# Patient Record
Sex: MALE | Race: WHITE | Employment: OTHER | URBAN - NONMETROPOLITAN AREA
[De-identification: names, ages, dates, MRNs, and addresses within clinical notes are randomized per-mention and may not be internally consistent; named-entity substitution may affect disease eponyms.]

---

## 2017-01-22 DIAGNOSIS — I10 ESSENTIAL HYPERTENSION: ICD-10-CM

## 2017-01-23 RX ORDER — AMLODIPINE BESYLATE 5 MG/1
TABLET ORAL
Qty: 60 TABLET | Refills: 5 | Status: SHIPPED | OUTPATIENT
Start: 2017-01-23 | End: 2017-06-06 | Stop reason: DRUGHIGH

## 2017-03-15 ENCOUNTER — TELEPHONE (OUTPATIENT)
Dept: CARDIOLOGY | Age: 62
End: 2017-03-15

## 2017-04-06 RX ORDER — LISINOPRIL 20 MG/1
20 TABLET ORAL 2 TIMES DAILY
Qty: 60 TABLET | Refills: 3 | Status: SHIPPED | OUTPATIENT
Start: 2017-04-06 | End: 2017-08-17 | Stop reason: SDUPTHER

## 2017-05-12 ENCOUNTER — OFFICE VISIT (OUTPATIENT)
Dept: GASTROENTEROLOGY | Age: 62
End: 2017-05-12
Payer: MEDICARE

## 2017-05-12 VITALS
SYSTOLIC BLOOD PRESSURE: 118 MMHG | WEIGHT: 174.6 LBS | HEIGHT: 67 IN | HEART RATE: 54 BPM | BODY MASS INDEX: 27.4 KG/M2 | DIASTOLIC BLOOD PRESSURE: 70 MMHG | OXYGEN SATURATION: 93 %

## 2017-05-12 DIAGNOSIS — R11.0 NAUSEA: ICD-10-CM

## 2017-05-12 DIAGNOSIS — R10.11 RIGHT UPPER QUADRANT ABDOMINAL PAIN: Primary | ICD-10-CM

## 2017-05-12 PROCEDURE — 99214 OFFICE O/P EST MOD 30 MIN: CPT | Performed by: NURSE PRACTITIONER

## 2017-05-12 RX ORDER — ONDANSETRON 4 MG/1
TABLET, FILM COATED ORAL
COMMUNITY
Start: 2017-05-09 | End: 2017-06-06 | Stop reason: ALTCHOICE

## 2017-05-12 RX ORDER — DICYCLOMINE HCL 20 MG
TABLET ORAL
COMMUNITY
Start: 2017-05-09 | End: 2017-06-06 | Stop reason: ALTCHOICE

## 2017-05-12 RX ORDER — MELOXICAM 15 MG/1
TABLET ORAL
Refills: 5 | COMMUNITY
Start: 2017-04-13 | End: 2018-03-29 | Stop reason: DRUGHIGH

## 2017-05-12 ASSESSMENT — ENCOUNTER SYMPTOMS
BLOOD IN STOOL: 0
SHORTNESS OF BREATH: 0
COUGH: 0
NAUSEA: 1
CONSTIPATION: 0
RECTAL PAIN: 0
ALLERGIC/IMMUNOLOGIC NEGATIVE: 1
TROUBLE SWALLOWING: 0
VOMITING: 0
SORE THROAT: 0
DIARRHEA: 0
BACK PAIN: 1
ABDOMINAL PAIN: 1
WHEEZING: 0
ABDOMINAL DISTENTION: 0
EYE DISCHARGE: 0
RHINORRHEA: 0
ANAL BLEEDING: 0

## 2017-05-16 ENCOUNTER — TELEPHONE (OUTPATIENT)
Dept: GASTROENTEROLOGY | Age: 62
End: 2017-05-16

## 2017-05-18 ENCOUNTER — ANESTHESIA (OUTPATIENT)
Dept: ENDOSCOPY | Age: 62
End: 2017-05-18
Payer: MEDICARE

## 2017-05-18 ENCOUNTER — PREP FOR PROCEDURE (OUTPATIENT)
Dept: SURGERY | Age: 62
End: 2017-05-18

## 2017-05-18 ENCOUNTER — OFFICE VISIT (OUTPATIENT)
Dept: SURGERY | Age: 62
End: 2017-05-18
Payer: MEDICARE

## 2017-05-18 ENCOUNTER — TELEPHONE (OUTPATIENT)
Dept: GASTROENTEROLOGY | Age: 62
End: 2017-05-18

## 2017-05-18 ENCOUNTER — ANESTHESIA EVENT (OUTPATIENT)
Dept: ENDOSCOPY | Age: 62
End: 2017-05-18
Payer: MEDICARE

## 2017-05-18 ENCOUNTER — HOSPITAL ENCOUNTER (OUTPATIENT)
Age: 62
Setting detail: OUTPATIENT SURGERY
Discharge: HOME | End: 2017-05-18
Attending: INTERNAL MEDICINE | Admitting: INTERNAL MEDICINE
Payer: MEDICARE

## 2017-05-18 VITALS
TEMPERATURE: 98.2 F | SYSTOLIC BLOOD PRESSURE: 150 MMHG | DIASTOLIC BLOOD PRESSURE: 80 MMHG | BODY MASS INDEX: 26.46 KG/M2 | WEIGHT: 168.6 LBS | HEIGHT: 67 IN

## 2017-05-18 VITALS
RESPIRATION RATE: 16 BRPM | HEART RATE: 79 BPM | DIASTOLIC BLOOD PRESSURE: 103 MMHG | OXYGEN SATURATION: 95 % | SYSTOLIC BLOOD PRESSURE: 166 MMHG | TEMPERATURE: 97.8 F

## 2017-05-18 VITALS
DIASTOLIC BLOOD PRESSURE: 94 MMHG | SYSTOLIC BLOOD PRESSURE: 177 MMHG | RESPIRATION RATE: 18 BRPM | OXYGEN SATURATION: 91 %

## 2017-05-18 DIAGNOSIS — K81.9 ACALCULOUS CHOLECYSTITIS: Primary | ICD-10-CM

## 2017-05-18 PROCEDURE — 7100000010 HC PHASE II RECOVERY - FIRST 15 MIN: Performed by: INTERNAL MEDICINE

## 2017-05-18 PROCEDURE — 87077 CULTURE AEROBIC IDENTIFY: CPT

## 2017-05-18 PROCEDURE — 3700000000 HC ANESTHESIA ATTENDED CARE: Performed by: INTERNAL MEDICINE

## 2017-05-18 PROCEDURE — 2500000003 HC RX 250 WO HCPCS: Performed by: NURSE ANESTHETIST, CERTIFIED REGISTERED

## 2017-05-18 PROCEDURE — 2500000003 HC RX 250 WO HCPCS: Performed by: INTERNAL MEDICINE

## 2017-05-18 PROCEDURE — 43239 EGD BIOPSY SINGLE/MULTIPLE: CPT | Performed by: INTERNAL MEDICINE

## 2017-05-18 PROCEDURE — 99215 OFFICE O/P EST HI 40 MIN: CPT | Performed by: PHYSICIAN ASSISTANT

## 2017-05-18 PROCEDURE — 2580000003 HC RX 258: Performed by: INTERNAL MEDICINE

## 2017-05-18 PROCEDURE — 7100000011 HC PHASE II RECOVERY - ADDTL 15 MIN: Performed by: INTERNAL MEDICINE

## 2017-05-18 PROCEDURE — 3609012400 HC EGD TRANSORAL BIOPSY SINGLE/MULTIPLE: Performed by: INTERNAL MEDICINE

## 2017-05-18 PROCEDURE — 6360000002 HC RX W HCPCS: Performed by: NURSE ANESTHETIST, CERTIFIED REGISTERED

## 2017-05-18 RX ORDER — ONDANSETRON 2 MG/ML
4 INJECTION INTRAMUSCULAR; INTRAVENOUS
Status: DISCONTINUED | OUTPATIENT
Start: 2017-05-18 | End: 2017-05-18 | Stop reason: HOSPADM

## 2017-05-18 RX ORDER — SODIUM CHLORIDE 0.9 % (FLUSH) 0.9 %
10 SYRINGE (ML) INJECTION PRN
Status: CANCELLED | OUTPATIENT
Start: 2017-05-18

## 2017-05-18 RX ORDER — PROMETHAZINE HYDROCHLORIDE 25 MG/ML
6.25 INJECTION, SOLUTION INTRAMUSCULAR; INTRAVENOUS
Status: DISCONTINUED | OUTPATIENT
Start: 2017-05-18 | End: 2017-05-18 | Stop reason: HOSPADM

## 2017-05-18 RX ORDER — SODIUM CHLORIDE, SODIUM LACTATE, POTASSIUM CHLORIDE, CALCIUM CHLORIDE 600; 310; 30; 20 MG/100ML; MG/100ML; MG/100ML; MG/100ML
INJECTION, SOLUTION INTRAVENOUS CONTINUOUS
Status: CANCELLED | OUTPATIENT
Start: 2017-05-18

## 2017-05-18 RX ORDER — SODIUM CHLORIDE, SODIUM LACTATE, POTASSIUM CHLORIDE, CALCIUM CHLORIDE 600; 310; 30; 20 MG/100ML; MG/100ML; MG/100ML; MG/100ML
INJECTION, SOLUTION INTRAVENOUS CONTINUOUS
Status: DISCONTINUED | OUTPATIENT
Start: 2017-05-18 | End: 2017-05-18 | Stop reason: HOSPADM

## 2017-05-18 RX ORDER — PROPOFOL 10 MG/ML
INJECTION, EMULSION INTRAVENOUS PRN
Status: DISCONTINUED | OUTPATIENT
Start: 2017-05-18 | End: 2017-05-18 | Stop reason: SDUPTHER

## 2017-05-18 RX ORDER — SODIUM CHLORIDE 0.9 % (FLUSH) 0.9 %
10 SYRINGE (ML) INJECTION EVERY 12 HOURS SCHEDULED
Status: CANCELLED | OUTPATIENT
Start: 2017-05-18

## 2017-05-18 RX ORDER — DIPHENHYDRAMINE HYDROCHLORIDE 50 MG/ML
12.5 INJECTION INTRAMUSCULAR; INTRAVENOUS
Status: DISCONTINUED | OUTPATIENT
Start: 2017-05-18 | End: 2017-05-18 | Stop reason: HOSPADM

## 2017-05-18 RX ORDER — LIDOCAINE HYDROCHLORIDE 10 MG/ML
1 INJECTION, SOLUTION EPIDURAL; INFILTRATION; INTRACAUDAL; PERINEURAL ONCE
Status: COMPLETED | OUTPATIENT
Start: 2017-05-18 | End: 2017-05-18

## 2017-05-18 RX ORDER — LIDOCAINE HYDROCHLORIDE 10 MG/ML
INJECTION, SOLUTION EPIDURAL; INFILTRATION; INTRACAUDAL; PERINEURAL PRN
Status: DISCONTINUED | OUTPATIENT
Start: 2017-05-18 | End: 2017-05-18 | Stop reason: SDUPTHER

## 2017-05-18 RX ADMIN — LIDOCAINE HYDROCHLORIDE 1 ML: 10 INJECTION, SOLUTION EPIDURAL; INFILTRATION; INTRACAUDAL; PERINEURAL at 11:26

## 2017-05-18 RX ADMIN — LIDOCAINE HYDROCHLORIDE 5 ML: 10 INJECTION, SOLUTION EPIDURAL; INFILTRATION; INTRACAUDAL; PERINEURAL at 12:07

## 2017-05-18 RX ADMIN — SODIUM CHLORIDE, SODIUM LACTATE, POTASSIUM CHLORIDE, AND CALCIUM CHLORIDE: 600; 310; 30; 20 INJECTION, SOLUTION INTRAVENOUS at 11:25

## 2017-05-18 RX ADMIN — PROPOFOL 200 MG: 10 INJECTION, EMULSION INTRAVENOUS at 12:07

## 2017-05-18 ASSESSMENT — ENCOUNTER SYMPTOMS
ABDOMINAL DISTENTION: 1
NAUSEA: 1
APNEA: 1
WHEEZING: 0
VOMITING: 1
ALLERGIC/IMMUNOLOGIC NEGATIVE: 1
SHORTNESS OF BREATH: 1
SHORTNESS OF BREATH: 1
BACK PAIN: 1
ABDOMINAL PAIN: 1
CONSTIPATION: 1
EYES NEGATIVE: 1

## 2017-05-18 ASSESSMENT — PAIN SCALES - GENERAL: PAINLEVEL_OUTOF10: 9

## 2017-05-18 ASSESSMENT — COPD QUESTIONNAIRES: CAT_SEVERITY: MODERATE

## 2017-05-19 ENCOUNTER — ANESTHESIA EVENT (OUTPATIENT)
Dept: OPERATING ROOM | Age: 62
End: 2017-05-19
Payer: MEDICARE

## 2017-05-19 ENCOUNTER — HOSPITAL ENCOUNTER (OUTPATIENT)
Age: 62
Setting detail: OUTPATIENT SURGERY
Discharge: HOME | End: 2017-05-19
Attending: SURGERY | Admitting: SURGERY
Payer: MEDICARE

## 2017-05-19 ENCOUNTER — TELEPHONE (OUTPATIENT)
Dept: SURGERY | Age: 62
End: 2017-05-19

## 2017-05-19 ENCOUNTER — ANESTHESIA (OUTPATIENT)
Dept: OPERATING ROOM | Age: 62
End: 2017-05-19
Payer: MEDICARE

## 2017-05-19 VITALS
TEMPERATURE: 98.7 F | OXYGEN SATURATION: 94 % | HEIGHT: 67 IN | DIASTOLIC BLOOD PRESSURE: 90 MMHG | WEIGHT: 175 LBS | SYSTOLIC BLOOD PRESSURE: 170 MMHG | HEART RATE: 60 BPM | RESPIRATION RATE: 18 BRPM | BODY MASS INDEX: 27.47 KG/M2

## 2017-05-19 VITALS
DIASTOLIC BLOOD PRESSURE: 83 MMHG | TEMPERATURE: 98.4 F | OXYGEN SATURATION: 95 % | SYSTOLIC BLOOD PRESSURE: 178 MMHG | RESPIRATION RATE: 7 BRPM

## 2017-05-19 DIAGNOSIS — K81.9 ACALCULOUS CHOLECYSTITIS: ICD-10-CM

## 2017-05-19 PROBLEM — K81.1 CHRONIC CHOLECYSTITIS WITHOUT CALCULUS: Chronic | Status: ACTIVE | Noted: 2017-05-19

## 2017-05-19 LAB — CLOTEST: NEGATIVE

## 2017-05-19 PROCEDURE — 47562 LAPAROSCOPIC CHOLECYSTECTOMY: CPT | Performed by: PHYSICIAN ASSISTANT

## 2017-05-19 PROCEDURE — 2500000003 HC RX 250 WO HCPCS: Performed by: ANESTHESIOLOGY

## 2017-05-19 PROCEDURE — 2720000001 HC MISC SURG SUPPLY STERILE $51-500: Performed by: SURGERY

## 2017-05-19 PROCEDURE — 7100000001 HC PACU RECOVERY - ADDTL 15 MIN: Performed by: SURGERY

## 2017-05-19 PROCEDURE — 7100000000 HC PACU RECOVERY - FIRST 15 MIN: Performed by: SURGERY

## 2017-05-19 PROCEDURE — 3700000001 HC ADD 15 MINUTES (ANESTHESIA): Performed by: SURGERY

## 2017-05-19 PROCEDURE — 3600000004 HC SURGERY LEVEL 4 BASE: Performed by: SURGERY

## 2017-05-19 PROCEDURE — 2500000003 HC RX 250 WO HCPCS: Performed by: NURSE ANESTHETIST, CERTIFIED REGISTERED

## 2017-05-19 PROCEDURE — 2580000003 HC RX 258: Performed by: PHYSICIAN ASSISTANT

## 2017-05-19 PROCEDURE — 88304 TISSUE EXAM BY PATHOLOGIST: CPT

## 2017-05-19 PROCEDURE — 6360000002 HC RX W HCPCS: Performed by: NURSE ANESTHETIST, CERTIFIED REGISTERED

## 2017-05-19 PROCEDURE — 7100000010 HC PHASE II RECOVERY - FIRST 15 MIN: Performed by: SURGERY

## 2017-05-19 PROCEDURE — 6360000002 HC RX W HCPCS: Performed by: PHYSICIAN ASSISTANT

## 2017-05-19 PROCEDURE — 3600000014 HC SURGERY LEVEL 4 ADDTL 15MIN: Performed by: SURGERY

## 2017-05-19 PROCEDURE — 3700000000 HC ANESTHESIA ATTENDED CARE: Performed by: SURGERY

## 2017-05-19 PROCEDURE — 7100000011 HC PHASE II RECOVERY - ADDTL 15 MIN: Performed by: SURGERY

## 2017-05-19 PROCEDURE — 2500000003 HC RX 250 WO HCPCS: Performed by: SURGERY

## 2017-05-19 PROCEDURE — 47562 LAPAROSCOPIC CHOLECYSTECTOMY: CPT | Performed by: SURGERY

## 2017-05-19 PROCEDURE — 2720000000 HC MISC SURG SUPPLY STERILE $0-50: Performed by: SURGERY

## 2017-05-19 RX ORDER — MIDAZOLAM HYDROCHLORIDE 1 MG/ML
2 INJECTION INTRAMUSCULAR; INTRAVENOUS
Status: DISCONTINUED | OUTPATIENT
Start: 2017-05-19 | End: 2017-05-19 | Stop reason: HOSPADM

## 2017-05-19 RX ORDER — MIDAZOLAM HYDROCHLORIDE 1 MG/ML
INJECTION INTRAMUSCULAR; INTRAVENOUS PRN
Status: DISCONTINUED | OUTPATIENT
Start: 2017-05-19 | End: 2017-05-19 | Stop reason: SDUPTHER

## 2017-05-19 RX ORDER — SODIUM CHLORIDE, SODIUM LACTATE, POTASSIUM CHLORIDE, CALCIUM CHLORIDE 600; 310; 30; 20 MG/100ML; MG/100ML; MG/100ML; MG/100ML
INJECTION, SOLUTION INTRAVENOUS CONTINUOUS
Status: DISCONTINUED | OUTPATIENT
Start: 2017-05-19 | End: 2017-05-19 | Stop reason: HOSPADM

## 2017-05-19 RX ORDER — LIDOCAINE HYDROCHLORIDE 10 MG/ML
INJECTION, SOLUTION INFILTRATION; PERINEURAL PRN
Status: DISCONTINUED | OUTPATIENT
Start: 2017-05-19 | End: 2017-05-19 | Stop reason: SDUPTHER

## 2017-05-19 RX ORDER — DEXAMETHASONE SODIUM PHOSPHATE 4 MG/ML
INJECTION, SOLUTION INTRA-ARTICULAR; INTRALESIONAL; INTRAMUSCULAR; INTRAVENOUS; SOFT TISSUE PRN
Status: DISCONTINUED | OUTPATIENT
Start: 2017-05-19 | End: 2017-05-19 | Stop reason: SDUPTHER

## 2017-05-19 RX ORDER — MEPERIDINE HYDROCHLORIDE 50 MG/ML
12.5 INJECTION INTRAMUSCULAR; INTRAVENOUS; SUBCUTANEOUS EVERY 5 MIN PRN
Status: DISCONTINUED | OUTPATIENT
Start: 2017-05-19 | End: 2017-05-19 | Stop reason: HOSPADM

## 2017-05-19 RX ORDER — LABETALOL HYDROCHLORIDE 5 MG/ML
5 INJECTION, SOLUTION INTRAVENOUS EVERY 10 MIN PRN
Status: DISCONTINUED | OUTPATIENT
Start: 2017-05-19 | End: 2017-05-19 | Stop reason: HOSPADM

## 2017-05-19 RX ORDER — OXYCODONE AND ACETAMINOPHEN 7.5; 325 MG/1; MG/1
1 TABLET ORAL EVERY 4 HOURS PRN
Status: DISCONTINUED | OUTPATIENT
Start: 2017-05-19 | End: 2017-05-19 | Stop reason: HOSPADM

## 2017-05-19 RX ORDER — OXYCODONE AND ACETAMINOPHEN 7.5; 325 MG/1; MG/1
2 TABLET ORAL EVERY 6 HOURS PRN
Status: DISCONTINUED | OUTPATIENT
Start: 2017-05-19 | End: 2017-05-19 | Stop reason: HOSPADM

## 2017-05-19 RX ORDER — ONDANSETRON 2 MG/ML
INJECTION INTRAMUSCULAR; INTRAVENOUS PRN
Status: DISCONTINUED | OUTPATIENT
Start: 2017-05-19 | End: 2017-05-19 | Stop reason: SDUPTHER

## 2017-05-19 RX ORDER — DIPHENHYDRAMINE HYDROCHLORIDE 50 MG/ML
12.5 INJECTION INTRAMUSCULAR; INTRAVENOUS
Status: DISCONTINUED | OUTPATIENT
Start: 2017-05-19 | End: 2017-05-19 | Stop reason: HOSPADM

## 2017-05-19 RX ORDER — LIDOCAINE HYDROCHLORIDE 10 MG/ML
1 INJECTION, SOLUTION EPIDURAL; INFILTRATION; INTRACAUDAL; PERINEURAL
Status: COMPLETED | OUTPATIENT
Start: 2017-05-19 | End: 2017-05-19

## 2017-05-19 RX ORDER — HYDRALAZINE HYDROCHLORIDE 20 MG/ML
5 INJECTION INTRAMUSCULAR; INTRAVENOUS EVERY 10 MIN PRN
Status: DISCONTINUED | OUTPATIENT
Start: 2017-05-19 | End: 2017-05-19 | Stop reason: HOSPADM

## 2017-05-19 RX ORDER — FENTANYL CITRATE 50 UG/ML
25 INJECTION, SOLUTION INTRAMUSCULAR; INTRAVENOUS
Status: DISCONTINUED | OUTPATIENT
Start: 2017-05-19 | End: 2017-05-19 | Stop reason: HOSPADM

## 2017-05-19 RX ORDER — SODIUM CHLORIDE 0.9 % (FLUSH) 0.9 %
10 SYRINGE (ML) INJECTION EVERY 12 HOURS SCHEDULED
Status: DISCONTINUED | OUTPATIENT
Start: 2017-05-19 | End: 2017-05-19 | Stop reason: HOSPADM

## 2017-05-19 RX ORDER — OXYCODONE AND ACETAMINOPHEN 7.5; 325 MG/1; MG/1
TABLET ORAL
Qty: 15 TABLET | Refills: 0 | Status: SHIPPED | OUTPATIENT
Start: 2017-05-19 | End: 2018-03-29 | Stop reason: DRUGHIGH

## 2017-05-19 RX ORDER — PROPOFOL 10 MG/ML
INJECTION, EMULSION INTRAVENOUS PRN
Status: DISCONTINUED | OUTPATIENT
Start: 2017-05-19 | End: 2017-05-19 | Stop reason: SDUPTHER

## 2017-05-19 RX ORDER — SODIUM CHLORIDE 0.9 % (FLUSH) 0.9 %
10 SYRINGE (ML) INJECTION PRN
Status: DISCONTINUED | OUTPATIENT
Start: 2017-05-19 | End: 2017-05-19 | Stop reason: HOSPADM

## 2017-05-19 RX ORDER — METOCLOPRAMIDE HYDROCHLORIDE 5 MG/ML
10 INJECTION INTRAMUSCULAR; INTRAVENOUS
Status: DISCONTINUED | OUTPATIENT
Start: 2017-05-19 | End: 2017-05-19 | Stop reason: HOSPADM

## 2017-05-19 RX ORDER — FENTANYL CITRATE 50 UG/ML
INJECTION, SOLUTION INTRAMUSCULAR; INTRAVENOUS PRN
Status: DISCONTINUED | OUTPATIENT
Start: 2017-05-19 | End: 2017-05-19 | Stop reason: SDUPTHER

## 2017-05-19 RX ORDER — MORPHINE SULFATE 10 MG/ML
INJECTION, SOLUTION INTRAMUSCULAR; INTRAVENOUS PRN
Status: DISCONTINUED | OUTPATIENT
Start: 2017-05-19 | End: 2017-05-19 | Stop reason: SDUPTHER

## 2017-05-19 RX ORDER — FENTANYL CITRATE 50 UG/ML
50 INJECTION, SOLUTION INTRAMUSCULAR; INTRAVENOUS
Status: DISCONTINUED | OUTPATIENT
Start: 2017-05-19 | End: 2017-05-19 | Stop reason: HOSPADM

## 2017-05-19 RX ORDER — PROMETHAZINE HYDROCHLORIDE 25 MG/ML
6.25 INJECTION, SOLUTION INTRAMUSCULAR; INTRAVENOUS
Status: DISCONTINUED | OUTPATIENT
Start: 2017-05-19 | End: 2017-05-19 | Stop reason: HOSPADM

## 2017-05-19 RX ORDER — ROCURONIUM BROMIDE 10 MG/ML
INJECTION, SOLUTION INTRAVENOUS PRN
Status: DISCONTINUED | OUTPATIENT
Start: 2017-05-19 | End: 2017-05-19 | Stop reason: SDUPTHER

## 2017-05-19 RX ADMIN — LIDOCAINE HYDROCHLORIDE 1 ML: 10 INJECTION, SOLUTION EPIDURAL; INFILTRATION; INTRACAUDAL; PERINEURAL at 11:34

## 2017-05-19 RX ADMIN — MIDAZOLAM HYDROCHLORIDE 1 MG: 1 INJECTION, SOLUTION INTRAMUSCULAR; INTRAVENOUS at 13:28

## 2017-05-19 RX ADMIN — Medication 2 G: at 13:46

## 2017-05-19 RX ADMIN — PHENYLEPHRINE HYDROCHLORIDE 50 MCG: 10 INJECTION INTRAVENOUS at 13:50

## 2017-05-19 RX ADMIN — PROPOFOL 140 MG: 10 INJECTION, EMULSION INTRAVENOUS at 13:35

## 2017-05-19 RX ADMIN — SODIUM CHLORIDE, SODIUM LACTATE, POTASSIUM CHLORIDE, AND CALCIUM CHLORIDE: 600; 310; 30; 20 INJECTION, SOLUTION INTRAVENOUS at 11:34

## 2017-05-19 RX ADMIN — ROCURONIUM BROMIDE 50 MG: 10 INJECTION INTRAVENOUS at 13:35

## 2017-05-19 RX ADMIN — FENTANYL CITRATE 100 MCG: 50 INJECTION INTRAMUSCULAR; INTRAVENOUS at 13:34

## 2017-05-19 RX ADMIN — ONDANSETRON HYDROCHLORIDE 4 MG: 2 INJECTION, SOLUTION INTRAVENOUS at 14:29

## 2017-05-19 RX ADMIN — MORPHINE SULFATE 4 MG: 10 INJECTION INTRAMUSCULAR; INTRAVENOUS; SUBCUTANEOUS at 14:36

## 2017-05-19 RX ADMIN — SODIUM CHLORIDE, SODIUM LACTATE, POTASSIUM CHLORIDE, AND CALCIUM CHLORIDE: 600; 310; 30; 20 INJECTION, SOLUTION INTRAVENOUS at 14:21

## 2017-05-19 RX ADMIN — LIDOCAINE HYDROCHLORIDE 5 ML: 10 INJECTION, SOLUTION INFILTRATION; PERINEURAL at 13:35

## 2017-05-19 RX ADMIN — FENTANYL CITRATE 75 MCG: 50 INJECTION INTRAMUSCULAR; INTRAVENOUS at 14:02

## 2017-05-19 RX ADMIN — DEXAMETHASONE SODIUM PHOSPHATE 5 MG: 4 INJECTION, SOLUTION INTRAMUSCULAR; INTRAVENOUS at 13:45

## 2017-05-19 RX ADMIN — FENTANYL CITRATE 25 MCG: 50 INJECTION INTRAMUSCULAR; INTRAVENOUS at 14:11

## 2017-05-19 RX ADMIN — MIDAZOLAM HYDROCHLORIDE 1 MG: 1 INJECTION, SOLUTION INTRAMUSCULAR; INTRAVENOUS at 13:33

## 2017-05-19 RX ADMIN — SUGAMMADEX 160 MG: 100 INJECTION, SOLUTION INTRAVENOUS at 14:38

## 2017-05-19 ASSESSMENT — PAIN SCALES - GENERAL
PAINLEVEL_OUTOF10: 4
PAINLEVEL_OUTOF10: 3

## 2017-05-19 ASSESSMENT — PAIN DESCRIPTION - PROGRESSION: CLINICAL_PROGRESSION: GRADUALLY IMPROVING

## 2017-05-19 ASSESSMENT — PAIN - FUNCTIONAL ASSESSMENT: PAIN_FUNCTIONAL_ASSESSMENT: 0-10

## 2017-05-22 PROBLEM — K81.9 ACALCULOUS CHOLECYSTITIS: Chronic | Status: ACTIVE | Noted: 2017-05-19

## 2017-06-02 ENCOUNTER — OFFICE VISIT (OUTPATIENT)
Dept: SURGERY | Age: 62
End: 2017-06-02

## 2017-06-02 VITALS — HEART RATE: 52 BPM | DIASTOLIC BLOOD PRESSURE: 70 MMHG | SYSTOLIC BLOOD PRESSURE: 110 MMHG

## 2017-06-02 DIAGNOSIS — K81.9 ACALCULOUS CHOLECYSTITIS: Primary | ICD-10-CM

## 2017-06-02 PROCEDURE — 99024 POSTOP FOLLOW-UP VISIT: CPT | Performed by: PHYSICIAN ASSISTANT

## 2017-06-06 ENCOUNTER — OFFICE VISIT (OUTPATIENT)
Dept: CARDIOLOGY | Age: 62
End: 2017-06-06
Payer: MEDICARE

## 2017-06-06 VITALS
SYSTOLIC BLOOD PRESSURE: 130 MMHG | WEIGHT: 170 LBS | HEART RATE: 50 BPM | HEIGHT: 67 IN | DIASTOLIC BLOOD PRESSURE: 80 MMHG | BODY MASS INDEX: 26.68 KG/M2

## 2017-06-06 DIAGNOSIS — I10 ESSENTIAL HYPERTENSION: ICD-10-CM

## 2017-06-06 DIAGNOSIS — I25.10 CORONARY ARTERY DISEASE INVOLVING NATIVE CORONARY ARTERY OF NATIVE HEART WITHOUT ANGINA PECTORIS: Primary | ICD-10-CM

## 2017-06-06 DIAGNOSIS — E78.2 MIXED HYPERLIPIDEMIA: ICD-10-CM

## 2017-06-06 PROCEDURE — 99214 OFFICE O/P EST MOD 30 MIN: CPT | Performed by: NURSE PRACTITIONER

## 2017-06-06 RX ORDER — AMLODIPINE BESYLATE 5 MG/1
5 TABLET ORAL 2 TIMES DAILY
COMMUNITY
End: 2017-06-06 | Stop reason: ALTCHOICE

## 2017-06-06 RX ORDER — AMLODIPINE BESYLATE 10 MG/1
10 TABLET ORAL 2 TIMES DAILY
Qty: 60 TABLET | Refills: 5 | Status: SHIPPED | OUTPATIENT
Start: 2017-06-06 | End: 2017-09-29 | Stop reason: SDUPTHER

## 2017-06-06 RX ORDER — AMLODIPINE BESYLATE 5 MG/1
10 TABLET ORAL 2 TIMES DAILY
COMMUNITY
End: 2017-06-06 | Stop reason: DRUGHIGH

## 2017-06-06 RX ORDER — OMEPRAZOLE 20 MG/1
20 CAPSULE, DELAYED RELEASE ORAL DAILY
COMMUNITY
End: 2018-11-12

## 2017-06-06 RX ORDER — METOPROLOL TARTRATE 50 MG/1
23 TABLET, FILM COATED ORAL 2 TIMES DAILY
COMMUNITY
End: 2017-06-24 | Stop reason: SDUPTHER

## 2017-06-26 RX ORDER — METOPROLOL TARTRATE 50 MG/1
TABLET, FILM COATED ORAL
Qty: 60 TABLET | Refills: 5 | Status: ON HOLD | OUTPATIENT
Start: 2017-06-26 | End: 2017-08-13 | Stop reason: DRUGHIGH

## 2017-08-13 ENCOUNTER — HOSPITAL ENCOUNTER (OUTPATIENT)
Age: 62
Setting detail: OBSERVATION
Discharge: HOME | End: 2017-08-16
Attending: EMERGENCY MEDICINE | Admitting: INTERNAL MEDICINE
Payer: MEDICARE

## 2017-08-13 ENCOUNTER — APPOINTMENT (OUTPATIENT)
Dept: NUCLEAR MEDICINE | Age: 62
End: 2017-08-13
Payer: MEDICARE

## 2017-08-13 ENCOUNTER — APPOINTMENT (OUTPATIENT)
Dept: GENERAL RADIOLOGY | Age: 62
End: 2017-08-13
Payer: MEDICARE

## 2017-08-13 DIAGNOSIS — R07.89 CHEST DISCOMFORT: Primary | ICD-10-CM

## 2017-08-13 LAB
ALBUMIN SERPL-MCNC: 4.3 G/DL (ref 3.5–5.2)
ALP BLD-CCNC: 99 U/L (ref 40–130)
ALT SERPL-CCNC: 14 U/L (ref 5–41)
ANION GAP SERPL CALCULATED.3IONS-SCNC: 13 MMOL/L (ref 7–19)
AST SERPL-CCNC: 21 U/L (ref 5–40)
BASOPHILS ABSOLUTE: 0.1 K/UL (ref 0–0.2)
BASOPHILS RELATIVE PERCENT: 0.8 % (ref 0–1)
BILIRUB SERPL-MCNC: 0.5 MG/DL (ref 0.2–1.2)
BUN BLDV-MCNC: 15 MG/DL (ref 8–23)
CALCIUM SERPL-MCNC: 9.5 MG/DL (ref 8.8–10.2)
CHLORIDE BLD-SCNC: 99 MMOL/L (ref 98–111)
CO2: 25 MMOL/L (ref 22–29)
CREAT SERPL-MCNC: 0.8 MG/DL (ref 0.5–1.2)
D DIMER: 1.19 NG/ML DDU (ref 0–0.48)
EOSINOPHILS ABSOLUTE: 0.2 K/UL (ref 0–0.6)
EOSINOPHILS RELATIVE PERCENT: 2.6 % (ref 0–5)
GFR NON-AFRICAN AMERICAN: >60
GLUCOSE BLD-MCNC: 87 MG/DL (ref 74–109)
HCT VFR BLD CALC: 39.9 % (ref 42–52)
HEMOGLOBIN: 13.3 G/DL (ref 14–18)
LYMPHOCYTES ABSOLUTE: 1.9 K/UL (ref 1.1–4.5)
LYMPHOCYTES RELATIVE PERCENT: 29.7 % (ref 20–40)
MCH RBC QN AUTO: 32 PG (ref 27–31)
MCHC RBC AUTO-ENTMCNC: 33.3 G/DL (ref 33–37)
MCV RBC AUTO: 95.9 FL (ref 80–94)
MONOCYTES ABSOLUTE: 0.8 K/UL (ref 0–0.9)
MONOCYTES RELATIVE PERCENT: 12.1 % (ref 0–10)
NEUTROPHILS ABSOLUTE: 3.6 K/UL (ref 1.5–7.5)
NEUTROPHILS RELATIVE PERCENT: 54.6 % (ref 50–65)
PDW BLD-RTO: 13.7 % (ref 11.5–14.5)
PERFORMED ON: NORMAL
PERFORMED ON: NORMAL
PLATELET # BLD: 193 K/UL (ref 130–400)
PMV BLD AUTO: 9.8 FL (ref 9.4–12.4)
POC TROPONIN I: 0 NG/ML (ref 0–0.08)
POC TROPONIN I: 0 NG/ML (ref 0–0.08)
POTASSIUM SERPL-SCNC: 4.1 MMOL/L (ref 3.5–5)
RBC # BLD: 4.16 M/UL (ref 4.7–6.1)
SODIUM BLD-SCNC: 137 MMOL/L (ref 136–145)
TOTAL PROTEIN: 7.5 G/DL (ref 6.6–8.7)
TROPONIN: <0.01 NG/ML (ref 0–0.03)
WBC # BLD: 6.5 K/UL (ref 4.8–10.8)

## 2017-08-13 PROCEDURE — 85025 COMPLETE CBC W/AUTO DIFF WBC: CPT

## 2017-08-13 PROCEDURE — 6360000002 HC RX W HCPCS: Performed by: EMERGENCY MEDICINE

## 2017-08-13 PROCEDURE — 85379 FIBRIN DEGRADATION QUANT: CPT

## 2017-08-13 PROCEDURE — 80053 COMPREHEN METABOLIC PANEL: CPT

## 2017-08-13 PROCEDURE — G0378 HOSPITAL OBSERVATION PER HR: HCPCS

## 2017-08-13 PROCEDURE — 71010 XR CHEST PORTABLE: CPT

## 2017-08-13 PROCEDURE — A9540 TC99M MAA: HCPCS | Performed by: EMERGENCY MEDICINE

## 2017-08-13 PROCEDURE — 2580000003 HC RX 258: Performed by: INTERNAL MEDICINE

## 2017-08-13 PROCEDURE — 96374 THER/PROPH/DIAG INJ IV PUSH: CPT

## 2017-08-13 PROCEDURE — 36415 COLL VENOUS BLD VENIPUNCTURE: CPT

## 2017-08-13 PROCEDURE — 78582 LUNG VENTILAT&PERFUS IMAGING: CPT

## 2017-08-13 PROCEDURE — A9558 XE133 XENON 10MCI: HCPCS | Performed by: EMERGENCY MEDICINE

## 2017-08-13 PROCEDURE — 3430000000 HC RX DIAGNOSTIC RADIOPHARMACEUTICAL: Performed by: EMERGENCY MEDICINE

## 2017-08-13 PROCEDURE — 99284 EMERGENCY DEPT VISIT MOD MDM: CPT | Performed by: EMERGENCY MEDICINE

## 2017-08-13 PROCEDURE — 99285 EMERGENCY DEPT VISIT HI MDM: CPT

## 2017-08-13 PROCEDURE — 93005 ELECTROCARDIOGRAM TRACING: CPT

## 2017-08-13 PROCEDURE — 84484 ASSAY OF TROPONIN QUANT: CPT

## 2017-08-13 PROCEDURE — 6370000000 HC RX 637 (ALT 250 FOR IP): Performed by: EMERGENCY MEDICINE

## 2017-08-13 RX ORDER — SODIUM CHLORIDE 0.9 % (FLUSH) 0.9 %
10 SYRINGE (ML) INJECTION PRN
Status: DISCONTINUED | OUTPATIENT
Start: 2017-08-13 | End: 2017-08-15

## 2017-08-13 RX ORDER — ACETAMINOPHEN 325 MG/1
650 TABLET ORAL EVERY 4 HOURS PRN
Status: DISCONTINUED | OUTPATIENT
Start: 2017-08-13 | End: 2017-08-16 | Stop reason: HOSPADM

## 2017-08-13 RX ORDER — METOPROLOL TARTRATE 50 MG/1
25 TABLET, FILM COATED ORAL 2 TIMES DAILY
COMMUNITY
End: 2017-09-28 | Stop reason: DRUGHIGH

## 2017-08-13 RX ORDER — LEFLUNOMIDE 20 MG/1
20 TABLET ORAL DAILY
Status: DISCONTINUED | OUTPATIENT
Start: 2017-08-13 | End: 2017-08-16 | Stop reason: HOSPADM

## 2017-08-13 RX ORDER — GABAPENTIN 400 MG/1
800 CAPSULE ORAL 3 TIMES DAILY
Status: DISCONTINUED | OUTPATIENT
Start: 2017-08-13 | End: 2017-08-14 | Stop reason: SDUPTHER

## 2017-08-13 RX ORDER — SODIUM CHLORIDE 0.9 % (FLUSH) 0.9 %
10 SYRINGE (ML) INJECTION EVERY 12 HOURS SCHEDULED
Status: DISCONTINUED | OUTPATIENT
Start: 2017-08-13 | End: 2017-08-15

## 2017-08-13 RX ORDER — METOPROLOL TARTRATE 50 MG/1
25 TABLET, FILM COATED ORAL 2 TIMES DAILY
Status: DISCONTINUED | OUTPATIENT
Start: 2017-08-14 | End: 2017-08-16 | Stop reason: HOSPADM

## 2017-08-13 RX ORDER — ASPIRIN 81 MG/1
81 TABLET ORAL DAILY
Status: DISCONTINUED | OUTPATIENT
Start: 2017-08-14 | End: 2017-08-16 | Stop reason: HOSPADM

## 2017-08-13 RX ORDER — PANTOPRAZOLE SODIUM 40 MG/1
40 TABLET, DELAYED RELEASE ORAL
Status: DISCONTINUED | OUTPATIENT
Start: 2017-08-14 | End: 2017-08-14

## 2017-08-13 RX ORDER — ONDANSETRON 2 MG/ML
4 INJECTION INTRAMUSCULAR; INTRAVENOUS EVERY 6 HOURS PRN
Status: DISCONTINUED | OUTPATIENT
Start: 2017-08-13 | End: 2017-08-16 | Stop reason: HOSPADM

## 2017-08-13 RX ORDER — LISINOPRIL 20 MG/1
20 TABLET ORAL 2 TIMES DAILY
Status: DISCONTINUED | OUTPATIENT
Start: 2017-08-13 | End: 2017-08-16 | Stop reason: HOSPADM

## 2017-08-13 RX ORDER — ASPIRIN 81 MG/1
324 TABLET, CHEWABLE ORAL ONCE
Status: COMPLETED | OUTPATIENT
Start: 2017-08-13 | End: 2017-08-13

## 2017-08-13 RX ORDER — AMLODIPINE BESYLATE 10 MG/1
10 TABLET ORAL 2 TIMES DAILY
Status: DISCONTINUED | OUTPATIENT
Start: 2017-08-13 | End: 2017-08-16 | Stop reason: HOSPADM

## 2017-08-13 RX ORDER — NITROGLYCERIN 0.4 MG/1
0.4 TABLET SUBLINGUAL EVERY 5 MIN PRN
Status: DISCONTINUED | OUTPATIENT
Start: 2017-08-13 | End: 2017-08-16 | Stop reason: HOSPADM

## 2017-08-13 RX ORDER — MORPHINE SULFATE 4 MG/ML
4 INJECTION, SOLUTION INTRAMUSCULAR; INTRAVENOUS ONCE
Status: COMPLETED | OUTPATIENT
Start: 2017-08-13 | End: 2017-08-13

## 2017-08-13 RX ORDER — METOPROLOL TARTRATE 50 MG/1
50 TABLET, FILM COATED ORAL 2 TIMES DAILY
Status: DISCONTINUED | OUTPATIENT
Start: 2017-08-13 | End: 2017-08-13

## 2017-08-13 RX ADMIN — Medication 5 MILLICURIE: at 16:36

## 2017-08-13 RX ADMIN — ASPIRIN 81 MG CHEWABLE TABLET 324 MG: 81 TABLET CHEWABLE at 13:55

## 2017-08-13 RX ADMIN — Medication 10 ML: at 21:55

## 2017-08-13 RX ADMIN — Medication 10 MILLICURIE: at 16:36

## 2017-08-13 RX ADMIN — MORPHINE SULFATE 4 MG: 4 INJECTION, SOLUTION INTRAMUSCULAR; INTRAVENOUS at 13:55

## 2017-08-13 ASSESSMENT — ENCOUNTER SYMPTOMS
ABDOMINAL PAIN: 0
BACK PAIN: 0
SHORTNESS OF BREATH: 1
VOMITING: 0
NAUSEA: 0
COUGH: 0
HEARTBURN: 1

## 2017-08-13 ASSESSMENT — PAIN DESCRIPTION - LOCATION
LOCATION: CHEST
LOCATION: CHEST

## 2017-08-13 ASSESSMENT — PAIN DESCRIPTION - PAIN TYPE
TYPE: ACUTE PAIN
TYPE: ACUTE PAIN

## 2017-08-13 ASSESSMENT — PAIN SCALES - GENERAL
PAINLEVEL_OUTOF10: 3
PAINLEVEL_OUTOF10: 6

## 2017-08-14 LAB
ALBUMIN SERPL-MCNC: 3.9 G/DL (ref 3.5–5.2)
ALP BLD-CCNC: 125 U/L (ref 40–130)
ALT SERPL-CCNC: 31 U/L (ref 5–41)
ANION GAP SERPL CALCULATED.3IONS-SCNC: 11 MMOL/L (ref 7–19)
AST SERPL-CCNC: 37 U/L (ref 5–40)
BASOPHILS ABSOLUTE: 0.1 K/UL (ref 0–0.2)
BASOPHILS RELATIVE PERCENT: 1.1 % (ref 0–1)
BILIRUB SERPL-MCNC: 0.4 MG/DL (ref 0.2–1.2)
BUN BLDV-MCNC: 12 MG/DL (ref 8–23)
CALCIUM SERPL-MCNC: 9.4 MG/DL (ref 8.8–10.2)
CHLORIDE BLD-SCNC: 103 MMOL/L (ref 98–111)
CHOLESTEROL, TOTAL: 210 MG/DL (ref 160–199)
CO2: 27 MMOL/L (ref 22–29)
CREAT SERPL-MCNC: 0.7 MG/DL (ref 0.5–1.2)
EOSINOPHILS ABSOLUTE: 0.2 K/UL (ref 0–0.6)
EOSINOPHILS RELATIVE PERCENT: 3.8 % (ref 0–5)
GFR NON-AFRICAN AMERICAN: >60
GLUCOSE BLD-MCNC: 103 MG/DL (ref 74–109)
HCT VFR BLD CALC: 40.5 % (ref 42–52)
HDLC SERPL-MCNC: 78 MG/DL (ref 55–121)
HEMOGLOBIN: 13.2 G/DL (ref 14–18)
INR BLD: 0.92 (ref 0.88–1.18)
LDL CHOLESTEROL CALCULATED: 116 MG/DL
LV EF: 58 %
LVEF MODALITY: NORMAL
LYMPHOCYTES ABSOLUTE: 1.5 K/UL (ref 1.1–4.5)
LYMPHOCYTES RELATIVE PERCENT: 27.9 % (ref 20–40)
MCH RBC QN AUTO: 31.4 PG (ref 27–31)
MCHC RBC AUTO-ENTMCNC: 32.6 G/DL (ref 33–37)
MCV RBC AUTO: 96.4 FL (ref 80–94)
MONOCYTES ABSOLUTE: 0.6 K/UL (ref 0–0.9)
MONOCYTES RELATIVE PERCENT: 11.7 % (ref 0–10)
NEUTROPHILS ABSOLUTE: 2.9 K/UL (ref 1.5–7.5)
NEUTROPHILS RELATIVE PERCENT: 55.3 % (ref 50–65)
PDW BLD-RTO: 13.8 % (ref 11.5–14.5)
PLATELET # BLD: 192 K/UL (ref 130–400)
PMV BLD AUTO: 10 FL (ref 9.4–12.4)
POTASSIUM SERPL-SCNC: 4.5 MMOL/L (ref 3.5–5)
PROTHROMBIN TIME: 12.3 SEC (ref 12–14.6)
RBC # BLD: 4.2 M/UL (ref 4.7–6.1)
SODIUM BLD-SCNC: 141 MMOL/L (ref 136–145)
TOTAL PROTEIN: 7.1 G/DL (ref 6.6–8.7)
TRIGL SERPL-MCNC: 80 MG/DL (ref 150–199)
TROPONIN: <0.01 NG/ML (ref 0–0.03)
TROPONIN: <0.01 NG/ML (ref 0–0.03)
WBC # BLD: 5.2 K/UL (ref 4.8–10.8)

## 2017-08-14 PROCEDURE — 6370000000 HC RX 637 (ALT 250 FOR IP): Performed by: INTERNAL MEDICINE

## 2017-08-14 PROCEDURE — 80061 LIPID PANEL: CPT

## 2017-08-14 PROCEDURE — 84484 ASSAY OF TROPONIN QUANT: CPT

## 2017-08-14 PROCEDURE — 93005 ELECTROCARDIOGRAM TRACING: CPT

## 2017-08-14 PROCEDURE — 85025 COMPLETE CBC W/AUTO DIFF WBC: CPT

## 2017-08-14 PROCEDURE — 36415 COLL VENOUS BLD VENIPUNCTURE: CPT

## 2017-08-14 PROCEDURE — 85610 PROTHROMBIN TIME: CPT

## 2017-08-14 PROCEDURE — 2580000003 HC RX 258: Performed by: INTERNAL MEDICINE

## 2017-08-14 PROCEDURE — 80053 COMPREHEN METABOLIC PANEL: CPT

## 2017-08-14 PROCEDURE — 99220 PR INITIAL OBSERVATION CARE/DAY 70 MINUTES: CPT | Performed by: INTERNAL MEDICINE

## 2017-08-14 PROCEDURE — G0378 HOSPITAL OBSERVATION PER HR: HCPCS

## 2017-08-14 PROCEDURE — 93306 TTE W/DOPPLER COMPLETE: CPT

## 2017-08-14 RX ORDER — GABAPENTIN 800 MG/1
800 TABLET ORAL 3 TIMES DAILY
Status: DISCONTINUED | OUTPATIENT
Start: 2017-08-14 | End: 2017-08-16 | Stop reason: HOSPADM

## 2017-08-14 RX ORDER — FAMOTIDINE 20 MG/1
20 TABLET, FILM COATED ORAL 2 TIMES DAILY
Status: COMPLETED | OUTPATIENT
Start: 2017-08-14 | End: 2017-08-15

## 2017-08-14 RX ORDER — SODIUM CHLORIDE 9 MG/ML
INJECTION, SOLUTION INTRAVENOUS CONTINUOUS
Status: DISCONTINUED | OUTPATIENT
Start: 2017-08-14 | End: 2017-08-15

## 2017-08-14 RX ORDER — DIPHENHYDRAMINE HCL 25 MG
50 TABLET ORAL
Status: COMPLETED | OUTPATIENT
Start: 2017-08-14 | End: 2017-08-15

## 2017-08-14 RX ORDER — PREDNISONE 20 MG/1
40 TABLET ORAL
Status: COMPLETED | OUTPATIENT
Start: 2017-08-14 | End: 2017-08-15

## 2017-08-14 RX ORDER — NICOTINE POLACRILEX 4 MG/1
20 GUM, CHEWING ORAL DAILY
Status: DISCONTINUED | OUTPATIENT
Start: 2017-08-14 | End: 2017-08-15

## 2017-08-14 RX ORDER — DIPHENHYDRAMINE HCL 25 MG
50 TABLET ORAL 4 TIMES DAILY
Status: DISCONTINUED | OUTPATIENT
Start: 2017-08-14 | End: 2017-08-14

## 2017-08-14 RX ADMIN — AMLODIPINE BESYLATE 10 MG: 10 TABLET ORAL at 20:11

## 2017-08-14 RX ADMIN — Medication 10 ML: at 20:17

## 2017-08-14 RX ADMIN — METOPROLOL TARTRATE 25 MG: 50 TABLET, FILM COATED ORAL at 20:11

## 2017-08-14 RX ADMIN — GABAPENTIN 800 MG: 800 TABLET ORAL at 08:37

## 2017-08-14 RX ADMIN — PREDNISONE 40 MG: 20 TABLET ORAL at 22:58

## 2017-08-14 RX ADMIN — FAMOTIDINE 20 MG: 20 TABLET ORAL at 20:16

## 2017-08-14 RX ADMIN — SODIUM CHLORIDE: 9 INJECTION, SOLUTION INTRAVENOUS at 11:55

## 2017-08-14 RX ADMIN — LEFLUNOMIDE 20 MG: 20 TABLET ORAL at 08:34

## 2017-08-14 RX ADMIN — NICOTINE POLACRILEX 20 MG: 4 GUM, CHEWING ORAL at 08:35

## 2017-08-14 RX ADMIN — LISINOPRIL 20 MG: 20 TABLET ORAL at 08:34

## 2017-08-14 RX ADMIN — PREDNISONE 40 MG: 20 TABLET ORAL at 20:17

## 2017-08-14 RX ADMIN — ASPIRIN 81 MG: 81 TABLET ORAL at 08:34

## 2017-08-14 RX ADMIN — GABAPENTIN 800 MG: 800 TABLET ORAL at 16:51

## 2017-08-14 RX ADMIN — AMLODIPINE BESYLATE 10 MG: 10 TABLET ORAL at 08:34

## 2017-08-14 RX ADMIN — DIPHENHYDRAMINE HCL 50 MG: 25 TABLET ORAL at 11:55

## 2017-08-14 RX ADMIN — PREDNISONE 40 MG: 20 TABLET ORAL at 16:47

## 2017-08-14 RX ADMIN — FAMOTIDINE 20 MG: 20 TABLET ORAL at 11:55

## 2017-08-14 RX ADMIN — PREDNISONE 40 MG: 20 TABLET ORAL at 11:54

## 2017-08-14 RX ADMIN — DIPHENHYDRAMINE HCL 50 MG: 25 TABLET ORAL at 22:58

## 2017-08-14 RX ADMIN — GABAPENTIN 800 MG: 800 TABLET ORAL at 20:16

## 2017-08-14 RX ADMIN — SODIUM CHLORIDE: 9 INJECTION, SOLUTION INTRAVENOUS at 22:58

## 2017-08-14 RX ADMIN — DIPHENHYDRAMINE HCL 50 MG: 25 TABLET ORAL at 16:47

## 2017-08-14 RX ADMIN — Medication 10 ML: at 08:37

## 2017-08-14 RX ADMIN — DIPHENHYDRAMINE HCL 50 MG: 25 TABLET ORAL at 20:16

## 2017-08-14 RX ADMIN — LISINOPRIL 20 MG: 20 TABLET ORAL at 20:11

## 2017-08-14 ASSESSMENT — PAIN SCALES - GENERAL: PAINLEVEL_OUTOF10: 0

## 2017-08-15 LAB
BASE EXCESS ARTERIAL: 1 (ref -3–3)
BASE EXCESS VENOUS: 1
BASE EXCESS VENOUS: 2
CALCIUM IONIZED: 1.07 MMOL/L (ref 1.1–1.3)
CALCIUM IONIZED: 1.16 MMOL/L (ref 1.1–1.3)
CALCIUM IONIZED: 1.16 MMOL/L (ref 1.1–1.3)
CO2: 26 MEQ/L (ref 21–32)
CO2: 26 MEQ/L (ref 21–32)
CO2: 27 MEQ/L (ref 21–32)
GFR NON-AFRICAN AMERICAN: >60
GLUCOSE BLD-MCNC: 134 MG/DL (ref 70–99)
GLUCOSE BLD-MCNC: 135 MG/DL (ref 70–99)
GLUCOSE BLD-MCNC: 138 MG/DL (ref 70–99)
HEMOGLOBIN: 13.3 GM/DL (ref 12–18)
HEMOGLOBIN: 13.5 GM/DL (ref 12–18)
HEMOGLOBIN: 13.6 GM/DL (ref 12–18)
O2 SAT, ARTERIAL: 94 % (ref 93–100)
O2 SAT, VEN: 69 %
O2 SAT, VEN: 93 %
PCO2 ARTERIAL: 40 MM HG (ref 35–48)
PCO2, VEN: 38.8 MM HG (ref 40–50)
PCO2, VEN: 44.2 MM HG (ref 40–50)
PERFORMED ON: ABNORMAL
PH ARTERIAL: 7.41 (ref 7.3–7.5)
PH VENOUS: 7.39
PH VENOUS: 7.43
PO2 ARTERIAL: 69 MM HG (ref 83–108)
PO2, VEN: 36.6 MM HG
PO2, VEN: 63.2 MM HG
POC ANION GAP: 7
POC ANION GAP: 7
POC ANION GAP: 8
POC CHLORIDE: 110 MEQ/L (ref 99–110)
POC CHLORIDE: 111 MEQ/L (ref 99–110)
POC CHLORIDE: 111 MEQ/L (ref 99–110)
POC CREATININE: 0.7 MG/DL (ref 0.3–1.3)
POC CREATININE: 0.7 MG/DL (ref 0.3–1.3)
POC CREATININE: 0.8 MG/DL (ref 0.3–1.3)
POC HEMATOCRIT: 39 % (ref 37–52)
POC HEMATOCRIT: 40 % (ref 37–52)
POC HEMATOCRIT: 40 % (ref 37–52)
POC POTASSIUM: 3.3 MEQ/L (ref 3.5–5.1)
POC SAMPLE TYPE: ABNORMAL
POC SODIUM: 144 MEQ/L (ref 136–145)
POC SODIUM: 144 MEQ/L (ref 136–145)
POC SODIUM: 145 MEQ/L (ref 136–145)
TCO2 ARTERIAL: 27 MMOL/L
TCO2 CALC VENOUS: 27 MMOL/L
TCO2 CALC VENOUS: 28 MMOL/L

## 2017-08-15 PROCEDURE — 82800 BLOOD PH: CPT

## 2017-08-15 PROCEDURE — 6370000000 HC RX 637 (ALT 250 FOR IP): Performed by: INTERNAL MEDICINE

## 2017-08-15 PROCEDURE — 93460 R&L HRT ART/VENTRICLE ANGIO: CPT | Performed by: INTERNAL MEDICINE

## 2017-08-15 PROCEDURE — 82948 REAGENT STRIP/BLOOD GLUCOSE: CPT

## 2017-08-15 PROCEDURE — 93567 NJX CAR CTH SPRVLV AORTGRPHY: CPT | Performed by: INTERNAL MEDICINE

## 2017-08-15 PROCEDURE — 99999 PR OFFICE/OUTPT VISIT,PROCEDURE ONLY: CPT | Performed by: INTERNAL MEDICINE

## 2017-08-15 PROCEDURE — C1887 CATHETER, GUIDING: HCPCS

## 2017-08-15 PROCEDURE — 6360000002 HC RX W HCPCS

## 2017-08-15 PROCEDURE — 2709999900 HC NON-CHARGEABLE SUPPLY

## 2017-08-15 PROCEDURE — 84132 ASSAY OF SERUM POTASSIUM: CPT

## 2017-08-15 PROCEDURE — S0028 INJECTION, FAMOTIDINE, 20 MG: HCPCS

## 2017-08-15 PROCEDURE — 85014 HEMATOCRIT: CPT

## 2017-08-15 PROCEDURE — G0378 HOSPITAL OBSERVATION PER HR: HCPCS

## 2017-08-15 PROCEDURE — 2720000001 HC MISC SURG SUPPLY STERILE $51-500

## 2017-08-15 PROCEDURE — C1751 CATH, INF, PER/CENT/MIDLINE: HCPCS

## 2017-08-15 PROCEDURE — 82435 ASSAY OF BLOOD CHLORIDE: CPT

## 2017-08-15 PROCEDURE — C1769 GUIDE WIRE: HCPCS

## 2017-08-15 PROCEDURE — 84295 ASSAY OF SERUM SODIUM: CPT

## 2017-08-15 PROCEDURE — 82330 ASSAY OF CALCIUM: CPT

## 2017-08-15 PROCEDURE — 82565 ASSAY OF CREATININE: CPT

## 2017-08-15 PROCEDURE — C1894 INTRO/SHEATH, NON-LASER: HCPCS

## 2017-08-15 PROCEDURE — 82374 ASSAY BLOOD CARBON DIOXIDE: CPT

## 2017-08-15 PROCEDURE — 82810 BLOOD GASES O2 SAT ONLY: CPT

## 2017-08-15 PROCEDURE — 2500000003 HC RX 250 WO HCPCS

## 2017-08-15 PROCEDURE — C1760 CLOSURE DEV, VASC: HCPCS

## 2017-08-15 RX ORDER — SODIUM CHLORIDE 0.9 % (FLUSH) 0.9 %
10 SYRINGE (ML) INJECTION PRN
Status: DISCONTINUED | OUTPATIENT
Start: 2017-08-15 | End: 2017-08-16 | Stop reason: HOSPADM

## 2017-08-15 RX ORDER — SODIUM CHLORIDE 0.9 % (FLUSH) 0.9 %
10 SYRINGE (ML) INJECTION EVERY 12 HOURS SCHEDULED
Status: DISCONTINUED | OUTPATIENT
Start: 2017-08-15 | End: 2017-08-16 | Stop reason: HOSPADM

## 2017-08-15 RX ORDER — ATORVASTATIN CALCIUM 20 MG/1
20 TABLET, FILM COATED ORAL NIGHTLY
Status: DISCONTINUED | OUTPATIENT
Start: 2017-08-15 | End: 2017-08-16 | Stop reason: HOSPADM

## 2017-08-15 RX ORDER — SODIUM CHLORIDE 9 MG/ML
INJECTION, SOLUTION INTRAVENOUS CONTINUOUS
Status: ACTIVE | OUTPATIENT
Start: 2017-08-15 | End: 2017-08-16

## 2017-08-15 RX ADMIN — LEFLUNOMIDE 20 MG: 20 TABLET ORAL at 08:18

## 2017-08-15 RX ADMIN — GABAPENTIN 800 MG: 800 TABLET ORAL at 20:26

## 2017-08-15 RX ADMIN — METOPROLOL TARTRATE 25 MG: 50 TABLET, FILM COATED ORAL at 08:18

## 2017-08-15 RX ADMIN — GABAPENTIN 800 MG: 800 TABLET ORAL at 08:31

## 2017-08-15 RX ADMIN — ATORVASTATIN CALCIUM 20 MG: 20 TABLET, FILM COATED ORAL at 20:26

## 2017-08-15 RX ADMIN — DIPHENHYDRAMINE HCL 50 MG: 25 TABLET ORAL at 06:05

## 2017-08-15 RX ADMIN — PREDNISONE 40 MG: 20 TABLET ORAL at 06:05

## 2017-08-15 RX ADMIN — LISINOPRIL 20 MG: 20 TABLET ORAL at 20:27

## 2017-08-15 RX ADMIN — LISINOPRIL 20 MG: 20 TABLET ORAL at 08:18

## 2017-08-15 RX ADMIN — AMLODIPINE BESYLATE 10 MG: 10 TABLET ORAL at 08:18

## 2017-08-15 RX ADMIN — METOPROLOL TARTRATE 25 MG: 50 TABLET, FILM COATED ORAL at 20:28

## 2017-08-15 RX ADMIN — ASPIRIN 81 MG: 81 TABLET ORAL at 08:18

## 2017-08-15 RX ADMIN — NICOTINE POLACRILEX 20 MG: 4 GUM, CHEWING ORAL at 08:18

## 2017-08-15 RX ADMIN — FAMOTIDINE 20 MG: 20 TABLET ORAL at 08:31

## 2017-08-15 RX ADMIN — AMLODIPINE BESYLATE 10 MG: 10 TABLET ORAL at 20:27

## 2017-08-15 ASSESSMENT — PAIN SCALES - GENERAL
PAINLEVEL_OUTOF10: 0

## 2017-08-16 VITALS
HEIGHT: 67 IN | SYSTOLIC BLOOD PRESSURE: 120 MMHG | HEART RATE: 67 BPM | OXYGEN SATURATION: 92 % | WEIGHT: 174 LBS | TEMPERATURE: 98.4 F | RESPIRATION RATE: 14 BRPM | BODY MASS INDEX: 27.31 KG/M2 | DIASTOLIC BLOOD PRESSURE: 74 MMHG

## 2017-08-16 LAB
ANION GAP SERPL CALCULATED.3IONS-SCNC: 13 MMOL/L (ref 7–19)
BUN BLDV-MCNC: 13 MG/DL (ref 8–23)
CALCIUM SERPL-MCNC: 8.9 MG/DL (ref 8.8–10.2)
CHLORIDE BLD-SCNC: 105 MMOL/L (ref 98–111)
CO2: 26 MMOL/L (ref 22–29)
CREAT SERPL-MCNC: 0.6 MG/DL (ref 0.5–1.2)
GFR NON-AFRICAN AMERICAN: >60
GLUCOSE BLD-MCNC: 113 MG/DL (ref 74–109)
HCT VFR BLD CALC: 36.1 % (ref 42–52)
HEMOGLOBIN: 11.7 G/DL (ref 14–18)
MCH RBC QN AUTO: 31.6 PG (ref 27–31)
MCHC RBC AUTO-ENTMCNC: 32.4 G/DL (ref 33–37)
MCV RBC AUTO: 97.6 FL (ref 80–94)
PDW BLD-RTO: 13.7 % (ref 11.5–14.5)
PLATELET # BLD: 185 K/UL (ref 130–400)
PMV BLD AUTO: 9.9 FL (ref 9.4–12.4)
POTASSIUM SERPL-SCNC: 3.8 MMOL/L (ref 3.5–5)
RBC # BLD: 3.7 M/UL (ref 4.7–6.1)
SODIUM BLD-SCNC: 144 MMOL/L (ref 136–145)
WBC # BLD: 12.7 K/UL (ref 4.8–10.8)

## 2017-08-16 PROCEDURE — G0378 HOSPITAL OBSERVATION PER HR: HCPCS

## 2017-08-16 PROCEDURE — 36415 COLL VENOUS BLD VENIPUNCTURE: CPT

## 2017-08-16 PROCEDURE — 99217 PR OBSERVATION CARE DISCHARGE MANAGEMENT: CPT | Performed by: INTERNAL MEDICINE

## 2017-08-16 PROCEDURE — 2580000003 HC RX 258: Performed by: INTERNAL MEDICINE

## 2017-08-16 PROCEDURE — 85027 COMPLETE CBC AUTOMATED: CPT

## 2017-08-16 PROCEDURE — 80048 BASIC METABOLIC PNL TOTAL CA: CPT

## 2017-08-16 RX ORDER — ATORVASTATIN CALCIUM 20 MG/1
20 TABLET, FILM COATED ORAL NIGHTLY
Qty: 30 TABLET | Refills: 3 | Status: SHIPPED | OUTPATIENT
Start: 2017-08-16 | End: 2017-09-29 | Stop reason: SDUPTHER

## 2017-08-16 RX ADMIN — AMLODIPINE BESYLATE 10 MG: 10 TABLET ORAL at 08:38

## 2017-08-16 RX ADMIN — GABAPENTIN 800 MG: 800 TABLET ORAL at 08:38

## 2017-08-16 RX ADMIN — Medication 10 ML: at 08:44

## 2017-08-16 RX ADMIN — LEFLUNOMIDE 20 MG: 20 TABLET ORAL at 08:42

## 2017-08-16 RX ADMIN — METOPROLOL TARTRATE 25 MG: 50 TABLET, FILM COATED ORAL at 08:39

## 2017-08-16 RX ADMIN — ASPIRIN 81 MG: 81 TABLET ORAL at 08:43

## 2017-08-16 RX ADMIN — LISINOPRIL 20 MG: 20 TABLET ORAL at 08:43

## 2017-08-16 ASSESSMENT — PAIN SCALES - GENERAL: PAINLEVEL_OUTOF10: 0

## 2017-08-17 RX ORDER — LISINOPRIL 20 MG/1
TABLET ORAL
Qty: 60 TABLET | Refills: 5 | Status: SHIPPED | OUTPATIENT
Start: 2017-08-17 | End: 2017-10-11 | Stop reason: SDUPTHER

## 2017-09-28 ENCOUNTER — OFFICE VISIT (OUTPATIENT)
Dept: CARDIOLOGY | Age: 62
End: 2017-09-28
Payer: MEDICARE

## 2017-09-28 VITALS
HEART RATE: 48 BPM | HEIGHT: 67 IN | BODY MASS INDEX: 26.68 KG/M2 | SYSTOLIC BLOOD PRESSURE: 110 MMHG | DIASTOLIC BLOOD PRESSURE: 62 MMHG | WEIGHT: 170 LBS

## 2017-09-28 DIAGNOSIS — I25.10 ATHEROSCLEROSIS OF NATIVE CORONARY ARTERY OF NATIVE HEART WITHOUT ANGINA PECTORIS: Primary | ICD-10-CM

## 2017-09-28 DIAGNOSIS — I35.0 NONRHEUMATIC AORTIC VALVE STENOSIS: ICD-10-CM

## 2017-09-28 DIAGNOSIS — I10 ESSENTIAL HYPERTENSION: ICD-10-CM

## 2017-09-28 DIAGNOSIS — R06.02 SHORTNESS OF BREATH: ICD-10-CM

## 2017-09-28 PROCEDURE — 99213 OFFICE O/P EST LOW 20 MIN: CPT | Performed by: CLINICAL NURSE SPECIALIST

## 2017-09-28 RX ORDER — METOPROLOL TARTRATE 50 MG/1
TABLET, FILM COATED ORAL
COMMUNITY
End: 2018-06-04 | Stop reason: SDUPTHER

## 2017-09-29 RX ORDER — ATORVASTATIN CALCIUM 20 MG/1
20 TABLET, FILM COATED ORAL NIGHTLY
Qty: 90 TABLET | Refills: 2 | Status: SHIPPED | OUTPATIENT
Start: 2017-09-29 | End: 2018-03-29 | Stop reason: DRUGHIGH

## 2017-09-29 RX ORDER — AMLODIPINE BESYLATE 10 MG/1
10 TABLET ORAL 2 TIMES DAILY
Qty: 180 TABLET | Refills: 2 | Status: SHIPPED | OUTPATIENT
Start: 2017-09-29 | End: 2018-07-12 | Stop reason: SDUPTHER

## 2017-09-29 ASSESSMENT — ENCOUNTER SYMPTOMS
ORTHOPNEA: 0
BLURRED VISION: 0
HEARTBURN: 0
NAUSEA: 0
VOMITING: 0
COUGH: 0
SHORTNESS OF BREATH: 1

## 2017-10-11 RX ORDER — LISINOPRIL 20 MG/1
TABLET ORAL
Qty: 180 TABLET | Refills: 2 | Status: SHIPPED | OUTPATIENT
Start: 2017-10-11 | End: 2018-09-19 | Stop reason: SDUPTHER

## 2017-12-12 ENCOUNTER — TELEPHONE (OUTPATIENT)
Dept: CARDIOLOGY | Age: 62
End: 2017-12-12

## 2017-12-15 ENCOUNTER — TELEPHONE (OUTPATIENT)
Dept: CARDIOLOGY | Age: 62
End: 2017-12-15

## 2017-12-15 NOTE — TELEPHONE ENCOUNTER
Called pt as requested and informed him that he has been cleared for surgery in January and that he does not need any testing. Pt v/u. Told pt I will fax a letter to Dr. Núñez [place] office.

## 2017-12-15 NOTE — TELEPHONE ENCOUNTER
Notification of Approved Cardiac Clearance    A surgical clearance request was initiated for this patient for the following procedure: knee surgery    After considering the patient's currently state of cardiac health, this patient has been risk to move forward with the surgical procedure indicated. Please note a letter has been forwarded to the office of Dr. Kavin Garcia to make them aware.

## 2018-01-29 LAB
ALBUMIN SERPL-MCNC: 3.6 G/DL (ref 3.5–5.2)
ALP BLD-CCNC: 118 U/L (ref 40–130)
ALT SERPL-CCNC: 10 U/L (ref 5–41)
ANION GAP SERPL CALCULATED.3IONS-SCNC: 15 MMOL/L (ref 7–19)
AST SERPL-CCNC: 15 U/L (ref 5–40)
BASOPHILS ABSOLUTE: 0.1 K/UL (ref 0–0.2)
BASOPHILS RELATIVE PERCENT: 1.2 % (ref 0–1)
BILIRUB SERPL-MCNC: <0.2 MG/DL (ref 0.2–1.2)
BUN BLDV-MCNC: 16 MG/DL (ref 8–23)
CALCIUM SERPL-MCNC: 9.1 MG/DL (ref 8.8–10.2)
CHLORIDE BLD-SCNC: 100 MMOL/L (ref 98–111)
CO2: 25 MMOL/L (ref 22–29)
CREAT SERPL-MCNC: 0.8 MG/DL (ref 0.5–1.2)
EOSINOPHILS ABSOLUTE: 0.3 K/UL (ref 0–0.6)
EOSINOPHILS RELATIVE PERCENT: 4 % (ref 0–5)
GFR NON-AFRICAN AMERICAN: >60
GLUCOSE BLD-MCNC: 122 MG/DL (ref 74–109)
HCT VFR BLD CALC: 35.8 % (ref 42–52)
HEMOGLOBIN: 11.1 G/DL (ref 14–18)
LYMPHOCYTES ABSOLUTE: 1.9 K/UL (ref 1.1–4.5)
LYMPHOCYTES RELATIVE PERCENT: 27.6 % (ref 20–40)
MCH RBC QN AUTO: 29.8 PG (ref 27–31)
MCHC RBC AUTO-ENTMCNC: 31 G/DL (ref 33–37)
MCV RBC AUTO: 96 FL (ref 80–94)
MONOCYTES ABSOLUTE: 0.7 K/UL (ref 0–0.9)
MONOCYTES RELATIVE PERCENT: 10.3 % (ref 0–10)
NEUTROPHILS ABSOLUTE: 3.8 K/UL (ref 1.5–7.5)
NEUTROPHILS RELATIVE PERCENT: 56.6 % (ref 50–65)
PDW BLD-RTO: 13.4 % (ref 11.5–14.5)
PLATELET # BLD: 207 K/UL (ref 130–400)
PMV BLD AUTO: 9.3 FL (ref 9.4–12.4)
POTASSIUM SERPL-SCNC: 4.4 MMOL/L (ref 3.5–5)
RBC # BLD: 3.73 M/UL (ref 4.7–6.1)
SEDIMENTATION RATE, ERYTHROCYTE: 28 MM/HR (ref 0–15)
SODIUM BLD-SCNC: 140 MMOL/L (ref 136–145)
TOTAL PROTEIN: 6.7 G/DL (ref 6.6–8.7)
WBC # BLD: 6.7 K/UL (ref 4.8–10.8)

## 2018-03-29 ENCOUNTER — OFFICE VISIT (OUTPATIENT)
Dept: CARDIOLOGY | Age: 63
End: 2018-03-29
Payer: MEDICARE

## 2018-03-29 VITALS
WEIGHT: 172 LBS | DIASTOLIC BLOOD PRESSURE: 62 MMHG | HEIGHT: 66 IN | SYSTOLIC BLOOD PRESSURE: 100 MMHG | BODY MASS INDEX: 27.64 KG/M2 | HEART RATE: 60 BPM

## 2018-03-29 DIAGNOSIS — I25.10 ARTERIOSCLEROTIC HEART DISEASE: ICD-10-CM

## 2018-03-29 DIAGNOSIS — I35.0 NONRHEUMATIC AORTIC VALVE STENOSIS: Primary | ICD-10-CM

## 2018-03-29 DIAGNOSIS — I35.0 MODERATE AORTIC STENOSIS: ICD-10-CM

## 2018-03-29 DIAGNOSIS — I35.1 MODERATE AORTIC REGURGITATION: ICD-10-CM

## 2018-03-29 PROCEDURE — 99213 OFFICE O/P EST LOW 20 MIN: CPT | Performed by: INTERNAL MEDICINE

## 2018-03-29 RX ORDER — OXYCODONE AND ACETAMINOPHEN 7.5; 325 MG/1; MG/1
1 TABLET ORAL 4 TIMES DAILY
Status: ON HOLD | COMMUNITY
End: 2019-12-14 | Stop reason: HOSPADM

## 2018-03-29 RX ORDER — TAMSULOSIN HYDROCHLORIDE 0.4 MG/1
0.4 CAPSULE ORAL DAILY
COMMUNITY
End: 2018-11-12

## 2018-03-29 RX ORDER — MELOXICAM 15 MG/1
15 TABLET ORAL DAILY
COMMUNITY
End: 2019-01-20 | Stop reason: ALTCHOICE

## 2018-03-29 RX ORDER — ATORVASTATIN CALCIUM 20 MG/1
20 TABLET, FILM COATED ORAL DAILY
COMMUNITY
End: 2018-08-06 | Stop reason: SDUPTHER

## 2018-03-30 ENCOUNTER — TELEPHONE (OUTPATIENT)
Dept: CARDIOLOGY | Age: 63
End: 2018-03-30

## 2018-04-04 PROBLEM — I35.0 MODERATE AORTIC STENOSIS: Status: ACTIVE | Noted: 2017-08-14

## 2018-04-04 PROBLEM — I35.1 MODERATE AORTIC REGURGITATION: Status: ACTIVE | Noted: 2017-08-14

## 2018-04-05 ENCOUNTER — HOSPITAL ENCOUNTER (OUTPATIENT)
Dept: NON INVASIVE DIAGNOSTICS | Age: 63
Discharge: HOME | End: 2018-04-05
Payer: MEDICARE

## 2018-04-05 DIAGNOSIS — I35.0 NONRHEUMATIC AORTIC VALVE STENOSIS: ICD-10-CM

## 2018-04-05 LAB
LV EF: 58 %
LVEF MODALITY: NORMAL

## 2018-04-05 PROCEDURE — 93306 TTE W/DOPPLER COMPLETE: CPT | Performed by: INTERNAL MEDICINE

## 2018-04-05 PROCEDURE — 93306 TTE W/DOPPLER COMPLETE: CPT

## 2018-04-12 ENCOUNTER — CLINICAL DOCUMENTATION (OUTPATIENT)
Dept: CARDIOLOGY | Age: 63
End: 2018-04-12

## 2018-06-04 RX ORDER — METOPROLOL TARTRATE 50 MG/1
TABLET, FILM COATED ORAL
Qty: 180 TABLET | Refills: 3 | Status: SHIPPED | OUTPATIENT
Start: 2018-06-04 | End: 2018-08-07 | Stop reason: DRUGHIGH

## 2018-07-08 LAB
EKG P AXIS: 70 DEGREES
EKG P AXIS: 71 DEGREES
EKG P AXIS: 77 DEGREES
EKG P AXIS: 78 DEGREES
EKG P-R INTERVAL: 209 MS
EKG P-R INTERVAL: 224 MS
EKG P-R INTERVAL: 243 MS
EKG P-R INTERVAL: 243 MS
EKG Q-T INTERVAL: 408 MS
EKG Q-T INTERVAL: 410 MS
EKG Q-T INTERVAL: 411 MS
EKG Q-T INTERVAL: 412 MS
EKG QRS DURATION: 102 MS
EKG QRS DURATION: 96 MS
EKG QRS DURATION: 96 MS
EKG QRS DURATION: 99 MS
EKG QTC CALCULATION (BAZETT): 365 MS
EKG QTC CALCULATION (BAZETT): 375 MS
EKG QTC CALCULATION (BAZETT): 376 MS
EKG QTC CALCULATION (BAZETT): 389 MS
EKG T AXIS: 39 DEGREES
EKG T AXIS: 53 DEGREES
EKG T AXIS: 54 DEGREES
EKG T AXIS: 61 DEGREES

## 2018-07-12 RX ORDER — AMLODIPINE BESYLATE 10 MG/1
TABLET ORAL
Qty: 180 TABLET | Refills: 3 | Status: SHIPPED | OUTPATIENT
Start: 2018-07-12 | End: 2018-07-16 | Stop reason: DRUGHIGH

## 2018-07-16 ENCOUNTER — OFFICE VISIT (OUTPATIENT)
Dept: CARDIOLOGY | Age: 63
End: 2018-07-16
Payer: MEDICARE

## 2018-07-16 VITALS
HEIGHT: 66 IN | BODY MASS INDEX: 28.28 KG/M2 | DIASTOLIC BLOOD PRESSURE: 62 MMHG | WEIGHT: 176 LBS | SYSTOLIC BLOOD PRESSURE: 98 MMHG | HEART RATE: 60 BPM

## 2018-07-16 DIAGNOSIS — Z95.5 S/P CORONARY ARTERY STENT PLACEMENT: ICD-10-CM

## 2018-07-16 DIAGNOSIS — I35.0 MODERATE AORTIC STENOSIS: ICD-10-CM

## 2018-07-16 DIAGNOSIS — E78.2 MIXED HYPERLIPIDEMIA: ICD-10-CM

## 2018-07-16 DIAGNOSIS — I95.89 OTHER SPECIFIED HYPOTENSION: Primary | ICD-10-CM

## 2018-07-16 DIAGNOSIS — I25.10 CORONARY ARTERY DISEASE INVOLVING NATIVE CORONARY ARTERY OF NATIVE HEART WITHOUT ANGINA PECTORIS: ICD-10-CM

## 2018-07-16 DIAGNOSIS — I35.1 MODERATE AORTIC REGURGITATION: ICD-10-CM

## 2018-07-16 DIAGNOSIS — I10 ESSENTIAL HYPERTENSION: ICD-10-CM

## 2018-07-16 PROBLEM — M06.9 RHEUMATOID ARTHRITIS (HCC): Status: ACTIVE | Noted: 2017-12-19

## 2018-07-16 PROBLEM — G62.9 NEUROPATHY: Status: ACTIVE | Noted: 2017-12-19

## 2018-07-16 PROBLEM — M10.9 GOUT: Status: ACTIVE | Noted: 2017-12-19

## 2018-07-16 PROBLEM — M17.10 ARTHRITIS OF KNEE: Status: ACTIVE | Noted: 2018-07-16

## 2018-07-16 PROBLEM — K21.9 GASTROESOPHAGEAL REFLUX DISEASE: Status: ACTIVE | Noted: 2017-12-19

## 2018-07-16 PROCEDURE — 99213 OFFICE O/P EST LOW 20 MIN: CPT | Performed by: NURSE PRACTITIONER

## 2018-07-16 RX ORDER — AMLODIPINE BESYLATE 5 MG/1
5 TABLET ORAL DAILY
Qty: 30 TABLET | Refills: 0
Start: 2018-07-16 | End: 2018-11-16 | Stop reason: DRUGHIGH

## 2018-07-16 NOTE — PROGRESS NOTES
Dear Drs. No ref. provider found & Chloe Burks,    Thank you for allowing me to participate in the care of Mr. Deanna Aranda. He presents today at the [address] [place] [address] in the [facility]. As you know, Mr. Selena Lima is a 60 y.o. male with history of hyperlipidemia, Mod AS/AR, MI, COPD, GERD and CAD-stents who presents with the chief complaint of hypotension. He is a patient of Dr. Sushma Cazares. Complaints of hypotension. BP today 98/62 HR 60. On norvasc 10 mg daily, Lisinopril 20 mg daily, lopressor 25 mg BID. Last seen in March by Dr. Sushma Cazares. BP at that time was 100/62. He cut back the lisinopril and norvasc himself about a week ago. Before he was low in evening and high in the morning. On July 11th he was 66/44, 71/54. Otherwise he has been 80-90s/ 40-50s. He has a few readings in the low 119U systolic. He is fatigue and has been dizzy/ lightheaded. He otherwise denies chest pain, SOA, HURLEY, PND, orthopnea or syncope. He has no other complaints. Review of Systems    Constitutional: Negative for fever, chills, diaphoresis, activity change, appetite change,  and unexpected weight change. +fatigue  Eyes: Negative for photophobia, pain, redness and visual disturbance. Respiratory: Negative for apnea, cough, chest tightness, + shortness of breath-stable, wheezing and stridor. Cardiovascular: Negative for chest pain, palpitations and leg swelling. Gastrointestinal: Negative for abdominal distention. Genitourinary: Negative for dysuria, urgency and frequency. Musculoskeletal: Negative for myalgias, arthralgias and gait problem. Skin: Negative for color change, pallor, rash and wound. Neurological: Negative for tremors, speech difficulty, weakness and numbness. +dizziness  Hematological: Does not bruise/bleed easily. Psychiatric/Behavioral: Negative.         Past Medical History:   Diagnosis Date    Arthritis     Bronchitis     Cancer ([facility] [address]

## 2018-07-31 LAB
ALBUMIN SERPL-MCNC: 4.4 G/DL (ref 3.5–5.2)
ALP BLD-CCNC: 111 U/L (ref 40–130)
ALT SERPL-CCNC: 13 U/L (ref 5–41)
ANION GAP SERPL CALCULATED.3IONS-SCNC: 15 MMOL/L (ref 7–19)
AST SERPL-CCNC: 21 U/L (ref 5–40)
BASOPHILS ABSOLUTE: 0.1 K/UL (ref 0–0.2)
BASOPHILS RELATIVE PERCENT: 1 % (ref 0–1)
BILIRUB SERPL-MCNC: 0.5 MG/DL (ref 0.2–1.2)
BUN BLDV-MCNC: 15 MG/DL (ref 8–23)
CALCIUM SERPL-MCNC: 9.5 MG/DL (ref 8.8–10.2)
CHLORIDE BLD-SCNC: 101 MMOL/L (ref 98–111)
CO2: 26 MMOL/L (ref 22–29)
CREAT SERPL-MCNC: 0.8 MG/DL (ref 0.5–1.2)
EOSINOPHILS ABSOLUTE: 0.2 K/UL (ref 0–0.6)
EOSINOPHILS RELATIVE PERCENT: 4.2 % (ref 0–5)
GFR NON-AFRICAN AMERICAN: >60
GLUCOSE BLD-MCNC: 142 MG/DL (ref 74–109)
HCT VFR BLD CALC: 39.6 % (ref 42–52)
HEMOGLOBIN: 12.4 G/DL (ref 14–18)
LYMPHOCYTES ABSOLUTE: 1.6 K/UL (ref 1.1–4.5)
LYMPHOCYTES RELATIVE PERCENT: 27.4 % (ref 20–40)
MCH RBC QN AUTO: 30.3 PG (ref 27–31)
MCHC RBC AUTO-ENTMCNC: 31.3 G/DL (ref 33–37)
MCV RBC AUTO: 96.8 FL (ref 80–94)
MONOCYTES ABSOLUTE: 0.6 K/UL (ref 0–0.9)
MONOCYTES RELATIVE PERCENT: 11 % (ref 0–10)
NEUTROPHILS ABSOLUTE: 3.2 K/UL (ref 1.5–7.5)
NEUTROPHILS RELATIVE PERCENT: 56.2 % (ref 50–65)
PDW BLD-RTO: 13.7 % (ref 11.5–14.5)
PLATELET # BLD: 170 K/UL (ref 130–400)
PMV BLD AUTO: 9.9 FL (ref 9.4–12.4)
POTASSIUM SERPL-SCNC: 4.6 MMOL/L (ref 3.5–5)
RBC # BLD: 4.09 M/UL (ref 4.7–6.1)
SEDIMENTATION RATE, ERYTHROCYTE: 19 MM/HR (ref 0–15)
SODIUM BLD-SCNC: 142 MMOL/L (ref 136–145)
TOTAL PROTEIN: 7.3 G/DL (ref 6.6–8.7)
WBC # BLD: 5.7 K/UL (ref 4.8–10.8)

## 2018-08-06 RX ORDER — ATORVASTATIN CALCIUM 20 MG/1
20 TABLET, FILM COATED ORAL NIGHTLY
Qty: 90 TABLET | Refills: 0 | Status: SHIPPED | OUTPATIENT
Start: 2018-08-06 | End: 2018-11-28 | Stop reason: SDUPTHER

## 2018-08-07 ENCOUNTER — OFFICE VISIT (OUTPATIENT)
Dept: CARDIOLOGY | Age: 63
End: 2018-08-07
Payer: MEDICARE

## 2018-08-07 VITALS
HEART RATE: 56 BPM | WEIGHT: 177 LBS | BODY MASS INDEX: 28.45 KG/M2 | SYSTOLIC BLOOD PRESSURE: 102 MMHG | DIASTOLIC BLOOD PRESSURE: 62 MMHG | HEIGHT: 66 IN

## 2018-08-07 DIAGNOSIS — I10 ESSENTIAL HYPERTENSION: Primary | ICD-10-CM

## 2018-08-07 PROCEDURE — 99213 OFFICE O/P EST LOW 20 MIN: CPT | Performed by: NURSE PRACTITIONER

## 2018-08-07 NOTE — PROGRESS NOTES
for tremors, speech difficulty, weakness and numbness. +dizziness-improved  Hematological: Does not bruise/bleed easily. Psychiatric/Behavioral: Negative. Past Medical History:   Diagnosis Date    Arthritis     Bronchitis     Cancer ([facility] [address]     Skin Cancer    Chronic cholecystitis without calculus 5/19/2017    Chronic GERD     COPD (chronic obstructive pulmonary disease) (HCC)     Coronary atherosclerosis of native coronary artery     s/p PTCA and stent placement to the LAD and RCA    History of tobacco abuse 2010    Hyperlipidemia     MI (myocardial infarction) ([facility] [address]     Old, inferior wall    Moderate aortic regurgitation 08/14/2017    mod-severe AR.  Moderate aortic stenosis 08/14/2017    mod-severe aortic stenosis. Past Surgical History:   Procedure Laterality Date    BACK SURGERY      s/p laminectomy    [facility]  [facility]/10[facility]04 [facility]      [facility]    CARD[facility]ATION  08/29/03 [facility]    [facility]ARD[facility]  [facility]/16/01 [facility]    CARDIAC CATHETERIZATION  05/25/2009    EF is estimated to be 50%. See scanned document.  CARDIAC CATHETERIZATION N/A 03/2016    stent placement    CARDIAC CATHETERIZATION  08/2017    no PCI    CHOLECYSTECTOMY, LAPAROSCOPIC N/A 5/19/2017    CHOLECYSTECTOMY LAPAROSCOPIC performed by Sahra Barry MD at [address] [place] [address] COLONOSCOPY  05/28/2015    Dr. Bridgette Aguilar  3/16    stent to LAD    EGD  05/18/2017    dr Santi Frausto      Left thumb    JOINT REPLACEMENT Right     JOINT REPLACEMENT      KNEE SURGERY      s/p Rt.  knee replacement    LEG SURGERY Right     Broken leg with surgical repair    [place] EGD TRANSORAL BIOPSY SINGLE/MULTIPLE N/A 5/18/2017    Dr Joana Aguilar, Amelie (-), biliary colic, surgical referral    UPPER GASTROINTESTINAL ENDOSCOPY  03/30/2015    Dr. Eda Santamaria       Family History   Problem Relation Age of Onset   

## 2018-08-23 ENCOUNTER — TELEPHONE (OUTPATIENT)
Dept: CARDIOLOGY | Age: 63
End: 2018-08-23

## 2018-08-24 NOTE — TELEPHONE ENCOUNTER
Called and spoke to patient's wife (she said he cannot hear well on the phone). I let her know what Robert Conroy thought of his BP log readings. The wife did say that his blood pressure bottoms out around 2:00 and that is why he checks it then. She verified that he is taking norvasc 5mg daily, lisinopril 20mg nightly, and lopressor 25 mg BID. Let patient's wife know to call if she needed anything.

## 2018-08-28 ENCOUNTER — HOSPITAL ENCOUNTER (OUTPATIENT)
Dept: GENERAL RADIOLOGY | Age: 63
Discharge: HOME | End: 2018-08-28
Payer: MEDICARE

## 2018-08-28 ENCOUNTER — OFFICE VISIT (OUTPATIENT)
Dept: FAMILY MEDICINE CLINIC | Age: 63
End: 2018-08-28
Payer: MEDICARE

## 2018-08-28 VITALS
HEIGHT: 67 IN | HEART RATE: 52 BPM | DIASTOLIC BLOOD PRESSURE: 72 MMHG | RESPIRATION RATE: 16 BRPM | SYSTOLIC BLOOD PRESSURE: 130 MMHG | OXYGEN SATURATION: 97 % | WEIGHT: 175.8 LBS | BODY MASS INDEX: 27.59 KG/M2 | TEMPERATURE: 97.9 F

## 2018-08-28 DIAGNOSIS — M54.50 ACUTE LEFT-SIDED LOW BACK PAIN WITHOUT SCIATICA: Primary | ICD-10-CM

## 2018-08-28 DIAGNOSIS — M54.50 ACUTE LEFT-SIDED LOW BACK PAIN WITHOUT SCIATICA: ICD-10-CM

## 2018-08-28 DIAGNOSIS — Z87.442 HISTORY OF RENAL STONE: ICD-10-CM

## 2018-08-28 DIAGNOSIS — R10.32 ABDOMINAL PAIN, LEFT LOWER QUADRANT: ICD-10-CM

## 2018-08-28 DIAGNOSIS — M54.50 LOW BACK PAIN WITHOUT SCIATICA, UNSPECIFIED BACK PAIN LATERALITY, UNSPECIFIED CHRONICITY: ICD-10-CM

## 2018-08-28 LAB
BILIRUBIN URINE: NEGATIVE
BLOOD, URINE: NEGATIVE
CLARITY: CLEAR
COLOR: YELLOW
GLUCOSE URINE: NEGATIVE MG/DL
KETONES, URINE: NEGATIVE MG/DL
LEUKOCYTE ESTERASE, URINE: NEGATIVE
NITRITE, URINE: NEGATIVE
PH UA: 6
PROTEIN UA: NEGATIVE MG/DL
SPECIFIC GRAVITY UA: <=1.005
URINE REFLEX TO CULTURE: NORMAL
URINE TYPE: NORMAL
UROBILINOGEN, URINE: 0.2 E.U./DL

## 2018-08-28 PROCEDURE — 99213 OFFICE O/P EST LOW 20 MIN: CPT | Performed by: FAMILY MEDICINE

## 2018-08-28 PROCEDURE — 74176 CT ABD & PELVIS W/O CONTRAST: CPT

## 2018-08-28 ASSESSMENT — ENCOUNTER SYMPTOMS
VOMITING: 0
CONSTIPATION: 0
DIARRHEA: 0
NAUSEA: 0
SHORTNESS OF BREATH: 0
COUGH: 0
BACK PAIN: 1
RHINORRHEA: 0
ABDOMINAL PAIN: 1

## 2018-08-28 ASSESSMENT — PATIENT HEALTH QUESTIONNAIRE - PHQ9
1. LITTLE INTEREST OR PLEASURE IN DOING THINGS: 0
SUM OF ALL RESPONSES TO PHQ QUESTIONS 1-9: 0
SUM OF ALL RESPONSES TO PHQ QUESTIONS 1-9: 0
SUM OF ALL RESPONSES TO PHQ9 QUESTIONS 1 & 2: 0
2. FEELING DOWN, DEPRESSED OR HOPELESS: 0

## 2018-08-28 NOTE — PATIENT INSTRUCTIONS
Patient Education        Learning About Diet for Kidney Stone Prevention  What are kidney stones? Kidney stones are made of salts and minerals in the urine that form small \"guy. \" Stones can form in the kidneys and the ureters (the tubes that lead from the kidneys to the bladder). They can also form in the bladder. Stones may not cause a problem as long as they stay in the kidneys. But they can cause sudden, severe pain. Pain is most likely when the stones travel from the kidneys to the bladder. Kidney stones can cause bloody urine. Kidney stones often run in families. You are more likely to get them if you don't drink enough fluids, mainly water. Certain foods and drinks and some dietary supplements may also increase your risk for kidney stones if you consume too much of them. What can you do to prevent kidney stones? Changing what you eat may not prevent all types of kidney stones. But for people who have a history of certain kinds of kidney stones, some changes in diet may help. A dietitian can help you set up a meal plan that includes healthy, low-oxalate choices. Here are some general guidelines to get you started. Plan your meals and snacks around foods that are low in oxalate. These foods include:  · Corn, kale, parsnips, and squash,. · Beef, chicken, pork, turkey, and fish. · Milk, butter, cheese, and yogurt. You can eat certain foods that are medium-high in oxalate, but eat them only once in a while. These foods include:  · Bread. · Brown rice. · English muffins. · Figs. · Popcorn. · String beans. · Tomatoes. Limit very high-oxalate foods, including:  · Black tea. · Coffee. · Chocolate. · Dark green vegetables. · Nuts. Here are some other things you can do to help prevent kidney stones. · Drink plenty of fluids. If you have kidney, heart, or liver disease and have to limit fluids, talk with your doctor before you increase the amount of fluids you drink.   · Do not take more than the recommended daily dose of vitamins C and D.  · Limit the salt in your diet. · Eat a balanced diet that is not too high in protein. Follow-up care is a key part of your treatment and safety. Be sure to make and go to all appointments, and call your doctor if you are having problems. It's also a good idea to know your test results and keep a list of the medicines you take. Where can you learn more? Go to https://BlinpickpemichiSpecifiedByeb.Mobitto. org and sign in to your [employer] account. Enter C138 in the The Daily Caller box to learn more about \"Learning About Diet for Kidney Stone Prevention. \"     If you do not have an account, please click on the \"Sign Up Now\" link. Current as of: May 12, 2017  Content Version: 11.7  © 7297-6076 [employer]. Care instructions adapted under license by [facility] ([facility]). If you have questions about a medical condition or this instruction, always ask your healthcare professional. [place] [address] any warranty or liability for your use of this information.

## 2018-08-28 NOTE — PROGRESS NOTES
Negative for gait problem. Skin: Negative for rash and wound. Psychiatric/Behavioral: Negative for dysphoric mood. The patient is not nervous/anxious. Past Medical History:   Diagnosis Date    Arthritis     Bronchitis     Cancer ([facility] [address]     Skin Cancer    Chronic cholecystitis without calculus 5/19/2017    Chronic GERD     COPD (chronic obstructive pulmonary disease) (HCC)     Coronary atherosclerosis of native coronary artery     s/p PTCA and stent placement to the LAD and RCA    History of tobacco abuse 2010    Hyperlipidemia     MI (myocardial infarction) ([facility] [address]     Old, inferior wall    Moderate aortic regurgitation 08/14/2017    mod-severe AR.  Moderate aortic stenosis 08/14/2017    mod-severe aortic stenosis. Current Outpatient Prescriptions   Medication Sig Dispense Refill    metoprolol tartrate (LOPRESSOR) 25 MG tablet Take 1 tablet by mouth 2 times daily 60 tablet 3    atorvastatin (LIPITOR) 20 MG tablet TAKE 1 TABLET BY MOUTH NIGHTLY 90 tablet 0    amLODIPine (NORVASC) 5 MG tablet Take 1 tablet by mouth daily 30 tablet 0    oxyCODONE-acetaminophen (PERCOCET) 7.5-325 MG per tablet Take 1 tablet by mouth 4 times daily.  meloxicam (MOBIC) 15 MG tablet Take 1/2 tablet daily      tamsulosin (FLOMAX) 0.4 MG capsule Take 0.4 mg by mouth daily      lisinopril (PRINIVIL;ZESTRIL) 20 MG tablet TAKE 1 TABLET BY MOUTH TWICE A DAY (Patient taking differently: daily TAKE 1 TABLET BY MOUTH TWICE A DAY) 180 tablet 2    omeprazole (PRILOSEC) 20 MG delayed release capsule Take 20 mg by mouth daily      gabapentin (NEURONTIN) 800 MG tablet Take 800 mg by mouth 2 times daily .  leflunomide (ARAVA) 20 MG tablet Take 20 mg by mouth daily       nitroGLYCERIN (NITROSTAT) 0.4 MG SL tablet Place 1 tablet under the tongue every 5 minutes as needed 1 tablet as needed 25 tablet 3    folic acid (FOLVITE) 1 MG tablet Take 1 mg by mouth daily.       aspirin 81 MG tablet Take 81 mg by mouth Cancer Neg Hx     Stomach Cancer Neg Hx        /72 (Site: Left Arm, Position: Sitting, Cuff Size: Large Adult)   Pulse 52   Temp 97.9 °F (36.6 °C) (Temporal)   Resp 16   Ht 5' 7\" (1.702 m)   Wt 175 lb 12.8 oz (79.7 kg)   SpO2 97%   BMI 27.53 kg/m²     Physical Exam   Constitutional: He is oriented to person, place, and time. He appears well-developed and well-nourished. No distress. HENT:   Head: Normocephalic and atraumatic. Right Ear: External ear normal.   Left Ear: External ear normal.   Nose: Nose normal.   Eyes: Conjunctivae and EOM are normal. Right eye exhibits no discharge. Left eye exhibits no discharge. No scleral icterus. Neck: Neck supple. No tracheal deviation present. No thyromegaly present. Cardiovascular: Normal rate, regular rhythm and intact distal pulses. Exam reveals no gallop and no friction rub. Murmur heard. Pulmonary/Chest: Effort normal and breath sounds normal. No respiratory distress. He has no wheezes. He has no rales. Abdominal: Soft. Bowel sounds are normal. He exhibits no distension. There is no tenderness. No abdominal tenderness at area of reported pain. No CVA tenderness   Musculoskeletal: He exhibits no edema (Bilateral lower extremities), tenderness (no tenderness appreciated) or deformity (No gross deformities of upper or lower extremities). Lymphadenopathy:     He has no cervical adenopathy. Neurological: He is alert and oriented to person, place, and time. No cranial nerve deficit. He exhibits normal muscle tone. Skin: Skin is warm and dry. No rash noted. He is not diaphoretic. No erythema. Psychiatric: He has a normal mood and affect. His behavior is normal. Thought content normal.   Nursing note and vitals reviewed. Assessment:    ICD-10-CM ICD-9-CM    1. Acute left-sided low back pain without sciatica M54.5 724.2 CT ABDOMEN PELVIS WO CONTRAST Additional Contrast? None   2.  Abdominal pain, left lower quadrant R10.32 789.04 CT Patient voices understanding and agrees to plans along with risks and benefits of plan. Patient is instructed to continue prior meds, diet, and exercise plans as instructed. Patient agrees to follow up as instructed and sooner if needed. Patient agrees to go to ER if condition becomes emergent. Return if symptoms worsen or fail to improve, for next scheduled follow up with PCP.

## 2018-09-05 ENCOUNTER — OFFICE VISIT (OUTPATIENT)
Dept: VASCULAR SURGERY | Age: 63
End: 2018-09-05
Payer: MEDICARE

## 2018-09-05 ENCOUNTER — HOSPITAL ENCOUNTER (OUTPATIENT)
Dept: VASCULAR LAB | Age: 63
Discharge: HOME | End: 2018-09-05
Payer: MEDICARE

## 2018-09-05 VITALS
RESPIRATION RATE: 18 BRPM | TEMPERATURE: 97.9 F | DIASTOLIC BLOOD PRESSURE: 84 MMHG | HEART RATE: 50 BPM | SYSTOLIC BLOOD PRESSURE: 145 MMHG

## 2018-09-05 DIAGNOSIS — I70.213 ATHEROSCLEROSIS OF NATIVE ARTERY OF BOTH LOWER EXTREMITIES WITH INTERMITTENT CLAUDICATION (HCC): ICD-10-CM

## 2018-09-05 DIAGNOSIS — I70.213 ATHEROSCLEROSIS OF NATIVE ARTERY OF BOTH LOWER EXTREMITIES WITH INTERMITTENT CLAUDICATION (HCC): Primary | ICD-10-CM

## 2018-09-05 PROCEDURE — 93923 UPR/LXTR ART STDY 3+ LVLS: CPT

## 2018-09-05 PROCEDURE — 99203 OFFICE O/P NEW LOW 30 MIN: CPT | Performed by: PHYSICIAN ASSISTANT

## 2018-09-05 NOTE — PROGRESS NOTES
Patient Care Team:  Bria Jacobsen as PCP - Iza Pina MD (Cardiology)  Justino Chandler DO as Consulting Physician (Gastroenterology)  SHIMNO Galvan as Nurse Practitioner (Family Nurse Practitioner)  Abdi Ontiveros MD as Consulting Physician (Rheumatology)  Liliane Maciel DO as Consulting Physician (Pain Management)      History and Physical    Mr. Manuelito De La Fuente is a 59 yo male who presents today as a referral from Dr. Bria Jacobsen for evaluation of PVD. He reports a history of CBP. He has had 3 back surgeries in the past. He sees pain management for his back. He also sees Dr. Myah Waddell. He had a CT A/P with results as listed below. He reports low back pain and pain that radiates around into the left lower quadrant abdominal pain. He reports that intermittently at variable distance his legs get weak and numb. Current medication include ASA and a statin daily. Julius Rodriguez has not had new wounds. Old records have been obtained, reviewed, and summarized. Madison Arriaga is a 60 y.o. male with the following history reviewed and recorded in [facility]:  Patient Active Problem List    Diagnosis Date Noted    Chest discomfort      Priority: High    Aortic valve stenosis      Priority: High    Arthritis of knee 07/16/2018    Gastroesophageal reflux disease 12/19/2017    Gout 12/19/2017    Neuropathy 12/19/2017    Rheumatoid arthritis ([facility] [address] 12/19/2017    Moderate aortic stenosis 08/14/2017     mod-severe aortic stenosis.  Moderate aortic regurgitation 08/14/2017     mod-severe AR.        Acalculous cholecystitis 05/19/2017    Right upper quadrant abdominal pain 05/12/2017    Mixed hyperlipidemia 04/26/2016    Essential hypertension 04/26/2016    S/P coronary artery stent placement 04/26/2016    SOB (shortness of breath) 04/26/2016    CAD (coronary artery disease)     Coronary artery disease involving native coronary artery of native heart     Hiatal hernia 04/20/2015    Encounter for screening colonoscopy 03/13/2015    Dysphagia 03/13/2015    Hoarseness, chronic 03/13/2015    Chronic heartburn 03/13/2015    History of tobacco abuse     Leg pain 08/25/2011    Hyperlipidemia     Coronary atherosclerosis of native coronary artery     MI (myocardial infarction) (HCC)     COPD (chronic obstructive pulmonary disease) (HCC)     Bronchitis      Current Outpatient Prescriptions   Medication Sig Dispense Refill    metoprolol tartrate (LOPRESSOR) 25 MG tablet Take 1 tablet by mouth 2 times daily 60 tablet 3    atorvastatin (LIPITOR) 20 MG tablet TAKE 1 TABLET BY MOUTH NIGHTLY 90 tablet 0    amLODIPine (NORVASC) 5 MG tablet Take 1 tablet by mouth daily 30 tablet 0    oxyCODONE-acetaminophen (PERCOCET) 7.5-325 MG per tablet Take 1 tablet by mouth 4 times daily.  meloxicam (MOBIC) 15 MG tablet Take 1/2 tablet daily      tamsulosin (FLOMAX) 0.4 MG capsule Take 0.4 mg by mouth daily      lisinopril (PRINIVIL;ZESTRIL) 20 MG tablet TAKE 1 TABLET BY MOUTH TWICE A DAY (Patient taking differently: daily TAKE 1 TABLET BY MOUTH TWICE A DAY) 180 tablet 2    omeprazole (PRILOSEC) 20 MG delayed release capsule Take 20 mg by mouth daily      gabapentin (NEURONTIN) 800 MG tablet Take 800 mg by mouth 2 times daily .  leflunomide (ARAVA) 20 MG tablet Take 20 mg by mouth daily       nitroGLYCERIN (NITROSTAT) 0.4 MG SL tablet Place 1 tablet under the tongue every 5 minutes as needed 1 tablet as needed 25 tablet 3    folic acid (FOLVITE) 1 MG tablet Take 1 mg by mouth daily.  aspirin 81 MG tablet Take 81 mg by mouth daily. No current facility-administered medications for this visit. Allergies: Codeine;  Hydrocodone-acetaminophen; and Iv [iodides]  Past Medical History:   Diagnosis Date    Arthritis     Bronchitis     Cancer ([facility] [address]     Skin Cancer    Chronic cholecystitis without calculus 5/19/2017    Chronic GERD     COPD (chronic obstructive pulmonary disease) ([facility] [address]     Coronary atherosclerosis of native coronary artery     s/p PTCA and stent placement to the LAD and RCA    History of tobacco abuse 2010    Hyperlipidemia     MI (myocardial infarction) ([facility] [address]     Old, inferior wall    Moderate aortic regurgitation 08/14/2017    mod-severe AR.  Moderate aortic stenosis 08/14/2017    mod-severe aortic stenosis. Past Surgical History:   Procedure Laterality Date    BACK SURGERY      s/p laminectomy    [facility]      CARDIAC CATHETERIZATION  12/10/03  [address] [facility] [address]    [facility]    CARDIAC CATHETERIZATION  08/29/03 [address] [facility] [address]    [facility]    CARDIAC CATHETERIZATION  02/16/01 [facility]    CARDIAC CATHETERIZATION  05/25/2009    EF is estimated to be 50%. See scanned document.  CARDIAC CATHETERIZATION N/A 03/2016    stent placement    CARDIAC CATHETERIZATION  08/2017    no PCI    CHOLECYSTECTOMY, LAPAROSCOPIC N/A 5/19/2017    CHOLECYSTECTOMY LAPAROSCOPIC performed by Katherine Barragan MD at [address] [place] [address] COLONOSCOPY  05/28/2015    Dr. Coyne Sera  3/16    stent to LAD    JOINT REPLACEMENT      Left thumb    JOINT REPLACEMENT Right     JOINT REPLACEMENT      KNEE SURGERY      s/p Rt.  knee replacement    LEG SURGERY Right     Broken leg with surgical repair    [place] EGD TRANSORAL BIOPSY SINGLE/MULTIPLE N/A 5/18/2017    Dr Adrienne Chowdhury, Amelie (-), biliary colic, surgical referral    UPPER GASTROINTESTINAL ENDOSCOPY  03/30/2015    Dr. Td Glodman     Family History   Problem Relation Age of Onset    Cancer Mother     Heart Disease Father     Cancer Father     Liver Cancer Father     Diabetes Maternal Grandmother     Heart Disease Maternal Grandfather     Cancer Maternal Grandfather     Stroke Paternal Grandmother     Colon Cancer Neg Hx     Colon Polyps Neg Hx     Esophageal Cancer Neg Hx     Liver Disease Neg Hx     Rectal Cancer Neg Hx     Stomach Cancer Neg Hx      Social History   Substance Use Topics    Smoking status: Former Smoker     Packs/day: 3.00     Years: 40.00     Types: Cigarettes     Quit date: 2009    Smokeless tobacco: Never Used    Alcohol use Yes         Review of Systems    Constitutional  no significant activity change, appetite change, or unexpected weight change. No fever or chills. No diaphoresis or significant fatigue. HENT  no significant rhinorrhea or epistaxis. No tinnitus or significant hearing loss. Eyes  no sudden vision change or amaurosis. Respiratory  no significant shortness of breath, wheezing, or stridor. No apnea, cough, or chest tightness associated with shortness of breath. Cardiovascular  no chest pain, syncope, or significant dizziness. No palpitations or significant leg swelling. Patient reports  claudication. Gastrointestinal  no abdominal swelling or pain. No blood in stool. No severe constipation, diarrhea, nausea, or vomiting. Genitourinary  No difficulty urinating, dysuria, frequency, or urgency. No flank pain or hematuria. Musculoskeletal  no back pain, gait disturbance, or myalgia. Skin  no color change, rash, pallor, or new wound. Neurologic  no dizziness, facial asymmetry, or light headedness. No seizures. No speech difficulty or lateralizing weakness. Hematologic  no easy bruising or excessive bleeding. Psychiatric  no severe anxiety or nervousness. No confusion. All other review of systems are negative. Physical Exam    BP (!) 145/84 Comment: left  Pulse 50   Temp 97.9 °F (36.6 °C)   Resp 18     Constitutional  well developed, well nourished. No diaphoresis or acute distress. HENT  head normocephalic. Right external ear canal appears normal.  Left external ear canal appears normal.  Septum appears midline. Eyes  conjunctiva normal.  EOMS normal.  No exudate. No icterus. Neck- ROM appears normal, no tracheal deviation. Cardiovascular  Regular rate and rhythm. Heart sounds are normal.  No murmur, rub, or gallop.

## 2018-09-06 ENCOUNTER — TELEPHONE (OUTPATIENT)
Dept: VASCULAR SURGERY | Age: 63
End: 2018-09-06

## 2018-09-19 RX ORDER — LISINOPRIL 20 MG/1
TABLET ORAL
Qty: 180 TABLET | Refills: 3 | Status: SHIPPED | OUTPATIENT
Start: 2018-09-19 | End: 2019-10-07 | Stop reason: ALTCHOICE

## 2018-10-12 ENCOUNTER — HOSPITAL ENCOUNTER (OUTPATIENT)
Dept: MRI IMAGING | Age: 63
Discharge: HOME | End: 2018-10-12
Payer: MEDICARE

## 2018-10-12 DIAGNOSIS — M54.5 LOW BACK PAIN, UNSPECIFIED BACK PAIN LATERALITY, UNSPECIFIED CHRONICITY, WITH SCIATICA PRESENCE UNSPECIFIED: ICD-10-CM

## 2018-10-12 PROCEDURE — 72148 MRI LUMBAR SPINE W/O DYE: CPT

## 2018-10-31 ENCOUNTER — OFFICE VISIT (OUTPATIENT)
Dept: GASTROENTEROLOGY | Age: 63
End: 2018-10-31
Payer: MEDICARE

## 2018-10-31 VITALS
WEIGHT: 182 LBS | HEART RATE: 55 BPM | HEIGHT: 67 IN | DIASTOLIC BLOOD PRESSURE: 60 MMHG | SYSTOLIC BLOOD PRESSURE: 129 MMHG | OXYGEN SATURATION: 96 % | BODY MASS INDEX: 28.56 KG/M2

## 2018-10-31 DIAGNOSIS — R19.8 ALTERNATING CONSTIPATION AND DIARRHEA: ICD-10-CM

## 2018-10-31 DIAGNOSIS — R12 CHRONIC HEARTBURN: Primary | ICD-10-CM

## 2018-10-31 DIAGNOSIS — R13.10 DYSPHAGIA, UNSPECIFIED TYPE: ICD-10-CM

## 2018-10-31 PROCEDURE — 99214 OFFICE O/P EST MOD 30 MIN: CPT | Performed by: NURSE PRACTITIONER

## 2018-10-31 RX ORDER — RANITIDINE 150 MG/1
150 TABLET ORAL NIGHTLY
Qty: 30 TABLET | Refills: 11 | Status: SHIPPED | OUTPATIENT
Start: 2018-10-31 | End: 2019-09-19 | Stop reason: SDUPTHER

## 2018-10-31 RX ORDER — PANTOPRAZOLE SODIUM 40 MG/1
40 TABLET, DELAYED RELEASE ORAL DAILY
Qty: 30 TABLET | Refills: 11 | Status: SHIPPED | OUTPATIENT
Start: 2018-10-31 | End: 2019-09-19 | Stop reason: SDUPTHER

## 2018-10-31 ASSESSMENT — ENCOUNTER SYMPTOMS
BACK PAIN: 1
ABDOMINAL DISTENTION: 0
VOMITING: 0
COUGH: 0
BLOOD IN STOOL: 0
VOICE CHANGE: 0
CONSTIPATION: 1
DIARRHEA: 1
ANAL BLEEDING: 0
SORE THROAT: 0
NAUSEA: 0
TROUBLE SWALLOWING: 1
RECTAL PAIN: 0
SHORTNESS OF BREATH: 1
ABDOMINAL PAIN: 0

## 2018-11-12 ENCOUNTER — HOSPITAL ENCOUNTER (OUTPATIENT)
Dept: GENERAL RADIOLOGY | Age: 63
Discharge: HOME | End: 2018-11-12
Payer: MEDICARE

## 2018-11-12 ENCOUNTER — HOSPITAL ENCOUNTER (OUTPATIENT)
Dept: PREADMISSION TESTING | Age: 63
Discharge: HOME | End: 2018-11-16
Payer: MEDICARE

## 2018-11-12 VITALS — HEIGHT: 67 IN | BODY MASS INDEX: 28.56 KG/M2 | WEIGHT: 182 LBS

## 2018-11-12 LAB
ALBUMIN SERPL-MCNC: 4.1 G/DL (ref 3.5–5.2)
ALP BLD-CCNC: 105 U/L (ref 40–130)
ALT SERPL-CCNC: 16 U/L (ref 5–41)
ANION GAP SERPL CALCULATED.3IONS-SCNC: 10 MMOL/L (ref 7–19)
APTT: 26.4 SEC (ref 26–36.2)
AST SERPL-CCNC: 19 U/L (ref 5–40)
BASOPHILS ABSOLUTE: 0.1 K/UL (ref 0–0.2)
BASOPHILS RELATIVE PERCENT: 1.6 % (ref 0–1)
BILIRUB SERPL-MCNC: 0.5 MG/DL (ref 0.2–1.2)
BILIRUBIN URINE: NEGATIVE
BLOOD, URINE: NEGATIVE
BUN BLDV-MCNC: 16 MG/DL (ref 8–23)
CALCIUM SERPL-MCNC: 9.3 MG/DL (ref 8.8–10.2)
CHLORIDE BLD-SCNC: 103 MMOL/L (ref 98–111)
CLARITY: CLEAR
CO2: 28 MMOL/L (ref 22–29)
COLOR: YELLOW
CREAT SERPL-MCNC: 0.8 MG/DL (ref 0.5–1.2)
EKG P AXIS: 55 DEGREES
EKG P-R INTERVAL: 228 MS
EKG Q-T INTERVAL: 422 MS
EKG QRS DURATION: 94 MS
EKG QTC CALCULATION (BAZETT): 393 MS
EKG T AXIS: 46 DEGREES
EOSINOPHILS ABSOLUTE: 0.3 K/UL (ref 0–0.6)
EOSINOPHILS RELATIVE PERCENT: 5.5 % (ref 0–5)
GFR NON-AFRICAN AMERICAN: >60
GLUCOSE BLD-MCNC: 88 MG/DL (ref 74–109)
GLUCOSE URINE: NEGATIVE MG/DL
HCT VFR BLD CALC: 41.4 % (ref 42–52)
HEMOGLOBIN: 13 G/DL (ref 14–18)
INR BLD: 0.94 (ref 0.88–1.18)
KETONES, URINE: NEGATIVE MG/DL
LEUKOCYTE ESTERASE, URINE: NEGATIVE
LYMPHOCYTES ABSOLUTE: 1.9 K/UL (ref 1.1–4.5)
LYMPHOCYTES RELATIVE PERCENT: 30.8 % (ref 20–40)
MCH RBC QN AUTO: 30.2 PG (ref 27–31)
MCHC RBC AUTO-ENTMCNC: 31.4 G/DL (ref 33–37)
MCV RBC AUTO: 96.1 FL (ref 80–94)
MONOCYTES ABSOLUTE: 0.9 K/UL (ref 0–0.9)
MONOCYTES RELATIVE PERCENT: 14.4 % (ref 0–10)
NEUTROPHILS ABSOLUTE: 2.9 K/UL (ref 1.5–7.5)
NEUTROPHILS RELATIVE PERCENT: 47.4 % (ref 50–65)
NITRITE, URINE: NEGATIVE
PDW BLD-RTO: 13.4 % (ref 11.5–14.5)
PH UA: 5.5
PLATELET # BLD: 193 K/UL (ref 130–400)
PMV BLD AUTO: 10.2 FL (ref 9.4–12.4)
POTASSIUM SERPL-SCNC: 4.9 MMOL/L (ref 3.5–5)
PROTEIN UA: NEGATIVE MG/DL
PROTHROMBIN TIME: 12.5 SEC (ref 12–14.6)
RBC # BLD: 4.31 M/UL (ref 4.7–6.1)
SODIUM BLD-SCNC: 141 MMOL/L (ref 136–145)
SPECIFIC GRAVITY UA: 1.01
TOTAL PROTEIN: 6.9 G/DL (ref 6.6–8.7)
URINE REFLEX TO CULTURE: NORMAL
UROBILINOGEN, URINE: 0.2 E.U./DL
WBC # BLD: 6.2 K/UL (ref 4.8–10.8)

## 2018-11-12 PROCEDURE — 80053 COMPREHEN METABOLIC PANEL: CPT

## 2018-11-12 PROCEDURE — 81003 URINALYSIS AUTO W/O SCOPE: CPT

## 2018-11-12 PROCEDURE — 93005 ELECTROCARDIOGRAM TRACING: CPT

## 2018-11-12 PROCEDURE — 71046 X-RAY EXAM CHEST 2 VIEWS: CPT

## 2018-11-12 PROCEDURE — 85610 PROTHROMBIN TIME: CPT

## 2018-11-12 PROCEDURE — 85025 COMPLETE CBC W/AUTO DIFF WBC: CPT

## 2018-11-12 PROCEDURE — 85730 THROMBOPLASTIN TIME PARTIAL: CPT

## 2018-11-16 ENCOUNTER — OFFICE VISIT (OUTPATIENT)
Dept: CARDIOLOGY | Age: 63
End: 2018-11-16
Payer: MEDICARE

## 2018-11-16 VITALS
HEART RATE: 55 BPM | BODY MASS INDEX: 28.56 KG/M2 | HEIGHT: 67 IN | WEIGHT: 182 LBS | SYSTOLIC BLOOD PRESSURE: 138 MMHG | DIASTOLIC BLOOD PRESSURE: 78 MMHG

## 2018-11-16 DIAGNOSIS — I10 ESSENTIAL HYPERTENSION: Primary | ICD-10-CM

## 2018-11-16 DIAGNOSIS — Z95.5 S/P CORONARY ARTERY STENT PLACEMENT: ICD-10-CM

## 2018-11-16 DIAGNOSIS — I51.89 DIASTOLIC DYSFUNCTION: ICD-10-CM

## 2018-11-16 DIAGNOSIS — I35.0 MODERATE AORTIC STENOSIS: ICD-10-CM

## 2018-11-16 DIAGNOSIS — R06.09 DOE (DYSPNEA ON EXERTION): ICD-10-CM

## 2018-11-16 DIAGNOSIS — I25.10 CORONARY ARTERY DISEASE INVOLVING NATIVE CORONARY ARTERY OF NATIVE HEART WITHOUT ANGINA PECTORIS: ICD-10-CM

## 2018-11-16 DIAGNOSIS — E78.2 MIXED HYPERLIPIDEMIA: ICD-10-CM

## 2018-11-16 DIAGNOSIS — Z01.818 PRE-OP TESTING: ICD-10-CM

## 2018-11-16 PROCEDURE — 99214 OFFICE O/P EST MOD 30 MIN: CPT | Performed by: NURSE PRACTITIONER

## 2018-11-16 PROCEDURE — 93000 ELECTROCARDIOGRAM COMPLETE: CPT | Performed by: NURSE PRACTITIONER

## 2018-11-16 RX ORDER — AMLODIPINE BESYLATE 5 MG/1
10 TABLET ORAL DAILY
Qty: 60 TABLET | Refills: 0 | Status: SHIPPED
Start: 2018-11-16 | End: 2019-05-07 | Stop reason: SDUPTHER

## 2018-11-16 RX ORDER — AMLODIPINE BESYLATE 5 MG/1
10 TABLET ORAL DAILY
Qty: 30 TABLET | Refills: 0 | Status: SHIPPED
Start: 2018-11-16 | End: 2018-11-16 | Stop reason: DRUGHIGH

## 2018-11-16 NOTE — Clinical Note
Will need risk letters for both Dr. Isak Rollins, GI 11/29/18 and Dr. Mayur Stuart- back surgery 12/4/18. Getting DSE as soon as possible.

## 2018-11-16 NOTE — PROGRESS NOTES
Dear Singh Mckenzie,    Thank you for allowing me to participate in the care of Mr. Radha Astorga. He presents today at the [address] [place] [address] in the [facility]. As you know, Mr. Dillan Martinez is a 58 y.o. male with history of hyperlipidemia, Mod AS/AR, MI, COPD, GERD, former smoker and CAD-stents who presents with the chief complaint of 3 month follow up and risk stratification. He is a patient of Dr. Linda Dillard. HTN-running high at home 140-170s/ 80-90s. He has been in a lot of pain with numbness as well in his legs. His surgery is planned for 12/4/18 on his back  HLD-on statin, pcp manages   CAD-on ASA, statin  AS/AR- SOB, chronic-no change   He also needs risk stratification for upcoming surgery with Dr. Dr. Romaine Griggs for back. He is currently not doing any physical or strenuous activity related to back. He states if his back was ok he could do normal daily activities without worsening SOB or chest pain. He is also to have a scope with Dr. Mary Beasley GI on 11/29/2018. He otherwise denies chest pain, SOA, HURLEY, PND, orthopnea or syncope. He has no other complaints. Review of Systems    Constitutional: Negative for fever, chills, diaphoresis, activity change, appetite change,  and unexpected weight change. +fatigue  Eyes: Negative for photophobia, pain, redness and visual disturbance. Respiratory: Negative for apnea, cough, chest tightness, + shortness of breath-no change, wheezing and stridor. Cardiovascular: Negative for chest pain, palpitations and leg swelling. Gastrointestinal: Negative for abdominal distention. Genitourinary: Negative for dysuria, urgency and frequency. Musculoskeletal: Negative for myalgias, and + gait problem. +chronic back pain  Skin: Negative for color change, pallor, rash and wound. Neurological: Negative for tremors, speech difficulty, weakness and numbness. Hematological: Does not bruise/bleed easily.

## 2018-11-19 ENCOUNTER — HOSPITAL ENCOUNTER (OUTPATIENT)
Dept: NON INVASIVE DIAGNOSTICS | Age: 63
Discharge: HOME | End: 2018-11-19
Payer: MEDICARE

## 2018-11-19 ENCOUNTER — TELEPHONE (OUTPATIENT)
Dept: CARDIOLOGY | Age: 63
End: 2018-11-19

## 2018-11-19 VITALS
SYSTOLIC BLOOD PRESSURE: 140 MMHG | WEIGHT: 178.57 LBS | DIASTOLIC BLOOD PRESSURE: 60 MMHG | BODY MASS INDEX: 27.97 KG/M2 | HEART RATE: 98 BPM

## 2018-11-19 PROCEDURE — 6360000002 HC RX W HCPCS: Performed by: INTERNAL MEDICINE

## 2018-11-19 PROCEDURE — 2580000003 HC RX 258: Performed by: INTERNAL MEDICINE

## 2018-11-19 PROCEDURE — 93306 TTE W/DOPPLER COMPLETE: CPT

## 2018-11-19 RX ORDER — SODIUM CHLORIDE 0.9 % (FLUSH) 0.9 %
10 SYRINGE (ML) INJECTION PRN
Status: ACTIVE | OUTPATIENT
Start: 2018-11-19 | End: 2018-11-20

## 2018-11-19 RX ORDER — SODIUM CHLORIDE 9 MG/ML
INJECTION, SOLUTION INTRAVENOUS
Status: COMPLETED | OUTPATIENT
Start: 2018-11-19 | End: 2018-11-19

## 2018-11-19 RX ORDER — DOBUTAMINE HYDROCHLORIDE 200 MG/100ML
10 INJECTION INTRAVENOUS CONTINUOUS PRN
Status: ACTIVE | OUTPATIENT
Start: 2018-11-19 | End: 2018-11-20

## 2018-11-19 RX ADMIN — SODIUM CHLORIDE: 9 INJECTION, SOLUTION INTRAVENOUS at 11:20

## 2018-11-19 RX ADMIN — DOBUTAMINE HYDROCHLORIDE 10 MCG/KG/MIN: 200 INJECTION INTRAVENOUS at 11:25

## 2018-11-19 RX ADMIN — Medication 10 ML: at 11:15

## 2018-11-21 ENCOUNTER — TELEPHONE (OUTPATIENT)
Dept: CARDIOLOGY | Age: 63
End: 2018-11-21

## 2018-11-28 DIAGNOSIS — E78.2 MIXED HYPERLIPIDEMIA: Primary | ICD-10-CM

## 2018-11-28 RX ORDER — ATORVASTATIN CALCIUM 20 MG/1
20 TABLET, FILM COATED ORAL NIGHTLY
Qty: 30 TABLET | Refills: 0 | Status: SHIPPED | OUTPATIENT
Start: 2018-11-28 | End: 2018-12-27 | Stop reason: SDUPTHER

## 2018-11-28 RX ORDER — ATORVASTATIN CALCIUM 20 MG/1
TABLET, FILM COATED ORAL
Qty: 90 TABLET | Refills: 0 | OUTPATIENT
Start: 2018-11-28

## 2018-11-29 ENCOUNTER — HOSPITAL ENCOUNTER (OUTPATIENT)
Age: 63
Setting detail: SPECIMEN
Discharge: HOME | End: 2018-11-29
Payer: MEDICARE

## 2018-11-29 ENCOUNTER — ANESTHESIA EVENT (OUTPATIENT)
Dept: OPERATING ROOM | Age: 63
End: 2018-11-29

## 2018-11-29 ENCOUNTER — ANESTHESIA (OUTPATIENT)
Dept: OPERATING ROOM | Age: 63
End: 2018-11-29

## 2018-11-29 ENCOUNTER — HOSPITAL ENCOUNTER (OUTPATIENT)
Age: 63
Setting detail: OUTPATIENT SURGERY
Discharge: HOME | End: 2018-11-29
Attending: INTERNAL MEDICINE | Admitting: INTERNAL MEDICINE
Payer: MEDICARE

## 2018-11-29 VITALS — DIASTOLIC BLOOD PRESSURE: 89 MMHG | SYSTOLIC BLOOD PRESSURE: 166 MMHG | OXYGEN SATURATION: 98 %

## 2018-11-29 VITALS
HEART RATE: 53 BPM | HEIGHT: 67 IN | DIASTOLIC BLOOD PRESSURE: 65 MMHG | RESPIRATION RATE: 18 BRPM | OXYGEN SATURATION: 95 % | WEIGHT: 180 LBS | SYSTOLIC BLOOD PRESSURE: 103 MMHG | BODY MASS INDEX: 28.25 KG/M2

## 2018-11-29 DIAGNOSIS — E78.2 MIXED HYPERLIPIDEMIA: ICD-10-CM

## 2018-11-29 LAB
ALBUMIN SERPL-MCNC: 4.4 G/DL (ref 3.5–5.2)
ALP BLD-CCNC: 110 U/L (ref 40–130)
ALT SERPL-CCNC: 16 U/L (ref 5–41)
AST SERPL-CCNC: 21 U/L (ref 5–40)
BILIRUB SERPL-MCNC: 0.5 MG/DL (ref 0.2–1.2)
BILIRUBIN DIRECT: 0.1 MG/DL (ref 0–0.3)
BILIRUBIN, INDIRECT: 0.4 MG/DL (ref 0.1–1)
CHOLESTEROL, TOTAL: 174 MG/DL (ref 160–199)
HDLC SERPL-MCNC: 71 MG/DL (ref 55–121)
LDL CHOLESTEROL CALCULATED: 88 MG/DL
TOTAL PROTEIN: 7.7 G/DL (ref 6.6–8.7)
TRIGL SERPL-MCNC: 77 MG/DL (ref 0–149)

## 2018-11-29 PROCEDURE — 43248 EGD GUIDE WIRE INSERTION: CPT

## 2018-11-29 PROCEDURE — 43239 EGD BIOPSY SINGLE/MULTIPLE: CPT | Performed by: INTERNAL MEDICINE

## 2018-11-29 PROCEDURE — 43248 EGD GUIDE WIRE INSERTION: CPT | Performed by: INTERNAL MEDICINE

## 2018-11-29 PROCEDURE — 43239 EGD BIOPSY SINGLE/MULTIPLE: CPT

## 2018-11-29 PROCEDURE — 88305 TISSUE EXAM BY PATHOLOGIST: CPT

## 2018-11-29 PROCEDURE — 88312 SPECIAL STAINS GROUP 1: CPT

## 2018-11-29 PROCEDURE — G8918 PT W/O PREOP ORDER IV AB PRO: HCPCS

## 2018-11-29 PROCEDURE — G8907 PT DOC NO EVENTS ON DISCHARG: HCPCS

## 2018-11-29 RX ORDER — PROPOFOL 10 MG/ML
INJECTION, EMULSION INTRAVENOUS PRN
Status: DISCONTINUED | OUTPATIENT
Start: 2018-11-29 | End: 2018-11-29 | Stop reason: SDUPTHER

## 2018-11-29 RX ORDER — LIDOCAINE HYDROCHLORIDE 10 MG/ML
1 INJECTION, SOLUTION EPIDURAL; INFILTRATION; INTRACAUDAL; PERINEURAL
Status: COMPLETED | OUTPATIENT
Start: 2018-11-29 | End: 2018-11-29

## 2018-11-29 RX ORDER — SODIUM CHLORIDE 9 MG/ML
INJECTION, SOLUTION INTRAVENOUS CONTINUOUS
Status: DISCONTINUED | OUTPATIENT
Start: 2018-11-29 | End: 2018-11-29 | Stop reason: HOSPADM

## 2018-11-29 RX ORDER — SUCRALFATE ORAL 1 G/10ML
1 SUSPENSION ORAL 4 TIMES DAILY
Qty: 1200 ML | Refills: 3 | Status: SHIPPED | OUTPATIENT
Start: 2018-11-29 | End: 2019-01-20 | Stop reason: ALTCHOICE

## 2018-11-29 RX ADMIN — SODIUM CHLORIDE: 9 INJECTION, SOLUTION INTRAVENOUS at 12:54

## 2018-11-29 RX ADMIN — LIDOCAINE HYDROCHLORIDE 30 MG: 10 INJECTION, SOLUTION EPIDURAL; INFILTRATION; INTRACAUDAL; PERINEURAL at 13:55

## 2018-11-29 RX ADMIN — PROPOFOL 300 MG: 10 INJECTION, EMULSION INTRAVENOUS at 13:55

## 2018-11-29 NOTE — H&P
7.5-325 MG per tablet Take 1 tablet by mouth 4 times daily. Yes Historical Provider, MD   meloxicam (MOBIC) 15 MG tablet Take 1/2 tablet daily   Yes Historical Provider, MD   gabapentin (NEURONTIN) 800 MG tablet Take 800 mg by mouth 2 times daily . 6/13/16  Yes Historical Provider, MD   leflunomide (ARAVA) 20 MG tablet Take 20 mg by mouth daily  1/22/16  Yes Historical Provider, MD   nitroGLYCERIN (NITROSTAT) 0.4 MG SL tablet Place 1 tablet under the tongue every 5 minutes as needed 1 tablet as needed 6/18/15  Yes SHIMON Tobin - CNP   folic acid (FOLVITE) 1 MG tablet Take 1 mg by mouth daily. Yes Historical Provider, MD   aspirin 81 MG tablet Take 81 mg by mouth daily. Yes Historical Provider, MD   pantoprazole (PROTONIX) 40 MG tablet Take 1 tablet by mouth daily Take daily first thing in the morning on an empty stomach. 10/31/18   SHIMON Hamlin   ranitidine (ZANTAC) 150 MG tablet Take 1 tablet by mouth nightly 10/31/18   SHIMON Hamlin       Past Medical History:  Past Medical History:   Diagnosis Date    Arthritis     Bronchitis     Cancer [facility])     Skin Cancer    Chronic cholecystitis without calculus 5/19/2017    Chronic GERD     COPD (chronic obstructive pulmonary disease) (HCC)     Coronary atherosclerosis of native coronary artery     s/p PTCA and stent placement to the LAD and RCA/5 stents    History of tobacco abuse 2010    Hyperlipidemia     Hypertension     MI (myocardial infarction) ([facility] [address]     Old, inferior wall    Moderate aortic regurgitation 08/14/2017    mod-severe AR.  Moderate aortic stenosis 08/14/2017    mod-severe aortic stenosis.         Past Surgical History:  Past Surgical History:   Procedure Laterality Date    BACK SURGERY      s/p laminectomy    CARDIAC CATHETERIZATION  08/10/04 [facility]      CARDIAC CATHETERIZATION  12/10/03  [facility]    CARDIAC CATHETERIZATION  08/29/03 [facility]    CARDIAC CATHETERIZATION  02/16/01 BILLIE    [facility]    CARDIAC

## 2018-12-03 ENCOUNTER — TELEPHONE (OUTPATIENT)
Dept: GASTROENTEROLOGY | Age: 63
End: 2018-12-03

## 2018-12-04 ENCOUNTER — ANESTHESIA EVENT (OUTPATIENT)
Dept: OPERATING ROOM | Age: 63
DRG: 460 | End: 2018-12-04
Payer: MEDICARE

## 2018-12-04 ENCOUNTER — ANESTHESIA (OUTPATIENT)
Dept: OPERATING ROOM | Age: 63
DRG: 460 | End: 2018-12-04
Payer: MEDICARE

## 2018-12-04 ENCOUNTER — HOSPITAL ENCOUNTER (INPATIENT)
Age: 63
LOS: 2 days | Discharge: HOME | DRG: 460 | End: 2018-12-06
Payer: MEDICARE

## 2018-12-04 VITALS
SYSTOLIC BLOOD PRESSURE: 101 MMHG | DIASTOLIC BLOOD PRESSURE: 58 MMHG | OXYGEN SATURATION: 100 % | RESPIRATION RATE: 8 BRPM | TEMPERATURE: 94.2 F

## 2018-12-04 PROBLEM — M48.062 LUMBAR STENOSIS WITH NEUROGENIC CLAUDICATION: Status: ACTIVE | Noted: 2018-12-04

## 2018-12-04 PROBLEM — M48.062 SPINAL STENOSIS OF LUMBAR REGION WITH NEUROGENIC CLAUDICATION: Status: ACTIVE | Noted: 2018-12-04

## 2018-12-04 PROBLEM — M51.36 DDD (DEGENERATIVE DISC DISEASE), LUMBAR: Status: ACTIVE | Noted: 2018-12-04

## 2018-12-04 LAB
ABO/RH: NORMAL
ANTIBODY SCREEN: NORMAL

## 2018-12-04 PROCEDURE — 2500000003 HC RX 250 WO HCPCS

## 2018-12-04 PROCEDURE — 6360000002 HC RX W HCPCS

## 2018-12-04 PROCEDURE — 6360000002 HC RX W HCPCS: Performed by: ANESTHESIOLOGY

## 2018-12-04 PROCEDURE — 6360000002 HC RX W HCPCS: Performed by: NURSE ANESTHETIST, CERTIFIED REGISTERED

## 2018-12-04 PROCEDURE — 3700000001 HC ADD 15 MINUTES (ANESTHESIA)

## 2018-12-04 PROCEDURE — C9359 IMPLNT,BON VOID FILLER-PUTTY: HCPCS

## 2018-12-04 PROCEDURE — 94664 DEMO&/EVAL PT USE INHALER: CPT

## 2018-12-04 PROCEDURE — 2709999900 HC NON-CHARGEABLE SUPPLY

## 2018-12-04 PROCEDURE — 00NY0ZZ RELEASE LUMBAR SPINAL CORD, OPEN APPROACH: ICD-10-PCS

## 2018-12-04 PROCEDURE — 1210000000 HC MED SURG R&B

## 2018-12-04 PROCEDURE — 3600000005 HC SURGERY LEVEL 5 BASE

## 2018-12-04 PROCEDURE — 2500000003 HC RX 250 WO HCPCS: Performed by: NURSE ANESTHETIST, CERTIFIED REGISTERED

## 2018-12-04 PROCEDURE — 94762 N-INVAS EAR/PLS OXIMTRY CONT: CPT

## 2018-12-04 PROCEDURE — 86901 BLOOD TYPING SEROLOGIC RH(D): CPT

## 2018-12-04 PROCEDURE — C9290 INJ, BUPIVACAINE LIPOSOME: HCPCS

## 2018-12-04 PROCEDURE — 36415 COLL VENOUS BLD VENIPUNCTURE: CPT

## 2018-12-04 PROCEDURE — 6370000000 HC RX 637 (ALT 250 FOR IP)

## 2018-12-04 PROCEDURE — 7100000000 HC PACU RECOVERY - FIRST 15 MIN

## 2018-12-04 PROCEDURE — 3600000015 HC SURGERY LEVEL 5 ADDTL 15MIN

## 2018-12-04 PROCEDURE — 2580000003 HC RX 258

## 2018-12-04 PROCEDURE — 2580000003 HC RX 258: Performed by: ANESTHESIOLOGY

## 2018-12-04 PROCEDURE — 0SG1071 FUSION OF 2 OR MORE LUMBAR VERTEBRAL JOINTS WITH AUTOLOGOUS TISSUE SUBSTITUTE, POSTERIOR APPROACH, POSTERIOR COLUMN, OPEN APPROACH: ICD-10-PCS

## 2018-12-04 PROCEDURE — 3700000000 HC ANESTHESIA ATTENDED CARE

## 2018-12-04 PROCEDURE — 86850 RBC ANTIBODY SCREEN: CPT

## 2018-12-04 PROCEDURE — 2720000010 HC SURG SUPPLY STERILE

## 2018-12-04 PROCEDURE — 86900 BLOOD TYPING SEROLOGIC ABO: CPT

## 2018-12-04 PROCEDURE — 7100000001 HC PACU RECOVERY - ADDTL 15 MIN

## 2018-12-04 PROCEDURE — C1751 CATH, INF, PER/CENT/MIDLINE: HCPCS

## 2018-12-04 PROCEDURE — 2780000010 HC IMPLANT OTHER

## 2018-12-04 DEVICE — AGENT HEMSTAT 8ML FLX TIP MTRX + DISP SURGIFLO: Type: IMPLANTABLE DEVICE | Site: SPINE LUMBAR | Status: FUNCTIONAL

## 2018-12-04 DEVICE — PUTTY BNE GRFT DEMIN BNE CONT 5 CC VOL: Type: IMPLANTABLE DEVICE | Site: SPINE LUMBAR | Status: FUNCTIONAL

## 2018-12-04 RX ORDER — SODIUM CHLORIDE 9 MG/ML
INJECTION, SOLUTION INTRAVENOUS CONTINUOUS
Status: DISCONTINUED | OUTPATIENT
Start: 2018-12-04 | End: 2018-12-06 | Stop reason: HOSPADM

## 2018-12-04 RX ORDER — AMLODIPINE BESYLATE 10 MG/1
10 TABLET ORAL DAILY
Status: DISCONTINUED | OUTPATIENT
Start: 2018-12-05 | End: 2018-12-06 | Stop reason: HOSPADM

## 2018-12-04 RX ORDER — FENTANYL CITRATE 50 UG/ML
INJECTION, SOLUTION INTRAMUSCULAR; INTRAVENOUS PRN
Status: DISCONTINUED | OUTPATIENT
Start: 2018-12-04 | End: 2018-12-04 | Stop reason: SDUPTHER

## 2018-12-04 RX ORDER — ACETAMINOPHEN 325 MG/1
650 TABLET ORAL EVERY 4 HOURS PRN
Status: DISCONTINUED | OUTPATIENT
Start: 2018-12-04 | End: 2018-12-06 | Stop reason: HOSPADM

## 2018-12-04 RX ORDER — FOLIC ACID 1 MG/1
1 TABLET ORAL DAILY
Status: DISCONTINUED | OUTPATIENT
Start: 2018-12-05 | End: 2018-12-06 | Stop reason: HOSPADM

## 2018-12-04 RX ORDER — SODIUM CHLORIDE, SODIUM LACTATE, POTASSIUM CHLORIDE, CALCIUM CHLORIDE 600; 310; 30; 20 MG/100ML; MG/100ML; MG/100ML; MG/100ML
INJECTION, SOLUTION INTRAVENOUS CONTINUOUS
Status: DISCONTINUED | OUTPATIENT
Start: 2018-12-04 | End: 2018-12-04

## 2018-12-04 RX ORDER — PROMETHAZINE HYDROCHLORIDE 25 MG/ML
6.25 INJECTION, SOLUTION INTRAMUSCULAR; INTRAVENOUS
Status: DISCONTINUED | OUTPATIENT
Start: 2018-12-04 | End: 2018-12-04 | Stop reason: HOSPADM

## 2018-12-04 RX ORDER — OXYCODONE HYDROCHLORIDE AND ACETAMINOPHEN 5; 325 MG/1; MG/1
1 TABLET ORAL EVERY 4 HOURS PRN
Status: DISCONTINUED | OUTPATIENT
Start: 2018-12-04 | End: 2018-12-06 | Stop reason: HOSPADM

## 2018-12-04 RX ORDER — ROCURONIUM BROMIDE 10 MG/ML
INJECTION, SOLUTION INTRAVENOUS PRN
Status: DISCONTINUED | OUTPATIENT
Start: 2018-12-04 | End: 2018-12-04 | Stop reason: SDUPTHER

## 2018-12-04 RX ORDER — METOCLOPRAMIDE HYDROCHLORIDE 5 MG/ML
10 INJECTION INTRAMUSCULAR; INTRAVENOUS
Status: DISCONTINUED | OUTPATIENT
Start: 2018-12-04 | End: 2018-12-04 | Stop reason: HOSPADM

## 2018-12-04 RX ORDER — ONDANSETRON 2 MG/ML
4 INJECTION INTRAMUSCULAR; INTRAVENOUS EVERY 6 HOURS PRN
Status: DISCONTINUED | OUTPATIENT
Start: 2018-12-04 | End: 2018-12-06 | Stop reason: HOSPADM

## 2018-12-04 RX ORDER — MEPERIDINE HYDROCHLORIDE 50 MG/ML
12.5 INJECTION INTRAMUSCULAR; INTRAVENOUS; SUBCUTANEOUS EVERY 5 MIN PRN
Status: DISCONTINUED | OUTPATIENT
Start: 2018-12-04 | End: 2018-12-04 | Stop reason: HOSPADM

## 2018-12-04 RX ORDER — LIDOCAINE HYDROCHLORIDE 10 MG/ML
1 INJECTION, SOLUTION EPIDURAL; INFILTRATION; INTRACAUDAL; PERINEURAL
Status: DISCONTINUED | OUTPATIENT
Start: 2018-12-04 | End: 2018-12-04 | Stop reason: HOSPADM

## 2018-12-04 RX ORDER — HYDRALAZINE HYDROCHLORIDE 20 MG/ML
5 INJECTION INTRAMUSCULAR; INTRAVENOUS EVERY 10 MIN PRN
Status: DISCONTINUED | OUTPATIENT
Start: 2018-12-04 | End: 2018-12-04 | Stop reason: HOSPADM

## 2018-12-04 RX ORDER — PROPOFOL 10 MG/ML
INJECTION, EMULSION INTRAVENOUS PRN
Status: DISCONTINUED | OUTPATIENT
Start: 2018-12-04 | End: 2018-12-04 | Stop reason: SDUPTHER

## 2018-12-04 RX ORDER — ONDANSETRON 2 MG/ML
INJECTION INTRAMUSCULAR; INTRAVENOUS PRN
Status: DISCONTINUED | OUTPATIENT
Start: 2018-12-04 | End: 2018-12-04 | Stop reason: SDUPTHER

## 2018-12-04 RX ORDER — SODIUM CHLORIDE 0.9 % (FLUSH) 0.9 %
10 SYRINGE (ML) INJECTION EVERY 12 HOURS SCHEDULED
Status: DISCONTINUED | OUTPATIENT
Start: 2018-12-04 | End: 2018-12-06 | Stop reason: HOSPADM

## 2018-12-04 RX ORDER — SODIUM CHLORIDE 0.9 % (FLUSH) 0.9 %
10 SYRINGE (ML) INJECTION PRN
Status: DISCONTINUED | OUTPATIENT
Start: 2018-12-04 | End: 2018-12-06 | Stop reason: HOSPADM

## 2018-12-04 RX ORDER — DEXAMETHASONE SODIUM PHOSPHATE 10 MG/ML
INJECTION INTRAMUSCULAR; INTRAVENOUS PRN
Status: DISCONTINUED | OUTPATIENT
Start: 2018-12-04 | End: 2018-12-04 | Stop reason: SDUPTHER

## 2018-12-04 RX ORDER — VANCOMYCIN HYDROCHLORIDE 1 G/20ML
INJECTION, POWDER, LYOPHILIZED, FOR SOLUTION INTRAVENOUS PRN
Status: DISCONTINUED | OUTPATIENT
Start: 2018-12-04 | End: 2018-12-04 | Stop reason: HOSPADM

## 2018-12-04 RX ORDER — DOCUSATE SODIUM 100 MG/1
100 CAPSULE, LIQUID FILLED ORAL DAILY
Status: DISCONTINUED | OUTPATIENT
Start: 2018-12-05 | End: 2018-12-06 | Stop reason: HOSPADM

## 2018-12-04 RX ORDER — NITROGLYCERIN 0.4 MG/1
0.4 TABLET SUBLINGUAL EVERY 5 MIN PRN
Status: DISCONTINUED | OUTPATIENT
Start: 2018-12-04 | End: 2018-12-06 | Stop reason: HOSPADM

## 2018-12-04 RX ORDER — DIPHENHYDRAMINE HYDROCHLORIDE 50 MG/ML
12.5 INJECTION INTRAMUSCULAR; INTRAVENOUS
Status: DISCONTINUED | OUTPATIENT
Start: 2018-12-04 | End: 2018-12-04 | Stop reason: HOSPADM

## 2018-12-04 RX ORDER — GABAPENTIN 400 MG/1
800 CAPSULE ORAL 2 TIMES DAILY
Status: DISCONTINUED | OUTPATIENT
Start: 2018-12-04 | End: 2018-12-06 | Stop reason: HOSPADM

## 2018-12-04 RX ORDER — MORPHINE SULFATE 2 MG/ML
4 INJECTION, SOLUTION INTRAMUSCULAR; INTRAVENOUS EVERY 5 MIN PRN
Status: DISCONTINUED | OUTPATIENT
Start: 2018-12-04 | End: 2018-12-04 | Stop reason: HOSPADM

## 2018-12-04 RX ORDER — LIDOCAINE HYDROCHLORIDE 10 MG/ML
INJECTION, SOLUTION EPIDURAL; INFILTRATION; INTRACAUDAL; PERINEURAL PRN
Status: DISCONTINUED | OUTPATIENT
Start: 2018-12-04 | End: 2018-12-04 | Stop reason: SDUPTHER

## 2018-12-04 RX ORDER — DIAZEPAM 5 MG/1
5 TABLET ORAL EVERY 6 HOURS PRN
Status: DISCONTINUED | OUTPATIENT
Start: 2018-12-04 | End: 2018-12-06 | Stop reason: HOSPADM

## 2018-12-04 RX ORDER — MIDAZOLAM HYDROCHLORIDE 1 MG/ML
INJECTION INTRAMUSCULAR; INTRAVENOUS PRN
Status: DISCONTINUED | OUTPATIENT
Start: 2018-12-04 | End: 2018-12-04 | Stop reason: SDUPTHER

## 2018-12-04 RX ORDER — PROPOFOL 10 MG/ML
INJECTION, EMULSION INTRAVENOUS CONTINUOUS PRN
Status: DISCONTINUED | OUTPATIENT
Start: 2018-12-04 | End: 2018-12-04 | Stop reason: SDUPTHER

## 2018-12-04 RX ORDER — BUPIVACAINE HYDROCHLORIDE 2.5 MG/ML
INJECTION, SOLUTION INFILTRATION; PERINEURAL PRN
Status: DISCONTINUED | OUTPATIENT
Start: 2018-12-04 | End: 2018-12-04 | Stop reason: HOSPADM

## 2018-12-04 RX ORDER — FENTANYL CITRATE 50 UG/ML
25 INJECTION, SOLUTION INTRAMUSCULAR; INTRAVENOUS
Status: DISCONTINUED | OUTPATIENT
Start: 2018-12-04 | End: 2018-12-04 | Stop reason: HOSPADM

## 2018-12-04 RX ORDER — LISINOPRIL 20 MG/1
20 TABLET ORAL 2 TIMES DAILY
Status: DISCONTINUED | OUTPATIENT
Start: 2018-12-04 | End: 2018-12-06 | Stop reason: HOSPADM

## 2018-12-04 RX ORDER — SODIUM CHLORIDE 0.9 % (FLUSH) 0.9 %
10 SYRINGE (ML) INJECTION PRN
Status: DISCONTINUED | OUTPATIENT
Start: 2018-12-04 | End: 2018-12-04 | Stop reason: HOSPADM

## 2018-12-04 RX ORDER — LABETALOL HYDROCHLORIDE 5 MG/ML
5 INJECTION, SOLUTION INTRAVENOUS EVERY 10 MIN PRN
Status: DISCONTINUED | OUTPATIENT
Start: 2018-12-04 | End: 2018-12-04 | Stop reason: HOSPADM

## 2018-12-04 RX ORDER — ENALAPRILAT 2.5 MG/2ML
1.25 INJECTION INTRAVENOUS
Status: DISCONTINUED | OUTPATIENT
Start: 2018-12-04 | End: 2018-12-04 | Stop reason: HOSPADM

## 2018-12-04 RX ORDER — CEFAZOLIN SODIUM 1 G/50ML
1 INJECTION, SOLUTION INTRAVENOUS EVERY 8 HOURS
Status: COMPLETED | OUTPATIENT
Start: 2018-12-05 | End: 2018-12-05

## 2018-12-04 RX ORDER — POLYETHYLENE GLYCOL 3350 17 G/17G
17 POWDER, FOR SOLUTION ORAL DAILY PRN
Status: DISCONTINUED | OUTPATIENT
Start: 2018-12-04 | End: 2018-12-06 | Stop reason: HOSPADM

## 2018-12-04 RX ORDER — SODIUM CHLORIDE 0.9 % (FLUSH) 0.9 %
10 SYRINGE (ML) INJECTION EVERY 12 HOURS SCHEDULED
Status: DISCONTINUED | OUTPATIENT
Start: 2018-12-04 | End: 2018-12-04 | Stop reason: HOSPADM

## 2018-12-04 RX ORDER — MORPHINE SULFATE 2 MG/ML
2 INJECTION, SOLUTION INTRAMUSCULAR; INTRAVENOUS EVERY 5 MIN PRN
Status: DISCONTINUED | OUTPATIENT
Start: 2018-12-04 | End: 2018-12-04 | Stop reason: HOSPADM

## 2018-12-04 RX ORDER — FENTANYL CITRATE 50 UG/ML
50 INJECTION, SOLUTION INTRAMUSCULAR; INTRAVENOUS
Status: COMPLETED | OUTPATIENT
Start: 2018-12-04 | End: 2018-12-04

## 2018-12-04 RX ORDER — MIDAZOLAM HYDROCHLORIDE 1 MG/ML
2 INJECTION INTRAMUSCULAR; INTRAVENOUS
Status: DISCONTINUED | OUTPATIENT
Start: 2018-12-04 | End: 2018-12-04 | Stop reason: HOSPADM

## 2018-12-04 RX ORDER — OXYCODONE HYDROCHLORIDE AND ACETAMINOPHEN 5; 325 MG/1; MG/1
2 TABLET ORAL EVERY 4 HOURS PRN
Status: DISCONTINUED | OUTPATIENT
Start: 2018-12-04 | End: 2018-12-06 | Stop reason: HOSPADM

## 2018-12-04 RX ORDER — FAMOTIDINE 20 MG/1
20 TABLET, FILM COATED ORAL DAILY
Status: DISCONTINUED | OUTPATIENT
Start: 2018-12-05 | End: 2018-12-06 | Stop reason: HOSPADM

## 2018-12-04 RX ORDER — PANTOPRAZOLE SODIUM 40 MG/1
40 TABLET, DELAYED RELEASE ORAL DAILY
Status: DISCONTINUED | OUTPATIENT
Start: 2018-12-05 | End: 2018-12-06 | Stop reason: HOSPADM

## 2018-12-04 RX ORDER — ATORVASTATIN CALCIUM 20 MG/1
20 TABLET, FILM COATED ORAL NIGHTLY
Status: DISCONTINUED | OUTPATIENT
Start: 2018-12-04 | End: 2018-12-06 | Stop reason: HOSPADM

## 2018-12-04 RX ORDER — SUCRALFATE 1 G/1
1 TABLET ORAL 3 TIMES DAILY PRN
Status: DISCONTINUED | OUTPATIENT
Start: 2018-12-04 | End: 2018-12-06 | Stop reason: HOSPADM

## 2018-12-04 RX ADMIN — LIDOCAINE HYDROCHLORIDE 40 MG: 10 INJECTION, SOLUTION EPIDURAL; INFILTRATION; INTRACAUDAL; PERINEURAL at 17:42

## 2018-12-04 RX ADMIN — ROCURONIUM BROMIDE 50 MG: 10 INJECTION INTRAVENOUS at 18:18

## 2018-12-04 RX ADMIN — HYDROMORPHONE HYDROCHLORIDE 0.5 MG: 1 INJECTION, SOLUTION INTRAMUSCULAR; INTRAVENOUS; SUBCUTANEOUS at 20:59

## 2018-12-04 RX ADMIN — PHENYLEPHRINE HYDROCHLORIDE 200 MCG: 10 INJECTION INTRAVENOUS at 19:00

## 2018-12-04 RX ADMIN — ROCURONIUM BROMIDE 40 MG: 10 INJECTION INTRAVENOUS at 17:42

## 2018-12-04 RX ADMIN — FENTANYL CITRATE 50 MCG: 50 INJECTION INTRAMUSCULAR; INTRAVENOUS at 19:07

## 2018-12-04 RX ADMIN — FENTANYL CITRATE 50 MCG: 50 INJECTION, SOLUTION INTRAMUSCULAR; INTRAVENOUS at 17:25

## 2018-12-04 RX ADMIN — MIDAZOLAM 1 MG: 1 INJECTION INTRAMUSCULAR; INTRAVENOUS at 18:20

## 2018-12-04 RX ADMIN — PROPOFOL 150 MG: 10 INJECTION, EMULSION INTRAVENOUS at 17:42

## 2018-12-04 RX ADMIN — HYDROMORPHONE HYDROCHLORIDE 0.5 MG: 1 INJECTION, SOLUTION INTRAMUSCULAR; INTRAVENOUS; SUBCUTANEOUS at 20:42

## 2018-12-04 RX ADMIN — HYDROMORPHONE HYDROCHLORIDE 0.5 MG: 1 INJECTION, SOLUTION INTRAMUSCULAR; INTRAVENOUS; SUBCUTANEOUS at 20:36

## 2018-12-04 RX ADMIN — PROPOFOL 50 MCG/KG/MIN: 10 INJECTION, EMULSION INTRAVENOUS at 17:50

## 2018-12-04 RX ADMIN — LISINOPRIL 20 MG: 20 TABLET ORAL at 22:16

## 2018-12-04 RX ADMIN — METOPROLOL TARTRATE 25 MG: 25 TABLET ORAL at 22:16

## 2018-12-04 RX ADMIN — PHENYLEPHRINE HYDROCHLORIDE 200 MCG: 10 INJECTION INTRAVENOUS at 19:36

## 2018-12-04 RX ADMIN — MIDAZOLAM 1 MG: 1 INJECTION INTRAMUSCULAR; INTRAVENOUS at 17:36

## 2018-12-04 RX ADMIN — ATORVASTATIN CALCIUM 20 MG: 20 TABLET, FILM COATED ORAL at 22:16

## 2018-12-04 RX ADMIN — SODIUM CHLORIDE: 9 INJECTION, SOLUTION INTRAVENOUS at 21:36

## 2018-12-04 RX ADMIN — FENTANYL CITRATE 100 MCG: 50 INJECTION INTRAMUSCULAR; INTRAVENOUS at 17:42

## 2018-12-04 RX ADMIN — SODIUM CHLORIDE, SODIUM LACTATE, POTASSIUM CHLORIDE, AND CALCIUM CHLORIDE: 600; 310; 30; 20 INJECTION, SOLUTION INTRAVENOUS at 13:20

## 2018-12-04 RX ADMIN — Medication 2 G: at 17:52

## 2018-12-04 RX ADMIN — ONDANSETRON HYDROCHLORIDE 4 MG: 2 INJECTION, SOLUTION INTRAMUSCULAR; INTRAVENOUS at 19:39

## 2018-12-04 RX ADMIN — SUGAMMADEX 200 MG: 100 INJECTION, SOLUTION INTRAVENOUS at 19:46

## 2018-12-04 RX ADMIN — Medication 10 ML: at 22:16

## 2018-12-04 RX ADMIN — FENTANYL CITRATE 50 MCG: 50 INJECTION INTRAMUSCULAR; INTRAVENOUS at 18:00

## 2018-12-04 RX ADMIN — Medication 10 ML: at 21:36

## 2018-12-04 RX ADMIN — SODIUM CHLORIDE, SODIUM LACTATE, POTASSIUM CHLORIDE, AND CALCIUM CHLORIDE: 600; 310; 30; 20 INJECTION, SOLUTION INTRAVENOUS at 19:48

## 2018-12-04 RX ADMIN — HYDROMORPHONE HYDROCHLORIDE 1 MG: 1 INJECTION, SOLUTION INTRAMUSCULAR; INTRAVENOUS; SUBCUTANEOUS at 19:53

## 2018-12-04 RX ADMIN — SODIUM CHLORIDE, SODIUM LACTATE, POTASSIUM CHLORIDE, AND CALCIUM CHLORIDE: 600; 310; 30; 20 INJECTION, SOLUTION INTRAVENOUS at 18:01

## 2018-12-04 RX ADMIN — FENTANYL CITRATE 50 MCG: 50 INJECTION INTRAMUSCULAR; INTRAVENOUS at 18:20

## 2018-12-04 RX ADMIN — GABAPENTIN 800 MG: 400 CAPSULE ORAL at 22:16

## 2018-12-04 RX ADMIN — DEXAMETHASONE SODIUM PHOSPHATE 10 MG: 10 INJECTION INTRAMUSCULAR; INTRAVENOUS at 17:55

## 2018-12-04 ASSESSMENT — PAIN SCALES - GENERAL
PAINLEVEL_OUTOF10: 6
PAINLEVEL_OUTOF10: 7
PAINLEVEL_OUTOF10: 0

## 2018-12-04 ASSESSMENT — LIFESTYLE VARIABLES: SMOKING_STATUS: 0

## 2018-12-04 ASSESSMENT — PAIN - FUNCTIONAL ASSESSMENT: PAIN_FUNCTIONAL_ASSESSMENT: 0-10

## 2018-12-04 NOTE — ANESTHESIA PRE PROCEDURE
daily.    Historical Provider, MD       Current medications:    No current facility-administered medications for this visit. No current outpatient prescriptions on file. Facility-Administered Medications Ordered in Other Visits   Medication Dose Route Frequency Provider Last Rate Last Dose    ceFAZolin (ANCEF) 2 g in 0.9% sodium chloride 50 mL IVPB  2 g Intravenous Once Pritesh Poole MD        lactated ringers infusion   Intravenous Continuous Pritesh Poole  mL/hr at 12/04/18 1320         Allergies:     Allergies   Allergen Reactions    Codeine Swelling     Lips swelling    Iv [Iodides] Swelling    Hydrocodone Swelling     lips swelling       Problem List:    Patient Active Problem List   Diagnosis Code    Hyperlipidemia E78.5    Coronary atherosclerosis of native coronary artery I25.10    [facility] I21.9    COPD (chronic obstructive pulmonary disease) ([facility] J44.9    Bronchitis J40    Leg pain M79.606    History of tobacco abuse Z87.891    Dysphagia R13.10    Hoarseness, chronic R49.0    Chronic heartburn R12    Hiatal hernia K44.9    CAD (coronary artery disease) I25.10    Coronary artery disease involving native coronary artery of native heart I25.10    Mixed hyperlipidemia E78.2    Essential hypertension I10    S/P coronary artery stent placement Z95.5    SOB (shortness of breath) R06.02    Right upper quadrant abdominal pain R10.11    Acalculous cholecystitis K81.9    Chest discomfort R07.89    Aortic valve stenosis I35.0    Moderate aortic stenosis I35.0    Moderate aortic regurgitation I35.1    Arthritis of knee M17.10    Gastroesophageal reflux disease K21.9    Gout M10.9    Neuropathy G62.9    Rheumatoid arthritis (HCC) M06.9    Alternating constipation and diarrhea R19.8       Past Medical History:        Diagnosis Date    Arthritis     Bronchitis     Cancer ([facility]     Skin Cancer    Chronic cholecystitis without calculus 5/19/2017 Former Smoker     Packs/day: 3.00     Years: 40.00     Types: Cigarettes     Quit date: 2009    Smokeless tobacco: Never Used    Alcohol use Yes      Comment: 6-12 beers daily                                Counseling given: Not Answered      Vital Signs (Current): There were no vitals filed for this visit. BP Readings from Last 3 Encounters:   12/04/18 125/82   11/29/18 103/65   11/29/18 (!) 166/89       NPO Status:                                                                                 BMI:   Wt Readings from Last 3 Encounters:   12/04/18 182 lb (82.6 kg)   11/29/18 180 lb (81.6 kg)   11/19/18 178 lb 9.2 oz (81 kg)     There is no height or weight on file to calculate BMI.    CBC:   Lab Results   Component Value Date    WBC 6.2 11/12/2018    RBC 4.31 11/12/2018    HGB 13.0 11/12/2018    HCT 41.4 11/12/2018    MCV 96.1 11/12/2018    RDW 13.4 11/12/2018     11/12/2018       CMP:   Lab Results   Component Value Date     11/12/2018    K 4.9 11/12/2018     11/12/2018    CO2 28 11/12/2018    BUN 16 11/12/2018    CREATININE 0.8 11/12/2018    LABGLOM >60 11/12/2018    GLUCOSE 88 11/12/2018    PROT 7.7 11/29/2018    CALCIUM 9.3 11/12/2018    BILITOT 0.5 11/29/2018    ALKPHOS 110 11/29/2018    AST 21 11/29/2018    ALT 16 11/29/2018       POC Tests: No results for input(s): POCGLU, POCNA, POCK, POCCL, POCBUN, POCHEMO, POCHCT in the last 72 hours.     Coags:   Lab Results   Component Value Date    PROTIME 12.5 11/12/2018    INR 0.94 11/12/2018    APTT 26.4 11/12/2018       HCG (If Applicable): No results found for: PREGTESTUR, PREGSERUM, HCG, HCGQUANT     ABGs:   Lab Results   Component Value Date    PHART 7.408 08/15/2017    PO2ART 69 08/15/2017    PYB8MMV 40 08/15/2017    BEART 1 08/15/2017    S8RDJGVK 94 08/15/2017        Type & Screen (If Applicable):  No results found for: LABABO, [address]    Anesthesia Evaluation  Patient summary reviewed and Nursing

## 2018-12-05 LAB
HCT VFR BLD CALC: 31.3 % (ref 42–52)
HEMOGLOBIN: 9.8 G/DL (ref 14–18)
MCH RBC QN AUTO: 30.1 PG (ref 27–31)
MCHC RBC AUTO-ENTMCNC: 31.3 G/DL (ref 33–37)
MCV RBC AUTO: 96 FL (ref 80–94)
PDW BLD-RTO: 13.2 % (ref 11.5–14.5)
PLATELET # BLD: 158 K/UL (ref 130–400)
PMV BLD AUTO: 10.1 FL (ref 9.4–12.4)
RBC # BLD: 3.26 M/UL (ref 4.7–6.1)
WBC # BLD: 6.3 K/UL (ref 4.8–10.8)

## 2018-12-05 PROCEDURE — G8988 SELF CARE GOAL STATUS: HCPCS

## 2018-12-05 PROCEDURE — 1210000000 HC MED SURG R&B

## 2018-12-05 PROCEDURE — G8987 SELF CARE CURRENT STATUS: HCPCS

## 2018-12-05 PROCEDURE — 97535 SELF CARE MNGMENT TRAINING: CPT

## 2018-12-05 PROCEDURE — G8978 MOBILITY CURRENT STATUS: HCPCS

## 2018-12-05 PROCEDURE — G8989 SELF CARE D/C STATUS: HCPCS

## 2018-12-05 PROCEDURE — 36415 COLL VENOUS BLD VENIPUNCTURE: CPT

## 2018-12-05 PROCEDURE — 97165 OT EVAL LOW COMPLEX 30 MIN: CPT

## 2018-12-05 PROCEDURE — 97161 PT EVAL LOW COMPLEX 20 MIN: CPT

## 2018-12-05 PROCEDURE — 97750 PHYSICAL PERFORMANCE TEST: CPT

## 2018-12-05 PROCEDURE — 6370000000 HC RX 637 (ALT 250 FOR IP)

## 2018-12-05 PROCEDURE — 2580000003 HC RX 258

## 2018-12-05 PROCEDURE — 94762 N-INVAS EAR/PLS OXIMTRY CONT: CPT

## 2018-12-05 PROCEDURE — G8979 MOBILITY GOAL STATUS: HCPCS

## 2018-12-05 PROCEDURE — 6360000002 HC RX W HCPCS

## 2018-12-05 PROCEDURE — 85027 COMPLETE CBC AUTOMATED: CPT

## 2018-12-05 RX ADMIN — OXYCODONE AND ACETAMINOPHEN 2 TABLET: 5; 325 TABLET ORAL at 08:17

## 2018-12-05 RX ADMIN — OXYCODONE AND ACETAMINOPHEN 2 TABLET: 5; 325 TABLET ORAL at 12:59

## 2018-12-05 RX ADMIN — CEFAZOLIN SODIUM 1 G: 1 INJECTION, SOLUTION INTRAVENOUS at 10:11

## 2018-12-05 RX ADMIN — OXYCODONE HYDROCHLORIDE AND ACETAMINOPHEN 1 TABLET: 5; 325 TABLET ORAL at 17:45

## 2018-12-05 RX ADMIN — OXYCODONE HYDROCHLORIDE AND ACETAMINOPHEN 1 TABLET: 5; 325 TABLET ORAL at 19:47

## 2018-12-05 RX ADMIN — FOLIC ACID 1 MG: 1 TABLET ORAL at 08:16

## 2018-12-05 RX ADMIN — OXYCODONE AND ACETAMINOPHEN 2 TABLET: 5; 325 TABLET ORAL at 23:40

## 2018-12-05 RX ADMIN — Medication 10 ML: at 19:48

## 2018-12-05 RX ADMIN — GABAPENTIN 800 MG: 400 CAPSULE ORAL at 08:16

## 2018-12-05 RX ADMIN — CEFAZOLIN SODIUM 1 G: 1 INJECTION, SOLUTION INTRAVENOUS at 00:10

## 2018-12-05 RX ADMIN — OXYCODONE AND ACETAMINOPHEN 2 TABLET: 5; 325 TABLET ORAL at 03:32

## 2018-12-05 RX ADMIN — ATORVASTATIN CALCIUM 20 MG: 20 TABLET, FILM COATED ORAL at 19:47

## 2018-12-05 RX ADMIN — PANTOPRAZOLE SODIUM 40 MG: 40 TABLET, DELAYED RELEASE ORAL at 08:16

## 2018-12-05 RX ADMIN — FAMOTIDINE 20 MG: 20 TABLET ORAL at 08:16

## 2018-12-05 RX ADMIN — DOCUSATE SODIUM 100 MG: 100 CAPSULE, LIQUID FILLED ORAL at 08:16

## 2018-12-05 RX ADMIN — SODIUM CHLORIDE: 9 INJECTION, SOLUTION INTRAVENOUS at 08:19

## 2018-12-05 RX ADMIN — GABAPENTIN 800 MG: 400 CAPSULE ORAL at 19:46

## 2018-12-05 RX ADMIN — OXYCODONE AND ACETAMINOPHEN 2 TABLET: 5; 325 TABLET ORAL at 00:10

## 2018-12-05 RX ADMIN — SODIUM CHLORIDE: 9 INJECTION, SOLUTION INTRAVENOUS at 19:48

## 2018-12-05 RX ADMIN — METOPROLOL TARTRATE 25 MG: 25 TABLET ORAL at 08:16

## 2018-12-05 ASSESSMENT — PAIN SCALES - GENERAL
PAINLEVEL_OUTOF10: 8
PAINLEVEL_OUTOF10: 5
PAINLEVEL_OUTOF10: 7
PAINLEVEL_OUTOF10: 6
PAINLEVEL_OUTOF10: 7
PAINLEVEL_OUTOF10: 7

## 2018-12-05 ASSESSMENT — PAIN DESCRIPTION - PAIN TYPE: TYPE: SURGICAL PAIN

## 2018-12-05 ASSESSMENT — PAIN DESCRIPTION - DESCRIPTORS: DESCRIPTORS: SORE

## 2018-12-05 ASSESSMENT — PAIN DESCRIPTION - LOCATION: LOCATION: BACK

## 2018-12-05 NOTE — PROGRESS NOTES
10.1 12/05/2018       ASSESSMENT AND PLAN:    Patient Active Problem List    Diagnosis Date Noted    Chest discomfort      Priority: High    Aortic valve stenosis      Priority: High    DDD (degenerative disc disease), lumbar 12/04/2018    Spinal stenosis of lumbar region with neurogenic claudication 12/04/2018    Lumbar stenosis with neurogenic claudication 12/04/2018    Alternating constipation and diarrhea 10/31/2018    Arthritis of knee 07/16/2018    Gastroesophageal reflux disease 12/19/2017    Gout 12/19/2017    Neuropathy 12/19/2017    Rheumatoid arthritis ([facility] [facility] 12/19/2017    Moderate aortic stenosis 08/14/2017    Moderate aortic regurgitation 08/14/2017    Acalculous cholecystitis 05/19/2017    Right upper quadrant abdominal pain 05/12/2017    Mixed hyperlipidemia 04/26/2016    Essential hypertension 04/26/2016    S/P coronary artery stent placement 04/26/2016    SOB (shortness of breath) 04/26/2016    CAD (coronary artery disease)     Coronary artery disease involving native coronary artery of native heart     Hiatal hernia 04/20/2015    Dysphagia 03/13/2015    Hoarseness, chronic 03/13/2015    Chronic heartburn 03/13/2015    History of tobacco abuse     Leg pain 08/25/2011    Hyperlipidemia     Coronary atherosclerosis of native coronary artery     MI (myocardial infarction) ([facility] [facility]     [facility]ch[facility]) [facility]     Bronchitis        Post operative day 1 status post laminectomy L1-L5    1:  Activity Level:  Progressive  2:  Pain Control:  Continue current  3:  Discharge Planning:  Pending continued stability  4:  Other:  Monitor drain output      Electronically signed by Irina Gold PA-C on 12/5/18 at 6:56 AM

## 2018-12-05 NOTE — PROGRESS NOTES
Occupational Therapy  Facility/Department: [facility] 5 SURG SERVICES  Daily Treatment Note  NAME: Quintin Bonilla  : 1955  MRN: [id number]    Date of Service: 2018    Discharge Recommendations:  Home with assist PRN       Patient Diagnosis(es): There were no encounter diagnoses. has a past medical history of Arthritis; Bronchitis; Cancer ([facility] [address] Chronic cholecystitis without calculus; Chronic GERD; COPD (chronic obstructive pulmonary disease) ([facility] [address] Coronary atherosclerosis of native coronary artery; DDD (degenerative disc disease), lumbar; History of tobacco abuse; Hyperlipidemia; Hypertension; MI (myocardial infarction) ([facility] [facility] Moderate aortic regurgitation; Moderate aortic stenosis; and Spinal stenosis of lumbar region with neurogenic claudication. has a past surgical history that includes knee surgery; back surgery; Upper gastrointestinal endoscopy (2015); Leg Surgery (Right); Coronary angioplasty with stent (3/16); joint replacement; joint replacement (Right); Colonoscopy (2015); Cardiac catheterization (08/10/04 [facility]); Cardiac catheterization (12/10/03  [facility]); Cardiac catheterization (03 [facility]); Cardiac catheterization (01 [facility]); Cardiac catheterization (2009); Cardiac catheterization (N/A, 2016); pr egd transoral biopsy single/multiple (N/A, 2017); Cholecystectomy, laparoscopic (N/A, 2017); Cardiac catheterization (2017); joint replacement; EGD (2017); pr egd transoral biopsy single/multiple (N/A, 2018); Upper gastrointestinal endoscopy (N/A, 2018); and Lumbar spine surgery (N/A, 2018).     Restrictions  Restrictions/Precautions  Restrictions/Precautions: Fall Risk  Required Braces or Orthoses?: Yes (does not have LSO yet)  Required Braces or Orthoses  Spinal Other: awaiting LSO  Position Activity Restriction  Spinal Precautions: No Bending, No Lifting, No Twisting  Other position/activity restrictions: patient has drain, LSO ordered but patient is under the impression that he doesn't need to use unless PT thinks he could benefit from one. Subjective   General  Chart Reviewed: Yes  Patient assessed for rehabilitation services?: Yes  Family / Caregiver Present: Yes (spouse)  Pre Treatment Pain Screening  Pain at present: 4  Scale Used: Numeric Score  Intervention List: Patient able to continue with treatment   Orientation  Orientation  Overall Orientation Status: Within Normal Limits  Objective    ADL    LE Bathing: Contact guard assistance (instructed in AE use)    LE Dressing: Contact guard assistance (instructed in AE use)            Functional Mobility  Functional - Mobility Device: Rolling Walker  Assist Level: Contact guard assistance  Functional Mobility Comments: Instructed in squat technique and potential issues with home management    Bed mobility  Comment: Instructed in bed mobility techniques                                                                 Assessment   Performance deficits / Impairments: Decreased functional mobility ; Decreased ADL status; Decreased high-level IADLs  Assessment: Instruction completed this visit. No further visits anticipated  Treatment Diagnosis: Lumbar Laminectomy and decompression L1-L5  Patient Education: back protocol  Barriers to Learning: none  REQUIRES OT FOLLOW UP: No  Activity Tolerance  Activity Tolerance: Patient Tolerated treatment well  Safety Devices  Safety Devices in place: Yes  Type of devices: Call light within reach; Left in chair;Chair alarm in place          Plan   Plan  Plan Comment: no further visits planned  G-Code  OT G-codes  Functional Assessment Tool Used: bathe dress toilet  Functional Limitation: Self care  Self Care Current Status (): At least 1 percent but less than 20 percent impaired, limited or restricted  Self Care Goal Status (): 0 percent impaired, limited or restricted  Self Care Discharge Status ():  At least 1 percent but less than 20 percent impaired, limited or restricted  OutComes Score                                           AM-PAC Score             Goals  Short term goals  Short term goal 1: Patient will be independent to demo/restate back protocols as they relate to bathing, dressing, toileting, bed mobility, and light home management  (MET)       Therapy Time   Individual Concurrent Group Co-treatment   Time In 1015         Time Out 1040         Minutes [address]Margarita Damon OT Electronically signed by Ne Sellers OT on 12/5/2018 at 12:27 PM

## 2018-12-05 NOTE — OP NOTE
levels were exposed. Once this was done, a Leksell rongeur was used to remove the spinous processes from L1 to L5. A Leksell rongeur was used to remove the central lamina of L4 L3 and L2 and L1 without complication. The facet joints were so hypertrophic, they were encroaching upon the midline and causing spinal stenosis. The medial aspect of the facet joints at each level were resected with a Leksell rongeur. A 3 mm Kerrison was then used to perform a central laminectomy taking off the entire lamina or entire central lamina of L4 L3 L2 and L1. Once this was done a high-speed bur was used to create a trough on each side of the spinal canal. In this trough a small osteotome was used cleave off the medial aspect of the remaining hypertrophic facet joints. The overhanging bone was removed with pituitaries, Kerrison punches, and up angled small curettes. This was all done without any dural injury. The spinal canal and been adequately decompressed. A 3 mm Kerrison punch was then used to undercut the lateral recesses from L1 to L5 bilaterally effectively decompressing lateral recesses and as far out into the neural foramen as could be reached. This effectively decompressed all the neural elements. The dura ballooned out nicely. There was still some dural deformity secondary to chronic compression around the L3 4 level. At the L4 5 level on the right there was a moderate amount of epidural scarring that this is able to be handled through the use of a Penfield elevator dissecting it off of the dura and then ultimately enabling me to complete the decompression. At this point the wound was copiously irrigated with normal saline. Surgi-Amandeep was placed in the posterolateral gutters to help maintain hemostasis. It should be noted that throughout this case he had significant bony bleeding that was unable to be stopped until the laminectomy was completed.  The dura was covered with a long piece of Gelfoam. The left-sided transverse processes were fully exposed and the decorticated. 10 mL of demineralized bone matrix was used along with some of the patient's locally obtained autograft bone from the extensive decompression was used to packed posterior lateral gutter on the left which was the concave side of his deformity with bone graft to promote a fusion. The retractors were removed. Vancomycin powder was placed within the wound. The paraspinal musculature, fascia, and subcu skin was anesthetized with a mixture of Marcaine and Exparel. The closure was performed by reapproximating the fascia and paraspinal musculature with #1 Vicryls in a watertight fashion. The subcutaneous skin was closed with 2-0 Vicryl. The skin edges were reapproximated with skin staples. Sterile dressings were applied. A deep Hemovac drain was placed prior to closing the fascial layer. Once the dressings were applied patient was transferred to a supine position and awakened from general endotracheal anesthesia. He was extubated and taken to the recovery room in satisfactory condition. There were no monitoring events noted throughout the case. All counts were correct at the end of the case. There were no complications.

## 2018-12-06 VITALS
OXYGEN SATURATION: 94 % | DIASTOLIC BLOOD PRESSURE: 90 MMHG | BODY MASS INDEX: 28.56 KG/M2 | HEIGHT: 67 IN | WEIGHT: 182 LBS | TEMPERATURE: 98.4 F | SYSTOLIC BLOOD PRESSURE: 154 MMHG | RESPIRATION RATE: 18 BRPM | HEART RATE: 72 BPM

## 2018-12-06 PROCEDURE — 6370000000 HC RX 637 (ALT 250 FOR IP)

## 2018-12-06 RX ADMIN — METOPROLOL TARTRATE 25 MG: 25 TABLET ORAL at 08:00

## 2018-12-06 RX ADMIN — OXYCODONE AND ACETAMINOPHEN 2 TABLET: 5; 325 TABLET ORAL at 08:00

## 2018-12-06 RX ADMIN — OXYCODONE AND ACETAMINOPHEN 2 TABLET: 5; 325 TABLET ORAL at 04:33

## 2018-12-06 RX ADMIN — PANTOPRAZOLE SODIUM 40 MG: 40 TABLET, DELAYED RELEASE ORAL at 08:00

## 2018-12-06 RX ADMIN — DOCUSATE SODIUM 100 MG: 100 CAPSULE, LIQUID FILLED ORAL at 08:00

## 2018-12-06 RX ADMIN — LISINOPRIL 20 MG: 20 TABLET ORAL at 07:59

## 2018-12-06 RX ADMIN — FAMOTIDINE 20 MG: 20 TABLET ORAL at 08:00

## 2018-12-06 RX ADMIN — GABAPENTIN 800 MG: 400 CAPSULE ORAL at 08:00

## 2018-12-06 RX ADMIN — FOLIC ACID 1 MG: 1 TABLET ORAL at 08:00

## 2018-12-06 ASSESSMENT — PAIN SCALES - GENERAL
PAINLEVEL_OUTOF10: 7
PAINLEVEL_OUTOF10: 7

## 2018-12-11 DIAGNOSIS — R30.0 DYSURIA: Primary | ICD-10-CM

## 2018-12-12 ENCOUNTER — OFFICE VISIT (OUTPATIENT)
Dept: FAMILY MEDICINE CLINIC | Age: 63
End: 2018-12-12
Payer: MEDICARE

## 2018-12-12 VITALS
TEMPERATURE: 97.7 F | HEIGHT: 66 IN | SYSTOLIC BLOOD PRESSURE: 100 MMHG | WEIGHT: 174.6 LBS | BODY MASS INDEX: 28.06 KG/M2 | DIASTOLIC BLOOD PRESSURE: 60 MMHG | HEART RATE: 60 BPM | RESPIRATION RATE: 16 BRPM | OXYGEN SATURATION: 97 %

## 2018-12-12 DIAGNOSIS — N41.0 ACUTE PROSTATITIS: Primary | ICD-10-CM

## 2018-12-12 DIAGNOSIS — R30.0 DYSURIA: ICD-10-CM

## 2018-12-12 LAB
BILIRUBIN URINE: NEGATIVE
BLOOD, URINE: NEGATIVE
CLARITY: CLEAR
COLOR: YELLOW
GLUCOSE URINE: NEGATIVE MG/DL
KETONES, URINE: NEGATIVE MG/DL
LEUKOCYTE ESTERASE, URINE: NEGATIVE
NITRITE, URINE: NEGATIVE
PH UA: 5.5
PROTEIN UA: NEGATIVE MG/DL
SPECIFIC GRAVITY UA: <=1.005
URINE REFLEX TO CULTURE: NORMAL
URINE TYPE: NORMAL
UROBILINOGEN, URINE: 0.2 E.U./DL

## 2018-12-12 PROCEDURE — 99213 OFFICE O/P EST LOW 20 MIN: CPT | Performed by: FAMILY MEDICINE

## 2018-12-12 RX ORDER — SULFAMETHOXAZOLE AND TRIMETHOPRIM 800; 160 MG/1; MG/1
1 TABLET ORAL 2 TIMES DAILY
Qty: 20 TABLET | Refills: 0 | Status: SHIPPED | OUTPATIENT
Start: 2018-12-12 | End: 2018-12-22

## 2018-12-12 ASSESSMENT — ENCOUNTER SYMPTOMS
EYE PAIN: 0
BACK PAIN: 1
SHORTNESS OF BREATH: 0
NAUSEA: 0
ABDOMINAL PAIN: 1
DIARRHEA: 0
VOMITING: 0
COUGH: 0
EYE DISCHARGE: 0
RHINORRHEA: 0
CONSTIPATION: 1

## 2018-12-12 NOTE — PATIENT INSTRUCTIONS
may help. Take your time and do not strain when having a bowel movement. · Avoid alcohol, caffeine, and spicy foods, especially if they make your symptoms worse. When should you call for help? Call your doctor now or seek immediate medical care if:    · You have symptoms of a urinary tract infection. These may include:  ? Pain or burning when you urinate. ? A frequent need to urinate without being able to pass much urine. ? Pain in the flank, which is just below the rib cage and above the waist on either side of the back. ? Blood in your urine. ? A fever.    Watch closely for changes in your health, and be sure to contact your doctor if:    · You cannot empty your bladder completely.     · You do not get better as expected. Where can you learn more? Go to [email or web address]://[email or web address] [email or web address] org and sign in to your [employer] account. Enter B917 in the [employer] box to learn more about \"Prostatitis: Care Instructions. \"     If you do not have an account, please click on the \"Sign Up Now\" link. Current as of: December 3, 2017  Content Version: 11.8  © 9332-4279 Healthwise, Incorporated. Care instructions adapted under license by [facility] [employer] [facility] ([facility]). If you have questions about a medical condition or this instruction, always ask your healthcare professional. [address] any warranty or liability for your use of this information.

## 2018-12-20 ENCOUNTER — HOSPITAL ENCOUNTER (OUTPATIENT)
Age: 63
Setting detail: OUTPATIENT SURGERY
Discharge: HOME | End: 2018-12-20
Payer: MEDICARE

## 2018-12-20 ENCOUNTER — ANESTHESIA (OUTPATIENT)
Dept: OPERATING ROOM | Age: 63
End: 2018-12-20
Payer: MEDICARE

## 2018-12-20 ENCOUNTER — ANESTHESIA EVENT (OUTPATIENT)
Dept: OPERATING ROOM | Age: 63
End: 2018-12-20
Payer: MEDICARE

## 2018-12-20 VITALS
HEIGHT: 67 IN | HEART RATE: 72 BPM | SYSTOLIC BLOOD PRESSURE: 109 MMHG | RESPIRATION RATE: 20 BRPM | DIASTOLIC BLOOD PRESSURE: 55 MMHG | BODY MASS INDEX: 28.56 KG/M2 | WEIGHT: 182 LBS | OXYGEN SATURATION: 96 % | TEMPERATURE: 98 F

## 2018-12-20 VITALS
SYSTOLIC BLOOD PRESSURE: 99 MMHG | DIASTOLIC BLOOD PRESSURE: 60 MMHG | OXYGEN SATURATION: 100 % | TEMPERATURE: 97.1 F | RESPIRATION RATE: 12 BRPM

## 2018-12-20 DIAGNOSIS — M48.062 SPINAL STENOSIS OF LUMBAR REGION WITH NEUROGENIC CLAUDICATION: Primary | ICD-10-CM

## 2018-12-20 LAB
HCT VFR BLD CALC: 34.7 % (ref 42–52)
HEMOGLOBIN: 10.6 G/DL (ref 14–18)
MCH RBC QN AUTO: 29.5 PG (ref 27–31)
MCHC RBC AUTO-ENTMCNC: 30.5 G/DL (ref 33–37)
MCV RBC AUTO: 96.7 FL (ref 80–94)
PDW BLD-RTO: 13.5 % (ref 11.5–14.5)
PLATELET # BLD: 397 K/UL (ref 130–400)
PMV BLD AUTO: 8.1 FL (ref 9.4–12.4)
RBC # BLD: 3.59 M/UL (ref 4.7–6.1)
WBC # BLD: 6.8 K/UL (ref 4.8–10.8)

## 2018-12-20 PROCEDURE — 2780000010 HC IMPLANT OTHER

## 2018-12-20 PROCEDURE — 7100000010 HC PHASE II RECOVERY - FIRST 15 MIN

## 2018-12-20 PROCEDURE — 3700000001 HC ADD 15 MINUTES (ANESTHESIA)

## 2018-12-20 PROCEDURE — 6360000002 HC RX W HCPCS: Performed by: ANESTHESIOLOGY

## 2018-12-20 PROCEDURE — 2500000003 HC RX 250 WO HCPCS

## 2018-12-20 PROCEDURE — 6360000002 HC RX W HCPCS

## 2018-12-20 PROCEDURE — 2709999900 HC NON-CHARGEABLE SUPPLY

## 2018-12-20 PROCEDURE — 7100000011 HC PHASE II RECOVERY - ADDTL 15 MIN

## 2018-12-20 PROCEDURE — 85027 COMPLETE CBC AUTOMATED: CPT

## 2018-12-20 PROCEDURE — 36415 COLL VENOUS BLD VENIPUNCTURE: CPT

## 2018-12-20 PROCEDURE — 3600000005 HC SURGERY LEVEL 5 BASE

## 2018-12-20 PROCEDURE — 7100000000 HC PACU RECOVERY - FIRST 15 MIN

## 2018-12-20 PROCEDURE — 2580000003 HC RX 258

## 2018-12-20 PROCEDURE — 2580000003 HC RX 258: Performed by: ANESTHESIOLOGY

## 2018-12-20 PROCEDURE — 7100000001 HC PACU RECOVERY - ADDTL 15 MIN

## 2018-12-20 PROCEDURE — 3700000000 HC ANESTHESIA ATTENDED CARE

## 2018-12-20 PROCEDURE — 3600000015 HC SURGERY LEVEL 5 ADDTL 15MIN

## 2018-12-20 DEVICE — AGENT HEMSTAT 8ML FLX TIP MTRX + DISP SURGIFLO: Type: IMPLANTABLE DEVICE | Site: BACK | Status: FUNCTIONAL

## 2018-12-20 RX ORDER — SCOLOPAMINE TRANSDERMAL SYSTEM 1 MG/1
1 PATCH, EXTENDED RELEASE TRANSDERMAL ONCE
Status: DISCONTINUED | OUTPATIENT
Start: 2018-12-20 | End: 2018-12-20 | Stop reason: HOSPADM

## 2018-12-20 RX ORDER — PROPOFOL 10 MG/ML
INJECTION, EMULSION INTRAVENOUS PRN
Status: DISCONTINUED | OUTPATIENT
Start: 2018-12-20 | End: 2018-12-20 | Stop reason: SDUPTHER

## 2018-12-20 RX ORDER — MEPERIDINE HYDROCHLORIDE 50 MG/ML
12.5 INJECTION INTRAMUSCULAR; INTRAVENOUS; SUBCUTANEOUS EVERY 5 MIN PRN
Status: DISCONTINUED | OUTPATIENT
Start: 2018-12-20 | End: 2018-12-20 | Stop reason: HOSPADM

## 2018-12-20 RX ORDER — METOCLOPRAMIDE HYDROCHLORIDE 5 MG/ML
10 INJECTION INTRAMUSCULAR; INTRAVENOUS
Status: DISCONTINUED | OUTPATIENT
Start: 2018-12-20 | End: 2018-12-20 | Stop reason: HOSPADM

## 2018-12-20 RX ORDER — KETAMINE HYDROCHLORIDE 100 MG/ML
INJECTION, SOLUTION INTRAMUSCULAR; INTRAVENOUS PRN
Status: DISCONTINUED | OUTPATIENT
Start: 2018-12-20 | End: 2018-12-20 | Stop reason: SDUPTHER

## 2018-12-20 RX ORDER — LIDOCAINE HYDROCHLORIDE 10 MG/ML
INJECTION, SOLUTION EPIDURAL; INFILTRATION; INTRACAUDAL; PERINEURAL PRN
Status: DISCONTINUED | OUTPATIENT
Start: 2018-12-20 | End: 2018-12-20 | Stop reason: SDUPTHER

## 2018-12-20 RX ORDER — PROMETHAZINE HYDROCHLORIDE 25 MG/ML
6.25 INJECTION, SOLUTION INTRAMUSCULAR; INTRAVENOUS
Status: DISCONTINUED | OUTPATIENT
Start: 2018-12-20 | End: 2018-12-20 | Stop reason: HOSPADM

## 2018-12-20 RX ORDER — ENALAPRILAT 2.5 MG/2ML
1.25 INJECTION INTRAVENOUS
Status: DISCONTINUED | OUTPATIENT
Start: 2018-12-20 | End: 2018-12-20 | Stop reason: HOSPADM

## 2018-12-20 RX ORDER — FENTANYL CITRATE 50 UG/ML
50 INJECTION, SOLUTION INTRAMUSCULAR; INTRAVENOUS
Status: COMPLETED | OUTPATIENT
Start: 2018-12-20 | End: 2018-12-20

## 2018-12-20 RX ORDER — EPHEDRINE SULFATE 50 MG/ML
INJECTION, SOLUTION INTRAVENOUS PRN
Status: DISCONTINUED | OUTPATIENT
Start: 2018-12-20 | End: 2018-12-20 | Stop reason: SDUPTHER

## 2018-12-20 RX ORDER — FENTANYL CITRATE 50 UG/ML
INJECTION, SOLUTION INTRAMUSCULAR; INTRAVENOUS PRN
Status: DISCONTINUED | OUTPATIENT
Start: 2018-12-20 | End: 2018-12-20 | Stop reason: SDUPTHER

## 2018-12-20 RX ORDER — MIDAZOLAM HYDROCHLORIDE 1 MG/ML
2 INJECTION INTRAMUSCULAR; INTRAVENOUS ONCE
Status: COMPLETED | OUTPATIENT
Start: 2018-12-20 | End: 2018-12-20

## 2018-12-20 RX ORDER — SUCCINYLCHOLINE CHLORIDE 20 MG/ML
INJECTION INTRAMUSCULAR; INTRAVENOUS PRN
Status: DISCONTINUED | OUTPATIENT
Start: 2018-12-20 | End: 2018-12-20 | Stop reason: SDUPTHER

## 2018-12-20 RX ORDER — LIDOCAINE HYDROCHLORIDE 10 MG/ML
1 INJECTION, SOLUTION EPIDURAL; INFILTRATION; INTRACAUDAL; PERINEURAL
Status: DISCONTINUED | OUTPATIENT
Start: 2018-12-20 | End: 2018-12-20 | Stop reason: HOSPADM

## 2018-12-20 RX ORDER — OXYCODONE HYDROCHLORIDE 5 MG/1
10 TABLET ORAL PRN
Status: DISCONTINUED | OUTPATIENT
Start: 2018-12-20 | End: 2018-12-20 | Stop reason: HOSPADM

## 2018-12-20 RX ORDER — DIPHENHYDRAMINE HYDROCHLORIDE 50 MG/ML
12.5 INJECTION INTRAMUSCULAR; INTRAVENOUS
Status: DISCONTINUED | OUTPATIENT
Start: 2018-12-20 | End: 2018-12-20 | Stop reason: HOSPADM

## 2018-12-20 RX ORDER — MORPHINE SULFATE 2 MG/ML
2 INJECTION, SOLUTION INTRAMUSCULAR; INTRAVENOUS EVERY 5 MIN PRN
Status: DISCONTINUED | OUTPATIENT
Start: 2018-12-20 | End: 2018-12-20 | Stop reason: HOSPADM

## 2018-12-20 RX ORDER — OXYCODONE AND ACETAMINOPHEN 7.5; 325 MG/1; MG/1
1 TABLET ORAL EVERY 6 HOURS PRN
Qty: 60 TABLET | Refills: 0 | Status: SHIPPED | OUTPATIENT
Start: 2018-12-20 | End: 2019-01-03

## 2018-12-20 RX ORDER — ONDANSETRON 2 MG/ML
INJECTION INTRAMUSCULAR; INTRAVENOUS PRN
Status: DISCONTINUED | OUTPATIENT
Start: 2018-12-20 | End: 2018-12-20 | Stop reason: SDUPTHER

## 2018-12-20 RX ORDER — SODIUM CHLORIDE 0.9 % (FLUSH) 0.9 %
10 SYRINGE (ML) INJECTION PRN
Status: DISCONTINUED | OUTPATIENT
Start: 2018-12-20 | End: 2018-12-20 | Stop reason: HOSPADM

## 2018-12-20 RX ORDER — OXYCODONE HYDROCHLORIDE 5 MG/1
5 TABLET ORAL PRN
Status: DISCONTINUED | OUTPATIENT
Start: 2018-12-20 | End: 2018-12-20 | Stop reason: HOSPADM

## 2018-12-20 RX ORDER — HYDRALAZINE HYDROCHLORIDE 20 MG/ML
5 INJECTION INTRAMUSCULAR; INTRAVENOUS EVERY 10 MIN PRN
Status: DISCONTINUED | OUTPATIENT
Start: 2018-12-20 | End: 2018-12-20 | Stop reason: HOSPADM

## 2018-12-20 RX ORDER — LABETALOL HYDROCHLORIDE 5 MG/ML
5 INJECTION, SOLUTION INTRAVENOUS EVERY 10 MIN PRN
Status: DISCONTINUED | OUTPATIENT
Start: 2018-12-20 | End: 2018-12-20 | Stop reason: HOSPADM

## 2018-12-20 RX ORDER — MIDAZOLAM HYDROCHLORIDE 1 MG/ML
2 INJECTION INTRAMUSCULAR; INTRAVENOUS
Status: COMPLETED | OUTPATIENT
Start: 2018-12-20 | End: 2018-12-20

## 2018-12-20 RX ORDER — MORPHINE SULFATE 2 MG/ML
4 INJECTION, SOLUTION INTRAMUSCULAR; INTRAVENOUS EVERY 5 MIN PRN
Status: DISCONTINUED | OUTPATIENT
Start: 2018-12-20 | End: 2018-12-20 | Stop reason: HOSPADM

## 2018-12-20 RX ORDER — SODIUM CHLORIDE 0.9 % (FLUSH) 0.9 %
10 SYRINGE (ML) INJECTION EVERY 12 HOURS SCHEDULED
Status: DISCONTINUED | OUTPATIENT
Start: 2018-12-20 | End: 2018-12-20 | Stop reason: HOSPADM

## 2018-12-20 RX ORDER — VANCOMYCIN HYDROCHLORIDE 1 G/20ML
INJECTION, POWDER, LYOPHILIZED, FOR SOLUTION INTRAVENOUS PRN
Status: DISCONTINUED | OUTPATIENT
Start: 2018-12-20 | End: 2018-12-20 | Stop reason: HOSPADM

## 2018-12-20 RX ORDER — SODIUM CHLORIDE, SODIUM LACTATE, POTASSIUM CHLORIDE, CALCIUM CHLORIDE 600; 310; 30; 20 MG/100ML; MG/100ML; MG/100ML; MG/100ML
INJECTION, SOLUTION INTRAVENOUS CONTINUOUS
Status: DISCONTINUED | OUTPATIENT
Start: 2018-12-20 | End: 2018-12-20 | Stop reason: HOSPADM

## 2018-12-20 RX ORDER — DEXAMETHASONE SODIUM PHOSPHATE 10 MG/ML
INJECTION INTRAMUSCULAR; INTRAVENOUS PRN
Status: DISCONTINUED | OUTPATIENT
Start: 2018-12-20 | End: 2018-12-20 | Stop reason: SDUPTHER

## 2018-12-20 RX ORDER — ROCURONIUM BROMIDE 10 MG/ML
INJECTION, SOLUTION INTRAVENOUS PRN
Status: DISCONTINUED | OUTPATIENT
Start: 2018-12-20 | End: 2018-12-20 | Stop reason: SDUPTHER

## 2018-12-20 RX ADMIN — PROPOFOL 25 MG: 10 INJECTION, EMULSION INTRAVENOUS at 16:26

## 2018-12-20 RX ADMIN — MIDAZOLAM HYDROCHLORIDE 2 MG: 2 INJECTION, SOLUTION INTRAMUSCULAR; INTRAVENOUS at 08:21

## 2018-12-20 RX ADMIN — EPHEDRINE SULFATE 10 MG: 50 INJECTION, SOLUTION INTRAMUSCULAR; INTRAVENOUS; SUBCUTANEOUS at 16:06

## 2018-12-20 RX ADMIN — EPHEDRINE SULFATE 5 MG: 50 INJECTION, SOLUTION INTRAMUSCULAR; INTRAVENOUS; SUBCUTANEOUS at 15:53

## 2018-12-20 RX ADMIN — EPHEDRINE SULFATE 10 MG: 50 INJECTION, SOLUTION INTRAMUSCULAR; INTRAVENOUS; SUBCUTANEOUS at 15:59

## 2018-12-20 RX ADMIN — PHENYLEPHRINE HYDROCHLORIDE 80 MCG: 10 INJECTION INTRAVENOUS at 15:48

## 2018-12-20 RX ADMIN — FENTANYL CITRATE 50 MCG: 50 INJECTION, SOLUTION INTRAMUSCULAR; INTRAVENOUS at 11:15

## 2018-12-20 RX ADMIN — Medication 20 MG: at 15:30

## 2018-12-20 RX ADMIN — PHENYLEPHRINE HYDROCHLORIDE 80 MCG: 10 INJECTION INTRAVENOUS at 16:06

## 2018-12-20 RX ADMIN — WATER 1 G: 1 INJECTION INTRAMUSCULAR; INTRAVENOUS; SUBCUTANEOUS at 15:42

## 2018-12-20 RX ADMIN — ONDANSETRON HYDROCHLORIDE 4 MG: 2 INJECTION, SOLUTION INTRAMUSCULAR; INTRAVENOUS at 16:14

## 2018-12-20 RX ADMIN — PHENYLEPHRINE HYDROCHLORIDE 80 MCG: 10 INJECTION INTRAVENOUS at 15:53

## 2018-12-20 RX ADMIN — PROPOFOL 30 MG: 10 INJECTION, EMULSION INTRAVENOUS at 16:15

## 2018-12-20 RX ADMIN — FENTANYL CITRATE 50 MCG: 50 INJECTION INTRAMUSCULAR; INTRAVENOUS at 15:30

## 2018-12-20 RX ADMIN — SODIUM CHLORIDE, SODIUM LACTATE, POTASSIUM CHLORIDE, AND CALCIUM CHLORIDE: 600; 310; 30; 20 INJECTION, SOLUTION INTRAVENOUS at 07:57

## 2018-12-20 RX ADMIN — PHENYLEPHRINE HYDROCHLORIDE 80 MCG: 10 INJECTION INTRAVENOUS at 15:59

## 2018-12-20 RX ADMIN — HYDROMORPHONE HYDROCHLORIDE 0.5 MG: 1 INJECTION, SOLUTION INTRAMUSCULAR; INTRAVENOUS; SUBCUTANEOUS at 16:25

## 2018-12-20 RX ADMIN — HYDROMORPHONE HYDROCHLORIDE 0.5 MG: 1 INJECTION, SOLUTION INTRAMUSCULAR; INTRAVENOUS; SUBCUTANEOUS at 16:46

## 2018-12-20 RX ADMIN — MIDAZOLAM HYDROCHLORIDE 2 MG: 2 INJECTION, SOLUTION INTRAMUSCULAR; INTRAVENOUS at 13:57

## 2018-12-20 RX ADMIN — FENTANYL CITRATE 50 MCG: 50 INJECTION INTRAMUSCULAR; INTRAVENOUS at 15:36

## 2018-12-20 RX ADMIN — SUGAMMADEX 200 MG: 100 INJECTION, SOLUTION INTRAVENOUS at 16:31

## 2018-12-20 RX ADMIN — PROPOFOL 100 MG: 10 INJECTION, EMULSION INTRAVENOUS at 15:30

## 2018-12-20 RX ADMIN — FENTANYL CITRATE 50 MCG: 50 INJECTION INTRAMUSCULAR; INTRAVENOUS at 16:15

## 2018-12-20 RX ADMIN — SUCCINYLCHOLINE CHLORIDE 160 MG: 20 INJECTION, SOLUTION INTRAMUSCULAR; INTRAVENOUS; PARENTERAL at 15:30

## 2018-12-20 RX ADMIN — SODIUM CHLORIDE, POTASSIUM CHLORIDE, SODIUM LACTATE AND CALCIUM CHLORIDE: 600; 310; 30; 20 INJECTION, SOLUTION INTRAVENOUS at 07:58

## 2018-12-20 RX ADMIN — SODIUM CHLORIDE, SODIUM LACTATE, POTASSIUM CHLORIDE, AND CALCIUM CHLORIDE: 600; 310; 30; 20 INJECTION, SOLUTION INTRAVENOUS at 16:22

## 2018-12-20 RX ADMIN — LIDOCAINE HYDROCHLORIDE 50 MG: 10 INJECTION, SOLUTION EPIDURAL; INFILTRATION; INTRACAUDAL; PERINEURAL at 15:30

## 2018-12-20 RX ADMIN — ROCURONIUM BROMIDE 45 MG: 10 INJECTION INTRAVENOUS at 15:37

## 2018-12-20 RX ADMIN — DEXAMETHASONE SODIUM PHOSPHATE 10 MG: 10 INJECTION INTRAMUSCULAR; INTRAVENOUS at 15:40

## 2018-12-20 RX ADMIN — ROCURONIUM BROMIDE 5 MG: 10 INJECTION INTRAVENOUS at 15:30

## 2018-12-20 ASSESSMENT — PAIN SCALES - GENERAL
PAINLEVEL_OUTOF10: 0
PAINLEVEL_OUTOF10: 0
PAINLEVEL_OUTOF10: 3
PAINLEVEL_OUTOF10: 6

## 2018-12-20 ASSESSMENT — PAIN - FUNCTIONAL ASSESSMENT: PAIN_FUNCTIONAL_ASSESSMENT: 0-10

## 2018-12-20 ASSESSMENT — LIFESTYLE VARIABLES: SMOKING_STATUS: 0

## 2018-12-20 NOTE — OP NOTE
Preoperative diagnosis:  1. 2 weeks status post lumbar laminectomy/decompression with persistent serous drainage from the wound    Postoperative diagnoses:  1. Postoperative seroma status post lumbar laminectomy/decompression    Procedure:  1. Exploration with irrigation and debridement of postoperative laminectomy wound  2. Revision laminectomy with widening of the laminectomy searching for any area of cerebrospinal fluid leakage    Staff surgeon: Dr. Fredrik Severe, M.D. Assistant: LEIDY Elena assisted throughout all key components of this case. He assisted with the exposure. He retracted tissue and utilized suction enabling adequate visualization of the dura. Anesthesia: Gen. endotracheal anesthesia    Estimated blood loss: Minimal.    Complications: None    Findings: Rust colored serous fluid consistent with postoperative seroma. Procedure in detail:  Patient was seen in the preoperative holding area. The patient was given prophylactic and bodies. Patient taken operating room where he was placed under general endotracheal anesthesia and appropriate monitoring leads were placed. Patient was then transferred to a prone position on the [facility]. The lumbar spine was prepped and draped in sterile fashion. A timeout was called, and all in the room agreed to proceed. The incision was opened by removing the staples and then the subcutaneous sutures. The fascial sutures were removed and upon removing the fascial sutures there was noted to be serous fluid both subfascially and superficial to the fascia. It was typical seroma appearing fluid. There was no purulence. The right angle retractors were used to hold back the musculature enabling adequate exposure of the dura. Once the dura was exposed, under loupe magnification the laminectomy was widened distally and surgery of any evidence of cerebrospinal fluid leakage. There was no CSF leakage noted.  The wound was copiously irrigated

## 2018-12-20 NOTE — ANESTHESIA PRE PROCEDURE
Manolo Frost APRN - CNP   folic acid (FOLVITE) 1 MG tablet Take 1 mg by mouth daily. Historical Provider, MD   aspirin 81 MG tablet Take 81 mg by mouth daily. Historical Provider, MD       Current medications:    No current facility-administered medications for this visit. No current outpatient prescriptions on file. Facility-Administered Medications Ordered in Other Visits   Medication Dose Route Frequency Provider Last Rate Last Dose    lactated ringers infusion   Intravenous Continuous Pritesh ELIUD Poole  mL/hr at 12/20/18 0758      ceFAZolin (ANCEF) 1 g in sterile water 10 mL IV syringe  1 g Intravenous Once Magaly Espinal MD           Allergies:     Allergies   Allergen Reactions    Codeine Swelling     Lips swelling    Iv [Iodides] Swelling    Hydrocodone Swelling     lips swelling       Problem List:    Patient Active Problem List   Diagnosis Code    Hyperlipidemia E78.5    Coronary atherosclerosis of native coronary artery I25.10    MI (myocardial infarction) ([facility] [address] I21.9    COPD (chronic obstructive pulmonary disease) ([facility] J44.9    Bronchitis J40    Leg pain M79.606    History of tobacco abuse Z87.891    Dysphagia R13.10    Hoarseness, chronic R49.0    Chronic heartburn R12    Hiatal hernia K44.9    CAD (coronary artery disease) I25.10    Coronary artery disease involving native coronary artery of native heart I25.10    Mixed hyperlipidemia E78.2    Essential hypertension I10    S/P coronary artery stent placement Z95.5    SOB (shortness of breath) R06.02    Right upper quadrant abdominal pain R10.11    Acalculous cholecystitis K81.9    Chest discomfort R07.89    Aortic valve stenosis I35.0    Moderate aortic stenosis I35.0    Moderate aortic regurgitation I35.1    Arthritis of knee M17.10    Gastroesophageal reflux disease K21.9    Gout M10.9    Neuropathy G62.9    Rheumatoid arthritis (HCC) M06.9    Alternating constipation and diarrhea R19.8    DDD

## 2018-12-20 NOTE — BRIEF OP NOTE
Brief Postoperative Note  ______________________________________________________________    Patient: Roberto Wynne  YOB: 1955  MRN: [id number]  Date of Procedure: 12/20/2018    Pre-Op Diagnosis: M54.5    Post-Op Diagnosis: Same       Procedure(s): I AND D LUMBAR INCISION SEROMA    Anesthesia: General    Surgeon(s):  Lobo Weaver MD    Assistant: Hilda Maciel PA-C    Estimated Blood Loss (mL): 50    Complications: None    Specimens:   * No specimens in log *    Implants:    Implant Name Type Inv. Item Serial No.  Lot No. LRB No. Used   IMPL SEALANT SURGIFLO HEMOSTAT MATRIC Bone/Graft/Tissue IMPL SEALANT SURGIFLO HEMOSTAT MATRIC   JN: [id number] N/A 1         Drains:   [REMOVED] Closed/Suction Drain Posterior Back [employer] [place] citizen (Removed)   Dressing Status Clean;Dry; Intact 12/4/2018  9:40 PM   Drainage Appearance Bloody 12/5/2018  5:46 PM   Status Compressed 12/5/2018  5:46 PM   Output (ml) 80 ml 12/6/2018  3:29 AM       [REMOVED] Urethral Catheter Double-lumen;Non-latex;Straight-tip 16 fr (Removed)   $ Urethral catheter insertion Inserted for procedure 12/4/2018  9:40 PM   Site Assessment No urethral drainage 12/4/2018  9:40 PM   Urine Color Yellow 12/4/2018  9:40 PM   Urine Appearance Clear 12/4/2018  9:40 PM       Findings: SEE OPERATIVE REPORT    Lobo Weaver MD  Date: 12/20/2018  Time: 4:42 PM

## 2018-12-27 DIAGNOSIS — E78.2 MIXED HYPERLIPIDEMIA: ICD-10-CM

## 2018-12-27 RX ORDER — ATORVASTATIN CALCIUM 20 MG/1
TABLET, FILM COATED ORAL
Qty: 30 TABLET | Refills: 5 | Status: SHIPPED | OUTPATIENT
Start: 2018-12-27 | End: 2019-07-12 | Stop reason: SDUPTHER

## 2019-01-20 ENCOUNTER — HOSPITAL ENCOUNTER (EMERGENCY)
Age: 64
Discharge: HOME | End: 2019-01-20
Payer: MEDICARE

## 2019-01-20 ENCOUNTER — APPOINTMENT (OUTPATIENT)
Dept: GENERAL RADIOLOGY | Age: 64
End: 2019-01-20
Payer: MEDICARE

## 2019-01-20 VITALS
TEMPERATURE: 99.3 F | OXYGEN SATURATION: 94 % | WEIGHT: 175 LBS | HEIGHT: 67 IN | BODY MASS INDEX: 27.47 KG/M2 | RESPIRATION RATE: 20 BRPM | SYSTOLIC BLOOD PRESSURE: 133 MMHG | HEART RATE: 74 BPM | DIASTOLIC BLOOD PRESSURE: 85 MMHG

## 2019-01-20 DIAGNOSIS — G89.29 CHRONIC NECK PAIN: ICD-10-CM

## 2019-01-20 DIAGNOSIS — M54.2 CHRONIC NECK PAIN: ICD-10-CM

## 2019-01-20 DIAGNOSIS — S16.1XXA ACUTE STRAIN OF NECK MUSCLE, INITIAL ENCOUNTER: Primary | ICD-10-CM

## 2019-01-20 PROCEDURE — 99283 EMERGENCY DEPT VISIT LOW MDM: CPT

## 2019-01-20 PROCEDURE — 6370000000 HC RX 637 (ALT 250 FOR IP): Performed by: NURSE PRACTITIONER

## 2019-01-20 PROCEDURE — 6360000002 HC RX W HCPCS: Performed by: NURSE PRACTITIONER

## 2019-01-20 PROCEDURE — 72040 X-RAY EXAM NECK SPINE 2-3 VW: CPT

## 2019-01-20 PROCEDURE — 96372 THER/PROPH/DIAG INJ SC/IM: CPT

## 2019-01-20 PROCEDURE — 99283 EMERGENCY DEPT VISIT LOW MDM: CPT | Performed by: NURSE PRACTITIONER

## 2019-01-20 RX ORDER — LIDOCAINE 50 MG/G
1 PATCH TOPICAL DAILY
Status: DISCONTINUED | OUTPATIENT
Start: 2019-01-20 | End: 2019-01-20 | Stop reason: HOSPADM

## 2019-01-20 RX ORDER — HYDROMORPHONE HCL 110MG/55ML
1 PATIENT CONTROLLED ANALGESIA SYRINGE INTRAVENOUS ONCE
Status: COMPLETED | OUTPATIENT
Start: 2019-01-20 | End: 2019-01-20

## 2019-01-20 RX ORDER — METHOCARBAMOL 500 MG/1
500 TABLET, FILM COATED ORAL 3 TIMES DAILY
Qty: 30 TABLET | Refills: 0 | Status: SHIPPED | OUTPATIENT
Start: 2019-01-20 | End: 2019-01-30

## 2019-01-20 RX ORDER — LIDOCAINE 50 MG/G
1 PATCH TOPICAL DAILY
Qty: 6 PATCH | Refills: 0 | Status: SHIPPED | OUTPATIENT
Start: 2019-01-20 | End: 2019-01-26

## 2019-01-20 RX ORDER — ORPHENADRINE CITRATE 30 MG/ML
60 INJECTION INTRAMUSCULAR; INTRAVENOUS ONCE
Status: COMPLETED | OUTPATIENT
Start: 2019-01-20 | End: 2019-01-20

## 2019-01-20 RX ADMIN — ORPHENADRINE CITRATE 60 MG: 30 INJECTION INTRAMUSCULAR; INTRAVENOUS at 15:03

## 2019-01-20 RX ADMIN — HYDROMORPHONE HYDROCHLORIDE 1 MG: 2 INJECTION INTRAMUSCULAR; INTRAVENOUS; SUBCUTANEOUS at 15:03

## 2019-01-20 ASSESSMENT — ENCOUNTER SYMPTOMS
ABDOMINAL PAIN: 0
BACK PAIN: 1

## 2019-01-20 ASSESSMENT — PAIN SCALES - GENERAL
PAINLEVEL_OUTOF10: 5
PAINLEVEL_OUTOF10: 5

## 2019-01-20 ASSESSMENT — PAIN DESCRIPTION - LOCATION: LOCATION: NECK

## 2019-01-20 ASSESSMENT — PAIN DESCRIPTION - PAIN TYPE: TYPE: ACUTE PAIN

## 2019-01-29 DIAGNOSIS — R30.0 DYSURIA: ICD-10-CM

## 2019-01-29 LAB
ALBUMIN SERPL-MCNC: 4.2 G/DL (ref 3.5–5.2)
ALP BLD-CCNC: 123 U/L (ref 40–130)
ALT SERPL-CCNC: 11 U/L (ref 5–41)
ANION GAP SERPL CALCULATED.3IONS-SCNC: 17 MMOL/L (ref 7–19)
AST SERPL-CCNC: 15 U/L (ref 5–40)
BASOPHILS ABSOLUTE: 0.1 K/UL (ref 0–0.2)
BASOPHILS RELATIVE PERCENT: 1 % (ref 0–1)
BILIRUB SERPL-MCNC: <0.2 MG/DL (ref 0.2–1.2)
BUN BLDV-MCNC: 15 MG/DL (ref 8–23)
CALCIUM SERPL-MCNC: 10.3 MG/DL (ref 8.8–10.2)
CHLORIDE BLD-SCNC: 100 MMOL/L (ref 98–111)
CO2: 26 MMOL/L (ref 22–29)
CREAT SERPL-MCNC: 0.8 MG/DL (ref 0.5–1.2)
EOSINOPHILS ABSOLUTE: 0.3 K/UL (ref 0–0.6)
EOSINOPHILS RELATIVE PERCENT: 3.2 % (ref 0–5)
GFR NON-AFRICAN AMERICAN: >60
GLUCOSE BLD-MCNC: 108 MG/DL (ref 74–109)
HCT VFR BLD CALC: 37.9 % (ref 42–52)
HEMOGLOBIN: 11.3 G/DL (ref 14–18)
LYMPHOCYTES ABSOLUTE: 2 K/UL (ref 1.1–4.5)
LYMPHOCYTES RELATIVE PERCENT: 24.1 % (ref 20–40)
MCH RBC QN AUTO: 27.8 PG (ref 27–31)
MCHC RBC AUTO-ENTMCNC: 29.8 G/DL (ref 33–37)
MCV RBC AUTO: 93.1 FL (ref 80–94)
MONOCYTES ABSOLUTE: 0.9 K/UL (ref 0–0.9)
MONOCYTES RELATIVE PERCENT: 10.3 % (ref 0–10)
NEUTROPHILS ABSOLUTE: 5.1 K/UL (ref 1.5–7.5)
NEUTROPHILS RELATIVE PERCENT: 61.2 % (ref 50–65)
PDW BLD-RTO: 13.6 % (ref 11.5–14.5)
PLATELET # BLD: 294 K/UL (ref 130–400)
PMV BLD AUTO: 8.8 FL (ref 9.4–12.4)
POTASSIUM SERPL-SCNC: 5.2 MMOL/L (ref 3.5–5)
RBC # BLD: 4.07 M/UL (ref 4.7–6.1)
SEDIMENTATION RATE, ERYTHROCYTE: 75 MM/HR (ref 0–15)
SODIUM BLD-SCNC: 143 MMOL/L (ref 136–145)
TOTAL PROTEIN: 7.9 G/DL (ref 6.6–8.7)
WBC # BLD: 8.3 K/UL (ref 4.8–10.8)

## 2019-03-01 ENCOUNTER — OFFICE VISIT (OUTPATIENT)
Dept: CARDIOLOGY | Age: 64
End: 2019-03-01
Payer: MEDICARE

## 2019-03-01 VITALS
DIASTOLIC BLOOD PRESSURE: 72 MMHG | WEIGHT: 186 LBS | HEIGHT: 67 IN | HEART RATE: 40 BPM | SYSTOLIC BLOOD PRESSURE: 138 MMHG | BODY MASS INDEX: 29.19 KG/M2

## 2019-03-01 DIAGNOSIS — E78.2 MIXED HYPERLIPIDEMIA: Primary | ICD-10-CM

## 2019-03-01 DIAGNOSIS — I25.10 CORONARY ARTERY DISEASE INVOLVING NATIVE CORONARY ARTERY OF NATIVE HEART WITHOUT ANGINA PECTORIS: ICD-10-CM

## 2019-03-01 DIAGNOSIS — I10 ESSENTIAL HYPERTENSION: ICD-10-CM

## 2019-03-01 DIAGNOSIS — I35.0 NONRHEUMATIC AORTIC VALVE STENOSIS: ICD-10-CM

## 2019-03-01 DIAGNOSIS — R00.1 BRADYCARDIA: ICD-10-CM

## 2019-03-01 PROCEDURE — 93000 ELECTROCARDIOGRAM COMPLETE: CPT | Performed by: NURSE PRACTITIONER

## 2019-03-01 PROCEDURE — 99213 OFFICE O/P EST LOW 20 MIN: CPT | Performed by: NURSE PRACTITIONER

## 2019-03-01 RX ORDER — CELECOXIB 100 MG/1
100 CAPSULE ORAL 2 TIMES DAILY
Refills: 5 | COMMUNITY
Start: 2019-02-07 | End: 2019-06-05

## 2019-03-26 ENCOUNTER — HOSPITAL ENCOUNTER (OUTPATIENT)
Dept: GENERAL RADIOLOGY | Age: 64
Discharge: HOME | End: 2019-03-26
Payer: MEDICARE

## 2019-03-26 ENCOUNTER — OFFICE VISIT (OUTPATIENT)
Dept: FAMILY MEDICINE CLINIC | Age: 64
End: 2019-03-26
Payer: MEDICARE

## 2019-03-26 VITALS
OXYGEN SATURATION: 98 % | RESPIRATION RATE: 20 BRPM | SYSTOLIC BLOOD PRESSURE: 134 MMHG | HEART RATE: 62 BPM | DIASTOLIC BLOOD PRESSURE: 72 MMHG | TEMPERATURE: 98.2 F | BODY MASS INDEX: 29.44 KG/M2 | WEIGHT: 188 LBS

## 2019-03-26 DIAGNOSIS — M1A.9XX0 CHRONIC GOUT WITHOUT TOPHUS, UNSPECIFIED CAUSE, UNSPECIFIED SITE: Primary | ICD-10-CM

## 2019-03-26 DIAGNOSIS — M1A.9XX0 CHRONIC GOUT WITHOUT TOPHUS, UNSPECIFIED CAUSE, UNSPECIFIED SITE: ICD-10-CM

## 2019-03-26 LAB
BASOPHILS ABSOLUTE: 0.1 K/UL (ref 0–0.2)
BASOPHILS RELATIVE PERCENT: 0.6 % (ref 0–1)
C-REACTIVE PROTEIN: 12.69 MG/DL (ref 0–0.5)
EOSINOPHILS ABSOLUTE: 0.2 K/UL (ref 0–0.6)
EOSINOPHILS RELATIVE PERCENT: 2.6 % (ref 0–5)
HCT VFR BLD CALC: 37.4 % (ref 42–52)
HEMOGLOBIN: 11.4 G/DL (ref 14–18)
LYMPHOCYTES ABSOLUTE: 2.1 K/UL (ref 1.1–4.5)
LYMPHOCYTES RELATIVE PERCENT: 22.5 % (ref 20–40)
MCH RBC QN AUTO: 27.9 PG (ref 27–31)
MCHC RBC AUTO-ENTMCNC: 30.5 G/DL (ref 33–37)
MCV RBC AUTO: 91.7 FL (ref 80–94)
MONOCYTES ABSOLUTE: 1.2 K/UL (ref 0–0.9)
MONOCYTES RELATIVE PERCENT: 12.9 % (ref 0–10)
NEUTROPHILS ABSOLUTE: 5.7 K/UL (ref 1.5–7.5)
NEUTROPHILS RELATIVE PERCENT: 61.2 % (ref 50–65)
PDW BLD-RTO: 16.7 % (ref 11.5–14.5)
PLATELET # BLD: 218 K/UL (ref 130–400)
PMV BLD AUTO: 9.7 FL (ref 9.4–12.4)
RBC # BLD: 4.08 M/UL (ref 4.7–6.1)
URIC ACID, SERUM: 3.9 MG/DL (ref 3.4–7)
WBC # BLD: 9.3 K/UL (ref 4.8–10.8)

## 2019-03-26 PROCEDURE — 73130 X-RAY EXAM OF HAND: CPT

## 2019-03-26 PROCEDURE — 99213 OFFICE O/P EST LOW 20 MIN: CPT | Performed by: NURSE PRACTITIONER

## 2019-03-26 RX ORDER — METHYLPREDNISOLONE 4 MG/1
TABLET ORAL
Qty: 1 KIT | Refills: 0 | Status: SHIPPED | OUTPATIENT
Start: 2019-03-26 | End: 2019-04-01

## 2019-03-26 ASSESSMENT — PATIENT HEALTH QUESTIONNAIRE - PHQ9
SUM OF ALL RESPONSES TO PHQ QUESTIONS 1-9: 0
1. LITTLE INTEREST OR PLEASURE IN DOING THINGS: 0
SUM OF ALL RESPONSES TO PHQ9 QUESTIONS 1 & 2: 0
2. FEELING DOWN, DEPRESSED OR HOPELESS: 0
SUM OF ALL RESPONSES TO PHQ QUESTIONS 1-9: 0

## 2019-03-26 ASSESSMENT — ENCOUNTER SYMPTOMS: COLOR CHANGE: 1

## 2019-05-07 ENCOUNTER — TELEPHONE (OUTPATIENT)
Dept: CARDIOLOGY | Age: 64
End: 2019-05-07

## 2019-05-07 RX ORDER — AMLODIPINE BESYLATE 5 MG/1
5 TABLET ORAL 2 TIMES DAILY
Qty: 60 TABLET | Refills: 3 | Status: SHIPPED | OUTPATIENT
Start: 2019-05-07 | End: 2019-09-16 | Stop reason: SDUPTHER

## 2019-05-13 ENCOUNTER — OFFICE VISIT (OUTPATIENT)
Dept: FAMILY MEDICINE CLINIC | Age: 64
End: 2019-05-13
Payer: MEDICARE

## 2019-05-13 ENCOUNTER — HOSPITAL ENCOUNTER (OUTPATIENT)
Dept: GENERAL RADIOLOGY | Age: 64
Discharge: HOME | End: 2019-05-13
Payer: MEDICARE

## 2019-05-13 VITALS
HEART RATE: 58 BPM | BODY MASS INDEX: 29.6 KG/M2 | OXYGEN SATURATION: 98 % | WEIGHT: 189 LBS | DIASTOLIC BLOOD PRESSURE: 78 MMHG | RESPIRATION RATE: 20 BRPM | TEMPERATURE: 98.4 F | SYSTOLIC BLOOD PRESSURE: 122 MMHG
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
## 2019-05-13 DIAGNOSIS — R53.82 CHRONIC FATIGUE: ICD-10-CM
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
## 2019-05-13 DIAGNOSIS — R10.11 RIGHT UPPER QUADRANT ABDOMINAL PAIN: ICD-10-CM

## 2019-05-13 DIAGNOSIS — M05.79 RHEUMATOID ARTHRITIS INVOLVING MULTIPLE SITES WITH POSITIVE RHEUMATOID FACTOR (HCC): ICD-10-CM

## 2019-05-13 DIAGNOSIS — K59.03 DRUG-INDUCED CONSTIPATION: ICD-10-CM

## 2019-05-13 DIAGNOSIS — D64.9 ANEMIA, UNSPECIFIED TYPE: ICD-10-CM

## 2019-05-13 LAB
BILIRUBIN URINE: NEGATIVE
BLOOD, URINE: NEGATIVE
CLARITY: CLEAR
COLOR: YELLOW
FERRITIN: 25.9 NG/ML (ref 30–400)
FOLATE: 15.6 NG/ML (ref 4.5–32.2)
GLUCOSE URINE: NEGATIVE MG/DL
HCT VFR BLD CALC: 41.4 % (ref 42–52)
IRON SATURATION: 8 % (ref 14–50)
IRON: 38 UG/DL (ref 59–158)
KETONES, URINE: NEGATIVE MG/DL
LEUKOCYTE ESTERASE, URINE: NEGATIVE
NITRITE, URINE: NEGATIVE
PH UA: 5.5 (ref 5–8)
PROTEIN UA: NEGATIVE MG/DL
RETICULOCYTE ABSOLUTE COUNT: 0.07 M/UL (ref 0.03–0.12)
RETICULOCYTE COUNT PCT: 1.43 % (ref 0.5–1.5)
SPECIFIC GRAVITY UA: 1.01 (ref 1–1.03)
T4 FREE: 1 NG/DL (ref 0.9–1.7)
TOTAL IRON BINDING CAPACITY: 451 UG/DL (ref 250–400)
TSH SERPL DL<=0.05 MIU/L-ACNC: 2.28 UIU/ML (ref 0.27–4.2)
URINE REFLEX TO CULTURE: NORMAL
URINE TYPE: NORMAL
UROBILINOGEN, URINE: 0.2 E.U./DL
VITAMIN B-12: 317 PG/ML (ref 211–946)

## 2019-05-13 PROCEDURE — 99214 OFFICE O/P EST MOD 30 MIN: CPT | Performed by: NURSE PRACTITIONER

## 2019-05-13 PROCEDURE — 74018 RADEX ABDOMEN 1 VIEW: CPT

## 2019-05-13 RX ORDER — FERROUS SULFATE 325(65) MG
325 TABLET ORAL
Qty: 30 TABLET | Refills: 5 | Status: SHIPPED | OUTPATIENT
Start: 2019-05-13 | End: 2019-11-07 | Stop reason: ALTCHOICE

## 2019-05-13 ASSESSMENT — ENCOUNTER SYMPTOMS
BACK PAIN: 1
ABDOMINAL PAIN: 1

## 2019-05-13 NOTE — PATIENT INSTRUCTIONS
Urine is normal.   KUB no stone  Miralax daily 1 capful daily  If no improvement after a couple days RTC for further eval.

## 2019-05-13 NOTE — PROGRESS NOTES
SUBJECTIVE:  Here today for abdominal pain. Patient ID: Dio Melendrez is a 58 y.o. male. HPI:   Back Pain   This is a new problem. The current episode started in the past 7 days (thursday night ). The problem occurs intermittently. The problem has been gradually worsening since onset. The quality of the pain is described as stabbing. Radiates to: to the front of abd. The symptoms are aggravated by lying down (worse on right side). Associated symptoms include abdominal pain. Pertinent negatives include no fever. (No change in urine, BM's a little more difficult  No history of bladder infection. )      Patient is worried that he might have a kidney stone. He also takes chronic pain medication Percocet and does admit to having some problems here recently with constipation but he has been taking something that seems to be improving the situation  Past Medical History:   Diagnosis Date    Arthritis     Bronchitis     Cancer ([facility] [address]     Skin Cancer    Chronic cholecystitis without calculus 5/19/2017    Chronic GERD     COPD (chronic obstructive pulmonary disease) (HCC)     Coronary atherosclerosis of native coronary artery     s/p PTCA and stent placement to the LAD and RCA/5 stents    DDD (degenerative disc disease), lumbar 12/4/2018    History of tobacco abuse 2010    Hyperlipidemia     Hypertension     MI (myocardial infarction) ([facility] [address]     Old, inferior wall    Moderate aortic regurgitation 08/14/2017    mod-severe AR.  Moderate aortic stenosis 08/14/2017    mod-severe aortic stenosis.  Spinal stenosis of lumbar region with neurogenic claudication 12/4/2018      Prior to Visit Medications    Medication Sig Taking?  Authorizing Provider   ferrous sulfate 325 (65 Fe) MG tablet Take 1 tablet by mouth daily (with breakfast) Yes SHIMON May   amLODIPine (NORVASC) 5 MG tablet Take 1 tablet by mouth 2 times daily Yes SHIMON Alston   celecoxib (CELEBREX) 100 MG capsule 100 mg 2 times daily Yes Historical Provider, MD   metoprolol tartrate (LOPRESSOR) 25 MG tablet Take 0.5 tablets by mouth 2 times daily Yes SHIMON Meehan   atorvastatin (LIPITOR) 20 MG tablet TAKE 1 TABLET BY MOUTH EVERY DAY AT NIGHT Yes SHIMON Martínez   Docusate Calcium (STOOL SOFTENER PO) Take by mouth as needed Yes Historical Provider, MD   pantoprazole (PROTONIX) 40 MG tablet Take 1 tablet by mouth daily Take daily first thing in the morning on an empty stomach. Yes SHIMON Douglass   ranitidine (ZANTAC) 150 MG tablet Take 1 tablet by mouth nightly Yes SHIMON Douglass   lisinopril (PRINIVIL;ZESTRIL) 20 MG tablet TAKE 1 TABLET BY MOUTH TWICE A DAY Yes SHIMON Forbes   oxyCODONE-acetaminophen (PERCOCET) 7.5-325 MG per tablet Take 1 tablet by mouth 4 times daily. Yes Historical Provider, MD   gabapentin (NEURONTIN) 800 MG tablet Take 800 mg by mouth 2 times daily . Yes Historical Provider, MD   nitroGLYCERIN (NITROSTAT) 0.4 MG SL tablet Place 1 tablet under the tongue every 5 minutes as needed 1 tablet as needed Yes SHIMON Lezama - CNP   aspirin 81 MG tablet Take 81 mg by mouth daily. Yes Historical Provider, MD      Allergies   Allergen Reactions    Codeine Swelling     Lips swelling    Iv [Iodides] Swelling    Hydrocodone Swelling     lips swelling       Review of Systems   Constitutional: Negative for fever. Gastrointestinal: Positive for abdominal pain. Musculoskeletal: Positive for back pain. Narrative   EXAMINATION: XR ABDOMEN (KUB) (SINGLE AP VIEW) 5/13/2019 12:20 PM   HISTORY: XR ABDOMEN (KUB) (SINGLE AP VIEW) 5/13/2019 11:10 AM   HISTORY: R10.11   COMPARISON: None   FINDINGS:   There is a nonobstructive bowel gas pattern. No pathologic   calcification or organomegaly is visualized. Evaluation for free intraperitoneal air is limited on a supine   radiograph, but there are no definite findings of free intraperitoneal   air.     Advanced degenerative spondylosis is noted in the lumbar spine. Loss   of height of all disc spaces. There is subtle lateral translation of   L3 on L4 to the right L2 on L3 to the left. Hypertrophic degenerative   changes present. Surgical clips are noted in previous cholecystectomy. .        Impression   1. Nonspecific bowel gas pattern. Advanced degenerative changes   present in the lumbar spine. Orders Only on 05/13/2019   Component Date Value Ref Range Status    Color, UA 05/13/2019 Yellow  Straw/Yellow Final    Clarity, UA 05/13/2019 Clear  Clear Final    Glucose, Ur 05/13/2019 Negative  Negative mg/dL Final    Bilirubin Urine 05/13/2019 Negative  Negative Final    Ketones, Urine 05/13/2019 Negative  Negative mg/dL Final    Specific Gravity, UA 05/13/2019 1.010  1.005 - 1.030 Final    Blood, Urine 05/13/2019 Negative  Negative Final    pH, UA 05/13/2019 5.5  5.0 - 8.0 Final    Protein, UA 05/13/2019 Negative  Negative mg/dL Final    Urobilinogen, Urine 05/13/2019 0.2  <2.0 E.U./dL Final    Nitrite, Urine 05/13/2019 Negative  Negative Final    Leukocyte Esterase, Urine 05/13/2019 Negative  Negative Final    Urine Reflex to Culture 05/13/2019 Not Indicated   Corrected    Corrected result; previously reported as yes on 05/13/2019 at 12:13 by TRISTA    Urine Type 05/13/2019 Clean catch   Final    Retic Ct Pct 05/13/2019 1.43  0.50 - 1.50 % Final    Retic Ct Abs 05/13/2019 0.0669  0.0250 - 0.1250 M/uL Final    Hematocrit 05/13/2019 41.4* 42.0 - 52.0 % Final         OBJECTIVE:    Physical Exam   Constitutional: He is oriented to person, place, and time. He appears well-developed and well-nourished. HENT:   Head: Normocephalic and atraumatic. Right Ear: External ear normal.   Left Ear: External ear normal.   Eyes: Conjunctivae and lids are normal.   Neck: Normal range of motion. Cardiovascular: Normal rate, regular rhythm and normal heart sounds.    Pulmonary/Chest: Effort normal and breath sounds normal.   Abdominal: Normal appearance. Neurological: He is alert and oriented to person, place, and time. Skin: Skin is warm and dry. Psychiatric: He has a normal mood and affect. His behavior is normal.      /78 (Site: Left Upper Arm, Position: Sitting, Cuff Size: Medium Adult)   Pulse 58   Temp 98.4 °F (36.9 °C) (Oral)   Resp 20   Wt 189 lb (85.7 kg)   SpO2 98%   BMI 29.60 kg/m²      ASSESSMENT:      ICD-10-CM    1. Tick bite, initial encounter W57. Juanito Grissom B. [place]fer[place] Antibodies   2. Right upper quadrant abdominal pain R10.11 XR ABDOMEN (KUB) (SINGLE AP VIEW)     Urinalysis Reflex to Culture     CANCELED: Urinalysis Reflex to Culture   3. Rheumatoid arthritis involving multiple sites with positive rheumatoid factor (HCC) M05.79    4. Anemia, unspecified type D64.9 Ferritin     Folate     Iron and TIBC     Reticulocytes     Vitamin B12     ferrous sulfate 325 (65 Fe) MG tablet   5. Chronic fatigue R53.82 TSH without Reflex     T4, Free       PLAN:    Ramón Owens: Back Pain (back pain radiates around to belly . )  RTC for no improvement    Diagnosis and orders for this visit are above.

## 2019-05-15 LAB — LYME, EIA: 0.59 LIV (ref 0–1.2)

## 2019-05-16 ENCOUNTER — TELEPHONE (OUTPATIENT)
Dept: CARDIOLOGY | Age: 64
End: 2019-05-16

## 2019-05-16 NOTE — TELEPHONE ENCOUNTER
Triage Declined by Patient:    The patient called today for shortness of breath. This is an emergent reason for call that requires triage by an RN. Patient refused triage.  Patient requested to schedule appt on 05/17 w/ [place] at [facility]

## 2019-05-17 ENCOUNTER — OFFICE VISIT (OUTPATIENT)
Dept: CARDIOLOGY | Age: 64
End: 2019-05-17
Payer: MEDICARE

## 2019-05-17 VITALS
DIASTOLIC BLOOD PRESSURE: 64 MMHG | WEIGHT: 187 LBS | HEART RATE: 60 BPM | BODY MASS INDEX: 29.35 KG/M2 | HEIGHT: 67 IN | SYSTOLIC BLOOD PRESSURE: 92 MMHG

## 2019-05-17 DIAGNOSIS — R94.31 ABNORMAL EKG: ICD-10-CM

## 2019-05-17 DIAGNOSIS — I10 ESSENTIAL HYPERTENSION: ICD-10-CM

## 2019-05-17 DIAGNOSIS — I35.0 NONRHEUMATIC AORTIC VALVE STENOSIS: Primary | ICD-10-CM

## 2019-05-17 DIAGNOSIS — R06.02 SOB (SHORTNESS OF BREATH): ICD-10-CM

## 2019-05-17 DIAGNOSIS — R53.83 OTHER FATIGUE: ICD-10-CM

## 2019-05-17 DIAGNOSIS — I25.10 CORONARY ARTERY DISEASE INVOLVING NATIVE CORONARY ARTERY OF NATIVE HEART WITHOUT ANGINA PECTORIS: ICD-10-CM

## 2019-05-17 DIAGNOSIS — E78.5 DYSLIPIDEMIA: ICD-10-CM

## 2019-05-17 PROCEDURE — 93000 ELECTROCARDIOGRAM COMPLETE: CPT | Performed by: NURSE PRACTITIONER

## 2019-05-17 PROCEDURE — 99213 OFFICE O/P EST LOW 20 MIN: CPT | Performed by: NURSE PRACTITIONER

## 2019-05-20 ENCOUNTER — HOSPITAL ENCOUNTER (OUTPATIENT)
Dept: GENERAL RADIOLOGY | Age: 64
Discharge: HOME | End: 2019-05-20
Payer: MEDICARE

## 2019-05-20 ENCOUNTER — OFFICE VISIT (OUTPATIENT)
Dept: FAMILY MEDICINE CLINIC | Age: 64
End: 2019-05-20
Payer: MEDICARE

## 2019-05-20 VITALS
BODY MASS INDEX: 28.98 KG/M2 | SYSTOLIC BLOOD PRESSURE: 118 MMHG | RESPIRATION RATE: 20 BRPM | OXYGEN SATURATION: 98 % | WEIGHT: 185 LBS | HEART RATE: 57 BPM | TEMPERATURE: 97.9 F | DIASTOLIC BLOOD PRESSURE: 72 MMHG

## 2019-05-20 DIAGNOSIS — R10.11 RIGHT UPPER QUADRANT ABDOMINAL PAIN: ICD-10-CM

## 2019-05-20 DIAGNOSIS — R10.11 RIGHT UPPER QUADRANT ABDOMINAL PAIN: Primary | ICD-10-CM

## 2019-05-20 PROCEDURE — 2500000003 HC RX 250 WO HCPCS: Performed by: NURSE PRACTITIONER

## 2019-05-20 PROCEDURE — 74176 CT ABD & PELVIS W/O CONTRAST: CPT

## 2019-05-20 PROCEDURE — 99213 OFFICE O/P EST LOW 20 MIN: CPT | Performed by: NURSE PRACTITIONER

## 2019-05-20 RX ADMIN — BARIUM SULFATE 450 ML: 21 SUSPENSION ORAL at 14:16

## 2019-05-20 ASSESSMENT — ENCOUNTER SYMPTOMS
CONSTIPATION: 1
DIARRHEA: 0
ABDOMINAL PAIN: 1
BACK PAIN: 1
VOMITING: 0
NAUSEA: 0
ABDOMINAL DISTENTION: 1

## 2019-05-20 NOTE — PROGRESS NOTES
SHIMON Overton   amLODIPine (NORVASC) 5 MG tablet Take 1 tablet by mouth 2 times daily Yes SHIMON Durbin   celecoxib (CELEBREX) 100 MG capsule 100 mg 2 times daily Yes Historical Provider, MD   metoprolol tartrate (LOPRESSOR) 25 MG tablet Take 0.5 tablets by mouth 2 times daily Yes SHIMON Meehan   atorvastatin (LIPITOR) 20 MG tablet TAKE 1 TABLET BY MOUTH EVERY DAY AT NIGHT Yes Darryle Brave, APRN   Docusate Calcium (STOOL SOFTENER PO) Take by mouth as needed Yes Historical Provider, MD   pantoprazole (PROTONIX) 40 MG tablet Take 1 tablet by mouth daily Take daily first thing in the morning on an empty stomach. Yes SHIMON Marti   ranitidine (ZANTAC) 150 MG tablet Take 1 tablet by mouth nightly Yes SHIMON Marti   lisinopril (PRINIVIL;ZESTRIL) 20 MG tablet TAKE 1 TABLET BY MOUTH TWICE A DAY Yes SHIMON Durbin   oxyCODONE-acetaminophen (PERCOCET) 7.5-325 MG per tablet Take 1 tablet by mouth 4 times daily. Yes Historical Provider, MD   gabapentin (NEURONTIN) 800 MG tablet Take 800 mg by mouth 2 times daily . Yes Historical Provider, MD   nitroGLYCERIN (NITROSTAT) 0.4 MG SL tablet Place 1 tablet under the tongue every 5 minutes as needed 1 tablet as needed Yes SHIMON Landa - CNP   aspirin 81 MG tablet Take 81 mg by mouth daily. Yes Historical Provider, MD      Allergies   Allergen Reactions    Codeine Swelling     Lips swelling    Iv [Iodides] Swelling    Hydrocodone Swelling     lips swelling       Review of Systems   Constitutional: Negative for activity change, appetite change and fever. Gastrointestinal: Positive for abdominal distention, abdominal pain and constipation. Negative for diarrhea, nausea and vomiting. Genitourinary: Negative for difficulty urinating and hematuria. Musculoskeletal: Positive for back pain and gait problem. OBJECTIVE:    Physical Exam   Constitutional: He is oriented to person, place, and time.  He appears well-developed and well-nourished. HENT:   Head: Normocephalic and atraumatic. Right Ear: External ear normal.   Left Ear: External ear normal.   Eyes: Conjunctivae and lids are normal.   Neck: Normal range of motion. Cardiovascular: Normal rate, regular rhythm and normal heart sounds. Pulmonary/Chest: Effort normal and breath sounds normal.   Abdominal: Normal appearance. There is tenderness in the right upper quadrant. Bowel sound increased RUQ decreased rest of abdomen. Neurological: He is alert and oriented to person, place, and time. Skin: Skin is warm and dry. Psychiatric: He has a normal mood and affect. His behavior is normal.      /72 (Site: Left Upper Arm, Position: Sitting, Cuff Size: Medium Adult)   Pulse 57   Temp 97.9 °F (36.6 °C) (Oral)   Resp 20   Wt 185 lb (83.9 kg)   SpO2 98%   BMI 28.98 kg/m²      ASSESSMENT:      ICD-10-CM    1. Right upper quadrant abdominal pain R10.11 CT ABDOMEN PELVIS WO CONTRAST Additional Contrast? Oral       PLAN:    Ramón Owesn: Abdominal Pain (still having stomach pain )  Review CT   Consider GI referral.     Diagnosis and orders for this visit are above.

## 2019-05-21 DIAGNOSIS — N28.1 RENAL CYST: Primary | ICD-10-CM

## 2019-05-24 ENCOUNTER — HOSPITAL ENCOUNTER (OUTPATIENT)
Dept: NON INVASIVE DIAGNOSTICS | Age: 64
Discharge: HOME | End: 2019-05-24
Payer: MEDICARE

## 2019-05-24 ENCOUNTER — HOSPITAL ENCOUNTER (OUTPATIENT)
Dept: NUCLEAR MEDICINE | Age: 64
Discharge: HOME | End: 2019-05-26
Payer: MEDICARE

## 2019-05-24 DIAGNOSIS — R53.83 OTHER FATIGUE: ICD-10-CM

## 2019-05-24 DIAGNOSIS — R06.02 SOB (SHORTNESS OF BREATH): ICD-10-CM

## 2019-05-24 DIAGNOSIS — R94.31 ABNORMAL EKG: ICD-10-CM

## 2019-05-24 DIAGNOSIS — I35.0 NONRHEUMATIC AORTIC VALVE STENOSIS: ICD-10-CM

## 2019-05-24 LAB
LV EF: 58 %
LVEF MODALITY: NORMAL

## 2019-05-24 PROCEDURE — 78452 HT MUSCLE IMAGE SPECT MULT: CPT

## 2019-05-24 PROCEDURE — 93017 CV STRESS TEST TRACING ONLY: CPT

## 2019-05-24 PROCEDURE — 93306 TTE W/DOPPLER COMPLETE: CPT

## 2019-05-24 PROCEDURE — A9500 TC99M SESTAMIBI: HCPCS | Performed by: NURSE PRACTITIONER

## 2019-05-24 PROCEDURE — 3430000000 HC RX DIAGNOSTIC RADIOPHARMACEUTICAL: Performed by: NURSE PRACTITIONER

## 2019-05-24 PROCEDURE — 6360000002 HC RX W HCPCS: Performed by: INTERNAL MEDICINE

## 2019-05-24 RX ADMIN — TETRAKIS(2-METHOXYISOBUTYLISOCYANIDE)COPPER(I) TETRAFLUOROBORATE 10 MILLICURIE: 1 INJECTION, POWDER, LYOPHILIZED, FOR SOLUTION INTRAVENOUS at 12:49

## 2019-05-24 RX ADMIN — REGADENOSON 0.4 MG: 0.08 INJECTION, SOLUTION INTRAVENOUS at 10:51

## 2019-05-24 RX ADMIN — TETRAKIS(2-METHOXYISOBUTYLISOCYANIDE)COPPER(I) TETRAFLUOROBORATE 30 MILLICURIE: 1 INJECTION, POWDER, LYOPHILIZED, FOR SOLUTION INTRAVENOUS at 12:49

## 2019-05-28 ENCOUNTER — TELEPHONE (OUTPATIENT)
Dept: CARDIOLOGY | Age: 64
End: 2019-05-28

## 2019-05-28 LAB
LV EF: 54 %
LVEF MODALITY: NORMAL

## 2019-05-28 NOTE — TELEPHONE ENCOUNTER
Called and spoke to patient, gave the following results per Shahab Doshi;    SHIMON [facility]             EF (Pump of the heart) 55-60% Normal   LVH (thickening of the ventricle wall) no   Diastolic Dysfunction yes, Grade 1-mild impaired relaxation   Mitral Valve- abnormal-trace reguritation   Aortic Valve-abnormal- mild to moderate aortic stenosis   Stable Echo      Patient voiced understanding, also mailed letter with results for patient's wife to review\.

## 2019-06-05 ENCOUNTER — TELEPHONE (OUTPATIENT)
Dept: CARDIOLOGY | Age: 64
End: 2019-06-05

## 2019-06-05 ENCOUNTER — OFFICE VISIT (OUTPATIENT)
Dept: CARDIOLOGY | Age: 64
End: 2019-06-05
Payer: MEDICARE

## 2019-06-05 VITALS
HEIGHT: 67 IN | HEART RATE: 50 BPM | BODY MASS INDEX: 28.41 KG/M2 | DIASTOLIC BLOOD PRESSURE: 72 MMHG | SYSTOLIC BLOOD PRESSURE: 124 MMHG | WEIGHT: 181 LBS

## 2019-06-05 DIAGNOSIS — I25.10 CORONARY ARTERY DISEASE INVOLVING NATIVE CORONARY ARTERY OF NATIVE HEART WITHOUT ANGINA PECTORIS: Primary | ICD-10-CM

## 2019-06-05 PROCEDURE — 99203 OFFICE O/P NEW LOW 30 MIN: CPT | Performed by: INTERNAL MEDICINE

## 2019-06-05 ASSESSMENT — ENCOUNTER SYMPTOMS
ABDOMINAL DISTENTION: 0
SHORTNESS OF BREATH: 1
BACK PAIN: 0
BLOOD IN STOOL: 0
DIARRHEA: 0
COUGH: 0
ABDOMINAL PAIN: 0
VOMITING: 0
WHEEZING: 0

## 2019-06-05 NOTE — PROGRESS NOTES
[facility]  Cardiology Office Note  [address] [address]  Phone: [phone]  Fax: [phone]                            Date:  6/5/2019  Patient: Adonay Mendez  Age:  58 y.o., 1955    Referral: SHIMON Evangelista      PROBLEM LIST:    Patient Active Problem List    Diagnosis Date Noted    Chest discomfort      Priority: High    Aortic valve stenosis      Priority: High    DDD (degenerative disc disease), lumbar 12/04/2018     Priority: Low    Spinal stenosis of lumbar region with neurogenic claudication 12/04/2018     Priority: Low    Lumbar stenosis with neurogenic claudication 12/04/2018     Priority: Low    Alternating constipation and diarrhea 10/31/2018     Priority: Low    Arthritis of knee 07/16/2018     Priority: Low    Gastroesophageal reflux disease 12/19/2017     Priority: Low    Gout 12/19/2017     Priority: Low    Neuropathy 12/19/2017     Priority: Low    Rheumatoid arthritis ([address] 12/19/2017     Priority: Low    Moderate aortic stenosis 08/14/2017     Priority: Low     Overview Note:     mod-severe aortic stenosis.  Moderate aortic regurgitation 08/14/2017     Priority: Low     Overview Note:     mod-severe AR.        Acalculous cholecystitis 05/19/2017     Priority: Low    Right upper quadrant abdominal pain 05/12/2017     Priority: Low    Mixed hyperlipidemia 04/26/2016     Priority: Low    Essential hypertension 04/26/2016     Priority: Low    S/P coronary artery stent placement 04/26/2016     Priority: Low    SOB (shortness of breath) 04/26/2016     Priority: Low    CAD (coronary artery disease)      Priority: Low    Coronary artery disease involving native coronary artery of native heart      Priority: Low    Hiatal hernia 04/20/2015     Priority: Low    Dysphagia 03/13/2015     Priority: Low    Hoarseness, chronic 03/13/2015     Priority: Low    Chronic heartburn 03/13/2015     Priority: Low  History of tobacco abuse      Priority: Low    Leg pain 08/25/2011     Priority: Low    Hyperlipidemia      Priority: Low    Coronary atherosclerosis of native coronary artery      Priority: Low    MI [facility]myocardial infarction) ([facility] [address]      Priority: Low    COPD (chronic obstructive pulmonary disease) ([facility])      Priority: Low    Bronchitis      Priority: Low     1. Coronary artery disease with prior inferior STEMI 2001, multiple PCI's to RCA, LAD and OM with catheterization 8/15/2017 with LAD mid 40% ISR, OM1 proximal 35% ISR, RCA mid 60% ISR, normal LV ejection fraction. 2. Moderate aortic stenosis with echo May 2019 with mean gradient 27 mmHg. 3. COPD with prior ex-tobacco use. 4. Squamous cell carcinoma of skin and right upper back on treatment. 5. Spinal stenosis    PRESENTATION: Claire Rocha is a 58y.o. year old male reports progressive exertional shortness of breath with fatigue over the last 2-3 months. This has been worsening. He reports no chest pain. There have been no episodes of lightheadedness or syncope. He does have a wound on the right upper back which is cancerous and did get infected in the last week. He has a prescription for antibiotics. REVIEW OF SYSTEMS:  Review of Systems   Constitutional: Positive for activity change and fatigue. Negative for diaphoresis. HENT: Negative for hearing loss, nosebleeds and tinnitus. Eyes: Negative for visual disturbance. Respiratory: Positive for shortness of breath. Negative for cough and wheezing. Cardiovascular: Negative for chest pain, palpitations and leg swelling. Gastrointestinal: Negative for abdominal distention, abdominal pain, blood in stool, diarrhea and vomiting. Endocrine: Negative for cold intolerance, heat intolerance, polydipsia, polyphagia and polyuria. Genitourinary: Negative for difficulty urinating, flank pain and hematuria. Musculoskeletal: Negative for arthralgias, back pain, joint swelling and myalgias. Skin: Positive for wound. Negative for pallor and rash. Neurological: Negative for dizziness, seizures, syncope and headaches. Psychiatric/Behavioral: Negative for behavioral problems and dysphoric mood. The patient is not nervous/anxious. Past Medical History:      Diagnosis Date    Arthritis     Bronchitis     Cancer ([facility] [address]     Skin Cancer    Chronic cholecystitis without calculus 5/19/2017    Chronic GERD     COPD (chronic obstructive pulmonary disease) (HCC)     Coronary atherosclerosis of native coronary artery     s/p PTCA and stent placement to the LAD and RCA/5 stents    DDD (degenerative disc disease), lumbar 12/4/2018    History of tobacco abuse 2010    Hyperlipidemia     Hypertension     MI (myocardial infarction) ([facility] [address]     Old, inferior wall    Moderate aortic regurgitation 08/14/2017    mod-severe AR.  Moderate aortic stenosis 08/14/2017    mod-severe aortic stenosis.  Spinal stenosis of lumbar region with neurogenic claudication 12/4/2018       Past Surgical History:      Procedure Laterality Date    BACK SURGERY      s/p laminectomy    [facility]AC [facility]HETERIZATION  08/10/04 [facility]      CARDI[facility]10/03  [facility]    CARDIAC CATHETERIZATION  08/29/03 [facility]    CARDIAC CATHETERIZATION  02/16/01 [facility]    CARDIAC CATHETERIZATION  05/25/2009    EF is estimated to be 50%. See scanned document.  CARDIAC CATHETERIZATION N/A 03/2016    stent placement    CARDIAC CATHETERIZATION  08/2017    no PCI    CHOLECYSTECTOMY, LAPAROSCOPIC N/A 5/19/2017    CHOLECYSTECTOMY LAPAROSCOPIC performed by Jeff Bergman MD at [address] [place] [address] COLONOSCOPY  05/28/2015    Dr. Hue Alexander  3/16    stent to LAD    EGD  05/18/2017    dr Guzman Arizmendi      Left thumb    JOINT REPLACEMENT Right     JOINT REPLACEMENT      KNEE SURGERY      s/p Rt.  knee replacement    LEG SURGERY Right     Broken leg with surgical for this visit. Allergies:  Codeine; Iv [iodides]; and Hydrocodone    Past Social History:  Social History     Socioeconomic History    Marital status:      Spouse name: Not on file    Number of children: Not on file    Years of education: Not on file    Highest education level: Not on file   Occupational History    Not on file   Social Needs    Financial resource strain: Not on file    Food insecurity:     Worry: Not on file     Inability: Not on file    Transportation needs:     Medical: Not on file     Non-medical: Not on file   Tobacco Use    Smoking status: Former Smoker     Packs/day: 0.00     Types: Cigarettes    Smokeless tobacco: Never Used   Substance and Sexual Activity    Alcohol use:  Yes    Drug use: No    Sexual activity: Yes     Partners: Female   Lifestyle    Physical activity:     Days per week: Not on file     Minutes per session: Not on file    Stress: Not on file   Relationships    Social connections:     Talks on phone: Not on file     Gets together: Not on file     Attends Church service: Not on file     Active member of club or organization: Not on file     Attends meetings of clubs or organizations: Not on file     Relationship status: Not on file    Intimate partner violence:     Fear of current or ex partner: Not on file     Emotionally abused: Not on file     Physically abused: Not on file     Forced sexual activity: Not on file   Other Topics Concern    Not on file   Social History Narrative    Not on file       Family History:       Problem Relation Age of Onset    Cancer Mother     Heart Disease Father     Cancer Father     Liver Cancer Father     Diabetes Maternal Grandmother     Heart Disease Maternal Grandfather     Cancer Maternal Grandfather     Stroke Paternal Grandmother     Colon Cancer Neg Hx     Colon Polyps Neg Hx     Esophageal Cancer Neg Hx     Liver Disease Neg Hx     Rectal Cancer Neg Hx     Stomach Cancer Neg Hx Physical Examination:  /72 (Site: Right Upper Arm)   Pulse 50   Ht 5' 7\" (1.702 m)   Wt 181 lb (82.1 kg)   BMI 28.35 kg/m²   Physical Exam   Blood pressure right arm sitting 110/70 mmHg, pulse 64 bpm regular  No JVD  No edema  No carotid bruits   Pedal pulses in both posterior tibials are 1-2+  S1 of normal intensity, A2 is diminished in intensity at least moderately, a mid to late peaking systolic murmur is appreciated in the aortic and high pitched in the mitral area consistent with moderate to severe aortic stenosis. Lungs are clear bilaterally with no crepitations or wheezes  Abdomen is soft, nontender, no palpable organomegaly, no abnormal pulsations are felt, no bruits audible  Neurological status is intact  No deformities  Right upper back with bandage. Labs:   CBC: No results for input(s): WBC, HGB, HCT, PLT in the last 72 hours. BMP:No results for input(s): NA, K, CO2, BUN, CREATININE, LABGLOM, GLUCOSE in the last 72 hours. BNP: No results for input(s): BNP in the last 72 hours. PT/INR: No results for input(s): PROTIME, INR in the last 72 hours. APTT:No results for input(s): APTT in the last 72 hours. CARDIAC ENZYMES:No results for input(s): CKTOTAL, CKMB, CKMBINDEX, TROPONINI in the last 72 hours. FASTING LIPID PANEL:  Lab Results   Component Value Date    HDL 71 11/29/2018    LDLCALC 88 11/29/2018    TRIG 77 11/29/2018     LIVER PROFILE:No results for input(s): AST, ALT, LABALBU in the last 72 hours.         Imaging:  Findings      Mitral Valve   Mitral valve leaflets are mildly thickened with preserved leaflet   mobility.   Trace mitral regurgitation.      Aortic Valve   The aortic valve is trileaflet.   Moderate calcification with mildly reduced leaflet separation.   Mild-moderate aortic stenosis is present.   The aortic valve area is 1.5 cm2 with a maximum gradient of 47 mmHg and a   mean gradient of 27 mmHg.   Mild aortic regurgitation noted.      Tricuspid Valve   Tricuspid

## 2019-06-05 NOTE — PATIENT INSTRUCTIONS
Take your antibiotics for the next 10 days. [place] at the [facility] and [address] [facility] located on the first floor of [place] [address] through hospital main entrance and turn immediately to your left. Date/Time:     Take one dose of Benadryl 6 hours prior to your procedure. Allergies:  Codeine; Iv [iodides]; and Hydrocodone   Contact number:  [phone] (home) [phone] (work)    Cardiac Catheterization Instructions   · Do not eat or drink anything after midnight on the night before your procedure. You can take your morning medications with a sip of water unless otherwise directed not to. · Bring a list of the names and dosages of all the medications you are taking. · Coumadin (warfarin) should be stopped two days prior to this procedure. · Pradaxa (dabigatran) should be stopped one day prior to procedure. · You should arrange to have someone take you home rather than drive yourself. · Further plan will depend upon the result of the cardiac catheterization.       If for any reason you are unable to keep this appointment, please contact [facility], [phone], as soon as possible to reschedule.  -------------------------------------------------------------------------------------------------------------------

## 2019-06-05 NOTE — TELEPHONE ENCOUNTER
Patient in office to see Dr. Triston Barbosa. Scheduled with Maricarmen in the cath lab for 10 days from today due to patient needing to be on antibiotic for 10 days for possible infection on back per his dermatologist. 10 days fell on the weekend so I scheduled for 6/17/19 at [address] [place] [address] to go over with patient he says that day is not good for him he forgot to tell me that. Called cath lab back and talked with Maricarmen again and rescheduled patient to 6/18/19 at 10AM. Patient will arrive at [address] [place] [address]. He does have contrast dye listed in his chart. Patient and I spoke with MD he has never been treated with prednisone he has always only had benadryl. He has been instructed to take one dose of benadryl 6 hours before his procedure and he will be treated from there in the cath lab. Referral has been placed. Patient is not on any medications that need to be held prior. Give instructions verbally in office.

## 2019-06-10 ENCOUNTER — HOSPITAL ENCOUNTER (OUTPATIENT)
Dept: MRI IMAGING | Age: 64
Discharge: HOME | End: 2019-06-10
Payer: MEDICARE

## 2019-06-10 DIAGNOSIS — M54.5 LOW BACK PAIN, UNSPECIFIED BACK PAIN LATERALITY, UNSPECIFIED CHRONICITY, WITH SCIATICA PRESENCE UNSPECIFIED: ICD-10-CM

## 2019-06-10 LAB
ATYPICAL LYMPHOCYTE RELATIVE PERCENT: 8 % (ref 0–8)
BASOPHILS ABSOLUTE: 0.1 K/UL (ref 0–0.2)
BASOPHILS RELATIVE PERCENT: 1 % (ref 0–1)
EOSINOPHILS ABSOLUTE: 0 K/UL (ref 0–0.6)
EOSINOPHILS RELATIVE PERCENT: 0 % (ref 0–5)
HCT VFR BLD CALC: 40 % (ref 42–52)
HEMOGLOBIN: 12.1 G/DL (ref 14–18)
HYPOCHROMIA: ABNORMAL
LYMPHOCYTES ABSOLUTE: 2 K/UL (ref 1.1–4.5)
LYMPHOCYTES RELATIVE PERCENT: 28 % (ref 20–40)
MCH RBC QN AUTO: 27 PG (ref 27–31)
MCHC RBC AUTO-ENTMCNC: 30.3 G/DL (ref 33–37)
MCV RBC AUTO: 89.3 FL (ref 80–94)
MONOCYTES ABSOLUTE: 0.6 K/UL (ref 0–0.9)
MONOCYTES RELATIVE PERCENT: 10 % (ref 0–10)
NEUTROPHILS ABSOLUTE: 2.9 K/UL (ref 1.5–7.5)
NEUTROPHILS RELATIVE PERCENT: 53 % (ref 50–65)
PDW BLD-RTO: 16.7 % (ref 11.5–14.5)
PLATELET # BLD: 131 K/UL (ref 130–400)
PLATELET SLIDE REVIEW: ADEQUATE
PMV BLD AUTO: 9.6 FL (ref 9.4–12.4)
RBC # BLD: 4.48 M/UL (ref 4.7–6.1)
WBC # BLD: 5.5 K/UL (ref 4.8–10.8)

## 2019-06-10 PROCEDURE — 72148 MRI LUMBAR SPINE W/O DYE: CPT

## 2019-06-12 ENCOUNTER — OFFICE VISIT (OUTPATIENT)
Dept: UROLOGY | Age: 64
End: 2019-06-12
Payer: MEDICARE

## 2019-06-12 VITALS — BODY MASS INDEX: 28.25 KG/M2 | TEMPERATURE: 99.4 F | WEIGHT: 180 LBS | HEIGHT: 67 IN

## 2019-06-12 DIAGNOSIS — N13.8 HYPERTROPHY OF PROSTATE WITH URINARY OBSTRUCTION: ICD-10-CM

## 2019-06-12 DIAGNOSIS — N40.1 HYPERTROPHY OF PROSTATE WITH URINARY OBSTRUCTION: ICD-10-CM

## 2019-06-12 DIAGNOSIS — N28.1 RENAL CYST, LEFT: Primary | ICD-10-CM

## 2019-06-12 LAB
BILIRUBIN, POC: 1
BLOOD URINE, POC: NORMAL
CLARITY, POC: CLEAR
COLOR, POC: YELLOW
GLUCOSE URINE, POC: NORMAL
KETONES, POC: NORMAL
LEUKOCYTE EST, POC: NORMAL
NITRITE, POC: NORMAL
PH, POC: 5
PROTEIN, POC: NORMAL
SPECIFIC GRAVITY, POC: 1.03
UROBILINOGEN, POC: 0.2

## 2019-06-12 PROCEDURE — 99204 OFFICE O/P NEW MOD 45 MIN: CPT | Performed by: UROLOGY

## 2019-06-12 PROCEDURE — 81003 URINALYSIS AUTO W/O SCOPE: CPT | Performed by: UROLOGY

## 2019-06-12 RX ORDER — TAMSULOSIN HYDROCHLORIDE 0.4 MG/1
0.4 CAPSULE ORAL DAILY
Qty: 30 CAPSULE | Refills: 11 | Status: ON HOLD | OUTPATIENT
Start: 2019-06-12 | End: 2019-07-11

## 2019-06-12 ASSESSMENT — ENCOUNTER SYMPTOMS
COUGH: 0
RHINORRHEA: 0
EYE REDNESS: 0
BACK PAIN: 0
SHORTNESS OF BREATH: 0
SINUS PRESSURE: 0
VOMITING: 0
SORE THROAT: 0
ABDOMINAL DISTENTION: 0
WHEEZING: 0
EYE DISCHARGE: 0
FACIAL SWELLING: 0
BLOOD IN STOOL: 0
CONSTIPATION: 0
EYE PAIN: 0
COLOR CHANGE: 0
NAUSEA: 0
DIARRHEA: 0
ABDOMINAL PAIN: 0

## 2019-06-12 NOTE — PROGRESS NOTES
infarction) ([facility] [address]     Old, inferior wall    Moderate aortic regurgitation 08/14/2017    mod-severe AR.  Moderate aortic stenosis 08/14/2017    mod-severe aortic stenosis.  Spinal stenosis of lumbar region with neurogenic claudication 12/4/2018       Past Surgical History:   Procedure Laterality Date    BACK SURGERY      s/p laminectomy    CARDIAC CATHETERIZATION  08/10/04 [facility]      CARDIAC CATHETERIZATION  12/10/03  [facility]    CARDIAC CATHETERIZATION  08/29/03 [facility]    CARDIAC CATHETERIZATION  02/16/01 [facility]    CARDIAC CATHETERIZATION  05/25/2009    EF is estimated to be 50%. See scanned document.  CARDIAC CATHETERIZATION N/A 03/2016    stent placement    CARDIAC CATHETERIZATION  08/2017    no PCI    CHOLECYSTECTOMY, LAPAROSCOPIC N/A 5/19/2017    CHOLECYSTECTOMY LAPAROSCOPIC performed by Jeff Bergman MD at [address] [place] [address] COLONOSCOPY  05/28/2015    Dr. Hue Alexander  3/16    stent to LAD    EGD  05/18/2017    dr Guzman Arizmendi      Left thumb    JOINT REPLACEMENT Right     JOINT REPLACEMENT      KNEE SURGERY      s/p Rt.  knee replacement    LEG SURGERY Right     Broken leg with surgical repair    LUMBAR SPINE SURGERY N/A 12/4/2018    L1-5 LAMINECTOMY performed by Reji Johnson MD at [address] [place] [address] [place] EGD TRANSORAL BIOPSY SINGLE/MULTIPLE N/A 5/18/2017    Dr Rachel Godoy, Amelie (-), biliary colic, surgical referral    [place] EGD TRANSORAL BIOPSY SINGLE/MULTIPLE N/A 11/29/2018    Dr JAIDEN Torres-w/dilation over wire-51 [place]-Distal esophagitis, gastritis    REMOVE HARDWARE SPINE N/A 12/20/2018    I AND D LUMBAR INCISION SEROMA performed by Reji Johnson MD at [address] [facility]  03/30/2015    Dr. Jocelyn Montano 11/29/2018    Dr JAIDEN Torres-w/dilation over wire-51 [place]-Distal esophagitis, gastritis       Current Outpatient Medications   Medication Sig Dispense Refill    Never Used   Substance and Sexual Activity    Alcohol use: Yes    Drug use: No    Sexual activity: Yes     Partners: Female   Lifestyle    Physical activity:     Days per week: None     Minutes per session: None    Stress: None   Relationships    Social connections:     Talks on phone: None     Gets together: None     Attends Yazidism service: None     Active member of club or organization: None     Attends meetings of clubs or organizations: None     Relationship status: None    Intimate partner violence:     Fear of current or ex partner: None     Emotionally abused: None     Physically abused: None     Forced sexual activity: None   Other Topics Concern    None   Social History Narrative    None       Family History   Problem Relation Age of Onset    Cancer Mother     Heart Disease Father     Cancer Father     Liver Cancer Father     Diabetes Maternal Grandmother     Heart Disease Maternal Grandfather     Cancer Maternal Grandfather     Stroke Paternal Grandmother     Colon Cancer Neg Hx     Colon Polyps Neg Hx     Esophageal Cancer Neg Hx     Liver Disease Neg Hx     Rectal Cancer Neg Hx     Stomach Cancer Neg Hx        REVIEW OF SYSTEMS:  Review of Systems   Constitutional: Negative for activity change, chills, fatigue and fever. HENT: Negative for congestion, ear discharge, ear pain, facial swelling, mouth sores, rhinorrhea, sinus pressure and sore throat. Eyes: Negative for pain, discharge and redness. Respiratory: Negative for cough, shortness of breath and wheezing. Cardiovascular: Negative for chest pain, palpitations and leg swelling. Gastrointestinal: Negative for abdominal distention, abdominal pain, blood in stool, constipation, diarrhea, nausea and vomiting. Endocrine: Negative for polydipsia, polyphagia and polyuria. Genitourinary: Positive for flank pain.  Negative for decreased urine volume, difficulty urinating, enuresis, frequency, genital sores, hematuria and urgency. Musculoskeletal: Negative for back pain, gait problem, joint swelling, neck pain and neck stiffness. Skin: Negative for color change, rash and wound. Allergic/Immunologic: Negative for environmental allergies and immunocompromised state. Neurological: Negative for dizziness, syncope, weakness, light-headedness, numbness and headaches. Psychiatric/Behavioral: Negative for agitation, confusion, dysphoric mood, self-injury, sleep disturbance and suicidal ideas. The patient is not hyperactive. PHYSICAL EXAM:  Temp 99.4 °F (37.4 °C) (Temporal)   Ht 5' 7\" (1.702 m)   Wt 180 lb (81.6 kg)   BMI 28.19 kg/m²   Physical Exam   Constitutional: He is oriented to person, place, and time. He appears well-developed and well-nourished. HENT:   Head: Normocephalic and atraumatic. Eyes: Conjunctivae are normal. No scleral icterus. Neck: Normal range of motion. Cardiovascular: Normal rate, regular rhythm and intact distal pulses. Pulmonary/Chest: Effort normal and breath sounds normal. No respiratory distress. Abdominal: Soft. Bowel sounds are normal. He exhibits no distension and no mass. There is no tenderness. Hernia confirmed negative in the right inguinal area and confirmed negative in the left inguinal area. Genitourinary: Rectum normal, testes normal and penis normal. Prostate is not tender. Enlarged: Prostate is 25 g smooth without nodularity. Right testis shows no mass, no swelling and no tenderness. Left testis shows no mass, no swelling and no tenderness. Circumcised. No phimosis. No discharge found. Musculoskeletal: Normal range of motion. He exhibits no edema or tenderness. Lymphadenopathy:     He has no cervical adenopathy. Right: No inguinal adenopathy present. Left: No inguinal adenopathy present. Neurological: He is alert and oriented to person, place, and time. Skin: Skin is warm and dry. Psychiatric: He has a normal mood and affect.  His behavior is normal.   Nursing note and vitals reviewed. DATA:  CBC:   Lab Results   Component Value Date    WBC 5.5 06/10/2019    RBC 4.48 06/10/2019    HGB 12.1 06/10/2019    HCT 40.0 06/10/2019    MCV 89.3 06/10/2019    MCH 27.0 06/10/2019    MCHC 30.3 06/10/2019    RDW 16.7 06/10/2019     06/10/2019    MPV 9.6 06/10/2019     CMP:    Lab Results   Component Value Date     01/29/2019    K 5.2 01/29/2019     01/29/2019    CO2 26 01/29/2019    BUN 15 01/29/2019    CREATININE 0.8 01/29/2019    LABGLOM >60 01/29/2019    GLUCOSE 108 01/29/2019    PROT 7.9 01/29/2019    LABALBU 4.2 01/29/2019    CALCIUM 10.3 01/29/2019    BILITOT <0.2 01/29/2019    ALKPHOS 123 01/29/2019    AST 15 01/29/2019    ALT 11 01/29/2019     PSA: No results found for: PSA  Results for orders placed or performed in visit on 06/12/19   POCT Urinalysis No Micro (Auto)   Result Value Ref Range    Color, UA yellow     Clarity, UA clear     Glucose, UA POC neg     Bilirubin, UA 1     Ketones, UA trace     Spec Grav, UA 1.030     Blood, UA POC neg     pH, UA 5.0     Protein, UA POC trace     Urobilinogen, UA 0.2     Leukocytes, UA neg     Nitrite, UA neg      No results found for: PSA  No results found for: PSAFREEPCT          IMAGING:  I reviewed the CT scan done on May 20, 2019 and this does show hypodense masses in both kidneys. These appeared to have the appearance of benign cortical cyst they were present on prior CT scan back in August 2018 however neither 1 of the studies were done with contrast of these or not completely characterized. He needs a renal ultrasound or a CT scan with renal mass protocol. 1. Renal cyst, left  He will get a renal ultrasound  - POCT Urinalysis No Micro (Auto)  - US Renal Complete; Future    2. Hypertrophy of prostate with urinary obstruction  He has a normal prostate on TIMOTHY we will go ahead and start him on alpha-blocker and check a PSA on follow-up  - PSA, Diagnostic;  Future  - tamsulosin (FLOMAX) 0.4 MG capsule; Take 1 capsule by mouth daily  Dispense: 30 capsule; Refill: 11      Orders Placed This Encounter   Procedures    US Renal Complete     Standing Status:   Future     Standing Expiration Date:   6/11/2020     Order Specific Question:   Reason for exam:     Answer:   f/u renal cyst    PSA, Diagnostic     PSA prior to next visit in 6 weeks     Standing Status:   Future     Standing Expiration Date:   6/11/2020    POCT Urinalysis No Micro (Auto)        Return in about 6 weeks (around 7/24/2019) for PSA prior to vext visit, office visit after xray study. EMR Dragon/transcription disclaimer: Much of this documentt is electronic  transcription/translation of spoken language to printed text. The  electronic translation of spoken language may be erroneous, or at times,  nonsensical words or phrases may be inadvertently transcribed.  Although I  have reviewed the document for such errors, some may still exist.

## 2019-06-17 NOTE — TELEPHONE ENCOUNTER
Patient come by the office to ask if he is still good to go for his heart cath tomorrow. Patient was on an antibiotic for 10 days. He did find out the infection on his back was a staff infection. He has been running a 100.4 temp over the last couple days but nothing higher than that. Consulted with Dr. Cha Groves he asked if he was clear of infection now patient says he is due to see his dermatologist today for a follow up at [facility]. He will have to be cleared of infection before going on with cath per Dr. Cha Groves. Patient knows this as well and he is going to let me know after his eli today what they say. If he is cleared he will bring something stating this. If he is not cleared I will call the cath lab and cancel his procedure for tomorrow.

## 2019-06-18 ENCOUNTER — HOSPITAL ENCOUNTER (OUTPATIENT)
Dept: GENERAL RADIOLOGY | Age: 64
Discharge: HOME | End: 2019-06-18
Payer: MEDICARE

## 2019-06-18 ENCOUNTER — OFFICE VISIT (OUTPATIENT)
Dept: FAMILY MEDICINE CLINIC | Age: 64
End: 2019-06-18
Payer: MEDICARE

## 2019-06-18 VITALS
DIASTOLIC BLOOD PRESSURE: 64 MMHG | WEIGHT: 183 LBS | SYSTOLIC BLOOD PRESSURE: 98 MMHG | RESPIRATION RATE: 20 BRPM | HEART RATE: 57 BPM | OXYGEN SATURATION: 99 % | BODY MASS INDEX: 28.66 KG/M2 | TEMPERATURE: 97.8 F

## 2019-06-18 DIAGNOSIS — R50.9 FEVER, UNSPECIFIED FEVER CAUSE: ICD-10-CM

## 2019-06-18 DIAGNOSIS — R50.9 FEVER, UNSPECIFIED FEVER CAUSE: Primary | ICD-10-CM

## 2019-06-18 PROCEDURE — 71046 X-RAY EXAM CHEST 2 VIEWS: CPT

## 2019-06-18 PROCEDURE — 73562 X-RAY EXAM OF KNEE 3: CPT

## 2019-06-18 PROCEDURE — 73130 X-RAY EXAM OF HAND: CPT

## 2019-06-18 PROCEDURE — 99214 OFFICE O/P EST MOD 30 MIN: CPT | Performed by: NURSE PRACTITIONER

## 2019-06-18 ASSESSMENT — ENCOUNTER SYMPTOMS
ABDOMINAL PAIN: 0
COUGH: 0

## 2019-06-23 LAB
BLOOD CULTURE, ROUTINE: NORMAL
CULTURE, BLOOD 2: NORMAL
CULTURE, BLOOD 3: NORMAL

## 2019-07-03 ENCOUNTER — TELEPHONE (OUTPATIENT)
Dept: CARDIOLOGY | Age: 64
End: 2019-07-03

## 2019-07-03 NOTE — TELEPHONE ENCOUNTER
Talked with Dr. Asia Bach he says if patient had a true reaction to contrast and not just allergic to shellfish he would like for him to take Prednisone 50 mg one tablet 6 hours prior to procedure (not benadryl) and then another tablet the morning of procedure (not benadryl) If he is allergic to shellfish then he is to take only the benadryl. Spoke with wife and patient states he had a reaction to the contrast dye about 30 years ago that caused his face to swell. Advised he is to no longer take the benadryl like instructed before, he is to take the prednisone like instructed above. Request it be sent to [facility] wife voiced understanding.

## 2019-07-04 RX ORDER — PREDNISONE 50 MG/1
TABLET ORAL
Qty: 2 TABLET | Refills: 0 | Status: ON HOLD | OUTPATIENT
Start: 2019-07-04 | End: 2019-07-11 | Stop reason: HOSPADM

## 2019-07-11 ENCOUNTER — HOSPITAL ENCOUNTER (OUTPATIENT)
Dept: CARDIAC CATH/INVASIVE PROCEDURES | Age: 64
Discharge: HOME | End: 2019-07-11
Attending: INTERNAL MEDICINE | Admitting: INTERNAL MEDICINE
Payer: MEDICARE

## 2019-07-11 VITALS
RESPIRATION RATE: 13 BRPM | TEMPERATURE: 98.3 F | HEART RATE: 68 BPM | HEIGHT: 67 IN | BODY MASS INDEX: 28.88 KG/M2 | OXYGEN SATURATION: 99 % | WEIGHT: 184 LBS | DIASTOLIC BLOOD PRESSURE: 68 MMHG | SYSTOLIC BLOOD PRESSURE: 133 MMHG

## 2019-07-11 DIAGNOSIS — R94.39 ABNORMAL MYOCARDIAL PERFUSION STUDY: ICD-10-CM

## 2019-07-11 LAB
ALBUMIN SERPL-MCNC: 3.9 G/DL (ref 3.5–5.2)
ALP BLD-CCNC: 129 U/L (ref 40–130)
ALT SERPL-CCNC: 10 U/L (ref 5–41)
ANION GAP SERPL CALCULATED.3IONS-SCNC: 10 MMOL/L (ref 7–19)
AST SERPL-CCNC: 16 U/L (ref 5–40)
BASE EXCESS ARTERIAL: 1 (ref -3–3)
BASE EXCESS VENOUS: 0
BASE EXCESS VENOUS: 1
BILIRUB SERPL-MCNC: 0.3 MG/DL (ref 0.2–1.2)
BUN BLDV-MCNC: 12 MG/DL (ref 8–23)
CALCIUM IONIZED: 1.19 MMOL/L (ref 1.1–1.3)
CALCIUM IONIZED: 1.22 MMOL/L (ref 1.1–1.3)
CALCIUM IONIZED: 1.22 MMOL/L (ref 1.1–1.3)
CALCIUM SERPL-MCNC: 9.5 MG/DL (ref 8.8–10.2)
CHLORIDE BLD-SCNC: 103 MMOL/L (ref 98–111)
CO2: 25 MEQ/L (ref 21–32)
CO2: 25 MEQ/L (ref 21–32)
CO2: 27 MEQ/L (ref 21–32)
CO2: 27 MMOL/L (ref 22–29)
CREAT SERPL-MCNC: 0.8 MG/DL (ref 0.5–1.2)
EKG P AXIS: 34 DEGREES
EKG P-R INTERVAL: 212 MS
EKG Q-T INTERVAL: 394 MS
EKG QRS DURATION: 94 MS
EKG QTC CALCULATION (BAZETT): 373 MS
EKG T AXIS: 15 DEGREES
GFR NON-AFRICAN AMERICAN: >60
GLUCOSE BLD-MCNC: 138 MG/DL (ref 70–99)
GLUCOSE BLD-MCNC: 139 MG/DL (ref 70–99)
GLUCOSE BLD-MCNC: 139 MG/DL (ref 74–109)
GLUCOSE BLD-MCNC: 147 MG/DL (ref 70–99)
HCT VFR BLD CALC: 39.2 % (ref 42–52)
HEMOGLOBIN: 12.2 G/DL (ref 14–18)
HEMOGLOBIN: 12.7 GM/DL (ref 12–18)
HEMOGLOBIN: 13.2 GM/DL (ref 12–18)
HEMOGLOBIN: 13.3 GM/DL (ref 12–18)
MCH RBC QN AUTO: 28.6 PG (ref 27–31)
MCHC RBC AUTO-ENTMCNC: 31.1 G/DL (ref 33–37)
MCV RBC AUTO: 91.8 FL (ref 80–94)
O2 SAT, ARTERIAL: 95 % (ref 93–100)
O2 SAT, VEN: 76 %
O2 SAT, VEN: 77 %
PCO2 ARTERIAL: 40 MM HG (ref 35–48)
PCO2, VEN: 41 MM HG (ref 40–50)
PCO2, VEN: 45.6 MM HG (ref 40–50)
PDW BLD-RTO: 15.2 % (ref 11.5–14.5)
PERFORMED ON: ABNORMAL
PH ARTERIAL: 7.41 (ref 7.3–7.5)
PH VENOUS: 7.37
PH VENOUS: 7.39
PLATELET # BLD: 207 K/UL (ref 130–400)
PMV BLD AUTO: 8.9 FL (ref 9.4–12.4)
PO2 ARTERIAL: 73 MM HG (ref 83–108)
PO2, VEN: 41.9 MM HG
PO2, VEN: 42 MM HG
POC ANION GAP: 7
POC ANION GAP: 9
POC ANION GAP: 9
POC CHLORIDE: 107 MEQ/L (ref 99–110)
POC CHLORIDE: 107 MEQ/L (ref 99–110)
POC CHLORIDE: 108 MEQ/L (ref 99–110)
POC CREATININE: 0.5 MG/DL (ref 0.3–1.3)
POC CREATININE: 0.6 MG/DL (ref 0.3–1.3)
POC CREATININE: 0.6 MG/DL (ref 0.3–1.3)
POC HEMATOCRIT: 37 % (ref 37–52)
POC HEMATOCRIT: 39 % (ref 37–52)
POC HEMATOCRIT: 39 % (ref 37–52)
POC POTASSIUM: 3.8 MEQ/L (ref 3.5–5.1)
POC POTASSIUM: 4 MEQ/L (ref 3.5–5.1)
POC POTASSIUM: 4.1 MEQ/L (ref 3.5–5.1)
POC SAMPLE TYPE: ABNORMAL
POC SODIUM: 141 MEQ/L (ref 136–145)
POC SODIUM: 141 MEQ/L (ref 136–145)
POC SODIUM: 142 MEQ/L (ref 136–145)
POTASSIUM SERPL-SCNC: 4.4 MMOL/L (ref 3.5–5)
RBC # BLD: 4.27 M/UL (ref 4.7–6.1)
SODIUM BLD-SCNC: 140 MMOL/L (ref 136–145)
TCO2 ARTERIAL: 27 MMOL/L
TCO2 CALC VENOUS: 26 MMOL/L
TCO2 CALC VENOUS: 28 MMOL/L
TOTAL PROTEIN: 8.3 G/DL (ref 6.6–8.7)
WBC # BLD: 5.1 K/UL (ref 4.8–10.8)

## 2019-07-11 PROCEDURE — 92928 PRQ TCAT PLMT NTRAC ST 1 LES: CPT

## 2019-07-11 PROCEDURE — C1887 CATHETER, GUIDING: HCPCS

## 2019-07-11 PROCEDURE — 82330 ASSAY OF CALCIUM: CPT

## 2019-07-11 PROCEDURE — 6360000004 HC RX CONTRAST MEDICATION: Performed by: INTERNAL MEDICINE

## 2019-07-11 PROCEDURE — 85014 HEMATOCRIT: CPT

## 2019-07-11 PROCEDURE — 82948 REAGENT STRIP/BLOOD GLUCOSE: CPT

## 2019-07-11 PROCEDURE — 84295 ASSAY OF SERUM SODIUM: CPT

## 2019-07-11 PROCEDURE — 92928 PRQ TCAT PLMT NTRAC ST 1 LES: CPT | Performed by: INTERNAL MEDICINE

## 2019-07-11 PROCEDURE — 36415 COLL VENOUS BLD VENIPUNCTURE: CPT

## 2019-07-11 PROCEDURE — 82374 ASSAY BLOOD CARBON DIOXIDE: CPT

## 2019-07-11 PROCEDURE — 82810 BLOOD GASES O2 SAT ONLY: CPT

## 2019-07-11 PROCEDURE — C1769 GUIDE WIRE: HCPCS

## 2019-07-11 PROCEDURE — 99152 MOD SED SAME PHYS/QHP 5/>YRS: CPT | Performed by: INTERNAL MEDICINE

## 2019-07-11 PROCEDURE — 2580000003 HC RX 258: Performed by: INTERNAL MEDICINE

## 2019-07-11 PROCEDURE — C1751 CATH, INF, PER/CENT/MIDLINE: HCPCS

## 2019-07-11 PROCEDURE — C1725 CATH, TRANSLUMIN NON-LASER: HCPCS

## 2019-07-11 PROCEDURE — 93005 ELECTROCARDIOGRAM TRACING: CPT

## 2019-07-11 PROCEDURE — 82565 ASSAY OF CREATININE: CPT

## 2019-07-11 PROCEDURE — C1894 INTRO/SHEATH, NON-LASER: HCPCS

## 2019-07-11 PROCEDURE — 6360000002 HC RX W HCPCS

## 2019-07-11 PROCEDURE — C1760 CLOSURE DEV, VASC: HCPCS

## 2019-07-11 PROCEDURE — 84132 ASSAY OF SERUM POTASSIUM: CPT

## 2019-07-11 PROCEDURE — 6360000002 HC RX W HCPCS: Performed by: INTERNAL MEDICINE

## 2019-07-11 PROCEDURE — 80053 COMPREHEN METABOLIC PANEL: CPT

## 2019-07-11 PROCEDURE — 93460 R&L HRT ART/VENTRICLE ANGIO: CPT | Performed by: INTERNAL MEDICINE

## 2019-07-11 PROCEDURE — 6370000000 HC RX 637 (ALT 250 FOR IP)

## 2019-07-11 PROCEDURE — 85027 COMPLETE CBC AUTOMATED: CPT

## 2019-07-11 PROCEDURE — 82800 BLOOD PH: CPT

## 2019-07-11 PROCEDURE — 99152 MOD SED SAME PHYS/QHP 5/>YRS: CPT

## 2019-07-11 PROCEDURE — 99153 MOD SED SAME PHYS/QHP EA: CPT

## 2019-07-11 PROCEDURE — 82435 ASSAY OF BLOOD CHLORIDE: CPT

## 2019-07-11 PROCEDURE — C1874 STENT, COATED/COV W/DEL SYS: HCPCS

## 2019-07-11 PROCEDURE — 93460 R&L HRT ART/VENTRICLE ANGIO: CPT

## 2019-07-11 PROCEDURE — 2709999900 HC NON-CHARGEABLE SUPPLY

## 2019-07-11 PROCEDURE — 2500000003 HC RX 250 WO HCPCS

## 2019-07-11 RX ORDER — SODIUM CHLORIDE 9 MG/ML
INJECTION, SOLUTION INTRAVENOUS CONTINUOUS
Status: DISCONTINUED | OUTPATIENT
Start: 2019-07-11 | End: 2019-07-11 | Stop reason: HOSPADM

## 2019-07-11 RX ORDER — DIPHENHYDRAMINE HYDROCHLORIDE 50 MG/ML
50 INJECTION INTRAMUSCULAR; INTRAVENOUS ONCE
Status: COMPLETED | OUTPATIENT
Start: 2019-07-11 | End: 2019-07-11

## 2019-07-11 RX ORDER — SODIUM CHLORIDE 9 MG/ML
INJECTION, SOLUTION INTRAVENOUS CONTINUOUS
Status: DISCONTINUED | OUTPATIENT
Start: 2019-07-11 | End: 2019-07-11

## 2019-07-11 RX ORDER — SODIUM CHLORIDE 0.9 % (FLUSH) 0.9 %
10 SYRINGE (ML) INJECTION PRN
Status: DISCONTINUED | OUTPATIENT
Start: 2019-07-11 | End: 2019-07-11 | Stop reason: HOSPADM

## 2019-07-11 RX ORDER — CLOPIDOGREL BISULFATE 75 MG/1
75 TABLET ORAL DAILY
Qty: 90 TABLET | Refills: 3 | Status: SHIPPED | OUTPATIENT
Start: 2019-07-11 | End: 2020-06-22

## 2019-07-11 RX ORDER — ONDANSETRON 2 MG/ML
4 INJECTION INTRAMUSCULAR; INTRAVENOUS EVERY 6 HOURS PRN
Status: DISCONTINUED | OUTPATIENT
Start: 2019-07-11 | End: 2019-07-11

## 2019-07-11 RX ORDER — IODIXANOL 320 MG/ML
200 INJECTION, SOLUTION INTRAVASCULAR
Status: COMPLETED | OUTPATIENT
Start: 2019-07-11 | End: 2019-07-11

## 2019-07-11 RX ORDER — ALPRAZOLAM 0.5 MG/1
0.5 TABLET ORAL
Status: DISCONTINUED | OUTPATIENT
Start: 2019-07-11 | End: 2019-07-11 | Stop reason: HOSPADM

## 2019-07-11 RX ORDER — ONDANSETRON 2 MG/ML
4 INJECTION INTRAMUSCULAR; INTRAVENOUS EVERY 6 HOURS PRN
Status: DISCONTINUED | OUTPATIENT
Start: 2019-07-11 | End: 2019-07-11 | Stop reason: HOSPADM

## 2019-07-11 RX ORDER — SODIUM CHLORIDE 0.9 % (FLUSH) 0.9 %
10 SYRINGE (ML) INJECTION EVERY 12 HOURS SCHEDULED
Status: DISCONTINUED | OUTPATIENT
Start: 2019-07-11 | End: 2019-07-11 | Stop reason: HOSPADM

## 2019-07-11 RX ORDER — ACETAMINOPHEN 325 MG/1
650 TABLET ORAL EVERY 4 HOURS PRN
Status: DISCONTINUED | OUTPATIENT
Start: 2019-07-11 | End: 2019-07-11 | Stop reason: HOSPADM

## 2019-07-11 RX ORDER — OXYCODONE AND ACETAMINOPHEN 7.5; 325 MG/1; MG/1
1 TABLET ORAL EVERY 4 HOURS PRN
Status: DISCONTINUED | OUTPATIENT
Start: 2019-07-11 | End: 2019-07-11 | Stop reason: HOSPADM

## 2019-07-11 RX ADMIN — IODIXANOL 170 ML: 320 INJECTION, SOLUTION INTRAVASCULAR at 13:05

## 2019-07-11 RX ADMIN — HYDROCORTISONE SODIUM SUCCINATE 200 MG: 100 INJECTION, POWDER, FOR SOLUTION INTRAMUSCULAR; INTRAVENOUS at 08:13

## 2019-07-11 RX ADMIN — DIPHENHYDRAMINE HYDROCHLORIDE 50 MG: 50 INJECTION, SOLUTION INTRAMUSCULAR; INTRAVENOUS at 08:11

## 2019-07-11 RX ADMIN — SODIUM CHLORIDE: 9 INJECTION, SOLUTION INTRAVENOUS at 08:05

## 2019-07-11 NOTE — H&P
Spouse name: Not on file    Number of children: Not on file    Years of education: Not on file    Highest education level: Not on file   Occupational History    Not on file   Social Needs    Financial resource strain: Not on file    Food insecurity:       Worry: Not on file       Inability: Not on file    Transportation needs:       Medical: Not on file       Non-medical: Not on file   Tobacco Use    Smoking status: Former Smoker       Packs/day: 0.00       Types: Cigarettes    Smokeless tobacco: Never Used   Substance and Sexual Activity    Alcohol use:  Yes    Drug use: No    Sexual activity: Yes       Partners: Female   Lifestyle    Physical activity:       Days per week: Not on file       Minutes per session: Not on file    Stress: Not on file   Relationships    Social connections:       Talks on phone: Not on file       Gets together: Not on file       Attends Mandaen service: Not on file       Active member of club or organization: Not on file       Attends meetings of clubs or organizations: Not on file       Relationship status: Not on file    Intimate partner violence:       Fear of current or ex partner: Not on file       Emotionally abused: Not on file       Physically abused: Not on file       Forced sexual activity: Not on file   Other Topics Concern    Not on file   Social History Narrative    Not on file            Family History:   Family History             Problem Relation Age of Onset    Cancer Mother      Heart Disease Father      Cancer Father      Liver Cancer Father      Diabetes Maternal Grandmother      Heart Disease Maternal Grandfather      Cancer Maternal Grandfather      Stroke Paternal Grandmother      Colon Cancer Neg Hx      Colon Polyps Neg Hx      Esophageal Cancer Neg Hx      Liver Disease Neg Hx      Rectal Cancer Neg Hx      Stomach Cancer Neg Hx                 Physical Examination:  /72 (Site: Right Upper Arm)   Pulse 50   Ht 5' 7\"

## 2019-07-12 DIAGNOSIS — E78.2 MIXED HYPERLIPIDEMIA: ICD-10-CM

## 2019-07-12 RX ORDER — ATORVASTATIN CALCIUM 20 MG/1
TABLET, FILM COATED ORAL
Qty: 30 TABLET | Refills: 5 | Status: SHIPPED | OUTPATIENT
Start: 2019-07-12 | End: 2019-10-15 | Stop reason: ALTCHOICE

## 2019-07-14 LAB
EKG P AXIS: 73 DEGREES
EKG P-R INTERVAL: 186 MS
EKG Q-T INTERVAL: 392 MS
EKG QRS DURATION: 98 MS
EKG QTC CALCULATION (BAZETT): 399 MS
EKG T AXIS: 50 DEGREES

## 2019-07-24 ENCOUNTER — HOSPITAL ENCOUNTER (OUTPATIENT)
Dept: GENERAL RADIOLOGY | Age: 64
Discharge: HOME | End: 2019-07-24
Payer: MEDICARE

## 2019-07-24 DIAGNOSIS — N28.1 RENAL CYST, LEFT: ICD-10-CM

## 2019-07-24 PROCEDURE — 76770 US EXAM ABDO BACK WALL COMP: CPT

## 2019-07-31 ENCOUNTER — OFFICE VISIT (OUTPATIENT)
Dept: CARDIOLOGY | Age: 64
End: 2019-07-31
Payer: MEDICARE

## 2019-07-31 VITALS
HEART RATE: 66 BPM | SYSTOLIC BLOOD PRESSURE: 128 MMHG | HEIGHT: 67 IN | WEIGHT: 182 LBS | DIASTOLIC BLOOD PRESSURE: 72 MMHG | BODY MASS INDEX: 28.56 KG/M2

## 2019-07-31 DIAGNOSIS — I25.10 CORONARY ARTERY DISEASE INVOLVING NATIVE CORONARY ARTERY OF NATIVE HEART WITHOUT ANGINA PECTORIS: Primary | ICD-10-CM

## 2019-07-31 DIAGNOSIS — I10 ESSENTIAL HYPERTENSION: ICD-10-CM

## 2019-07-31 DIAGNOSIS — N40.1 HYPERTROPHY OF PROSTATE WITH URINARY OBSTRUCTION: ICD-10-CM

## 2019-07-31 DIAGNOSIS — I35.0 AORTIC VALVE STENOSIS, ETIOLOGY OF CARDIAC VALVE DISEASE UNSPECIFIED: ICD-10-CM

## 2019-07-31 DIAGNOSIS — N13.8 HYPERTROPHY OF PROSTATE WITH URINARY OBSTRUCTION: ICD-10-CM

## 2019-07-31 DIAGNOSIS — I35.1 MODERATE AORTIC REGURGITATION: ICD-10-CM

## 2019-07-31 LAB — PROSTATE SPECIFIC ANTIGEN: 1 NG/ML (ref 0–4)

## 2019-07-31 PROCEDURE — 99214 OFFICE O/P EST MOD 30 MIN: CPT | Performed by: CLINICAL NURSE SPECIALIST

## 2019-07-31 NOTE — PROGRESS NOTES
CATHETERIZATION N/A 03/2016    stent placement    CARDIAC CATHETERIZATION  08/2017    no PCI    CARDIAC CATHETERIZATION  07/11/2019    Drug-eluting stents placed to mid LAD and mid RCA, normal LV    CHOLECYSTECTOMY, LAPAROSCOPIC N/A 5/19/2017    CHOLECYSTECTOMY LAPAROSCOPIC performed by Dilma Junior MD at [address] [place] [address]  05/28/2015    Dr. Ehsan Cuevas  3/16    stent to LAD    EGD  05/18/2017    dr Yassine Ireland      Left thumb    JOINT REPLACEMENT Right     JOINT REPLACEMENT      KNEE SURGERY      s/p Rt. knee replacement    LEG SURGERY Right     Broken leg with surgical repair    LUMBAR SPINE SURGERY N/A 12/4/2018    L1-5 LAMINECTOMY performed by Miguel Orellana MD at [facility] EGD TRANSORAL BIOPSY SINGLE/MULTIPLE N/A 5/18/2017    Dr Jazmine Macedo, Amelie (-), biliary colic, surgical referral    [place] EGD TRANSORAL BIOPSY SINGLE/MULTIPLE N/A 11/29/2018    Dr JAIDEN Torres-w/dilation over wire-51 [place]-Distal esophagitis, gastritis    REMOVE HARDWARE SPINE N/A 12/20/2018    I AND D LUMBAR INCISION SEROMA performed by Miguel Orellana MD at [address]  03/30/2015    Dr. Radha Noel 11/29/2018    Dr JAIDEN Torres-w/dilation over wire-51 [place]-Distal esophagitis, gastritis     Family History   Problem Relation Age of Onset    Cancer Mother     Heart Disease Father     Cancer Father     Liver Cancer Father     Diabetes Maternal Grandmother     Heart Disease Maternal Grandfather     Cancer Maternal Grandfather     Stroke Paternal Grandmother     Colon Cancer Neg Hx     Colon Polyps Neg Hx     Esophageal Cancer Neg Hx     Liver Disease Neg Hx     Rectal Cancer Neg Hx     Stomach Cancer Neg Hx      Social History     Tobacco Use    Smoking status: Former Smoker     Packs/day: 0.00     Types: Cigarettes    Smokeless tobacco: Never Used   Substance Use Topics    Alcohol use:  Yes Current Outpatient Medications   Medication Sig Dispense Refill    metoprolol tartrate (LOPRESSOR) 25 MG tablet Take 0.5 tablets by mouth 2 times daily 60 tablet 3    atorvastatin (LIPITOR) 20 MG tablet TAKE 1 TABLET BY MOUTH EVERY DAY AT NIGHT 30 tablet 5    clopidogrel (PLAVIX) 75 MG tablet Take 1 tablet by mouth daily 90 tablet 3    ferrous sulfate 325 (65 Fe) MG tablet Take 1 tablet by mouth daily (with breakfast) 30 tablet 5    amLODIPine (NORVASC) 5 MG tablet Take 1 tablet by mouth 2 times daily 60 tablet 3    Docusate Calcium (STOOL SOFTENER PO) Take 1 tablet by mouth as needed       pantoprazole (PROTONIX) 40 MG tablet Take 1 tablet by mouth daily Take daily first thing in the morning on an empty stomach. 30 tablet 11    ranitidine (ZANTAC) 150 MG tablet Take 1 tablet by mouth nightly 30 tablet 11    lisinopril (PRINIVIL;ZESTRIL) 20 MG tablet TAKE 1 TABLET BY MOUTH TWICE A  tablet 3    oxyCODONE-acetaminophen (PERCOCET) 7.5-325 MG per tablet Take 1 tablet by mouth 4 times daily.  gabapentin (NEURONTIN) 800 MG tablet Take 800 mg by mouth 2 times daily .  nitroGLYCERIN (NITROSTAT) 0.4 MG SL tablet Place 1 tablet under the tongue every 5 minutes as needed 1 tablet as needed 25 tablet 3    aspirin 81 MG tablet Take 81 mg by mouth nightly        No current facility-administered medications for this visit. Allergies: Codeine; Iv [iodides]; and Hydrocodone    Review of Systems  Constitutional - no significant activity change, appetite change, or unexpected weight change. No fever, chills or diaphoresis. No fatigue. HEENT - no significant rhinorrhea or epistaxis. No tinnitus or significant hearing loss. Eyes - no sudden vision change or amaurosis. Respiratory - no significant wheezing, stridor, apnea or cough. + dyspnea on exertion + shortness of breath. Cardiovascular - no exertional chest pain, orthopnea or PND. No sensation of arrhythmia or slow heart rate.    No

## 2019-08-02 ENCOUNTER — OFFICE VISIT (OUTPATIENT)
Dept: UROLOGY | Age: 64
End: 2019-08-02
Payer: MEDICARE

## 2019-08-02 VITALS
BODY MASS INDEX: 28.72 KG/M2 | TEMPERATURE: 97.9 F | WEIGHT: 183 LBS | DIASTOLIC BLOOD PRESSURE: 68 MMHG | HEIGHT: 67 IN | SYSTOLIC BLOOD PRESSURE: 131 MMHG | HEART RATE: 51 BPM

## 2019-08-02 DIAGNOSIS — N28.1 RENAL CYST, LEFT: Primary | ICD-10-CM

## 2019-08-02 DIAGNOSIS — N40.1 HYPERTROPHY OF PROSTATE WITH URINARY OBSTRUCTION: ICD-10-CM

## 2019-08-02 DIAGNOSIS — N13.8 HYPERTROPHY OF PROSTATE WITH URINARY OBSTRUCTION: ICD-10-CM

## 2019-08-02 DIAGNOSIS — N28.1 COMPLEX RENAL CYST: ICD-10-CM

## 2019-08-02 LAB
APPEARANCE FLUID: NORMAL
BILIRUBIN, POC: NORMAL
BLOOD URINE, POC: NORMAL
CLARITY, POC: CLEAR
COLOR, POC: YELLOW
GLUCOSE URINE, POC: NORMAL
KETONES, POC: NORMAL
LEUKOCYTE EST, POC: NORMAL
NITRITE, POC: NORMAL
PH, POC: 5.5
PROTEIN, POC: NORMAL
SPECIFIC GRAVITY, POC: 1.01
UROBILINOGEN, POC: 0.2

## 2019-08-02 PROCEDURE — 81002 URINALYSIS NONAUTO W/O SCOPE: CPT | Performed by: UROLOGY

## 2019-08-02 PROCEDURE — 99214 OFFICE O/P EST MOD 30 MIN: CPT | Performed by: UROLOGY

## 2019-08-02 RX ORDER — PREDNISONE 50 MG/1
50 TABLET ORAL EVERY 6 HOURS
Qty: 3 TABLET | Refills: 0 | Status: SHIPPED | OUTPATIENT
Start: 2019-08-02 | End: 2019-08-03

## 2019-08-02 RX ORDER — ALFUZOSIN HYDROCHLORIDE 10 MG/1
10 TABLET, EXTENDED RELEASE ORAL DAILY
Qty: 30 TABLET | Refills: 6 | Status: SHIPPED | OUTPATIENT
Start: 2019-08-02 | End: 2019-11-07

## 2019-08-02 RX ORDER — DIPHENHYDRAMINE HCL 25 MG
50 TABLET ORAL ONCE
Qty: 2 TABLET | Refills: 0 | Status: SHIPPED | OUTPATIENT
Start: 2019-08-02 | End: 2019-08-02

## 2019-08-02 ASSESSMENT — ENCOUNTER SYMPTOMS
BACK PAIN: 0
COUGH: 0
WHEEZING: 0
SHORTNESS OF BREATH: 0
DIARRHEA: 0
EYE DISCHARGE: 0
EYE PAIN: 0
RHINORRHEA: 0
BLOOD IN STOOL: 0
CONSTIPATION: 0
ABDOMINAL PAIN: 0
VOMITING: 0
FACIAL SWELLING: 0
SORE THROAT: 0
EYE REDNESS: 0
ABDOMINAL DISTENTION: 0
COLOR CHANGE: 0
NAUSEA: 0
SINUS PRESSURE: 0

## 2019-08-02 NOTE — PROGRESS NOTES
cholecystitis without calculus 5/19/2017    Chronic GERD     COPD (chronic obstructive pulmonary disease) (HCC)     Coronary atherosclerosis of native coronary artery     s/p PTCA and stent placement to the LAD and RCA/5 stents    DDD (degenerative disc disease), lumbar 12/4/2018    History of tobacco abuse 2010    Hyperlipidemia     Hypertension     MI (myocardial infarction) ([facility] [address]     Old, inferior wall    Moderate aortic regurgitation 08/14/2017    mod-severe AR.  Moderate aortic stenosis 08/14/2017    mod-severe aortic stenosis.  Spinal stenosis of lumbar region with neurogenic claudication 12/4/2018       Past Surgical History:   Procedure Laterality Date    BACK SURGERY      s/p laminectomy    [facility]  [facility]      CARDIAC CATHETERIZATION  12/10/03  [address]    [facility]    CARDIAC CATHETERIZATION  08/29/03 [address]    [facility]    CARDIAC [facility]IZATION  02/16/01 [facility]    CARDIAC CATHETERIZATION  05/25/2009    EF is estimated to be 50%. See scanned document.  CARDIAC CATHETERIZATION N/A 03/2016    stent placement    CARDIAC CATHETERIZATION  08/2017    no PCI    CARDIAC CATHETERIZATION  07/11/2019    Drug-eluting stents placed to mid LAD and mid RCA, normal LV    CHOLECYSTECTOMY, LAPAROSCOPIC N/A 5/19/2017    CHOLECYSTECTOMY LAPAROSCOPIC performed by Jayashree Hayden MD at [address] [place] [address]  05/28/2015    Dr. Sim Heads  3/16    stent to LAD    EGD  05/18/2017    dr Jesús Longo      Left thumb    JOINT REPLACEMENT Right     JOINT REPLACEMENT      KNEE SURGERY      s/p Rt.  knee replacement    LEG SURGERY Right     Broken leg with surgical repair    LUMBAR SPINE SURGERY N/A 12/4/2018    L1-5 LAMINECTOMY performed by Cheli Mead MD at [address] [place] EGD TRANSORAL BIOPSY SINGLE/MULTIPLE N/A 5/18/2017    Dr Rukhsana Cordoba, Amelie (-), biliary colic, surgical referral    [place] EGD TRANSORAL BIOPSY Skippy Lessen nitroGLYCERIN (NITROSTAT) 0.4 MG SL tablet Place 1 tablet under the tongue every 5 minutes as needed 1 tablet as needed 25 tablet 3    aspirin 81 MG tablet Take 81 mg by mouth nightly        No current facility-administered medications for this visit. Allergies   Allergen Reactions    Codeine Swelling     Lips swelling    Iv [Iodides] Swelling     Contrast dye used for heart cath. Caused face to swell.  Hydrocodone Swelling     lips swelling       Social History     Socioeconomic History    Marital status:      Spouse name: None    Number of children: None    Years of education: None    Highest education level: None   Occupational History    None   Social Needs    Financial resource strain: None    Food insecurity:     Worry: None     Inability: None    Transportation needs:     Medical: None     Non-medical: None   Tobacco Use    Smoking status: Former Smoker     Packs/day: 0.00     Types: Cigarettes    Smokeless tobacco: Never Used   Substance and Sexual Activity    Alcohol use:  Yes    Drug use: No    Sexual activity: Yes     Partners: Female   Lifestyle    Physical activity:     Days per week: None     Minutes per session: None    Stress: None   Relationships    Social connections:     Talks on phone: None     Gets together: None     Attends [employer]ar[employer] service: None     Active member of club or organization: None     Attends meetings of clubs or organizations: None     Relationship status: None    Intimate partner violence:     Fear of current or ex partner: None     Emotionally abused: None     Physically abused: None     Forced sexual activity: None   Other Topics Concern    None   Social History Narrative    None       Family History   Problem Relation Age of Onset    Cancer Mother     Heart Disease Father     Cancer Father     Liver Cancer Father     Diabetes Maternal Grandmother     Heart Disease Maternal Grandfather     Cancer Maternal Grandfather     Urinalysis no Micro  - CT ABDOMEN PELVIS W WO CONTRAST Additional Contrast? Radiologist Recommendation (renal mass protocol); Future    2. Hypertrophy of prostate with urinary obstruction  He could not tolerate Flomax due to adverse reaction and a prescription in for alfuzosin. He was instructed should he experience dizziness is to stop the medication  - POCT Urinalysis no Micro  - alfuzosin (UROXATRAL) 10 MG extended release tablet; Take 1 tablet by mouth daily  Dispense: 30 tablet; Refill: 6    3. Complex renal cyst  2.2 cm cyst with a septation. He will follow-up to see me with a CT scan to better characterize this and see if there is any interval change in size in 6 months he has an allergy to contrast and iodides and will premedicate him  - CT ABDOMEN PELVIS W WO CONTRAST Additional Contrast? Radiologist Recommendation (renal mass protocol); Future  - diphenhydrAMINE (BENADRYL) 25 MG tablet; Take 2 tablets by mouth once for 1 dose Dose should be given 1 hour prior to contrast media administration. Dispense: 2 tablet; Refill: 0  - predniSONE (DELTASONE) 50 MG tablet; Take 1 tablet by mouth every 6 hours for 3 doses First dose should be given 13 hours prior to contrast media administration. Dispense: 3 tablet; Refill: 0      Orders Placed This Encounter   Procedures    CT ABDOMEN PELVIS W WO CONTRAST Additional Contrast? Radiologist Recommendation (renal mass protocol)     Renal mass protocol     Standing Status:   Future     Standing Expiration Date:   8/1/2020     Order Specific Question:   Additional Contrast?     Answer:   Radiologist Recommendation     Comments:   renal mass protocol     Order Specific Question:   Reason for exam:     Answer:   f/u right complex cyst seen on SHAUN    POCT Urinalysis no Micro        Return in about 6 months (around 2/2/2020) for office visit after xray study.       EMR Dragon/transcription disclaimer: Much of this documentt is electronic  transcription/translation of

## 2019-08-14 LAB
C-REACTIVE PROTEIN: 0.58 MG/DL (ref 0–0.5)
SEDIMENTATION RATE, ERYTHROCYTE: 21 MM/HR (ref 0–15)
URIC ACID, SERUM: 5.4 MG/DL (ref 3.4–7)

## 2019-09-16 RX ORDER — AMLODIPINE BESYLATE 5 MG/1
TABLET ORAL
Qty: 180 TABLET | Refills: 3 | Status: SHIPPED | OUTPATIENT
Start: 2019-09-16 | End: 2019-10-07

## 2019-10-07 ENCOUNTER — OFFICE VISIT (OUTPATIENT)
Dept: CARDIOLOGY | Age: 64
End: 2019-10-07
Payer: MEDICARE

## 2019-10-07 VITALS
HEART RATE: 56 BPM | WEIGHT: 189 LBS | DIASTOLIC BLOOD PRESSURE: 60 MMHG | BODY MASS INDEX: 29.66 KG/M2 | SYSTOLIC BLOOD PRESSURE: 92 MMHG | HEIGHT: 67 IN

## 2019-10-07 DIAGNOSIS — M79.10 MYALGIA: ICD-10-CM

## 2019-10-07 DIAGNOSIS — E78.2 MIXED HYPERLIPIDEMIA: ICD-10-CM

## 2019-10-07 DIAGNOSIS — I25.118 CORONARY ARTERY DISEASE OF NATIVE ARTERY OF NATIVE HEART WITH STABLE ANGINA PECTORIS (HCC): ICD-10-CM

## 2019-10-07 DIAGNOSIS — I35.0 AORTIC VALVE STENOSIS, ETIOLOGY OF CARDIAC VALVE DISEASE UNSPECIFIED: ICD-10-CM

## 2019-10-07 DIAGNOSIS — I95.2 HYPOTENSION DUE TO DRUGS: Primary | ICD-10-CM

## 2019-10-07 PROBLEM — I35.1 MODERATE AORTIC REGURGITATION: Status: RESOLVED | Noted: 2017-08-14 | Resolved: 2019-10-07

## 2019-10-07 PROCEDURE — 99214 OFFICE O/P EST MOD 30 MIN: CPT | Performed by: CLINICAL NURSE SPECIALIST

## 2019-10-07 RX ORDER — AMLODIPINE BESYLATE 5 MG/1
5 TABLET ORAL DAILY
Qty: 90 TABLET | Refills: 3 | Status: SHIPPED | OUTPATIENT
Start: 2019-10-07 | End: 2019-11-07

## 2019-10-07 ASSESSMENT — ENCOUNTER SYMPTOMS
VOMITING: 0
WHEEZING: 0
ABDOMINAL PAIN: 0
EYE REDNESS: 0
CHEST TIGHTNESS: 1
COUGH: 0
FACIAL SWELLING: 0
NAUSEA: 0
SHORTNESS OF BREATH: 0

## 2019-10-08 DIAGNOSIS — E78.2 MIXED HYPERLIPIDEMIA: ICD-10-CM

## 2019-10-08 LAB
ALT SERPL-CCNC: 11 U/L (ref 5–41)
AST SERPL-CCNC: 21 U/L (ref 5–40)
CHOLESTEROL, TOTAL: 161 MG/DL (ref 160–199)
HDLC SERPL-MCNC: 69 MG/DL (ref 55–121)
LDL CHOLESTEROL CALCULATED: 80 MG/DL
TRIGL SERPL-MCNC: 61 MG/DL (ref 0–149)

## 2019-10-15 ENCOUNTER — OFFICE VISIT (OUTPATIENT)
Dept: GASTROENTEROLOGY | Age: 64
End: 2019-10-15
Payer: MEDICARE

## 2019-10-15 VITALS
HEIGHT: 67 IN | WEIGHT: 186 LBS | BODY MASS INDEX: 29.19 KG/M2 | SYSTOLIC BLOOD PRESSURE: 121 MMHG | HEART RATE: 62 BPM | OXYGEN SATURATION: 96 % | DIASTOLIC BLOOD PRESSURE: 70 MMHG

## 2019-10-15 DIAGNOSIS — R12 CHRONIC HEARTBURN: ICD-10-CM

## 2019-10-15 PROCEDURE — 99213 OFFICE O/P EST LOW 20 MIN: CPT | Performed by: NURSE PRACTITIONER

## 2019-10-15 RX ORDER — PANTOPRAZOLE SODIUM 40 MG/1
40 TABLET, DELAYED RELEASE ORAL DAILY
Qty: 90 TABLET | Refills: 3 | Status: SHIPPED | OUTPATIENT
Start: 2019-10-15 | End: 2020-01-01

## 2019-10-15 ASSESSMENT — ENCOUNTER SYMPTOMS
SHORTNESS OF BREATH: 0
COUGH: 0
DIARRHEA: 0
BACK PAIN: 1
VOICE CHANGE: 0
VOMITING: 0
TROUBLE SWALLOWING: 0
NAUSEA: 0
BLOOD IN STOOL: 0
RECTAL PAIN: 0
ANAL BLEEDING: 0
CONSTIPATION: 0
ABDOMINAL PAIN: 0
ABDOMINAL DISTENTION: 0

## 2019-11-05 ENCOUNTER — TRANSCRIBE ORDERS (OUTPATIENT)
Dept: ADMINISTRATIVE | Facility: HOSPITAL | Age: 64
End: 2019-11-05

## 2019-11-05 DIAGNOSIS — M25.511 RIGHT SHOULDER PAIN, UNSPECIFIED CHRONICITY: Primary | ICD-10-CM

## 2019-11-05 DIAGNOSIS — M19.011 PRIMARY OSTEOARTHRITIS OF RIGHT SHOULDER: ICD-10-CM

## 2019-11-07 ENCOUNTER — OFFICE VISIT (OUTPATIENT)
Dept: CARDIOLOGY | Age: 64
End: 2019-11-07
Payer: MEDICARE

## 2019-11-07 VITALS
WEIGHT: 188 LBS | HEART RATE: 56 BPM | SYSTOLIC BLOOD PRESSURE: 110 MMHG | DIASTOLIC BLOOD PRESSURE: 78 MMHG | HEIGHT: 67 IN | BODY MASS INDEX: 29.51 KG/M2

## 2019-11-07 DIAGNOSIS — M19.011 PRIMARY OSTEOARTHRITIS OF BOTH SHOULDERS: ICD-10-CM

## 2019-11-07 DIAGNOSIS — M79.10 MYALGIA DUE TO STATIN: ICD-10-CM

## 2019-11-07 DIAGNOSIS — I35.0 AORTIC VALVE STENOSIS, ETIOLOGY OF CARDIAC VALVE DISEASE UNSPECIFIED: ICD-10-CM

## 2019-11-07 DIAGNOSIS — M48.062 SPINAL STENOSIS OF LUMBAR REGION WITH NEUROGENIC CLAUDICATION: ICD-10-CM

## 2019-11-07 DIAGNOSIS — T46.6X5A MYALGIA DUE TO STATIN: ICD-10-CM

## 2019-11-07 DIAGNOSIS — I25.10 CORONARY ARTERY DISEASE INVOLVING NATIVE CORONARY ARTERY OF NATIVE HEART WITHOUT ANGINA PECTORIS: Primary | ICD-10-CM

## 2019-11-07 DIAGNOSIS — E78.2 MIXED HYPERLIPIDEMIA: ICD-10-CM

## 2019-11-07 DIAGNOSIS — I10 ESSENTIAL HYPERTENSION: ICD-10-CM

## 2019-11-07 DIAGNOSIS — M19.012 PRIMARY OSTEOARTHRITIS OF BOTH SHOULDERS: ICD-10-CM

## 2019-11-07 PROCEDURE — 99214 OFFICE O/P EST MOD 30 MIN: CPT | Performed by: CLINICAL NURSE SPECIALIST

## 2019-11-07 RX ORDER — LISINOPRIL 20 MG/1
20 TABLET ORAL 2 TIMES DAILY
COMMUNITY
End: 2020-07-02

## 2019-11-07 RX ORDER — AMLODIPINE BESYLATE 5 MG/1
5 TABLET ORAL 2 TIMES DAILY
Qty: 180 TABLET | Refills: 3 | Status: SHIPPED | OUTPATIENT
Start: 2019-11-07 | End: 2019-12-10

## 2019-11-07 RX ORDER — PRAVASTATIN SODIUM 20 MG
20 TABLET ORAL DAILY
Qty: 90 TABLET | Refills: 3 | Status: SHIPPED | OUTPATIENT
Start: 2019-11-07 | End: 2020-07-02

## 2019-11-07 ASSESSMENT — ENCOUNTER SYMPTOMS
COUGH: 0
WHEEZING: 0
NAUSEA: 0
FACIAL SWELLING: 0
SHORTNESS OF BREATH: 0
EYE REDNESS: 0
CHEST TIGHTNESS: 0
VOMITING: 0
ABDOMINAL PAIN: 0

## 2019-11-11 ENCOUNTER — HOSPITAL ENCOUNTER (OUTPATIENT)
Dept: MRI IMAGING | Facility: HOSPITAL | Age: 64
Discharge: HOME | End: 2019-11-11
Admitting: ORTHOPAEDIC SURGERY

## 2019-11-11 PROCEDURE — 73221 MRI JOINT UPR EXTREM W/O DYE: CPT

## 2019-11-13 ENCOUNTER — TELEPHONE (OUTPATIENT)
Dept: CARDIOLOGY | Age: 64
End: 2019-11-13

## 2019-12-02 RX ORDER — LISINOPRIL 20 MG/1
TABLET ORAL
Qty: 180 TABLET | Refills: 3 | Status: SHIPPED | OUTPATIENT
Start: 2019-12-02 | End: 2019-12-10

## 2019-12-10 ENCOUNTER — HOSPITAL ENCOUNTER (OUTPATIENT)
Dept: PREADMISSION TESTING | Age: 64
Discharge: HOME | DRG: 483 | End: 2019-12-14
Payer: MEDICARE

## 2019-12-10 ENCOUNTER — HOSPITAL ENCOUNTER (OUTPATIENT)
Dept: GENERAL RADIOLOGY | Age: 64
Discharge: HOME | DRG: 483 | End: 2019-12-10
Payer: MEDICARE

## 2019-12-10 VITALS — BODY MASS INDEX: 28.88 KG/M2 | WEIGHT: 184 LBS | HEIGHT: 67 IN

## 2019-12-10 LAB
ABO/RH: NORMAL
ALBUMIN SERPL-MCNC: 4.1 G/DL (ref 3.5–5.2)
ALP BLD-CCNC: 135 U/L (ref 40–130)
ALT SERPL-CCNC: 13 U/L (ref 5–41)
ANION GAP SERPL CALCULATED.3IONS-SCNC: 13 MMOL/L (ref 7–19)
ANTIBODY SCREEN: NORMAL
AST SERPL-CCNC: 22 U/L (ref 5–40)
BASOPHILS ABSOLUTE: 0.1 K/UL (ref 0–0.2)
BASOPHILS RELATIVE PERCENT: 0.8 % (ref 0–1)
BILIRUB SERPL-MCNC: 0.3 MG/DL (ref 0.2–1.2)
BILIRUBIN URINE: NEGATIVE
BLOOD, URINE: NEGATIVE
BUN BLDV-MCNC: 15 MG/DL (ref 8–23)
CALCIUM SERPL-MCNC: 9.7 MG/DL (ref 8.8–10.2)
CHLORIDE BLD-SCNC: 100 MMOL/L (ref 98–111)
CLARITY: ABNORMAL
CO2: 26 MMOL/L (ref 22–29)
COLOR: YELLOW
CREAT SERPL-MCNC: 0.8 MG/DL (ref 0.5–1.2)
EKG P AXIS: 57 DEGREES
EKG P-R INTERVAL: 222 MS
EKG Q-T INTERVAL: 408 MS
EKG QRS DURATION: 98 MS
EKG QTC CALCULATION (BAZETT): 386 MS
EKG T AXIS: 53 DEGREES
EOSINOPHILS ABSOLUTE: 0.2 K/UL (ref 0–0.6)
EOSINOPHILS RELATIVE PERCENT: 2.1 % (ref 0–5)
GFR NON-AFRICAN AMERICAN: >60
GLUCOSE BLD-MCNC: 101 MG/DL (ref 74–109)
GLUCOSE URINE: NEGATIVE MG/DL
HCT VFR BLD CALC: 43 % (ref 42–52)
HEMOGLOBIN: 13.3 G/DL (ref 14–18)
IMMATURE GRANULOCYTES #: 0 K/UL
INR BLD: 1.03 (ref 0.88–1.18)
KETONES, URINE: NEGATIVE MG/DL
LEUKOCYTE ESTERASE, URINE: NEGATIVE
LYMPHOCYTES ABSOLUTE: 3 K/UL (ref 1.1–4.5)
LYMPHOCYTES RELATIVE PERCENT: 40.9 % (ref 20–40)
MCH RBC QN AUTO: 27.9 PG (ref 27–31)
MCHC RBC AUTO-ENTMCNC: 30.9 G/DL (ref 33–37)
MCV RBC AUTO: 90.3 FL (ref 80–94)
MONOCYTES ABSOLUTE: 0.9 K/UL (ref 0–0.9)
MONOCYTES RELATIVE PERCENT: 12 % (ref 0–10)
NEUTROPHILS ABSOLUTE: 3.2 K/UL (ref 1.5–7.5)
NEUTROPHILS RELATIVE PERCENT: 43.9 % (ref 50–65)
NITRITE, URINE: NEGATIVE
PDW BLD-RTO: 16 % (ref 11.5–14.5)
PH UA: 5.5 (ref 5–8)
PLATELET # BLD: 221 K/UL (ref 130–400)
PMV BLD AUTO: 9.5 FL (ref 9.4–12.4)
POTASSIUM SERPL-SCNC: 4.4 MMOL/L (ref 3.5–5)
PROTEIN UA: NEGATIVE MG/DL
PROTHROMBIN TIME: 12.9 SEC (ref 12–14.6)
RBC # BLD: 4.76 M/UL (ref 4.7–6.1)
SODIUM BLD-SCNC: 139 MMOL/L (ref 136–145)
SPECIFIC GRAVITY UA: 1.01 (ref 1–1.03)
TOTAL PROTEIN: 7.8 G/DL (ref 6.6–8.7)
URINE REFLEX TO CULTURE: ABNORMAL
UROBILINOGEN, URINE: 0.2 E.U./DL
WBC # BLD: 7.2 K/UL (ref 4.8–10.8)

## 2019-12-10 PROCEDURE — 86901 BLOOD TYPING SEROLOGIC RH(D): CPT

## 2019-12-10 PROCEDURE — 85610 PROTHROMBIN TIME: CPT

## 2019-12-10 PROCEDURE — 87081 CULTURE SCREEN ONLY: CPT

## 2019-12-10 PROCEDURE — 85025 COMPLETE CBC W/AUTO DIFF WBC: CPT

## 2019-12-10 PROCEDURE — 93005 ELECTROCARDIOGRAM TRACING: CPT | Performed by: ORTHOPAEDIC SURGERY

## 2019-12-10 PROCEDURE — 86900 BLOOD TYPING SEROLOGIC ABO: CPT

## 2019-12-10 PROCEDURE — 93010 ELECTROCARDIOGRAM REPORT: CPT | Performed by: INTERNAL MEDICINE

## 2019-12-10 PROCEDURE — 71046 X-RAY EXAM CHEST 2 VIEWS: CPT

## 2019-12-10 PROCEDURE — 81003 URINALYSIS AUTO W/O SCOPE: CPT

## 2019-12-10 PROCEDURE — 86850 RBC ANTIBODY SCREEN: CPT

## 2019-12-10 PROCEDURE — 80053 COMPREHEN METABOLIC PANEL: CPT

## 2019-12-10 RX ORDER — AMLODIPINE BESYLATE 5 MG/1
5 TABLET ORAL 2 TIMES DAILY
COMMUNITY
End: 2020-01-01

## 2019-12-11 PROBLEM — M19.011 PRIMARY OSTEOARTHRITIS OF RIGHT SHOULDER: Status: ACTIVE | Noted: 2019-12-11

## 2019-12-11 LAB — MRSA CULTURE ONLY: NORMAL

## 2019-12-12 ENCOUNTER — ANESTHESIA EVENT (OUTPATIENT)
Dept: OPERATING ROOM | Age: 64
DRG: 483 | End: 2019-12-12
Payer: MEDICARE

## 2019-12-12 ENCOUNTER — ANESTHESIA (OUTPATIENT)
Dept: OPERATING ROOM | Age: 64
DRG: 483 | End: 2019-12-12
Payer: MEDICARE

## 2019-12-12 ENCOUNTER — APPOINTMENT (OUTPATIENT)
Dept: GENERAL RADIOLOGY | Age: 64
DRG: 483 | End: 2019-12-12
Attending: ORTHOPAEDIC SURGERY
Payer: MEDICARE

## 2019-12-12 ENCOUNTER — HOSPITAL ENCOUNTER (INPATIENT)
Age: 64
LOS: 2 days | Discharge: HOME | DRG: 483 | End: 2019-12-14
Attending: ORTHOPAEDIC SURGERY | Admitting: INTERNAL MEDICINE
Payer: MEDICARE

## 2019-12-12 VITALS
OXYGEN SATURATION: 92 % | SYSTOLIC BLOOD PRESSURE: 156 MMHG | DIASTOLIC BLOOD PRESSURE: 97 MMHG | TEMPERATURE: 96.4 F | RESPIRATION RATE: 2 BRPM

## 2019-12-12 DIAGNOSIS — M19.011 PRIMARY OSTEOARTHRITIS, RIGHT SHOULDER: Primary | ICD-10-CM

## 2019-12-12 LAB
ABO/RH: NORMAL
ANTIBODY SCREEN: NORMAL
BASOPHILS ABSOLUTE: 0 K/UL (ref 0–0.2)
BASOPHILS RELATIVE PERCENT: 0.2 % (ref 0–1)
EOSINOPHILS ABSOLUTE: 0 K/UL (ref 0–0.6)
EOSINOPHILS RELATIVE PERCENT: 0.2 % (ref 0–5)
HCT VFR BLD CALC: 26.4 % (ref 42–52)
HEMOGLOBIN: 8.3 G/DL (ref 14–18)
IMMATURE GRANULOCYTES #: 0.1 K/UL
LACTIC ACID: 2.7 MMOL/L (ref 0.5–1.9)
LYMPHOCYTES ABSOLUTE: 1 K/UL (ref 1.1–4.5)
LYMPHOCYTES RELATIVE PERCENT: 11.2 % (ref 20–40)
MCH RBC QN AUTO: 28.3 PG (ref 27–31)
MCHC RBC AUTO-ENTMCNC: 31.4 G/DL (ref 33–37)
MCV RBC AUTO: 90.1 FL (ref 80–94)
MONOCYTES ABSOLUTE: 0.6 K/UL (ref 0–0.9)
MONOCYTES RELATIVE PERCENT: 6.5 % (ref 0–10)
NEUTROPHILS ABSOLUTE: 7 K/UL (ref 1.5–7.5)
NEUTROPHILS RELATIVE PERCENT: 81.3 % (ref 50–65)
PDW BLD-RTO: 15.9 % (ref 11.5–14.5)
PLATELET # BLD: 137 K/UL (ref 130–400)
PMV BLD AUTO: 9.3 FL (ref 9.4–12.4)
RBC # BLD: 2.93 M/UL (ref 4.7–6.1)
TROPONIN: <0.01 NG/ML (ref 0–0.03)
WBC # BLD: 8.6 K/UL (ref 4.8–10.8)

## 2019-12-12 PROCEDURE — 2580000003 HC RX 258: Performed by: NURSE ANESTHETIST, CERTIFIED REGISTERED

## 2019-12-12 PROCEDURE — 3600000015 HC SURGERY LEVEL 5 ADDTL 15MIN: Performed by: ORTHOPAEDIC SURGERY

## 2019-12-12 PROCEDURE — 84443 ASSAY THYROID STIM HORMONE: CPT

## 2019-12-12 PROCEDURE — 36430 TRANSFUSION BLD/BLD COMPNT: CPT

## 2019-12-12 PROCEDURE — 7100000001 HC PACU RECOVERY - ADDTL 15 MIN: Performed by: ORTHOPAEDIC SURGERY

## 2019-12-12 PROCEDURE — 2000000000 HC ICU R&B

## 2019-12-12 PROCEDURE — 3600000005 HC SURGERY LEVEL 5 BASE: Performed by: ORTHOPAEDIC SURGERY

## 2019-12-12 PROCEDURE — 2780000010 HC IMPLANT OTHER: Performed by: ORTHOPAEDIC SURGERY

## 2019-12-12 PROCEDURE — 3700000001 HC ADD 15 MINUTES (ANESTHESIA): Performed by: ORTHOPAEDIC SURGERY

## 2019-12-12 PROCEDURE — 6360000002 HC RX W HCPCS: Performed by: NURSE ANESTHETIST, CERTIFIED REGISTERED

## 2019-12-12 PROCEDURE — 84484 ASSAY OF TROPONIN QUANT: CPT

## 2019-12-12 PROCEDURE — 2580000003 HC RX 258: Performed by: ORTHOPAEDIC SURGERY

## 2019-12-12 PROCEDURE — 73030 X-RAY EXAM OF SHOULDER: CPT

## 2019-12-12 PROCEDURE — 2580000003 HC RX 258: Performed by: HOSPITALIST

## 2019-12-12 PROCEDURE — 6360000002 HC RX W HCPCS: Performed by: ORTHOPAEDIC SURGERY

## 2019-12-12 PROCEDURE — C1776 JOINT DEVICE (IMPLANTABLE): HCPCS | Performed by: ORTHOPAEDIC SURGERY

## 2019-12-12 PROCEDURE — 6360000002 HC RX W HCPCS: Performed by: ANESTHESIOLOGY

## 2019-12-12 PROCEDURE — 86923 COMPATIBILITY TEST ELECTRIC: CPT

## 2019-12-12 PROCEDURE — 82533 TOTAL CORTISOL: CPT

## 2019-12-12 PROCEDURE — 64415 NJX AA&/STRD BRCH PLXS IMG: CPT | Performed by: ANESTHESIOLOGY

## 2019-12-12 PROCEDURE — 2720000010 HC SURG SUPPLY STERILE: Performed by: ORTHOPAEDIC SURGERY

## 2019-12-12 PROCEDURE — 0RRJ00Z REPLACEMENT OF RIGHT SHOULDER JOINT WITH REVERSE BALL AND SOCKET SYNTHETIC SUBSTITUTE, OPEN APPROACH: ICD-10-PCS | Performed by: ORTHOPAEDIC SURGERY

## 2019-12-12 PROCEDURE — 83605 ASSAY OF LACTIC ACID: CPT

## 2019-12-12 PROCEDURE — 86900 BLOOD TYPING SEROLOGIC ABO: CPT

## 2019-12-12 PROCEDURE — 2709999900 HC NON-CHARGEABLE SUPPLY: Performed by: ORTHOPAEDIC SURGERY

## 2019-12-12 PROCEDURE — 7100000000 HC PACU RECOVERY - FIRST 15 MIN: Performed by: ORTHOPAEDIC SURGERY

## 2019-12-12 PROCEDURE — C1713 ANCHOR/SCREW BN/BN,TIS/BN: HCPCS | Performed by: ORTHOPAEDIC SURGERY

## 2019-12-12 PROCEDURE — 2500000003 HC RX 250 WO HCPCS: Performed by: NURSE ANESTHETIST, CERTIFIED REGISTERED

## 2019-12-12 PROCEDURE — 85025 COMPLETE CBC W/AUTO DIFF WBC: CPT

## 2019-12-12 PROCEDURE — P9016 RBC LEUKOCYTES REDUCED: HCPCS

## 2019-12-12 PROCEDURE — 80053 COMPREHEN METABOLIC PANEL: CPT

## 2019-12-12 PROCEDURE — 36415 COLL VENOUS BLD VENIPUNCTURE: CPT

## 2019-12-12 PROCEDURE — 6370000000 HC RX 637 (ALT 250 FOR IP): Performed by: ORTHOPAEDIC SURGERY

## 2019-12-12 PROCEDURE — 86901 BLOOD TYPING SEROLOGIC RH(D): CPT

## 2019-12-12 PROCEDURE — 2500000003 HC RX 250 WO HCPCS: Performed by: ANESTHESIOLOGY

## 2019-12-12 PROCEDURE — 3700000000 HC ANESTHESIA ATTENDED CARE: Performed by: ORTHOPAEDIC SURGERY

## 2019-12-12 PROCEDURE — 86850 RBC ANTIBODY SCREEN: CPT

## 2019-12-12 DEVICE — SCREW BNE L20MM DIA6.5MM UNIV SHLDR CTRL UNIVERSE REVERS: Type: IMPLANTABLE DEVICE | Site: SHOULDER | Status: FUNCTIONAL

## 2019-12-12 DEVICE — SCREW BNE L30MM DIA4.5MM UNIV SHLDR TI PERIPH LOK UNIVERSE: Type: IMPLANTABLE DEVICE | Site: SHOULDER | Status: FUNCTIONAL

## 2019-12-12 DEVICE — SPHERE GLEN M DIA39MM +4MM OFFSET LAT SHLDR UNIVERS REVERS: Type: IMPLANTABLE DEVICE | Site: SHOULDER | Status: FUNCTIONAL

## 2019-12-12 DEVICE — LINER HUM M DIA39MM +3MM OFFSET MED SHLDR UHMWPE UNIVERS: Type: IMPLANTABLE DEVICE | Site: SHOULDER | Status: FUNCTIONAL

## 2019-12-12 DEVICE — BASEPLATE GLEN M UNIV SHLDR CAP COAT UNIVERSE REVERS: Type: IMPLANTABLE DEVICE | Site: SHOULDER | Status: FUNCTIONAL

## 2019-12-12 DEVICE — CUP HUM DIA39MM SHLDR NEUT SUT UNIVERS REVERS: Type: IMPLANTABLE DEVICE | Site: SHOULDER | Status: FUNCTIONAL

## 2019-12-12 RX ORDER — HYDRALAZINE HYDROCHLORIDE 20 MG/ML
5 INJECTION INTRAMUSCULAR; INTRAVENOUS EVERY 10 MIN PRN
Status: DISCONTINUED | OUTPATIENT
Start: 2019-12-12 | End: 2019-12-12 | Stop reason: HOSPADM

## 2019-12-12 RX ORDER — ONDANSETRON 2 MG/ML
4 INJECTION INTRAMUSCULAR; INTRAVENOUS EVERY 6 HOURS PRN
Status: DISCONTINUED | OUTPATIENT
Start: 2019-12-12 | End: 2019-12-14 | Stop reason: HOSPADM

## 2019-12-12 RX ORDER — SENNA AND DOCUSATE SODIUM 50; 8.6 MG/1; MG/1
1 TABLET, FILM COATED ORAL 2 TIMES DAILY
Status: DISCONTINUED | OUTPATIENT
Start: 2019-12-12 | End: 2019-12-14 | Stop reason: HOSPADM

## 2019-12-12 RX ORDER — NALOXONE HYDROCHLORIDE 0.4 MG/ML
0.4 INJECTION, SOLUTION INTRAMUSCULAR; INTRAVENOUS; SUBCUTANEOUS PRN
Status: DISCONTINUED | OUTPATIENT
Start: 2019-12-12 | End: 2019-12-14 | Stop reason: HOSPADM

## 2019-12-12 RX ORDER — DOCUSATE SODIUM 100 MG/1
100 CAPSULE, LIQUID FILLED ORAL 2 TIMES DAILY
Status: DISCONTINUED | OUTPATIENT
Start: 2019-12-12 | End: 2019-12-14 | Stop reason: HOSPADM

## 2019-12-12 RX ORDER — NITROGLYCERIN 0.4 MG/1
0.4 TABLET SUBLINGUAL EVERY 5 MIN PRN
Status: DISCONTINUED | OUTPATIENT
Start: 2019-12-12 | End: 2019-12-14 | Stop reason: HOSPADM

## 2019-12-12 RX ORDER — CLOPIDOGREL BISULFATE 75 MG/1
75 TABLET ORAL DAILY
Status: DISCONTINUED | OUTPATIENT
Start: 2019-12-13 | End: 2019-12-14 | Stop reason: HOSPADM

## 2019-12-12 RX ORDER — 0.9 % SODIUM CHLORIDE 0.9 %
INTRAVENOUS SOLUTION INTRAVENOUS CONTINUOUS PRN
Status: COMPLETED | OUTPATIENT
Start: 2019-12-12 | End: 2019-12-12

## 2019-12-12 RX ORDER — FENTANYL CITRATE 50 UG/ML
25 INJECTION, SOLUTION INTRAMUSCULAR; INTRAVENOUS
Status: DISCONTINUED | OUTPATIENT
Start: 2019-12-12 | End: 2019-12-12 | Stop reason: HOSPADM

## 2019-12-12 RX ORDER — FUROSEMIDE 10 MG/ML
10 INJECTION INTRAMUSCULAR; INTRAVENOUS PRN
Status: DISCONTINUED | OUTPATIENT
Start: 2019-12-12 | End: 2019-12-14 | Stop reason: HOSPADM

## 2019-12-12 RX ORDER — 0.9 % SODIUM CHLORIDE 0.9 %
500 INTRAVENOUS SOLUTION INTRAVENOUS ONCE
Status: COMPLETED | OUTPATIENT
Start: 2019-12-12 | End: 2019-12-12

## 2019-12-12 RX ORDER — MIDAZOLAM HYDROCHLORIDE 1 MG/ML
2 INJECTION INTRAMUSCULAR; INTRAVENOUS
Status: COMPLETED | OUTPATIENT
Start: 2019-12-12 | End: 2019-12-12

## 2019-12-12 RX ORDER — SODIUM CHLORIDE 0.9 % (FLUSH) 0.9 %
10 SYRINGE (ML) INJECTION PRN
Status: DISCONTINUED | OUTPATIENT
Start: 2019-12-12 | End: 2019-12-12 | Stop reason: HOSPADM

## 2019-12-12 RX ORDER — ROPIVACAINE HYDROCHLORIDE 5 MG/ML
INJECTION, SOLUTION EPIDURAL; INFILTRATION; PERINEURAL
Status: COMPLETED | OUTPATIENT
Start: 2019-12-12 | End: 2019-12-12

## 2019-12-12 RX ORDER — MEPERIDINE HYDROCHLORIDE 50 MG/ML
12.5 INJECTION INTRAMUSCULAR; INTRAVENOUS; SUBCUTANEOUS EVERY 5 MIN PRN
Status: DISCONTINUED | OUTPATIENT
Start: 2019-12-12 | End: 2019-12-12 | Stop reason: HOSPADM

## 2019-12-12 RX ORDER — OXYCODONE HCL 20 MG/1
20 TABLET, FILM COATED, EXTENDED RELEASE ORAL EVERY 12 HOURS PRN
Status: DISCONTINUED | OUTPATIENT
Start: 2019-12-12 | End: 2019-12-14 | Stop reason: HOSPADM

## 2019-12-12 RX ORDER — 0.9 % SODIUM CHLORIDE 0.9 %
250 INTRAVENOUS SOLUTION INTRAVENOUS ONCE
Status: DISCONTINUED | OUTPATIENT
Start: 2019-12-12 | End: 2019-12-14 | Stop reason: HOSPADM

## 2019-12-12 RX ORDER — SODIUM CHLORIDE 0.9 % (FLUSH) 0.9 %
10 SYRINGE (ML) INJECTION EVERY 12 HOURS SCHEDULED
Status: DISCONTINUED | OUTPATIENT
Start: 2019-12-12 | End: 2019-12-14 | Stop reason: HOSPADM

## 2019-12-12 RX ORDER — MORPHINE SULFATE 4 MG/ML
4 INJECTION, SOLUTION INTRAMUSCULAR; INTRAVENOUS EVERY 5 MIN PRN
Status: DISCONTINUED | OUTPATIENT
Start: 2019-12-12 | End: 2019-12-12 | Stop reason: HOSPADM

## 2019-12-12 RX ORDER — FENTANYL CITRATE 50 UG/ML
INJECTION, SOLUTION INTRAMUSCULAR; INTRAVENOUS PRN
Status: DISCONTINUED | OUTPATIENT
Start: 2019-12-12 | End: 2019-12-12 | Stop reason: SDUPTHER

## 2019-12-12 RX ORDER — LISINOPRIL 20 MG/1
20 TABLET ORAL 2 TIMES DAILY
Status: DISCONTINUED | OUTPATIENT
Start: 2019-12-12 | End: 2019-12-14 | Stop reason: HOSPADM

## 2019-12-12 RX ORDER — LIDOCAINE HYDROCHLORIDE 10 MG/ML
1 INJECTION, SOLUTION EPIDURAL; INFILTRATION; INTRACAUDAL; PERINEURAL
Status: DISCONTINUED | OUTPATIENT
Start: 2019-12-12 | End: 2019-12-12 | Stop reason: HOSPADM

## 2019-12-12 RX ORDER — ENALAPRILAT 2.5 MG/2ML
1.25 INJECTION INTRAVENOUS
Status: DISCONTINUED | OUTPATIENT
Start: 2019-12-12 | End: 2019-12-12 | Stop reason: HOSPADM

## 2019-12-12 RX ORDER — SODIUM CHLORIDE 0.9 % (FLUSH) 0.9 %
10 SYRINGE (ML) INJECTION EVERY 12 HOURS SCHEDULED
Status: DISCONTINUED | OUTPATIENT
Start: 2019-12-12 | End: 2019-12-12 | Stop reason: HOSPADM

## 2019-12-12 RX ORDER — MULTIVITAMIN WITH FOLIC ACID 400 MCG
1 TABLET ORAL DAILY
Status: DISCONTINUED | OUTPATIENT
Start: 2019-12-12 | End: 2019-12-14 | Stop reason: HOSPADM

## 2019-12-12 RX ORDER — LIDOCAINE HYDROCHLORIDE 10 MG/ML
INJECTION, SOLUTION EPIDURAL; INFILTRATION; INTRACAUDAL; PERINEURAL PRN
Status: DISCONTINUED | OUTPATIENT
Start: 2019-12-12 | End: 2019-12-12 | Stop reason: SDUPTHER

## 2019-12-12 RX ORDER — PRAVASTATIN SODIUM 20 MG
20 TABLET ORAL DAILY
Status: DISCONTINUED | OUTPATIENT
Start: 2019-12-13 | End: 2019-12-14 | Stop reason: HOSPADM

## 2019-12-12 RX ORDER — SODIUM CHLORIDE 9 MG/ML
INJECTION, SOLUTION INTRAVENOUS CONTINUOUS
Status: DISCONTINUED | OUTPATIENT
Start: 2019-12-12 | End: 2019-12-12

## 2019-12-12 RX ORDER — SODIUM CHLORIDE, SODIUM LACTATE, POTASSIUM CHLORIDE, CALCIUM CHLORIDE 600; 310; 30; 20 MG/100ML; MG/100ML; MG/100ML; MG/100ML
INJECTION, SOLUTION INTRAVENOUS CONTINUOUS PRN
Status: DISCONTINUED | OUTPATIENT
Start: 2019-12-12 | End: 2019-12-12 | Stop reason: SDUPTHER

## 2019-12-12 RX ORDER — DEXAMETHASONE SODIUM PHOSPHATE 10 MG/ML
INJECTION INTRAMUSCULAR; INTRAVENOUS PRN
Status: DISCONTINUED | OUTPATIENT
Start: 2019-12-12 | End: 2019-12-12 | Stop reason: SDUPTHER

## 2019-12-12 RX ORDER — DIPHENHYDRAMINE HYDROCHLORIDE 50 MG/ML
12.5 INJECTION INTRAMUSCULAR; INTRAVENOUS
Status: DISCONTINUED | OUTPATIENT
Start: 2019-12-12 | End: 2019-12-12 | Stop reason: HOSPADM

## 2019-12-12 RX ORDER — MORPHINE SULFATE 4 MG/ML
2 INJECTION, SOLUTION INTRAMUSCULAR; INTRAVENOUS
Status: DISCONTINUED | OUTPATIENT
Start: 2019-12-12 | End: 2019-12-14 | Stop reason: HOSPADM

## 2019-12-12 RX ORDER — GABAPENTIN 400 MG/1
800 CAPSULE ORAL 2 TIMES DAILY
Status: DISCONTINUED | OUTPATIENT
Start: 2019-12-12 | End: 2019-12-14 | Stop reason: HOSPADM

## 2019-12-12 RX ORDER — ACETAMINOPHEN 325 MG/1
650 TABLET ORAL EVERY 6 HOURS
Status: DISCONTINUED | OUTPATIENT
Start: 2019-12-12 | End: 2019-12-14 | Stop reason: HOSPADM

## 2019-12-12 RX ORDER — ONDANSETRON 2 MG/ML
INJECTION INTRAMUSCULAR; INTRAVENOUS PRN
Status: DISCONTINUED | OUTPATIENT
Start: 2019-12-12 | End: 2019-12-12 | Stop reason: SDUPTHER

## 2019-12-12 RX ORDER — LABETALOL 20 MG/4 ML (5 MG/ML) INTRAVENOUS SYRINGE
5 EVERY 10 MIN PRN
Status: DISCONTINUED | OUTPATIENT
Start: 2019-12-12 | End: 2019-12-12 | Stop reason: HOSPADM

## 2019-12-12 RX ORDER — MORPHINE SULFATE 4 MG/ML
2 INJECTION, SOLUTION INTRAMUSCULAR; INTRAVENOUS EVERY 5 MIN PRN
Status: DISCONTINUED | OUTPATIENT
Start: 2019-12-12 | End: 2019-12-12 | Stop reason: HOSPADM

## 2019-12-12 RX ORDER — ROCURONIUM BROMIDE 10 MG/ML
INJECTION, SOLUTION INTRAVENOUS PRN
Status: DISCONTINUED | OUTPATIENT
Start: 2019-12-12 | End: 2019-12-12 | Stop reason: SDUPTHER

## 2019-12-12 RX ORDER — SODIUM CHLORIDE 0.9 % (FLUSH) 0.9 %
10 SYRINGE (ML) INJECTION PRN
Status: DISCONTINUED | OUTPATIENT
Start: 2019-12-12 | End: 2019-12-14 | Stop reason: HOSPADM

## 2019-12-12 RX ORDER — METOCLOPRAMIDE HYDROCHLORIDE 5 MG/ML
10 INJECTION INTRAMUSCULAR; INTRAVENOUS
Status: DISCONTINUED | OUTPATIENT
Start: 2019-12-12 | End: 2019-12-12 | Stop reason: HOSPADM

## 2019-12-12 RX ORDER — AMLODIPINE BESYLATE 5 MG/1
5 TABLET ORAL DAILY
Status: DISCONTINUED | OUTPATIENT
Start: 2019-12-13 | End: 2019-12-14 | Stop reason: HOSPADM

## 2019-12-12 RX ORDER — MORPHINE SULFATE 4 MG/ML
4 INJECTION, SOLUTION INTRAMUSCULAR; INTRAVENOUS
Status: DISCONTINUED | OUTPATIENT
Start: 2019-12-12 | End: 2019-12-14 | Stop reason: HOSPADM

## 2019-12-12 RX ORDER — SODIUM CHLORIDE, SODIUM LACTATE, POTASSIUM CHLORIDE, CALCIUM CHLORIDE 600; 310; 30; 20 MG/100ML; MG/100ML; MG/100ML; MG/100ML
INJECTION, SOLUTION INTRAVENOUS CONTINUOUS
Status: DISCONTINUED | OUTPATIENT
Start: 2019-12-12 | End: 2019-12-12

## 2019-12-12 RX ORDER — PROMETHAZINE HYDROCHLORIDE 25 MG/ML
6.25 INJECTION, SOLUTION INTRAMUSCULAR; INTRAVENOUS
Status: DISCONTINUED | OUTPATIENT
Start: 2019-12-12 | End: 2019-12-12 | Stop reason: HOSPADM

## 2019-12-12 RX ORDER — PROPOFOL 10 MG/ML
INJECTION, EMULSION INTRAVENOUS PRN
Status: DISCONTINUED | OUTPATIENT
Start: 2019-12-12 | End: 2019-12-12 | Stop reason: SDUPTHER

## 2019-12-12 RX ORDER — FENTANYL CITRATE 50 UG/ML
50 INJECTION, SOLUTION INTRAMUSCULAR; INTRAVENOUS
Status: DISCONTINUED | OUTPATIENT
Start: 2019-12-12 | End: 2019-12-12 | Stop reason: HOSPADM

## 2019-12-12 RX ORDER — SODIUM CHLORIDE, SODIUM LACTATE, POTASSIUM CHLORIDE, CALCIUM CHLORIDE 600; 310; 30; 20 MG/100ML; MG/100ML; MG/100ML; MG/100ML
INJECTION, SOLUTION INTRAVENOUS CONTINUOUS
Status: DISCONTINUED | OUTPATIENT
Start: 2019-12-12 | End: 2019-12-13

## 2019-12-12 RX ORDER — CYCLOBENZAPRINE HCL 10 MG
10 TABLET ORAL 3 TIMES DAILY PRN
Status: DISCONTINUED | OUTPATIENT
Start: 2019-12-12 | End: 2019-12-14 | Stop reason: HOSPADM

## 2019-12-12 RX ORDER — DIPHENHYDRAMINE HCL 25 MG
25 TABLET ORAL EVERY 6 HOURS PRN
Status: DISCONTINUED | OUTPATIENT
Start: 2019-12-12 | End: 2019-12-14 | Stop reason: HOSPADM

## 2019-12-12 RX ADMIN — FENTANYL CITRATE 50 MCG: 50 INJECTION INTRAMUSCULAR; INTRAVENOUS at 15:32

## 2019-12-12 RX ADMIN — FENTANYL CITRATE 150 MCG: 50 INJECTION, SOLUTION INTRAMUSCULAR; INTRAVENOUS at 16:58

## 2019-12-12 RX ADMIN — SODIUM CHLORIDE, POTASSIUM CHLORIDE, SODIUM LACTATE AND CALCIUM CHLORIDE: 600; 310; 30; 20 INJECTION, SOLUTION INTRAVENOUS at 21:17

## 2019-12-12 RX ADMIN — LIDOCAINE HYDROCHLORIDE 50 MG: 10 INJECTION, SOLUTION EPIDURAL; INFILTRATION; INTRACAUDAL; PERINEURAL at 16:58

## 2019-12-12 RX ADMIN — SODIUM CHLORIDE 500 ML: 9 INJECTION, SOLUTION INTRAVENOUS at 20:58

## 2019-12-12 RX ADMIN — ROCURONIUM BROMIDE 50 MG: 10 SOLUTION INTRAVENOUS at 16:58

## 2019-12-12 RX ADMIN — SENNOSIDES AND DOCUSATE SODIUM 1 TABLET: 8.6; 5 TABLET ORAL at 21:06

## 2019-12-12 RX ADMIN — FENTANYL CITRATE 150 MCG: 50 INJECTION INTRAMUSCULAR; INTRAVENOUS at 16:56

## 2019-12-12 RX ADMIN — PHENYLEPHRINE HYDROCHLORIDE 80 MCG: 10 INJECTION INTRAVENOUS at 17:17

## 2019-12-12 RX ADMIN — ROCURONIUM BROMIDE 20 MG: 10 SOLUTION INTRAVENOUS at 17:44

## 2019-12-12 RX ADMIN — ONDANSETRON HYDROCHLORIDE 4 MG: 2 INJECTION, SOLUTION INTRAMUSCULAR; INTRAVENOUS at 18:10

## 2019-12-12 RX ADMIN — Medication 2 G: at 17:00

## 2019-12-12 RX ADMIN — MIDAZOLAM 2 MG: 1 INJECTION INTRAMUSCULAR; INTRAVENOUS at 15:51

## 2019-12-12 RX ADMIN — SODIUM CHLORIDE, SODIUM LACTATE, POTASSIUM CHLORIDE, AND CALCIUM CHLORIDE: 600; 310; 30; 20 INJECTION, SOLUTION INTRAVENOUS at 16:54

## 2019-12-12 RX ADMIN — PHENYLEPHRINE HYDROCHLORIDE 80 MCG: 10 INJECTION INTRAVENOUS at 17:11

## 2019-12-12 RX ADMIN — DOCUSATE SODIUM 100 MG: 100 CAPSULE, LIQUID FILLED ORAL at 21:06

## 2019-12-12 RX ADMIN — THERA TABS 1 TABLET: TAB at 21:11

## 2019-12-12 RX ADMIN — ACETAMINOPHEN 650 MG: 325 TABLET ORAL at 21:11

## 2019-12-12 RX ADMIN — GABAPENTIN 800 MG: 400 CAPSULE ORAL at 21:06

## 2019-12-12 RX ADMIN — ROPIVACAINE HYDROCHLORIDE 20 ML: 5 INJECTION, SOLUTION EPIDURAL; INFILTRATION; PERINEURAL at 15:56

## 2019-12-12 RX ADMIN — SUGAMMADEX 300 MG: 100 INJECTION, SOLUTION INTRAVENOUS at 18:10

## 2019-12-12 RX ADMIN — Medication 10 ML: at 21:03

## 2019-12-12 RX ADMIN — ONDANSETRON 4 MG: 2 INJECTION INTRAMUSCULAR; INTRAVENOUS at 21:57

## 2019-12-12 RX ADMIN — SODIUM CHLORIDE, SODIUM LACTATE, POTASSIUM CHLORIDE, AND CALCIUM CHLORIDE: 600; 310; 30; 20 INJECTION, SOLUTION INTRAVENOUS at 18:10

## 2019-12-12 RX ADMIN — DEXAMETHASONE SODIUM PHOSPHATE 10 MG: 10 INJECTION INTRAMUSCULAR; INTRAVENOUS at 18:10

## 2019-12-12 RX ADMIN — SODIUM CHLORIDE 1000 ML: 9 INJECTION, SOLUTION INTRAVENOUS at 20:09

## 2019-12-12 RX ADMIN — PHENYLEPHRINE HYDROCHLORIDE 20 MCG/MIN: 10 INJECTION INTRAVENOUS at 17:26

## 2019-12-12 RX ADMIN — PROPOFOL 110 MG: 10 INJECTION, EMULSION INTRAVENOUS at 16:58

## 2019-12-12 ASSESSMENT — PAIN SCALES - GENERAL
PAINLEVEL_OUTOF10: 3
PAINLEVEL_OUTOF10: 6
PAINLEVEL_OUTOF10: 0
PAINLEVEL_OUTOF10: 7
PAINLEVEL_OUTOF10: 0
PAINLEVEL_OUTOF10: 6

## 2019-12-12 ASSESSMENT — ENCOUNTER SYMPTOMS: SHORTNESS OF BREATH: 1

## 2019-12-12 ASSESSMENT — PAIN - FUNCTIONAL ASSESSMENT: PAIN_FUNCTIONAL_ASSESSMENT: 0-10

## 2019-12-12 ASSESSMENT — PAIN DESCRIPTION - LOCATION
LOCATION: SHOULDER
LOCATION: SHOULDER

## 2019-12-12 ASSESSMENT — PAIN DESCRIPTION - PAIN TYPE
TYPE: CHRONIC PAIN
TYPE: ACUTE PAIN
TYPE: ACUTE PAIN

## 2019-12-12 ASSESSMENT — LIFESTYLE VARIABLES: SMOKING_STATUS: 0

## 2019-12-13 PROBLEM — I95.9 HYPOTENSION: Status: ACTIVE | Noted: 2019-12-13

## 2019-12-13 PROBLEM — D64.9 POSTOPERATIVE ANEMIA: Status: ACTIVE | Noted: 2019-12-13

## 2019-12-13 LAB
ALBUMIN SERPL-MCNC: 2.8 G/DL (ref 3.5–5.2)
ALP BLD-CCNC: 78 U/L (ref 40–130)
ALT SERPL-CCNC: 9 U/L (ref 5–41)
ANION GAP SERPL CALCULATED.3IONS-SCNC: 13 MMOL/L (ref 7–19)
ANION GAP SERPL CALCULATED.3IONS-SCNC: 14 MMOL/L (ref 7–19)
AST SERPL-CCNC: 16 U/L (ref 5–40)
BILIRUB SERPL-MCNC: 0.3 MG/DL (ref 0.2–1.2)
BLOOD BANK DISPENSE STATUS: NORMAL
BLOOD BANK DISPENSE STATUS: NORMAL
BLOOD BANK PRODUCT CODE: NORMAL
BLOOD BANK PRODUCT CODE: NORMAL
BPU ID: NORMAL
BPU ID: NORMAL
BUN BLDV-MCNC: 14 MG/DL (ref 8–23)
BUN BLDV-MCNC: 14 MG/DL (ref 8–23)
CALCIUM SERPL-MCNC: 8 MG/DL (ref 8.8–10.2)
CALCIUM SERPL-MCNC: 8.2 MG/DL (ref 8.8–10.2)
CHLORIDE BLD-SCNC: 103 MMOL/L (ref 98–111)
CHLORIDE BLD-SCNC: 103 MMOL/L (ref 98–111)
CO2: 20 MMOL/L (ref 22–29)
CO2: 21 MMOL/L (ref 22–29)
CORTISOL TOTAL: 29.4 UG/DL
CREAT SERPL-MCNC: 0.7 MG/DL (ref 0.5–1.2)
CREAT SERPL-MCNC: 0.8 MG/DL (ref 0.5–1.2)
DESCRIPTION BLOOD BANK: NORMAL
DESCRIPTION BLOOD BANK: NORMAL
GFR NON-AFRICAN AMERICAN: >60
GFR NON-AFRICAN AMERICAN: >60
GLUCOSE BLD-MCNC: 109 MG/DL (ref 74–109)
GLUCOSE BLD-MCNC: 111 MG/DL (ref 74–109)
HCT VFR BLD CALC: 32 % (ref 42–52)
HEMOGLOBIN: 10.3 G/DL (ref 14–18)
MCH RBC QN AUTO: 28.5 PG (ref 27–31)
MCHC RBC AUTO-ENTMCNC: 32.2 G/DL (ref 33–37)
MCV RBC AUTO: 88.6 FL (ref 80–94)
PDW BLD-RTO: 15.4 % (ref 11.5–14.5)
PLATELET # BLD: 142 K/UL (ref 130–400)
PMV BLD AUTO: 9.3 FL (ref 9.4–12.4)
POTASSIUM SERPL-SCNC: 4 MMOL/L (ref 3.5–5)
POTASSIUM SERPL-SCNC: 4.5 MMOL/L (ref 3.5–5)
RBC # BLD: 3.61 M/UL (ref 4.7–6.1)
SODIUM BLD-SCNC: 137 MMOL/L (ref 136–145)
SODIUM BLD-SCNC: 137 MMOL/L (ref 136–145)
TOTAL PROTEIN: 5 G/DL (ref 6.6–8.7)
TROPONIN: <0.01 NG/ML (ref 0–0.03)
TSH SERPL DL<=0.05 MIU/L-ACNC: 1.96 UIU/ML (ref 0.27–4.2)
WBC # BLD: 8.9 K/UL (ref 4.8–10.8)

## 2019-12-13 PROCEDURE — 6360000002 HC RX W HCPCS: Performed by: INTERNAL MEDICINE

## 2019-12-13 PROCEDURE — 1210000000 HC MED SURG R&B

## 2019-12-13 PROCEDURE — 85027 COMPLETE CBC AUTOMATED: CPT

## 2019-12-13 PROCEDURE — 84484 ASSAY OF TROPONIN QUANT: CPT

## 2019-12-13 PROCEDURE — 80048 BASIC METABOLIC PNL TOTAL CA: CPT

## 2019-12-13 PROCEDURE — 97110 THERAPEUTIC EXERCISES: CPT

## 2019-12-13 PROCEDURE — 6360000002 HC RX W HCPCS: Performed by: ORTHOPAEDIC SURGERY

## 2019-12-13 PROCEDURE — 97161 PT EVAL LOW COMPLEX 20 MIN: CPT

## 2019-12-13 PROCEDURE — 6360000002 HC RX W HCPCS: Performed by: HOSPITALIST

## 2019-12-13 PROCEDURE — 2580000003 HC RX 258: Performed by: INTERNAL MEDICINE

## 2019-12-13 PROCEDURE — 2580000003 HC RX 258: Performed by: ORTHOPAEDIC SURGERY

## 2019-12-13 PROCEDURE — 6370000000 HC RX 637 (ALT 250 FOR IP): Performed by: INTERNAL MEDICINE

## 2019-12-13 PROCEDURE — 36415 COLL VENOUS BLD VENIPUNCTURE: CPT

## 2019-12-13 PROCEDURE — 6370000000 HC RX 637 (ALT 250 FOR IP): Performed by: ORTHOPAEDIC SURGERY

## 2019-12-13 RX ORDER — KETOROLAC TROMETHAMINE 30 MG/ML
15 INJECTION, SOLUTION INTRAMUSCULAR; INTRAVENOUS EVERY 8 HOURS PRN
Status: DISCONTINUED | OUTPATIENT
Start: 2019-12-13 | End: 2019-12-14 | Stop reason: HOSPADM

## 2019-12-13 RX ADMIN — ACETAMINOPHEN 650 MG: 325 TABLET ORAL at 08:15

## 2019-12-13 RX ADMIN — ACETAMINOPHEN 650 MG: 325 TABLET ORAL at 01:59

## 2019-12-13 RX ADMIN — MORPHINE SULFATE 4 MG: 4 INJECTION, SOLUTION INTRAMUSCULAR; INTRAVENOUS at 18:14

## 2019-12-13 RX ADMIN — SENNOSIDES AND DOCUSATE SODIUM 1 TABLET: 8.6; 5 TABLET ORAL at 21:07

## 2019-12-13 RX ADMIN — THERA TABS 1 TABLET: TAB at 08:14

## 2019-12-13 RX ADMIN — MORPHINE SULFATE 2 MG: 4 INJECTION, SOLUTION INTRAMUSCULAR; INTRAVENOUS at 14:46

## 2019-12-13 RX ADMIN — Medication 10 ML: at 21:07

## 2019-12-13 RX ADMIN — CLOPIDOGREL BISULFATE 75 MG: 75 TABLET ORAL at 08:15

## 2019-12-13 RX ADMIN — Medication 10 ML: at 08:16

## 2019-12-13 RX ADMIN — MORPHINE SULFATE 4 MG: 4 INJECTION, SOLUTION INTRAMUSCULAR; INTRAVENOUS at 21:07

## 2019-12-13 RX ADMIN — ACETAMINOPHEN 650 MG: 325 TABLET ORAL at 21:06

## 2019-12-13 RX ADMIN — Medication 2 G: at 08:16

## 2019-12-13 RX ADMIN — MORPHINE SULFATE 2 MG: 4 INJECTION, SOLUTION INTRAMUSCULAR; INTRAVENOUS at 08:15

## 2019-12-13 RX ADMIN — PRAVASTATIN SODIUM 20 MG: 20 TABLET ORAL at 08:14

## 2019-12-13 RX ADMIN — GABAPENTIN 800 MG: 400 CAPSULE ORAL at 21:07

## 2019-12-13 RX ADMIN — DOCUSATE SODIUM 100 MG: 100 CAPSULE, LIQUID FILLED ORAL at 08:14

## 2019-12-13 RX ADMIN — METOPROLOL TARTRATE 12.5 MG: 25 TABLET ORAL at 08:14

## 2019-12-13 RX ADMIN — Medication 2 G: at 01:59

## 2019-12-13 RX ADMIN — DOCUSATE SODIUM 100 MG: 100 CAPSULE, LIQUID FILLED ORAL at 21:07

## 2019-12-13 RX ADMIN — GABAPENTIN 800 MG: 400 CAPSULE ORAL at 08:15

## 2019-12-13 RX ADMIN — KETOROLAC TROMETHAMINE 15 MG: 30 INJECTION, SOLUTION INTRAMUSCULAR at 13:09

## 2019-12-13 RX ADMIN — SENNOSIDES AND DOCUSATE SODIUM 1 TABLET: 8.6; 5 TABLET ORAL at 08:14

## 2019-12-13 RX ADMIN — ONDANSETRON 4 MG: 2 INJECTION INTRAMUSCULAR; INTRAVENOUS at 09:23

## 2019-12-13 RX ADMIN — ACETAMINOPHEN 650 MG: 325 TABLET ORAL at 13:10

## 2019-12-13 RX ADMIN — KETOROLAC TROMETHAMINE 15 MG: 30 INJECTION, SOLUTION INTRAMUSCULAR at 05:47

## 2019-12-13 ASSESSMENT — PAIN DESCRIPTION - DESCRIPTORS
DESCRIPTORS: ACHING;THROBBING
DESCRIPTORS: DISCOMFORT

## 2019-12-13 ASSESSMENT — PAIN SCALES - GENERAL
PAINLEVEL_OUTOF10: 0
PAINLEVEL_OUTOF10: 6
PAINLEVEL_OUTOF10: 3
PAINLEVEL_OUTOF10: 6
PAINLEVEL_OUTOF10: 8
PAINLEVEL_OUTOF10: 3
PAINLEVEL_OUTOF10: 2
PAINLEVEL_OUTOF10: 6
PAINLEVEL_OUTOF10: 3
PAINLEVEL_OUTOF10: 8
PAINLEVEL_OUTOF10: 10
PAINLEVEL_OUTOF10: 2
PAINLEVEL_OUTOF10: 6
PAINLEVEL_OUTOF10: 6

## 2019-12-13 ASSESSMENT — PAIN DESCRIPTION - LOCATION
LOCATION: SHOULDER
LOCATION: BACK;SHOULDER
LOCATION: SHOULDER
LOCATION: ARM

## 2019-12-13 ASSESSMENT — ENCOUNTER SYMPTOMS
VOMITING: 0
DIARRHEA: 0
CONSTIPATION: 0
RESPIRATORY NEGATIVE: 1
GASTROINTESTINAL NEGATIVE: 1
SHORTNESS OF BREATH: 0
BACK PAIN: 0
NAUSEA: 0

## 2019-12-13 ASSESSMENT — PAIN DESCRIPTION - ORIENTATION
ORIENTATION: RIGHT

## 2019-12-13 ASSESSMENT — PAIN DESCRIPTION - PAIN TYPE
TYPE: SURGICAL PAIN
TYPE: CHRONIC PAIN
TYPE: ACUTE PAIN;CHRONIC PAIN
TYPE: SURGICAL PAIN
TYPE: ACUTE PAIN
TYPE: ACUTE PAIN;CHRONIC PAIN
TYPE: ACUTE PAIN;CHRONIC PAIN
TYPE: ACUTE PAIN
TYPE: CHRONIC PAIN

## 2019-12-13 ASSESSMENT — PAIN DESCRIPTION - PROGRESSION
CLINICAL_PROGRESSION: GRADUALLY IMPROVING
CLINICAL_PROGRESSION: NOT CHANGED

## 2019-12-13 ASSESSMENT — PAIN DESCRIPTION - DIRECTION: RADIATING_TOWARDS: BACK

## 2019-12-13 ASSESSMENT — PAIN DESCRIPTION - FREQUENCY
FREQUENCY: INTERMITTENT
FREQUENCY: CONTINUOUS

## 2019-12-13 ASSESSMENT — PAIN DESCRIPTION - ONSET: ONSET: ON-GOING

## 2019-12-13 ASSESSMENT — PAIN - FUNCTIONAL ASSESSMENT
PAIN_FUNCTIONAL_ASSESSMENT: PREVENTS OR INTERFERES WITH ALL ACTIVE AND SOME PASSIVE ACTIVITIES
PAIN_FUNCTIONAL_ASSESSMENT: PREVENTS OR INTERFERES SOME ACTIVE ACTIVITIES AND ADLS

## 2019-12-14 VITALS
SYSTOLIC BLOOD PRESSURE: 109 MMHG | OXYGEN SATURATION: 90 % | HEIGHT: 67 IN | RESPIRATION RATE: 16 BRPM | HEART RATE: 83 BPM | BODY MASS INDEX: 28.88 KG/M2 | DIASTOLIC BLOOD PRESSURE: 64 MMHG | WEIGHT: 184 LBS | TEMPERATURE: 99.7 F

## 2019-12-14 PROBLEM — I95.9 HYPOTENSION: Status: RESOLVED | Noted: 2019-12-13 | Resolved: 2019-12-14

## 2019-12-14 PROBLEM — M19.011 PRIMARY OSTEOARTHRITIS OF RIGHT SHOULDER: Status: RESOLVED | Noted: 2019-12-11 | Resolved: 2019-12-14

## 2019-12-14 PROBLEM — D64.9 POSTOPERATIVE ANEMIA: Status: RESOLVED | Noted: 2019-12-13 | Resolved: 2019-12-14

## 2019-12-14 PROBLEM — M19.011 PRIMARY OSTEOARTHRITIS, RIGHT SHOULDER: Status: RESOLVED | Noted: 2019-12-12 | Resolved: 2019-12-14

## 2019-12-14 LAB
ANION GAP SERPL CALCULATED.3IONS-SCNC: 10 MMOL/L (ref 7–19)
BUN BLDV-MCNC: 11 MG/DL (ref 8–23)
CALCIUM SERPL-MCNC: 8.3 MG/DL (ref 8.8–10.2)
CHLORIDE BLD-SCNC: 102 MMOL/L (ref 98–111)
CO2: 24 MMOL/L (ref 22–29)
CREAT SERPL-MCNC: 0.7 MG/DL (ref 0.5–1.2)
GFR NON-AFRICAN AMERICAN: >60
GLUCOSE BLD-MCNC: 136 MG/DL (ref 74–109)
HCT VFR BLD CALC: 29.5 % (ref 42–52)
HEMOGLOBIN: 9.7 G/DL (ref 14–18)
MCH RBC QN AUTO: 28.5 PG (ref 27–31)
MCHC RBC AUTO-ENTMCNC: 32.9 G/DL (ref 33–37)
MCV RBC AUTO: 86.8 FL (ref 80–94)
PDW BLD-RTO: 16 % (ref 11.5–14.5)
PLATELET # BLD: 138 K/UL (ref 130–400)
PMV BLD AUTO: 9.7 FL (ref 9.4–12.4)
POTASSIUM SERPL-SCNC: 3.9 MMOL/L (ref 3.5–5)
RBC # BLD: 3.4 M/UL (ref 4.7–6.1)
SODIUM BLD-SCNC: 136 MMOL/L (ref 136–145)
WBC # BLD: 7.1 K/UL (ref 4.8–10.8)

## 2019-12-14 PROCEDURE — 85027 COMPLETE CBC AUTOMATED: CPT

## 2019-12-14 PROCEDURE — 6370000000 HC RX 637 (ALT 250 FOR IP): Performed by: INTERNAL MEDICINE

## 2019-12-14 PROCEDURE — 6360000002 HC RX W HCPCS: Performed by: INTERNAL MEDICINE

## 2019-12-14 PROCEDURE — 97116 GAIT TRAINING THERAPY: CPT

## 2019-12-14 PROCEDURE — 36415 COLL VENOUS BLD VENIPUNCTURE: CPT

## 2019-12-14 PROCEDURE — 2580000003 HC RX 258: Performed by: INTERNAL MEDICINE

## 2019-12-14 PROCEDURE — 80048 BASIC METABOLIC PNL TOTAL CA: CPT

## 2019-12-14 RX ORDER — DOCUSATE SODIUM 250 MG
250 CAPSULE ORAL 2 TIMES DAILY
Qty: 60 CAPSULE | Refills: 0 | Status: SHIPPED | OUTPATIENT
Start: 2019-12-14 | End: 2020-07-02

## 2019-12-14 RX ORDER — OXYCODONE AND ACETAMINOPHEN 10; 325 MG/1; MG/1
1-2 TABLET ORAL EVERY 4 HOURS PRN
Qty: 25 TABLET | Refills: 0 | Status: SHIPPED | OUTPATIENT
Start: 2019-12-14 | End: 2019-12-17

## 2019-12-14 RX ADMIN — DOCUSATE SODIUM 100 MG: 100 CAPSULE, LIQUID FILLED ORAL at 09:19

## 2019-12-14 RX ADMIN — GABAPENTIN 800 MG: 400 CAPSULE ORAL at 09:18

## 2019-12-14 RX ADMIN — MORPHINE SULFATE 4 MG: 4 INJECTION, SOLUTION INTRAMUSCULAR; INTRAVENOUS at 12:02

## 2019-12-14 RX ADMIN — ACETAMINOPHEN 650 MG: 325 TABLET ORAL at 02:35

## 2019-12-14 RX ADMIN — MORPHINE SULFATE 4 MG: 4 INJECTION, SOLUTION INTRAMUSCULAR; INTRAVENOUS at 02:35

## 2019-12-14 RX ADMIN — PRAVASTATIN SODIUM 20 MG: 20 TABLET ORAL at 09:18

## 2019-12-14 RX ADMIN — MORPHINE SULFATE 4 MG: 4 INJECTION, SOLUTION INTRAMUSCULAR; INTRAVENOUS at 07:36

## 2019-12-14 RX ADMIN — ACETAMINOPHEN 650 MG: 325 TABLET ORAL at 07:37

## 2019-12-14 RX ADMIN — Medication 10 ML: at 07:37

## 2019-12-14 RX ADMIN — SENNOSIDES AND DOCUSATE SODIUM 1 TABLET: 8.6; 5 TABLET ORAL at 09:18

## 2019-12-14 RX ADMIN — THERA TABS 1 TABLET: TAB at 09:18

## 2019-12-14 RX ADMIN — CLOPIDOGREL BISULFATE 75 MG: 75 TABLET ORAL at 09:18

## 2019-12-14 ASSESSMENT — PAIN SCALES - GENERAL
PAINLEVEL_OUTOF10: 9
PAINLEVEL_OUTOF10: 7
PAINLEVEL_OUTOF10: 7
PAINLEVEL_OUTOF10: 0
PAINLEVEL_OUTOF10: 7

## 2020-01-01 ENCOUNTER — NURSE ONLY (OUTPATIENT)
Dept: FAMILY MEDICINE CLINIC | Age: 65
End: 2020-01-01
Payer: MEDICARE

## 2020-01-01 ENCOUNTER — OFFICE VISIT (OUTPATIENT)
Dept: UROLOGY | Age: 65
End: 2020-01-01
Payer: MEDICARE

## 2020-01-01 ENCOUNTER — TELEPHONE (OUTPATIENT)
Dept: CARDIOLOGY | Age: 65
End: 2020-01-01

## 2020-01-01 ENCOUNTER — OFFICE VISIT (OUTPATIENT)
Dept: FAMILY MEDICINE CLINIC | Age: 65
End: 2020-01-01
Payer: MEDICARE

## 2020-01-01 ENCOUNTER — HOSPITAL ENCOUNTER (OUTPATIENT)
Dept: GENERAL RADIOLOGY | Age: 65
Discharge: HOME | End: 2020-11-18
Payer: MEDICARE

## 2020-01-01 ENCOUNTER — HOSPITAL ENCOUNTER (OUTPATIENT)
Dept: GENERAL RADIOLOGY | Age: 65
Discharge: HOME | End: 2020-08-04
Payer: MEDICARE

## 2020-01-01 ENCOUNTER — TELEPHONE (OUTPATIENT)
Dept: GASTROENTEROLOGY | Age: 65
End: 2020-01-01

## 2020-01-01 ENCOUNTER — HOSPITAL ENCOUNTER (OUTPATIENT)
Dept: GENERAL RADIOLOGY | Age: 65
Discharge: HOME | End: 2020-12-01
Payer: MEDICARE

## 2020-01-01 ENCOUNTER — OFFICE VISIT (OUTPATIENT)
Dept: GASTROENTEROLOGY | Age: 65
End: 2020-01-01
Payer: MEDICARE

## 2020-01-01 ENCOUNTER — TELEPHONE (OUTPATIENT)
Dept: FAMILY MEDICINE CLINIC | Age: 65
End: 2020-01-01

## 2020-01-01 VITALS
DIASTOLIC BLOOD PRESSURE: 60 MMHG | SYSTOLIC BLOOD PRESSURE: 120 MMHG | BODY MASS INDEX: 27 KG/M2 | HEART RATE: 60 BPM | HEIGHT: 67 IN | OXYGEN SATURATION: 98 % | WEIGHT: 172 LBS

## 2020-01-01 VITALS
BODY MASS INDEX: 27.72 KG/M2 | RESPIRATION RATE: 20 BRPM | OXYGEN SATURATION: 98 % | OXYGEN SATURATION: 98 % | SYSTOLIC BLOOD PRESSURE: 118 MMHG | DIASTOLIC BLOOD PRESSURE: 66 MMHG | DIASTOLIC BLOOD PRESSURE: 78 MMHG | HEART RATE: 50 BPM | BODY MASS INDEX: 27.72 KG/M2 | WEIGHT: 177 LBS | RESPIRATION RATE: 20 BRPM | TEMPERATURE: 96.8 F | SYSTOLIC BLOOD PRESSURE: 120 MMHG | TEMPERATURE: 97.2 F | HEART RATE: 54 BPM | WEIGHT: 177 LBS

## 2020-01-01 VITALS — TEMPERATURE: 97.5 F | HEIGHT: 67 IN | BODY MASS INDEX: 27.94 KG/M2 | WEIGHT: 178 LBS

## 2020-01-01 DIAGNOSIS — N13.8 HYPERTROPHY OF PROSTATE WITH URINARY OBSTRUCTION: ICD-10-CM

## 2020-01-01 DIAGNOSIS — R10.9 FLANK PAIN: ICD-10-CM

## 2020-01-01 DIAGNOSIS — R10.31 RIGHT LOWER QUADRANT ABDOMINAL PAIN: ICD-10-CM

## 2020-01-01 DIAGNOSIS — Z79.899 CURRENT USE OF PROTON PUMP INHIBITOR: ICD-10-CM

## 2020-01-01 DIAGNOSIS — N40.1 HYPERTROPHY OF PROSTATE WITH URINARY OBSTRUCTION: ICD-10-CM

## 2020-01-01 LAB
ANION GAP SERPL CALCULATED.3IONS-SCNC: 7 MMOL/L (ref 7–19)
BACTERIA URINE, POC: 0
BACTERIA: NEGATIVE /HPF
BILIRUBIN URINE: 0 MG/DL
BILIRUBIN URINE: NEGATIVE
BLOOD, URINE: ABNORMAL
BLOOD, URINE: POSITIVE
BUN BLDV-MCNC: 11 MG/DL (ref 8–23)
CALCIUM SERPL-MCNC: 9.3 MG/DL (ref 8.8–10.2)
CASTS URINE, POC: 0
CHLORIDE BLD-SCNC: 104 MMOL/L (ref 98–111)
CLARITY: ABNORMAL
CLARITY: CLEAR
CO2: 30 MMOL/L (ref 22–29)
COLOR: YELLOW
COLOR: YELLOW
CREAT SERPL-MCNC: 0.7 MG/DL (ref 0.5–1.2)
CRYSTALS URINE, POC: 0
CRYSTALS, UA: ABNORMAL /HPF
EPI CELLS URINE, POC: 0
EPITHELIAL CELLS, UA: 0 /HPF (ref 0–5)
GFR AFRICAN AMERICAN: >59
GFR NON-AFRICAN AMERICAN: >60
GLUCOSE BLD-MCNC: 134 MG/DL (ref 74–109)
GLUCOSE URINE: NEGATIVE MG/DL
GLUCOSE URINE: NORMAL
HYALINE CASTS: 0 /HPF (ref 0–8)
KETONES, URINE: NEGATIVE
KETONES, URINE: NEGATIVE MG/DL
LEUKOCYTE EST, POC: NORMAL
LEUKOCYTE ESTERASE, URINE: NEGATIVE
NITRITE, URINE: NEGATIVE
NITRITE, URINE: NEGATIVE
PH UA: 5 (ref 4.5–8)
PH UA: 5.5 (ref 5–8)
POTASSIUM SERPL-SCNC: 4 MMOL/L (ref 3.5–5)
PROSTATE SPECIFIC ANTIGEN: 1.04 NG/ML (ref 0–4)
PROTEIN UA: NEGATIVE
PROTEIN UA: NEGATIVE MG/DL
RBC UA: 0 /HPF (ref 0–4)
RBC URINE, POC: 0
SODIUM BLD-SCNC: 141 MMOL/L (ref 136–145)
SPECIFIC GRAVITY UA: 1.01 (ref 1–1.03)
SPECIFIC GRAVITY UA: 1.01 (ref 1–1.03)
URINE REFLEX TO CULTURE: NO
URINE TYPE: ABNORMAL
UROBILINOGEN, URINE: 0.2 E.U./DL
UROBILINOGEN, URINE: NORMAL
WBC UA: 0 /HPF (ref 0–5)
WBC URINE, POC: 0
YEAST URINE, POC: 0

## 2020-01-01 PROCEDURE — 99213 OFFICE O/P EST LOW 20 MIN: CPT | Performed by: NURSE PRACTITIONER

## 2020-01-01 PROCEDURE — 90694 VACC AIIV4 NO PRSRV 0.5ML IM: CPT | Performed by: FAMILY MEDICINE

## 2020-01-01 PROCEDURE — 74150 CT ABDOMEN W/O CONTRAST: CPT

## 2020-01-01 PROCEDURE — 74018 RADEX ABDOMEN 1 VIEW: CPT

## 2020-01-01 PROCEDURE — 76770 US EXAM ABDO BACK WALL COMP: CPT

## 2020-01-01 PROCEDURE — G0008 ADMIN INFLUENZA VIRUS VAC: HCPCS | Performed by: FAMILY MEDICINE

## 2020-01-01 PROCEDURE — 81001 URINALYSIS AUTO W/SCOPE: CPT | Performed by: UROLOGY

## 2020-01-01 PROCEDURE — 99213 OFFICE O/P EST LOW 20 MIN: CPT | Performed by: UROLOGY

## 2020-01-01 RX ORDER — EZETIMIBE 10 MG/1
TABLET ORAL
Qty: 30 TABLET | Refills: 5 | Status: SHIPPED | OUTPATIENT
Start: 2020-01-01 | End: 2021-01-01

## 2020-01-01 RX ORDER — PREDNISONE 50 MG/1
50 TABLET ORAL EVERY 6 HOURS
Qty: 3 TABLET | Refills: 0 | Status: SHIPPED | OUTPATIENT
Start: 2020-01-01 | End: 2020-01-01

## 2020-01-01 RX ORDER — DIPHENHYDRAMINE HCL 25 MG
50 TABLET ORAL ONCE
Qty: 2 TABLET | Refills: 0 | Status: SHIPPED | OUTPATIENT
Start: 2020-01-01 | End: 2020-01-01

## 2020-01-01 RX ORDER — AMLODIPINE BESYLATE 5 MG/1
TABLET ORAL
Qty: 180 TABLET | Refills: 3 | Status: SHIPPED | OUTPATIENT
Start: 2020-01-01 | End: 2021-01-01

## 2020-01-01 RX ORDER — PANTOPRAZOLE SODIUM 40 MG/1
40 TABLET, DELAYED RELEASE ORAL DAILY
Qty: 90 TABLET | Refills: 3 | Status: SHIPPED | OUTPATIENT
Start: 2020-01-01

## 2020-01-01 ASSESSMENT — ENCOUNTER SYMPTOMS
ABDOMINAL PAIN: 1
ABDOMINAL PAIN: 1
ANAL BLEEDING: 0
COLOR CHANGE: 0
CONSTIPATION: 1
DIARRHEA: 0
BACK PAIN: 1
WHEEZING: 0
NAUSEA: 0
BACK PAIN: 1
COUGH: 0
SHORTNESS OF BREATH: 0
ABDOMINAL DISTENTION: 0
TROUBLE SWALLOWING: 0
SINUS PRESSURE: 0
DIARRHEA: 0
EYE DISCHARGE: 0
FACIAL SWELLING: 0
BLOOD IN STOOL: 0
VOMITING: 0
NAUSEA: 0
VOMITING: 0
RHINORRHEA: 0
COUGH: 0
VOICE CHANGE: 0
EYE PAIN: 0
BACK PAIN: 1
DIARRHEA: 0
EYE REDNESS: 0
CONSTIPATION: 0
SORE THROAT: 0
VOMITING: 0
RECTAL PAIN: 0
CONSTIPATION: 0
VOMITING: 0
BLOOD IN STOOL: 0
ABDOMINAL DISTENTION: 0
CONSTIPATION: 0
ABDOMINAL PAIN: 1
SHORTNESS OF BREATH: 0
NAUSEA: 0
ABDOMINAL PAIN: 0
NAUSEA: 0

## 2020-01-01 ASSESSMENT — PATIENT HEALTH QUESTIONNAIRE - PHQ9
SUM OF ALL RESPONSES TO PHQ QUESTIONS 1-9: 0
SUM OF ALL RESPONSES TO PHQ9 QUESTIONS 1 & 2: 0
1. LITTLE INTEREST OR PLEASURE IN DOING THINGS: 0
2. FEELING DOWN, DEPRESSED OR HOPELESS: 0
SUM OF ALL RESPONSES TO PHQ QUESTIONS 1-9: 0
SUM OF ALL RESPONSES TO PHQ QUESTIONS 1-9: 0

## 2020-01-15 DIAGNOSIS — E78.2 MIXED HYPERLIPIDEMIA: ICD-10-CM

## 2020-01-21 LAB
ALT SERPL-CCNC: 10 U/L (ref 5–41)
AST SERPL-CCNC: 19 U/L (ref 5–40)
CHOLESTEROL, TOTAL: 179 MG/DL (ref 160–199)
HDLC SERPL-MCNC: 49 MG/DL (ref 55–121)
LDL CHOLESTEROL CALCULATED: 107 MG/DL
TRIGL SERPL-MCNC: 117 MG/DL (ref 0–149)

## 2020-02-11 ENCOUNTER — HOSPITAL ENCOUNTER (OUTPATIENT)
Dept: GENERAL RADIOLOGY | Age: 65
Discharge: HOME | End: 2020-02-11
Payer: MEDICARE

## 2020-02-11 PROCEDURE — 6360000004 HC RX CONTRAST MEDICATION: Performed by: UROLOGY

## 2020-02-11 PROCEDURE — 74178 CT ABD&PLV WO CNTR FLWD CNTR: CPT

## 2020-02-11 RX ADMIN — IOPAMIDOL 85 ML: 755 INJECTION, SOLUTION INTRAVENOUS at 09:31

## 2020-02-13 ENCOUNTER — OFFICE VISIT (OUTPATIENT)
Dept: UROLOGY | Age: 65
End: 2020-02-13
Payer: MEDICARE

## 2020-02-13 VITALS — WEIGHT: 182 LBS | BODY MASS INDEX: 28.56 KG/M2 | HEIGHT: 67 IN

## 2020-02-13 PROCEDURE — 99213 OFFICE O/P EST LOW 20 MIN: CPT | Performed by: UROLOGY

## 2020-02-13 RX ORDER — OXYCODONE AND ACETAMINOPHEN 7.5; 325 MG/1; MG/1
1 TABLET ORAL EVERY 4 HOURS PRN
COMMUNITY

## 2020-02-13 ASSESSMENT — ENCOUNTER SYMPTOMS
EYE REDNESS: 0
WHEEZING: 0
EYE DISCHARGE: 0
ABDOMINAL PAIN: 0
DIARRHEA: 0
BACK PAIN: 0
SHORTNESS OF BREATH: 0
CONSTIPATION: 0
COUGH: 0

## 2020-02-13 NOTE — PROGRESS NOTES
Sarah Barfield is a 71 y.o. male who presents today   Chief Complaint   Patient presents with    Follow-up     I am here for a 6 month follow up for a renal cyst with my CT done. I am no longer taking alfuzosin     Renal Cyst:  Patient is here today for a renal cyst which was first noted approximately 6 month(s) ago. The mass(es) is(are): bilateral, he has a dominant cyst in the left kidney and a smaller septated cyst in the right kidney is felt to be a complex cyst  Overall the size of the cyst(s) are: stable. 4.2 cm on the left, 1.6 cm on the right. previously left was 3.9 cm. But by ultrasound it measured 4.8 cm. So there is some fluctuation on the measurements but these are all basically stable. Flank pain? no  Hematuria? None  Bosniak renal cyst classification: Type 2 on the right, 1 on the left    BPH/LUTS:  Patient is here today for lower urinary tract symptoms which started  year(s) ago. Recently the urinary symptoms are: are improving  Current medical Rx for BPH/LUTS: Alfuzosin. Previously had tried tamsulosin with no benefit.   He currently is not taking alfuzosin because he did not feel like it made any difference  Lower urinary tract symptoms: urgency, frequency, decreased urinary stream and nocturia, 2 times per night  incomplete emptying he rates his severity as mild with an AUA symptom score 7    Past Medical History:   Diagnosis Date    Arthritis     Bronchitis     Cancer ([address]     Skin Cancer    Chronic cholecystitis without calculus 5/19/2017    Chronic GERD     COPD (chronic obstructive pulmonary disease) (HCC)     Coronary artery disease of native artery of native heart with stable angina pectoris ([address]     Coronary atherosclerosis of native coronary artery     s/p PTCA and stent placement to the LAD and RCA/7 stents    DDD (degenerative disc disease), lumbar 12/4/2018    History of tobacco abuse 2010    Hyperlipidemia     Hypertension     MI (myocardial infarction) ([address] 0.7 12/14/2019    LABGLOM >60 12/14/2019    GLUCOSE 136 12/14/2019    PROT 5.0 12/12/2019    LABALBU 2.8 12/12/2019    CALCIUM 8.3 12/14/2019    BILITOT 0.3 12/12/2019    ALKPHOS 78 12/12/2019    AST 19 01/21/2020    ALT 10 01/21/2020     No results found for this visit on 02/13/20. Lab Results   Component Value Date    PSA 1.00 07/31/2019       IMAGING:  Reviewed the CT scan for renal mass protocol done 2/11/2020. This shows a simple cyst in the left kidney measuring 4.2 cm. It is stated this is increased in size from 3.9 cm however renal ultrasound done on 7/24/2019 measured this 1 to be 4.8 cm so since this is unchanged has the appearance of a simple cyst.  Smaller 1.6 cm cyst in the right kidney has no complex change on CT scan there is no thickened septations there is no mural nodules were calcification or enhancement in the wall so it looks more like a benign cyst as compared to the prior ultrasound. Again unchanged in size. 1. Renal cyst, left  Stable but benign-appearing cyst unchanged. We will follow-up ultrasound in 6 months if that shows no significant changes and I think 1 more annual follow-up is all that is needed  - US Renal Complete; Future    2. Complex renal cyst  This looks less complex by CT scan compared to ultrasound no change in size we will follow-up 6 months  - US Renal Complete; Future    3. Hypertrophy of prostate with urinary obstruction  Patient will be due his TIMOTHY and PSA in 6 months. He is now off all medical therapy feels like his symptoms are improved I did discuss options for minimally invasive treatment since he did not respond to medication versus GLAP versus TURP  - PSA, Diagnostic; Future      Orders Placed This Encounter   Procedures    US Renal Complete     Standing Status:   Future     Standing Expiration Date:   2/12/2021     Order Specific Question:   Reason for exam:     Answer:    Follow-up renal cyst    PSA, Diagnostic     PSA in 6 month     Standing Status: Future     Standing Expiration Date:   2/12/2021        Return in about 6 months (around 8/13/2020) for PSA prior to vext visit, office visit after xray study. All information inputted into the note by the MA to include chief complaint, past medical history, past surgical history, medications, allergies, social and family history and review of systems has been reviewed and updated as needed by me. EMR Dragon/transcription disclaimer: Much of this documentt is electronic  transcription/translation of spoken language to printed text. The  electronic translation of spoken language may be erroneous, or at times,  nonsensical words or phrases may be inadvertently transcribed.  Although I  have reviewed the document for such errors, some may still exist.

## 2020-03-23 ENCOUNTER — TELEPHONE (OUTPATIENT)
Dept: CARDIOLOGY | Age: 65
End: 2020-03-23

## 2020-06-22 RX ORDER — CLOPIDOGREL BISULFATE 75 MG/1
TABLET ORAL
Qty: 90 TABLET | Refills: 3 | Status: ON HOLD | OUTPATIENT
Start: 2020-06-22 | End: 2021-01-01 | Stop reason: HOSPADM

## 2020-07-02 ENCOUNTER — OFFICE VISIT (OUTPATIENT)
Dept: CARDIOLOGY | Age: 65
End: 2020-07-02
Payer: MEDICARE

## 2020-07-02 VITALS
DIASTOLIC BLOOD PRESSURE: 60 MMHG | BODY MASS INDEX: 27.62 KG/M2 | WEIGHT: 176 LBS | SYSTOLIC BLOOD PRESSURE: 134 MMHG | HEART RATE: 52 BPM | HEIGHT: 67 IN

## 2020-07-02 PROCEDURE — 99214 OFFICE O/P EST MOD 30 MIN: CPT | Performed by: CLINICAL NURSE SPECIALIST

## 2020-07-02 RX ORDER — EZETIMIBE 10 MG/1
10 TABLET ORAL DAILY
Qty: 30 TABLET | Refills: 5 | Status: SHIPPED | OUTPATIENT
Start: 2020-07-02 | End: 2020-01-01

## 2020-07-02 RX ORDER — HYDROXYCHLOROQUINE SULFATE 200 MG/1
400 TABLET, FILM COATED ORAL DAILY
COMMUNITY
Start: 2020-06-15

## 2020-07-02 ASSESSMENT — ENCOUNTER SYMPTOMS
COUGH: 0
EYE REDNESS: 0
VOMITING: 0
CHEST TIGHTNESS: 0
ABDOMINAL PAIN: 0
FACIAL SWELLING: 0
WHEEZING: 0
SHORTNESS OF BREATH: 1
NAUSEA: 0

## 2020-07-02 NOTE — PATIENT INSTRUCTIONS
Return in about 6 months (around 1/2/2021) for APRN. OK to stop Aspirin  Continue Plavix  Zetia 10mg daily    Call with any questionsor concerns  Follow up with Rojas Womack for non cardiac problems  Report any new problems  Cardiovascular Fitness-Exercise as tolerated. Strive for 15 minutes of exercise most days of the week. Cardiac / HealthyDiet  Continue current medications as directed  Continue plan of treatment  It is always recommended that you bring your medicationsbottles with you to each visit - this is for your safety!

## 2020-07-02 NOTE — PROGRESS NOTES
[facility], [address], [address]  [address]  Phone: [phone]  Fax: [phone]    OFFICE VISIT:  2020    Manny Alvarez - : 1955    Reason For Visit:  Dianna Davenport is a 71 y.o. male who is here for Coronary Artery Disease (No cardiac sx today. )      HPI  Patient is here for follow-up today with a history of CAD, aortic regurgitation, hypertension, hyperlipidemia. Catheterization 2019 showed normal LV systolic function. Moderate aortic stenosis. PCI with drug-eluting stents placed to mid LAD and mid RCA. Patient reports he has had a shoulder surgery since his last visit here that occurred in December. He states the surgeon operated while he was on Plavix. He states there were some complications of bleeding issues post surgery. He states he would really like to get off Plavix. He bleeds very easily when he cuts himself which happens frequently. He denies chest pain. He has chronic dyspnea which he feels is unchanged. He denies orthopnea, PND, edema, palpitations. There have been issues with myalgias related to statins. He has tried atorvastatin and last visit we changed to pravastatin which also caused issues. He states he felt he had hair loss and itching around his face related to this medication. When he stopped it, symptoms resolved. Faisal Solis is PCP.   Manny Alvarez has the following history as [facility] in [facility]:    Patient Active Problem List    Diagnosis Date Noted    Abnormal myocardial perfusion study      Priority: High    Chest discomfort      Priority: High    Aortic valve stenosis      Priority: High    Osteoarthritis of both shoulders 2019    DDD (degenerative disc disease), lumbar 2018    Spinal stenosis of lumbar region with neurogenic claudication 2018    Lumbar stenosis with neurogenic claudication 2018    Alternating constipation and diarrhea 10/31/2018    Arthritis of knee 07/16/2018    Gastroesophageal reflux disease 12/19/2017    Gout 12/19/2017    Neuropathy 12/19/2017    Rheumatoid arthritis ([address] 12/19/2017    Acalculous cholecystitis 05/19/2017    Right upper quadrant abdominal pain 05/12/2017    Mixed hyperlipidemia 04/26/2016    S/P coronary artery stent placement 04/26/2016    SOB (shortness of breath) 04/26/2016    Coronary artery disease involving native coronary artery of native heart without angina pectoris     Hiatal hernia 04/20/2015    Dysphagia 03/13/2015    Hoarseness, chronic 03/13/2015    Chronic heartburn 03/13/2015    History of tobacco abuse     Leg pain 08/25/2011    Hyperlipidemia     MI (myocardial infarction) ([address]     COPD (chronic obstructive pulmonary disease) (HCC)     Bronchitis      Past Medical History:   Diagnosis Date    Arthritis     Bronchitis     Cancer ([address]     Skin Cancer    Chronic cholecystitis without calculus 5/19/2017    Chronic GERD     COPD (chronic obstructive pulmonary disease) (HCC)     Coronary artery disease of native artery of native heart with stable angina pectoris ([address]     Coronary atherosclerosis of native coronary artery     s/p PTCA and stent placement to the LAD and RCA/7 stents    DDD (degenerative disc disease), lumbar 12/4/2018    History of tobacco abuse 2010    Hyperlipidemia     Hypertension     MI (myocardial infarction) ([address]     Old, inferior wall    Moderate aortic regurgitation 08/14/2017    mod-severe AR.  Moderate aortic stenosis 08/14/2017    mod-severe aortic stenosis.      Spinal stenosis of lumbar region with neurogenic claudication 12/4/2018     Past Surgical History:   Procedure Laterality Date    BACK SURGERY      s/p laminectomy    CARDIAC [facility]HETERIZATION  [facility]10[facility]04 [facility]      CARDIAC [facility]10/03  [facility]    CARDIAC CATHETERIZATION  08/29/03 [facility]    C[facility]ATION  [facility]/16/01 [facility]    CARDIAC CATHETERIZATION 05/25/2009    EF is estimated to be 50%. See scanned document.  CARDIAC CATHETERIZATION N/A 03/2016    stent placement    CARDIAC CATHETERIZATION  08/2017    no PCI    CARDIAC CATHETERIZATION  07/11/2019    Drug-eluting stents placed to mid LAD and mid RCA, normal LV    CHOLECYSTECTOMY, LAPAROSCOPIC N/A 5/19/2017    CHOLECYSTECTOMY LAPAROSCOPIC performed by Wojciech Spencer MD at [address] [place] [address]  05/28/2015    Dr. Juancho Herrera: No Polyps   10yr recall    CORONARY ANGIOPLASTY WITH STENT PLACEMENT  3/16    stent to LAD    JOINT REPLACEMENT      Left thumb    JOINT REPLACEMENT Right     JOINT REPLACEMENT      KNEE SURGERY      s/p Rt.  knee replacement    LEG SURGERY Right     Broken leg with surgical repair    LUMBAR SPINE SURGERY N/A 12/4/2018    L1-5 LAMINECTOMY performed by Luis Carlos Alfaro MD at [address] [place] [address] [place] EGD TRANSORAL BIOPSY SINGLE/MULTIPLE N/A 5/18/2017    Dr Prema Manley, Amelie (-), biliary colic, surgical referral    [place] EGD TRANSORAL BIOPSY SINGLE/MULTIPLE N/A 11/29/2018    Dr JAIDEN Torres-w/dilation over wire-51 [place]-Distal esophagitis, gastritis    REMOVE HARDWARE SPINE N/A 12/20/2018    I AND D LUMBAR INCISION SEROMA performed by Luis Carlos Alfaro MD at [facility] 12/12/2019    RIGHT REVERSE TOTAL SHOULDER ARTHROPLASTY performed by Kady Alberts MD at [facility]  03/30/2015    Dr. Juancho Herrera: amelie neg,,normal, empiric dilatation with 51F    UPPER GASTROINTESTINAL ENDOSCOPY N/A 11/29/2018    Dr JAIDEN Torres-w/dilation over wire-51 [place]-Distal esophagitis, gastritis     Family History   Problem Relation Age of Onset    Cancer Mother     Lung Cancer Mother     Heart Disease Father     Cancer Father     Liver Cancer Father     Diabetes Maternal Grandmother     Heart Disease Maternal Grandfather     Cancer Maternal Grandfather     Stroke Paternal Grandmother     Colon Cancer Neg Hx     Colon Polyps Neg Hx     Esophageal Cancer Neg Hx     Liver Disease Neg Hx     Rectal Cancer Neg Hx     Stomach Cancer Neg Hx      Social History     Tobacco Use    Smoking status: Former Smoker     Packs/day: 0.00     Types: Cigarettes     Last attempt to quit: 12/10/2009     Years since quitting: 10.5    Smokeless tobacco: Never Used   Substance Use Topics    Alcohol use: Yes     Alcohol/week: 6.0 standard drinks     Types: 6 Cans of beer per week     Comment: daily      Current Outpatient Medications   Medication Sig Dispense Refill    hydroxychloroquine (PLAQUENIL) 200 MG tablet Take 200 mg by mouth 2 times daily      ezetimibe (ZETIA) 10 MG tablet Take 1 tablet by mouth daily 30 tablet 5    clopidogrel (PLAVIX) 75 MG tablet TAKE 1 TABLET BY MOUTH EVERY DAY 90 tablet 3    metoprolol tartrate (LOPRESSOR) 25 MG tablet TAKE 1/2 TABLET BY MOUTH TWICE A DAY 90 tablet 2    oxyCODONE-acetaminophen (PERCOCET) 7.5-325 MG per tablet Take 1 tablet by mouth every 4 hours as needed for Pain.  amLODIPine (NORVASC) 5 MG tablet Take 5 mg by mouth daily      pantoprazole (PROTONIX) 40 MG tablet Take 1 tablet by mouth daily Take daily first thing in the morning on an empty stomach. 90 tablet 3    gabapentin (NEURONTIN) 800 MG tablet Take 800 mg by mouth 2 times daily .  nitroGLYCERIN (NITROSTAT) 0.4 MG SL tablet Place 1 tablet under the tongue every 5 minutes as needed 1 tablet as needed 25 tablet 3     No current facility-administered medications for this visit. Allergies: Codeine; Iv [iodides]; Hydrocodone; and Statins    Review of Systems  Review of Systems   Constitutional: Negative for activity change, diaphoresis, fatigue, fever and unexpected weight change. HENT: Negative for facial swelling and nosebleeds. Eyes: Negative for redness and visual disturbance. Respiratory: Positive for shortness of breath. Negative for cough, chest tightness and wheezing. Cardiovascular: Negative for chest pain, palpitations and leg swelling. Gastrointestinal: Negative for abdominal pain, nausea and vomiting. Endocrine: Negative for cold intolerance and heat intolerance. Genitourinary: Negative for dysuria and hematuria. Musculoskeletal: Negative for arthralgias and myalgias (resolved). Skin: Negative for pallor and rash. Neurological: Negative for dizziness, seizures, syncope, weakness and light-headedness. Hematological: Bruises/bleeds easily. Psychiatric/Behavioral: Negative for agitation. The patient is not nervous/anxious. Objective  Vital Signs - /60   Pulse 52   Ht 5' 7\" (1.702 m)   Wt 176 lb (79.8 kg)   BMI 27.57 kg/m²   Physical Exam  Vitals signs and nursing note reviewed. Constitutional:       General: He is not in acute distress. Appearance: Normal appearance. He is well-developed. HENT:      Head: Normocephalic and atraumatic. Right Ear: Hearing and external ear normal.      Left Ear: Hearing and external ear normal.      Nose: Nose normal.   Eyes:      General:         Right eye: No discharge. Left eye: No discharge. Pupils: Pupils are equal, round, and reactive to light. Neck:      Thyroid: No thyromegaly. Vascular: No JVD. Trachea: No tracheal deviation. Cardiovascular:      Rate and Rhythm: Normal rate and regular rhythm. Heart sounds: Murmur (2/6 systolic) present. No friction rub. No gallop. Comments: No carotid bruit  Pulmonary:      Effort: Pulmonary effort is normal. No respiratory distress. Breath sounds: Normal breath sounds. No wheezing or rales. Abdominal:      Palpations: Abdomen is soft. Tenderness: There is no abdominal tenderness. Musculoskeletal:         General: No swelling or deformity. Comments: Normal gait and station   Skin:     General: Skin is warm and dry. Findings: Bruising (arms) present. No rash. Neurological:      Mental Status: He is alert and oriented to person, place, and time. Cranial Nerves:  No problems  Cardiovascular Fitness-Exercise as tolerated. Strive for 15 minutes of exercise most days of the week. Cardiac / HealthyDiet  Continue current medications as directed  Continue plan of treatment  It is always recommended that you bring your medicationsbottles with you to each visit - this is for your safety!        SHIMON Simental

## 2020-09-02 ENCOUNTER — OFFICE VISIT (OUTPATIENT)
Dept: OTOLARYNGOLOGY | Facility: CLINIC | Age: 65
End: 2020-09-02

## 2020-09-02 VITALS
BODY MASS INDEX: 27.15 KG/M2 | TEMPERATURE: 98 F | SYSTOLIC BLOOD PRESSURE: 120 MMHG | HEIGHT: 67 IN | WEIGHT: 173 LBS | HEART RATE: 65 BPM | DIASTOLIC BLOOD PRESSURE: 79 MMHG | RESPIRATION RATE: 20 BRPM

## 2020-09-02 DIAGNOSIS — Z79.02 ANTIPLATELET OR ANTITHROMBOTIC LONG-TERM USE: ICD-10-CM

## 2020-09-02 DIAGNOSIS — C44.612 BASAL CELL CARCINOMA, ARM, RIGHT: ICD-10-CM

## 2020-09-02 DIAGNOSIS — C44.219 BASAL CELL CARCINOMA, EAR, LEFT: Primary | ICD-10-CM

## 2020-09-02 PROCEDURE — 99203 OFFICE O/P NEW LOW 30 MIN: CPT | Performed by: OTOLARYNGOLOGY

## 2020-09-02 RX ORDER — EZETIMIBE 10 MG/1
10 TABLET ORAL
COMMUNITY
Start: 2020-07-02

## 2020-09-02 RX ORDER — PANTOPRAZOLE SODIUM 40 MG/1
TABLET, DELAYED RELEASE ORAL
COMMUNITY
Start: 2020-06-10

## 2020-09-02 RX ORDER — HYDROXYCHLOROQUINE SULFATE 200 MG/1
200 TABLET, FILM COATED ORAL
COMMUNITY
Start: 2020-06-15

## 2020-09-02 RX ORDER — CLOPIDOGREL BISULFATE 75 MG/1
TABLET ORAL
COMMUNITY
Start: 2020-06-22

## 2020-09-02 RX ORDER — RANITIDINE 150 MG/1
TABLET ORAL
COMMUNITY
End: 2020-09-02

## 2020-09-02 RX ORDER — AMLODIPINE BESYLATE 5 MG/1
5 TABLET ORAL 2 TIMES DAILY
COMMUNITY
Start: 2020-06-21

## 2020-09-02 RX ORDER — GABAPENTIN 800 MG/1
800 TABLET ORAL EVERY 8 HOURS
COMMUNITY
Start: 2020-07-13

## 2020-09-02 RX ORDER — OXYCODONE AND ACETAMINOPHEN 7.5; 325 MG/1; MG/1
TABLET ORAL
COMMUNITY
Start: 2020-08-07

## 2020-09-02 NOTE — PROGRESS NOTES
PRIMARY CARE PROVIDER: Jose Goss MD  REFERRING PROVIDER: No ref. provider found    Chief Complaint   Patient presents with   • Skin Lesion     bcc of left ear       Subjective   History of Present Illness:  Cuhng Castañeda is a  64 y.o. male who presents for surgical consultation regarding a biopsy-proven basal cell carcinoma  of the left ear.  Prior to the biopsy, the lesion had the following characteristics:    Location: left ear  Quality: nodular, bleeding lesion  Severity: mild  Duration: 1 year  Timing: constant  Modifying Factors: chemo cream did not alter it  Associated Signs & Symptoms: mild pain and bleeding    He also has a lesion on the right arm.  He reports that this was biopsied, and consistent with a basal cell carcinoma.  Prior to the biopsy, the lesion had been present for a few years.  He had treated this multiple times with topical chemo cream without resolution.    On Plavix Dr. Hewitt.  Stents a little over a year ago.    Review of Systems:  Review of Systems   Constitutional: Negative for chills, fatigue, fever and unexpected weight change.   HENT: Negative for facial swelling.    Respiratory: Negative for cough, chest tightness and shortness of breath.    Cardiovascular: Negative for chest pain.   Musculoskeletal: Negative for neck pain.   Skin: Negative for color change.   Neurological: Negative for facial asymmetry.   Hematological: Negative for adenopathy. Bruises/bleeds easily.       Past History:  Past Medical History:   Diagnosis Date   • Arthritis    • Asthma    • COPD (chronic obstructive pulmonary disease) (CMS/[facility])    • Heart disease    • High blood pressure      Past Surgical History:   Procedure Laterality Date   • BACK SURGERY      X5   • CARDIAC SURGERY      STENTS X7   • REPLACEMENT TOTAL KNEE BILATERAL     • SHOULDER SURGERY       History reviewed. No pertinent family history.  Social History     Tobacco Use   • Smoking status: Never Smoker   • Smokeless tobacco: Never Used    Substance Use Topics   • Alcohol use: Yes     Comment: SOCIAL   • Drug use: Not on file     Allergies:  Codeine; Iodides; Hydrocodone; Hydrocodone-acetaminophen; and Statins    Current Outpatient Medications:   •  amLODIPine (NORVASC) 5 MG tablet, Take 5 mg by mouth 2 (Two) Times a Day., Disp: , Rfl:   •  clopidogrel (PLAVIX) 75 MG tablet, clopidogrel 75 mg tablet, Disp: , Rfl:   •  ezetimibe (ZETIA) 10 MG tablet, Take 10 mg by mouth., Disp: , Rfl:   •  gabapentin (NEURONTIN) 800 MG tablet, Take 800 mg by mouth Every 8 (Eight) Hours., Disp: , Rfl:   •  hydroxychloroquine (PLAQUENIL) 200 MG tablet, Take 200 mg by mouth., Disp: , Rfl:   •  metoprolol tartrate (LOPRESSOR) 25 MG tablet, Take 12.5 mg by mouth 2 (Two) Times a Day., Disp: , Rfl:   •  oxyCODONE-acetaminophen (PERCOCET) 7.5-325 MG per tablet, TAKE 1 TABLET BY MOUTH EVERY 6 HOURS AS NEEDED (8/7), Disp: , Rfl:   •  pantoprazole (PROTONIX) 40 MG EC tablet, TAKE 1 TABLET BY MOUTH DAILY TAKE DAILY FIRST THING IN THE MORNING ON AN EMPTY STOMACH., Disp: , Rfl:     Objective     Vital Signs:  Temp:  [98 °F (36.7 °C)] 98 °F (36.7 °C)  Heart Rate:  [65] 65  Resp:  [20] 20  BP: (120)/(79) 120/79    Physical Exam:  Physical Exam   Constitutional: He is oriented to person, place, and time. He appears well-developed and well-nourished. He is cooperative. No distress.   HENT:   Head: Normocephalic and atraumatic.   Right Ear: External ear normal.   Left Ear: External ear normal.   Ears:    Nose: Nose normal.   Eyes: Pupils are equal, round, and reactive to light. Conjunctivae and EOM are normal. Right eye exhibits no discharge. Left eye exhibits no discharge. No scleral icterus.   Neck: Normal range of motion. Neck supple. No JVD present. No tracheal deviation present. No thyromegaly present.   Pulmonary/Chest: Effort normal. No stridor.   Musculoskeletal: Normal range of motion. He exhibits no edema or deformity.   Lymphadenopathy:     He has no cervical adenopathy.    Neurological: He is alert and oriented to person, place, and time. He has normal strength. No cranial nerve deficit. Coordination normal.   Skin: Skin is warm and dry. No rash noted. He is not diaphoretic. No erythema. No pallor.        Psychiatric: He has a normal mood and affect. His speech is normal and behavior is normal. Judgment and thought content normal. Cognition and memory are normal.   Nursing note and vitals reviewed.      Data Review:  I have personally reviewed the pathology report demonstrating a basal cell carcinoma of the left inferior helix:      Operative plan:    Rotational flap    Assessment   Assessment:  1. Basal cell carcinoma, ear, left    2. Basal cell carcinoma, arm, right    3. Antiplatelet or antithrombotic long-term use        Plan   Plan:    I have offered excision of the skin lesion of the left ear in the office with frozen section analysis and likely rotational flap closure under local anesthesia.  We will see if he can come off his Plavix.  We will also request the pathology from the right upper extremity, and remove this at the same time.  Likely linear closure for this.    Discussion of skin lesion. Discussed risks, benefits, alternatives, and possible complications of excision of the skin lesion with reconstruction utilizing local tissue rearrangement, full-thickness skin grafting, or local interpolated flaps. Risks include, but are not limited too: bleeding, infection, hematoma, recurrence, need for additional procedures, flap failure, cosmetic deformity. Patient understands risks and would like to proceed with surgery.     My findings and recommendations were discussed and questions were answered.     Chiki Chery MD  09/02/20  13:58

## 2020-09-02 NOTE — PATIENT INSTRUCTIONS
CONTACT INFORMATION:  The main office phone number is [phone]. For emergencies after hours and on weekends, this number will convert over to our answering service and the on call provider will answer. Please try to keep non emergent phone calls/ questions to office hours 9am-5pm Monday through Friday.     [employer]  As an alternative, you can sign up and use the Epic MyChart system for more direct and quicker access for non emergent questions/ problems.  [facility] [employer] allows you to send messages to your doctor, view your test results, renew your prescriptions, schedule appointments, and more. To sign up, go to [employer] and click on the Sign Up Now link in the New User? box. Enter your [employer] Activation Code exactly as it appears below along with the last four digits of your Social Security Number and your Date of Birth () to complete the sign-up process. If you do not sign up before the expiration date, you must request a new code.    [employer] Activation Code: [email or web address]  Expires: 10/2/2020  1:07 PM    If you have questions, you can email [email or web address] or call [phone] to talk to our [employer] staff. Remember, [employer] is NOT to be used for urgent needs. For medical emergencies, dial 911.

## 2020-09-04 NOTE — TELEPHONE ENCOUNTER
Date: TBD    Cardiologist: Roni Jaramillo    Procedure: excision of skin lesion on ear    Surgeon: Kesha Pinzon    Last Office Visit: 7/2/20  Reason for office visit and medical concerns addressed at this office visit: vhd, cad, copd, htn, hyperlipidemia    Testing Performed and Date of Service:  7/11/19 Cath  Double vessel disease with in-stent restenosis in mid LAD and mid RCA. Normal LV ejection fraction. Moderate aortic stenosis. Successful PCI with drug-eluting stent to mid LAD and mid RCA. Medical management. Does the patient have a stent? If so, what type? LINDA 7/11/19    Current Medications: hydroxychloroquine, zetia, plavix, metoprolol, percocet, amlodipine, protonix, gabapentin,      Is the patient currently taking an anticoagulant?  If so, what is the diagnosis the patient has been given to warrant the need for the anticoagulant? plavix    Additional Notes: requesting to hold plavix prior to procedure4

## 2020-09-14 ENCOUNTER — TELEPHONE (OUTPATIENT)
Dept: OTOLARYNGOLOGY | Facility: CLINIC | Age: 65
End: 2020-09-14

## 2020-09-14 NOTE — TELEPHONE ENCOUNTER
I have contacted patient in regards to his clearance to hold his Plavix and I have not yet heard back from Dr Hewitt in regards to this. Patient will call Dr Hewitt office to see if he can get things moving.

## 2020-09-15 ENCOUNTER — TELEPHONE (OUTPATIENT)
Dept: OTOLARYNGOLOGY | Facility: CLINIC | Age: 65
End: 2020-09-15

## 2020-09-15 NOTE — TELEPHONE ENCOUNTER
patient has been cleared to hold his Plavix 5 days prior surgery and re-start right after surgery.Patient has been contacted .

## 2020-09-17 PROBLEM — N13.8 HYPERTROPHY OF PROSTATE WITH URINARY OBSTRUCTION: Status: ACTIVE | Noted: 2020-01-01

## 2020-09-17 PROBLEM — N40.1 HYPERTROPHY OF PROSTATE WITH URINARY OBSTRUCTION: Status: ACTIVE | Noted: 2020-01-01

## 2020-09-17 PROBLEM — N28.1 COMPLEX RENAL CYST: Status: ACTIVE | Noted: 2020-01-01

## 2020-09-17 PROBLEM — N28.1 RENAL CYST, LEFT: Status: ACTIVE | Noted: 2020-01-01

## 2020-09-17 NOTE — PROGRESS NOTES
Gentry Rodarte is a 71 y.o. male who presents today   Chief Complaint   Patient presents with    Follow-up     patient here for his yearly follow up on a renal cyst with renal ultrasound done and BPH with PSa done     BPH/LUTS:  Patient is here today for lower urinary tract symptoms which started  year(s) ago. Recently the urinary symptoms are: show no change  Current medical Rx for BPH/LUTS: None. He previously had tried tamsulosin and alfuzosin without any benefit. We have discussed surgical intervention and minimally invasive treatment with him in the past.  Lower urinary tract symptoms: urgency, frequency, hesitancy, decreased urinary stream, nocturia, 3 times per night and mild straining. His AUA symptom score is 12    Renal Cyst:  Patient is here today for a renal cyst which was first noted approximately 1 years(s) ago. The mass(es) is(are): bilateral is a dominant cyst in left kidney and a smaller septated cyst cyst in the right kidney felt to be a complex cyst.  Overall the size of the cyst(s) are: stable. Right measured 1.9 x 1.6 x 1.7. The cyst on the left previously measured 4.8 cm now measuring 5 cm  Flank pain? no  Hematuria?   None  Bosniak renal cyst classification: Type 2 on the right due to septation, 1 on the left    Past Medical History:   Diagnosis Date    Arthritis     Bronchitis     Cancer ([address]     Skin Cancer    Chronic cholecystitis without calculus 5/19/2017    Chronic GERD     COPD (chronic obstructive pulmonary disease) (HCC)     Coronary artery disease of native artery of native heart with stable angina pectoris ([address]     Coronary atherosclerosis of native coronary artery     s/p PTCA and stent placement to the LAD and RCA/7 stents    DDD (degenerative disc disease), lumbar 12/4/2018    History of tobacco abuse 2010    Hyperlipidemia     Hypertension     MI (myocardial infarction) ([address]     Old, inferior wall    Moderate aortic regurgitation 08/14/2017    mod-severe AR.     Moderate aortic stenosis 08/14/2017    mod-severe aortic stenosis.  Spinal stenosis of lumbar region with neurogenic claudication 12/4/2018       Past Surgical History:   Procedure Laterality Date    BACK SURGERY      s/p laminectomy    [facility]  [facility]10[facility]04 [facility]      C[facility]    CARDIAC CATHETERIZATION  08/29/03 [facility]    CARDIAC CATHETERIZATION  02/16/01 [facility]    CARDIAC CATHETERIZATION  05/25/2009    EF is estimated to be 50%. See scanned document.  CARDIAC CATHETERIZATION N/A 03/2016    stent placement    CARDIAC CATHETERIZATION  08/2017    no PCI    CARDIAC CATHETERIZATION  07/11/2019    Drug-eluting stents placed to mid LAD and mid RCA, normal LV    CHOLECYSTECTOMY, LAPAROSCOPIC N/A 5/19/2017    CHOLECYSTECTOMY LAPAROSCOPIC performed by Alberto Mckeon MD at [address] [place] [address]  05/28/2015    Dr. Davin Hutton: No Polyps   10yr recall    CORONARY ANGIOPLASTY WITH STENT PLACEMENT  3/16    stent to LAD    JOINT REPLACEMENT      Left thumb    JOINT REPLACEMENT Right     JOINT REPLACEMENT      KNEE SURGERY      s/p Rt.  knee replacement    LEG SURGERY Right     Broken leg with surgical repair    LUMBAR SPINE SURGERY N/A 12/4/2018    L1-5 LAMINECTOMY performed by Maranda Faria MD at [address] [place] [address] [place] EGD TRANSORAL BIOPSY SINGLE/MULTIPLE N/A 5/18/2017    Dr Vera Proctor, Amelie (-), biliary colic, surgical referral    [place] EGD TRANSORAL BIOPSY SINGLE/MULTIPLE N/A 11/29/2018    Dr JAIDEN Torres-w/dilation over wire-51 Brazilian-Distal esophagitis, gastritis    REMOVE HARDWARE SPINE N/A 12/20/2018    I AND D LUMBAR INCISION SEROMA performed by Maranda Faria MD at [facility] 12/12/2019    RIGHT REVERSE TOTAL SHOULDER ARTHROPLASTY performed by Tatyana Torres MD at [place] [address]  03/30/2015    Dr. Davin Hutton: amelie neg,,normal, empiric dilatation with 51F    UPPER GASTROINTESTINAL ENDOSCOPY N/A 11/29/2018    Dr JAIDEN Torres-w/dilation over wire-51 French-Distal esophagitis, gastritis       Current Outpatient Medications   Medication Sig Dispense Refill    hydroxychloroquine (PLAQUENIL) 200 MG tablet Take 200 mg by mouth 2 times daily      ezetimibe (ZETIA) 10 MG tablet Take 1 tablet by mouth daily 30 tablet 5    clopidogrel (PLAVIX) 75 MG tablet TAKE 1 TABLET BY MOUTH EVERY DAY 90 tablet 3    metoprolol tartrate (LOPRESSOR) 25 MG tablet TAKE 1/2 TABLET BY MOUTH TWICE A DAY 90 tablet 2    oxyCODONE-acetaminophen (PERCOCET) 7.5-325 MG per tablet Take 1 tablet by mouth every 4 hours as needed for Pain.  amLODIPine (NORVASC) 5 MG tablet Take 5 mg by mouth daily      pantoprazole (PROTONIX) 40 MG tablet Take 1 tablet by mouth daily Take daily first thing in the morning on an empty stomach. 90 tablet 3    gabapentin (NEURONTIN) 800 MG tablet Take 800 mg by mouth 2 times daily .  nitroGLYCERIN (NITROSTAT) 0.4 MG SL tablet Place 1 tablet under the tongue every 5 minutes as needed 1 tablet as needed 25 tablet 3     No current facility-administered medications for this visit. Allergies   Allergen Reactions    Codeine Swelling     Lips swelling    Iv [Iodides] Swelling     Contrast dye used for heart cath. Caused face to swell.     Hydrocodone Swelling     lips swelling    Hydrocodone-Acetaminophen Hives    Statins      Myalgias         Social History     Socioeconomic History    Marital status:      Spouse name: None    Number of children: None    Years of education: None    Highest education level: None   Occupational History    None   Social Needs    Financial resource strain: None    Food insecurity     Worry: None     Inability: None    Transportation needs     Medical: None     Non-medical: None   Tobacco Use    Smoking status: Former Smoker     Packs/day: 0.00     Types: Cigarettes     Last attempt to quit: 12/10/2009     Years since quitting: 10.7    Smokeless tobacco: Never Used   Substance and Sexual Activity    Alcohol use: Yes     Alcohol/week: 6.0 standard drinks     Types: 6 Cans of beer per week     Comment: daily    Drug use: No    Sexual activity: Yes     Partners: Female   Lifestyle    Physical activity     Days per week: None     Minutes per session: None    Stress: None   Relationships    Social connections     Talks on phone: None     Gets together: None     Attends Sikh service: None     Active member of club or organization: None     Attends meetings of clubs or organizations: None     Relationship status: None    Intimate partner violence     Fear of current or ex partner: None     Emotionally abused: None     Physically abused: None     Forced sexual activity: None   Other Topics Concern    None   Social History Narrative    None       Family History   Problem Relation Age of Onset    Cancer Mother     Lung Cancer Mother     Heart Disease Father     Cancer Father     Liver Cancer Father     Diabetes Maternal Grandmother     Heart Disease Maternal Grandfather     Cancer Maternal Grandfather     Stroke Paternal Grandmother     Colon Cancer Neg Hx     Colon Polyps Neg Hx     Esophageal Cancer Neg Hx     Liver Disease Neg Hx     Rectal Cancer Neg Hx     Stomach Cancer Neg Hx        REVIEW OF SYSTEMS:  Review of Systems   Constitutional: Negative for activity change, chills, fatigue and fever. HENT: Negative for congestion, ear discharge, ear pain, facial swelling, mouth sores, rhinorrhea, sinus pressure and sore throat. Eyes: Negative for pain, discharge and redness. Respiratory: Negative for cough, shortness of breath and wheezing. Cardiovascular: Negative for chest pain, palpitations and leg swelling. Gastrointestinal: Negative for abdominal distention, abdominal pain, blood in stool, constipation, diarrhea, nausea and vomiting. Endocrine: Negative for polydipsia, polyphagia and polyuria.    Genitourinary: Negative for decreased urine volume, difficulty urinating, dysuria, enuresis, flank pain, frequency, genital sores, hematuria and urgency. Musculoskeletal: Positive for back pain. Negative for gait problem, joint swelling, neck pain and neck stiffness. Skin: Negative for color change, rash and wound. Allergic/Immunologic: Negative for environmental allergies and immunocompromised state. Neurological: Negative for dizziness, syncope, weakness, light-headedness, numbness and headaches. Hematological: Bruises/bleeds easily. Psychiatric/Behavioral: Negative for agitation, confusion, dysphoric mood, self-injury, sleep disturbance and suicidal ideas. The patient is not hyperactive. PHYSICAL EXAM:  Temp 97.5 °F (36.4 °C) (Temporal)   Ht 5' 7\" (1.702 m)   Wt 178 lb (80.7 kg)   BMI 27.88 kg/m²   Physical Exam  Constitutional:       General: He is not in acute distress. Appearance: Normal appearance. He is well-developed. HENT:      Head: Normocephalic and atraumatic. Nose: Nose normal.   Eyes:      General: No scleral icterus. Conjunctiva/sclera: Conjunctivae normal.      Pupils: Pupils are equal, round, and reactive to light. Neck:      Musculoskeletal: Normal range of motion and neck supple. Trachea: No tracheal deviation. Cardiovascular:      Rate and Rhythm: Normal rate and regular rhythm. Pulmonary:      Effort: Pulmonary effort is normal. No respiratory distress. Breath sounds: No stridor. Abdominal:      General: There is no distension. Palpations: Abdomen is soft. There is no mass. Tenderness: There is no abdominal tenderness. Genitourinary:     Prostate: Enlarged (30 g). No nodules present. Musculoskeletal: Normal range of motion. General: No tenderness. Lymphadenopathy:      Cervical: No cervical adenopathy. Skin:     General: Skin is warm and dry. Findings: No erythema.    Neurological:      Mental Status: He is alert and oriented to person, place, and time. Psychiatric:         Behavior: Behavior normal.         Judgment: Judgment normal.             DATA:  CMP:    Lab Results   Component Value Date     12/14/2019    K 3.9 12/14/2019     12/14/2019    CO2 24 12/14/2019    BUN 11 12/14/2019    CREATININE 0.7 12/14/2019    LABGLOM >60 12/14/2019    GLUCOSE 136 12/14/2019    PROT 5.0 12/12/2019    LABALBU 2.8 12/12/2019    CALCIUM 8.3 12/14/2019    BILITOT 0.3 12/12/2019    ALKPHOS 78 12/12/2019    AST 19 01/21/2020    ALT 10 01/21/2020     Results for orders placed or performed in visit on 09/17/20   POCT Urinalysis Dipstick w/ Micro (Auto)   Result Value Ref Range    Color, UA Yellow     Clarity, UA Clear Clear    Glucose, Ur neg     Bilirubin Urine 0 mg/dL    Ketones, Urine Negative     Specific Gravity, UA 1.010 1.005 - 1.030    Blood, Urine Positive     pH, UA 5.0 4.5 - 8.0    Protein, UA Negative Negative    Nitrite, Urine Negative     Leukocytes, UA neg     Urobilinogen, Urine Normal     rbc urine, poc 0     wbc urine, poc 0     bacteria urine, poc 0     yeast urine, poc 0     casts urine, poc 0     epi cells urine, poc 0     crystals urine, poc 0      Lab Results   Component Value Date    PSA 1.04 08/04/2020    PSA 1.00 07/31/2019       IMAGING:  I reviewed the renal ultrasound. The septated cyst in the right kidney appears essentially unchanged. No significant change in size. On the left side this appears to be consistent with a simple cyst may have increased in size from 4.8 to 5 cm but again this appears benign. There was a hyperechoic area in the right lobe of the liver probably wrapping it representing hemangioma. This was not identified on the prior CT scan but there was a low-density lesion in the right lobe seen on prior CT scan but this was not done for delayed hemangioma protocol. This is small and probably represent benign hemangioma.     1. Renal cyst, left  Stable simple cyst.  - POCT Urinalysis Dipstick w/ Micro (Auto)  - CT ABDOMEN PELVIS W WO CONTRAST Additional Contrast? Radiologist Recommendation (renal mass protocol); Future    2. Complex renal cyst  This cyst needs follow-up in 1 year. If this appears unchanged by CT scan I think no further follow-up is needed. - diphenhydrAMINE (BENADRYL) 25 MG tablet; Take 2 tablets by mouth once for 1 dose Dose should be given 1 hour prior to contrast media administration. Dispense: 2 tablet; Refill: 0  - predniSONE (DELTASONE) 50 MG tablet; Take 1 tablet by mouth every 6 hours for 3 doses First dose should be given 13 hours prior to contrast media administration. Dispense: 3 tablet; Refill: 0  - CT ABDOMEN PELVIS W WO CONTRAST Additional Contrast? Radiologist Recommendation (renal mass protocol); Future    3. Hypertrophy of prostate with urinary obstruction  Symptoms stable TIMOTHY and PSA unremarkable. He has not responded to medical therapy the past he has moderate symptoms I did give him some information on [facility] minimally invasive treatment for BPH. He will contact us if he thinks he is interested in this otherwise we will follow-up in 1 year. - PSA, Diagnostic; Future      Orders Placed This Encounter   Procedures    CT ABDOMEN PELVIS W WO CONTRAST Additional Contrast? Radiologist Recommendation (renal mass protocol)     Renal mass protocol     Standing Status:   Future     Standing Expiration Date:   3/17/2022     Scheduling Instructions:       In 1 year prior to next visit     Order Specific Question:   Additional Contrast?     Answer:   Radiologist Recommendation     Comments:   renal mass protocol     Order Specific Question:   Reason for exam:     Answer:   renal mass follow-up complex renal cyst    PSA, Diagnostic     In 1 year prior to the next visit     Standing Status:   Future     Standing Expiration Date:   9/17/2022    POCT Urinalysis Dipstick w/ Micro (Auto)        Return in about 1 year (around 9/17/2021) for office visit after xray study, PSA prior to vext visit. All information inputted into the note by the MA to include chief complaint, past medical history, past surgical history, medications, allergies, social and family history and review of systems has been reviewed and updated as needed by me. EMR Dragon/transcription disclaimer: Much of this documentt is electronic  transcription/translation of spoken language to printed text. The  electronic translation of spoken language may be erroneous, or at times,  nonsensical words or phrases may be inadvertently transcribed.  Although I  have reviewed the document for such errors, some may still exist.

## 2020-10-07 NOTE — PROGRESS NOTES
Patient administered flu shot in left arm. CDC information sheet given. Patient voiced understanding. Patient tolerated well.

## 2020-10-13 ENCOUNTER — TRANSCRIBE ORDERS (OUTPATIENT)
Dept: ADMINISTRATIVE | Facility: HOSPITAL | Age: 65
End: 2020-10-13

## 2020-10-13 DIAGNOSIS — Z01.818 PRE-OP TESTING: ICD-10-CM

## 2020-10-13 DIAGNOSIS — Z01.818 PRE-TRANSPLANT EVALUATION FOR CHRONIC KIDNEY DISEASE: Primary | ICD-10-CM

## 2020-10-17 ENCOUNTER — LAB (OUTPATIENT)
Dept: LAB | Facility: HOSPITAL | Age: 65
End: 2020-10-17

## 2020-10-17 PROCEDURE — C9803 HOPD COVID-19 SPEC COLLECT: HCPCS | Performed by: OTOLARYNGOLOGY

## 2020-10-17 PROCEDURE — U0003 INFECTIOUS AGENT DETECTION BY NUCLEIC ACID (DNA OR RNA); SEVERE ACUTE RESPIRATORY SYNDROME CORONAVIRUS 2 (SARS-COV-2) (CORONAVIRUS DISEASE [COVID-19]), AMPLIFIED PROBE TECHNIQUE, MAKING USE OF HIGH THROUGHPUT TECHNOLOGIES AS DESCRIBED BY CMS-2020-01-R: HCPCS | Performed by: OTOLARYNGOLOGY

## 2020-10-18 LAB
COVID LABCORP PRIORITY: NORMAL
SARS-COV-2 RNA RESP QL NAA+PROBE: NOT DETECTED

## 2020-10-21 ENCOUNTER — PROCEDURE VISIT (OUTPATIENT)
Dept: OTOLARYNGOLOGY | Facility: CLINIC | Age: 65
End: 2020-10-21

## 2020-10-21 VITALS — TEMPERATURE: 97.8 F

## 2020-10-21 DIAGNOSIS — C44.219 BASAL CELL CARCINOMA, EAR, LEFT: Primary | ICD-10-CM

## 2020-10-21 DIAGNOSIS — C44.612 BASAL CELL CARCINOMA, ARM, RIGHT: ICD-10-CM

## 2020-10-21 PROCEDURE — 88331 PATH CONSLTJ SURG 1 BLK 1SPC: CPT | Performed by: OTOLARYNGOLOGY

## 2020-10-21 PROCEDURE — 13121 CMPLX RPR S/A/L 2.6-7.5 CM: CPT | Performed by: OTOLARYNGOLOGY

## 2020-10-21 PROCEDURE — 88305 TISSUE EXAM BY PATHOLOGIST: CPT | Performed by: OTOLARYNGOLOGY

## 2020-10-21 PROCEDURE — 11603 EXC TR-EXT MAL+MARG 2.1-3 CM: CPT | Performed by: OTOLARYNGOLOGY

## 2020-10-21 PROCEDURE — 14060 TIS TRNFR E/N/E/L 10 SQ CM/<: CPT | Performed by: OTOLARYNGOLOGY

## 2020-10-21 NOTE — PROGRESS NOTES
PATIENT NAME:  Chung Castañeda    DATE:  10/21/20    PREOPERATIVE DIAGNOSIS:  1) basal cell carcinoma of left ear   2) basal cell carcinoma skin of right arm    POSTOPERATIVE DIAGNOSIS:  1) basal cell carcinoma of left ear   2) basal cell carcinoma skin of right arm     PROCEDURE:  1) excision malignant neoplasm of skin of left ear, 1.9 cm x 1.8 cm   2) chondrocutaneous flap of left ear, 2.0 cm x 2.1 cm   3) excision of neoplasm skin of right arm, 2.3 cm x 5.0 cm   4) complex layered closure skin of right forearm, 5.5 cm    SURGEON:  Chiki Chery MD, FACS    FACILITY: [facility] Office Procedure Room    ANESTHESIA:  Local with 5 cc 1% lidocaine and 1:100,000 epinephrine    DICTATED BY:  Chiki Chery MD, FACS    IVF: None    IMPLANTS: None    DRAINS: None    SPECIMENS: 1) basal cell carcinoma of left ear, stitch at 12:00--frozen   2) basal cell carcinoma skin of right arm, stitch at 12:00-frozen    EBL: 15 cc    COMPLICATIONS: None apparent    INDICATIONS FOR SURGERY: Mr. Castañeda presented with biopsy-proven basal cell carcinomas of the right arm and left ear.  He opted for surgical excision.    OPERATIVE FINDINGS:     Left ear:  Lesion: 1.0 cm x 1.0 cm  Margins: 3 mm, then an additional 3 mm  Defect: 1.9 cm x 1.8 cm  Depth: Full-thickness  Flap and defect: 2.0 cm x 2.1 cm    Right arm:  Lesion: 1.3 cm x 2.2 cm  Margins: 3.5 mm, than an additional 3 mm  Defect: 5.0 cm x 2.3 cm  Depth: Subcutaneous fat  Closure Length: 5.0  cm      OPERATIVE DETAILS:       After patient verification consent material was reviewed, the patient was taken to the procedure room and laid supine on the procedure table.  The skin was cleansed with alcohol and infiltrated with 1% lidocaine with 1-100,000 epinephrine.    After approximately 15 minutes, the skin was tested for anesthesia, and deemed appropriate.  The right arm lesion was marked with the above listed dimensions, the appropriate margins, as listed above, were  drawn around this.  This was then converted to a fusiform-type incision to allow closure and to decrease the standing cutaneous deformities associated with circular to oval-type defects.  This was oriented transverse to the arm due to the inherent orientation of the cancer.    To the left ear, a wedge excision with chondral-cutaneous advancement flap was designed.    The patient was then sterilely prepped and draped.    The ear was addressed first.  A 15 blade was used to incise the skin along the prior-marked plan.  The skin was then excised in full-thickness fashion.  The specimen was oriented with a suture at 12:00 and sent for frozen section analysis.    The right arm lesion was then addressed.  A fresh 15 blade was used to make a fusiform incision and this is undermined the subcutaneous plane.  A stitch was placed at 12:00, and this was sent for frozen section analysis.    Frozen section analysis demonstrated a small amount of superficial basal cell carcinoma at the 6-9 margin on the arm; and a small focus of superficial invasion at the 12:00 of the ear.    As a result, the 6-9 o'clock margin was taken measuring 3 mm per approximately 2 and half centimeters of the right arm.  Surgical ink was placed at the distal margin.  A stitch was placed at 9:00.  This was sent for permanent section analysis.    I then took an individual wedge at 12:00 of the ear, placed blue at the new margin, and sent this for permanent section pathology.    The ear was closed by incising the cartilage, to please the inherent spring and rotating the flap in place.  This was closed utilizing deep 5-0 undyed Vicryl suture perform a running, locking 5-0 fast-absorbing gut.    The arm was then closed.  Utilizing a fresh 15 blade, the skin was undermined in the subcutaneous plane for approximately 2.0 cm in each direction to facilitate wound closure with reduced tension, and wound eversion.    The skin was closed utilizing deep, buried 4-0  undyed Vicryl suture.  The overlying skin was closed utilizing horizontal mattress and simple 4-0 nylon sutures.    Antibiotic ointment was placed to the incisions.    DISPOSITION:  The procedures were completed without complication and tolerated well.  The patient was released in the company of himself to return home in satisfactory condition.  A follow-up appointment has been scheduled, routine post-op medications prescribed (if required), and post-op instructions were given to the responsible party.           Chiki Chery MD, FACS  Board Certified Facial Plastic and Reconstructive Surgery  Board Certified Otolaryngology -- Head and Neck Surgery  Electronically signed by Chiki Chery MD, 10/21/20, 1:13 PM CDT.

## 2020-10-21 NOTE — PATIENT INSTRUCTIONS
CONTACT INFORMATION:  The main office phone number is [phone]. For emergencies after hours and on weekends, this number will convert over to our answering service and the on call provider will answer. Please try to keep non emergent phone calls/ questions to office hours 9am-5pm Monday through Friday.     [employer]  As an alternative, you can sign up and use the Epic MyChart system for more direct and quicker access for non emergent questions/ problems.  [facility] [employer]book allows you to send messages to your doctor, view your test results, renew your prescriptions, schedule appointments, and more. To sign up, go to [employer] and click on the Sign Up Now link in the New User? box. Enter your [employer]book Activation Code exactly as it appears below along with the last four digits of your Social Security Number and your Date of Birth () to complete the sign-up process. If you do not sign up before the expiration date, you must request a new code.    [employer]book Activation Code: [email or web address]  Expires: 2020  9:23 AM    If you have questions, you can email [email or web address] or call [phone] to talk to our [employer] staff. Remember, [employer] is NOT to be used for urgent needs. For medical emergencies, dial 911.

## 2020-10-23 LAB
LAB AP CASE REPORT: NORMAL
LAB AP CLINICAL INFORMATION: NORMAL
Lab: NORMAL
PATH REPORT.FINAL DX SPEC: NORMAL
PATH REPORT.GROSS SPEC: NORMAL

## 2020-10-29 ENCOUNTER — OFFICE VISIT (OUTPATIENT)
Dept: OTOLARYNGOLOGY | Facility: CLINIC | Age: 65
End: 2020-10-29

## 2020-10-29 DIAGNOSIS — C44.612 BASAL CELL CARCINOMA, ARM, RIGHT: ICD-10-CM

## 2020-10-29 DIAGNOSIS — C44.219 BASAL CELL CARCINOMA, EAR, LEFT: Primary | ICD-10-CM

## 2020-10-29 PROCEDURE — 99024 POSTOP FOLLOW-UP VISIT: CPT | Performed by: NURSE PRACTITIONER

## 2020-10-29 RX ORDER — CEPHALEXIN 500 MG/1
500 CAPSULE ORAL 4 TIMES DAILY
Qty: 28 CAPSULE | Refills: 0 | Status: SHIPPED | OUTPATIENT
Start: 2020-10-29 | End: 2020-11-05

## 2020-10-29 NOTE — PROGRESS NOTES
CC/Reason for visit: Chung Castañeda returns to the office status post excision of a basal cell carcinoma of the left ear with chondrocutaneous flap and excision of a basal cell carcinoma of the right forearm with complex layered closure on October 21, 2020.      SUBJECTIVE:  Since surgery, he has been doing relatively well.  He comes in today with complaints of increasing redness around the right forearm incision.  This has been progressively worsening over the last 3 days.  He denies fever, chills, bleeding, or drainage      OBJECTIVE:  Right forearm incision is well approximated and intact with sutures.  Moderate amount of erythema surrounding the right forearm incision.  This is warm to touch.  No evidence of drainage      Pathology Reviewed:        ASSESSMENT:  Diagnoses and all orders for this visit:    1. Basal cell carcinoma, ear, left (Primary)    2. Basal cell carcinoma, arm, right    Other orders  -     cephalexin (KEFLEX) 500 MG capsule; Take 1 capsule by mouth 4 (Four) Times a Day for 7 days.  Dispense: 28 capsule; Refill: 0        PLAN:   Start Keflex.  Continue local wound care.  He was instructed to call if his symptoms worsen or persist.  Keep next scheduled follow-up in 1 week for suture removal.        Michelle Mobley, APRN   10/29/2020  12:41 CDT

## 2020-11-05 ENCOUNTER — OFFICE VISIT (OUTPATIENT)
Dept: OTOLARYNGOLOGY | Facility: CLINIC | Age: 65
End: 2020-11-05

## 2020-11-05 DIAGNOSIS — Z48.02 ENCOUNTER FOR REMOVAL OF SUTURES: Primary | ICD-10-CM

## 2020-11-05 PROCEDURE — 99024 POSTOP FOLLOW-UP VISIT: CPT | Performed by: OTOLARYNGOLOGY

## 2020-11-05 NOTE — PROGRESS NOTES
Pt here for suture removal.  Removed sutures from right arm.  No S/S of infection noted.  Pt tolerated well

## 2020-11-18 NOTE — PROGRESS NOTES
SUBJECTIVE:    Patient ID: Mark Bettencourt is a 76 y.o. male. HPI:   HPI   Started last week. Thought it was back pain but now it is in the front on the right side and goes around to the back area. Varies in intensity. No injury  History of kidney stone    sitting in chair and sudden onset. He has recently been on antibiotic which she says caused some constipation he had some skin cancers removed and they got infected so he was started on antibiotic which he has completed he did state that he had a bowel movement this morning but it was very small  Radiology Unable to see right kidney due to stool. Narrative    Examination. XR ABDOMEN (KUB) (SINGLE AP VIEW) 11/18/2020 10:11 AM    History: Right lower quadrant and flank pain. A frontal projection of the abdomen including the pelvis is obtained. The comparison is made with the previous study dated 3/13/2019. There is moderate gas and stool in the colon. No evidence of    dilatation of small bowel loops. Both kidneys are obscured by the bowel contents. A linear    calcification projected over the left renal pelvis probably represent    a vascular calcification. No definite radiopaque calculi. There is evidence of prior cholecystectomy. Atheromatous changes of the abdominal aorta and iliac arteries are    seen. No evidence of previous lumbar spine surgery/laminectomy. Chronic    degenerative changes of the lumbar spine are seen with a mild    dextroscoliosis.         Impression    No nephrolithiasis. Nonspecific abdominal gas pattern. No evidence of obstruction or    ileus.         Orders Only on 11/18/2020   Component Date Value Ref Range Status    Color, UA 11/18/2020 Yellow  Straw/Yellow Final    Clarity, UA 11/18/2020 SL CLOUDY* Clear Final    Glucose, Ur 11/18/2020 Negative  Negative mg/dL Final    Bilirubin Urine 11/18/2020 Negative  Negative Final    Ketones, Urine 11/18/2020 Negative  Negative mg/dL Final    Specific Gravity, UA 11/18/2020 1.010  1.005 - 1.030 Final    Blood, Urine 11/18/2020 TRACE* Negative Final    pH, UA 11/18/2020 5.5  5.0 - 8.0 Final    Protein, UA 11/18/2020 Negative  Negative mg/dL Final    Urobilinogen, Urine 11/18/2020 0.2  <2.0 E.U./dL Final    Nitrite, Urine 11/18/2020 Negative  Negative Final    Leukocyte Esterase, Urine 11/18/2020 Negative  Negative Final    Urine Type 11/18/2020 Clean catch   Final    Urine Reflex to Culture 11/18/2020 no   Final       Past Medical History:   Diagnosis Date    Arthritis     Bronchitis     Cancer ([facility] [address]     Skin Cancer    Chronic cholecystitis without calculus 5/19/2017    Chronic GERD     [facility]ch[facility] obstructive [facility]) ([facility]     Coronary artery disease of native artery of native heart with stable angina pectoris ([facility] [address]     Coronary atherosclerosis of native coronary artery     s/p PTCA and stent placement to the LAD and RCA/7 stents    DDD (degenerative disc disease), lumbar 12/4/2018    History of tobacco abuse 2010    Hyperlipidemia     Hypertension     MI (myocardial infarction) ([facility] [address]     Old, inferior wall    Moderate aortic regurgitation 08/14/2017    mod-severe AR.  Moderate aortic stenosis 08/14/2017    mod-severe aortic stenosis.  Spinal stenosis of lumbar region with neurogenic claudication 12/4/2018      Prior to Visit Medications    Medication Sig Taking? Authorizing Provider   magnesium citrate solution 1/2 bottle to be taken and if no Bm repeat 1/2 bottle next day.  Yes SHIMON Dunbar   ezetimibe (ZETIA) 10 MG tablet TAKE 1 TABLET BY MOUTH EVERY DAY Yes Lester Webster APRN - NP   hydroxychloroquine (PLAQUENIL) 200 MG tablet Take 200 mg by mouth 2 times daily Yes Historical Provider, MD   clopidogrel (PLAVIX) 75 MG tablet TAKE 1 TABLET BY MOUTH EVERY DAY Yes SHIMON Freeman   metoprolol tartrate (LOPRESSOR) 25 MG tablet TAKE 1/2 TABLET BY MOUTH TWICE A DAY Yes Tyler Forte, APRN - CNP eye: No discharge. Extraocular Movements:      Right eye: Normal extraocular motion. Left eye: Normal extraocular motion. Conjunctiva/sclera: Conjunctivae normal.      Right eye: Right conjunctiva is not injected. Left eye: Left conjunctiva is not injected. Pupils: Pupils are equal, round, and reactive to light. Neck:      Musculoskeletal: Normal range of motion and neck supple. Thyroid: No thyromegaly. Vascular: No carotid bruit or JVD. Cardiovascular:      Rate and Rhythm: Normal rate and regular rhythm. Pulses:           Carotid pulses are 2+ on the right side and 2+ on the left side. Radial pulses are 2+ on the right side and 2+ on the left side. Heart sounds: Normal heart sounds, S1 normal and S2 normal. No murmur. Pulmonary:      Effort: Pulmonary effort is normal. No accessory muscle usage. Breath sounds: Normal breath sounds. Abdominal:      General: Bowel sounds are normal. There is no distension or abdominal bruit. Palpations: Abdomen is soft. There is no mass. Tenderness: There is no abdominal tenderness. Hernia: No hernia is present. Musculoskeletal: Normal range of motion. Right lower leg: No edema. Left lower leg: No edema. Lymphadenopathy:      Cervical:      Right cervical: No superficial cervical adenopathy. Left cervical: No superficial cervical adenopathy. Skin:     General: Skin is warm and dry. Coloration: Skin is not jaundiced or pale. Findings: No lesion or rash. Nails: There is no clubbing. Neurological:      Mental Status: He is alert and oriented to person, place, and time. Cranial Nerves: No facial asymmetry. Motor: No weakness or tremor. Coordination: Coordination normal.      Gait: Gait normal.      Deep Tendon Reflexes: Reflexes are normal and symmetric.    Psychiatric:         Attention and Perception: Attention normal.         Mood and Affect: Mood normal.         Speech: Speech normal.         Behavior: Behavior normal.         Thought Content: Thought content normal.         Cognition and Memory: Memory normal.         Judgment: Judgment normal.        /78 (Site: Left Upper Arm, Position: Sitting, Cuff Size: Medium Adult)   Pulse 54   Temp 97.2 °F (36.2 °C) (Temporal)   Resp 20   Wt 177 lb (80.3 kg)   SpO2 98%   BMI 27.72 kg/m²      ASSESSMENT:      ICD-10-CM    1. Flank pain  R10.9 Urinalysis Reflex to Culture     XR ABDOMEN (KUB) (SINGLE AP VIEW)   2. Right lower quadrant abdominal pain  R10.31 Urinalysis Reflex to Culture     XR ABDOMEN (KUB) (SINGLE AP VIEW)   3. Constipation, unspecified constipation type  K59.00 magnesium citrate solution       PLAN:    Ramón Owens: Abdominal Pain (has pain in RLQ radiates to right flank . about 1 week ago this started )  If continue pain will do a CT of abdomen     Diagnosis and orders for this visit are above.

## 2020-11-30 NOTE — PROGRESS NOTES
SUBJECTIVE:  Here today for continued Pain   Patient ID: Mark Bettencourt is a 76 y.o. male. HPI:   HPI   HPI HPI from 11/18/2020  Started last week. Thought it was back pain but now it is in the front on the right side and goes around to the back area. Varies in intensity. No injury  History of kidney stone    sitting in chair and sudden onset. He has recently been on antibiotic which she says caused some constipation he had some skin cancers removed and they got infected so he was started on antibiotic which he has completed he did state that he had a bowel movement this morning but it was very small  Radiology Unable to see right kidney due to stool. Narrative    Examination. XR ABDOMEN (KUB) (SINGLE AP VIEW) 11/18/2020 10:11 AM    History: Right lower quadrant and flank pain. A frontal projection of the abdomen including the pelvis is obtained. The comparison is made with the previous study dated 3/13/2019. There is moderate gas and stool in the colon. No evidence of    dilatation of small bowel loops. Both kidneys are obscured by the bowel contents. A linear    calcification projected over the left renal pelvis probably represent    a vascular calcification. No definite radiopaque calculi. There is evidence of prior cholecystectomy. Atheromatous changes of the abdominal aorta and iliac arteries are    seen. No evidence of previous lumbar spine surgery/laminectomy. Chronic    degenerative changes of the lumbar spine are seen with a mild    dextroscoliosis.         Impression    No nephrolithiasis. Nonspecific abdominal gas pattern. No evidence of obstruction or    ileus.        TODAY: continues with abdominal pain on the RLQ DID take magnesium citrate for Constipation. DId help with constipation. But Pain under rib. Not sharp severe at times than other times it is constant.      Past Medical History:   Diagnosis Date    Arthritis     Bronchitis     Cancer ([facility] [address]     Skin Cancer  Chronic cholecystitis without calculus 5/19/2017    Chronic GERD     COPD (chronic obstructive pulmonary disease) (HCC)     Coronary artery disease of native artery of native heart with stable angina pectoris ([facility] [address]     Coronary atherosclerosis of native coronary artery     s/p PTCA and stent placement to the LAD and RCA/7 stents    DDD (degenerative disc disease), lumbar 12/4/2018    History of tobacco abuse 2010    Hyperlipidemia     Hypertension     MI (myocardial infarction) ([facility] [address]     Old, inferior wall    Moderate aortic regurgitation 08/14/2017    mod-severe AR.  Moderate aortic stenosis 08/14/2017    mod-severe aortic stenosis.  Spinal stenosis of lumbar region with neurogenic claudication 12/4/2018      Prior to Visit Medications    Medication Sig Taking? Authorizing Provider   magnesium citrate solution 1/2 bottle to be taken and if no Bm repeat 1/2 bottle next day. Yes SHIMON Amin   ezetimibe (ZETIA) 10 MG tablet TAKE 1 TABLET BY MOUTH EVERY DAY Yes SHIMON Tavarez - NP   hydroxychloroquine (PLAQUENIL) 200 MG tablet Take 200 mg by mouth 2 times daily Yes Historical Provider, MD   clopidogrel (PLAVIX) 75 MG tablet TAKE 1 TABLET BY MOUTH EVERY DAY Yes SHIMON Amin   metoprolol tartrate (LOPRESSOR) 25 MG tablet TAKE 1/2 TABLET BY MOUTH TWICE A DAY Yes SHIMON Marion - CNP   oxyCODONE-acetaminophen (PERCOCET) 7.5-325 MG per tablet Take 1 tablet by mouth every 4 hours as needed for Pain. Yes Historical Provider, MD   amLODIPine (NORVASC) 5 MG tablet Take 5 mg by mouth 2 times daily  Yes Historical Provider, MD   pantoprazole (PROTONIX) 40 MG tablet Take 1 tablet by mouth daily Take daily first thing in the morning on an empty stomach. Yes Colon SHIMON Pizano   gabapentin (NEURONTIN) 800 MG tablet Take 800 mg by mouth 2 times daily .  Yes Historical Provider, MD   nitroGLYCERIN (NITROSTAT) 0.4 MG SL tablet Place 1 tablet under the tongue every 5 minutes as needed 1 tablet as needed Yes SHIMON Umanzor - CNP      Allergies   Allergen Reactions    Codeine Swelling     Lips swelling    Iv [Iodides] Swelling     Contrast dye used for heart cath. Caused face to swell.  Hydrocodone Swelling     lips swelling    Hydrocodone-Acetaminophen Hives    Statins      Myalgias         Review of Systems   Constitutional: Negative for appetite change and fever. Gastrointestinal: Positive for abdominal pain. Negative for constipation, diarrhea, nausea and vomiting. Genitourinary: Positive for flank pain. Negative for difficulty urinating and dysuria. OBJECTIVE:    Physical Exam  Vitals signs reviewed. Constitutional:       Appearance: Normal appearance. He is well-developed. HENT:      Head: Normocephalic and atraumatic. Right Ear: Tympanic membrane, ear canal and external ear normal. There is no impacted cerumen. Left Ear: Tympanic membrane, ear canal and external ear normal. There is no impacted cerumen. Nose: Nose normal.      Mouth/Throat:      Lips: Pink. Mouth: Mucous membranes are moist.      Dentition: Normal dentition. Tongue: No lesions. Pharynx: Oropharynx is clear. Uvula midline. Tonsils: No tonsillar exudate or tonsillar abscesses. Eyes:      General: Lids are normal.         Right eye: No discharge. Left eye: No discharge. Extraocular Movements:      Right eye: Normal extraocular motion. Left eye: Normal extraocular motion. Conjunctiva/sclera: Conjunctivae normal.      Right eye: Right conjunctiva is not injected. Left eye: Left conjunctiva is not injected. Pupils: Pupils are equal, round, and reactive to light. Neck:      Musculoskeletal: Normal range of motion and neck supple. Thyroid: No thyromegaly. Vascular: No carotid bruit or JVD. Cardiovascular:      Rate and Rhythm: Normal rate and regular rhythm.       Pulses:           Carotid pulses are 2+ on the right side and 2+ on the left side. Radial pulses are 2+ on the right side and 2+ on the left side. Heart sounds: Normal heart sounds, S1 normal and S2 normal. No murmur. Pulmonary:      Effort: Pulmonary effort is normal. No accessory muscle usage. Breath sounds: Normal breath sounds. Abdominal:      General: Bowel sounds are increased. There is no distension or abdominal bruit. Palpations: Abdomen is soft. There is no mass. Tenderness: There is abdominal tenderness in the right upper quadrant. Hernia: No hernia is present. Musculoskeletal: Normal range of motion. Right lower leg: No edema. Left lower leg: No edema. Lymphadenopathy:      Cervical:      Right cervical: No superficial cervical adenopathy. Left cervical: No superficial cervical adenopathy. Skin:     General: Skin is warm and dry. Coloration: Skin is not jaundiced or pale. Findings: No lesion or rash. Nails: There is no clubbing. Neurological:      Mental Status: He is alert and oriented to person, place, and time. Cranial Nerves: No facial asymmetry. Motor: No weakness or tremor. Coordination: Coordination normal.      Gait: Gait normal.      Deep Tendon Reflexes: Reflexes are normal and symmetric. Psychiatric:         Attention and Perception: Attention normal.         Mood and Affect: Mood normal.         Speech: Speech normal.         Behavior: Behavior normal.         Thought Content: Thought content normal.         Cognition and Memory: Memory normal.         Judgment: Judgment normal.        /66 (Site: Left Upper Arm, Position: Sitting, Cuff Size: Medium Adult)   Pulse 50   Temp 96.8 °F (36 °C) (Temporal)   Resp 20   Wt 177 lb (80.3 kg)   SpO2 98%   BMI 27.72 kg/m²      ASSESSMENT:      ICD-10-CM    1. Flank pain, acute  R10.9 CT ABDOMEN WO CONTRAST Additional Contrast? None       PLAN:    Ramón Owens: Abdominal Pain (RLQ pain continues .  he was seen on 11/18 for this and pain has not gone away . )  CT is planned for Dec 1    Diagnosis and orders for this visit are above.

## 2020-12-03 PROBLEM — Z79.899 CURRENT USE OF PROTON PUMP INHIBITOR: Status: ACTIVE | Noted: 2020-01-01

## 2020-12-03 NOTE — PROGRESS NOTES
Subjective:      Travis Santana is a78 y.o. male  Chief Complaint   Patient presents with    Heartburn     needs med refilled       HPI  PCP: Dustin Kothari   Pt is here for an annual appt for med refills. Has chronic heartburn well controlled with protonix 40mg daily. Needs med refilled. No a/e of medication reported. C/o right sided upper abd pain. Started a month ago. Constant. Some times worse than others. Describes as \"dull ache. \"  Laying on left side causes apulliong sensation and this makes the pain worse. No change in bowel habits, no blood in stools. Had a CT 12/1/2020 for eval of this pain, as ordered by his PCP. Negative from GI standpoint. GI scope reports in history per MA per OV policy, I reviewed this. Family HX:    Pt denies family hx of colon polyps, colon CA, inflammatory bowel dx, gastric CA and esophageal CA. Past Medical History:   Diagnosis Date    Arthritis     Bronchitis     Cancer ([facility] [address]     Skin Cancer    Chronic cholecystitis without calculus 5/19/2017    Chronic GERD     COPD (chronic obstructive pulmonary disease) (HCC)     Coronary artery disease of native artery of native heart with stable angina pectoris ([facility] [address]     Coronary atherosclerosis of native coronary artery     s/p PTCA and stent placement to the LAD and RCA/7 stents    DDD (degenerative disc disease), lumbar 12/4/2018    History of tobacco abuse 2010    Hyperlipidemia     Hypertension     MI (myocardial infarction) ([facility] [address]     Old, inferior wall    Moderate aortic regurgitation 08/14/2017    mod-severe AR.  Moderate aortic stenosis 08/14/2017    mod-severe aortic stenosis.      Spinal stenosis of lumbar region with neurogenic claudication 12/4/2018          Past Surgical History:   Procedure Laterality Date    BACK SURGERY      s/p laminectomy    CARDIAC CATHETERIZATION  08/10/04 [facility]      CARDIAC [facility]HE[facility]  [facility]10[facility]03  [facility]    CARDIAC CATHETERIZATION  08/29/03 [facility] Inability: None    Transportation needs     Medical: None     Non-medical: None   Tobacco Use    Smoking status: Former Smoker     Packs/day: 0.00     Types: Cigarettes     Last attempt to quit: 12/10/2009     Years since quitting: 10.9    Smokeless tobacco: Never Used   Substance and Sexual Activity    Alcohol use: Yes     Alcohol/week: 6.0 standard drinks     Types: 6 Cans of beer per week     Comment: daily    Drug use: No    Sexual activity: Yes     Partners: Female   Lifestyle    Physical activity     Days per week: None     Minutes per session: None    Stress: None   Relationships    Social connections     Talks on phone: None     Gets together: None     Attends [employer] service: None     Active member of club or organization: None     Attends meetings of clubs or organizations: None     Relationship status: None    Intimate partner violence     Fear of current or ex partner: None     Emotionally abused: None     Physically abused: None     Forced sexual activity: None   Other Topics Concern    None   Social History Narrative    None       Allergies   Allergen Reactions    Codeine Swelling     Lips swelling    Iv [Iodides] Swelling     Contrast dye used for heart cath. Caused face to swell.  Hydrocodone Swelling     lips swelling    Hydrocodone-Acetaminophen Hives    Statins      Myalgias         Current Outpatient Medications   Medication Sig Dispense Refill    magnesium citrate solution 1/2 bottle to be taken and if no Bm repeat 1/2 bottle next day.  296 mL 0    ezetimibe (ZETIA) 10 MG tablet TAKE 1 TABLET BY MOUTH EVERY DAY 30 tablet 5    hydroxychloroquine (PLAQUENIL) 200 MG tablet Take 200 mg by mouth 2 times daily      clopidogrel (PLAVIX) 75 MG tablet TAKE 1 TABLET BY MOUTH EVERY DAY 90 tablet 3    metoprolol tartrate (LOPRESSOR) 25 MG tablet TAKE 1/2 TABLET BY MOUTH TWICE A DAY 90 tablet 2    oxyCODONE-acetaminophen (PERCOCET) 7.5-325 MG per tablet Take 1 tablet by mouth every 4 hours as needed for Pain.  amLODIPine (NORVASC) 5 MG tablet Take 5 mg by mouth 2 times daily       pantoprazole (PROTONIX) 40 MG tablet Take 1 tablet by mouth daily Take daily first thing in the morning on an empty stomach. 90 tablet 3    gabapentin (NEURONTIN) 800 MG tablet Take 800 mg by mouth 2 times daily .  nitroGLYCERIN (NITROSTAT) 0.4 MG SL tablet Place 1 tablet under the tongue every 5 minutes as needed 1 tablet as needed 25 tablet 3     No current facility-administered medications for this visit. Review of Systems   Constitutional: Negative for fatigue and unexpected weight change. HENT: Negative for trouble swallowing and voice change. Respiratory: Negative for cough and shortness of breath. Cardiovascular: Negative for chest pain and palpitations. Gastrointestinal: Positive for abdominal pain. Negative for abdominal distention, anal bleeding, blood in stool, constipation, diarrhea, nausea, rectal pain and vomiting. Genitourinary: Negative for hematuria. Musculoskeletal: Positive for back pain and neck pain. Negative for arthralgias. Neurological: Negative for weakness and headaches. Psychiatric/Behavioral: Negative for dysphoric mood. The patient is not nervous/anxious. Objective:     Physical Exam  Vitals signs and nursing note reviewed. Constitutional:       Appearance: He is well-developed. Comments: /60   Pulse 60   Ht 5' 7\" (1.702 m)   Wt 172 lb (78 kg)   SpO2 98%   BMI 26.94 kg/m²    Eyes:      General: No scleral icterus. Conjunctiva/sclera: Conjunctivae normal.      Pupils: Pupils are equal, round, and reactive to light. Cardiovascular:      Rate and Rhythm: Normal rate and regular rhythm. Heart sounds: Murmur (3/6 early systolic) present. No friction rub. No gallop. Pulmonary:      Effort: Pulmonary effort is normal. No respiratory distress. Breath sounds: Normal breath sounds.    Abdominal:      General: Bowel sounds are normal. There is no distension. Palpations: Abdomen is soft. Tenderness: There is abdominal tenderness. There is no rebound. Comments: Pain is in this location, more on the lateral aspect of right upper abdomen   Neurological:      Mental Status: He is alert and oriented to person, place, and time. Cranial Nerves: No cranial nerve deficit. Psychiatric:         Judgment: Judgment normal.           Assessment:       Diagnosis Orders   1. Chronic heartburn     2. Current use of proton pump inhibitor  Basic Metabolic Panel   3. Right upper quadrant abdominal pain           Plan:      1. We discussed EGD and colonoscopy for eval of his abd pain. He defers at this time. But will call to get them scheduled if he changes his mind. 2. Bmp today. If stable I will refill his protonix, 90 day supply  3.  F/u in 1 year for med refills, sooner if needed

## 2020-12-03 NOTE — TELEPHONE ENCOUNTER
12-3-20  Pt has been notified of results & recommendations.   Faxed results to Jyothi Trujillo for review

## 2020-12-03 NOTE — PATIENT INSTRUCTIONS
Patient Education        pantoprazole (oral/injection)  Pronunciation:  pan TOE pra zole  Brand:  Protonix  What is the most important information I should know about pantoprazole? Pantoprazole can cause kidney problems. Tell your doctor if you are urinating less than usual, or if you have blood in your urine. Diarrhea may be a sign of a new infection. Call your doctor if you have diarrhea that is watery or has blood in it. Pantoprazole may cause new or worsening symptoms of lupus. Tell your doctor if you have joint pain and a skin rash on your cheeks or arms that worsens in sunlight. You may be more likely to have a broken bone while taking this medicine long term or more than once per day. What is pantoprazole? Pantoprazole is a proton pump inhibitor that decreases the amount of acid produced in the stomach. Pantoprazole is used to treat erosive esophagitis (damage to the esophagus from stomach acid caused by gastroesophageal reflux disease, or GERD) in adults and children who are at least 11years old. Pantoprazole is usually given for up to 8 weeks at a time while your esophagus heals. Pantoprazole is also used to treat Zollinger-Willis syndrome and other conditions involving excess stomach acid. Pantoprazole is not for immediate relief of heartburn. Pantoprazole may also be used for purposes not listed in this medication guide. What should I discuss with my healthcare provider before using pantoprazole? Heartburn can mimic early symptoms of a heart attack. Get emergency medical help if you have chest pain that spreads to your jaw or shoulder and you feel anxious or light-headed. You should not use this medicine if:  · you also take medicine that contains rilpivirine (Edurant, Eddie, Jewel Burner); or  · you are allergic to pantoprazole or similar medicines (lansoprazole, omeprazole, Nexium, Prevacid, Prilosec, and others).   Tell your doctor if you have ever had:  · low levels of magnesium in your blood;  · lupus; or  · osteoporosis or low bone mineral density. You may be more likely to have a broken bone in your hip, wrist, or spine while taking a proton pump inhibitor long-term or more than once per day. Talk with your doctor about ways to keep your bones healthy. It is not known whether this medicine will harm an unborn baby. Tell your doctor if you are pregnant or plan to become pregnant. You should not breast-feed while using this medicine. Pantoprazole is not approved for use by anyone younger than 11years old. How should I use pantoprazole? Follow all directions on your prescription label and read all medication guides or instruction sheets. Use the medicine exactly as directed. Use the lowest dose for the shortest amount of time needed to treat your condition. Pantoprazole is taken by mouth (oral) or given as an infusion into a vein (injection). A healthcare provider may teach you how to properly use pantoprazole injection by yourself. Pantoprazole tablets are taken by mouth, with or without food. Pantoprazole oral granules should be taken 30 minutes before a meal.  Do not crush, chew, or break the tablet. Swallow it whole. The oral granules should be mixed with applesauce or apple juice and given either by mouth or through a nasogastric (NG) tube. Read and carefully follow any Instructions for Use provided with your medicine. Ask your doctor or pharmacist if you do not understand these instructions. Use this medicine for the full prescribed length of time, even if your symptoms quickly improve. Call your doctor if your symptoms do not improve or if they get worse while you are using this medicine. This medicine can affect the results of certain medical tests. Tell any doctor who treats you that you are using pantoprazole. Pantoprazole may also affect a drug-screening urine test and you may have false results. Tell the laboratory staff that you use this medicine.   Store this medicine at room temperature away from moisture, heat, and light. What happens if I miss a dose? Use the medicine as soon as you can, but skip the missed dose if it is almost time for your next dose. Do not use two doses at one time. What happens if I overdose? Seek emergency medical attention or call the Poison Help line at 1-692.294.1487. What should I avoid while using pantoprazole? This medicine can cause diarrhea, which may be a sign of a new infection. If you have diarrhea that is watery or bloody, call your doctor. Do not use anti-diarrhea medicine unless your doctor tells you to. What are the possible side effects of pantoprazole? Get emergency medical help if you have signs of an allergic reaction: hives; difficulty breathing; swelling of your face, lips, tongue, or throat. Call your doctor at once if you have:  · severe stomach pain, diarrhea that is watery or bloody;  · sudden pain or trouble moving your hip, wrist, or back;  · bruising or swelling where intravenous pantoprazole was injected;  · kidney problems --urinating less than usual, blood in your urine, swelling, rapid weight gain;  · low magnesium --dizziness, fast or irregular heart rate, tremors (shaking) or jerking muscle movements, feeling jittery, muscle cramps, muscle spasms in your hands and feet, cough or choking feeling; or  · new or worsening symptoms of lupus --joint pain, and a skin rash on your cheeks or arms that worsens in sunlight. Taking pantoprazole long-term may cause you to develop stomach growths called fundic gland polyps. Talk with your doctor about this risk. If you use pantoprazole for longer than 3 years, you could develop a vitamin B-12 deficiency. Talk to your doctor about how to manage this condition if you develop it. Common side effects may include:  · headache, dizziness;  · stomach pain, gas, nausea, vomiting, diarrhea;  · joint pain; or  · fever, rash, or cold symptoms (most common in children).   This is not a complete list of side effects and others may occur. Call your doctor for medical advice about side effects. You may report side effects to FDA at 4-382-ZQK-1715. What other drugs will affect pantoprazole? Tell your doctor about all your other medicines, especially:  · digoxin;  · methotrexate; or  · a diuretic or \"water pill. \"  This list is not complete. Other drugs may affect pantoprazole, including prescription and over-the-counter medicines, vitamins, and herbal products. Not all possible drug interactions are listed here. Where can I get more information? Your pharmacist can provide more information about pantoprazole. Remember, keep this and all other medicines out of the reach of children, never share your medicines with others, and use this medication only for the indication prescribed. Every effort has been made to ensure that the information provided by [address] is accurate, up-to-date, and complete, but no guarantee is made to that effect. Drug information contained herein may be time sensitive. [employer] information has been compiled for use by healthcare practitioners and consumers in the [facility] and therefore [employer] does not warrant that uses outside of the [facility] are appropriate, unless specifically indicated otherwise. Merged with [facility]'s drug information does not endorse drugs, diagnose patients or recommend therapy. [employer] drug information is an informational resource designed to assist licensed healthcare practitioners in caring for their patients and/or to serve consumers viewing this service as a supplement to, and not a substitute for, the expertise, skill, knowledge and judgment of healthcare practitioners. The absence of a warning for a given drug or drug combination in no way should be construed to indicate that the drug or drug combination is safe, effective or appropriate for any given patient.  [employer] does not assume any responsibility for any aspect

## 2020-12-04 NOTE — TELEPHONE ENCOUNTER
The glucose was elevated. Please check with pt I think this glucose is not fasting.  If it is we need an A1C

## 2020-12-18 NOTE — TELEPHONE ENCOUNTER
Electronic refill request received today for the following:    Requested Prescriptions     Pending Prescriptions Disp Refills    pantoprazole (PROTONIX) 40 MG tablet [Pharmacy Med Name: PANTOPRAZOLE SOD DR 40 MG TAB] 90 tablet 3     Sig: TAKE 1 TABLET BY MOUTH DAILY TAKE DAILY FIRST THING IN THE MORNING ON AN EMPTY STOMACH. Last ov with [facility] APRN 12/3/20, it mentions that if his BMP was stable, she would send in 90 day refill. Renal function was normal. I am not seeing where it was sent in. No follow up scheduled.

## 2021-01-01 ENCOUNTER — OFFICE VISIT (OUTPATIENT)
Dept: CARDIOLOGY CLINIC | Age: 66
End: 2021-01-01
Payer: MEDICARE

## 2021-01-01 ENCOUNTER — APPOINTMENT (OUTPATIENT)
Dept: GENERAL RADIOLOGY | Age: 66
DRG: 466 | End: 2021-01-01
Payer: MEDICARE

## 2021-01-01 ENCOUNTER — TELEPHONE (OUTPATIENT)
Dept: NEUROSURGERY | Age: 66
End: 2021-01-01

## 2021-01-01 ENCOUNTER — TELEPHONE (OUTPATIENT)
Dept: FAMILY MEDICINE CLINIC | Age: 66
End: 2021-01-01

## 2021-01-01 ENCOUNTER — HOSPITAL ENCOUNTER (OUTPATIENT)
Dept: CT IMAGING | Age: 66
Discharge: HOME | End: 2021-03-26
Payer: MEDICARE

## 2021-01-01 ENCOUNTER — OFFICE VISIT (OUTPATIENT)
Age: 66
End: 2021-01-01

## 2021-01-01 ENCOUNTER — HOSPITAL ENCOUNTER (OUTPATIENT)
Dept: NON INVASIVE DIAGNOSTICS | Age: 66
Discharge: HOME | End: 2021-03-04
Payer: MEDICARE

## 2021-01-01 ENCOUNTER — OFFICE VISIT (OUTPATIENT)
Dept: VASCULAR SURGERY | Age: 66
End: 2021-01-01
Payer: MEDICARE

## 2021-01-01 ENCOUNTER — HOSPITAL ENCOUNTER (INPATIENT)
Age: 66
LOS: 2 days | Discharge: HOME | DRG: 286 | End: 2021-04-17
Attending: EMERGENCY MEDICINE
Payer: MEDICARE

## 2021-01-01 ENCOUNTER — APPOINTMENT (OUTPATIENT)
Dept: CT IMAGING | Age: 66
DRG: 466 | End: 2021-01-01
Payer: MEDICARE

## 2021-01-01 ENCOUNTER — TELEPHONE (OUTPATIENT)
Dept: CARDIOLOGY CLINIC | Age: 66
End: 2021-01-01

## 2021-01-01 ENCOUNTER — APPOINTMENT (OUTPATIENT)
Dept: GENERAL RADIOLOGY | Age: 66
DRG: 286 | End: 2021-01-01
Payer: MEDICARE

## 2021-01-01 ENCOUNTER — OFFICE VISIT (OUTPATIENT)
Dept: NEUROSURGERY | Age: 66
End: 2021-01-01
Payer: MEDICARE

## 2021-01-01 ENCOUNTER — HOSPITAL ENCOUNTER (INPATIENT)
Age: 66
LOS: 10 days | Discharge: HOSPICE | DRG: 466 | End: 2021-07-26
Attending: EMERGENCY MEDICINE | Admitting: INTERNAL MEDICINE
Payer: MEDICARE

## 2021-01-01 ENCOUNTER — OFFICE VISIT (OUTPATIENT)
Dept: CARDIOTHORACIC SURGERY | Age: 66
End: 2021-01-01
Payer: MEDICARE

## 2021-01-01 ENCOUNTER — APPOINTMENT (OUTPATIENT)
Dept: CT IMAGING | Age: 66
DRG: 266 | End: 2021-01-01
Attending: INTERNAL MEDICINE
Payer: MEDICARE

## 2021-01-01 ENCOUNTER — ANESTHESIA EVENT (OUTPATIENT)
Dept: CARDIAC CATH/INVASIVE PROCEDURES | Age: 66
DRG: 266 | End: 2021-01-01
Payer: MEDICARE

## 2021-01-01 ENCOUNTER — ANESTHESIA (OUTPATIENT)
Dept: CARDIAC CATH/INVASIVE PROCEDURES | Age: 66
DRG: 266 | End: 2021-01-01
Payer: MEDICARE

## 2021-01-01 ENCOUNTER — APPOINTMENT (OUTPATIENT)
Dept: GENERAL RADIOLOGY | Age: 66
DRG: 266 | End: 2021-01-01
Attending: INTERNAL MEDICINE
Payer: MEDICARE

## 2021-01-01 ENCOUNTER — ANESTHESIA EVENT (OUTPATIENT)
Dept: OPERATING ROOM | Age: 66
DRG: 466 | End: 2021-01-01
Payer: MEDICARE

## 2021-01-01 ENCOUNTER — OFFICE VISIT (OUTPATIENT)
Dept: FAMILY MEDICINE CLINIC | Age: 66
End: 2021-01-01
Payer: MEDICARE

## 2021-01-01 ENCOUNTER — ANESTHESIA (OUTPATIENT)
Dept: CARDIAC CATH/INVASIVE PROCEDURES | Age: 66
DRG: 267 | End: 2021-01-01
Payer: MEDICARE

## 2021-01-01 ENCOUNTER — VIRTUAL VISIT (OUTPATIENT)
Dept: FAMILY MEDICINE CLINIC | Age: 66
End: 2021-01-01
Payer: MEDICARE

## 2021-01-01 ENCOUNTER — HOSPITAL ENCOUNTER (OUTPATIENT)
Dept: CT IMAGING | Age: 66
Discharge: HOME | End: 2021-06-11
Payer: MEDICARE

## 2021-01-01 ENCOUNTER — ANESTHESIA (OUTPATIENT)
Dept: OPERATING ROOM | Age: 66
DRG: 466 | End: 2021-01-01
Payer: MEDICARE

## 2021-01-01 ENCOUNTER — ANESTHESIA EVENT (OUTPATIENT)
Dept: CARDIAC CATH/INVASIVE PROCEDURES | Age: 66
DRG: 267 | End: 2021-01-01
Payer: MEDICARE

## 2021-01-01 ENCOUNTER — APPOINTMENT (OUTPATIENT)
Dept: CT IMAGING | Age: 66
DRG: 286 | End: 2021-01-01
Payer: MEDICARE

## 2021-01-01 ENCOUNTER — HOSPITAL ENCOUNTER (OUTPATIENT)
Dept: CARDIAC CATH/INVASIVE PROCEDURES | Age: 66
Discharge: HOME | End: 2021-03-16
Attending: INTERNAL MEDICINE | Admitting: INTERNAL MEDICINE
Payer: MEDICARE

## 2021-01-01 ENCOUNTER — HOSPITAL ENCOUNTER (INPATIENT)
Dept: CARDIAC CATH/INVASIVE PROCEDURES | Age: 66
LOS: 3 days | Discharge: HOME | DRG: 266 | End: 2021-04-24
Attending: INTERNAL MEDICINE | Admitting: INTERNAL MEDICINE
Payer: MEDICARE

## 2021-01-01 ENCOUNTER — HOSPITAL ENCOUNTER (OUTPATIENT)
Dept: GENERAL RADIOLOGY | Age: 66
Discharge: HOME | DRG: 286 | End: 2021-04-12
Payer: MEDICARE

## 2021-01-01 ENCOUNTER — HOSPITAL ENCOUNTER (OUTPATIENT)
Dept: NON INVASIVE DIAGNOSTICS | Age: 66
Discharge: HOME | End: 2021-05-18
Payer: MEDICARE

## 2021-01-01 ENCOUNTER — TELEPHONE (OUTPATIENT)
Dept: VASCULAR SURGERY | Age: 66
End: 2021-01-01

## 2021-01-01 ENCOUNTER — HOSPITAL ENCOUNTER (INPATIENT)
Dept: CARDIAC CATH/INVASIVE PROCEDURES | Age: 66
LOS: 1 days | Discharge: HOME | DRG: 267 | End: 2021-04-08
Attending: INTERNAL MEDICINE | Admitting: INTERNAL MEDICINE
Payer: MEDICARE

## 2021-01-01 ENCOUNTER — HOSPITAL ENCOUNTER (OUTPATIENT)
Dept: GENERAL RADIOLOGY | Age: 66
Discharge: HOME | End: 2021-05-19
Payer: MEDICARE

## 2021-01-01 ENCOUNTER — NURSE ONLY (OUTPATIENT)
Dept: CARDIOLOGY | Age: 66
End: 2021-01-01

## 2021-01-01 VITALS
HEART RATE: 59 BPM | OXYGEN SATURATION: 99 % | SYSTOLIC BLOOD PRESSURE: 171 MMHG | DIASTOLIC BLOOD PRESSURE: 81 MMHG | TEMPERATURE: 98.2 F

## 2021-01-01 VITALS
HEART RATE: 58 BPM | WEIGHT: 179 LBS | DIASTOLIC BLOOD PRESSURE: 68 MMHG | HEIGHT: 67 IN | SYSTOLIC BLOOD PRESSURE: 119 MMHG | BODY MASS INDEX: 28.09 KG/M2

## 2021-01-01 VITALS — HEART RATE: 52 BPM

## 2021-01-01 VITALS
SYSTOLIC BLOOD PRESSURE: 114 MMHG | HEIGHT: 67 IN | BODY MASS INDEX: 26.37 KG/M2 | WEIGHT: 168 LBS | DIASTOLIC BLOOD PRESSURE: 66 MMHG | HEART RATE: 67 BPM

## 2021-01-01 VITALS
DIASTOLIC BLOOD PRESSURE: 50 MMHG | OXYGEN SATURATION: 91 % | SYSTOLIC BLOOD PRESSURE: 110 MMHG | TEMPERATURE: 98 F | HEART RATE: 75 BPM | RESPIRATION RATE: 16 BRPM | WEIGHT: 174.6 LBS | HEIGHT: 67 IN | BODY MASS INDEX: 27.4 KG/M2

## 2021-01-01 VITALS
HEART RATE: 58 BPM | DIASTOLIC BLOOD PRESSURE: 68 MMHG | WEIGHT: 179 LBS | SYSTOLIC BLOOD PRESSURE: 119 MMHG | BODY MASS INDEX: 28.09 KG/M2 | RESPIRATION RATE: 20 BRPM | OXYGEN SATURATION: 97 % | HEIGHT: 67 IN

## 2021-01-01 VITALS
OXYGEN SATURATION: 98 % | DIASTOLIC BLOOD PRESSURE: 77 MMHG | HEIGHT: 67 IN | BODY MASS INDEX: 27.47 KG/M2 | BODY MASS INDEX: 25.74 KG/M2 | TEMPERATURE: 96.4 F | OXYGEN SATURATION: 98 % | WEIGHT: 164 LBS | HEART RATE: 56 BPM | SYSTOLIC BLOOD PRESSURE: 165 MMHG | SYSTOLIC BLOOD PRESSURE: 110 MMHG | TEMPERATURE: 97.4 F | HEIGHT: 67 IN | HEART RATE: 78 BPM | DIASTOLIC BLOOD PRESSURE: 52 MMHG | WEIGHT: 175 LBS

## 2021-01-01 VITALS
RESPIRATION RATE: 12 BRPM | TEMPERATURE: 97 F | OXYGEN SATURATION: 93 % | SYSTOLIC BLOOD PRESSURE: 106 MMHG | DIASTOLIC BLOOD PRESSURE: 54 MMHG

## 2021-01-01 VITALS — OXYGEN SATURATION: 91 % | DIASTOLIC BLOOD PRESSURE: 70 MMHG | TEMPERATURE: 98.3 F | SYSTOLIC BLOOD PRESSURE: 112 MMHG

## 2021-01-01 VITALS
TEMPERATURE: 97.9 F | RESPIRATION RATE: 15 BRPM | HEART RATE: 73 BPM | SYSTOLIC BLOOD PRESSURE: 138 MMHG | HEIGHT: 67 IN | BODY MASS INDEX: 26.37 KG/M2 | WEIGHT: 168 LBS | DIASTOLIC BLOOD PRESSURE: 72 MMHG | OXYGEN SATURATION: 97 %

## 2021-01-01 VITALS — BODY MASS INDEX: 27.31 KG/M2 | HEIGHT: 67 IN | TEMPERATURE: 97.9 F | WEIGHT: 174 LBS

## 2021-01-01 VITALS
DIASTOLIC BLOOD PRESSURE: 97 MMHG | TEMPERATURE: 98.9 F | OXYGEN SATURATION: 95 % | WEIGHT: 163.1 LBS | RESPIRATION RATE: 20 BRPM | SYSTOLIC BLOOD PRESSURE: 133 MMHG | BODY MASS INDEX: 25.6 KG/M2 | HEART RATE: 140 BPM | HEIGHT: 67 IN

## 2021-01-01 VITALS
DIASTOLIC BLOOD PRESSURE: 64 MMHG | WEIGHT: 161 LBS | SYSTOLIC BLOOD PRESSURE: 120 MMHG | BODY MASS INDEX: 25.22 KG/M2 | TEMPERATURE: 97.5 F | RESPIRATION RATE: 16 BRPM | OXYGEN SATURATION: 98 % | HEART RATE: 76 BPM

## 2021-01-01 VITALS
HEIGHT: 67 IN | BODY MASS INDEX: 25.27 KG/M2 | SYSTOLIC BLOOD PRESSURE: 178 MMHG | DIASTOLIC BLOOD PRESSURE: 71 MMHG | HEART RATE: 69 BPM | WEIGHT: 161 LBS

## 2021-01-01 VITALS — OXYGEN SATURATION: 95 % | HEART RATE: 83 BPM

## 2021-01-01 VITALS
BODY MASS INDEX: 25.64 KG/M2 | OXYGEN SATURATION: 92 % | HEIGHT: 67 IN | RESPIRATION RATE: 18 BRPM | HEART RATE: 88 BPM | DIASTOLIC BLOOD PRESSURE: 93 MMHG | WEIGHT: 163.38 LBS | SYSTOLIC BLOOD PRESSURE: 147 MMHG | TEMPERATURE: 97.2 F

## 2021-01-01 VITALS
TEMPERATURE: 96.9 F | HEIGHT: 67 IN | SYSTOLIC BLOOD PRESSURE: 132 MMHG | DIASTOLIC BLOOD PRESSURE: 68 MMHG | BODY MASS INDEX: 25.74 KG/M2 | HEART RATE: 62 BPM | WEIGHT: 164 LBS | OXYGEN SATURATION: 97 %

## 2021-01-01 VITALS
HEIGHT: 67 IN | OXYGEN SATURATION: 95 % | RESPIRATION RATE: 16 BRPM | WEIGHT: 171 LBS | TEMPERATURE: 98.1 F | DIASTOLIC BLOOD PRESSURE: 53 MMHG | SYSTOLIC BLOOD PRESSURE: 106 MMHG | HEART RATE: 85 BPM | BODY MASS INDEX: 26.84 KG/M2

## 2021-01-01 VITALS — DIASTOLIC BLOOD PRESSURE: 81 MMHG | OXYGEN SATURATION: 96 % | SYSTOLIC BLOOD PRESSURE: 144 MMHG | TEMPERATURE: 99.5 F

## 2021-01-01 VITALS
HEIGHT: 67 IN | BODY MASS INDEX: 28.09 KG/M2 | WEIGHT: 179 LBS | SYSTOLIC BLOOD PRESSURE: 134 MMHG | DIASTOLIC BLOOD PRESSURE: 74 MMHG | HEART RATE: 56 BPM

## 2021-01-01 VITALS — OXYGEN SATURATION: 94 % | HEART RATE: 82 BPM

## 2021-01-01 DIAGNOSIS — Z98.890 S/P AORTIC VALVE REPAIR: ICD-10-CM

## 2021-01-01 DIAGNOSIS — R06.02 SHORTNESS OF BREATH: ICD-10-CM

## 2021-01-01 DIAGNOSIS — I65.23 BILATERAL CAROTID ARTERY STENOSIS: ICD-10-CM

## 2021-01-01 DIAGNOSIS — R07.89 OTHER CHEST PAIN: ICD-10-CM

## 2021-01-01 DIAGNOSIS — M10.242: ICD-10-CM

## 2021-01-01 DIAGNOSIS — R41.3 MEMORY LOSS: ICD-10-CM

## 2021-01-01 DIAGNOSIS — I95.9 HYPOTENSION, UNSPECIFIED HYPOTENSION TYPE: Primary | ICD-10-CM

## 2021-01-01 DIAGNOSIS — R06.02 SHORTNESS OF BREATH: Primary | ICD-10-CM

## 2021-01-01 DIAGNOSIS — E78.2 MIXED HYPERLIPIDEMIA: ICD-10-CM

## 2021-01-01 DIAGNOSIS — M25.561 ACUTE PAIN OF RIGHT KNEE: ICD-10-CM

## 2021-01-01 DIAGNOSIS — I25.10 CORONARY ARTERY DISEASE INVOLVING NATIVE CORONARY ARTERY OF NATIVE HEART WITHOUT ANGINA PECTORIS: ICD-10-CM

## 2021-01-01 DIAGNOSIS — R41.0 CONFUSION: Primary | ICD-10-CM

## 2021-01-01 DIAGNOSIS — I50.9 CONGESTIVE HEART FAILURE, UNSPECIFIED HF CHRONICITY, UNSPECIFIED HEART FAILURE TYPE (HCC): ICD-10-CM

## 2021-01-01 DIAGNOSIS — I63.9 CEREBROVASCULAR ACCIDENT (CVA), UNSPECIFIED MECHANISM (HCC): Primary | ICD-10-CM

## 2021-01-01 DIAGNOSIS — R09.89 SUSPECTED CEREBROVASCULAR ACCIDENT (CVA): ICD-10-CM

## 2021-01-01 DIAGNOSIS — I35.0 SEVERE AORTIC STENOSIS: Primary | ICD-10-CM

## 2021-01-01 DIAGNOSIS — D64.9 ANEMIA, UNSPECIFIED TYPE: ICD-10-CM

## 2021-01-01 DIAGNOSIS — Z78.9 STATIN INTOLERANCE: ICD-10-CM

## 2021-01-01 DIAGNOSIS — Z11.59 SCREENING FOR VIRAL DISEASE: Primary | ICD-10-CM

## 2021-01-01 DIAGNOSIS — I35.0 NONRHEUMATIC AORTIC VALVE STENOSIS: ICD-10-CM

## 2021-01-01 DIAGNOSIS — I35.0 SEVERE AORTIC STENOSIS: ICD-10-CM

## 2021-01-01 DIAGNOSIS — R26.9 GAIT ABNORMALITY: ICD-10-CM

## 2021-01-01 DIAGNOSIS — I50.32 CHRONIC DIASTOLIC CONGESTIVE HEART FAILURE (HCC): Primary | ICD-10-CM

## 2021-01-01 DIAGNOSIS — I50.32 CHRONIC DIASTOLIC CONGESTIVE HEART FAILURE (HCC): ICD-10-CM

## 2021-01-01 DIAGNOSIS — Z00.00 ROUTINE GENERAL MEDICAL EXAMINATION AT A HEALTH CARE FACILITY: ICD-10-CM

## 2021-01-01 DIAGNOSIS — M25.461 EFFUSION OF RIGHT KNEE: Primary | ICD-10-CM

## 2021-01-01 DIAGNOSIS — I25.10 CORONARY ARTERY DISEASE INVOLVING NATIVE CORONARY ARTERY OF NATIVE HEART WITHOUT ANGINA PECTORIS: Primary | ICD-10-CM

## 2021-01-01 DIAGNOSIS — R82.90 BAD ODOR OF URINE: Primary | ICD-10-CM

## 2021-01-01 DIAGNOSIS — J44.9 CHRONIC OBSTRUCTIVE PULMONARY DISEASE, UNSPECIFIED COPD TYPE (HCC): ICD-10-CM

## 2021-01-01 DIAGNOSIS — R41.0 CONFUSION: ICD-10-CM

## 2021-01-01 DIAGNOSIS — I35.0 AORTIC VALVE STENOSIS, ETIOLOGY OF CARDIAC VALVE DISEASE UNSPECIFIED: ICD-10-CM

## 2021-01-01 DIAGNOSIS — G44.89 OTHER HEADACHE SYNDROME: ICD-10-CM

## 2021-01-01 DIAGNOSIS — R09.89 BILATERAL CAROTID BRUITS: ICD-10-CM

## 2021-01-01 DIAGNOSIS — I35.0 NONRHEUMATIC AORTIC VALVE STENOSIS: Primary | ICD-10-CM

## 2021-01-01 DIAGNOSIS — R41.3 MEMORY CHANGES: ICD-10-CM

## 2021-01-01 DIAGNOSIS — M19.012 PRIMARY OSTEOARTHRITIS OF BOTH SHOULDERS: ICD-10-CM

## 2021-01-01 DIAGNOSIS — Z95.2 S/P TAVR (TRANSCATHETER AORTIC VALVE REPLACEMENT): ICD-10-CM

## 2021-01-01 DIAGNOSIS — Z95.2 S/P TAVR (TRANSCATHETER AORTIC VALVE REPLACEMENT): Primary | ICD-10-CM

## 2021-01-01 DIAGNOSIS — I65.23 BILATERAL CAROTID ARTERY STENOSIS: Primary | ICD-10-CM

## 2021-01-01 DIAGNOSIS — H53.9 VISUAL CHANGES: Primary | ICD-10-CM

## 2021-01-01 DIAGNOSIS — M19.011 PRIMARY OSTEOARTHRITIS OF BOTH SHOULDERS: ICD-10-CM

## 2021-01-01 DIAGNOSIS — I35.0 AORTIC VALVE STENOSIS, ETIOLOGY OF CARDIAC VALVE DISEASE UNSPECIFIED: Primary | ICD-10-CM

## 2021-01-01 DIAGNOSIS — R77.8 ELEVATED TROPONIN: ICD-10-CM

## 2021-01-01 DIAGNOSIS — R50.9 FEVER, UNSPECIFIED FEVER CAUSE: ICD-10-CM

## 2021-01-01 DIAGNOSIS — R09.89 SUSPECTED CEREBROVASCULAR ACCIDENT (CVA): Primary | ICD-10-CM

## 2021-01-01 DIAGNOSIS — Z00.00 MEDICARE ANNUAL WELLNESS VISIT, INITIAL: Primary | ICD-10-CM

## 2021-01-01 LAB
ABO/RH: NORMAL
ABO/RH: NORMAL
ACANTHOCYTES: ABNORMAL
ACANTHOCYTES: ABNORMAL
ACINETOBACTER CALCOAC BAUMANNII COMPLEX BY PCR: NOT DETECTED
ACINETOBACTER CALCOAC BAUMANNII COMPLEX BY PCR: NOT DETECTED
ALBUMIN SERPL-MCNC: 2.3 G/DL (ref 3.5–5.2)
ALBUMIN SERPL-MCNC: 2.5 G/DL (ref 3.5–5.2)
ALBUMIN SERPL-MCNC: 2.6 G/DL (ref 3.5–5.2)
ALBUMIN SERPL-MCNC: 2.6 G/DL (ref 3.5–5.2)
ALBUMIN SERPL-MCNC: 2.7 G/DL (ref 3.5–5.2)
ALBUMIN SERPL-MCNC: 2.9 G/DL (ref 3.5–5.2)
ALBUMIN SERPL-MCNC: 2.9 G/DL (ref 3.5–5.2)
ALBUMIN SERPL-MCNC: 3.3 G/DL (ref 3.5–5.2)
ALBUMIN SERPL-MCNC: 3.5 G/DL (ref 3.5–5.2)
ALBUMIN SERPL-MCNC: 3.6 G/DL (ref 3.5–5.2)
ALBUMIN SERPL-MCNC: 3.8 G/DL (ref 3.5–5.2)
ALP BLD-CCNC: 105 U/L (ref 40–130)
ALP BLD-CCNC: 105 U/L (ref 40–130)
ALP BLD-CCNC: 117 U/L (ref 40–130)
ALP BLD-CCNC: 139 U/L (ref 40–130)
ALP BLD-CCNC: 152 U/L (ref 40–130)
ALP BLD-CCNC: 175 U/L (ref 40–130)
ALP BLD-CCNC: 178 U/L (ref 40–130)
ALP BLD-CCNC: 209 U/L (ref 40–130)
ALP BLD-CCNC: 211 U/L (ref 40–130)
ALP BLD-CCNC: 231 U/L (ref 40–130)
ALP BLD-CCNC: 85 U/L (ref 40–130)
ALP BLD-CCNC: 88 U/L (ref 40–130)
ALP BLD-CCNC: 91 U/L (ref 40–130)
ALP BLD-CCNC: 95 U/L (ref 40–130)
ALP BLD-CCNC: 99 U/L (ref 40–130)
ALT SERPL-CCNC: 10 U/L (ref 5–41)
ALT SERPL-CCNC: 12 U/L (ref 5–41)
ALT SERPL-CCNC: 14 U/L (ref 5–41)
ALT SERPL-CCNC: 15 U/L (ref 5–41)
ALT SERPL-CCNC: 19 U/L (ref 5–41)
ALT SERPL-CCNC: 19 U/L (ref 5–41)
ALT SERPL-CCNC: 21 U/L (ref 5–41)
ALT SERPL-CCNC: 21 U/L (ref 5–41)
ALT SERPL-CCNC: 22 U/L (ref 5–41)
ALT SERPL-CCNC: 25 U/L (ref 5–41)
ALT SERPL-CCNC: 33 U/L (ref 5–41)
ALT SERPL-CCNC: 8 U/L (ref 5–41)
ALT SERPL-CCNC: 8 U/L (ref 5–41)
AMMONIA: 10 UMOL/L (ref 16–60)
ANAEROBIC CULTURE: ABNORMAL
ANION GAP SERPL CALCULATED.3IONS-SCNC: 10 MMOL/L (ref 7–19)
ANION GAP SERPL CALCULATED.3IONS-SCNC: 11 MMOL/L (ref 7–19)
ANION GAP SERPL CALCULATED.3IONS-SCNC: 12 MMOL/L (ref 7–19)
ANION GAP SERPL CALCULATED.3IONS-SCNC: 12 MMOL/L (ref 7–19)
ANION GAP SERPL CALCULATED.3IONS-SCNC: 13 MMOL/L (ref 7–19)
ANION GAP SERPL CALCULATED.3IONS-SCNC: 16 MMOL/L (ref 7–19)
ANION GAP SERPL CALCULATED.3IONS-SCNC: 18 MMOL/L (ref 7–19)
ANION GAP SERPL CALCULATED.3IONS-SCNC: 8 MMOL/L (ref 7–19)
ANION GAP SERPL CALCULATED.3IONS-SCNC: 9 MMOL/L (ref 7–19)
ANISOCYTOSIS: ABNORMAL
ANISOCYTOSIS: ABNORMAL
ANTIBODY SCREEN: NORMAL
ANTIBODY SCREEN: NORMAL
APPEARANCE FLUID: NORMAL
APPEARANCE FLUID: NORMAL
APTT: 29.5 SEC (ref 26–36.2)
AST SERPL-CCNC: 19 U/L (ref 5–40)
AST SERPL-CCNC: 21 U/L (ref 5–40)
AST SERPL-CCNC: 21 U/L (ref 5–40)
AST SERPL-CCNC: 22 U/L (ref 5–40)
AST SERPL-CCNC: 25 U/L (ref 5–40)
AST SERPL-CCNC: 27 U/L (ref 5–40)
AST SERPL-CCNC: 27 U/L (ref 5–40)
AST SERPL-CCNC: 28 U/L (ref 5–40)
AST SERPL-CCNC: 32 U/L (ref 5–40)
AST SERPL-CCNC: 34 U/L (ref 5–40)
AST SERPL-CCNC: 40 U/L (ref 5–40)
AST SERPL-CCNC: 47 U/L (ref 5–40)
AST SERPL-CCNC: 48 U/L (ref 5–40)
AST SERPL-CCNC: 56 U/L (ref 5–40)
AST SERPL-CCNC: 61 U/L (ref 5–40)
AST SERPL-CCNC: 63 U/L (ref 5–40)
ATYPICAL LYMPHOCYTE RELATIVE PERCENT: 1 % (ref 0–8)
ATYPICAL LYMPHOCYTE RELATIVE PERCENT: 2 % (ref 0–8)
BACTEROIDES FRAGILIS BY PCR: NOT DETECTED
BACTEROIDES FRAGILIS BY PCR: NOT DETECTED
BANDED NEUTROPHILS RELATIVE PERCENT: 11 % (ref 0–5)
BANDED NEUTROPHILS RELATIVE PERCENT: 39 % (ref 0–5)
BASE EXCESS ARTERIAL: -1 (ref -3–3)
BASE EXCESS ARTERIAL: -1.1 MMOL/L (ref -2–2)
BASE EXCESS VENOUS: 0
BASE EXCESS VENOUS: 1
BASOPHILS ABSOLUTE: 0 K/UL (ref 0–0.2)
BASOPHILS ABSOLUTE: 0.1 K/UL (ref 0–0.2)
BASOPHILS ABSOLUTE: 0.1 K/UL (ref 0–0.2)
BASOPHILS RELATIVE PERCENT: 0 % (ref 0–1)
BASOPHILS RELATIVE PERCENT: 0.1 % (ref 0–1)
BASOPHILS RELATIVE PERCENT: 0.1 % (ref 0–1)
BASOPHILS RELATIVE PERCENT: 0.2 % (ref 0–1)
BASOPHILS RELATIVE PERCENT: 0.3 % (ref 0–1)
BASOPHILS RELATIVE PERCENT: 0.4 % (ref 0–1)
BASOPHILS RELATIVE PERCENT: 0.4 % (ref 0–1)
BASOPHILS RELATIVE PERCENT: 0.7 % (ref 0–1)
BASOPHILS RELATIVE PERCENT: 1.1 % (ref 0–1)
BILIRUB SERPL-MCNC: 0.3 MG/DL (ref 0.2–1.2)
BILIRUB SERPL-MCNC: 0.3 MG/DL (ref 0.2–1.2)
BILIRUB SERPL-MCNC: 0.4 MG/DL (ref 0.2–1.2)
BILIRUB SERPL-MCNC: 0.5 MG/DL (ref 0.2–1.2)
BILIRUB SERPL-MCNC: 0.6 MG/DL (ref 0.2–1.2)
BILIRUB SERPL-MCNC: 0.7 MG/DL (ref 0.2–1.2)
BILIRUB SERPL-MCNC: <0.2 MG/DL (ref 0.2–1.2)
BILIRUB SERPL-MCNC: <0.2 MG/DL (ref 0.2–1.2)
BILIRUBIN URINE: NEGATIVE
BLOOD CULTURE, ROUTINE: ABNORMAL
BLOOD CULTURE, ROUTINE: NORMAL
BLOOD, URINE: NEGATIVE
BODY FLUID CULTURE, STERILE: ABNORMAL
BUN BLDV-MCNC: 11 MG/DL (ref 8–23)
BUN BLDV-MCNC: 12 MG/DL (ref 8–23)
BUN BLDV-MCNC: 13 MG/DL (ref 8–23)
BUN BLDV-MCNC: 15 MG/DL (ref 8–23)
BUN BLDV-MCNC: 17 MG/DL (ref 8–23)
BUN BLDV-MCNC: 18 MG/DL (ref 8–23)
BUN BLDV-MCNC: 19 MG/DL (ref 8–23)
BUN BLDV-MCNC: 19 MG/DL (ref 8–23)
BUN BLDV-MCNC: 26 MG/DL (ref 8–23)
BUN BLDV-MCNC: 29 MG/DL (ref 8–23)
BUN BLDV-MCNC: 9 MG/DL (ref 8–23)
C-REACTIVE PROTEIN: 34.75 MG/DL (ref 0–0.5)
CALCIUM IONIZED: 1.16 MMOL/L (ref 1.1–1.3)
CALCIUM IONIZED: 1.18 MMOL/L (ref 1.1–1.3)
CALCIUM IONIZED: 1.21 MMOL/L (ref 1.1–1.3)
CALCIUM SERPL-MCNC: 8.2 MG/DL (ref 8.8–10.2)
CALCIUM SERPL-MCNC: 8.3 MG/DL (ref 8.8–10.2)
CALCIUM SERPL-MCNC: 8.4 MG/DL (ref 8.8–10.2)
CALCIUM SERPL-MCNC: 8.4 MG/DL (ref 8.8–10.2)
CALCIUM SERPL-MCNC: 8.5 MG/DL (ref 8.8–10.2)
CALCIUM SERPL-MCNC: 8.6 MG/DL (ref 8.8–10.2)
CALCIUM SERPL-MCNC: 8.7 MG/DL (ref 8.8–10.2)
CALCIUM SERPL-MCNC: 9 MG/DL (ref 8.8–10.2)
CALCIUM SERPL-MCNC: 9.1 MG/DL (ref 8.8–10.2)
CALCIUM SERPL-MCNC: 9.1 MG/DL (ref 8.8–10.2)
CALCIUM SERPL-MCNC: 9.3 MG/DL (ref 8.8–10.2)
CALCIUM SERPL-MCNC: 9.3 MG/DL (ref 8.8–10.2)
CALCIUM SERPL-MCNC: 9.4 MG/DL (ref 8.8–10.2)
CALCIUM SERPL-MCNC: 9.4 MG/DL (ref 8.8–10.2)
CALCIUM SERPL-MCNC: 9.6 MG/DL (ref 8.8–10.2)
CANDIDA ALBICANS BY PCR: NOT DETECTED
CANDIDA ALBICANS BY PCR: NOT DETECTED
CANDIDA AURIS BY PCR: NOT DETECTED
CANDIDA AURIS BY PCR: NOT DETECTED
CANDIDA GLABRATA BY PCR: NOT DETECTED
CANDIDA GLABRATA BY PCR: NOT DETECTED
CANDIDA KRUSEI BY PCR: NOT DETECTED
CANDIDA KRUSEI BY PCR: NOT DETECTED
CANDIDA PARAPSILOSIS BY PCR: NOT DETECTED
CANDIDA PARAPSILOSIS BY PCR: NOT DETECTED
CANDIDA TROPICALIS BY PCR: NOT DETECTED
CANDIDA TROPICALIS BY PCR: NOT DETECTED
CARBOXYHEMOGLOBIN ARTERIAL: 2.1 % (ref 0–5)
CELL COUNT FLUID TYPE: NORMAL
CELL COUNT FLUID TYPE: NORMAL
CHLORIDE BLD-SCNC: 100 MMOL/L (ref 98–111)
CHLORIDE BLD-SCNC: 100 MMOL/L (ref 98–111)
CHLORIDE BLD-SCNC: 101 MMOL/L (ref 98–111)
CHLORIDE BLD-SCNC: 101 MMOL/L (ref 98–111)
CHLORIDE BLD-SCNC: 102 MMOL/L (ref 98–111)
CHLORIDE BLD-SCNC: 103 MMOL/L (ref 98–111)
CHLORIDE BLD-SCNC: 104 MMOL/L (ref 98–111)
CHLORIDE BLD-SCNC: 105 MMOL/L (ref 98–111)
CHLORIDE BLD-SCNC: 105 MMOL/L (ref 98–111)
CHLORIDE BLD-SCNC: 106 MMOL/L (ref 98–111)
CHLORIDE BLD-SCNC: 97 MMOL/L (ref 98–111)
CHLORIDE BLD-SCNC: 99 MMOL/L (ref 98–111)
CHLORIDE BLD-SCNC: 99 MMOL/L (ref 98–111)
CHOLESTEROL, TOTAL: 188 MG/DL (ref 160–199)
CHOLESTEROL, TOTAL: 94 MG/DL (ref 160–199)
CLARITY: CLEAR
CLOT EVALUATION: NORMAL
CLOT EVALUATION: NORMAL
CO2: 21 MMOL/L (ref 22–29)
CO2: 22 MMOL/L (ref 22–29)
CO2: 24 MEQ/L (ref 21–32)
CO2: 24 MMOL/L (ref 22–29)
CO2: 25 MEQ/L (ref 21–32)
CO2: 25 MMOL/L (ref 22–29)
CO2: 26 MMOL/L (ref 22–29)
CO2: 27 MEQ/L (ref 21–32)
CO2: 27 MMOL/L (ref 22–29)
CO2: 28 MMOL/L (ref 22–29)
CO2: 28 MMOL/L (ref 22–29)
CO2: 29 MMOL/L (ref 22–29)
COLOR FLUID: NORMAL
COLOR FLUID: NORMAL
COLOR: YELLOW
CREAT SERPL-MCNC: 0.5 MG/DL (ref 0.5–1.2)
CREAT SERPL-MCNC: 0.6 MG/DL (ref 0.5–1.2)
CREAT SERPL-MCNC: 0.7 MG/DL (ref 0.5–1.2)
CREAT SERPL-MCNC: 0.8 MG/DL (ref 0.5–1.2)
CREAT SERPL-MCNC: 1 MG/DL (ref 0.5–1.2)
CREAT SERPL-MCNC: 1.1 MG/DL (ref 0.5–1.2)
CRYPTOCOCCUS NEOFORMANS/GATTII BY PCR: NOT DETECTED
CRYPTOCOCCUS NEOFORMANS/GATTII BY PCR: NOT DETECTED
CULTURE SURGICAL: ABNORMAL
CULTURE, BLOOD 2: ABNORMAL
CULTURE, BLOOD 2: ABNORMAL
CULTURE, BLOOD 2: NORMAL
CULTURE, BLOOD 2: NORMAL
CULTURE, BLOOD 3: NORMAL
DOHLE BODIES: ABNORMAL
EKG P AXIS: 26 DEGREES
EKG P AXIS: 36 DEGREES
EKG P AXIS: 38 DEGREES
EKG P AXIS: 50 DEGREES
EKG P AXIS: 62 DEGREES
EKG P AXIS: 65 DEGREES
EKG P AXIS: 66 DEGREES
EKG P AXIS: 66 DEGREES
EKG P AXIS: 79 DEGREES
EKG P-R INTERVAL: 162 MS
EKG P-R INTERVAL: 168 MS
EKG P-R INTERVAL: 176 MS
EKG P-R INTERVAL: 184 MS
EKG P-R INTERVAL: 188 MS
EKG P-R INTERVAL: 194 MS
EKG P-R INTERVAL: 216 MS
EKG Q-T INTERVAL: 356 MS
EKG Q-T INTERVAL: 360 MS
EKG Q-T INTERVAL: 368 MS
EKG Q-T INTERVAL: 370 MS
EKG Q-T INTERVAL: 380 MS
EKG Q-T INTERVAL: 398 MS
EKG Q-T INTERVAL: 420 MS
EKG Q-T INTERVAL: 420 MS
EKG Q-T INTERVAL: 422 MS
EKG QRS DURATION: 106 MS
EKG QRS DURATION: 108 MS
EKG QRS DURATION: 110 MS
EKG QRS DURATION: 110 MS
EKG QRS DURATION: 114 MS
EKG QRS DURATION: 116 MS
EKG QRS DURATION: 122 MS
EKG QRS DURATION: 124 MS
EKG QRS DURATION: 128 MS
EKG QTC CALCULATION (BAZETT): 398 MS
EKG QTC CALCULATION (BAZETT): 403 MS
EKG QTC CALCULATION (BAZETT): 410 MS
EKG QTC CALCULATION (BAZETT): 410 MS
EKG QTC CALCULATION (BAZETT): 413 MS
EKG QTC CALCULATION (BAZETT): 415 MS
EKG QTC CALCULATION (BAZETT): 430 MS
EKG T AXIS: 13 DEGREES
EKG T AXIS: 20 DEGREES
EKG T AXIS: 21 DEGREES
EKG T AXIS: 37 DEGREES
EKG T AXIS: 38 DEGREES
EKG T AXIS: 43 DEGREES
EKG T AXIS: 45 DEGREES
EKG T AXIS: 47 DEGREES
EKG T AXIS: 68 DEGREES
ENTEROBACTER CLOACAE COMPLEX BY PCR: NOT DETECTED
ENTEROBACTER CLOACAE COMPLEX BY PCR: NOT DETECTED
ENTEROBACTERALES BY PCR: NOT DETECTED
ENTEROBACTERALES BY PCR: NOT DETECTED
ENTEROCOCCUS FAECALIS BY PCR: NOT DETECTED
ENTEROCOCCUS FAECALIS BY PCR: NOT DETECTED
ENTEROCOCCUS FAECIUM BY PCR: NOT DETECTED
ENTEROCOCCUS FAECIUM BY PCR: NOT DETECTED
EOSINOPHILS ABSOLUTE: 0 K/UL (ref 0–0.6)
EOSINOPHILS ABSOLUTE: 0.1 K/UL (ref 0–0.6)
EOSINOPHILS ABSOLUTE: 0.2 K/UL (ref 0–0.6)
EOSINOPHILS ABSOLUTE: 0.3 K/UL (ref 0–0.6)
EOSINOPHILS RELATIVE PERCENT: 0 % (ref 0–5)
EOSINOPHILS RELATIVE PERCENT: 0.2 % (ref 0–5)
EOSINOPHILS RELATIVE PERCENT: 0.3 % (ref 0–5)
EOSINOPHILS RELATIVE PERCENT: 0.4 % (ref 0–5)
EOSINOPHILS RELATIVE PERCENT: 1.8 % (ref 0–5)
EOSINOPHILS RELATIVE PERCENT: 4.6 % (ref 0–5)
ESCHERICHIA COLI BY PCR: NOT DETECTED
ESCHERICHIA COLI BY PCR: NOT DETECTED
FERRITIN: 213.3 NG/ML (ref 30–400)
FERRITIN: 340.1 NG/ML (ref 30–400)
GFR AFRICAN AMERICAN: >59
GFR AFRICAN AMERICAN: >60
GFR NON-AFRICAN AMERICAN: >60
GLUCOSE BLD-MCNC: 100 MG/DL (ref 74–109)
GLUCOSE BLD-MCNC: 110 MG/DL (ref 74–109)
GLUCOSE BLD-MCNC: 112 MG/DL (ref 74–109)
GLUCOSE BLD-MCNC: 115 MG/DL (ref 70–99)
GLUCOSE BLD-MCNC: 115 MG/DL (ref 74–109)
GLUCOSE BLD-MCNC: 122 MG/DL (ref 70–99)
GLUCOSE BLD-MCNC: 123 MG/DL (ref 70–99)
GLUCOSE BLD-MCNC: 126 MG/DL (ref 74–109)
GLUCOSE BLD-MCNC: 127 MG/DL (ref 74–109)
GLUCOSE BLD-MCNC: 128 MG/DL (ref 70–99)
GLUCOSE BLD-MCNC: 132 MG/DL (ref 74–109)
GLUCOSE BLD-MCNC: 135 MG/DL (ref 74–109)
GLUCOSE BLD-MCNC: 136 MG/DL (ref 74–109)
GLUCOSE BLD-MCNC: 144 MG/DL (ref 74–109)
GLUCOSE BLD-MCNC: 144 MG/DL (ref 74–109)
GLUCOSE BLD-MCNC: 146 MG/DL (ref 74–109)
GLUCOSE BLD-MCNC: 159 MG/DL (ref 74–109)
GLUCOSE BLD-MCNC: 172 MG/DL (ref 70–99)
GLUCOSE BLD-MCNC: 196 MG/DL (ref 74–109)
GLUCOSE BLD-MCNC: 81 MG/DL (ref 74–109)
GLUCOSE BLD-MCNC: 91 MG/DL (ref 74–109)
GLUCOSE BLD-MCNC: 94 MG/DL (ref 74–109)
GLUCOSE BLD-MCNC: 95 MG/DL (ref 74–109)
GLUCOSE BLD-MCNC: 97 MG/DL (ref 74–109)
GLUCOSE BLD-MCNC: 99 MG/DL (ref 74–109)
GLUCOSE URINE: NEGATIVE MG/DL
GRAM STAIN RESULT: ABNORMAL
HAEMOPHILUS INFLUENZAE BY PCR: NOT DETECTED
HAEMOPHILUS INFLUENZAE BY PCR: NOT DETECTED
HAPTOGLOBIN: 347 MG/DL (ref 30–200)
HBA1C MFR BLD: 5.1 % (ref 4–6)
HCO3 ARTERIAL: 26 MMOL/L (ref 22–26)
HCT VFR BLD CALC: 23.7 % (ref 42–52)
HCT VFR BLD CALC: 25.3 % (ref 42–52)
HCT VFR BLD CALC: 26.3 % (ref 42–52)
HCT VFR BLD CALC: 27 % (ref 42–52)
HCT VFR BLD CALC: 27.2 % (ref 42–52)
HCT VFR BLD CALC: 27.3 % (ref 42–52)
HCT VFR BLD CALC: 27.9 % (ref 42–52)
HCT VFR BLD CALC: 28.3 % (ref 42–52)
HCT VFR BLD CALC: 28.7 % (ref 42–52)
HCT VFR BLD CALC: 29.3 % (ref 42–52)
HCT VFR BLD CALC: 30.3 % (ref 42–52)
HCT VFR BLD CALC: 30.4 % (ref 42–52)
HCT VFR BLD CALC: 31.8 % (ref 42–52)
HCT VFR BLD CALC: 32.1 % (ref 42–52)
HCT VFR BLD CALC: 32.5 % (ref 42–52)
HCT VFR BLD CALC: 32.7 % (ref 42–52)
HCT VFR BLD CALC: 33.9 % (ref 42–52)
HCT VFR BLD CALC: 34.2 % (ref 42–52)
HCT VFR BLD CALC: 34.4 % (ref 42–52)
HCT VFR BLD CALC: 35.1 % (ref 42–52)
HCT VFR BLD CALC: 35.1 % (ref 42–52)
HCT VFR BLD CALC: 35.2 % (ref 42–52)
HCT VFR BLD CALC: 35.7 % (ref 42–52)
HCT VFR BLD CALC: 35.9 % (ref 42–52)
HCT VFR BLD CALC: 36.4 % (ref 42–52)
HCT VFR BLD CALC: 36.6 % (ref 42–52)
HCT VFR BLD CALC: 38.6 % (ref 42–52)
HCT VFR BLD CALC: 41.2 % (ref 42–52)
HCT VFR BLD CALC: 41.4 % (ref 42–52)
HCT VFR BLD CALC: 42.3 % (ref 42–52)
HDLC SERPL-MCNC: 29 MG/DL (ref 55–121)
HDLC SERPL-MCNC: 81 MG/DL (ref 55–121)
HEMOGLOBIN, ART, EXTENDED: 10.3 G/DL (ref 14–18)
HEMOGLOBIN: 10.4 G/DL (ref 14–18)
HEMOGLOBIN: 10.5 G/DL (ref 14–18)
HEMOGLOBIN: 10.7 G/DL (ref 14–18)
HEMOGLOBIN: 10.7 G/DL (ref 14–18)
HEMOGLOBIN: 11 G/DL (ref 14–18)
HEMOGLOBIN: 11.1 G/DL (ref 14–18)
HEMOGLOBIN: 11.3 G/DL (ref 14–18)
HEMOGLOBIN: 11.4 G/DL (ref 14–18)
HEMOGLOBIN: 11.4 G/DL (ref 14–18)
HEMOGLOBIN: 11.6 G/DL (ref 14–18)
HEMOGLOBIN: 11.7 G/DL (ref 14–18)
HEMOGLOBIN: 12.1 G/DL (ref 14–18)
HEMOGLOBIN: 12.4 G/DL (ref 14–18)
HEMOGLOBIN: 13 GM/DL (ref 12–18)
HEMOGLOBIN: 13.1 G/DL (ref 14–18)
HEMOGLOBIN: 13.2 GM/DL (ref 12–18)
HEMOGLOBIN: 13.6 G/DL (ref 14–18)
HEMOGLOBIN: 13.6 GM/DL (ref 12–18)
HEMOGLOBIN: 13.8 G/DL (ref 14–18)
HEMOGLOBIN: 7.5 G/DL (ref 14–18)
HEMOGLOBIN: 8.4 G/DL (ref 14–18)
HEMOGLOBIN: 8.5 G/DL (ref 14–18)
HEMOGLOBIN: 8.6 G/DL (ref 14–18)
HEMOGLOBIN: 8.8 G/DL (ref 14–18)
HEMOGLOBIN: 8.9 G/DL (ref 14–18)
HEMOGLOBIN: 8.9 G/DL (ref 14–18)
HEMOGLOBIN: 9 G/DL (ref 14–18)
HEMOGLOBIN: 9.5 G/DL (ref 14–18)
HEMOGLOBIN: 9.6 G/DL (ref 14–18)
HEMOGLOBIN: 9.8 G/DL (ref 14–18)
HEMOGLOBIN: 9.9 G/DL (ref 14–18)
HYPOCHROMIA: ABNORMAL
IMMATURE GRANULOCYTES #: 0 K/UL
IMMATURE GRANULOCYTES #: 0.1 K/UL
IMMATURE GRANULOCYTES #: 0.2 K/UL
INR BLD: 0.99 (ref 0.88–1.18)
INR BLD: 1.06 (ref 0.88–1.18)
INR BLD: 1.06 (ref 0.88–1.18)
INR BLD: 1.11 (ref 0.88–1.18)
IRON SATURATION: 13 % (ref 14–50)
IRON SATURATION: 18 % (ref 14–50)
IRON: 36 UG/DL (ref 59–158)
IRON: 38 UG/DL (ref 59–158)
KETONES, URINE: NEGATIVE MG/DL
KLEBSIELLA AEROGENES BY PCR: NOT DETECTED
KLEBSIELLA AEROGENES BY PCR: NOT DETECTED
KLEBSIELLA OXYTOCA BY PCR: NOT DETECTED
KLEBSIELLA OXYTOCA BY PCR: NOT DETECTED
KLEBSIELLA PNEUMONIAE GROUP BY PCR: NOT DETECTED
KLEBSIELLA PNEUMONIAE GROUP BY PCR: NOT DETECTED
LACTATE DEHYDROGENASE: 282 U/L (ref 91–215)
LACTIC ACID, SEPSIS: 1.2 MG/DL (ref 0.5–1.9)
LACTIC ACID, SEPSIS: 1.4 MG/DL (ref 0.5–1.9)
LACTIC ACID: 1 MMOL/L (ref 0.5–1.9)
LACTIC ACID: 1.6 MMOL/L (ref 0.5–1.9)
LACTIC ACID: 1.7 MMOL/L (ref 0.5–1.9)
LACTIC ACID: 1.7 MMOL/L (ref 0.5–1.9)
LACTIC ACID: 1.8 MMOL/L (ref 0.5–1.9)
LACTIC ACID: 1.9 MMOL/L (ref 0.5–1.9)
LACTIC ACID: 2.3 MMOL/L (ref 0.5–1.9)
LACTIC ACID: 2.4 MMOL/L (ref 0.5–1.9)
LDL CHOLESTEROL CALCULATED: 39 MG/DL
LDL CHOLESTEROL CALCULATED: 96 MG/DL
LEUKOCYTE ESTERASE, URINE: NEGATIVE
LISTERIA MONOCYTOGENES BY PCR: NOT DETECTED
LISTERIA MONOCYTOGENES BY PCR: NOT DETECTED
LV EF: 38 %
LV EF: 58 %
LV EF: 58 %
LV EF: 60 %
LVEF MODALITY: NORMAL
LYMPHOCYTES ABSOLUTE: 0.3 K/UL (ref 1.1–4.5)
LYMPHOCYTES ABSOLUTE: 0.4 K/UL (ref 1.1–4.5)
LYMPHOCYTES ABSOLUTE: 0.4 K/UL (ref 1.1–4.5)
LYMPHOCYTES ABSOLUTE: 0.5 K/UL (ref 1.1–4.5)
LYMPHOCYTES ABSOLUTE: 0.6 K/UL (ref 1.1–4.5)
LYMPHOCYTES ABSOLUTE: 0.7 K/UL (ref 1.1–4.5)
LYMPHOCYTES ABSOLUTE: 0.9 K/UL (ref 1.1–4.5)
LYMPHOCYTES ABSOLUTE: 1 K/UL (ref 1.1–4.5)
LYMPHOCYTES ABSOLUTE: 1.1 K/UL (ref 1.1–4.5)
LYMPHOCYTES ABSOLUTE: 1.3 K/UL (ref 1.1–4.5)
LYMPHOCYTES ABSOLUTE: 1.5 K/UL (ref 1.1–4.5)
LYMPHOCYTES ABSOLUTE: 1.6 K/UL (ref 1.1–4.5)
LYMPHOCYTES ABSOLUTE: 1.8 K/UL (ref 1.1–4.5)
LYMPHOCYTES RELATIVE PERCENT: 1 % (ref 20–40)
LYMPHOCYTES RELATIVE PERCENT: 10 % (ref 20–40)
LYMPHOCYTES RELATIVE PERCENT: 10.8 % (ref 20–40)
LYMPHOCYTES RELATIVE PERCENT: 12 % (ref 20–40)
LYMPHOCYTES RELATIVE PERCENT: 12.4 % (ref 20–40)
LYMPHOCYTES RELATIVE PERCENT: 12.8 % (ref 20–40)
LYMPHOCYTES RELATIVE PERCENT: 15 % (ref 20–40)
LYMPHOCYTES RELATIVE PERCENT: 15.6 % (ref 20–40)
LYMPHOCYTES RELATIVE PERCENT: 24.4 % (ref 20–40)
LYMPHOCYTES RELATIVE PERCENT: 3 % (ref 20–40)
LYMPHOCYTES RELATIVE PERCENT: 3.2 % (ref 20–40)
LYMPHOCYTES RELATIVE PERCENT: 4 % (ref 20–40)
LYMPHOCYTES RELATIVE PERCENT: 4.3 % (ref 20–40)
LYMPHOCYTES RELATIVE PERCENT: 6 % (ref 20–40)
LYMPHOCYTES RELATIVE PERCENT: 7.2 % (ref 20–40)
LYMPHOCYTES RELATIVE PERCENT: 8.2 % (ref 20–40)
LYMPHOCYTES RELATIVE PERCENT: 8.9 % (ref 20–40)
LYMPHOCYTES, BODY FLUID: 16 %
LYMPHOCYTES, BODY FLUID: 6 %
MACROCYTES: ABNORMAL
MACROPHAGE FLUID: 8 %
MAGNESIUM: 1.5 MG/DL (ref 1.6–2.4)
MAGNESIUM: 1.6 MG/DL (ref 1.6–2.4)
MAGNESIUM: 1.6 MG/DL (ref 1.6–2.4)
MAGNESIUM: 2 MG/DL (ref 1.6–2.4)
MAGNESIUM: 2.1 MG/DL (ref 1.6–2.4)
MAGNESIUM: 2.2 MG/DL (ref 1.6–2.4)
MCH RBC QN AUTO: 29.6 PG (ref 27–31)
MCH RBC QN AUTO: 29.8 PG (ref 27–31)
MCH RBC QN AUTO: 29.8 PG (ref 27–31)
MCH RBC QN AUTO: 29.9 PG (ref 27–31)
MCH RBC QN AUTO: 29.9 PG (ref 27–31)
MCH RBC QN AUTO: 30.1 PG (ref 27–31)
MCH RBC QN AUTO: 30.2 PG (ref 27–31)
MCH RBC QN AUTO: 30.5 PG (ref 27–31)
MCH RBC QN AUTO: 30.7 PG (ref 27–31)
MCH RBC QN AUTO: 30.7 PG (ref 27–31)
MCH RBC QN AUTO: 30.8 PG (ref 27–31)
MCH RBC QN AUTO: 30.9 PG (ref 27–31)
MCH RBC QN AUTO: 30.9 PG (ref 27–31)
MCH RBC QN AUTO: 31 PG (ref 27–31)
MCH RBC QN AUTO: 31.1 PG (ref 27–31)
MCH RBC QN AUTO: 31.2 PG (ref 27–31)
MCH RBC QN AUTO: 31.2 PG (ref 27–31)
MCH RBC QN AUTO: 31.3 PG (ref 27–31)
MCH RBC QN AUTO: 31.4 PG (ref 27–31)
MCH RBC QN AUTO: 31.5 PG (ref 27–31)
MCH RBC QN AUTO: 31.6 PG (ref 27–31)
MCH RBC QN AUTO: 31.7 PG (ref 27–31)
MCH RBC QN AUTO: 31.9 PG (ref 27–31)
MCHC RBC AUTO-ENTMCNC: 31.3 G/DL (ref 33–37)
MCHC RBC AUTO-ENTMCNC: 31.5 G/DL (ref 33–37)
MCHC RBC AUTO-ENTMCNC: 31.6 G/DL (ref 33–37)
MCHC RBC AUTO-ENTMCNC: 31.7 G/DL (ref 33–37)
MCHC RBC AUTO-ENTMCNC: 31.8 G/DL (ref 33–37)
MCHC RBC AUTO-ENTMCNC: 31.8 G/DL (ref 33–37)
MCHC RBC AUTO-ENTMCNC: 32 G/DL (ref 33–37)
MCHC RBC AUTO-ENTMCNC: 32.1 G/DL (ref 33–37)
MCHC RBC AUTO-ENTMCNC: 32.3 G/DL (ref 33–37)
MCHC RBC AUTO-ENTMCNC: 32.5 G/DL (ref 33–37)
MCHC RBC AUTO-ENTMCNC: 32.6 G/DL (ref 33–37)
MCHC RBC AUTO-ENTMCNC: 32.7 G/DL (ref 33–37)
MCHC RBC AUTO-ENTMCNC: 32.7 G/DL (ref 33–37)
MCHC RBC AUTO-ENTMCNC: 32.8 G/DL (ref 33–37)
MCHC RBC AUTO-ENTMCNC: 32.9 G/DL (ref 33–37)
MCHC RBC AUTO-ENTMCNC: 33.2 G/DL (ref 33–37)
MCHC RBC AUTO-ENTMCNC: 33.2 G/DL (ref 33–37)
MCHC RBC AUTO-ENTMCNC: 33.4 G/DL (ref 33–37)
MCV RBC AUTO: 100.3 FL (ref 80–94)
MCV RBC AUTO: 90.2 FL (ref 80–94)
MCV RBC AUTO: 91.1 FL (ref 80–94)
MCV RBC AUTO: 92.2 FL (ref 80–94)
MCV RBC AUTO: 92.3 FL (ref 80–94)
MCV RBC AUTO: 92.3 FL (ref 80–94)
MCV RBC AUTO: 92.6 FL (ref 80–94)
MCV RBC AUTO: 92.6 FL (ref 80–94)
MCV RBC AUTO: 92.7 FL (ref 80–94)
MCV RBC AUTO: 93.3 FL (ref 80–94)
MCV RBC AUTO: 93.7 FL (ref 80–94)
MCV RBC AUTO: 94.1 FL (ref 80–94)
MCV RBC AUTO: 95 FL (ref 80–94)
MCV RBC AUTO: 96.4 FL (ref 80–94)
MCV RBC AUTO: 97.1 FL (ref 80–94)
MCV RBC AUTO: 97.4 FL (ref 80–94)
MCV RBC AUTO: 97.8 FL (ref 80–94)
MCV RBC AUTO: 97.8 FL (ref 80–94)
MCV RBC AUTO: 98 FL (ref 80–94)
MCV RBC AUTO: 98 FL (ref 80–94)
MCV RBC AUTO: 98.1 FL (ref 80–94)
MCV RBC AUTO: 98.3 FL (ref 80–94)
MCV RBC AUTO: 98.5 FL (ref 80–94)
MCV RBC AUTO: 98.7 FL (ref 80–94)
MCV RBC AUTO: 98.8 FL (ref 80–94)
METHEMOGLOBIN ARTERIAL: 1.5 %
METHICILLIN RESISTANCE MECA/C  BY PCR: DETECTED
METHICILLIN RESISTANCE MECA/C AND MREJ BY PCR: NOT DETECTED
MONOCYTE, FLUID: 3 %
MONOCYTE, FLUID: 66 %
MONOCYTES ABSOLUTE: 0.1 K/UL (ref 0–0.9)
MONOCYTES ABSOLUTE: 0.1 K/UL (ref 0–0.9)
MONOCYTES ABSOLUTE: 0.2 K/UL (ref 0–0.9)
MONOCYTES ABSOLUTE: 0.4 K/UL (ref 0–0.9)
MONOCYTES ABSOLUTE: 0.5 K/UL (ref 0–0.9)
MONOCYTES ABSOLUTE: 0.5 K/UL (ref 0–0.9)
MONOCYTES ABSOLUTE: 0.7 K/UL (ref 0–0.9)
MONOCYTES ABSOLUTE: 0.9 K/UL (ref 0–0.9)
MONOCYTES ABSOLUTE: 0.9 K/UL (ref 0–0.9)
MONOCYTES ABSOLUTE: 1 K/UL (ref 0–0.9)
MONOCYTES ABSOLUTE: 1 K/UL (ref 0–0.9)
MONOCYTES ABSOLUTE: 1.2 K/UL (ref 0–0.9)
MONOCYTES ABSOLUTE: 1.2 K/UL (ref 0–0.9)
MONOCYTES ABSOLUTE: 1.3 K/UL (ref 0–0.9)
MONOCYTES ABSOLUTE: 1.5 K/UL (ref 0–0.9)
MONOCYTES RELATIVE PERCENT: 1 % (ref 0–10)
MONOCYTES RELATIVE PERCENT: 10.3 % (ref 0–10)
MONOCYTES RELATIVE PERCENT: 11 % (ref 0–10)
MONOCYTES RELATIVE PERCENT: 11.1 % (ref 0–10)
MONOCYTES RELATIVE PERCENT: 11.8 % (ref 0–10)
MONOCYTES RELATIVE PERCENT: 11.9 % (ref 0–10)
MONOCYTES RELATIVE PERCENT: 15 % (ref 0–10)
MONOCYTES RELATIVE PERCENT: 2 % (ref 0–10)
MONOCYTES RELATIVE PERCENT: 2 % (ref 0–10)
MONOCYTES RELATIVE PERCENT: 3 % (ref 0–10)
MONOCYTES RELATIVE PERCENT: 3.2 % (ref 0–10)
MONOCYTES RELATIVE PERCENT: 5 % (ref 0–10)
MONOCYTES RELATIVE PERCENT: 6.5 % (ref 0–10)
MONOCYTES RELATIVE PERCENT: 7.5 % (ref 0–10)
MONOCYTES RELATIVE PERCENT: 7.9 % (ref 0–10)
MONOCYTES RELATIVE PERCENT: 8.9 % (ref 0–10)
MONOCYTES RELATIVE PERCENT: 9 % (ref 0–10)
NEISSERIA MENINGITIDIS BY PCR: NOT DETECTED
NEISSERIA MENINGITIDIS BY PCR: NOT DETECTED
NEUTROPHIL, FLUID: 10 %
NEUTROPHIL, FLUID: 91 %
NEUTROPHILS ABSOLUTE: 10.3 K/UL (ref 1.5–7.5)
NEUTROPHILS ABSOLUTE: 10.4 K/UL (ref 1.5–7.5)
NEUTROPHILS ABSOLUTE: 10.9 K/UL (ref 1.5–7.5)
NEUTROPHILS ABSOLUTE: 11.3 K/UL (ref 1.5–7.5)
NEUTROPHILS ABSOLUTE: 12.4 K/UL (ref 1.5–7.5)
NEUTROPHILS ABSOLUTE: 14.6 K/UL (ref 1.5–7.5)
NEUTROPHILS ABSOLUTE: 17.5 K/UL (ref 1.5–7.5)
NEUTROPHILS ABSOLUTE: 18.6 K/UL (ref 1.5–7.5)
NEUTROPHILS ABSOLUTE: 3.8 K/UL (ref 1.5–7.5)
NEUTROPHILS ABSOLUTE: 5.9 K/UL (ref 1.5–7.5)
NEUTROPHILS ABSOLUTE: 5.9 K/UL (ref 1.5–7.5)
NEUTROPHILS ABSOLUTE: 6.7 K/UL (ref 1.5–7.5)
NEUTROPHILS ABSOLUTE: 6.9 K/UL (ref 1.5–7.5)
NEUTROPHILS ABSOLUTE: 7 K/UL (ref 1.5–7.5)
NEUTROPHILS ABSOLUTE: 7.7 K/UL (ref 1.5–7.5)
NEUTROPHILS ABSOLUTE: 7.8 K/UL (ref 1.5–7.5)
NEUTROPHILS ABSOLUTE: 8.7 K/UL (ref 1.5–7.5)
NEUTROPHILS RELATIVE PERCENT: 53 % (ref 50–65)
NEUTROPHILS RELATIVE PERCENT: 58.5 % (ref 50–65)
NEUTROPHILS RELATIVE PERCENT: 69.5 % (ref 50–65)
NEUTROPHILS RELATIVE PERCENT: 71.7 % (ref 50–65)
NEUTROPHILS RELATIVE PERCENT: 73 % (ref 50–65)
NEUTROPHILS RELATIVE PERCENT: 74.7 % (ref 50–65)
NEUTROPHILS RELATIVE PERCENT: 78.5 % (ref 50–65)
NEUTROPHILS RELATIVE PERCENT: 79 % (ref 50–65)
NEUTROPHILS RELATIVE PERCENT: 79.6 % (ref 50–65)
NEUTROPHILS RELATIVE PERCENT: 82 % (ref 50–65)
NEUTROPHILS RELATIVE PERCENT: 83.4 % (ref 50–65)
NEUTROPHILS RELATIVE PERCENT: 83.6 % (ref 50–65)
NEUTROPHILS RELATIVE PERCENT: 85 % (ref 50–65)
NEUTROPHILS RELATIVE PERCENT: 88.3 % (ref 50–65)
NEUTROPHILS RELATIVE PERCENT: 92 % (ref 50–65)
NEUTROPHILS RELATIVE PERCENT: 92.6 % (ref 50–65)
NEUTROPHILS RELATIVE PERCENT: 96 % (ref 50–65)
NITRITE, URINE: NEGATIVE
NUCLEATED CELLS FLUID: 3914 /CUMM
NUCLEATED CELLS FLUID: NORMAL /CUMM
NUMBER OF CELLS COUNTED FLUID: 100
NUMBER OF CELLS COUNTED FLUID: 100
O2 CONTENT ARTERIAL: 12.5 ML/DL
O2 SAT, ARTERIAL: 86 %
O2 SAT, ARTERIAL: 93 % (ref 93–100)
O2 SAT, VEN: 76 %
O2 SAT, VEN: 77 %
O2 THERAPY: ABNORMAL
ORGANISM: ABNORMAL
OVALOCYTES: ABNORMAL
OVALOCYTES: ABNORMAL
P2Y12 RESULT: 137 PRU (ref 194–418)
PCO2 ARTERIAL: 40 MM HG (ref 35–48)
PCO2 ARTERIAL: 54 MMHG (ref 35–45)
PCO2, VEN: 42 MM HG (ref 40–50)
PCO2, VEN: 46.6 MM HG (ref 40–50)
PDW BLD-RTO: 13.2 % (ref 11.5–14.5)
PDW BLD-RTO: 13.3 % (ref 11.5–14.5)
PDW BLD-RTO: 13.5 % (ref 11.5–14.5)
PDW BLD-RTO: 13.5 % (ref 11.5–14.5)
PDW BLD-RTO: 13.6 % (ref 11.5–14.5)
PDW BLD-RTO: 13.6 % (ref 11.5–14.5)
PDW BLD-RTO: 13.7 % (ref 11.5–14.5)
PDW BLD-RTO: 13.9 % (ref 11.5–14.5)
PDW BLD-RTO: 13.9 % (ref 11.5–14.5)
PDW BLD-RTO: 14 % (ref 11.5–14.5)
PDW BLD-RTO: 14.1 % (ref 11.5–14.5)
PDW BLD-RTO: 14.2 % (ref 11.5–14.5)
PDW BLD-RTO: 14.2 % (ref 11.5–14.5)
PDW BLD-RTO: 14.3 % (ref 11.5–14.5)
PDW BLD-RTO: 14.5 % (ref 11.5–14.5)
PDW BLD-RTO: 14.7 % (ref 11.5–14.5)
PDW BLD-RTO: 14.8 % (ref 11.5–14.5)
PDW BLD-RTO: 14.8 % (ref 11.5–14.5)
PDW BLD-RTO: 14.9 % (ref 11.5–14.5)
PDW BLD-RTO: 15 % (ref 11.5–14.5)
PDW BLD-RTO: 15 % (ref 11.5–14.5)
PDW BLD-RTO: 15.1 % (ref 11.5–14.5)
PERFORMED ON: ABNORMAL
PERFORMED ON: NORMAL
PH ARTERIAL: 7.29 (ref 7.35–7.45)
PH ARTERIAL: 7.39 (ref 7.3–7.5)
PH UA: 7 (ref 5–8)
PH VENOUS: 7.36
PH VENOUS: 7.38
PLATELET # BLD: 117 K/UL (ref 130–400)
PLATELET # BLD: 121 K/UL (ref 130–400)
PLATELET # BLD: 153 K/UL (ref 130–400)
PLATELET # BLD: 154 K/UL (ref 130–400)
PLATELET # BLD: 177 K/UL (ref 130–400)
PLATELET # BLD: 179 K/UL (ref 130–400)
PLATELET # BLD: 182 K/UL (ref 130–400)
PLATELET # BLD: 184 K/UL (ref 130–400)
PLATELET # BLD: 186 K/UL (ref 130–400)
PLATELET # BLD: 192 K/UL (ref 130–400)
PLATELET # BLD: 201 K/UL (ref 130–400)
PLATELET # BLD: 206 K/UL (ref 130–400)
PLATELET # BLD: 206 K/UL (ref 130–400)
PLATELET # BLD: 222 K/UL (ref 130–400)
PLATELET # BLD: 223 K/UL (ref 130–400)
PLATELET # BLD: 252 K/UL (ref 130–400)
PLATELET # BLD: 264 K/UL (ref 130–400)
PLATELET # BLD: 283 K/UL (ref 130–400)
PLATELET # BLD: 303 K/UL (ref 130–400)
PLATELET # BLD: 43 K/UL (ref 130–400)
PLATELET # BLD: 46 K/UL (ref 130–400)
PLATELET # BLD: 55 K/UL (ref 130–400)
PLATELET # BLD: 57 K/UL (ref 130–400)
PLATELET # BLD: 61 K/UL (ref 130–400)
PLATELET # BLD: 87 K/UL (ref 130–400)
PLATELET # BLD: 97 K/UL (ref 130–400)
PLATELET SLIDE REVIEW: ABNORMAL
PLATELET SLIDE REVIEW: ADEQUATE
PLATELET SLIDE REVIEW: ADEQUATE
PMV BLD AUTO: 10.1 FL (ref 9.4–12.4)
PMV BLD AUTO: 10.2 FL (ref 9.4–12.4)
PMV BLD AUTO: 10.4 FL (ref 9.4–12.4)
PMV BLD AUTO: 10.5 FL (ref 9.4–12.4)
PMV BLD AUTO: 10.5 FL (ref 9.4–12.4)
PMV BLD AUTO: 10.6 FL (ref 9.4–12.4)
PMV BLD AUTO: 10.7 FL (ref 9.4–12.4)
PMV BLD AUTO: 11.3 FL (ref 9.4–12.4)
PMV BLD AUTO: 11.5 FL (ref 9.4–12.4)
PMV BLD AUTO: 11.6 FL (ref 9.4–12.4)
PMV BLD AUTO: 12.3 FL (ref 9.4–12.4)
PMV BLD AUTO: 12.4 FL (ref 9.4–12.4)
PMV BLD AUTO: 12.7 FL (ref 9.4–12.4)
PMV BLD AUTO: 8.6 FL (ref 9.4–12.4)
PMV BLD AUTO: 8.7 FL (ref 9.4–12.4)
PMV BLD AUTO: 9 FL (ref 9.4–12.4)
PMV BLD AUTO: 9 FL (ref 9.4–12.4)
PMV BLD AUTO: 9.3 FL (ref 9.4–12.4)
PMV BLD AUTO: 9.5 FL (ref 9.4–12.4)
PMV BLD AUTO: 9.5 FL (ref 9.4–12.4)
PMV BLD AUTO: 9.6 FL (ref 9.4–12.4)
PMV BLD AUTO: 9.6 FL (ref 9.4–12.4)
PMV BLD AUTO: 9.7 FL (ref 9.4–12.4)
PMV BLD AUTO: 9.7 FL (ref 9.4–12.4)
PMV BLD AUTO: 9.8 FL (ref 9.4–12.4)
PO2 ARTERIAL: 54 MMHG (ref 80–100)
PO2 ARTERIAL: 68 MM HG (ref 83–108)
PO2, VEN: 42.1 MM HG
PO2, VEN: 42.9 MM HG
POC ACT LR: 235 SEC
POC ACT LR: 237 SEC
POC ACT LR: 250 SEC
POC ACT LR: 252 SEC
POC ACT LR: 263 SEC
POC ANION GAP: 10
POC ANION GAP: 9
POC ANION GAP: 9
POC CHLORIDE: 104 MEQ/L (ref 99–110)
POC CHLORIDE: 106 MEQ/L (ref 99–110)
POC CHLORIDE: 108 MEQ/L (ref 99–110)
POC CREATININE: 0.6 MG/DL (ref 0.3–1.3)
POC CREATININE: 0.7 MG/DL (ref 0.3–1.3)
POC CREATININE: 0.7 MG/DL (ref 0.3–1.3)
POC CREATININE: 0.8 MG/DL (ref 0.3–1.3)
POC HEMATOCRIT: 38 % (ref 37–52)
POC HEMATOCRIT: 39 % (ref 37–52)
POC HEMATOCRIT: 40 % (ref 37–52)
POC POTASSIUM: 3.6 MEQ/L (ref 3.5–5.1)
POC POTASSIUM: 3.6 MEQ/L (ref 3.5–5.1)
POC POTASSIUM: 3.8 MEQ/L (ref 3.5–5.1)
POC SAMPLE TYPE: ABNORMAL
POC SAMPLE TYPE: NORMAL
POC SODIUM: 140 MEQ/L (ref 136–145)
POC SODIUM: 141 MEQ/L (ref 136–145)
POC SODIUM: 141 MEQ/L (ref 136–145)
POLYCHROMASIA: ABNORMAL
POTASSIUM REFLEX MAGNESIUM: 2.9 MMOL/L (ref 3.5–5)
POTASSIUM REFLEX MAGNESIUM: 3.2 MMOL/L (ref 3.5–5)
POTASSIUM REFLEX MAGNESIUM: 3.3 MMOL/L (ref 3.5–5)
POTASSIUM REFLEX MAGNESIUM: 3.6 MMOL/L (ref 3.5–5)
POTASSIUM REFLEX MAGNESIUM: 3.6 MMOL/L (ref 3.5–5)
POTASSIUM REFLEX MAGNESIUM: 3.7 MMOL/L (ref 3.5–5)
POTASSIUM REFLEX MAGNESIUM: 3.8 MMOL/L (ref 3.5–5)
POTASSIUM REFLEX MAGNESIUM: 4 MMOL/L (ref 3.5–5)
POTASSIUM REFLEX MAGNESIUM: 4.3 MMOL/L (ref 3.5–5)
POTASSIUM REFLEX MAGNESIUM: 4.3 MMOL/L (ref 3.5–5)
POTASSIUM REFLEX MAGNESIUM: 4.7 MMOL/L (ref 3.5–5)
POTASSIUM REFLEX MAGNESIUM: 4.7 MMOL/L (ref 3.5–5)
POTASSIUM SERPL-SCNC: 3.4 MMOL/L (ref 3.5–5)
POTASSIUM SERPL-SCNC: 3.6 MMOL/L (ref 3.5–5)
POTASSIUM SERPL-SCNC: 3.9 MMOL/L (ref 3.5–5)
POTASSIUM SERPL-SCNC: 4 MMOL/L (ref 3.5–5)
POTASSIUM SERPL-SCNC: 4.2 MMOL/L (ref 3.5–5)
POTASSIUM SERPL-SCNC: 4.5 MMOL/L (ref 3.5–5)
POTASSIUM SERPL-SCNC: 4.5 MMOL/L (ref 3.5–5)
POTASSIUM SERPL-SCNC: 4.7 MMOL/L (ref 3.5–5)
POTASSIUM, WHOLE BLOOD: 4.1
PRO-BNP: 1332 PG/ML (ref 0–900)
PRO-BNP: 2540 PG/ML (ref 0–900)
PRO-BNP: 2714 PG/ML (ref 0–900)
PRO-BNP: 2773 PG/ML (ref 0–900)
PROCALCITONIN: 0.05 NG/ML (ref 0–0.09)
PROTEIN UA: NEGATIVE MG/DL
PROTEUS SPECIES BY PCR: NOT DETECTED
PROTEUS SPECIES BY PCR: NOT DETECTED
PROTHROMBIN TIME: 13 SEC (ref 12–14.6)
PROTHROMBIN TIME: 13.7 SEC (ref 12–14.6)
PROTHROMBIN TIME: 13.8 SEC (ref 12–14.6)
PROTHROMBIN TIME: 14.5 SEC (ref 12–14.6)
PSEUDOMONAS AERUGINOSA BY PCR: NOT DETECTED
PSEUDOMONAS AERUGINOSA BY PCR: NOT DETECTED
RBC # BLD: 2.53 M/UL (ref 4.7–6.1)
RBC # BLD: 2.74 M/UL (ref 4.7–6.1)
RBC # BLD: 2.78 M/UL (ref 4.7–6.1)
RBC # BLD: 2.85 M/UL (ref 4.7–6.1)
RBC # BLD: 2.87 M/UL (ref 4.7–6.1)
RBC # BLD: 2.96 M/UL (ref 4.7–6.1)
RBC # BLD: 3.01 M/UL (ref 4.7–6.1)
RBC # BLD: 3.08 M/UL (ref 4.7–6.1)
RBC # BLD: 3.12 M/UL (ref 4.7–6.1)
RBC # BLD: 3.25 M/UL (ref 4.7–6.1)
RBC # BLD: 3.41 M/UL (ref 4.7–6.1)
RBC # BLD: 3.44 M/UL (ref 4.7–6.1)
RBC # BLD: 3.46 M/UL (ref 4.7–6.1)
RBC # BLD: 3.51 M/UL (ref 4.7–6.1)
RBC # BLD: 3.57 M/UL (ref 4.7–6.1)
RBC # BLD: 3.67 M/UL (ref 4.7–6.1)
RBC # BLD: 3.73 M/UL (ref 4.7–6.1)
RBC # BLD: 3.92 M/UL (ref 4.7–6.1)
RBC # BLD: 3.93 M/UL (ref 4.7–6.1)
RBC # BLD: 3.94 M/UL (ref 4.7–6.1)
RBC # BLD: 4.23 M/UL (ref 4.7–6.1)
RBC # BLD: 4.36 M/UL (ref 4.7–6.1)
RBC # BLD: 4.39 M/UL (ref 4.7–6.1)
RBC FLUID: NORMAL /CUMM
RBC FLUID: NORMAL /CUMM
REASON FOR REJECTION: NORMAL
REJECTED TEST: NORMAL
RETICULOCYTE ABSOLUTE COUNT: 0.07 M/UL (ref 0.03–0.12)
RETICULOCYTE COUNT PCT: 2.17 % (ref 0.5–1.5)
SALMONELLA SPECIES BY PCR: NOT DETECTED
SALMONELLA SPECIES BY PCR: NOT DETECTED
SARS-COV-2, NAAT: NOT DETECTED
SARS-COV-2, PCR: NORMAL
SARS-COV-2, PCR: NOT DETECTED
SCHISTOCYTES: ABNORMAL
SEDIMENTATION RATE, ERYTHROCYTE: 30 MM/HR (ref 0–15)
SERRATIA MARCESCENS BY PCR: NOT DETECTED
SERRATIA MARCESCENS BY PCR: NOT DETECTED
SODIUM BLD-SCNC: 133 MMOL/L (ref 136–145)
SODIUM BLD-SCNC: 134 MMOL/L (ref 136–145)
SODIUM BLD-SCNC: 136 MMOL/L (ref 136–145)
SODIUM BLD-SCNC: 137 MMOL/L (ref 136–145)
SODIUM BLD-SCNC: 137 MMOL/L (ref 136–145)
SODIUM BLD-SCNC: 138 MMOL/L (ref 136–145)
SODIUM BLD-SCNC: 140 MMOL/L (ref 136–145)
SODIUM BLD-SCNC: 141 MMOL/L (ref 136–145)
SODIUM BLD-SCNC: 142 MMOL/L (ref 136–145)
SODIUM BLD-SCNC: 142 MMOL/L (ref 136–145)
SODIUM BLD-SCNC: 144 MMOL/L (ref 136–145)
SPECIFIC GRAVITY UA: 1 (ref 1–1.03)
STAPHYLOCOCCUS AUREUS BY PCR: DETECTED
STAPHYLOCOCCUS AUREUS BY PCR: NOT DETECTED
STAPHYLOCOCCUS EPIDERMIDIS BY PCR: DETECTED
STAPHYLOCOCCUS EPIDERMIDIS BY PCR: NOT DETECTED
STAPHYLOCOCCUS LUGDUNENSIS BY PCR: NOT DETECTED
STAPHYLOCOCCUS LUGDUNENSIS BY PCR: NOT DETECTED
STAPHYLOCOCCUS SPECIES BY PCR: DETECTED
STAPHYLOCOCCUS SPECIES BY PCR: DETECTED
STENOTROPHOMONAS MALTOPHILIA BY PCR: NOT DETECTED
STENOTROPHOMONAS MALTOPHILIA BY PCR: NOT DETECTED
STOMATOCYTES: ABNORMAL
STREP PNEUMONIAE ANTIGEN, URINE: NORMAL
STREPTOCOCCUS AGALACTIAE BY PCR: NOT DETECTED
STREPTOCOCCUS AGALACTIAE BY PCR: NOT DETECTED
STREPTOCOCCUS PNEUMONIAE BY PCR: NOT DETECTED
STREPTOCOCCUS PNEUMONIAE BY PCR: NOT DETECTED
STREPTOCOCCUS PYOGENES  BY PCR: NOT DETECTED
STREPTOCOCCUS PYOGENES  BY PCR: NOT DETECTED
STREPTOCOCCUS SPECIES BY PCR: NOT DETECTED
STREPTOCOCCUS SPECIES BY PCR: NOT DETECTED
TCO2 ARTERIAL: 26 MMOL/L
TCO2 CALC VENOUS: 26 MMOL/L
TCO2 CALC VENOUS: 28 MMOL/L
TOTAL IRON BINDING CAPACITY: 204 UG/DL (ref 250–400)
TOTAL IRON BINDING CAPACITY: 292 UG/DL (ref 250–400)
TOTAL PROTEIN: 4.7 G/DL (ref 6.6–8.7)
TOTAL PROTEIN: 4.9 G/DL (ref 6.6–8.7)
TOTAL PROTEIN: 5.1 G/DL (ref 6.6–8.7)
TOTAL PROTEIN: 5.4 G/DL (ref 6.6–8.7)
TOTAL PROTEIN: 5.8 G/DL (ref 6.6–8.7)
TOTAL PROTEIN: 6 G/DL (ref 6.6–8.7)
TOTAL PROTEIN: 6 G/DL (ref 6.6–8.7)
TOTAL PROTEIN: 6.1 G/DL (ref 6.6–8.7)
TOTAL PROTEIN: 6.2 G/DL (ref 6.6–8.7)
TOTAL PROTEIN: 6.4 G/DL (ref 6.6–8.7)
TOTAL PROTEIN: 6.7 G/DL (ref 6.6–8.7)
TOTAL PROTEIN: 6.7 G/DL (ref 6.6–8.7)
TOTAL PROTEIN: 6.8 G/DL (ref 6.6–8.7)
TOTAL PROTEIN: 6.9 G/DL (ref 6.6–8.7)
TOTAL PROTEIN: 7 G/DL (ref 6.6–8.7)
TOXIC GRANULATION: ABNORMAL
TOXIC GRANULATION: ABNORMAL
TRIGL SERPL-MCNC: 132 MG/DL (ref 0–149)
TRIGL SERPL-MCNC: 53 MG/DL (ref 0–149)
TROPONIN: 0.04 NG/ML (ref 0–0.03)
TROPONIN: 0.05 NG/ML (ref 0–0.03)
TROPONIN: 0.05 NG/ML (ref 0–0.03)
TROPONIN: 0.1 NG/ML (ref 0–0.03)
TROPONIN: 0.1 NG/ML (ref 0–0.03)
TROPONIN: 0.18 NG/ML (ref 0–0.03)
TROPONIN: 0.22 NG/ML (ref 0–0.03)
TROPONIN: 0.22 NG/ML (ref 0–0.03)
TROPONIN: 0.23 NG/ML (ref 0–0.03)
TSH REFLEX FT4: 1.52 UIU/ML (ref 0.35–5.5)
UROBILINOGEN, URINE: 0.2 E.U./DL
VITAMIN B-12: 689 PG/ML (ref 211–946)
VITAMIN B-12: 746 PG/ML (ref 211–946)
WBC # BLD: 10.9 K/UL (ref 4.8–10.8)
WBC # BLD: 10.9 K/UL (ref 4.8–10.8)
WBC # BLD: 12.2 K/UL (ref 4.8–10.8)
WBC # BLD: 13 K/UL (ref 4.8–10.8)
WBC # BLD: 13.2 K/UL (ref 4.8–10.8)
WBC # BLD: 13.3 K/UL (ref 4.8–10.8)
WBC # BLD: 13.8 K/UL (ref 4.8–10.8)
WBC # BLD: 14.8 K/UL (ref 4.8–10.8)
WBC # BLD: 15.8 K/UL (ref 4.8–10.8)
WBC # BLD: 19.4 K/UL (ref 4.8–10.8)
WBC # BLD: 19.8 K/UL (ref 4.8–10.8)
WBC # BLD: 22 K/UL (ref 4.8–10.8)
WBC # BLD: 6.3 K/UL (ref 4.8–10.8)
WBC # BLD: 6.6 K/UL (ref 4.8–10.8)
WBC # BLD: 7.3 K/UL (ref 4.8–10.8)
WBC # BLD: 7.4 K/UL (ref 4.8–10.8)
WBC # BLD: 7.5 K/UL (ref 4.8–10.8)
WBC # BLD: 7.8 K/UL (ref 4.8–10.8)
WBC # BLD: 8.4 K/UL (ref 4.8–10.8)
WBC # BLD: 8.5 K/UL (ref 4.8–10.8)
WBC # BLD: 9.5 K/UL (ref 4.8–10.8)
WBC # BLD: 9.6 K/UL (ref 4.8–10.8)
WBC # BLD: 9.7 K/UL (ref 4.8–10.8)
WBC # BLD: 9.7 K/UL (ref 4.8–10.8)
WBC # BLD: 9.8 K/UL (ref 4.8–10.8)

## 2021-01-01 PROCEDURE — 85014 HEMATOCRIT: CPT

## 2021-01-01 PROCEDURE — 70496 CT ANGIOGRAPHY HEAD: CPT

## 2021-01-01 PROCEDURE — 99213 OFFICE O/P EST LOW 20 MIN: CPT | Performed by: CLINICAL NURSE SPECIALIST

## 2021-01-01 PROCEDURE — 6370000000 HC RX 637 (ALT 250 FOR IP): Performed by: ORTHOPAEDIC SURGERY

## 2021-01-01 PROCEDURE — 82947 ASSAY GLUCOSE BLOOD QUANT: CPT

## 2021-01-01 PROCEDURE — 6360000002 HC RX W HCPCS: Performed by: ORTHOPAEDIC SURGERY

## 2021-01-01 PROCEDURE — C1769 GUIDE WIRE: HCPCS

## 2021-01-01 PROCEDURE — 97530 THERAPEUTIC ACTIVITIES: CPT

## 2021-01-01 PROCEDURE — 97162 PT EVAL MOD COMPLEX 30 MIN: CPT

## 2021-01-01 PROCEDURE — 93308 TTE F-UP OR LMTD: CPT

## 2021-01-01 PROCEDURE — 6360000002 HC RX W HCPCS

## 2021-01-01 PROCEDURE — 6360000004 HC RX CONTRAST MEDICATION: Performed by: STUDENT IN AN ORGANIZED HEALTH CARE EDUCATION/TRAINING PROGRAM

## 2021-01-01 PROCEDURE — 51798 US URINE CAPACITY MEASURE: CPT

## 2021-01-01 PROCEDURE — 2700000000 HC OXYGEN THERAPY PER DAY

## 2021-01-01 PROCEDURE — 89051 BODY FLUID CELL COUNT: CPT

## 2021-01-01 PROCEDURE — 6370000000 HC RX 637 (ALT 250 FOR IP): Performed by: NURSE PRACTITIONER

## 2021-01-01 PROCEDURE — 2140000000 HC CCU INTERMEDIATE R&B

## 2021-01-01 PROCEDURE — 87040 BLOOD CULTURE FOR BACTERIA: CPT

## 2021-01-01 PROCEDURE — 2500000003 HC RX 250 WO HCPCS: Performed by: EMERGENCY MEDICINE

## 2021-01-01 PROCEDURE — 94640 AIRWAY INHALATION TREATMENT: CPT

## 2021-01-01 PROCEDURE — 83540 ASSAY OF IRON: CPT

## 2021-01-01 PROCEDURE — 2580000003 HC RX 258: Performed by: NURSE PRACTITIONER

## 2021-01-01 PROCEDURE — 84484 ASSAY OF TROPONIN QUANT: CPT

## 2021-01-01 PROCEDURE — 6370000000 HC RX 637 (ALT 250 FOR IP): Performed by: INTERNAL MEDICINE

## 2021-01-01 PROCEDURE — C1725 CATH, TRANSLUMIN NON-LASER: HCPCS

## 2021-01-01 PROCEDURE — 0SPC0JZ REMOVAL OF SYNTHETIC SUBSTITUTE FROM RIGHT KNEE JOINT, OPEN APPROACH: ICD-10-PCS | Performed by: ORTHOPAEDIC SURGERY

## 2021-01-01 PROCEDURE — 99223 1ST HOSP IP/OBS HIGH 75: CPT | Performed by: PSYCHIATRY & NEUROLOGY

## 2021-01-01 PROCEDURE — 99999 PR OFFICE/OUTPT VISIT,PROCEDURE ONLY: CPT | Performed by: NURSE PRACTITIONER

## 2021-01-01 PROCEDURE — 80053 COMPREHEN METABOLIC PANEL: CPT

## 2021-01-01 PROCEDURE — 2580000003 HC RX 258: Performed by: ORTHOPAEDIC SURGERY

## 2021-01-01 PROCEDURE — 93005 ELECTROCARDIOGRAM TRACING: CPT | Performed by: ANESTHESIOLOGY

## 2021-01-01 PROCEDURE — 7100000000 HC PACU RECOVERY - FIRST 15 MIN

## 2021-01-01 PROCEDURE — 82435 ASSAY OF BLOOD CHLORIDE: CPT

## 2021-01-01 PROCEDURE — 85025 COMPLETE CBC W/AUTO DIFF WBC: CPT

## 2021-01-01 PROCEDURE — 6370000000 HC RX 637 (ALT 250 FOR IP): Performed by: FAMILY MEDICINE

## 2021-01-01 PROCEDURE — 87635 SARS-COV-2 COVID-19 AMP PRB: CPT

## 2021-01-01 PROCEDURE — 99239 HOSP IP/OBS DSCHRG MGMT >30: CPT | Performed by: INTERNAL MEDICINE

## 2021-01-01 PROCEDURE — 85018 HEMOGLOBIN: CPT

## 2021-01-01 PROCEDURE — 6370000000 HC RX 637 (ALT 250 FOR IP): Performed by: HOSPITALIST

## 2021-01-01 PROCEDURE — 82565 ASSAY OF CREATININE: CPT

## 2021-01-01 PROCEDURE — 2500000003 HC RX 250 WO HCPCS: Performed by: INTERNAL MEDICINE

## 2021-01-01 PROCEDURE — 02HV33Z INSERTION OF INFUSION DEVICE INTO SUPERIOR VENA CAVA, PERCUTANEOUS APPROACH: ICD-10-PCS | Performed by: FAMILY MEDICINE

## 2021-01-01 PROCEDURE — 7100000001 HC PACU RECOVERY - ADDTL 15 MIN

## 2021-01-01 PROCEDURE — 6370000000 HC RX 637 (ALT 250 FOR IP)

## 2021-01-01 PROCEDURE — 1210000000 HC MED SURG R&B

## 2021-01-01 PROCEDURE — 36415 COLL VENOUS BLD VENIPUNCTURE: CPT

## 2021-01-01 PROCEDURE — 86850 RBC ANTIBODY SCREEN: CPT

## 2021-01-01 PROCEDURE — 3700000001 HC ADD 15 MINUTES (ANESTHESIA)

## 2021-01-01 PROCEDURE — 93460 R&L HRT ART/VENTRICLE ANGIO: CPT

## 2021-01-01 PROCEDURE — 97116 GAIT TRAINING THERAPY: CPT

## 2021-01-01 PROCEDURE — 93005 ELECTROCARDIOGRAM TRACING: CPT

## 2021-01-01 PROCEDURE — 6360000002 HC RX W HCPCS: Performed by: HOSPITALIST

## 2021-01-01 PROCEDURE — 99214 OFFICE O/P EST MOD 30 MIN: CPT | Performed by: CLINICAL NURSE SPECIALIST

## 2021-01-01 PROCEDURE — G0438 PPPS, INITIAL VISIT: HCPCS | Performed by: FAMILY MEDICINE

## 2021-01-01 PROCEDURE — 99232 SBSQ HOSP IP/OBS MODERATE 35: CPT | Performed by: PSYCHIATRY & NEUROLOGY

## 2021-01-01 PROCEDURE — 85027 COMPLETE CBC AUTOMATED: CPT

## 2021-01-01 PROCEDURE — 93321 DOPPLER ECHO F-UP/LMTD STD: CPT

## 2021-01-01 PROCEDURE — 3700000000 HC ANESTHESIA ATTENDED CARE: Performed by: ORTHOPAEDIC SURGERY

## 2021-01-01 PROCEDURE — 93926 LOWER EXTREMITY STUDY: CPT

## 2021-01-01 PROCEDURE — 6360000002 HC RX W HCPCS: Performed by: NURSE PRACTITIONER

## 2021-01-01 PROCEDURE — 85347 COAGULATION TIME ACTIVATED: CPT

## 2021-01-01 PROCEDURE — 99213 OFFICE O/P EST LOW 20 MIN: CPT | Performed by: NURSE PRACTITIONER

## 2021-01-01 PROCEDURE — 33361 REPLACE AORTIC VALVE PERQ: CPT | Performed by: INTERNAL MEDICINE

## 2021-01-01 PROCEDURE — 6360000002 HC RX W HCPCS: Performed by: NURSE ANESTHETIST, CERTIFIED REGISTERED

## 2021-01-01 PROCEDURE — 99152 MOD SED SAME PHYS/QHP 5/>YRS: CPT

## 2021-01-01 PROCEDURE — 6360000004 HC RX CONTRAST MEDICATION: Performed by: NURSE PRACTITIONER

## 2021-01-01 PROCEDURE — 2580000003 HC RX 258: Performed by: INTERNAL MEDICINE

## 2021-01-01 PROCEDURE — 84132 ASSAY OF SERUM POTASSIUM: CPT

## 2021-01-01 PROCEDURE — 83010 ASSAY OF HAPTOGLOBIN QUANT: CPT

## 2021-01-01 PROCEDURE — 80048 BASIC METABOLIC PNL TOTAL CA: CPT

## 2021-01-01 PROCEDURE — 7100000001 HC PACU RECOVERY - ADDTL 15 MIN: Performed by: ORTHOPAEDIC SURGERY

## 2021-01-01 PROCEDURE — 96376 TX/PRO/DX INJ SAME DRUG ADON: CPT

## 2021-01-01 PROCEDURE — 93880 EXTRACRANIAL BILAT STUDY: CPT

## 2021-01-01 PROCEDURE — 99233 SBSQ HOSP IP/OBS HIGH 50: CPT | Performed by: PSYCHIATRY & NEUROLOGY

## 2021-01-01 PROCEDURE — 71045 X-RAY EXAM CHEST 1 VIEW: CPT

## 2021-01-01 PROCEDURE — C1713 ANCHOR/SCREW BN/BN,TIS/BN: HCPCS | Performed by: ORTHOPAEDIC SURGERY

## 2021-01-01 PROCEDURE — 87176 TISSUE HOMOGENIZATION CULTR: CPT

## 2021-01-01 PROCEDURE — 82728 ASSAY OF FERRITIN: CPT

## 2021-01-01 PROCEDURE — 85652 RBC SED RATE AUTOMATED: CPT

## 2021-01-01 PROCEDURE — 87186 SC STD MICRODIL/AGAR DIL: CPT

## 2021-01-01 PROCEDURE — 33361 REPLACE AORTIC VALVE PERQ: CPT | Performed by: THORACIC SURGERY (CARDIOTHORACIC VASCULAR SURGERY)

## 2021-01-01 PROCEDURE — 84443 ASSAY THYROID STIM HORMONE: CPT

## 2021-01-01 PROCEDURE — C1894 INTRO/SHEATH, NON-LASER: HCPCS

## 2021-01-01 PROCEDURE — 97110 THERAPEUTIC EXERCISES: CPT

## 2021-01-01 PROCEDURE — 73560 X-RAY EXAM OF KNEE 1 OR 2: CPT

## 2021-01-01 PROCEDURE — 33361 REPLACE AORTIC VALVE PERQ: CPT

## 2021-01-01 PROCEDURE — 84295 ASSAY OF SERUM SODIUM: CPT

## 2021-01-01 PROCEDURE — 6360000002 HC RX W HCPCS: Performed by: INTERNAL MEDICINE

## 2021-01-01 PROCEDURE — 93458 L HRT ARTERY/VENTRICLE ANGIO: CPT | Performed by: INTERNAL MEDICINE

## 2021-01-01 PROCEDURE — 82140 ASSAY OF AMMONIA: CPT

## 2021-01-01 PROCEDURE — 93567 NJX CAR CTH SPRVLV AORTGRPHY: CPT

## 2021-01-01 PROCEDURE — 71275 CT ANGIOGRAPHY CHEST: CPT

## 2021-01-01 PROCEDURE — 74176 CT ABD & PELVIS W/O CONTRAST: CPT

## 2021-01-01 PROCEDURE — 83605 ASSAY OF LACTIC ACID: CPT

## 2021-01-01 PROCEDURE — 93000 ELECTROCARDIOGRAM COMPLETE: CPT | Performed by: CLINICAL NURSE SPECIALIST

## 2021-01-01 PROCEDURE — 82374 ASSAY BLOOD CARBON DIOXIDE: CPT

## 2021-01-01 PROCEDURE — 82800 BLOOD PH: CPT

## 2021-01-01 PROCEDURE — 93460 R&L HRT ART/VENTRICLE ANGIO: CPT | Performed by: INTERNAL MEDICINE

## 2021-01-01 PROCEDURE — 2580000003 HC RX 258

## 2021-01-01 PROCEDURE — 2709999900 HC NON-CHARGEABLE SUPPLY

## 2021-01-01 PROCEDURE — 99233 SBSQ HOSP IP/OBS HIGH 50: CPT | Performed by: INTERNAL MEDICINE

## 2021-01-01 PROCEDURE — C1776 JOINT DEVICE (IMPLANTABLE): HCPCS | Performed by: ORTHOPAEDIC SURGERY

## 2021-01-01 PROCEDURE — 84145 PROCALCITONIN (PCT): CPT

## 2021-01-01 PROCEDURE — 02RF38Z REPLACEMENT OF AORTIC VALVE WITH ZOOPLASTIC TISSUE, PERCUTANEOUS APPROACH: ICD-10-PCS | Performed by: INTERNAL MEDICINE

## 2021-01-01 PROCEDURE — C8929 TTE W OR WO FOL WCON,DOPPLER: HCPCS

## 2021-01-01 PROCEDURE — 6360000002 HC RX W HCPCS: Performed by: ANESTHESIOLOGY

## 2021-01-01 PROCEDURE — 93325 DOPPLER ECHO COLOR FLOW MAPG: CPT

## 2021-01-01 PROCEDURE — 85576 BLOOD PLATELET AGGREGATION: CPT

## 2021-01-01 PROCEDURE — 93306 TTE W/DOPPLER COMPLETE: CPT

## 2021-01-01 PROCEDURE — 2500000003 HC RX 250 WO HCPCS

## 2021-01-01 PROCEDURE — 93458 L HRT ARTERY/VENTRICLE ANGIO: CPT

## 2021-01-01 PROCEDURE — 86900 BLOOD TYPING SEROLOGIC ABO: CPT

## 2021-01-01 PROCEDURE — 3700000001 HC ADD 15 MINUTES (ANESTHESIA): Performed by: ORTHOPAEDIC SURGERY

## 2021-01-01 PROCEDURE — 83735 ASSAY OF MAGNESIUM: CPT

## 2021-01-01 PROCEDURE — 6360000004 HC RX CONTRAST MEDICATION: Performed by: INTERNAL MEDICINE

## 2021-01-01 PROCEDURE — 6370000000 HC RX 637 (ALT 250 FOR IP): Performed by: ANESTHESIOLOGY

## 2021-01-01 PROCEDURE — 7100000000 HC PACU RECOVERY - FIRST 15 MIN: Performed by: ORTHOPAEDIC SURGERY

## 2021-01-01 PROCEDURE — 87075 CULTR BACTERIA EXCEPT BLOOD: CPT

## 2021-01-01 PROCEDURE — 93005 ELECTROCARDIOGRAM TRACING: CPT | Performed by: INTERNAL MEDICINE

## 2021-01-01 PROCEDURE — 94660 CPAP INITIATION&MGMT: CPT

## 2021-01-01 PROCEDURE — 99024 POSTOP FOLLOW-UP VISIT: CPT | Performed by: THORACIC SURGERY (CARDIOTHORACIC VASCULAR SURGERY)

## 2021-01-01 PROCEDURE — 99222 1ST HOSP IP/OBS MODERATE 55: CPT | Performed by: INTERNAL MEDICINE

## 2021-01-01 PROCEDURE — 3700000000 HC ANESTHESIA ATTENDED CARE

## 2021-01-01 PROCEDURE — 87070 CULTURE OTHR SPECIMN AEROBIC: CPT

## 2021-01-01 PROCEDURE — 85610 PROTHROMBIN TIME: CPT

## 2021-01-01 PROCEDURE — 1111F DSCHRG MED/CURRENT MED MERGE: CPT | Performed by: NURSE PRACTITIONER

## 2021-01-01 PROCEDURE — 99495 TRANSJ CARE MGMT MOD F2F 14D: CPT | Performed by: NURSE PRACTITIONER

## 2021-01-01 PROCEDURE — 94761 N-INVAS EAR/PLS OXIMETRY MLT: CPT

## 2021-01-01 PROCEDURE — 2720000010 HC SURG SUPPLY STERILE

## 2021-01-01 PROCEDURE — 93325 DOPPLER ECHO COLOR FLOW MAPG: CPT | Performed by: INTERNAL MEDICINE

## 2021-01-01 PROCEDURE — 86140 C-REACTIVE PROTEIN: CPT

## 2021-01-01 PROCEDURE — C1760 CLOSURE DEV, VASC: HCPCS

## 2021-01-01 PROCEDURE — 99284 EMERGENCY DEPT VISIT MOD MDM: CPT

## 2021-01-01 PROCEDURE — 85049 AUTOMATED PLATELET COUNT: CPT

## 2021-01-01 PROCEDURE — 83880 ASSAY OF NATRIURETIC PEPTIDE: CPT

## 2021-01-01 PROCEDURE — 2500000003 HC RX 250 WO HCPCS: Performed by: ORTHOPAEDIC SURGERY

## 2021-01-01 PROCEDURE — 85007 BL SMEAR W/DIFF WBC COUNT: CPT

## 2021-01-01 PROCEDURE — B2151ZZ FLUOROSCOPY OF LEFT HEART USING LOW OSMOLAR CONTRAST: ICD-10-PCS | Performed by: INTERNAL MEDICINE

## 2021-01-01 PROCEDURE — 82810 BLOOD GASES O2 SAT ONLY: CPT

## 2021-01-01 PROCEDURE — 2780000006 HC MISC HEART VALVE

## 2021-01-01 PROCEDURE — 0R9J3ZX DRAINAGE OF RIGHT SHOULDER JOINT, PERCUTANEOUS APPROACH, DIAGNOSTIC: ICD-10-PCS | Performed by: ORTHOPAEDIC SURGERY

## 2021-01-01 PROCEDURE — 99285 EMERGENCY DEPT VISIT HI MDM: CPT

## 2021-01-01 PROCEDURE — 99152 MOD SED SAME PHYS/QHP 5/>YRS: CPT | Performed by: INTERNAL MEDICINE

## 2021-01-01 PROCEDURE — 93005 ELECTROCARDIOGRAM TRACING: CPT | Performed by: NURSE PRACTITIONER

## 2021-01-01 PROCEDURE — 2580000003 HC RX 258: Performed by: HOSPITALIST

## 2021-01-01 PROCEDURE — 83036 HEMOGLOBIN GLYCOSYLATED A1C: CPT

## 2021-01-01 PROCEDURE — 74174 CTA ABD&PLVS W/CONTRAST: CPT

## 2021-01-01 PROCEDURE — B2111ZZ FLUOROSCOPY OF MULTIPLE CORONARY ARTERIES USING LOW OSMOLAR CONTRAST: ICD-10-PCS | Performed by: INTERNAL MEDICINE

## 2021-01-01 PROCEDURE — 2720000010 HC SURG SUPPLY STERILE: Performed by: ORTHOPAEDIC SURGERY

## 2021-01-01 PROCEDURE — 99204 OFFICE O/P NEW MOD 45 MIN: CPT | Performed by: NURSE PRACTITIONER

## 2021-01-01 PROCEDURE — B24BZZ4 ULTRASONOGRAPHY OF HEART WITH AORTA, TRANSESOPHAGEAL: ICD-10-PCS | Performed by: INTERNAL MEDICINE

## 2021-01-01 PROCEDURE — 2000000000 HC ICU R&B

## 2021-01-01 PROCEDURE — 85045 AUTOMATED RETICULOCYTE COUNT: CPT

## 2021-01-01 PROCEDURE — 87015 SPECIMEN INFECT AGNT CONCNTJ: CPT

## 2021-01-01 PROCEDURE — B3101ZZ FLUOROSCOPY OF THORACIC AORTA USING LOW OSMOLAR CONTRAST: ICD-10-PCS | Performed by: INTERNAL MEDICINE

## 2021-01-01 PROCEDURE — 83550 IRON BINDING TEST: CPT

## 2021-01-01 PROCEDURE — 95816 EEG AWAKE AND DROWSY: CPT | Performed by: PSYCHIATRY & NEUROLOGY

## 2021-01-01 PROCEDURE — 86901 BLOOD TYPING SEROLOGIC RH(D): CPT

## 2021-01-01 PROCEDURE — 70450 CT HEAD/BRAIN W/O DYE: CPT

## 2021-01-01 PROCEDURE — 82330 ASSAY OF CALCIUM: CPT

## 2021-01-01 PROCEDURE — 73030 X-RAY EXAM OF SHOULDER: CPT

## 2021-01-01 PROCEDURE — 89060 EXAM SYNOVIAL FLUID CRYSTALS: CPT

## 2021-01-01 PROCEDURE — 20610 DRAIN/INJ JOINT/BURSA W/O US: CPT

## 2021-01-01 PROCEDURE — 02HK3JZ INSERTION OF PACEMAKER LEAD INTO RIGHT VENTRICLE, PERCUTANEOUS APPROACH: ICD-10-PCS | Performed by: INTERNAL MEDICINE

## 2021-01-01 PROCEDURE — 96374 THER/PROPH/DIAG INJ IV PUSH: CPT

## 2021-01-01 PROCEDURE — 82607 VITAMIN B-12: CPT

## 2021-01-01 PROCEDURE — 33363 REPLACE AORTIC VALVE OPEN: CPT | Performed by: THORACIC SURGERY (CARDIOTHORACIC VASCULAR SURGERY)

## 2021-01-01 PROCEDURE — 70498 CT ANGIOGRAPHY NECK: CPT

## 2021-01-01 PROCEDURE — B24BZZ4 ULTRASONOGRAPHY OF HEART WITH AORTA, TRANSESOPHAGEAL: ICD-10-PCS | Performed by: FAMILY MEDICINE

## 2021-01-01 PROCEDURE — 4A023N7 MEASUREMENT OF CARDIAC SAMPLING AND PRESSURE, LEFT HEART, PERCUTANEOUS APPROACH: ICD-10-PCS | Performed by: INTERNAL MEDICINE

## 2021-01-01 PROCEDURE — 3600000005 HC SURGERY LEVEL 5 BASE: Performed by: ORTHOPAEDIC SURGERY

## 2021-01-01 PROCEDURE — 3600000015 HC SURGERY LEVEL 5 ADDTL 15MIN: Performed by: ORTHOPAEDIC SURGERY

## 2021-01-01 PROCEDURE — 6360000002 HC RX W HCPCS: Performed by: FAMILY MEDICINE

## 2021-01-01 PROCEDURE — 6360000002 HC RX W HCPCS: Performed by: STUDENT IN AN ORGANIZED HEALTH CARE EDUCATION/TRAINING PROGRAM

## 2021-01-01 PROCEDURE — 87449 NOS EACH ORGANISM AG IA: CPT

## 2021-01-01 PROCEDURE — 97161 PT EVAL LOW COMPLEX 20 MIN: CPT

## 2021-01-01 PROCEDURE — 87205 SMEAR GRAM STAIN: CPT

## 2021-01-01 PROCEDURE — 71046 X-RAY EXAM CHEST 2 VIEWS: CPT

## 2021-01-01 PROCEDURE — 93355 ECHO TRANSESOPHAGEAL (TEE): CPT

## 2021-01-01 PROCEDURE — 99214 OFFICE O/P EST MOD 30 MIN: CPT | Performed by: INTERNAL MEDICINE

## 2021-01-01 PROCEDURE — 99204 OFFICE O/P NEW MOD 45 MIN: CPT | Performed by: INTERNAL MEDICINE

## 2021-01-01 PROCEDURE — 95819 EEG AWAKE AND ASLEEP: CPT

## 2021-01-01 PROCEDURE — 99239 HOSP IP/OBS DSCHRG MGMT >30: CPT | Performed by: NURSE PRACTITIONER

## 2021-01-01 PROCEDURE — B246ZZ4 ULTRASONOGRAPHY OF RIGHT AND LEFT HEART, TRANSESOPHAGEAL: ICD-10-PCS | Performed by: INTERNAL MEDICINE

## 2021-01-01 PROCEDURE — 99204 OFFICE O/P NEW MOD 45 MIN: CPT | Performed by: THORACIC SURGERY (CARDIOTHORACIC VASCULAR SURGERY)

## 2021-01-01 PROCEDURE — 36592 COLLECT BLOOD FROM PICC: CPT

## 2021-01-01 PROCEDURE — 82803 BLOOD GASES ANY COMBINATION: CPT

## 2021-01-01 PROCEDURE — 93010 ELECTROCARDIOGRAM REPORT: CPT | Performed by: INTERNAL MEDICINE

## 2021-01-01 PROCEDURE — 87150 DNA/RNA AMPLIFIED PROBE: CPT

## 2021-01-01 PROCEDURE — 99153 MOD SED SAME PHYS/QHP EA: CPT

## 2021-01-01 PROCEDURE — 83615 LACTATE (LD) (LDH) ENZYME: CPT

## 2021-01-01 PROCEDURE — 93312 ECHO TRANSESOPHAGEAL: CPT

## 2021-01-01 PROCEDURE — 0SRC0EZ REPLACEMENT OF RIGHT KNEE JOINT WITH ARTICULATING SPACER, OPEN APPROACH: ICD-10-PCS | Performed by: ORTHOPAEDIC SURGERY

## 2021-01-01 PROCEDURE — 2580000003 HC RX 258: Performed by: NURSE ANESTHETIST, CERTIFIED REGISTERED

## 2021-01-01 PROCEDURE — 87102 FUNGUS ISOLATION CULTURE: CPT

## 2021-01-01 PROCEDURE — 97535 SELF CARE MNGMENT TRAINING: CPT

## 2021-01-01 PROCEDURE — 6360000004 HC RX CONTRAST MEDICATION: Performed by: PHYSICIAN ASSISTANT

## 2021-01-01 PROCEDURE — 99223 1ST HOSP IP/OBS HIGH 75: CPT | Performed by: NURSE PRACTITIONER

## 2021-01-01 PROCEDURE — 3209999900 FLUORO FOR SURGICAL PROCEDURES

## 2021-01-01 PROCEDURE — 93320 DOPPLER ECHO COMPLETE: CPT | Performed by: INTERNAL MEDICINE

## 2021-01-01 PROCEDURE — 36600 WITHDRAWAL OF ARTERIAL BLOOD: CPT

## 2021-01-01 PROCEDURE — 6360000004 HC RX CONTRAST MEDICATION: Performed by: NURSE ANESTHETIST, CERTIFIED REGISTERED

## 2021-01-01 PROCEDURE — 2500000003 HC RX 250 WO HCPCS: Performed by: NURSE ANESTHETIST, CERTIFIED REGISTERED

## 2021-01-01 PROCEDURE — 93922 UPR/L XTREMITY ART 2 LEVELS: CPT

## 2021-01-01 PROCEDURE — 80061 LIPID PANEL: CPT

## 2021-01-01 PROCEDURE — 86403 PARTICLE AGGLUT ANTBDY SCRN: CPT

## 2021-01-01 PROCEDURE — 93312 ECHO TRANSESOPHAGEAL: CPT | Performed by: INTERNAL MEDICINE

## 2021-01-01 PROCEDURE — 6360000002 HC RX W HCPCS: Performed by: PHYSICIAN ASSISTANT

## 2021-01-01 PROCEDURE — 97165 OT EVAL LOW COMPLEX 30 MIN: CPT

## 2021-01-01 PROCEDURE — 99497 ADVNCD CARE PLAN 30 MIN: CPT | Performed by: NURSE PRACTITIONER

## 2021-01-01 PROCEDURE — 2709999900 HC NON-CHARGEABLE SUPPLY: Performed by: ORTHOPAEDIC SURGERY

## 2021-01-01 PROCEDURE — 85730 THROMBOPLASTIN TIME PARTIAL: CPT

## 2021-01-01 PROCEDURE — 96375 TX/PRO/DX INJ NEW DRUG ADDON: CPT

## 2021-01-01 PROCEDURE — 93567 NJX CAR CTH SPRVLV AORTGRPHY: CPT | Performed by: INTERNAL MEDICINE

## 2021-01-01 DEVICE — IMPLANTABLE DEVICE: Type: IMPLANTABLE DEVICE | Site: KNEE | Status: FUNCTIONAL

## 2021-01-01 DEVICE — Z  INACTIVE USE 2750024 CEMENT BONE 40GM W/ GENTMYCN HI VISC PALACOS R+G: Type: IMPLANTABLE DEVICE | Site: KNEE | Status: FUNCTIONAL

## 2021-01-01 DEVICE — CEMENT BNE 40GM HI VISC RADPQ FOR REV SURG: Type: IMPLANTABLE DEVICE | Site: KNEE | Status: FUNCTIONAL

## 2021-01-01 DEVICE — STIMULAN® RAPID CURE PROVIDED STERILE FOR SINGLE PATIENT USE. STIMULAN® RAPID CURE CONTAINS CALCIUM SULFATE POWDER AND MIXING SOLUTION IN PRE-MEASURED QUANTITIES SO THAT WHEN MIXED TOGETHER IN A STERILE MIXING BOWL, THE RESULTANT PASTE IS TO BE DIGITALLY PACKED INTO OPEN BONE VOID/GAP TO SET INSITU OR PLACED INTO THE MOULD PROVIDED, THE MIXTURE SETS TO FORM BEADS. THE BIODEGRADABLE, RADIOPAQUE BEADS ARE RESORBED IN APPROXIMATELY 30 – 60 DAYS WHEN USED IN ACCORDANCE WITH THE DEVICE LABELLING. STIMULAN® RAPID CURE IS MANUFACTURED FROM SYNTHETIC IMPLANT GRADE CALCIUM SULFATE DIHYDRATE(CASO4.2H2O) THAT RESORBS AND IS REPLACED WITH BONE DURING THE HEALING PROCESS. ALSO, AS THE BONE VOID FILLER BEADS ARE BIODEGRADABLE AND BIOCOMPATIBLE, THEY MAY BE USED AT AN INFECTED SITE.
Type: IMPLANTABLE DEVICE | Site: KNEE | Status: FUNCTIONAL
Brand: STIMULAN® RAPID CURE

## 2021-01-01 DEVICE — ADAPTER FEM +2/-2MM OFFSET BOLT PFC SIG: Type: IMPLANTABLE DEVICE | Site: KNEE | Status: FUNCTIONAL

## 2021-01-01 DEVICE — STEM FEM L75MM DIA12MM UNIV KNEE PRESSFIT FLUT FOR ROT HNG: Type: IMPLANTABLE DEVICE | Site: KNEE | Status: FUNCTIONAL

## 2021-01-01 DEVICE — INSERT TIB SZ 4 AP51MM ML76MM THK15MM UNIV GVF POLYETH TC3: Type: IMPLANTABLE DEVICE | Site: KNEE | Status: FUNCTIONAL

## 2021-01-01 DEVICE — ADAPTER FEM 5DEG KNEE PFC SIG: Type: IMPLANTABLE DEVICE | Site: KNEE | Status: FUNCTIONAL

## 2021-01-01 RX ORDER — PROMETHAZINE HYDROCHLORIDE 25 MG/ML
6.25 INJECTION, SOLUTION INTRAMUSCULAR; INTRAVENOUS
Status: ACTIVE | OUTPATIENT
Start: 2021-01-01 | End: 2021-01-01

## 2021-01-01 RX ORDER — ACETAMINOPHEN 325 MG/1
650 TABLET ORAL EVERY 6 HOURS
Status: CANCELLED | OUTPATIENT
Start: 2021-01-01

## 2021-01-01 RX ORDER — CEFDINIR 300 MG/1
300 CAPSULE ORAL 2 TIMES DAILY
Qty: 10 CAPSULE | Refills: 0 | Status: ON HOLD | OUTPATIENT
Start: 2021-01-01 | End: 2021-01-01 | Stop reason: HOSPADM

## 2021-01-01 RX ORDER — CIMETIDINE 300 MG/1
300 TABLET, FILM COATED ORAL 4 TIMES DAILY
Qty: 4 TABLET | Refills: 0 | Status: ON HOLD | OUTPATIENT
Start: 2021-01-01 | End: 2021-01-01

## 2021-01-01 RX ORDER — HYDROMORPHONE HYDROCHLORIDE 1 MG/ML
0.25 INJECTION, SOLUTION INTRAMUSCULAR; INTRAVENOUS; SUBCUTANEOUS EVERY 5 MIN PRN
Status: DISCONTINUED | OUTPATIENT
Start: 2021-01-01 | End: 2021-01-01 | Stop reason: HOSPADM

## 2021-01-01 RX ORDER — SODIUM CHLORIDE 9 MG/ML
INJECTION, SOLUTION INTRAVENOUS CONTINUOUS
Status: DISCONTINUED | OUTPATIENT
Start: 2021-01-01 | End: 2021-01-01

## 2021-01-01 RX ORDER — FENTANYL CITRATE 50 UG/ML
INJECTION, SOLUTION INTRAMUSCULAR; INTRAVENOUS PRN
Status: DISCONTINUED | OUTPATIENT
Start: 2021-01-01 | End: 2021-01-01 | Stop reason: SDUPTHER

## 2021-01-01 RX ORDER — SODIUM CHLORIDE 0.9 % (FLUSH) 0.9 %
10 SYRINGE (ML) INJECTION PRN
Status: DISCONTINUED | OUTPATIENT
Start: 2021-01-01 | End: 2021-01-01 | Stop reason: HOSPADM

## 2021-01-01 RX ORDER — HYDROMORPHONE HYDROCHLORIDE 1 MG/ML
1 INJECTION, SOLUTION INTRAMUSCULAR; INTRAVENOUS; SUBCUTANEOUS
Status: DISCONTINUED | OUTPATIENT
Start: 2021-01-01 | End: 2021-01-01 | Stop reason: HOSPADM

## 2021-01-01 RX ORDER — SODIUM CHLORIDE 0.9 % (FLUSH) 0.9 %
5-40 SYRINGE (ML) INJECTION PRN
Status: DISCONTINUED | OUTPATIENT
Start: 2021-01-01 | End: 2021-01-01 | Stop reason: HOSPADM

## 2021-01-01 RX ORDER — HYDROMORPHONE HYDROCHLORIDE 1 MG/ML
1 INJECTION, SOLUTION INTRAMUSCULAR; INTRAVENOUS; SUBCUTANEOUS ONCE
Status: COMPLETED | OUTPATIENT
Start: 2021-01-01 | End: 2021-01-01

## 2021-01-01 RX ORDER — PROPOFOL 10 MG/ML
INJECTION, EMULSION INTRAVENOUS PRN
Status: DISCONTINUED | OUTPATIENT
Start: 2021-01-01 | End: 2021-01-01 | Stop reason: SDUPTHER

## 2021-01-01 RX ORDER — ACETAMINOPHEN 325 MG/1
650 TABLET ORAL EVERY 6 HOURS PRN
Status: CANCELLED | OUTPATIENT
Start: 2021-01-01

## 2021-01-01 RX ORDER — FUROSEMIDE 20 MG/1
20 TABLET ORAL DAILY
Qty: 60 TABLET | Refills: 3 | Status: SHIPPED | OUTPATIENT
Start: 2021-01-01

## 2021-01-01 RX ORDER — SODIUM CHLORIDE 9 MG/ML
INJECTION, SOLUTION INTRAVENOUS CONTINUOUS
Status: CANCELLED | OUTPATIENT
Start: 2021-01-01

## 2021-01-01 RX ORDER — ACETAMINOPHEN 325 MG/1
650 TABLET ORAL EVERY 4 HOURS PRN
Status: DISCONTINUED | OUTPATIENT
Start: 2021-01-01 | End: 2021-01-01 | Stop reason: HOSPADM

## 2021-01-01 RX ORDER — LIDOCAINE HYDROCHLORIDE 10 MG/ML
5 INJECTION, SOLUTION EPIDURAL; INFILTRATION; INTRACAUDAL; PERINEURAL ONCE
Status: DISCONTINUED | OUTPATIENT
Start: 2021-01-01 | End: 2021-01-01 | Stop reason: ALTCHOICE

## 2021-01-01 RX ORDER — EZETIMIBE 10 MG/1
10 TABLET ORAL DAILY
Status: CANCELLED | OUTPATIENT
Start: 2021-01-01

## 2021-01-01 RX ORDER — FUROSEMIDE 10 MG/ML
40 INJECTION INTRAMUSCULAR; INTRAVENOUS ONCE
Status: COMPLETED | OUTPATIENT
Start: 2021-01-01 | End: 2021-01-01

## 2021-01-01 RX ORDER — SODIUM CHLORIDE 0.9 % (FLUSH) 0.9 %
10 SYRINGE (ML) INJECTION EVERY 12 HOURS SCHEDULED
Status: DISCONTINUED | OUTPATIENT
Start: 2021-01-01 | End: 2021-01-01 | Stop reason: HOSPADM

## 2021-01-01 RX ORDER — DIPHENHYDRAMINE HYDROCHLORIDE 50 MG/ML
12.5 INJECTION INTRAMUSCULAR; INTRAVENOUS
Status: DISCONTINUED | OUTPATIENT
Start: 2021-01-01 | End: 2021-01-01 | Stop reason: HOSPADM

## 2021-01-01 RX ORDER — ASPIRIN 81 MG/1
81 TABLET, CHEWABLE ORAL DAILY
Status: DISCONTINUED | OUTPATIENT
Start: 2021-01-01 | End: 2021-01-01

## 2021-01-01 RX ORDER — THIAMINE HYDROCHLORIDE 100 MG/ML
100 INJECTION, SOLUTION INTRAMUSCULAR; INTRAVENOUS DAILY
Status: DISCONTINUED | OUTPATIENT
Start: 2021-01-01 | End: 2021-01-01 | Stop reason: HOSPADM

## 2021-01-01 RX ORDER — INDOMETHACIN 25 MG/1
50 CAPSULE ORAL DAILY PRN
Status: DISCONTINUED | OUTPATIENT
Start: 2021-01-01 | End: 2021-01-01 | Stop reason: HOSPADM

## 2021-01-01 RX ORDER — NITROGLYCERIN 0.4 MG/1
0.4 TABLET SUBLINGUAL EVERY 5 MIN PRN
Status: DISCONTINUED | OUTPATIENT
Start: 2021-01-01 | End: 2021-01-01 | Stop reason: HOSPADM

## 2021-01-01 RX ORDER — ONDANSETRON 2 MG/ML
4 INJECTION INTRAMUSCULAR; INTRAVENOUS EVERY 6 HOURS PRN
Status: DISCONTINUED | OUTPATIENT
Start: 2021-01-01 | End: 2021-01-01 | Stop reason: HOSPADM

## 2021-01-01 RX ORDER — MAGNESIUM SULFATE IN WATER 40 MG/ML
2000 INJECTION, SOLUTION INTRAVENOUS PRN
Status: DISCONTINUED | OUTPATIENT
Start: 2021-01-01 | End: 2021-01-01 | Stop reason: HOSPADM

## 2021-01-01 RX ORDER — MAGNESIUM SULFATE IN WATER 40 MG/ML
2000 INJECTION, SOLUTION INTRAVENOUS PRN
Status: CANCELLED | OUTPATIENT
Start: 2021-01-01

## 2021-01-01 RX ORDER — LORAZEPAM 2 MG/ML
1 INJECTION INTRAMUSCULAR
Status: DISCONTINUED | OUTPATIENT
Start: 2021-01-01 | End: 2021-01-01 | Stop reason: HOSPADM

## 2021-01-01 RX ORDER — POLYETHYLENE GLYCOL 3350 17 G/17G
17 POWDER, FOR SOLUTION ORAL DAILY PRN
Status: CANCELLED | OUTPATIENT
Start: 2021-01-01

## 2021-01-01 RX ORDER — CLOPIDOGREL BISULFATE 75 MG/1
75 TABLET ORAL DAILY
Status: DISCONTINUED | OUTPATIENT
Start: 2021-01-01 | End: 2021-01-01 | Stop reason: HOSPADM

## 2021-01-01 RX ORDER — PANTOPRAZOLE SODIUM 40 MG/1
40 TABLET, DELAYED RELEASE ORAL DAILY
Status: DISCONTINUED | OUTPATIENT
Start: 2021-01-01 | End: 2021-01-01 | Stop reason: HOSPADM

## 2021-01-01 RX ORDER — PREDNISONE 20 MG/1
40 TABLET ORAL DAILY
Status: ON HOLD | COMMUNITY
Start: 2021-01-01 | End: 2021-01-01

## 2021-01-01 RX ORDER — ACETAMINOPHEN 325 MG/1
650 TABLET ORAL EVERY 6 HOURS PRN
Status: DISCONTINUED | OUTPATIENT
Start: 2021-01-01 | End: 2021-01-01 | Stop reason: HOSPADM

## 2021-01-01 RX ORDER — NITROGLYCERIN 0.4 MG/1
0.4 TABLET SUBLINGUAL EVERY 5 MIN PRN
Status: DISCONTINUED | OUTPATIENT
Start: 2021-01-01 | End: 2021-01-01 | Stop reason: SDUPTHER

## 2021-01-01 RX ORDER — BUDESONIDE 0.5 MG/2ML
0.5 INHALANT ORAL 2 TIMES DAILY
Status: CANCELLED | OUTPATIENT
Start: 2021-01-01

## 2021-01-01 RX ORDER — MULTIVITAMIN WITH IRON
1 TABLET ORAL DAILY
COMMUNITY

## 2021-01-01 RX ORDER — ONDANSETRON 4 MG/1
4 TABLET, ORALLY DISINTEGRATING ORAL EVERY 8 HOURS PRN
Status: DISCONTINUED | OUTPATIENT
Start: 2021-01-01 | End: 2021-01-01 | Stop reason: HOSPADM

## 2021-01-01 RX ORDER — BACLOFEN 10 MG/1
5 TABLET ORAL 3 TIMES DAILY PRN
Status: DISCONTINUED | OUTPATIENT
Start: 2021-01-01 | End: 2021-01-01 | Stop reason: HOSPADM

## 2021-01-01 RX ORDER — SODIUM CHLORIDE, SODIUM LACTATE, POTASSIUM CHLORIDE, CALCIUM CHLORIDE 600; 310; 30; 20 MG/100ML; MG/100ML; MG/100ML; MG/100ML
INJECTION, SOLUTION INTRAVENOUS CONTINUOUS PRN
Status: DISCONTINUED | OUTPATIENT
Start: 2021-01-01 | End: 2021-01-01 | Stop reason: SDUPTHER

## 2021-01-01 RX ORDER — SODIUM CHLORIDE 9 MG/ML
INJECTION, SOLUTION INTRAVENOUS CONTINUOUS
Status: DISCONTINUED | OUTPATIENT
Start: 2021-01-01 | End: 2021-01-01 | Stop reason: HOSPADM

## 2021-01-01 RX ORDER — TRANEXAMIC ACID 650 1/1
1950 TABLET ORAL
Status: DISCONTINUED | OUTPATIENT
Start: 2021-01-01 | End: 2021-01-01 | Stop reason: HOSPADM

## 2021-01-01 RX ORDER — LIDOCAINE HYDROCHLORIDE 10 MG/ML
INJECTION, SOLUTION EPIDURAL; INFILTRATION; INTRACAUDAL; PERINEURAL PRN
Status: DISCONTINUED | OUTPATIENT
Start: 2021-01-01 | End: 2021-01-01 | Stop reason: SDUPTHER

## 2021-01-01 RX ORDER — SODIUM CHLORIDE 0.9 % (FLUSH) 0.9 %
10 SYRINGE (ML) INJECTION EVERY 12 HOURS SCHEDULED
Status: DISCONTINUED | OUTPATIENT
Start: 2021-01-01 | End: 2021-01-01

## 2021-01-01 RX ORDER — ALPRAZOLAM 0.5 MG/1
0.5 TABLET ORAL ONCE
Status: COMPLETED | OUTPATIENT
Start: 2021-01-01 | End: 2021-01-01

## 2021-01-01 RX ORDER — EZETIMIBE 10 MG/1
TABLET ORAL
Qty: 90 TABLET | Refills: 1 | Status: SHIPPED | OUTPATIENT
Start: 2021-01-01

## 2021-01-01 RX ORDER — LORAZEPAM 2 MG/ML
1 INJECTION INTRAMUSCULAR
Status: CANCELLED | OUTPATIENT
Start: 2021-01-01

## 2021-01-01 RX ORDER — POTASSIUM CHLORIDE 20 MEQ/1
40 TABLET, EXTENDED RELEASE ORAL PRN
Status: DISCONTINUED | OUTPATIENT
Start: 2021-01-01 | End: 2021-01-01 | Stop reason: HOSPADM

## 2021-01-01 RX ORDER — SODIUM CHLORIDE 0.9 % (FLUSH) 0.9 %
5-40 SYRINGE (ML) INJECTION EVERY 12 HOURS SCHEDULED
Status: DISCONTINUED | OUTPATIENT
Start: 2021-01-01 | End: 2021-01-01 | Stop reason: HOSPADM

## 2021-01-01 RX ORDER — HYDROXYCHLOROQUINE SULFATE 200 MG/1
200 TABLET, FILM COATED ORAL 2 TIMES DAILY
Status: DISCONTINUED | OUTPATIENT
Start: 2021-01-01 | End: 2021-01-01 | Stop reason: HOSPADM

## 2021-01-01 RX ORDER — EPHEDRINE SULFATE 50 MG/ML
INJECTION, SOLUTION INTRAVENOUS PRN
Status: DISCONTINUED | OUTPATIENT
Start: 2021-01-01 | End: 2021-01-01 | Stop reason: SDUPTHER

## 2021-01-01 RX ORDER — POTASSIUM CHLORIDE 7.45 MG/ML
10 INJECTION INTRAVENOUS PRN
Status: DISCONTINUED | OUTPATIENT
Start: 2021-01-01 | End: 2021-01-01 | Stop reason: HOSPADM

## 2021-01-01 RX ORDER — NITROGLYCERIN 0.4 MG/1
0.4 TABLET SUBLINGUAL EVERY 5 MIN PRN
Qty: 25 TABLET | Refills: 3 | Status: SHIPPED | OUTPATIENT
Start: 2021-01-01

## 2021-01-01 RX ORDER — SODIUM CHLORIDE, SODIUM GLUCONATE, SODIUM ACETATE, POTASSIUM CHLORIDE AND MAGNESIUM CHLORIDE 526; 502; 368; 37; 30 MG/100ML; MG/100ML; MG/100ML; MG/100ML; MG/100ML
INJECTION, SOLUTION INTRAVENOUS CONTINUOUS PRN
Status: DISCONTINUED | OUTPATIENT
Start: 2021-01-01 | End: 2021-01-01 | Stop reason: SDUPTHER

## 2021-01-01 RX ORDER — GABAPENTIN 400 MG/1
800 CAPSULE ORAL 2 TIMES DAILY
Status: DISCONTINUED | OUTPATIENT
Start: 2021-01-01 | End: 2021-01-01 | Stop reason: HOSPADM

## 2021-01-01 RX ORDER — LABETALOL HYDROCHLORIDE 5 MG/ML
5 INJECTION, SOLUTION INTRAVENOUS EVERY 10 MIN PRN
Status: DISCONTINUED | OUTPATIENT
Start: 2021-01-01 | End: 2021-01-01 | Stop reason: HOSPADM

## 2021-01-01 RX ORDER — HYDRALAZINE HYDROCHLORIDE 20 MG/ML
5 INJECTION INTRAMUSCULAR; INTRAVENOUS EVERY 10 MIN PRN
Status: DISCONTINUED | OUTPATIENT
Start: 2021-01-01 | End: 2021-01-01 | Stop reason: HOSPADM

## 2021-01-01 RX ORDER — CEFAZOLIN SODIUM 1 G/3ML
INJECTION, POWDER, FOR SOLUTION INTRAMUSCULAR; INTRAVENOUS PRN
Status: DISCONTINUED | OUTPATIENT
Start: 2021-01-01 | End: 2021-01-01 | Stop reason: SDUPTHER

## 2021-01-01 RX ORDER — SODIUM CHLORIDE 0.9 % (FLUSH) 0.9 %
5-40 SYRINGE (ML) INJECTION PRN
Status: DISCONTINUED | OUTPATIENT
Start: 2021-01-01 | End: 2021-01-01 | Stop reason: SDUPTHER

## 2021-01-01 RX ORDER — FUROSEMIDE 20 MG/1
20 TABLET ORAL DAILY
Status: DISCONTINUED | OUTPATIENT
Start: 2021-01-01 | End: 2021-01-01 | Stop reason: HOSPADM

## 2021-01-01 RX ORDER — SODIUM CHLORIDE 0.9 % (FLUSH) 0.9 %
10 SYRINGE (ML) INJECTION PRN
Status: DISCONTINUED | OUTPATIENT
Start: 2021-01-01 | End: 2021-01-01

## 2021-01-01 RX ORDER — MULTIVITAMIN WITH IRON
1 TABLET ORAL DAILY
Status: DISCONTINUED | OUTPATIENT
Start: 2021-01-01 | End: 2021-01-01 | Stop reason: HOSPADM

## 2021-01-01 RX ORDER — SODIUM CHLORIDE 0.9 % (FLUSH) 0.9 %
5-40 SYRINGE (ML) INJECTION EVERY 12 HOURS SCHEDULED
Status: DISCONTINUED | OUTPATIENT
Start: 2021-01-01 | End: 2021-01-01

## 2021-01-01 RX ORDER — MEPERIDINE HYDROCHLORIDE 50 MG/ML
12.5 INJECTION INTRAMUSCULAR; INTRAVENOUS; SUBCUTANEOUS EVERY 5 MIN PRN
Status: DISCONTINUED | OUTPATIENT
Start: 2021-01-01 | End: 2021-01-01 | Stop reason: HOSPADM

## 2021-01-01 RX ORDER — METOCLOPRAMIDE HYDROCHLORIDE 5 MG/ML
10 INJECTION INTRAMUSCULAR; INTRAVENOUS
Status: ACTIVE | OUTPATIENT
Start: 2021-01-01 | End: 2021-01-01

## 2021-01-01 RX ORDER — ENALAPRILAT 2.5 MG/2ML
1.25 INJECTION INTRAVENOUS
Status: DISCONTINUED | OUTPATIENT
Start: 2021-01-01 | End: 2021-01-01 | Stop reason: HOSPADM

## 2021-01-01 RX ORDER — LORAZEPAM 2 MG/ML
3 INJECTION INTRAMUSCULAR
Status: DISCONTINUED | OUTPATIENT
Start: 2021-01-01 | End: 2021-01-01 | Stop reason: HOSPADM

## 2021-01-01 RX ORDER — FUROSEMIDE 40 MG/1
20 TABLET ORAL DAILY
Status: DISCONTINUED | OUTPATIENT
Start: 2021-01-01 | End: 2021-01-01 | Stop reason: HOSPADM

## 2021-01-01 RX ORDER — LORAZEPAM 1 MG/1
1 TABLET ORAL
Status: DISCONTINUED | OUTPATIENT
Start: 2021-01-01 | End: 2021-01-01 | Stop reason: HOSPADM

## 2021-01-01 RX ORDER — FUROSEMIDE 10 MG/ML
INJECTION INTRAMUSCULAR; INTRAVENOUS
Status: DISPENSED
Start: 2021-01-01 | End: 2021-01-01

## 2021-01-01 RX ORDER — OXYCODONE HYDROCHLORIDE 5 MG/1
10 TABLET ORAL EVERY 4 HOURS PRN
Status: DISCONTINUED | OUTPATIENT
Start: 2021-01-01 | End: 2021-01-01

## 2021-01-01 RX ORDER — SODIUM CHLORIDE 0.9 % (FLUSH) 0.9 %
5-40 SYRINGE (ML) INJECTION EVERY 12 HOURS SCHEDULED
Status: DISCONTINUED | OUTPATIENT
Start: 2021-01-01 | End: 2021-01-01 | Stop reason: SDUPTHER

## 2021-01-01 RX ORDER — MAGNESIUM SULFATE 1 G/100ML
1000 INJECTION INTRAVENOUS ONCE
Status: COMPLETED | OUTPATIENT
Start: 2021-01-01 | End: 2021-01-01

## 2021-01-01 RX ORDER — AMLODIPINE BESYLATE 2.5 MG/1
2.5 TABLET ORAL DAILY
Qty: 90 TABLET | Refills: 3 | Status: ON HOLD | OUTPATIENT
Start: 2021-01-01 | End: 2021-01-01 | Stop reason: HOSPADM

## 2021-01-01 RX ORDER — SENNA AND DOCUSATE SODIUM 50; 8.6 MG/1; MG/1
1 TABLET, FILM COATED ORAL 2 TIMES DAILY
Status: CANCELLED | OUTPATIENT
Start: 2021-01-01

## 2021-01-01 RX ORDER — MORPHINE SULFATE 4 MG/ML
1 INJECTION, SOLUTION INTRAMUSCULAR; INTRAVENOUS ONCE
Status: COMPLETED | OUTPATIENT
Start: 2021-01-01 | End: 2021-01-01

## 2021-01-01 RX ORDER — HYDROMORPHONE HYDROCHLORIDE 1 MG/ML
0.5 INJECTION, SOLUTION INTRAMUSCULAR; INTRAVENOUS; SUBCUTANEOUS EVERY 5 MIN PRN
Status: DISCONTINUED | OUTPATIENT
Start: 2021-01-01 | End: 2021-01-01 | Stop reason: HOSPADM

## 2021-01-01 RX ORDER — ONDANSETRON 4 MG/1
4 TABLET, ORALLY DISINTEGRATING ORAL EVERY 8 HOURS PRN
Status: CANCELLED | OUTPATIENT
Start: 2021-01-01

## 2021-01-01 RX ORDER — ASPIRIN 81 MG/1
81 TABLET, CHEWABLE ORAL DAILY
Qty: 30 TABLET | Refills: 3 | Status: SHIPPED | OUTPATIENT
Start: 2021-01-01

## 2021-01-01 RX ORDER — SODIUM CHLORIDE 9 MG/ML
25 INJECTION, SOLUTION INTRAVENOUS PRN
Status: DISCONTINUED | OUTPATIENT
Start: 2021-01-01 | End: 2021-01-01 | Stop reason: SDUPTHER

## 2021-01-01 RX ORDER — ASPIRIN 81 MG/1
81 TABLET, CHEWABLE ORAL DAILY
Status: DISCONTINUED | OUTPATIENT
Start: 2021-01-01 | End: 2021-01-01 | Stop reason: HOSPADM

## 2021-01-01 RX ORDER — LIDOCAINE HYDROCHLORIDE 10 MG/ML
20 INJECTION, SOLUTION INFILTRATION; PERINEURAL ONCE
Status: COMPLETED | OUTPATIENT
Start: 2021-01-01 | End: 2021-01-01

## 2021-01-01 RX ORDER — LIDOCAINE HYDROCHLORIDE 10 MG/ML
10 INJECTION, SOLUTION EPIDURAL; INFILTRATION; INTRACAUDAL; PERINEURAL ONCE
Status: COMPLETED | OUTPATIENT
Start: 2021-01-01 | End: 2021-01-01

## 2021-01-01 RX ORDER — LORAZEPAM 2 MG/ML
1 INJECTION INTRAMUSCULAR EVERY 4 HOURS PRN
Status: DISCONTINUED | OUTPATIENT
Start: 2021-01-01 | End: 2021-01-01

## 2021-01-01 RX ORDER — MORPHINE SULFATE 4 MG/ML
4 INJECTION, SOLUTION INTRAMUSCULAR; INTRAVENOUS
Status: DISCONTINUED | OUTPATIENT
Start: 2021-01-01 | End: 2021-01-01

## 2021-01-01 RX ORDER — HYDROXYZINE PAMOATE 25 MG/1
25 CAPSULE ORAL 3 TIMES DAILY PRN
Status: CANCELLED | OUTPATIENT
Start: 2021-01-01

## 2021-01-01 RX ORDER — IPRATROPIUM BROMIDE AND ALBUTEROL SULFATE 2.5; .5 MG/3ML; MG/3ML
1 SOLUTION RESPIRATORY (INHALATION)
Status: DISCONTINUED | OUTPATIENT
Start: 2021-01-01 | End: 2021-01-01 | Stop reason: HOSPADM

## 2021-01-01 RX ORDER — ASPIRIN 81 MG/1
81 TABLET, CHEWABLE ORAL ONCE
Status: COMPLETED | OUTPATIENT
Start: 2021-01-01 | End: 2021-01-01

## 2021-01-01 RX ORDER — OXYCODONE HYDROCHLORIDE 5 MG/1
5 TABLET ORAL EVERY 4 HOURS PRN
Status: DISCONTINUED | OUTPATIENT
Start: 2021-01-01 | End: 2021-01-01

## 2021-01-01 RX ORDER — EZETIMIBE 10 MG/1
10 TABLET ORAL DAILY
Status: DISCONTINUED | OUTPATIENT
Start: 2021-01-01 | End: 2021-01-01 | Stop reason: HOSPADM

## 2021-01-01 RX ORDER — PROTAMINE SULFATE 10 MG/ML
INJECTION, SOLUTION INTRAVENOUS PRN
Status: DISCONTINUED | OUTPATIENT
Start: 2021-01-01 | End: 2021-01-01 | Stop reason: SDUPTHER

## 2021-01-01 RX ORDER — PROPOFOL 10 MG/ML
INJECTION, EMULSION INTRAVENOUS CONTINUOUS PRN
Status: DISCONTINUED | OUTPATIENT
Start: 2021-01-01 | End: 2021-01-01 | Stop reason: SDUPTHER

## 2021-01-01 RX ORDER — HYDROXYZINE PAMOATE 25 MG/1
25 CAPSULE ORAL 3 TIMES DAILY PRN
Status: DISCONTINUED | OUTPATIENT
Start: 2021-01-01 | End: 2021-01-01 | Stop reason: HOSPADM

## 2021-01-01 RX ORDER — SODIUM CHLORIDE 9 MG/ML
25 INJECTION, SOLUTION INTRAVENOUS PRN
Status: DISCONTINUED | OUTPATIENT
Start: 2021-01-01 | End: 2021-01-01 | Stop reason: HOSPADM

## 2021-01-01 RX ORDER — CEFDINIR 300 MG/1
300 CAPSULE ORAL 2 TIMES DAILY
Status: DISCONTINUED | OUTPATIENT
Start: 2021-01-01 | End: 2021-01-01 | Stop reason: HOSPADM

## 2021-01-01 RX ORDER — ENALAPRILAT 2.5 MG/2ML
1.25 INJECTION INTRAVENOUS
Status: ACTIVE | OUTPATIENT
Start: 2021-01-01 | End: 2021-01-01

## 2021-01-01 RX ORDER — MORPHINE SULFATE 4 MG/ML
4 INJECTION, SOLUTION INTRAMUSCULAR; INTRAVENOUS ONCE
Status: COMPLETED | OUTPATIENT
Start: 2021-01-01 | End: 2021-01-01

## 2021-01-01 RX ORDER — LABETALOL 20 MG/4 ML (5 MG/ML) INTRAVENOUS SYRINGE
PRN
Status: DISCONTINUED | OUTPATIENT
Start: 2021-01-01 | End: 2021-01-01 | Stop reason: SDUPTHER

## 2021-01-01 RX ORDER — POLYETHYLENE GLYCOL 3350 17 G/17G
17 POWDER, FOR SOLUTION ORAL DAILY PRN
Status: DISCONTINUED | OUTPATIENT
Start: 2021-01-01 | End: 2021-01-01 | Stop reason: HOSPADM

## 2021-01-01 RX ORDER — DIPHENHYDRAMINE HYDROCHLORIDE 50 MG/ML
50 INJECTION INTRAMUSCULAR; INTRAVENOUS ONCE
Status: COMPLETED | OUTPATIENT
Start: 2021-01-01 | End: 2021-01-01

## 2021-01-01 RX ORDER — DIPHENHYDRAMINE HCL 25 MG
25 TABLET ORAL 4 TIMES DAILY
Qty: 5 TABLET | Refills: 0 | Status: ON HOLD | OUTPATIENT
Start: 2021-01-01 | End: 2021-01-01

## 2021-01-01 RX ORDER — MORPHINE SULFATE 4 MG/ML
4 INJECTION, SOLUTION INTRAMUSCULAR; INTRAVENOUS EVERY 5 MIN PRN
Status: DISCONTINUED | OUTPATIENT
Start: 2021-01-01 | End: 2021-01-01 | Stop reason: HOSPADM

## 2021-01-01 RX ORDER — NITROGLYCERIN 0.4 MG/1
0.4 TABLET SUBLINGUAL ONCE
Status: COMPLETED | OUTPATIENT
Start: 2021-01-01 | End: 2021-01-01

## 2021-01-01 RX ORDER — OXYCODONE AND ACETAMINOPHEN 7.5; 325 MG/1; MG/1
1 TABLET ORAL EVERY 4 HOURS PRN
Status: DISCONTINUED | OUTPATIENT
Start: 2021-01-01 | End: 2021-01-01 | Stop reason: HOSPADM

## 2021-01-01 RX ORDER — MORPHINE SULFATE 4 MG/ML
2 INJECTION, SOLUTION INTRAMUSCULAR; INTRAVENOUS ONCE
Status: COMPLETED | OUTPATIENT
Start: 2021-01-01 | End: 2021-01-01

## 2021-01-01 RX ORDER — OXYCODONE AND ACETAMINOPHEN 7.5; 325 MG/1; MG/1
1 TABLET ORAL EVERY 4 HOURS PRN
Status: DISCONTINUED | OUTPATIENT
Start: 2021-01-01 | End: 2021-01-01

## 2021-01-01 RX ORDER — BACLOFEN 10 MG/1
5 TABLET ORAL 3 TIMES DAILY PRN
Status: CANCELLED | OUTPATIENT
Start: 2021-01-01

## 2021-01-01 RX ORDER — LIDOCAINE HYDROCHLORIDE 10 MG/ML
5 INJECTION, SOLUTION INFILTRATION; PERINEURAL ONCE
Status: COMPLETED | OUTPATIENT
Start: 2021-01-01 | End: 2021-01-01

## 2021-01-01 RX ORDER — PREDNISONE 20 MG/1
TABLET ORAL
Qty: 10 TABLET | Refills: 0 | Status: SHIPPED | OUTPATIENT
Start: 2021-01-01 | End: 2021-01-01

## 2021-01-01 RX ORDER — SODIUM CHLORIDE 0.9 % (FLUSH) 0.9 %
5-40 SYRINGE (ML) INJECTION EVERY 12 HOURS SCHEDULED
Status: CANCELLED | OUTPATIENT
Start: 2021-01-01

## 2021-01-01 RX ORDER — DEXAMETHASONE SODIUM PHOSPHATE 10 MG/ML
10 INJECTION, SOLUTION INTRAMUSCULAR; INTRAVENOUS ONCE
Status: COMPLETED | OUTPATIENT
Start: 2021-01-01 | End: 2021-01-01

## 2021-01-01 RX ORDER — LIDOCAINE HYDROCHLORIDE 10 MG/ML
20 INJECTION, SOLUTION EPIDURAL; INFILTRATION; INTRACAUDAL; PERINEURAL ONCE
Status: DISCONTINUED | OUTPATIENT
Start: 2021-01-01 | End: 2021-01-01 | Stop reason: SDUPTHER

## 2021-01-01 RX ORDER — ONDANSETRON 2 MG/ML
INJECTION INTRAMUSCULAR; INTRAVENOUS PRN
Status: DISCONTINUED | OUTPATIENT
Start: 2021-01-01 | End: 2021-01-01 | Stop reason: SDUPTHER

## 2021-01-01 RX ORDER — LORAZEPAM 1 MG/1
3 TABLET ORAL
Status: DISCONTINUED | OUTPATIENT
Start: 2021-01-01 | End: 2021-01-01 | Stop reason: HOSPADM

## 2021-01-01 RX ORDER — FOLIC ACID 1 MG/1
1 TABLET ORAL DAILY
Status: CANCELLED | OUTPATIENT
Start: 2021-01-01

## 2021-01-01 RX ORDER — METOCLOPRAMIDE HYDROCHLORIDE 5 MG/ML
10 INJECTION INTRAMUSCULAR; INTRAVENOUS
Status: DISCONTINUED | OUTPATIENT
Start: 2021-01-01 | End: 2021-01-01 | Stop reason: HOSPADM

## 2021-01-01 RX ORDER — FENTANYL CITRATE 50 UG/ML
50 INJECTION, SOLUTION INTRAMUSCULAR; INTRAVENOUS EVERY 5 MIN PRN
Status: DISCONTINUED | OUTPATIENT
Start: 2021-01-01 | End: 2021-01-01 | Stop reason: HOSPADM

## 2021-01-01 RX ORDER — LORAZEPAM 2 MG/ML
4 INJECTION INTRAMUSCULAR
Status: DISCONTINUED | OUTPATIENT
Start: 2021-01-01 | End: 2021-01-01 | Stop reason: HOSPADM

## 2021-01-01 RX ORDER — DIPHENHYDRAMINE HCL 25 MG
TABLET ORAL
Qty: 5 TABLET | Refills: 0 | Status: SHIPPED | OUTPATIENT
Start: 2021-01-01 | End: 2021-01-01

## 2021-01-01 RX ORDER — METHYLPREDNISOLONE SODIUM SUCCINATE 40 MG/ML
40 INJECTION, POWDER, LYOPHILIZED, FOR SOLUTION INTRAMUSCULAR; INTRAVENOUS ONCE
Status: COMPLETED | OUTPATIENT
Start: 2021-01-01 | End: 2021-01-01

## 2021-01-01 RX ORDER — EZETIMIBE 10 MG/1
10 TABLET ORAL NIGHTLY
Status: DISCONTINUED | OUTPATIENT
Start: 2021-01-01 | End: 2021-01-01 | Stop reason: HOSPADM

## 2021-01-01 RX ORDER — DIPHENHYDRAMINE HYDROCHLORIDE 50 MG/ML
12.5 INJECTION INTRAMUSCULAR; INTRAVENOUS
Status: ACTIVE | OUTPATIENT
Start: 2021-01-01 | End: 2021-01-01

## 2021-01-01 RX ORDER — DIPHENHYDRAMINE HYDROCHLORIDE 50 MG/ML
12.5 INJECTION INTRAMUSCULAR; INTRAVENOUS ONCE
Status: COMPLETED | OUTPATIENT
Start: 2021-01-01 | End: 2021-01-01

## 2021-01-01 RX ORDER — ONDANSETRON 2 MG/ML
4 INJECTION INTRAMUSCULAR; INTRAVENOUS
Status: ACTIVE | OUTPATIENT
Start: 2021-01-01 | End: 2021-01-01

## 2021-01-01 RX ORDER — ACETAMINOPHEN 500 MG
1000 TABLET ORAL ONCE
Status: DISCONTINUED | OUTPATIENT
Start: 2021-01-01 | End: 2021-01-01 | Stop reason: HOSPADM

## 2021-01-01 RX ORDER — FUROSEMIDE 20 MG/1
20 TABLET ORAL DAILY
Status: CANCELLED | OUTPATIENT
Start: 2021-01-01

## 2021-01-01 RX ORDER — MECOBALAMIN 5000 MCG
5 TABLET,DISINTEGRATING ORAL NIGHTLY
Status: DISCONTINUED | OUTPATIENT
Start: 2021-01-01 | End: 2021-01-01 | Stop reason: HOSPADM

## 2021-01-01 RX ORDER — INDOMETHACIN 50 MG/1
50 CAPSULE ORAL DAILY PRN
Status: ON HOLD | COMMUNITY
Start: 2021-01-01 | End: 2021-01-01 | Stop reason: HOSPADM

## 2021-01-01 RX ORDER — LORAZEPAM 2 MG/ML
0.5 INJECTION INTRAMUSCULAR EVERY 5 MIN PRN
Status: COMPLETED | OUTPATIENT
Start: 2021-01-01 | End: 2021-01-01

## 2021-01-01 RX ORDER — ONDANSETRON 2 MG/ML
4 INJECTION INTRAMUSCULAR; INTRAVENOUS EVERY 6 HOURS PRN
Status: CANCELLED | OUTPATIENT
Start: 2021-01-01

## 2021-01-01 RX ORDER — SODIUM CHLORIDE 9 MG/ML
25 INJECTION, SOLUTION INTRAVENOUS PRN
Status: DISCONTINUED | OUTPATIENT
Start: 2021-01-01 | End: 2021-01-01

## 2021-01-01 RX ORDER — POTASSIUM CHLORIDE 20 MEQ/1
40 TABLET, EXTENDED RELEASE ORAL PRN
Status: CANCELLED | OUTPATIENT
Start: 2021-01-01

## 2021-01-01 RX ORDER — LIDOCAINE HYDROCHLORIDE 10 MG/ML
INJECTION, SOLUTION INFILTRATION; PERINEURAL PRN
Status: DISCONTINUED | OUTPATIENT
Start: 2021-01-01 | End: 2021-01-01 | Stop reason: SDUPTHER

## 2021-01-01 RX ORDER — MORPHINE SULFATE 4 MG/ML
2 INJECTION, SOLUTION INTRAMUSCULAR; INTRAVENOUS EVERY 4 HOURS PRN
Status: DISCONTINUED | OUTPATIENT
Start: 2021-01-01 | End: 2021-01-01 | Stop reason: SDUPTHER

## 2021-01-01 RX ORDER — MULTIVITAMIN WITH IRON
1 TABLET ORAL DAILY
Status: CANCELLED | OUTPATIENT
Start: 2021-01-01

## 2021-01-01 RX ORDER — LORAZEPAM 1 MG/1
4 TABLET ORAL
Status: DISCONTINUED | OUTPATIENT
Start: 2021-01-01 | End: 2021-01-01 | Stop reason: HOSPADM

## 2021-01-01 RX ORDER — LIDOCAINE HYDROCHLORIDE 10 MG/ML
INJECTION, SOLUTION EPIDURAL; INFILTRATION; INTRACAUDAL; PERINEURAL
Status: DISPENSED
Start: 2021-01-01 | End: 2021-01-01

## 2021-01-01 RX ORDER — MORPHINE SULFATE 4 MG/ML
2 INJECTION, SOLUTION INTRAMUSCULAR; INTRAVENOUS
Status: DISCONTINUED | OUTPATIENT
Start: 2021-01-01 | End: 2021-01-01

## 2021-01-01 RX ORDER — ARFORMOTEROL TARTRATE 15 UG/2ML
15 SOLUTION RESPIRATORY (INHALATION) 2 TIMES DAILY
Status: CANCELLED | OUTPATIENT
Start: 2021-01-01

## 2021-01-01 RX ORDER — LORAZEPAM 2 MG/ML
2 INJECTION INTRAMUSCULAR
Status: DISCONTINUED | OUTPATIENT
Start: 2021-01-01 | End: 2021-01-01 | Stop reason: HOSPADM

## 2021-01-01 RX ORDER — FENTANYL CITRATE 50 UG/ML
25 INJECTION, SOLUTION INTRAMUSCULAR; INTRAVENOUS EVERY 5 MIN PRN
Status: DISCONTINUED | OUTPATIENT
Start: 2021-01-01 | End: 2021-01-01 | Stop reason: HOSPADM

## 2021-01-01 RX ORDER — INDOMETHACIN 50 MG/1
CAPSULE ORAL
COMMUNITY
Start: 2021-01-01

## 2021-01-01 RX ORDER — CELECOXIB 100 MG/1
100 CAPSULE ORAL ONCE
Status: DISCONTINUED | OUTPATIENT
Start: 2021-01-01 | End: 2021-01-01 | Stop reason: HOSPADM

## 2021-01-01 RX ORDER — SODIUM CHLORIDE 9 MG/ML
25 INJECTION, SOLUTION INTRAVENOUS PRN
Status: CANCELLED | OUTPATIENT
Start: 2021-01-01

## 2021-01-01 RX ORDER — THIAMINE HYDROCHLORIDE 100 MG/ML
100 INJECTION, SOLUTION INTRAMUSCULAR; INTRAVENOUS DAILY
Status: CANCELLED | OUTPATIENT
Start: 2021-01-01

## 2021-01-01 RX ORDER — PROCHLORPERAZINE EDISYLATE 5 MG/ML
5 INJECTION INTRAMUSCULAR; INTRAVENOUS
Status: ACTIVE | OUTPATIENT
Start: 2021-01-01 | End: 2021-01-01

## 2021-01-01 RX ORDER — FUROSEMIDE 10 MG/ML
INJECTION INTRAMUSCULAR; INTRAVENOUS
Status: COMPLETED
Start: 2021-01-01 | End: 2021-01-01

## 2021-01-01 RX ORDER — PREDNISONE 50 MG/1
TABLET ORAL
Qty: 3 TABLET | Refills: 0 | Status: SHIPPED | OUTPATIENT
Start: 2021-01-01

## 2021-01-01 RX ORDER — HYDROXYCHLOROQUINE SULFATE 200 MG/1
400 TABLET, FILM COATED ORAL DAILY
Status: DISCONTINUED | OUTPATIENT
Start: 2021-01-01 | End: 2021-01-01 | Stop reason: HOSPADM

## 2021-01-01 RX ORDER — MORPHINE SULFATE 4 MG/ML
2 INJECTION, SOLUTION INTRAMUSCULAR; INTRAVENOUS EVERY 5 MIN PRN
Status: DISCONTINUED | OUTPATIENT
Start: 2021-01-01 | End: 2021-01-01 | Stop reason: HOSPADM

## 2021-01-01 RX ORDER — ARFORMOTEROL TARTRATE 15 UG/2ML
15 SOLUTION RESPIRATORY (INHALATION) 2 TIMES DAILY
Status: DISCONTINUED | OUTPATIENT
Start: 2021-01-01 | End: 2021-01-01 | Stop reason: HOSPADM

## 2021-01-01 RX ORDER — NALOXONE HYDROCHLORIDE 0.4 MG/ML
0.4 INJECTION, SOLUTION INTRAMUSCULAR; INTRAVENOUS; SUBCUTANEOUS PRN
Status: DISCONTINUED | OUTPATIENT
Start: 2021-01-01 | End: 2021-01-01 | Stop reason: HOSPADM

## 2021-01-01 RX ORDER — SENNA AND DOCUSATE SODIUM 50; 8.6 MG/1; MG/1
1 TABLET, FILM COATED ORAL 2 TIMES DAILY
Status: DISCONTINUED | OUTPATIENT
Start: 2021-01-01 | End: 2021-01-01 | Stop reason: HOSPADM

## 2021-01-01 RX ORDER — ACETAMINOPHEN 325 MG/1
650 TABLET ORAL EVERY 6 HOURS
Status: DISCONTINUED | OUTPATIENT
Start: 2021-01-01 | End: 2021-01-01 | Stop reason: HOSPADM

## 2021-01-01 RX ORDER — FOLIC ACID 1 MG/1
1 TABLET ORAL DAILY
Status: DISCONTINUED | OUTPATIENT
Start: 2021-01-01 | End: 2021-01-01 | Stop reason: HOSPADM

## 2021-01-01 RX ORDER — SODIUM CHLORIDE, SODIUM LACTATE, POTASSIUM CHLORIDE, CALCIUM CHLORIDE 600; 310; 30; 20 MG/100ML; MG/100ML; MG/100ML; MG/100ML
1000 INJECTION, SOLUTION INTRAVENOUS ONCE
Status: COMPLETED | OUTPATIENT
Start: 2021-01-01 | End: 2021-01-01

## 2021-01-01 RX ORDER — HEPARIN SODIUM 1000 [USP'U]/ML
INJECTION, SOLUTION INTRAVENOUS; SUBCUTANEOUS PRN
Status: DISCONTINUED | OUTPATIENT
Start: 2021-01-01 | End: 2021-01-01 | Stop reason: SDUPTHER

## 2021-01-01 RX ORDER — POTASSIUM CHLORIDE 7.45 MG/ML
10 INJECTION INTRAVENOUS PRN
Status: CANCELLED | OUTPATIENT
Start: 2021-01-01

## 2021-01-01 RX ORDER — CIMETIDINE 300 MG/1
TABLET, FILM COATED ORAL
Qty: 4 TABLET | Refills: 0 | Status: SHIPPED | OUTPATIENT
Start: 2021-01-01 | End: 2021-01-01

## 2021-01-01 RX ORDER — HYDROMORPHONE HYDROCHLORIDE 1 MG/ML
1 INJECTION, SOLUTION INTRAMUSCULAR; INTRAVENOUS; SUBCUTANEOUS EVERY 4 HOURS PRN
Status: DISCONTINUED | OUTPATIENT
Start: 2021-01-01 | End: 2021-01-01

## 2021-01-01 RX ORDER — FAMOTIDINE 20 MG/1
20 TABLET, FILM COATED ORAL ONCE
Status: COMPLETED | OUTPATIENT
Start: 2021-01-01 | End: 2021-01-01

## 2021-01-01 RX ORDER — LORAZEPAM 1 MG/1
2 TABLET ORAL
Status: DISCONTINUED | OUTPATIENT
Start: 2021-01-01 | End: 2021-01-01 | Stop reason: HOSPADM

## 2021-01-01 RX ORDER — ASPIRIN 325 MG
325 TABLET ORAL ONCE
Status: COMPLETED | OUTPATIENT
Start: 2021-01-01 | End: 2021-01-01

## 2021-01-01 RX ORDER — HALOPERIDOL 5 MG/ML
5 INJECTION INTRAMUSCULAR
Status: COMPLETED | OUTPATIENT
Start: 2021-01-01 | End: 2021-01-01

## 2021-01-01 RX ORDER — ACETAMINOPHEN 650 MG/1
650 SUPPOSITORY RECTAL EVERY 6 HOURS PRN
Status: CANCELLED | OUTPATIENT
Start: 2021-01-01

## 2021-01-01 RX ORDER — PANTOPRAZOLE SODIUM 40 MG/1
40 TABLET, DELAYED RELEASE ORAL DAILY
Status: CANCELLED | OUTPATIENT
Start: 2021-01-01

## 2021-01-01 RX ORDER — VANCOMYCIN HYDROCHLORIDE 1 G/20ML
INJECTION, POWDER, LYOPHILIZED, FOR SOLUTION INTRAVENOUS PRN
Status: DISCONTINUED | OUTPATIENT
Start: 2021-01-01 | End: 2021-01-01 | Stop reason: HOSPADM

## 2021-01-01 RX ORDER — NITROGLYCERIN 0.4 MG/1
TABLET SUBLINGUAL
Status: COMPLETED
Start: 2021-01-01 | End: 2021-01-01

## 2021-01-01 RX ORDER — MECOBALAMIN 5000 MCG
5 TABLET,DISINTEGRATING ORAL NIGHTLY
Status: CANCELLED | OUTPATIENT
Start: 2021-01-01

## 2021-01-01 RX ORDER — METHYLPREDNISOLONE SODIUM SUCCINATE 125 MG/2ML
125 INJECTION, POWDER, LYOPHILIZED, FOR SOLUTION INTRAMUSCULAR; INTRAVENOUS ONCE
Status: COMPLETED | OUTPATIENT
Start: 2021-01-01 | End: 2021-01-01

## 2021-01-01 RX ORDER — DIPHENHYDRAMINE HYDROCHLORIDE 50 MG/ML
12.5 INJECTION INTRAMUSCULAR; INTRAVENOUS
Status: COMPLETED | OUTPATIENT
Start: 2021-01-01 | End: 2021-01-01

## 2021-01-01 RX ORDER — ACETAMINOPHEN 650 MG/1
650 SUPPOSITORY RECTAL EVERY 6 HOURS PRN
Status: DISCONTINUED | OUTPATIENT
Start: 2021-01-01 | End: 2021-01-01 | Stop reason: HOSPADM

## 2021-01-01 RX ORDER — BUDESONIDE 0.5 MG/2ML
0.5 INHALANT ORAL 2 TIMES DAILY
Status: DISCONTINUED | OUTPATIENT
Start: 2021-01-01 | End: 2021-01-01 | Stop reason: HOSPADM

## 2021-01-01 RX ORDER — PREDNISONE 20 MG/1
40 TABLET ORAL 4 TIMES DAILY
Qty: 10 TABLET | Refills: 0 | Status: SHIPPED | OUTPATIENT
Start: 2021-01-01 | End: 2021-01-01

## 2021-01-01 RX ORDER — SODIUM CHLORIDE 0.9 % (FLUSH) 0.9 %
10 SYRINGE (ML) INJECTION PRN
Status: CANCELLED | OUTPATIENT
Start: 2021-01-01

## 2021-01-01 RX ORDER — PROMETHAZINE HYDROCHLORIDE 25 MG/ML
6.25 INJECTION, SOLUTION INTRAMUSCULAR; INTRAVENOUS
Status: DISCONTINUED | OUTPATIENT
Start: 2021-01-01 | End: 2021-01-01 | Stop reason: HOSPADM

## 2021-01-01 RX ORDER — DEXAMETHASONE SODIUM PHOSPHATE 10 MG/ML
INJECTION, SOLUTION INTRAMUSCULAR; INTRAVENOUS PRN
Status: DISCONTINUED | OUTPATIENT
Start: 2021-01-01 | End: 2021-01-01 | Stop reason: SDUPTHER

## 2021-01-01 RX ORDER — SODIUM CHLORIDE 0.9 % (FLUSH) 0.9 %
5-40 SYRINGE (ML) INJECTION PRN
Status: CANCELLED | OUTPATIENT
Start: 2021-01-01

## 2021-01-01 RX ORDER — ONDANSETRON 2 MG/ML
4 INJECTION INTRAMUSCULAR; INTRAVENOUS EVERY 6 HOURS PRN
Status: DISCONTINUED | OUTPATIENT
Start: 2021-01-01 | End: 2021-01-01

## 2021-01-01 RX ORDER — HYDROXYCHLOROQUINE SULFATE 200 MG/1
400 TABLET, FILM COATED ORAL DAILY
Status: CANCELLED | OUTPATIENT
Start: 2021-01-01

## 2021-01-01 RX ORDER — SUCCINYLCHOLINE CHLORIDE 20 MG/ML
INJECTION INTRAMUSCULAR; INTRAVENOUS PRN
Status: DISCONTINUED | OUTPATIENT
Start: 2021-01-01 | End: 2021-01-01 | Stop reason: SDUPTHER

## 2021-01-01 RX ORDER — BUMETANIDE 0.25 MG/ML
1 INJECTION, SOLUTION INTRAMUSCULAR; INTRAVENOUS ONCE
Status: COMPLETED | OUTPATIENT
Start: 2021-01-01 | End: 2021-01-01

## 2021-01-01 RX ORDER — PHYSOSTIGMINE SALICYLATE 1 MG/ML
1 INJECTION INTRAVENOUS ONCE
Status: DISCONTINUED | OUTPATIENT
Start: 2021-01-01 | End: 2021-01-01 | Stop reason: RX

## 2021-01-01 RX ORDER — SODIUM CHLORIDE, SODIUM LACTATE, POTASSIUM CHLORIDE, CALCIUM CHLORIDE 600; 310; 30; 20 MG/100ML; MG/100ML; MG/100ML; MG/100ML
INJECTION, SOLUTION INTRAVENOUS CONTINUOUS
Status: DISCONTINUED | OUTPATIENT
Start: 2021-01-01 | End: 2021-01-01

## 2021-01-01 RX ORDER — ROCURONIUM BROMIDE 10 MG/ML
INJECTION, SOLUTION INTRAVENOUS PRN
Status: DISCONTINUED | OUTPATIENT
Start: 2021-01-01 | End: 2021-01-01 | Stop reason: SDUPTHER

## 2021-01-01 RX ORDER — CEFAZOLIN SODIUM 1 G/3ML
INJECTION, POWDER, FOR SOLUTION INTRAMUSCULAR; INTRAVENOUS PRN
Status: DISCONTINUED | OUTPATIENT
Start: 2021-01-01 | End: 2021-01-01 | Stop reason: HOSPADM

## 2021-01-01 RX ORDER — TRANEXAMIC ACID 100 MG/ML
INJECTION, SOLUTION INTRAVENOUS PRN
Status: DISCONTINUED | OUTPATIENT
Start: 2021-01-01 | End: 2021-01-01 | Stop reason: SDUPTHER

## 2021-01-01 RX ORDER — PREDNISONE 50 MG/1
TABLET ORAL
Qty: 3 TABLET | Refills: 0 | Status: ON HOLD | OUTPATIENT
Start: 2021-01-01 | End: 2021-01-01 | Stop reason: HOSPADM

## 2021-01-01 RX ORDER — HYDROMORPHONE HYDROCHLORIDE 1 MG/ML
1 INJECTION, SOLUTION INTRAMUSCULAR; INTRAVENOUS; SUBCUTANEOUS
Status: CANCELLED | OUTPATIENT
Start: 2021-01-01

## 2021-01-01 RX ORDER — METHYLPREDNISOLONE SODIUM SUCCINATE 125 MG/2ML
INJECTION, POWDER, LYOPHILIZED, FOR SOLUTION INTRAMUSCULAR; INTRAVENOUS
Status: COMPLETED
Start: 2021-01-01 | End: 2021-01-01

## 2021-01-01 RX ORDER — GABAPENTIN 400 MG/1
800 CAPSULE ORAL 3 TIMES DAILY
Status: DISCONTINUED | OUTPATIENT
Start: 2021-01-01 | End: 2021-01-01 | Stop reason: HOSPADM

## 2021-01-01 RX ORDER — HEPARIN SODIUM 5000 [USP'U]/ML
INJECTION, SOLUTION INTRAVENOUS; SUBCUTANEOUS PRN
Status: DISCONTINUED | OUTPATIENT
Start: 2021-01-01 | End: 2021-01-01

## 2021-01-01 RX ORDER — LORAZEPAM 2 MG/ML
INJECTION INTRAMUSCULAR
Status: COMPLETED
Start: 2021-01-01 | End: 2021-01-01

## 2021-01-01 RX ORDER — SODIUM CHLORIDE 0.9 % (FLUSH) 0.9 %
10 SYRINGE (ML) INJECTION EVERY 12 HOURS SCHEDULED
Status: CANCELLED | OUTPATIENT
Start: 2021-01-01

## 2021-01-01 RX ORDER — CLOPIDOGREL BISULFATE 75 MG/1
75 TABLET ORAL DAILY
Qty: 30 TABLET | Refills: 3 | Status: SHIPPED | OUTPATIENT
Start: 2021-01-01

## 2021-01-01 RX ADMIN — GABAPENTIN 800 MG: 400 CAPSULE ORAL at 16:44

## 2021-01-01 RX ADMIN — LORAZEPAM 1 MG: 2 INJECTION INTRAMUSCULAR; INTRAVENOUS at 06:24

## 2021-01-01 RX ADMIN — DOCUSATE SODIUM 50 MG AND SENNOSIDES 8.6 MG 1 TABLET: 8.6; 5 TABLET, FILM COATED ORAL at 20:57

## 2021-01-01 RX ADMIN — GABAPENTIN 800 MG: 400 CAPSULE ORAL at 20:38

## 2021-01-01 RX ADMIN — HYDROXYCHLOROQUINE SULFATE 200 MG: 200 TABLET ORAL at 09:45

## 2021-01-01 RX ADMIN — SODIUM CHLORIDE, PRESERVATIVE FREE 10 ML: 5 INJECTION INTRAVENOUS at 21:55

## 2021-01-01 RX ADMIN — EPHEDRINE SULFATE 5 MG: 50 INJECTION INTRAMUSCULAR; INTRAVENOUS; SUBCUTANEOUS at 12:09

## 2021-01-01 RX ADMIN — GABAPENTIN 800 MG: 400 CAPSULE ORAL at 08:44

## 2021-01-01 RX ADMIN — HYDROXYCHLOROQUINE SULFATE 400 MG: 200 TABLET ORAL at 07:51

## 2021-01-01 RX ADMIN — HEPARIN SODIUM 10000 UNITS: 1000 INJECTION, SOLUTION INTRAVENOUS; SUBCUTANEOUS at 15:23

## 2021-01-01 RX ADMIN — ASPIRIN 325 MG: 325 TABLET, FILM COATED ORAL at 11:05

## 2021-01-01 RX ADMIN — SODIUM CHLORIDE, PRESERVATIVE FREE 10 ML: 5 INJECTION INTRAVENOUS at 21:04

## 2021-01-01 RX ADMIN — SODIUM CHLORIDE, PRESERVATIVE FREE 10 ML: 5 INJECTION INTRAVENOUS at 21:29

## 2021-01-01 RX ADMIN — PHENYLEPHRINE HYDROCHLORIDE 100 MCG: 10 INJECTION INTRAVENOUS at 14:55

## 2021-01-01 RX ADMIN — HYDROXYCHLOROQUINE SULFATE 200 MG: 200 TABLET ORAL at 21:20

## 2021-01-01 RX ADMIN — SODIUM CHLORIDE, PRESERVATIVE FREE 10 ML: 5 INJECTION INTRAVENOUS at 23:28

## 2021-01-01 RX ADMIN — FOLIC ACID 1 MG: 1 TABLET ORAL at 19:11

## 2021-01-01 RX ADMIN — Medication 2000 MG: at 21:02

## 2021-01-01 RX ADMIN — ACETAMINOPHEN 650 MG: 325 TABLET ORAL at 22:13

## 2021-01-01 RX ADMIN — OXYCODONE 10 MG: 5 TABLET ORAL at 21:02

## 2021-01-01 RX ADMIN — SODIUM CHLORIDE, PRESERVATIVE FREE 10 ML: 5 INJECTION INTRAVENOUS at 20:44

## 2021-01-01 RX ADMIN — ROCURONIUM BROMIDE 30 MG: 10 INJECTION, SOLUTION INTRAVENOUS at 13:00

## 2021-01-01 RX ADMIN — ARFORMOTEROL TARTRATE 15 MCG: 15 SOLUTION RESPIRATORY (INHALATION) at 07:07

## 2021-01-01 RX ADMIN — ARFORMOTEROL TARTRATE 15 MCG: 15 SOLUTION RESPIRATORY (INHALATION) at 19:51

## 2021-01-01 RX ADMIN — CLOPIDOGREL BISULFATE 75 MG: 75 TABLET ORAL at 11:34

## 2021-01-01 RX ADMIN — FENTANYL CITRATE 25 MCG: 50 INJECTION, SOLUTION INTRAMUSCULAR; INTRAVENOUS at 18:09

## 2021-01-01 RX ADMIN — THERA TABS 1 TABLET: TAB at 09:09

## 2021-01-01 RX ADMIN — POTASSIUM CHLORIDE 10 MEQ: 7.46 INJECTION, SOLUTION INTRAVENOUS at 06:27

## 2021-01-01 RX ADMIN — ARFORMOTEROL TARTRATE 15 MCG: 15 SOLUTION RESPIRATORY (INHALATION) at 06:25

## 2021-01-01 RX ADMIN — CEFEPIME HYDROCHLORIDE 2000 MG: 2 INJECTION, POWDER, FOR SOLUTION INTRAVENOUS at 05:40

## 2021-01-01 RX ADMIN — FUROSEMIDE 20 MG: 20 TABLET ORAL at 09:24

## 2021-01-01 RX ADMIN — OXYCODONE 10 MG: 5 TABLET ORAL at 17:30

## 2021-01-01 RX ADMIN — Medication 1000 MG: at 16:18

## 2021-01-01 RX ADMIN — ASPIRIN 81 MG: 81 TABLET, CHEWABLE ORAL at 18:30

## 2021-01-01 RX ADMIN — Medication 2000 MG: at 21:23

## 2021-01-01 RX ADMIN — HYDROXYCHLOROQUINE SULFATE 200 MG: 200 TABLET ORAL at 21:52

## 2021-01-01 RX ADMIN — OXYCODONE 10 MG: 5 TABLET ORAL at 04:17

## 2021-01-01 RX ADMIN — FENTANYL CITRATE 50 MCG: 50 INJECTION, SOLUTION INTRAMUSCULAR; INTRAVENOUS at 12:37

## 2021-01-01 RX ADMIN — THIAMINE HYDROCHLORIDE 100 MG: 100 INJECTION, SOLUTION INTRAMUSCULAR; INTRAVENOUS at 19:11

## 2021-01-01 RX ADMIN — Medication 2000 MG: at 04:44

## 2021-01-01 RX ADMIN — HYDROXYCHLOROQUINE SULFATE 200 MG: 200 TABLET ORAL at 20:38

## 2021-01-01 RX ADMIN — OXYCODONE HYDROCHLORIDE AND ACETAMINOPHEN 1 TABLET: 7.5; 325 TABLET ORAL at 21:55

## 2021-01-01 RX ADMIN — PANTOPRAZOLE SODIUM 40 MG: 40 TABLET, DELAYED RELEASE ORAL at 08:40

## 2021-01-01 RX ADMIN — LORAZEPAM 1 MG: 1 TABLET ORAL at 22:18

## 2021-01-01 RX ADMIN — ALPRAZOLAM 0.5 MG: 0.5 TABLET ORAL at 22:29

## 2021-01-01 RX ADMIN — FENTANYL CITRATE 50 MCG: 50 INJECTION, SOLUTION INTRAMUSCULAR; INTRAVENOUS at 14:13

## 2021-01-01 RX ADMIN — FENTANYL CITRATE 50 MCG: 50 INJECTION, SOLUTION INTRAMUSCULAR; INTRAVENOUS at 17:48

## 2021-01-01 RX ADMIN — Medication 2000 MG: at 11:20

## 2021-01-01 RX ADMIN — BUDESONIDE 500 MCG: 0.5 SUSPENSION RESPIRATORY (INHALATION) at 16:58

## 2021-01-01 RX ADMIN — SODIUM CHLORIDE, SODIUM LACTATE, POTASSIUM CHLORIDE, AND CALCIUM CHLORIDE: 600; 310; 30; 20 INJECTION, SOLUTION INTRAVENOUS at 11:04

## 2021-01-01 RX ADMIN — IOPAMIDOL 144 ML: 612 INJECTION, SOLUTION INTRAVENOUS at 16:53

## 2021-01-01 RX ADMIN — DIPHENHYDRAMINE HYDROCHLORIDE 50 MG: 50 INJECTION, SOLUTION INTRAMUSCULAR; INTRAVENOUS at 11:10

## 2021-01-01 RX ADMIN — DEXAMETHASONE SODIUM PHOSPHATE 10 MG: 10 INJECTION, SOLUTION INTRAMUSCULAR; INTRAVENOUS at 14:54

## 2021-01-01 RX ADMIN — FENTANYL CITRATE 50 MCG: 50 INJECTION, SOLUTION INTRAMUSCULAR; INTRAVENOUS at 11:11

## 2021-01-01 RX ADMIN — MORPHINE SULFATE 4 MG: 4 INJECTION, SOLUTION INTRAMUSCULAR; INTRAVENOUS at 14:34

## 2021-01-01 RX ADMIN — TRANEXAMIC ACID 1000 MG: 1 INJECTION, SOLUTION INTRAVENOUS at 12:07

## 2021-01-01 RX ADMIN — FOLIC ACID 1 MG: 1 TABLET ORAL at 07:51

## 2021-01-01 RX ADMIN — GABAPENTIN 800 MG: 400 CAPSULE ORAL at 07:50

## 2021-01-01 RX ADMIN — Medication 2000 MG: at 19:53

## 2021-01-01 RX ADMIN — IOPAMIDOL 90 ML: 755 INJECTION, SOLUTION INTRAVENOUS at 11:07

## 2021-01-01 RX ADMIN — GABAPENTIN 800 MG: 400 CAPSULE ORAL at 21:35

## 2021-01-01 RX ADMIN — ARFORMOTEROL TARTRATE 15 MCG: 15 SOLUTION RESPIRATORY (INHALATION) at 06:52

## 2021-01-01 RX ADMIN — SODIUM CHLORIDE, PRESERVATIVE FREE 10 ML: 5 INJECTION INTRAVENOUS at 21:31

## 2021-01-01 RX ADMIN — PANTOPRAZOLE SODIUM 40 MG: 40 TABLET, DELAYED RELEASE ORAL at 09:30

## 2021-01-01 RX ADMIN — GABAPENTIN 800 MG: 400 CAPSULE ORAL at 20:50

## 2021-01-01 RX ADMIN — OXYCODONE HYDROCHLORIDE AND ACETAMINOPHEN 1 TABLET: 7.5; 325 TABLET ORAL at 18:03

## 2021-01-01 RX ADMIN — DOCUSATE SODIUM 50 MG AND SENNOSIDES 8.6 MG 1 TABLET: 8.6; 5 TABLET, FILM COATED ORAL at 09:08

## 2021-01-01 RX ADMIN — OXYCODONE HYDROCHLORIDE AND ACETAMINOPHEN 1 TABLET: 7.5; 325 TABLET ORAL at 18:29

## 2021-01-01 RX ADMIN — Medication 2000 MG: at 04:30

## 2021-01-01 RX ADMIN — PANTOPRAZOLE SODIUM 40 MG: 40 TABLET, DELAYED RELEASE ORAL at 11:32

## 2021-01-01 RX ADMIN — OXYCODONE 10 MG: 5 TABLET ORAL at 21:03

## 2021-01-01 RX ADMIN — ACETAMINOPHEN 650 MG: 325 TABLET ORAL at 16:47

## 2021-01-01 RX ADMIN — ALBUTEROL SULFATE 2.5 MG: 2.5 SOLUTION RESPIRATORY (INHALATION) at 21:40

## 2021-01-01 RX ADMIN — ENOXAPARIN SODIUM 40 MG: 40 INJECTION SUBCUTANEOUS at 11:33

## 2021-01-01 RX ADMIN — SODIUM CHLORIDE, PRESERVATIVE FREE 10 ML: 5 INJECTION INTRAVENOUS at 11:35

## 2021-01-01 RX ADMIN — ACETAMINOPHEN 650 MG: 325 TABLET ORAL at 00:38

## 2021-01-01 RX ADMIN — OXYCODONE HYDROCHLORIDE AND ACETAMINOPHEN 1 TABLET: 7.5; 325 TABLET ORAL at 22:33

## 2021-01-01 RX ADMIN — PROPOFOL 160 MG: 10 INJECTION, EMULSION INTRAVENOUS at 11:44

## 2021-01-01 RX ADMIN — HYDROXYCHLOROQUINE SULFATE 200 MG: 200 TABLET ORAL at 08:44

## 2021-01-01 RX ADMIN — OXYCODONE 10 MG: 5 TABLET ORAL at 16:29

## 2021-01-01 RX ADMIN — HEPARIN SODIUM 10000 UNITS: 1000 INJECTION, SOLUTION INTRAVENOUS; SUBCUTANEOUS at 12:18

## 2021-01-01 RX ADMIN — HYDROXYCHLOROQUINE SULFATE 200 MG: 200 TABLET ORAL at 11:43

## 2021-01-01 RX ADMIN — DOCUSATE SODIUM 50 MG AND SENNOSIDES 8.6 MG 1 TABLET: 8.6; 5 TABLET, FILM COATED ORAL at 09:30

## 2021-01-01 RX ADMIN — HYDROXYCHLOROQUINE SULFATE 200 MG: 200 TABLET ORAL at 08:43

## 2021-01-01 RX ADMIN — CEFEPIME 1000 MG: 1 INJECTION, POWDER, FOR SOLUTION INTRAMUSCULAR; INTRAVENOUS at 02:55

## 2021-01-01 RX ADMIN — CEFDINIR 300 MG: 300 CAPSULE ORAL at 09:45

## 2021-01-01 RX ADMIN — BACLOFEN 5 MG: 10 TABLET ORAL at 02:41

## 2021-01-01 RX ADMIN — OXYCODONE HYDROCHLORIDE AND ACETAMINOPHEN 1 TABLET: 7.5; 325 TABLET ORAL at 05:42

## 2021-01-01 RX ADMIN — PHENYLEPHRINE HYDROCHLORIDE 100 MCG: 10 INJECTION INTRAVENOUS at 14:44

## 2021-01-01 RX ADMIN — ACETAMINOPHEN 650 MG: 325 TABLET ORAL at 11:36

## 2021-01-01 RX ADMIN — GABAPENTIN 800 MG: 400 CAPSULE ORAL at 07:51

## 2021-01-01 RX ADMIN — POTASSIUM CHLORIDE 40 MEQ: 1500 TABLET, EXTENDED RELEASE ORAL at 11:26

## 2021-01-01 RX ADMIN — ACETAMINOPHEN 650 MG: 325 TABLET ORAL at 17:08

## 2021-01-01 RX ADMIN — Medication 2000 MG: at 11:26

## 2021-01-01 RX ADMIN — HYDROXYCHLOROQUINE SULFATE 400 MG: 200 TABLET ORAL at 07:59

## 2021-01-01 RX ADMIN — HALOPERIDOL LACTATE 5 MG: 5 INJECTION, SOLUTION INTRAMUSCULAR at 06:39

## 2021-01-01 RX ADMIN — ASPIRIN 81 MG: 81 TABLET, CHEWABLE ORAL at 08:44

## 2021-01-01 RX ADMIN — SODIUM CHLORIDE, PRESERVATIVE FREE 10 ML: 5 INJECTION INTRAVENOUS at 07:51

## 2021-01-01 RX ADMIN — LIDOCAINE HYDROCHLORIDE 10 ML: 10 INJECTION, SOLUTION EPIDURAL; INFILTRATION; INTRACAUDAL; PERINEURAL at 07:52

## 2021-01-01 RX ADMIN — THERA TABS 1 TABLET: TAB at 09:21

## 2021-01-01 RX ADMIN — OXYCODONE 10 MG: 5 TABLET ORAL at 20:54

## 2021-01-01 RX ADMIN — CEFEPIME HYDROCHLORIDE 2000 MG: 2 INJECTION, POWDER, FOR SOLUTION INTRAVENOUS at 04:47

## 2021-01-01 RX ADMIN — ARFORMOTEROL TARTRATE 15 MCG: 15 SOLUTION RESPIRATORY (INHALATION) at 06:06

## 2021-01-01 RX ADMIN — HYDROCORTISONE SODIUM SUCCINATE 200 MG: 100 INJECTION, POWDER, FOR SOLUTION INTRAMUSCULAR; INTRAVENOUS at 11:00

## 2021-01-01 RX ADMIN — PANTOPRAZOLE SODIUM 40 MG: 40 TABLET, DELAYED RELEASE ORAL at 09:40

## 2021-01-01 RX ADMIN — ASPIRIN 325 MG: 325 TABLET, COATED ORAL at 20:44

## 2021-01-01 RX ADMIN — HEPARIN SODIUM 4000 UNITS: 1000 INJECTION, SOLUTION INTRAVENOUS; SUBCUTANEOUS at 15:39

## 2021-01-01 RX ADMIN — GABAPENTIN 800 MG: 400 CAPSULE ORAL at 13:08

## 2021-01-01 RX ADMIN — EZETIMIBE 10 MG: 10 TABLET ORAL at 07:51

## 2021-01-01 RX ADMIN — PROTAMINE SULFATE 30 MG: 10 INJECTION, SOLUTION INTRAVENOUS at 14:27

## 2021-01-01 RX ADMIN — BUDESONIDE 500 MCG: 0.5 SUSPENSION RESPIRATORY (INHALATION) at 06:14

## 2021-01-01 RX ADMIN — OXYCODONE HYDROCHLORIDE AND ACETAMINOPHEN 1 TABLET: 7.5; 325 TABLET ORAL at 01:41

## 2021-01-01 RX ADMIN — ASPIRIN 325 MG: 325 TABLET, COATED ORAL at 22:14

## 2021-01-01 RX ADMIN — GABAPENTIN 800 MG: 400 CAPSULE ORAL at 21:52

## 2021-01-01 RX ADMIN — THERA TABS 1 TABLET: TAB at 19:11

## 2021-01-01 RX ADMIN — OXYCODONE HYDROCHLORIDE 5 MG: 5 TABLET ORAL at 17:08

## 2021-01-01 RX ADMIN — ACETAMINOPHEN 650 MG: 325 TABLET ORAL at 05:44

## 2021-01-01 RX ADMIN — GABAPENTIN 800 MG: 400 CAPSULE ORAL at 09:21

## 2021-01-01 RX ADMIN — ASPIRIN 325 MG: 325 TABLET, COATED ORAL at 09:08

## 2021-01-01 RX ADMIN — SODIUM CHLORIDE, SODIUM LACTATE, POTASSIUM CHLORIDE, AND CALCIUM CHLORIDE: 600; 310; 30; 20 INJECTION, SOLUTION INTRAVENOUS at 07:58

## 2021-01-01 RX ADMIN — OXYCODONE HYDROCHLORIDE AND ACETAMINOPHEN 1 TABLET: 7.5; 325 TABLET ORAL at 09:54

## 2021-01-01 RX ADMIN — Medication 10 ML: at 09:43

## 2021-01-01 RX ADMIN — BACLOFEN 5 MG: 10 TABLET ORAL at 21:02

## 2021-01-01 RX ADMIN — ASPIRIN 325 MG: 325 TABLET, COATED ORAL at 20:57

## 2021-01-01 RX ADMIN — MAGNESIUM SULFATE HEPTAHYDRATE 1000 MG: 1 INJECTION, SOLUTION INTRAVENOUS at 10:06

## 2021-01-01 RX ADMIN — THIAMINE HYDROCHLORIDE 100 MG: 100 INJECTION, SOLUTION INTRAMUSCULAR; INTRAVENOUS at 09:40

## 2021-01-01 RX ADMIN — NITROGLYCERIN 0.4 MG: 0.4 TABLET SUBLINGUAL at 22:06

## 2021-01-01 RX ADMIN — CEFAZOLIN 2000 MG: 1 INJECTION, POWDER, FOR SOLUTION INTRAMUSCULAR; INTRAVENOUS; PARENTERAL at 14:33

## 2021-01-01 RX ADMIN — HYDROXYCHLOROQUINE SULFATE 200 MG: 200 TABLET ORAL at 08:17

## 2021-01-01 RX ADMIN — HYDROXYCHLOROQUINE SULFATE 200 MG: 200 TABLET ORAL at 22:29

## 2021-01-01 RX ADMIN — IPRATROPIUM BROMIDE AND ALBUTEROL SULFATE 1 AMPULE: .5; 3 SOLUTION RESPIRATORY (INHALATION) at 10:42

## 2021-01-01 RX ADMIN — GABAPENTIN 800 MG: 400 CAPSULE ORAL at 11:32

## 2021-01-01 RX ADMIN — ARFORMOTEROL TARTRATE 15 MCG: 15 SOLUTION RESPIRATORY (INHALATION) at 07:00

## 2021-01-01 RX ADMIN — GABAPENTIN 800 MG: 400 CAPSULE ORAL at 09:40

## 2021-01-01 RX ADMIN — FUROSEMIDE 40 MG: 10 INJECTION, SOLUTION INTRAMUSCULAR; INTRAVENOUS at 10:59

## 2021-01-01 RX ADMIN — IPRATROPIUM BROMIDE AND ALBUTEROL SULFATE 1 AMPULE: .5; 3 SOLUTION RESPIRATORY (INHALATION) at 14:46

## 2021-01-01 RX ADMIN — HYDROXYCHLOROQUINE SULFATE 400 MG: 200 TABLET ORAL at 18:30

## 2021-01-01 RX ADMIN — Medication 5 MG: at 21:23

## 2021-01-01 RX ADMIN — BUDESONIDE 500 MCG: 0.5 SUSPENSION RESPIRATORY (INHALATION) at 19:07

## 2021-01-01 RX ADMIN — THIAMINE HYDROCHLORIDE 100 MG: 100 INJECTION, SOLUTION INTRAMUSCULAR; INTRAVENOUS at 08:40

## 2021-01-01 RX ADMIN — IOPAMIDOL 90 ML: 755 INJECTION, SOLUTION INTRAVENOUS at 13:50

## 2021-01-01 RX ADMIN — EZETIMIBE 10 MG: 10 TABLET ORAL at 09:08

## 2021-01-01 RX ADMIN — GABAPENTIN 800 MG: 400 CAPSULE ORAL at 21:03

## 2021-01-01 RX ADMIN — ARFORMOTEROL TARTRATE 15 MCG: 15 SOLUTION RESPIRATORY (INHALATION) at 06:14

## 2021-01-01 RX ADMIN — THIAMINE HYDROCHLORIDE 100 MG: 100 INJECTION, SOLUTION INTRAMUSCULAR; INTRAVENOUS at 08:00

## 2021-01-01 RX ADMIN — ACETAMINOPHEN 650 MG: 325 TABLET ORAL at 13:55

## 2021-01-01 RX ADMIN — THIAMINE HYDROCHLORIDE 100 MG: 100 INJECTION, SOLUTION INTRAMUSCULAR; INTRAVENOUS at 09:30

## 2021-01-01 RX ADMIN — HYDROXYCHLOROQUINE SULFATE 200 MG: 200 TABLET ORAL at 20:50

## 2021-01-01 RX ADMIN — PANTOPRAZOLE SODIUM 40 MG: 40 TABLET, DELAYED RELEASE ORAL at 09:09

## 2021-01-01 RX ADMIN — ACETAMINOPHEN 650 MG: 325 TABLET ORAL at 16:37

## 2021-01-01 RX ADMIN — LORAZEPAM 1 MG: 2 INJECTION INTRAMUSCULAR; INTRAVENOUS at 09:28

## 2021-01-01 RX ADMIN — FOLIC ACID 1 MG: 1 TABLET ORAL at 09:40

## 2021-01-01 RX ADMIN — CEFEPIME HYDROCHLORIDE 2000 MG: 2 INJECTION, POWDER, FOR SOLUTION INTRAVENOUS at 21:52

## 2021-01-01 RX ADMIN — POTASSIUM CHLORIDE 10 MEQ: 7.46 INJECTION, SOLUTION INTRAVENOUS at 10:26

## 2021-01-01 RX ADMIN — FUROSEMIDE 40 MG: 10 INJECTION, SOLUTION INTRAMUSCULAR; INTRAVENOUS at 21:51

## 2021-01-01 RX ADMIN — FOLIC ACID 1 MG: 1 TABLET ORAL at 08:00

## 2021-01-01 RX ADMIN — BUDESONIDE 500 MCG: 0.5 SUSPENSION RESPIRATORY (INHALATION) at 06:25

## 2021-01-01 RX ADMIN — THERA TABS 1 TABLET: TAB at 08:00

## 2021-01-01 RX ADMIN — GABAPENTIN 800 MG: 400 CAPSULE ORAL at 08:43

## 2021-01-01 RX ADMIN — THIAMINE HYDROCHLORIDE 100 MG: 100 INJECTION, SOLUTION INTRAMUSCULAR; INTRAVENOUS at 09:09

## 2021-01-01 RX ADMIN — ACETAMINOPHEN 650 MG: 325 TABLET ORAL at 17:42

## 2021-01-01 RX ADMIN — ACETAMINOPHEN 650 MG: 325 TABLET ORAL at 16:20

## 2021-01-01 RX ADMIN — SODIUM CHLORIDE, SODIUM LACTATE, POTASSIUM CHLORIDE, AND CALCIUM CHLORIDE: 600; 310; 30; 20 INJECTION, SOLUTION INTRAVENOUS at 12:40

## 2021-01-01 RX ADMIN — BUMETANIDE 1 MG: 0.25 INJECTION, SOLUTION INTRAMUSCULAR; INTRAVENOUS at 01:27

## 2021-01-01 RX ADMIN — EZETIMIBE 10 MG: 10 TABLET ORAL at 09:45

## 2021-01-01 RX ADMIN — BUDESONIDE 500 MCG: 0.5 SUSPENSION RESPIRATORY (INHALATION) at 06:20

## 2021-01-01 RX ADMIN — ROCURONIUM BROMIDE 30 MG: 10 INJECTION, SOLUTION INTRAVENOUS at 15:20

## 2021-01-01 RX ADMIN — Medication 650 MG: at 09:53

## 2021-01-01 RX ADMIN — PHENYLEPHRINE HYDROCHLORIDE 100 MCG: 10 INJECTION INTRAVENOUS at 14:47

## 2021-01-01 RX ADMIN — LIDOCAINE HYDROCHLORIDE 20 ML: 10 INJECTION, SOLUTION INFILTRATION; PERINEURAL at 13:33

## 2021-01-01 RX ADMIN — Medication 2000 MG: at 13:56

## 2021-01-01 RX ADMIN — DEXTROSE MONOHYDRATE 1000 MG: 50 INJECTION, SOLUTION INTRAVENOUS at 12:41

## 2021-01-01 RX ADMIN — THIAMINE HYDROCHLORIDE 100 MG: 100 INJECTION, SOLUTION INTRAMUSCULAR; INTRAVENOUS at 09:20

## 2021-01-01 RX ADMIN — POTASSIUM CHLORIDE 10 MEQ: 7.46 INJECTION, SOLUTION INTRAVENOUS at 09:13

## 2021-01-01 RX ADMIN — DOCUSATE SODIUM 50 MG AND SENNOSIDES 8.6 MG 1 TABLET: 8.6; 5 TABLET, FILM COATED ORAL at 21:04

## 2021-01-01 RX ADMIN — THIAMINE HYDROCHLORIDE 100 MG: 100 INJECTION, SOLUTION INTRAMUSCULAR; INTRAVENOUS at 07:56

## 2021-01-01 RX ADMIN — DEXAMETHASONE SODIUM PHOSPHATE 10 MG: 10 INJECTION, SOLUTION INTRAMUSCULAR; INTRAVENOUS at 11:55

## 2021-01-01 RX ADMIN — FUROSEMIDE 20 MG: 20 TABLET ORAL at 08:44

## 2021-01-01 RX ADMIN — ASPIRIN 325 MG: 325 TABLET, COATED ORAL at 08:40

## 2021-01-01 RX ADMIN — HYDROMORPHONE HYDROCHLORIDE 1 MG: 1 INJECTION, SOLUTION INTRAMUSCULAR; INTRAVENOUS; SUBCUTANEOUS at 22:49

## 2021-01-01 RX ADMIN — DOCUSATE SODIUM 50 MG AND SENNOSIDES 8.6 MG 1 TABLET: 8.6; 5 TABLET, FILM COATED ORAL at 09:40

## 2021-01-01 RX ADMIN — PANTOPRAZOLE SODIUM 40 MG: 40 TABLET, DELAYED RELEASE ORAL at 07:51

## 2021-01-01 RX ADMIN — HALOPERIDOL LACTATE 5 MG: 5 INJECTION, SOLUTION INTRAMUSCULAR at 19:32

## 2021-01-01 RX ADMIN — Medication 2000 MG: at 04:38

## 2021-01-01 RX ADMIN — ASPIRIN 325 MG: 325 TABLET, COATED ORAL at 08:00

## 2021-01-01 RX ADMIN — ONDANSETRON HYDROCHLORIDE 4 MG: 2 SOLUTION INTRAMUSCULAR; INTRAVENOUS at 16:31

## 2021-01-01 RX ADMIN — HYDROXYCHLOROQUINE SULFATE 400 MG: 200 TABLET ORAL at 08:40

## 2021-01-01 RX ADMIN — LORAZEPAM 2 MG: 2 INJECTION INTRAMUSCULAR; INTRAVENOUS at 11:25

## 2021-01-01 RX ADMIN — GABAPENTIN 800 MG: 400 CAPSULE ORAL at 13:38

## 2021-01-01 RX ADMIN — CEFEPIME HYDROCHLORIDE 2000 MG: 2 INJECTION, POWDER, FOR SOLUTION INTRAVENOUS at 11:33

## 2021-01-01 RX ADMIN — BUDESONIDE 500 MCG: 0.5 SUSPENSION RESPIRATORY (INHALATION) at 06:40

## 2021-01-01 RX ADMIN — BACLOFEN 5 MG: 10 TABLET ORAL at 16:20

## 2021-01-01 RX ADMIN — ACETAMINOPHEN 650 MG: 325 TABLET ORAL at 11:22

## 2021-01-01 RX ADMIN — LORAZEPAM 1 MG: 2 INJECTION INTRAMUSCULAR; INTRAVENOUS at 21:40

## 2021-01-01 RX ADMIN — OXYCODONE HYDROCHLORIDE AND ACETAMINOPHEN 1 TABLET: 7.5; 325 TABLET ORAL at 09:36

## 2021-01-01 RX ADMIN — ASPIRIN 325 MG: 325 TABLET, COATED ORAL at 09:30

## 2021-01-01 RX ADMIN — SODIUM CHLORIDE, SODIUM GLUCONATE, SODIUM ACETATE, POTASSIUM CHLORIDE AND MAGNESIUM CHLORIDE: 526; 502; 368; 37; 30 INJECTION, SOLUTION INTRAVENOUS at 13:45

## 2021-01-01 RX ADMIN — Medication 2000 MG: at 12:38

## 2021-01-01 RX ADMIN — CLOPIDOGREL BISULFATE 75 MG: 75 TABLET ORAL at 08:44

## 2021-01-01 RX ADMIN — SODIUM CHLORIDE: 9 INJECTION, SOLUTION INTRAVENOUS at 11:54

## 2021-01-01 RX ADMIN — ASPIRIN 81 MG: 81 TABLET, CHEWABLE ORAL at 08:17

## 2021-01-01 RX ADMIN — Medication 2000 MG: at 11:23

## 2021-01-01 RX ADMIN — BUDESONIDE 500 MCG: 0.5 SUSPENSION RESPIRATORY (INHALATION) at 17:48

## 2021-01-01 RX ADMIN — ACETAMINOPHEN 650 MG: 325 TABLET ORAL at 04:37

## 2021-01-01 RX ADMIN — FUROSEMIDE 40 MG: 10 INJECTION, SOLUTION INTRAMUSCULAR; INTRAVENOUS at 05:40

## 2021-01-01 RX ADMIN — ASPIRIN 325 MG: 325 TABLET, COATED ORAL at 23:03

## 2021-01-01 RX ADMIN — BACLOFEN 5 MG: 10 TABLET ORAL at 20:44

## 2021-01-01 RX ADMIN — Medication 2000 MG: at 05:20

## 2021-01-01 RX ADMIN — SODIUM CHLORIDE, PRESERVATIVE FREE 10 ML: 5 INJECTION INTRAVENOUS at 08:00

## 2021-01-01 RX ADMIN — OXYCODONE HYDROCHLORIDE 5 MG: 5 TABLET ORAL at 21:22

## 2021-01-01 RX ADMIN — IOPAMIDOL 196 ML: 612 INJECTION, SOLUTION INTRAVENOUS at 12:07

## 2021-01-01 RX ADMIN — Medication 2000 MG: at 03:51

## 2021-01-01 RX ADMIN — ASPIRIN 325 MG: 325 TABLET, COATED ORAL at 23:33

## 2021-01-01 RX ADMIN — CEFEPIME HYDROCHLORIDE 2000 MG: 2 INJECTION, POWDER, FOR SOLUTION INTRAVENOUS at 20:17

## 2021-01-01 RX ADMIN — PANTOPRAZOLE SODIUM 40 MG: 40 TABLET, DELAYED RELEASE ORAL at 08:18

## 2021-01-01 RX ADMIN — FENTANYL CITRATE 50 MCG: 50 INJECTION, SOLUTION INTRAMUSCULAR; INTRAVENOUS at 13:51

## 2021-01-01 RX ADMIN — GABAPENTIN 800 MG: 400 CAPSULE ORAL at 22:29

## 2021-01-01 RX ADMIN — ASPIRIN 325 MG: 325 TABLET, COATED ORAL at 21:02

## 2021-01-01 RX ADMIN — BUDESONIDE 500 MCG: 0.5 SUSPENSION RESPIRATORY (INHALATION) at 19:51

## 2021-01-01 RX ADMIN — SUCCINYLCHOLINE CHLORIDE 100 MG: 20 INJECTION, SOLUTION INTRAMUSCULAR; INTRAVENOUS at 11:44

## 2021-01-01 RX ADMIN — PHENYLEPHRINE HYDROCHLORIDE 200 MCG: 10 INJECTION INTRAVENOUS at 14:58

## 2021-01-01 RX ADMIN — BUDESONIDE 500 MCG: 0.5 SUSPENSION RESPIRATORY (INHALATION) at 20:24

## 2021-01-01 RX ADMIN — ACETAMINOPHEN 650 MG: 325 TABLET ORAL at 06:57

## 2021-01-01 RX ADMIN — ENOXAPARIN SODIUM 40 MG: 40 INJECTION SUBCUTANEOUS at 08:19

## 2021-01-01 RX ADMIN — Medication 2000 MG: at 03:48

## 2021-01-01 RX ADMIN — FUROSEMIDE 40 MG: 10 INJECTION INTRAMUSCULAR; INTRAVENOUS at 13:28

## 2021-01-01 RX ADMIN — SODIUM CHLORIDE, PRESERVATIVE FREE 10 ML: 5 INJECTION INTRAVENOUS at 08:18

## 2021-01-01 RX ADMIN — FOLIC ACID 1 MG: 1 TABLET ORAL at 08:39

## 2021-01-01 RX ADMIN — SODIUM CHLORIDE: 9 INJECTION, SOLUTION INTRAVENOUS at 11:09

## 2021-01-01 RX ADMIN — PANTOPRAZOLE SODIUM 40 MG: 40 TABLET, DELAYED RELEASE ORAL at 09:21

## 2021-01-01 RX ADMIN — LORAZEPAM 0.5 MG: 2 INJECTION INTRAMUSCULAR; INTRAVENOUS at 19:06

## 2021-01-01 RX ADMIN — ASPIRIN 81 MG: 81 TABLET, CHEWABLE ORAL at 11:09

## 2021-01-01 RX ADMIN — HYDROMORPHONE HYDROCHLORIDE 1 MG: 1 INJECTION, SOLUTION INTRAMUSCULAR; INTRAVENOUS; SUBCUTANEOUS at 06:24

## 2021-01-01 RX ADMIN — SODIUM CHLORIDE, PRESERVATIVE FREE 20 ML: 5 INJECTION INTRAVENOUS at 05:41

## 2021-01-01 RX ADMIN — SODIUM CHLORIDE, SODIUM LACTATE, POTASSIUM CHLORIDE, AND CALCIUM CHLORIDE: 600; 310; 30; 20 INJECTION, SOLUTION INTRAVENOUS at 16:26

## 2021-01-01 RX ADMIN — ARFORMOTEROL TARTRATE 15 MCG: 15 SOLUTION RESPIRATORY (INHALATION) at 07:15

## 2021-01-01 RX ADMIN — LIDOCAINE HYDROCHLORIDE 5 ML: 10 INJECTION, SOLUTION EPIDURAL; INFILTRATION; INTRACAUDAL; PERINEURAL at 14:37

## 2021-01-01 RX ADMIN — GABAPENTIN 800 MG: 400 CAPSULE ORAL at 07:59

## 2021-01-01 RX ADMIN — GABAPENTIN 800 MG: 400 CAPSULE ORAL at 09:45

## 2021-01-01 RX ADMIN — DIPHENHYDRAMINE HYDROCHLORIDE 12.5 MG: 50 INJECTION, SOLUTION INTRAMUSCULAR; INTRAVENOUS at 18:41

## 2021-01-01 RX ADMIN — SODIUM CHLORIDE: 9 INJECTION, SOLUTION INTRAVENOUS at 04:34

## 2021-01-01 RX ADMIN — ACETAMINOPHEN 650 MG: 325 TABLET ORAL at 05:29

## 2021-01-01 RX ADMIN — ACETAMINOPHEN 650 MG: 325 TABLET ORAL at 23:19

## 2021-01-01 RX ADMIN — ROCURONIUM BROMIDE 50 MG: 10 INJECTION, SOLUTION INTRAVENOUS at 11:55

## 2021-01-01 RX ADMIN — HEPARIN SODIUM 2000 UNITS: 1000 INJECTION, SOLUTION INTRAVENOUS; SUBCUTANEOUS at 13:05

## 2021-01-01 RX ADMIN — GABAPENTIN 800 MG: 400 CAPSULE ORAL at 21:33

## 2021-01-01 RX ADMIN — ARFORMOTEROL TARTRATE 15 MCG: 15 SOLUTION RESPIRATORY (INHALATION) at 20:24

## 2021-01-01 RX ADMIN — ACETAMINOPHEN 650 MG: 325 TABLET ORAL at 05:59

## 2021-01-01 RX ADMIN — Medication 2000 MG: at 11:49

## 2021-01-01 RX ADMIN — SODIUM CHLORIDE: 9 INJECTION, SOLUTION INTRAVENOUS at 14:50

## 2021-01-01 RX ADMIN — ACETAMINOPHEN 650 MG: 325 TABLET ORAL at 11:49

## 2021-01-01 RX ADMIN — FENTANYL CITRATE 50 MCG: 50 INJECTION, SOLUTION INTRAMUSCULAR; INTRAVENOUS at 13:13

## 2021-01-01 RX ADMIN — GABAPENTIN 800 MG: 400 CAPSULE ORAL at 12:44

## 2021-01-01 RX ADMIN — SODIUM CHLORIDE, PRESERVATIVE FREE 10 ML: 5 INJECTION INTRAVENOUS at 09:41

## 2021-01-01 RX ADMIN — DEXTROSE MONOHYDRATE 1250 MG: 50 INJECTION, SOLUTION INTRAVENOUS at 21:35

## 2021-01-01 RX ADMIN — ASPIRIN 325 MG: 325 TABLET, COATED ORAL at 19:54

## 2021-01-01 RX ADMIN — FENTANYL CITRATE 50 MCG: 50 INJECTION, SOLUTION INTRAMUSCULAR; INTRAVENOUS at 15:28

## 2021-01-01 RX ADMIN — SUGAMMADEX 200 MG: 100 INJECTION, SOLUTION INTRAVENOUS at 16:36

## 2021-01-01 RX ADMIN — Medication 2000 MG: at 13:07

## 2021-01-01 RX ADMIN — LORAZEPAM 1 MG: 1 TABLET ORAL at 20:54

## 2021-01-01 RX ADMIN — ACETAMINOPHEN 650 MG: 325 TABLET ORAL at 15:55

## 2021-01-01 RX ADMIN — ASPIRIN 325 MG: 325 TABLET, COATED ORAL at 07:51

## 2021-01-01 RX ADMIN — SODIUM CHLORIDE, POTASSIUM CHLORIDE, SODIUM LACTATE AND CALCIUM CHLORIDE 1000 ML: 600; 310; 30; 20 INJECTION, SOLUTION INTRAVENOUS at 14:23

## 2021-01-01 RX ADMIN — ACETAMINOPHEN 650 MG: 325 TABLET ORAL at 17:09

## 2021-01-01 RX ADMIN — GABAPENTIN 800 MG: 400 CAPSULE ORAL at 17:42

## 2021-01-01 RX ADMIN — EZETIMIBE 10 MG: 10 TABLET ORAL at 09:21

## 2021-01-01 RX ADMIN — ARFORMOTEROL TARTRATE 15 MCG: 15 SOLUTION RESPIRATORY (INHALATION) at 16:58

## 2021-01-01 RX ADMIN — HEPARIN SODIUM 3000 UNITS: 1000 INJECTION, SOLUTION INTRAVENOUS; SUBCUTANEOUS at 12:33

## 2021-01-01 RX ADMIN — PANTOPRAZOLE SODIUM 40 MG: 40 TABLET, DELAYED RELEASE ORAL at 08:44

## 2021-01-01 RX ADMIN — GABAPENTIN 800 MG: 400 CAPSULE ORAL at 08:18

## 2021-01-01 RX ADMIN — GABAPENTIN 800 MG: 400 CAPSULE ORAL at 21:20

## 2021-01-01 RX ADMIN — ACETAMINOPHEN 650 MG: 325 TABLET ORAL at 05:27

## 2021-01-01 RX ADMIN — DIPHENHYDRAMINE HYDROCHLORIDE 50 MG: 50 INJECTION, SOLUTION INTRAMUSCULAR; INTRAVENOUS at 14:54

## 2021-01-01 RX ADMIN — CLOPIDOGREL BISULFATE 75 MG: 75 TABLET ORAL at 08:17

## 2021-01-01 RX ADMIN — POTASSIUM CHLORIDE 10 MEQ: 7.46 INJECTION, SOLUTION INTRAVENOUS at 05:30

## 2021-01-01 RX ADMIN — BACLOFEN 5 MG: 10 TABLET ORAL at 17:08

## 2021-01-01 RX ADMIN — CEFDINIR 300 MG: 300 CAPSULE ORAL at 08:44

## 2021-01-01 RX ADMIN — VANCOMYCIN HYDROCHLORIDE 750 MG: 750 INJECTION, POWDER, LYOPHILIZED, FOR SOLUTION INTRAVENOUS at 21:33

## 2021-01-01 RX ADMIN — IOPAMIDOL 90 ML: 755 INJECTION, SOLUTION INTRAVENOUS at 15:32

## 2021-01-01 RX ADMIN — THERA TABS 1 TABLET: TAB at 08:18

## 2021-01-01 RX ADMIN — BUDESONIDE 500 MCG: 0.5 SUSPENSION RESPIRATORY (INHALATION) at 14:07

## 2021-01-01 RX ADMIN — EZETIMIBE 10 MG: 10 TABLET ORAL at 08:43

## 2021-01-01 RX ADMIN — PANTOPRAZOLE SODIUM 40 MG: 40 TABLET, DELAYED RELEASE ORAL at 09:37

## 2021-01-01 RX ADMIN — PHENYLEPHRINE HYDROCHLORIDE 100 MCG: 10 INJECTION INTRAVENOUS at 14:39

## 2021-01-01 RX ADMIN — EZETIMIBE 10 MG: 10 TABLET ORAL at 20:38

## 2021-01-01 RX ADMIN — FENTANYL CITRATE 50 MCG: 50 INJECTION, SOLUTION INTRAMUSCULAR; INTRAVENOUS at 14:47

## 2021-01-01 RX ADMIN — ARFORMOTEROL TARTRATE 15 MCG: 15 SOLUTION RESPIRATORY (INHALATION) at 19:07

## 2021-01-01 RX ADMIN — HEPARIN SODIUM 3000 UNITS: 1000 INJECTION, SOLUTION INTRAVENOUS; SUBCUTANEOUS at 14:00

## 2021-01-01 RX ADMIN — FOLIC ACID 1 MG: 1 TABLET ORAL at 09:08

## 2021-01-01 RX ADMIN — GABAPENTIN 800 MG: 400 CAPSULE ORAL at 21:04

## 2021-01-01 RX ADMIN — PANTOPRAZOLE SODIUM 40 MG: 40 TABLET, DELAYED RELEASE ORAL at 18:29

## 2021-01-01 RX ADMIN — SODIUM CHLORIDE 75 ML/HR: 9 INJECTION, SOLUTION INTRAVENOUS at 16:10

## 2021-01-01 RX ADMIN — ACETAMINOPHEN 650 MG: 325 TABLET ORAL at 12:07

## 2021-01-01 RX ADMIN — FENTANYL CITRATE 50 MCG: 50 INJECTION, SOLUTION INTRAMUSCULAR; INTRAVENOUS at 17:59

## 2021-01-01 RX ADMIN — ARFORMOTEROL TARTRATE 15 MCG: 15 SOLUTION RESPIRATORY (INHALATION) at 06:40

## 2021-01-01 RX ADMIN — SODIUM CHLORIDE, PRESERVATIVE FREE 10 ML: 5 INJECTION INTRAVENOUS at 22:32

## 2021-01-01 RX ADMIN — POTASSIUM CHLORIDE 10 MEQ: 7.46 INJECTION, SOLUTION INTRAVENOUS at 04:34

## 2021-01-01 RX ADMIN — EZETIMIBE 10 MG: 10 TABLET ORAL at 08:00

## 2021-01-01 RX ADMIN — SODIUM CHLORIDE, PRESERVATIVE FREE 10 ML: 5 INJECTION INTRAVENOUS at 08:48

## 2021-01-01 RX ADMIN — EZETIMIBE 10 MG: 10 TABLET ORAL at 18:29

## 2021-01-01 RX ADMIN — OXYCODONE 10 MG: 5 TABLET ORAL at 06:57

## 2021-01-01 RX ADMIN — PANTOPRAZOLE SODIUM 40 MG: 40 TABLET, DELAYED RELEASE ORAL at 08:00

## 2021-01-01 RX ADMIN — MORPHINE SULFATE 2 MG: 4 INJECTION, SOLUTION INTRAMUSCULAR; INTRAVENOUS at 16:46

## 2021-01-01 RX ADMIN — GABAPENTIN 800 MG: 400 CAPSULE ORAL at 08:39

## 2021-01-01 RX ADMIN — GABAPENTIN 800 MG: 400 CAPSULE ORAL at 19:54

## 2021-01-01 RX ADMIN — SODIUM CHLORIDE, PRESERVATIVE FREE 10 ML: 5 INJECTION INTRAVENOUS at 09:49

## 2021-01-01 RX ADMIN — CEFDINIR 300 MG: 300 CAPSULE ORAL at 22:29

## 2021-01-01 RX ADMIN — BUDESONIDE 500 MCG: 0.5 SUSPENSION RESPIRATORY (INHALATION) at 07:06

## 2021-01-01 RX ADMIN — METHYLPREDNISOLONE SODIUM SUCCINATE 125 MG: 125 INJECTION, POWDER, LYOPHILIZED, FOR SOLUTION INTRAMUSCULAR; INTRAVENOUS at 22:09

## 2021-01-01 RX ADMIN — BUDESONIDE 500 MCG: 0.5 SUSPENSION RESPIRATORY (INHALATION) at 19:25

## 2021-01-01 RX ADMIN — FENTANYL CITRATE 50 MCG: 50 INJECTION, SOLUTION INTRAMUSCULAR; INTRAVENOUS at 11:58

## 2021-01-01 RX ADMIN — Medication 2000 MG: at 21:03

## 2021-01-01 RX ADMIN — ALPRAZOLAM 0.5 MG: 0.5 TABLET ORAL at 20:50

## 2021-01-01 RX ADMIN — PROTAMINE SULFATE 40 MG: 10 INJECTION, SOLUTION INTRAVENOUS at 16:18

## 2021-01-01 RX ADMIN — EZETIMIBE 10 MG: 10 TABLET ORAL at 09:30

## 2021-01-01 RX ADMIN — HYDROMORPHONE HYDROCHLORIDE 0.25 MG: 1 INJECTION, SOLUTION INTRAMUSCULAR; INTRAVENOUS; SUBCUTANEOUS at 18:26

## 2021-01-01 RX ADMIN — METHYLPREDNISOLONE SODIUM SUCCINATE 40 MG: 40 INJECTION, POWDER, FOR SOLUTION INTRAMUSCULAR; INTRAVENOUS at 22:51

## 2021-01-01 RX ADMIN — Medication 2000 MG: at 04:11

## 2021-01-01 RX ADMIN — Medication 2000 MG: at 12:07

## 2021-01-01 RX ADMIN — ASPIRIN 325 MG: 325 TABLET, COATED ORAL at 21:03

## 2021-01-01 RX ADMIN — ASPIRIN 325 MG: 325 TABLET, COATED ORAL at 09:19

## 2021-01-01 RX ADMIN — SODIUM CHLORIDE, PRESERVATIVE FREE 10 ML: 5 INJECTION INTRAVENOUS at 09:31

## 2021-01-01 RX ADMIN — BUDESONIDE 500 MCG: 0.5 SUSPENSION RESPIRATORY (INHALATION) at 07:15

## 2021-01-01 RX ADMIN — ACETAMINOPHEN 650 MG: 325 TABLET ORAL at 17:53

## 2021-01-01 RX ADMIN — FENTANYL CITRATE 50 MCG: 50 INJECTION, SOLUTION INTRAMUSCULAR; INTRAVENOUS at 14:36

## 2021-01-01 RX ADMIN — HYDROXYCHLOROQUINE SULFATE 400 MG: 200 TABLET ORAL at 09:40

## 2021-01-01 RX ADMIN — PROPOFOL 100 MG: 10 INJECTION, EMULSION INTRAVENOUS at 14:46

## 2021-01-01 RX ADMIN — SODIUM CHLORIDE: 9 INJECTION, SOLUTION INTRAVENOUS at 03:35

## 2021-01-01 RX ADMIN — ROCURONIUM BROMIDE 50 MG: 10 INJECTION, SOLUTION INTRAVENOUS at 14:46

## 2021-01-01 RX ADMIN — DOCUSATE SODIUM 50 MG AND SENNOSIDES 8.6 MG 1 TABLET: 8.6; 5 TABLET, FILM COATED ORAL at 21:02

## 2021-01-01 RX ADMIN — POTASSIUM CHLORIDE 10 MEQ: 7.46 INJECTION, SOLUTION INTRAVENOUS at 07:47

## 2021-01-01 RX ADMIN — SODIUM CHLORIDE: 9 INJECTION, SOLUTION INTRAVENOUS at 10:30

## 2021-01-01 RX ADMIN — BACLOFEN 5 MG: 10 TABLET ORAL at 21:03

## 2021-01-01 RX ADMIN — OXYCODONE 10 MG: 5 TABLET ORAL at 08:39

## 2021-01-01 RX ADMIN — Medication 2000 MG: at 04:51

## 2021-01-01 RX ADMIN — ARFORMOTEROL TARTRATE 15 MCG: 15 SOLUTION RESPIRATORY (INHALATION) at 19:25

## 2021-01-01 RX ADMIN — SODIUM CHLORIDE, PRESERVATIVE FREE 10 ML: 5 INJECTION INTRAVENOUS at 08:44

## 2021-01-01 RX ADMIN — HYDROXYCHLOROQUINE SULFATE 200 MG: 200 TABLET ORAL at 09:37

## 2021-01-01 RX ADMIN — HYDROXYCHLOROQUINE SULFATE 400 MG: 200 TABLET ORAL at 09:08

## 2021-01-01 RX ADMIN — THERA TABS 1 TABLET: TAB at 07:51

## 2021-01-01 RX ADMIN — ASPIRIN 325 MG: 325 TABLET, COATED ORAL at 09:40

## 2021-01-01 RX ADMIN — SODIUM CHLORIDE, PRESERVATIVE FREE 10 ML: 5 INJECTION INTRAVENOUS at 11:10

## 2021-01-01 RX ADMIN — OXYCODONE HYDROCHLORIDE AND ACETAMINOPHEN 1 TABLET: 7.5; 325 TABLET ORAL at 18:12

## 2021-01-01 RX ADMIN — ACETAMINOPHEN 650 MG: 325 TABLET ORAL at 18:22

## 2021-01-01 RX ADMIN — LABETALOL 20 MG/4 ML (5 MG/ML) INTRAVENOUS SYRINGE 5 MG: at 13:08

## 2021-01-01 RX ADMIN — OXYCODONE HYDROCHLORIDE AND ACETAMINOPHEN 1 TABLET: 7.5; 325 TABLET ORAL at 01:57

## 2021-01-01 RX ADMIN — GABAPENTIN 800 MG: 400 CAPSULE ORAL at 23:34

## 2021-01-01 RX ADMIN — SODIUM CHLORIDE, PRESERVATIVE FREE 10 ML: 5 INJECTION INTRAVENOUS at 21:26

## 2021-01-01 RX ADMIN — POTASSIUM CHLORIDE 40 MEQ: 1500 TABLET, EXTENDED RELEASE ORAL at 07:56

## 2021-01-01 RX ADMIN — GABAPENTIN 800 MG: 400 CAPSULE ORAL at 16:36

## 2021-01-01 RX ADMIN — FUROSEMIDE 40 MG: 10 INJECTION, SOLUTION INTRAMUSCULAR; INTRAVENOUS at 22:08

## 2021-01-01 RX ADMIN — HYDROMORPHONE HYDROCHLORIDE 1 MG: 1 INJECTION, SOLUTION INTRAMUSCULAR; INTRAVENOUS; SUBCUTANEOUS at 01:27

## 2021-01-01 RX ADMIN — FENTANYL CITRATE 50 MCG: 50 INJECTION, SOLUTION INTRAMUSCULAR; INTRAVENOUS at 18:17

## 2021-01-01 RX ADMIN — GABAPENTIN 800 MG: 400 CAPSULE ORAL at 20:57

## 2021-01-01 RX ADMIN — PHENYLEPHRINE HYDROCHLORIDE 100 MCG: 10 INJECTION INTRAVENOUS at 14:50

## 2021-01-01 RX ADMIN — METHYLPREDNISOLONE SODIUM SUCCINATE 125 MG: 125 INJECTION, POWDER, FOR SOLUTION INTRAMUSCULAR; INTRAVENOUS at 22:09

## 2021-01-01 RX ADMIN — IPRATROPIUM BROMIDE AND ALBUTEROL SULFATE 1 AMPULE: .5; 3 SOLUTION RESPIRATORY (INHALATION) at 06:57

## 2021-01-01 RX ADMIN — GABAPENTIN 800 MG: 400 CAPSULE ORAL at 23:03

## 2021-01-01 RX ADMIN — EZETIMIBE 10 MG: 10 TABLET ORAL at 08:39

## 2021-01-01 RX ADMIN — OXYCODONE HYDROCHLORIDE AND ACETAMINOPHEN 1 TABLET: 7.5; 325 TABLET ORAL at 18:06

## 2021-01-01 RX ADMIN — BUDESONIDE 500 MCG: 0.5 SUSPENSION RESPIRATORY (INHALATION) at 06:06

## 2021-01-01 RX ADMIN — FOLIC ACID 1 MG: 1 TABLET ORAL at 09:21

## 2021-01-01 RX ADMIN — IPRATROPIUM BROMIDE AND ALBUTEROL SULFATE 1 AMPULE: .5; 3 SOLUTION RESPIRATORY (INHALATION) at 19:45

## 2021-01-01 RX ADMIN — OXYCODONE 10 MG: 5 TABLET ORAL at 11:26

## 2021-01-01 RX ADMIN — MORPHINE SULFATE 2 MG: 4 INJECTION, SOLUTION INTRAMUSCULAR; INTRAVENOUS at 13:07

## 2021-01-01 RX ADMIN — FENTANYL CITRATE 25 MCG: 50 INJECTION, SOLUTION INTRAMUSCULAR; INTRAVENOUS at 17:38

## 2021-01-01 RX ADMIN — HYDROXYZINE PAMOATE 25 MG: 25 CAPSULE ORAL at 11:50

## 2021-01-01 RX ADMIN — GABAPENTIN 800 MG: 400 CAPSULE ORAL at 09:30

## 2021-01-01 RX ADMIN — DOCUSATE SODIUM 50 MG AND SENNOSIDES 8.6 MG 1 TABLET: 8.6; 5 TABLET, FILM COATED ORAL at 08:39

## 2021-01-01 RX ADMIN — Medication 2000 MG: at 20:56

## 2021-01-01 RX ADMIN — ONDANSETRON HYDROCHLORIDE 4 MG: 2 INJECTION, SOLUTION INTRAMUSCULAR; INTRAVENOUS at 14:10

## 2021-01-01 RX ADMIN — ACETAMINOPHEN 650 MG: 325 TABLET ORAL at 13:06

## 2021-01-01 RX ADMIN — LORAZEPAM 1 MG: 2 INJECTION INTRAMUSCULAR; INTRAVENOUS at 04:26

## 2021-01-01 RX ADMIN — IPRATROPIUM BROMIDE AND ALBUTEROL SULFATE 1 AMPULE: .5; 3 SOLUTION RESPIRATORY (INHALATION) at 06:51

## 2021-01-01 RX ADMIN — CEFAZOLIN 2000 MG: 1 INJECTION, POWDER, FOR SOLUTION INTRAMUSCULAR; INTRAVENOUS; PARENTERAL at 12:00

## 2021-01-01 RX ADMIN — HYDROXYCHLOROQUINE SULFATE 400 MG: 200 TABLET ORAL at 09:30

## 2021-01-01 RX ADMIN — BUDESONIDE 500 MCG: 0.5 SUSPENSION RESPIRATORY (INHALATION) at 06:52

## 2021-01-01 RX ADMIN — DOCUSATE SODIUM 50 MG AND SENNOSIDES 8.6 MG 1 TABLET: 8.6; 5 TABLET, FILM COATED ORAL at 09:21

## 2021-01-01 RX ADMIN — ASPIRIN 325 MG: 325 TABLET, COATED ORAL at 21:24

## 2021-01-01 RX ADMIN — OXYCODONE HYDROCHLORIDE AND ACETAMINOPHEN 1 TABLET: 7.5; 325 TABLET ORAL at 16:20

## 2021-01-01 RX ADMIN — FENTANYL CITRATE 50 MCG: 50 INJECTION, SOLUTION INTRAMUSCULAR; INTRAVENOUS at 11:36

## 2021-01-01 RX ADMIN — EZETIMIBE 10 MG: 10 TABLET ORAL at 08:44

## 2021-01-01 RX ADMIN — ASPIRIN 81 MG: 81 TABLET, CHEWABLE ORAL at 09:36

## 2021-01-01 RX ADMIN — CEFDINIR 300 MG: 300 CAPSULE ORAL at 20:50

## 2021-01-01 RX ADMIN — CEFDINIR 300 MG: 300 CAPSULE ORAL at 21:19

## 2021-01-01 RX ADMIN — SODIUM CHLORIDE, PRESERVATIVE FREE 10 ML: 5 INJECTION INTRAVENOUS at 11:43

## 2021-01-01 RX ADMIN — LIDOCAINE HYDROCHLORIDE: 20 SOLUTION ORAL; TOPICAL at 15:42

## 2021-01-01 RX ADMIN — SODIUM CHLORIDE: 9 INJECTION, SOLUTION INTRAVENOUS at 11:02

## 2021-01-01 RX ADMIN — GABAPENTIN 800 MG: 400 CAPSULE ORAL at 20:44

## 2021-01-01 RX ADMIN — Medication 2000 MG: at 11:36

## 2021-01-01 RX ADMIN — OXYCODONE 10 MG: 5 TABLET ORAL at 02:58

## 2021-01-01 RX ADMIN — PHENYLEPHRINE HYDROCHLORIDE 100 MCG: 10 INJECTION INTRAVENOUS at 14:34

## 2021-01-01 RX ADMIN — HYDROMORPHONE HYDROCHLORIDE 1 MG: 1 INJECTION, SOLUTION INTRAMUSCULAR; INTRAVENOUS; SUBCUTANEOUS at 09:28

## 2021-01-01 RX ADMIN — PANTOPRAZOLE SODIUM 40 MG: 40 TABLET, DELAYED RELEASE ORAL at 09:44

## 2021-01-01 RX ADMIN — SODIUM CHLORIDE, PRESERVATIVE FREE 10 ML: 5 INJECTION INTRAVENOUS at 19:54

## 2021-01-01 RX ADMIN — Medication 2000 MG: at 23:26

## 2021-01-01 RX ADMIN — THERA TABS 1 TABLET: TAB at 09:40

## 2021-01-01 RX ADMIN — BUDESONIDE 500 MCG: 0.5 SUSPENSION RESPIRATORY (INHALATION) at 07:00

## 2021-01-01 RX ADMIN — FAMOTIDINE 20 MG: 20 TABLET ORAL at 11:06

## 2021-01-01 RX ADMIN — Medication 2000 MG: at 20:44

## 2021-01-01 RX ADMIN — ARFORMOTEROL TARTRATE 15 MCG: 15 SOLUTION RESPIRATORY (INHALATION) at 06:20

## 2021-01-01 RX ADMIN — THERA TABS 1 TABLET: TAB at 11:34

## 2021-01-01 RX ADMIN — GABAPENTIN 800 MG: 400 CAPSULE ORAL at 22:14

## 2021-01-01 RX ADMIN — ARFORMOTEROL TARTRATE 15 MCG: 15 SOLUTION RESPIRATORY (INHALATION) at 14:07

## 2021-01-01 RX ADMIN — IOPAMIDOL 250 ML: 612 INJECTION, SOLUTION INTRAVENOUS at 15:43

## 2021-01-01 RX ADMIN — HYDROXYCHLOROQUINE SULFATE 400 MG: 200 TABLET ORAL at 09:19

## 2021-01-01 RX ADMIN — FUROSEMIDE 20 MG: 20 TABLET ORAL at 09:46

## 2021-01-01 RX ADMIN — THERA TABS 1 TABLET: TAB at 08:39

## 2021-01-01 RX ADMIN — LIDOCAINE HYDROCHLORIDE 50 MG: 10 INJECTION, SOLUTION EPIDURAL; INFILTRATION; INTRACAUDAL; PERINEURAL at 11:44

## 2021-01-01 RX ADMIN — BUDESONIDE 500 MCG: 0.5 SUSPENSION RESPIRATORY (INHALATION) at 06:26

## 2021-01-01 RX ADMIN — ARFORMOTEROL TARTRATE 15 MCG: 15 SOLUTION RESPIRATORY (INHALATION) at 17:48

## 2021-01-01 RX ADMIN — Medication 10 ML: at 12:59

## 2021-01-01 RX ADMIN — SODIUM CHLORIDE, PRESERVATIVE FREE 10 ML: 5 INJECTION INTRAVENOUS at 08:41

## 2021-01-01 RX ADMIN — THERA TABS 1 TABLET: TAB at 09:37

## 2021-01-01 RX ADMIN — POTASSIUM CHLORIDE 40 MEQ: 1500 TABLET, EXTENDED RELEASE ORAL at 17:22

## 2021-01-01 RX ADMIN — CLOPIDOGREL BISULFATE 75 MG: 75 TABLET ORAL at 09:45

## 2021-01-01 RX ADMIN — ACETAMINOPHEN 650 MG: 325 TABLET ORAL at 00:21

## 2021-01-01 RX ADMIN — GABAPENTIN 800 MG: 400 CAPSULE ORAL at 13:07

## 2021-01-01 RX ADMIN — CLOPIDOGREL BISULFATE 75 MG: 75 TABLET ORAL at 09:37

## 2021-01-01 RX ADMIN — GABAPENTIN 800 MG: 400 CAPSULE ORAL at 21:22

## 2021-01-01 RX ADMIN — DOCUSATE SODIUM 50 MG AND SENNOSIDES 8.6 MG 1 TABLET: 8.6; 5 TABLET, FILM COATED ORAL at 07:51

## 2021-01-01 RX ADMIN — EZETIMIBE 10 MG: 10 TABLET ORAL at 09:40

## 2021-01-01 RX ADMIN — SODIUM CHLORIDE, SODIUM LACTATE, POTASSIUM CHLORIDE, AND CALCIUM CHLORIDE: 600; 310; 30; 20 INJECTION, SOLUTION INTRAVENOUS at 14:18

## 2021-01-01 RX ADMIN — SODIUM CHLORIDE: 9 INJECTION, SOLUTION INTRAVENOUS at 12:19

## 2021-01-01 RX ADMIN — FUROSEMIDE 20 MG: 40 TABLET ORAL at 08:18

## 2021-01-01 RX ADMIN — ONDANSETRON HYDROCHLORIDE 4 MG: 2 SOLUTION INTRAMUSCULAR; INTRAVENOUS at 14:34

## 2021-01-01 RX ADMIN — EZETIMIBE 10 MG: 10 TABLET ORAL at 08:17

## 2021-01-01 RX ADMIN — VANCOMYCIN HYDROCHLORIDE 750 MG: 750 INJECTION, POWDER, LYOPHILIZED, FOR SOLUTION INTRAVENOUS at 09:34

## 2021-01-01 RX ADMIN — ASPIRIN 81 MG: 81 TABLET, CHEWABLE ORAL at 09:45

## 2021-01-01 RX ADMIN — GABAPENTIN 800 MG: 400 CAPSULE ORAL at 09:09

## 2021-01-01 RX ADMIN — OXYCODONE HYDROCHLORIDE AND ACETAMINOPHEN 1 TABLET: 7.5; 325 TABLET ORAL at 21:53

## 2021-01-01 RX ADMIN — OXYCODONE HYDROCHLORIDE AND ACETAMINOPHEN 1 TABLET: 7.5; 325 TABLET ORAL at 22:46

## 2021-01-01 RX ADMIN — SUGAMMADEX 300 MG: 100 INJECTION, SOLUTION INTRAVENOUS at 14:10

## 2021-01-01 RX ADMIN — SODIUM CHLORIDE: 9 INJECTION, SOLUTION INTRAVENOUS at 18:30

## 2021-01-01 RX ADMIN — LORAZEPAM 0.5 MG: 2 INJECTION INTRAMUSCULAR; INTRAVENOUS at 19:16

## 2021-01-01 RX ADMIN — CEFEPIME HYDROCHLORIDE 2000 MG: 2 INJECTION, POWDER, FOR SOLUTION INTRAVENOUS at 15:17

## 2021-01-01 RX ADMIN — THIAMINE HYDROCHLORIDE 100 MG: 100 INJECTION, SOLUTION INTRAMUSCULAR; INTRAVENOUS at 07:53

## 2021-01-01 RX ADMIN — FUROSEMIDE 20 MG: 20 TABLET ORAL at 08:39

## 2021-01-01 RX ADMIN — ASPIRIN 81 MG: 81 TABLET, CHEWABLE ORAL at 11:33

## 2021-01-01 RX ADMIN — HYDROXYCHLOROQUINE SULFATE 200 MG: 200 TABLET ORAL at 21:34

## 2021-01-01 RX ADMIN — LIDOCAINE HYDROCHLORIDE 50 MG: 10 INJECTION, SOLUTION INFILTRATION; PERINEURAL at 14:46

## 2021-01-01 RX ADMIN — MORPHINE SULFATE 1 MG: 4 INJECTION, SOLUTION INTRAMUSCULAR; INTRAVENOUS at 22:18

## 2021-01-01 RX ADMIN — PANTOPRAZOLE SODIUM 40 MG: 40 TABLET, DELAYED RELEASE ORAL at 22:29

## 2021-01-01 RX ADMIN — ACETAMINOPHEN 650 MG: 325 TABLET ORAL at 11:20

## 2021-01-01 RX ADMIN — ACETAMINOPHEN 650 MG: 325 TABLET ORAL at 06:11

## 2021-01-01 RX ADMIN — ACETAMINOPHEN 650 MG: 325 TABLET ORAL at 12:37

## 2021-01-01 RX ADMIN — Medication 2000 MG: at 22:27

## 2021-01-01 RX ADMIN — OXYCODONE HYDROCHLORIDE AND ACETAMINOPHEN 1 TABLET: 7.5; 325 TABLET ORAL at 13:55

## 2021-01-01 RX ADMIN — GABAPENTIN 800 MG: 400 CAPSULE ORAL at 13:33

## 2021-01-01 RX ADMIN — DIPHENHYDRAMINE HYDROCHLORIDE 12.5 MG: 50 INJECTION, SOLUTION INTRAMUSCULAR; INTRAVENOUS at 03:44

## 2021-01-01 RX ADMIN — FUROSEMIDE 40 MG: 10 INJECTION, SOLUTION INTRAMUSCULAR; INTRAVENOUS at 13:28

## 2021-01-01 RX ADMIN — IPRATROPIUM BROMIDE AND ALBUTEROL SULFATE 1 AMPULE: .5; 3 SOLUTION RESPIRATORY (INHALATION) at 14:37

## 2021-01-01 RX ADMIN — Medication 2000 MG: at 23:03

## 2021-01-01 RX ADMIN — GABAPENTIN 800 MG: 400 CAPSULE ORAL at 09:36

## 2021-01-01 RX ADMIN — EZETIMIBE 10 MG: 10 TABLET ORAL at 11:32

## 2021-01-01 RX ADMIN — THERA TABS 1 TABLET: TAB at 09:30

## 2021-01-01 RX ADMIN — ACETAMINOPHEN 650 MG: 325 TABLET ORAL at 23:34

## 2021-01-01 RX ADMIN — THIAMINE HYDROCHLORIDE 100 MG: 100 INJECTION, SOLUTION INTRAMUSCULAR; INTRAVENOUS at 07:51

## 2021-01-01 RX ADMIN — FUROSEMIDE 40 MG: 10 INJECTION INTRAMUSCULAR; INTRAVENOUS at 22:08

## 2021-01-01 RX ADMIN — Medication 2000 MG: at 04:49

## 2021-01-01 RX ADMIN — OXYCODONE HYDROCHLORIDE AND ACETAMINOPHEN 1 TABLET: 7.5; 325 TABLET ORAL at 04:02

## 2021-01-01 RX ADMIN — FUROSEMIDE 40 MG: 10 INJECTION, SOLUTION INTRAMUSCULAR; INTRAVENOUS at 22:29

## 2021-01-01 RX ADMIN — FOLIC ACID 1 MG: 1 TABLET ORAL at 09:37

## 2021-01-01 RX ADMIN — PROPOFOL 120 MCG/KG/MIN: 10 INJECTION, EMULSION INTRAVENOUS at 11:38

## 2021-01-01 RX ADMIN — SODIUM CHLORIDE, PRESERVATIVE FREE 10 ML: 5 INJECTION INTRAVENOUS at 09:25

## 2021-01-01 RX ADMIN — HYDROMORPHONE HYDROCHLORIDE 0.5 MG: 1 INJECTION, SOLUTION INTRAMUSCULAR; INTRAVENOUS; SUBCUTANEOUS at 06:46

## 2021-01-01 RX ADMIN — OXYCODONE HYDROCHLORIDE AND ACETAMINOPHEN 1 TABLET: 7.5; 325 TABLET ORAL at 11:36

## 2021-01-01 RX ADMIN — CEFEPIME 1000 MG: 1 INJECTION, POWDER, FOR SOLUTION INTRAMUSCULAR; INTRAVENOUS at 16:18

## 2021-01-01 SDOH — HEALTH STABILITY: MENTAL HEALTH: HOW OFTEN DO YOU HAVE A DRINK CONTAINING ALCOHOL?: NOT ASKED

## 2021-01-01 ASSESSMENT — PAIN DESCRIPTION - ONSET
ONSET: ON-GOING

## 2021-01-01 ASSESSMENT — ENCOUNTER SYMPTOMS
NAUSEA: 0
ABDOMINAL PAIN: 0
WHEEZING: 0
CONSTIPATION: 0
WHEEZING: 0
SORE THROAT: 0
EYE PAIN: 0
ABDOMINAL DISTENTION: 0
DIARRHEA: 0
SHORTNESS OF BREATH: 1
COLOR CHANGE: 0
ABDOMINAL PAIN: 0
FACIAL SWELLING: 0
NAUSEA: 0
SHORTNESS OF BREATH: 1
EYE PAIN: 0
SHORTNESS OF BREATH: 1
SHORTNESS OF BREATH: 1
ABDOMINAL PAIN: 0
COUGH: 0
NAUSEA: 0
COUGH: 0
VOMITING: 0
COLOR CHANGE: 0
NAUSEA: 0
ABDOMINAL DISTENTION: 0
PHOTOPHOBIA: 0
VOMITING: 0
SHORTNESS OF BREATH: 1
CHEST TIGHTNESS: 0
WHEEZING: 0
EYE PAIN: 0
ABDOMINAL PAIN: 0
EYE ITCHING: 0
SHORTNESS OF BREATH: 0
FACIAL SWELLING: 0
DIARRHEA: 0
CONSTIPATION: 0
BACK PAIN: 0
ABDOMINAL PAIN: 0
WHEEZING: 0
COUGH: 0
FACIAL SWELLING: 0
DIARRHEA: 0
ABDOMINAL PAIN: 0
VOMITING: 0
DIARRHEA: 0
SHORTNESS OF BREATH: 1
ABDOMINAL PAIN: 0
PHOTOPHOBIA: 0
VOMITING: 0
PHOTOPHOBIA: 0
COUGH: 0
ABDOMINAL PAIN: 0
SHORTNESS OF BREATH: 1
ABDOMINAL DISTENTION: 0
SHORTNESS OF BREATH: 1
NAUSEA: 0
COUGH: 0
VOMITING: 0
WHEEZING: 0
COUGH: 0
SHORTNESS OF BREATH: 1
NAUSEA: 0
COUGH: 0
SHORTNESS OF BREATH: 1
ABDOMINAL PAIN: 0
EYE ITCHING: 0
VOMITING: 0
NAUSEA: 0
CHEST TIGHTNESS: 1
SORE THROAT: 0
FACIAL SWELLING: 0
ABDOMINAL DISTENTION: 0
WHEEZING: 0
CONSTIPATION: 0
EYE PAIN: 0
ABDOMINAL DISTENTION: 0
NAUSEA: 0
COUGH: 1
CONSTIPATION: 0
WHEEZING: 0
SHORTNESS OF BREATH: 0
COUGH: 1
NAUSEA: 0
SINUS PAIN: 0
WHEEZING: 0
DIARRHEA: 0
VOMITING: 0
COUGH: 0
NAUSEA: 0
SINUS PRESSURE: 0
SHORTNESS OF BREATH: 1
EYE REDNESS: 0
CHEST TIGHTNESS: 0
WHEEZING: 0
SORE THROAT: 0
ABDOMINAL PAIN: 0
COUGH: 1
EYE REDNESS: 0
COUGH: 0
VOMITING: 0
BACK PAIN: 0
GASTROINTESTINAL NEGATIVE: 1
COUGH: 0
CHEST TIGHTNESS: 0
SHORTNESS OF BREATH: 1
SINUS PRESSURE: 0
NAUSEA: 0
SHORTNESS OF BREATH: 1
NAUSEA: 1
PHOTOPHOBIA: 0
VOMITING: 0
SHORTNESS OF BREATH: 1
COUGH: 0
DIARRHEA: 0
EYE REDNESS: 0
APNEA: 0
NAUSEA: 0
VOMITING: 0
COUGH: 0
BLOOD IN STOOL: 0
SINUS PAIN: 0
CHEST TIGHTNESS: 0
ABDOMINAL DISTENTION: 0
EYE DISCHARGE: 0
SHORTNESS OF BREATH: 1
VOMITING: 0
WHEEZING: 0
WHEEZING: 0
CHEST TIGHTNESS: 0
NAUSEA: 0
PHOTOPHOBIA: 0
DIARRHEA: 0
ABDOMINAL PAIN: 0
BLOOD IN STOOL: 0
COUGH: 0
WHEEZING: 0
DIARRHEA: 0
CONSTIPATION: 0
DYSPNEA ACTIVITY LEVEL: AFTER AMBULATING 1 FLIGHT OF STAIRS
BLOOD IN STOOL: 0
CONSTIPATION: 0
EYE REDNESS: 0
FACIAL SWELLING: 0
SINUS PRESSURE: 0
COUGH: 0
NAUSEA: 0
ABDOMINAL PAIN: 0
COLOR CHANGE: 0
FACIAL SWELLING: 0
WHEEZING: 0
CHEST TIGHTNESS: 0
CHEST TIGHTNESS: 0
EYE ITCHING: 0
ABDOMINAL DISTENTION: 0
SORE THROAT: 0
ABDOMINAL DISTENTION: 0
ABDOMINAL DISTENTION: 0
CHEST TIGHTNESS: 0
ABDOMINAL PAIN: 0
NAUSEA: 0
SINUS PRESSURE: 0
EYE REDNESS: 0
SINUS PAIN: 0
ABDOMINAL DISTENTION: 0
COLOR CHANGE: 0
VOMITING: 0
COUGH: 0
ABDOMINAL PAIN: 0
EYE ITCHING: 0
BLOOD IN STOOL: 0
SHORTNESS OF BREATH: 1
DIARRHEA: 0
EYE REDNESS: 0
WHEEZING: 0
COUGH: 0
EYE ITCHING: 0
BLOOD IN STOOL: 0
RHINORRHEA: 0
BACK PAIN: 0
SHORTNESS OF BREATH: 1
VOMITING: 0
BLOOD IN STOOL: 0
CHEST TIGHTNESS: 0
DYSPNEA ACTIVITY LEVEL: AFTER AMBULATING 1 FLIGHT OF STAIRS
WHEEZING: 0
SINUS PAIN: 0
EYE ITCHING: 0
DIARRHEA: 0
ABDOMINAL PAIN: 0
CHEST TIGHTNESS: 0
FACIAL SWELLING: 0
ABDOMINAL PAIN: 0
WHEEZING: 0
NAUSEA: 0
SHORTNESS OF BREATH: 1
WHEEZING: 0
CHEST TIGHTNESS: 0
VOMITING: 0
ABDOMINAL PAIN: 0
ABDOMINAL PAIN: 0
SORE THROAT: 0
ABDOMINAL PAIN: 0
COLOR CHANGE: 0
BACK PAIN: 0
DIARRHEA: 0
SINUS PRESSURE: 0
PHOTOPHOBIA: 0
SHORTNESS OF BREATH: 1
EYE REDNESS: 0
DIARRHEA: 0
CHEST TIGHTNESS: 0
VOMITING: 0
EYE PAIN: 0
SHORTNESS OF BREATH: 1
WHEEZING: 0
CHEST TIGHTNESS: 0
VOMITING: 0
PHOTOPHOBIA: 0
ABDOMINAL DISTENTION: 0
GASTROINTESTINAL NEGATIVE: 1
SINUS PAIN: 0

## 2021-01-01 ASSESSMENT — PAIN DESCRIPTION - PAIN TYPE
TYPE: ACUTE PAIN;SURGICAL PAIN
TYPE: SURGICAL PAIN
TYPE: CHRONIC PAIN
TYPE: ACUTE PAIN;SURGICAL PAIN
TYPE: SURGICAL PAIN
TYPE: CHRONIC PAIN
TYPE: SURGICAL PAIN
TYPE: ACUTE PAIN;SURGICAL PAIN
TYPE: SURGICAL PAIN
TYPE: ACUTE PAIN

## 2021-01-01 ASSESSMENT — PAIN DESCRIPTION - LOCATION
LOCATION: KNEE
LOCATION: BACK
LOCATION: KNEE
LOCATION: BACK
LOCATION: KNEE
LOCATION: KNEE;SHOULDER
LOCATION: KNEE
LOCATION: KNEE

## 2021-01-01 ASSESSMENT — PAIN DESCRIPTION - FREQUENCY
FREQUENCY: INTERMITTENT
FREQUENCY: CONTINUOUS
FREQUENCY: INTERMITTENT
FREQUENCY: CONTINUOUS

## 2021-01-01 ASSESSMENT — PAIN DESCRIPTION - PROGRESSION
CLINICAL_PROGRESSION: GRADUALLY IMPROVING
CLINICAL_PROGRESSION: GRADUALLY IMPROVING
CLINICAL_PROGRESSION: NOT CHANGED
CLINICAL_PROGRESSION: GRADUALLY IMPROVING
CLINICAL_PROGRESSION: GRADUALLY IMPROVING
CLINICAL_PROGRESSION: NOT CHANGED
CLINICAL_PROGRESSION: GRADUALLY IMPROVING

## 2021-01-01 ASSESSMENT — PAIN SCALES - GENERAL
PAINLEVEL_OUTOF10: 6
PAINLEVEL_OUTOF10: 7
PAINLEVEL_OUTOF10: 0
PAINLEVEL_OUTOF10: 7
PAINLEVEL_OUTOF10: 7
PAINLEVEL_OUTOF10: 6
PAINLEVEL_OUTOF10: 7
PAINLEVEL_OUTOF10: 6
PAINLEVEL_OUTOF10: 7
PAINLEVEL_OUTOF10: 4
PAINLEVEL_OUTOF10: 5
PAINLEVEL_OUTOF10: 10
PAINLEVEL_OUTOF10: 4
PAINLEVEL_OUTOF10: 10
PAINLEVEL_OUTOF10: 8
PAINLEVEL_OUTOF10: 0
PAINLEVEL_OUTOF10: 0
PAINLEVEL_OUTOF10: 6
PAINLEVEL_OUTOF10: 0
PAINLEVEL_OUTOF10: 8
PAINLEVEL_OUTOF10: 8
PAINLEVEL_OUTOF10: 0
PAINLEVEL_OUTOF10: 0
PAINLEVEL_OUTOF10: 6
PAINLEVEL_OUTOF10: 7
PAINLEVEL_OUTOF10: 8
PAINLEVEL_OUTOF10: 0
PAINLEVEL_OUTOF10: 7
PAINLEVEL_OUTOF10: 6
PAINLEVEL_OUTOF10: 6
PAINLEVEL_OUTOF10: 7
PAINLEVEL_OUTOF10: 5
PAINLEVEL_OUTOF10: 8
PAINLEVEL_OUTOF10: 5
PAINLEVEL_OUTOF10: 8
PAINLEVEL_OUTOF10: 4
PAINLEVEL_OUTOF10: 7
PAINLEVEL_OUTOF10: 10
PAINLEVEL_OUTOF10: 0
PAINLEVEL_OUTOF10: 6
PAINLEVEL_OUTOF10: 7
PAINLEVEL_OUTOF10: 7
PAINLEVEL_OUTOF10: 4
PAINLEVEL_OUTOF10: 6
PAINLEVEL_OUTOF10: 6
PAINLEVEL_OUTOF10: 4
PAINLEVEL_OUTOF10: 0
PAINLEVEL_OUTOF10: 0
PAINLEVEL_OUTOF10: 6
PAINLEVEL_OUTOF10: 6
PAINLEVEL_OUTOF10: 8
PAINLEVEL_OUTOF10: 0
PAINLEVEL_OUTOF10: 6
PAINLEVEL_OUTOF10: 2
PAINLEVEL_OUTOF10: 7
PAINLEVEL_OUTOF10: 6
PAINLEVEL_OUTOF10: 8
PAINLEVEL_OUTOF10: 0
PAINLEVEL_OUTOF10: 0
PAINLEVEL_OUTOF10: 7
PAINLEVEL_OUTOF10: 7
PAINLEVEL_OUTOF10: 0
PAINLEVEL_OUTOF10: 5
PAINLEVEL_OUTOF10: 0
PAINLEVEL_OUTOF10: 5
PAINLEVEL_OUTOF10: 6
PAINLEVEL_OUTOF10: 8
PAINLEVEL_OUTOF10: 5
PAINLEVEL_OUTOF10: 5
PAINLEVEL_OUTOF10: 6
PAINLEVEL_OUTOF10: 7
PAINLEVEL_OUTOF10: 9
PAINLEVEL_OUTOF10: 7
PAINLEVEL_OUTOF10: 5
PAINLEVEL_OUTOF10: 8
PAINLEVEL_OUTOF10: 7
PAINLEVEL_OUTOF10: 0
PAINLEVEL_OUTOF10: 9
PAINLEVEL_OUTOF10: 9
PAINLEVEL_OUTOF10: 6
PAINLEVEL_OUTOF10: 10
PAINLEVEL_OUTOF10: 8
PAINLEVEL_OUTOF10: 6
PAINLEVEL_OUTOF10: 7
PAINLEVEL_OUTOF10: 4
PAINLEVEL_OUTOF10: 6

## 2021-01-01 ASSESSMENT — PAIN DESCRIPTION - ORIENTATION
ORIENTATION: RIGHT
ORIENTATION: MID
ORIENTATION: RIGHT

## 2021-01-01 ASSESSMENT — LIFESTYLE VARIABLES
HOW OFTEN DURING THE LAST YEAR HAVE YOU NEEDED AN ALCOHOLIC DRINK FIRST THING IN THE MORNING TO GET YOURSELF GOING AFTER A NIGHT OF HEAVY DRINKING: 0
HOW OFTEN DURING THE LAST YEAR HAVE YOU FAILED TO DO WHAT WAS NORMALLY EXPECTED FROM YOU BECAUSE OF DRINKING: 0
AUDIT TOTAL SCORE: 10
SMOKING_STATUS: 0
SMOKING_STATUS: 0

## 2021-01-01 ASSESSMENT — PAIN DESCRIPTION - DESCRIPTORS
DESCRIPTORS: BURNING
DESCRIPTORS: ACHING
DESCRIPTORS: ACHING;TENDER
DESCRIPTORS: ACHING;SORE
DESCRIPTORS: ACHING
DESCRIPTORS: BURNING
DESCRIPTORS: ACHING;DISCOMFORT
DESCRIPTORS: ACHING;SORE
DESCRIPTORS: ACHING;THROBBING
DESCRIPTORS: THROBBING;ACHING
DESCRIPTORS: ACHING

## 2021-01-01 ASSESSMENT — PATIENT HEALTH QUESTIONNAIRE - PHQ9
2. FEELING DOWN, DEPRESSED OR HOPELESS: 0
1. LITTLE INTEREST OR PLEASURE IN DOING THINGS: 0
SUM OF ALL RESPONSES TO PHQ QUESTIONS 1-9: 0

## 2021-01-01 ASSESSMENT — PAIN - FUNCTIONAL ASSESSMENT

## 2021-01-01 ASSESSMENT — COPD QUESTIONNAIRES: CAT_SEVERITY: MODERATE

## 2021-01-01 ASSESSMENT — PAIN SCALES - WONG BAKER
WONGBAKER_NUMERICALRESPONSE: 0
WONGBAKER_NUMERICALRESPONSE: 6
WONGBAKER_NUMERICALRESPONSE: 0

## 2021-01-04 NOTE — PROGRESS NOTES
Medicare Annual Wellness Visit  Name: Chris Garrett Date: 2021   MRN: [id number] Sex: Male   Age: 76 y.o. Ethnicity: Non-/Non    : 1955 Race: Tonie Chao is here for Medicare AWV    Screenings for behavioral, psychosocial and functional/safety risks, and cognitive dysfunction are all negative except as indicated below. These results, as well as other patient data from the [address] form, are documented in Flowsheets linked to this Encounter. Allergies   Allergen Reactions    Codeine Swelling     Lips swelling    Iv [Iodides] Swelling     Contrast dye used for heart cath. Caused face to swell.  Hydrocodone Swelling     lips swelling    Hydrocodone-Acetaminophen Hives    Statins      Myalgias         Prior to Visit Medications    Medication Sig Taking? Authorizing Provider   metoprolol tartrate (LOPRESSOR) 25 MG tablet TAKE 1/2 TABLET BY MOUTH TWICE A DAY Yes SHIMON Marion - CNP   pantoprazole (PROTONIX) 40 MG tablet TAKE 1 TABLET BY MOUTH DAILY TAKE DAILY FIRST THING IN THE MORNING ON AN EMPTY STOMACH. Yes SHIMON Hart   amLODIPine (NORVASC) 5 MG tablet TAKE 1 TABLET BY MOUTH TWICE A DAY Yes SHIMON Ontiveros   magnesium citrate solution 1/2 bottle to be taken and if no Bm repeat 1/2 bottle next day. Yes SHIMON Del Cid   ezetimibe (ZETIA) 10 MG tablet TAKE 1 TABLET BY MOUTH EVERY DAY Yes Gerardine Signs, APRN - NP   hydroxychloroquine (PLAQUENIL) 200 MG tablet Take 200 mg by mouth 2 times daily Yes Historical Provider, MD   clopidogrel (PLAVIX) 75 MG tablet TAKE 1 TABLET BY MOUTH EVERY DAY Yes SHIMON Ontiveros   oxyCODONE-acetaminophen (PERCOCET) 7.5-325 MG per tablet Take 1 tablet by mouth every 4 hours as needed for Pain. Yes Historical Provider, MD   gabapentin (NEURONTIN) 800 MG tablet Take 800 mg by mouth 2 times daily .  Yes Historical Provider, MD nitroGLYCERIN (NITROSTAT) 0.4 MG SL tablet Place 1 tablet under the tongue every 5 minutes as needed 1 tablet as needed Yes Paul Pagan, APRN - CNP       Past Medical History:   Diagnosis Date    Arthritis     Bronchitis     Cancer ([facility]     Skin Cancer    Chronic cholecystitis without calculus 5/19/2017    Chronic GERD     COPD (chronic obstructive pulmonary disease) (HCC)     Coronary artery disease of native artery of native heart with stable angina pectoris ([facility]     Coronary atherosclerosis of native coronary artery     s/p PTCA and stent placement to the LAD and RCA/7 stents    DDD (degenerative disc disease), lumbar 12/4/2018    History of tobacco abuse 2010    Hyperlipidemia     Hypertension     MI (myocardial infarction) ([facility] [facility]     Old, inferior wall    Moderate aortic regurgitation 08/14/2017    mod-severe AR.  Moderate aortic stenosis 08/14/2017    mod-severe aortic stenosis.  Spinal stenosis of lumbar region with neurogenic claudication 12/4/2018       Past Surgical History:   Procedure Laterality Date    BACK SURGERY      s/p laminectomy    [facility]  [facility]10[facility]04 [facility]      C[facility]    CARDIAC CATHETERIZATION  08/29/03 [facility]    CARDIAC CATHETERIZATION  02/16/01 [facility]    CARDIAC CATHETERIZATION  05/25/2009    EF is estimated to be 50%. See scanned document.     CARDIAC CATHETERIZATION N/A 03/2016    stent placement    CARDIAC CATHETERIZATION  08/2017    no PCI    CARDIAC CATHETERIZATION  07/11/2019    Drug-eluting stents placed to mid LAD and mid RCA, normal LV    CHOLECYSTECTOMY, LAPAROSCOPIC N/A 5/19/2017    CHOLECYSTECTOMY LAPAROSCOPIC performed by Karna Severs, MD at [address] [place] [address]  05/28/2015    Dr. Luis Paul: No Polyps   10yr recall    CORONARY ANGIOPLASTY WITH STENT PLACEMENT  3/16    stent to LAD    JOINT REPLACEMENT      Left thumb    JOINT REPLACEMENT Right     JOINT REPLACEMENT  KNEE SURGERY      s/p Rt.  knee replacement    LEG SURGERY Right     Broken leg with surgical repair    LUMBAR SPINE SURGERY N/A 12/4/2018    L1-5 LAMINECTOMY performed by Mana Pierre MD at [address] EGD TRANSORAL BIOPSY SINGLE/MULTIPLE N/A 5/18/2017    Dr Denver Ruelas, Amelie (-), biliary colic, surgical referral    [place] EGD TRANSORAL BIOPSY SINGLE/MULTIPLE N/A 11/29/2018    Dr JAIDEN Torres-w/dilation over wire-51 [place]-Distal esophagitis, gastritis    REMOVE HARDWARE SPINE N/A 12/20/2018    I AND D LUMBAR INCISION SEROMA performed by Mana Pierre MD at [address] Right 12/12/2019    RIGHT REVERSE TOTAL SHOULDER ARTHROPLASTY performed by Es Puente MD at [address]  03/30/2015    Dr. Antony Damon: amelie neg,,normal, empiric dilatation with 51F    UPPER GASTROINTESTINAL ENDOSCOPY N/A 11/29/2018    Dr JAIDEN Torres-w/dilation over wire-51 [place]-Distal esophagitis, gastritis       Family History   Problem Relation Age of Onset    Cancer Mother     Lung Cancer Mother     Heart Disease Father     Cancer Father     Liver Cancer Father     Lung Cancer Father     Diabetes Maternal Grandmother     Heart Disease Maternal Grandfather     Cancer Maternal Grandfather     Stroke Paternal Grandmother     Stomach Cancer Sister     Colon Cancer Neg Hx     Colon Polyps Neg Hx     Esophageal Cancer Neg Hx     Liver Disease Neg Hx     Rectal Cancer Neg Hx        CareTeam (Including outside providers/suppliers regularly involved in providing care):   Patient Care Team:  SHIMON Mera as PCP - General (Nurse Practitioner Family)  SHIMON Mera as PCP - [facility] Empaneled Provider  Medardo Dumont DO as Consulting Physician (Gastroenterology)  Refugio Gonzalez MD as Consulting Physician (Rheumatology)  Omayra Longo DO as Consulting Physician (Pain Management)  Karen Trent MD as Consulting Physician (Interventional Cardiology) Wt Readings from Last 3 Encounters:   01/04/21 174 lb (78.9 kg)   12/03/20 172 lb (78 kg)   11/30/20 177 lb (80.3 kg)     Vitals:    01/04/21 1326   Temp: 97.9 °F (36.6 °C)   Weight: 174 lb (78.9 kg)   Height: 5' 7\" (1.702 m)     Body mass index is 27.25 kg/m². Based upon direct observation of the patient, evaluation of cognition reveals recent and remote memory intact. Patient's complete Health Risk Assessment and screening values have been reviewed and are found in Flowsheets. The following problems were reviewed today and where indicated follow up appointments were made and/or referrals ordered. Positive Risk Factor Screenings with Interventions:         Substance History:  Social History     Tobacco History     Smoking Status  Former Smoker Quit date  12/10/2009 Smoking Frequency  0 packs/day Smoking Tobacco Type  Cigarettes    Smokeless Tobacco Use  Never Used          Alcohol History     Alcohol Use Status  Yes Drinks/Week  6 Cans of beer per week Amount  6.0 standard drinks of alcohol/wk Comment  daily          Drug Use     Drug Use Status  No          Sexual Activity     Sexually Active  Yes Partners  Female               Alcohol Screening: Audit-C Score: 10  Total Score: 10    A score of 8 or more is associated with harmful or hazardous drinking. A score of 13 or more in women, and 15 or more in men, is likely to indicate alcohol dependence. Substance Abuse Interventions:  · Alcohol misuse/dependence:  patient is not ready to change his/her alcohol consumption behavior at this time    General Health and ACP:  General  In general, how would you say your health is?: Fair  In the past 7 days, have you experienced any of the following?  New or Increased Pain, New or Increased Fatigue, Loneliness, Social Isolation, Stress or Anger?: None of These  Do you get the social and emotional support that you need?: Yes  Do you have a Living Will?: (!) No  Advance Directives Power of [employer] Living Will ACP-Advance Directive ACP-Power of     Not on File Not on File Not on File Not on File      General Health Risk Interventions:  · No Living Will: Patient declines ACP discussion/assistance    Health Habits/Nutrition:  Health Habits/Nutrition  Do you exercise for at least 20 minutes 2-3 times per week?: (!) No  Have you lost any weight without trying in the past 3 months?: No  Do you eat fewer than 2 meals per day?: No  Have you seen a dentist within the past year?: (!) No(Has upper and lower dentures.)  Body mass index: (!) 27.25  Health Habits/Nutrition Interventions:  · Inadequate physical activity:  educational materials provided to promote increased physical activity  · Dental exam overdue:  patient declines dental evaluation    Hearing/Vision:  No exam data present  Hearing/Vision  Do you or your family notice any trouble with your hearing?: (!) Yes  Do you have difficulty driving, watching TV, or doing any of your daily activities because of your eyesight?: No  Have you had an eye exam within the past year?: Yes  Hearing/Vision Interventions:  · Hearing concerns:  patient declines any further evaluation/treatment for hearing issues    Safety:  Safety  Do you have working smoke detectors?: Yes  Have all throw rugs been removed or fastened?: (!) No  Do you have non-slip mats or surfaces in all bathtubs/showers?: (!) No  Do all of your stairways have a railing or banister?: Yes  Are your doorways, halls and stairs free of clutter?: Yes  Do you always fasten your seatbelt when you are in a car?: Yes  Safety Interventions:  · Home safety tips provided     Personalized Preventive Plan   Current Health Maintenance Status  Immunization History   Administered Date(s) Administered    Influenza Vaccine, unspecified formulation 10/12/2016, 09/25/2018    Influenza Virus Vaccine 11/13/2014, 09/25/2018, 09/15/2019    Influenza, Quadv, adjuvanted, 65 yrs +, IM, PF (Fluad) 10/07/2020 [employer]

## 2021-01-06 NOTE — PROGRESS NOTES
[facility], [address], [address]  [address]  Phone: [phone]  Fax: [phone]    OFFICE VISIT:  2021    Julia Crabtree - : 1955    Reason For Visit:  Jamie Renteria is a 76 y.o. male who is here for 6 Month Follow-Up (No cardiac sx today to discuss) and Coronary Artery Disease      HPI  Patient is here for follow-up today with a history of CAD, aortic regurgitation, hypertension, hyperlipidemia, aortic stenosis. Catheterization 2019 showed normal LV systolic function. Moderate aortic stenosis. PCI with drug-eluting stents placed to mid LAD and mid RCA. He denies chest pain. He has chronic dyspnea which he feels is unchanged. He denies orthopnea, PND, edema, palpitations. He bruises and bleeds easily. At last visit we discontinued aspirin and continue the Plavix only    There have been issues with myalgias related to statins. He has tried atorvastatin and last visit we changed to pravastatin which also caused issues. He is now taking Zetia and seems to tolerate well    SHIMON London is PCP.   Julia Crabtree has the following history as recorded in [facility]:    Patient Active Problem List    Diagnosis Date Noted    Abnormal myocardial perfusion study      Priority: High    Chest discomfort      Priority: High    Aortic valve stenosis      Priority: High    Current use of proton pump inhibitor 2020    Renal cyst, left 2020    Complex renal cyst 2020    Hypertrophy of prostate with urinary obstruction 2020    Osteoarthritis of both shoulders 2019    DDD (degenerative disc disease), lumbar 2018    Spinal stenosis of lumbar region with neurogenic claudication 2018    Lumbar stenosis with neurogenic claudication 2018    Alternating constipation and diarrhea 10/31/2018    Arthritis of knee 2018    Gastroesophageal reflux disease 2017    Gout 2017  Neuropathy 12/19/2017    Rheumatoid arthritis ([address] 12/19/2017    Acalculous cholecystitis 05/19/2017    Right upper quadrant abdominal pain 05/12/2017    Mixed hyperlipidemia 04/26/2016    S/P coronary artery stent placement 04/26/2016    SOB (shortness of breath) 04/26/2016    Coronary artery disease involving native coronary artery of native heart without angina pectoris     Hiatal hernia 04/20/2015    Dysphagia 03/13/2015    Hoarseness, chronic 03/13/2015    Chronic heartburn 03/13/2015    History of tobacco abuse     Leg pain 08/25/2011    Hyperlipidemia     MI (myocardial infarction) ([address]     COPD [facility] obstructive [facility]     Bronchitis      Past Medical History:   Diagnosis Date    Arthritis     Bronchitis     Cancer ([address]     Skin Cancer    Chronic cholecystitis without calculus 5/19/2017    Chronic GERD     COPD [facility]chronic obstructive pulmonary disease) ([facility]     Coronary artery disease of native artery of native heart with stable angina pectoris ([address]     Coronary atherosclerosis of native coronary artery     s/p PTCA and stent placement to the LAD and RCA/7 stents    DDD (degenerative disc disease), lumbar 12/4/2018    History of tobacco abuse 2010    Hyperlipidemia     Hypertension     MI (myocardial infarction) ([address]     Old, inferior wall    Moderate aortic regurgitation 08/14/2017    mod-severe AR.  Moderate aortic stenosis 08/14/2017    mod-severe aortic stenosis.  Spinal stenosis of lumbar region with neurogenic claudication 12/4/2018     Past Surgical History:   Procedure Laterality Date    BACK SURGERY      s/p laminectomy    [facility]  [facility]04 [facility]      CARDIAC CATHETERIZATION  12/10/03  [facility]    CARDIAC CATHETERIZATION  08/29/03 [facility]    CARDIAC CATHETERIZATION  02/16/01 [facility]    CARDIAC CATHETERIZATION  05/25/2009    EF is estimated to be 50%. See scanned document.  CARDIAC CATHETERIZATION N/A 03/2016    stent placement    CARDIAC CATHETERIZATION  08/2017    no PCI    CARDIAC CATHETERIZATION  07/11/2019    Drug-eluting stents placed to mid LAD and mid RCA, normal LV    CHOLECYSTECTOMY, LAPAROSCOPIC N/A 5/19/2017    CHOLECYSTECTOMY LAPAROSCOPIC performed by Gladys Sher MD at [address] [place] [address]  05/28/2015    Dr. Theodore Lovelace: No Polyps   10yr recall    CORONARY ANGIOPLASTY WITH STENT PLACEMENT  3/16    stent to LAD    JOINT REPLACEMENT      Left thumb    JOINT REPLACEMENT Right     JOINT REPLACEMENT      KNEE SURGERY      s/p Rt.  knee replacement    LEG SURGERY Right     Broken leg with surgical repair    LUMBAR SPINE SURGERY N/A 12/4/2018    L1-5 LAMINECTOMY performed by Madison Rocha MD at \[facility]"" 43 EGD TRANSORAL BIOPSY SINGLE/MULTIPLE N/A 5/18/2017    Dr Craig Son, Amelie (-), biliary colic, surgical referral    GA EGD TRANSORAL BIOPSY SINGLE/MULTIPLE N/A 11/29/2018    Dr JAIDEN Torres-w/dilation over wire-51 Indonesian-Distal esophagitis, gastritis    REMOVE HARDWARE SPINE N/A 12/20/2018    I AND D LUMBAR INCISION SEROMA performed by Madison Rocha MD at [facility] Right 12/12/2019    RIGHT REVERSE TOTAL SHOULDER ARTHROPLASTY performed by Razia Rader MD at [place] [address]  03/30/2015    Dr. Theodore Lovelace: amelie neg,,normal, empiric dilatation with 51F    UPPER GASTROINTESTINAL ENDOSCOPY N/A 11/29/2018    Dr JAIDEN Torres-w/dilation over wire-51 Indonesian-Distal esophagitis, gastritis     Family History   Problem Relation Age of Onset    Cancer Mother     Lung Cancer Mother     Heart Disease Father     Cancer Father     Liver Cancer Father     Lung Cancer Father     Diabetes Maternal Grandmother     Heart Disease Maternal Grandfather     Cancer Maternal Grandfather     Stroke Paternal Grandmother     Stomach Cancer Sister     Colon Cancer Neg Hx     Colon Polyps Neg Hx     Esophageal Cancer Neg Hx  Liver Disease Neg Hx     Rectal Cancer Neg Hx      Social History     Tobacco Use    Smoking status: Former Smoker     Packs/day: 0.00     Types: Cigarettes     Quit date: 12/10/2009     Years since quittin.0    Smokeless tobacco: Never Used   Substance Use Topics    Alcohol use: Yes     Alcohol/week: 6.0 standard drinks     Types: 6 Cans of beer per week     Comment: daily      Current Outpatient Medications   Medication Sig Dispense Refill    metoprolol tartrate (LOPRESSOR) 25 MG tablet TAKE 1/2 TABLET BY MOUTH TWICE A DAY 90 tablet 0    pantoprazole (PROTONIX) 40 MG tablet TAKE 1 TABLET BY MOUTH DAILY TAKE DAILY FIRST THING IN THE MORNING ON AN EMPTY STOMACH. 90 tablet 3    amLODIPine (NORVASC) 5 MG tablet TAKE 1 TABLET BY MOUTH TWICE A  tablet 3    magnesium citrate solution 1/2 bottle to be taken and if no Bm repeat 1/2 bottle next day. 296 mL 0    ezetimibe (ZETIA) 10 MG tablet TAKE 1 TABLET BY MOUTH EVERY DAY 30 tablet 5    hydroxychloroquine (PLAQUENIL) 200 MG tablet Take 200 mg by mouth 2 times daily      clopidogrel (PLAVIX) 75 MG tablet TAKE 1 TABLET BY MOUTH EVERY DAY 90 tablet 3    oxyCODONE-acetaminophen (PERCOCET) 7.5-325 MG per tablet Take 1 tablet by mouth every 4 hours as needed for Pain.  gabapentin (NEURONTIN) 800 MG tablet Take 800 mg by mouth 2 times daily .  nitroGLYCERIN (NITROSTAT) 0.4 MG SL tablet Place 1 tablet under the tongue every 5 minutes as needed 1 tablet as needed 25 tablet 3     No current facility-administered medications for this visit. Allergies: Codeine, Iv [iodides], Hydrocodone, Hydrocodone-acetaminophen, and Statins    Review of Systems  Review of Systems   Constitutional: Negative for activity change, diaphoresis, fatigue, fever and unexpected weight change. HENT: Negative for facial swelling and nosebleeds. Eyes: Negative for redness and visual disturbance. Respiratory: Positive for shortness of breath (mild). Negative for cough, chest tightness and wheezing. Cardiovascular: Negative for chest pain, palpitations and leg swelling. Gastrointestinal: Negative for abdominal pain, nausea and vomiting. Endocrine: Negative for cold intolerance and heat intolerance. Genitourinary: Negative for dysuria and hematuria. Musculoskeletal: Negative for arthralgias and myalgias. Skin: Negative for pallor and rash. Neurological: Negative for dizziness, seizures, syncope, weakness and light-headedness. Hematological: Bruises/bleeds easily. Psychiatric/Behavioral: Negative for agitation. The patient is not nervous/anxious. Objective  Vital Signs - /74   Pulse 56   Ht 5' 7\" (1.702 m)   Wt 179 lb (81.2 kg)   BMI 28.04 kg/m²   Physical Exam  Vitals signs and nursing note reviewed. Constitutional:       General: He is not in acute distress. Appearance: Normal appearance. He is well-developed. HENT:      Head: Normocephalic and atraumatic. Right Ear: Hearing and external ear normal.      Left Ear: Hearing and external ear normal.      Nose: Nose normal.   Eyes:      General:         Right eye: No discharge. Left eye: No discharge. Pupils: Pupils are equal, round, and reactive to light. Neck:      Thyroid: No thyromegaly. Vascular: No JVD. Trachea: No tracheal deviation. Cardiovascular:      Rate and Rhythm: Normal rate and regular rhythm. Heart sounds: Murmur (2/6 systolic) present. No friction rub. No gallop. Comments: No carotid bruit  Pulmonary:      Effort: Pulmonary effort is normal. No respiratory distress. Breath sounds: Normal breath sounds. No wheezing or rales. Abdominal:      Palpations: Abdomen is soft. Tenderness: There is no abdominal tenderness. Musculoskeletal:         General: No swelling or deformity. Right lower leg: No edema. Left lower leg: No edema. Comments: Normal gait and station   Skin:     General: Skin is warm and dry. Findings: Bruising (arms) present. No rash. Neurological:      Mental Status: He is alert and oriented to person, place, and time. Cranial Nerves: No cranial nerve deficit. Psychiatric:         Behavior: Behavior normal.         Judgment: Judgment normal.       Data:  Cardiac Cath 7/11/19  Conclusions    Double vessel disease with in-stent restenosis in mid LAD and mid RCA.   Normal LV ejection fraction.   Moderate aortic stenosis.   Successful PCI with drug-eluting stent to mid LAD and mid RCA.   Medical management. Echo 5/19  Summary   The aortic valve is trileaflet. Moderate calcification with mildly reduced leaflet separation. Mild-moderate aortic stenosis is present. The aortic valve area is 1.5 cm2 with a maximum gradient of 47 mmHg and a   mean gradient of 27 mmHg. Mild aortic regurgitation noted. Normal left ventricular size with preserved LV function and an estimated   ejection fraction of approximately 55-60%. No regional wall motion abnormalities identified. Grade I diastolic dysfunction. No evidence of left ventricular mass or thrombus noted. Lab Results   Component Value Date    CHOL 179 01/21/2020    TRIG 117 01/21/2020    HDL 49 (L) 01/21/2020    LDLCALC 107 01/21/2020     Lab Results   Component Value Date    ALT 10 01/21/2020    AST 19 01/21/2020     Assessment:     Diagnosis Orders   1. Coronary artery disease involving native coronary artery of native heart without angina pectoris  EKG 12 lead   2. Shortness of breath  EKG 12 lead   3. Mixed hyperlipidemia  ALT    AST    Lipid Panel   4. Statin intolerance     5. Aortic valve stenosis, etiology of cardiac valve disease unspecified         CADstable without symptoms of angina. Hyperlipidemia/intolerance to statins problems with both atorvastatin and pravastatin. Doing well with Zetia. Check fasting lipids, AST, ALT at [facility] lab    Aortic stenosismoderate per  heart cath 2019. Will delay repeating echocardiogram due to coronavirus. He is not having any change in symptoms. Order echo at next visit this summer    Hypertensionstable on current regimen     stable cardiovascular status. No evidence of overt heart failure,angina or dysrhythmia. Plan    Return in about 6 months (around 7/6/2021) for SHIMON. Check cholesterol fasting  Echo next visit    Call with any questionsor concerns  Follow up with SHIMON Perdomo for non cardiac problems  Report any new problems  Cardiovascular Fitness-Exercise as tolerated. Strive for 15 minutes of exercise most days of the week. Cardiac / HealthyDiet  Continue current medications as directed  Continue plan of treatment  It is always recommended that you bring your medicationsbottles with you to each visit - this is for your safety!        SHIMON Urbina

## 2021-02-22 NOTE — PROGRESS NOTES
[facility], [address] [place] [address], [address] [id number]  Phone: [phone]  Fax: [phone]    OFFICE VISIT:  2021    Ynes Bustillos - : 1955    Reason For Visit:  Tang Gordon is a 76 y.o. male who is here for Follow-up (patient states BP has been running low, fluctuating more than usual)      HPI  Patient is here for follow-up today with a history of CAD, aortic regurgitation, hypertension, hyperlipidemia, aortic stenosis. Catheterization 2019 showed normal LV systolic function. Moderate aortic stenosis. PCI with drug-eluting stents placed to mid LAD and mid RCA. There have been issues with myalgias related to statins. He has tried atorvastatin and last visit we changed to pravastatin which also caused issues. He is now taking Zetia and seems to tolerate well    He returns for an early visit today because he is concerned about his blood pressure being lower and brings in a list of readings with systolic BP occasionally in the 90s. He also states he has been noticing a burning type chest pain that feels different from his acid reflux is similar to previous angina. SHIMON Akers is PCP.   Ynes Bustillos has the following history as recorded in [facility]:    Patient Active Problem List    Diagnosis Date Noted    Abnormal myocardial perfusion study      Priority: High    Chest discomfort      Priority: High    Aortic valve stenosis      Priority: High    Current use of proton pump inhibitor 2020    Renal cyst, left 2020    Complex renal cyst 2020    Hypertrophy of prostate with urinary obstruction 2020    Osteoarthritis of both shoulders 2019    DDD (degenerative disc disease), lumbar 2018    Spinal stenosis of lumbar region with neurogenic claudication 2018    Lumbar stenosis with neurogenic claudication 2018    Alternating constipation and diarrhea 10/31/2018    Arthritis of knee 07/16/2018    Gastroesophageal reflux disease 12/19/2017    Gout 12/19/2017    Neuropathy 12/19/2017    Rheumatoid arthritis ([address] 12/19/2017    Acalculous cholecystitis 05/19/2017    Right upper quadrant abdominal pain 05/12/2017    Mixed hyperlipidemia 04/26/2016    S/P coronary artery stent placement 04/26/2016    SOB (shortness of breath) 04/26/2016    Coronary artery disease involving native coronary artery of native heart without angina pectoris     Hiatal hernia 04/20/2015    Dysphagia 03/13/2015    Hoarseness, chronic 03/13/2015    Chronic heartburn 03/13/2015    History of tobacco abuse     Leg pain 08/25/2011    Hyperlipidemia     Coronary atherosclerosis of native coronary artery     MI (myocardial infarction) ([address]     COPD (chronic obstructive pulmonary disease) (HCC)     Bronchitis      Past Medical History:   Diagnosis Date    Arthritis     Bronchitis     Cancer ([address]     Skin Cancer    Chronic cholecystitis without calculus 5/19/2017    Chronic GERD     COPD (chronic obstructive pulmonary disease) (HCC)     Coronary artery disease of native artery of native heart with stable angina pectoris ([address]     Coronary atherosclerosis of native coronary artery     s/p PTCA and stent placement to the LAD and RCA/7 stents    DDD (degenerative disc disease), lumbar 12/4/2018    History of tobacco abuse 2010    Hyperlipidemia     Hypertension     MI (myocardial infarction) ([address]     Old, inferior wall    Moderate aortic regurgitation 08/14/2017    mod-severe AR.  Moderate aortic stenosis 08/14/2017    mod-severe aortic stenosis.      Spinal stenosis of lumbar region with neurogenic claudication 12/4/2018     Past Surgical History:   Procedure Laterality Date    BACK SURGERY      s/p laminectomy    CARDIAC CATHETERIZATION  08/10/04 [facility]      CARDIAC CATHETERIZATION  12/10/03  [facility]    CARDIAC CATHETERIZATION  08/29/03 [facility]    CARDIAC [facility]HETERIZATION 02/16/01 [facility]    CARDIAC CATHETERIZATION  05/25/2009    EF is estimated to be 50%. See scanned document.  CARDIAC CATHETERIZATION N/A 03/2016    stent placement    CARDIAC CATHETERIZATION  08/2017    no PCI    CARDIAC CATHETERIZATION  07/11/2019    Drug-eluting stents placed to mid LAD and mid RCA, normal LV    CHOLECYSTECTOMY, LAPAROSCOPIC N/A 5/19/2017    CHOLECYSTECTOMY LAPAROSCOPIC performed by Tai Pandya MD at [address] [place] [address]  05/28/2015    Dr. Marvin Garner: No Polyps   10yr recall    CORONARY ANGIOPLASTY WITH STENT PLACEMENT  3/16    stent to LAD    JOINT REPLACEMENT      Left thumb    JOINT REPLACEMENT Right     JOINT REPLACEMENT      KNEE SURGERY      s/p Rt.  knee replacement    LEG SURGERY Right     Broken leg with surgical repair    LUMBAR SPINE SURGERY N/A 12/4/2018    L1-5 LAMINECTOMY performed by Minal Garza MD at [address] [place] [address] RI EGD TRANSORAL BIOPSY SINGLE/MULTIPLE N/A 5/18/2017    Dr Juancho Crowley, Amelie (-), biliary colic, surgical referral    RI EGD TRANSORAL BIOPSY SINGLE/MULTIPLE N/A 11/29/2018    Dr JAIDEN Torres-w/dilation over wire-51 [place]-Distal esophagitis, gastritis    REMOVE HARDWARE SPINE N/A 12/20/2018    I AND D LUMBAR INCISION SEROMA performed by Minal Garza MD at [facility] 12/12/2019    RIGHT REVERSE TOTAL SHOULDER ARTHROPLASTY performed by Kami Heller MD at [facility]  03/30/2015    Dr. Marvin Garner: amelie neg,,normal, empiric dilatation with 51F    UPPER GASTROINTESTINAL ENDOSCOPY N/A 11/29/2018    Dr JAIDEN Cantu/dilation over wire-51 [place]-Distal esophagitis, gastritis     Family History   Problem Relation Age of Onset    Cancer Mother     Lung Cancer Mother     Heart Disease Father     Cancer Father     Liver Cancer Father     Lung Cancer Father     Diabetes Maternal Grandmother     Heart Disease Maternal Grandfather     Cancer Maternal Grandfather     Stroke Paternal Grandmother     Stomach Cancer Sister     Colon Cancer Neg Hx     Colon Polyps Neg Hx     Esophageal Cancer Neg Hx     Liver Disease Neg Hx     Rectal Cancer Neg Hx      Social History     Tobacco Use    Smoking status: Former Smoker     Packs/day: 0.00     Types: Cigarettes     Quit date: 12/10/2009     Years since quittin.2    Smokeless tobacco: Never Used   Substance Use Topics    Alcohol use: Yes     Alcohol/week: 6.0 standard drinks     Types: 6 Cans of beer per week     Comment: daily      Current Outpatient Medications   Medication Sig Dispense Refill    amLODIPine (NORVASC) 2.5 MG tablet Take 1 tablet by mouth daily 90 tablet 3    metoprolol tartrate (LOPRESSOR) 25 MG tablet TAKE 1/2 TABLET BY MOUTH TWICE A DAY 90 tablet 0    pantoprazole (PROTONIX) 40 MG tablet TAKE 1 TABLET BY MOUTH DAILY TAKE DAILY FIRST THING IN THE MORNING ON AN EMPTY STOMACH. 90 tablet 3    magnesium citrate solution 1/2 bottle to be taken and if no Bm repeat 1/2 bottle next day. 296 mL 0    ezetimibe (ZETIA) 10 MG tablet TAKE 1 TABLET BY MOUTH EVERY DAY 30 tablet 5    hydroxychloroquine (PLAQUENIL) 200 MG tablet Take 200 mg by mouth 2 times daily      clopidogrel (PLAVIX) 75 MG tablet TAKE 1 TABLET BY MOUTH EVERY DAY 90 tablet 3    oxyCODONE-acetaminophen (PERCOCET) 7.5-325 MG per tablet Take 1 tablet by mouth every 4 hours as needed for Pain.  gabapentin (NEURONTIN) 800 MG tablet Take 800 mg by mouth 2 times daily .  nitroGLYCERIN (NITROSTAT) 0.4 MG SL tablet Place 1 tablet under the tongue every 5 minutes as needed 1 tablet as needed 25 tablet 3     No current facility-administered medications for this visit. Allergies: Codeine, Iv [iodides], Hydrocodone, Hydrocodone-acetaminophen, and Statins    Review of Systems  Review of Systems   Constitutional: Negative for activity change, diaphoresis, fatigue, fever and unexpected weight change. HENT: Negative for facial swelling and nosebleeds.     Eyes: Negative for redness and visual disturbance. Respiratory: Positive for shortness of breath (mild). Negative for cough, chest tightness and wheezing. Cardiovascular: Positive for chest pain (burning type). Negative for palpitations and leg swelling. Gastrointestinal: Negative for abdominal pain, nausea and vomiting. Endocrine: Negative for cold intolerance and heat intolerance. Genitourinary: Negative for dysuria and hematuria. Musculoskeletal: Negative for arthralgias and myalgias. Skin: Negative for pallor and rash. Neurological: Negative for dizziness, seizures, syncope, weakness and light-headedness. Hematological: Bruises/bleeds easily. Psychiatric/Behavioral: Negative for agitation. The patient is not nervous/anxious. Objective  Vital Signs - /66   Pulse 67   Ht 5' 7\" (1.702 m)   Wt 168 lb (76.2 kg)   BMI 26.31 kg/m²   Physical Exam  Vitals signs and nursing note reviewed. Constitutional:       General: He is not in acute distress. Appearance: Normal appearance. He is well-developed. HENT:      Head: Normocephalic and atraumatic. Right Ear: Hearing and external ear normal.      Left Ear: Hearing and external ear normal.      Nose: Nose normal.   Eyes:      General:         Right eye: No discharge. Left eye: No discharge. Pupils: Pupils are equal, round, and reactive to light. Neck:      Thyroid: No thyromegaly. Vascular: No JVD. Trachea: No tracheal deviation. Cardiovascular:      Rate and Rhythm: Normal rate and regular rhythm. Heart sounds: Murmur (2/6 systolic) present. No friction rub. No gallop. Comments: No carotid bruit  Pulmonary:      Effort: Pulmonary effort is normal. No respiratory distress. Breath sounds: Normal breath sounds. No wheezing or rales. Abdominal:      Palpations: Abdomen is soft. Tenderness: There is no abdominal tenderness. Musculoskeletal:         General: No swelling or deformity.       Right lower leg: No edema. Left lower leg: No edema. Comments: Normal gait and station   Skin:     General: Skin is warm and dry. Findings: Bruising (arms) present. No rash. Neurological:      Mental Status: He is alert and oriented to person, place, and time. Cranial Nerves: No cranial nerve deficit. Psychiatric:         Behavior: Behavior normal.         Judgment: Judgment normal.       Data:  Cardiac Cath 7/11/19  Conclusions    Double vessel disease with in-stent restenosis in mid LAD and mid RCA.   Normal LV ejection fraction.   Moderate aortic stenosis.   Successful PCI with drug-eluting stent to mid LAD and mid RCA.   Medical management. Echo 5/19  Summary   The aortic valve is trileaflet. Moderate calcification with mildly reduced leaflet separation. Mild-moderate aortic stenosis is present. The aortic valve area is 1.5 cm2 with a maximum gradient of 47 mmHg and a   mean gradient of 27 mmHg. Mild aortic regurgitation noted. Normal left ventricular size with preserved LV function and an estimated   ejection fraction of approximately 55-60%. No regional wall motion abnormalities identified. Grade I diastolic dysfunction. No evidence of left ventricular mass or thrombus noted. Lab Results   Component Value Date    CHOL 188 01/13/2021    TRIG 53 01/13/2021    HDL 81 01/13/2021    LDLCALC 96 01/13/2021     Lab Results   Component Value Date    ALT 15 01/13/2021    AST 25 01/13/2021     Assessment:     Diagnosis Orders   1. Hypotension, unspecified hypotension type     2. Other chest pain  ECHO Exercise Stress Test   3. Nonrheumatic aortic valve stenosis  ECHO Complete 2D W Doppler W Color   4. Coronary artery disease involving native coronary artery of native heart without angina pectoris     5. Mixed hyperlipidemia     6. Statin intolerance         Hypotensionsystolic BP occasionally in the 90s. Will decrease amlodipine to 2.5 mg daily    Chest pain/CAD-new since last visit. Check stress echocardiogram to rule out myocardial ischemia      Hyperlipidemia/intolerance to statins problems with both atorvastatin and pravastatin. Doing well with Zetia. Most recent LDL 96. Patient does not want to make any further changes    Aortic stenosismoderate per  heart cath 2019. Repeat 2D echocardiogram      Plan    Return for APRN, as scheduled. Stress echo-hold metoprolol night before and morning of test  Echocardiogram  Decrease amlodipine to 2.5 mg daily    Call with any questionsor concerns  Follow up with [place] SHIMON Barraza for non cardiac problems  Report any new problems  Cardiovascular Fitness-Exercise as tolerated. Strive for 15 minutes of exercise most days of the week. Cardiac / HealthyDiet  Continue current medications as directed  Continue plan of treatment  It is always recommended that you bring your medicationsbottles with you to each visit - this is for your safety!        SHIMON Lorenz

## 2021-02-22 NOTE — PATIENT INSTRUCTIONS
Decrease amlodipine to 2.5 mg daily    [place] at the [address] [place] [address] and [address] [facility] located on the first floor of [address] [place] [address] through hospital main entrance and turn immediately to your left. Patient contact number:  [phone] (home)      Date/Arrival Time:      Stress Echocardiogram      This records the heart's activity during a cardiac stress test.  A stress echocardiogram is a very effective, noninvasive test that can help determine whether you have blockages in your coronary arteries. The exam takes approximately thirty minutes. To help ensure accurate results, patients should take the following steps in preparation for a stress echocardiogram:   Refrain from strenuous activity for 12 hours before the test.   Do not eat, drink, or smoke for two hours prior to the test.  Unless instructed otherwise by your physician, you should continue to take prescribed medications. Wear loose, comfortable clothing and walking shoes. Hold metoprolol night before and morning of test    If you need to change this appointment, please call [phone] to reschedule. [place] at the [address] [place] [address] and [address] [facility] located on the first floor of [address] [place] [address] through [facility] main entrance and turn immediately to your left. Date/Time:     Patient's contact number:  [phone] (home)     Echocardiogram -  No prep. Takes approximately 30 min. An echocardiogram uses sound waves to produce images of your heart. This commonly used test allows your doctor to see how your heart is beating and pumping blood. Your doctor can use the images from an echocardiogram to identify various abnormalities in the heart muscle and valves. This test has 2 parts:   Ø You will be asked to disrobe from the waist up and given a gown to wear.  The technologist will then hook up an EKG monitor to you for the entire exam. Ø You will then have an ultrasound of your heart (echocardiogram) to assess the heart muscle, heart valves and heart function. You may eat and take any medicines before the exam.     If you need to change your appointment, please call outpatient scheduling at [phone].

## 2021-03-04 NOTE — PROGRESS NOTES
Stress echo cancelled due to severe AS on echo. Dr. Binta Rivera notified. Explained to patient the reason behind cancellation and that someone would contact him with results of echo and next step in the plan of care.

## 2021-03-08 NOTE — TELEPHONE ENCOUNTER
Case discussed with Dr. Guy Hansen on 3/5/2021. He reviewed echo. Mean gradient of the aortic valve is approaching 36 mmHg, nearing severe aortic stenosis. Patient with symptoms of chest pain and shortness of breath.   He recommends a left and right heart cath    [facility] call patient to schedule

## 2021-03-12 NOTE — TELEPHONE ENCOUNTER
I spoke with pts wife and I reminded them that they were supposed to do the labs first she said the line was long so they went ahead and got the covid first, they are going to come at 830 instead of 9 so that they can get the labs prior to the procedure.

## 2021-03-12 NOTE — TELEPHONE ENCOUNTER
Patient left voicemail that he got his COVID test but when he tried to do labs they wouldn't do them due to no order being placed.  He requested call back on what he needs to do. [phone]

## 2021-03-16 NOTE — H&P
Patient:  Pedro Nguyen                  1955  MRN: [id number]    PROBLEM LIST:    Patient Active Problem List    Diagnosis Date Noted    Abnormal myocardial perfusion study      Priority: High    Chest discomfort      Priority: High    Aortic valve stenosis      Priority: High    Current use of proton pump inhibitor 12/03/2020     Priority: Low    Renal cyst, left 09/17/2020     Priority: Low    Complex renal cyst 09/17/2020     Priority: Low    Hypertrophy of prostate with urinary obstruction 09/17/2020     Priority: Low    Osteoarthritis of both shoulders 11/07/2019     Priority: Low    DDD (degenerative disc disease), lumbar 12/04/2018     Priority: Low    Spinal stenosis of lumbar region with neurogenic claudication 12/04/2018     Priority: Low    Lumbar stenosis with neurogenic claudication 12/04/2018     Priority: Low    Alternating constipation and diarrhea 10/31/2018     Priority: Low    Arthritis of knee 07/16/2018     Priority: Low    Gastroesophageal reflux disease 12/19/2017     Priority: Low    Gout 12/19/2017     Priority: Low    Neuropathy 12/19/2017     Priority: Low    [facility] 12/19/2017     Priority: Low    Acalculous cholecystitis 05/19/2017     Priority: Low    Right upper quadrant abdominal pain 05/12/2017     Priority: Low    Mixed hyperlipidemia 04/26/2016     Priority: Low    S/P coronary artery stent placement 04/26/2016     Priority: Low    SOB (shortness of breath) 04/26/2016     Priority: Low    Coronary artery disease involving native coronary artery of native heart without angina pectoris      Priority: Low    Hiatal hernia 04/20/2015     Priority: Low    Dysphagia 03/13/2015     Priority: Low    Hoarseness, chronic 03/13/2015     Priority: Low    Chronic heartburn 03/13/2015     Priority: Low    History of tobacco abuse      Priority: Low    Leg pain 08/25/2011     Priority: Low    Hyperlipidemia      Priority: Low    Coronary atherosclerosis of native coronary artery      Priority: Low    MI (myocardial infarction) ([facility]      Priority: Low    COPD (chronic obstructive pulmonary disease) ([facility])      Priority: Low    Bronchitis      Priority: Low     1. Coronary artery disease with prior inferior STEMI 2001, multiple PCI's to RCA, LAD and OM with catheterization 7/11/2019 with PCI to restenotic mid LAD and mid RCA with LINDA, normal LV ejection fraction. 2.  Moderate to severe aortic stenosis, prior mean gradient 25 mmHg 7/11/2019, recent echo 3/2021 with mean gradient of 36 mmHg  3. COPD with prior ex-tobacco use. 4. Squamous cell carcinoma of skin and right upper back on treatment. 5. Spinal stenosis  PRESENTATION: Genevie Mcardle is a 76y.o. year old male who presents with recent echo 3/4/2021 with probable severe aortic stenosis with mean gradient 36 mmHg with recent complaints of chest pain as well as blood pressures in the lower side from normal being referred for cardiac catheterization. REVIEW OF SYSTEMS:  Review of Systems   Constitutional: Negative for activity change, diaphoresis and fatigue. HENT: Negative for hearing loss, nosebleeds and tinnitus. Eyes: Negative for visual disturbance. Respiratory: Positive for chest tightness. Negative for cough, shortness of breath and wheezing. Cardiovascular: Negative for chest pain, palpitations and leg swelling. Gastrointestinal: Negative for abdominal distention, abdominal pain, blood in stool, diarrhea and vomiting. Endocrine: Negative for cold intolerance, heat intolerance, polydipsia, polyphagia and polyuria. Genitourinary: Negative for difficulty urinating, flank pain and hematuria. Musculoskeletal: Negative for arthralgias, back pain, joint swelling and myalgias. Skin: Negative for pallor and rash. Neurological: Negative for dizziness, seizures, syncope and headaches. Psychiatric/Behavioral: Negative for behavioral problems and dysphoric mood.  The patient is not nervous/anxious. Past Medical History:      Diagnosis Date    Arthritis     Bronchitis     Cancer ([facility]     Skin Cancer    Chronic cholecystitis without calculus 5/19/2017    Chronic GERD     COPD (chronic obstructive pulmonary disease) (HCC)     Coronary artery disease of native artery of native heart with stable angina pectoris ([facility]     Coronary atherosclerosis of native coronary artery     s/p PTCA and stent placement to the LAD and RCA/7 stents    DDD (degenerative disc disease), lumbar 12/4/2018    History of tobacco abuse 2010    Hyperlipidemia     Hypertension     MI (myocardial infarction) ([facility]     Old, inferior wall    Moderate aortic regurgitation 08/14/2017    mod-severe AR.  Moderate aortic stenosis 08/14/2017    mod-severe aortic stenosis.  Spinal stenosis of lumbar region with neurogenic claudication 12/4/2018       Past Surgical History:      Procedure Laterality Date    BACK SURGERY      s/p laminectomy    CARDIAC [facility]HETERIZATION  08/10/04 [facility]      CARDIAC [facility]IZATION  12/10/03  [facility]    CARDIAC CATHETERIZATION  08/29/03 [facility]    CARDIAC CATHETERIZATION  02/16/01 [facility]    CARDIAC CATHETERIZATION  05/25/2009    EF is estimated to be 50%. See scanned document.  CARDIAC CATHETERIZATION N/A 03/2016    stent placement    CARDIAC CATHETERIZATION  08/2017    no PCI    CARDIAC CATHETERIZATION  07/11/2019    Drug-eluting stents placed to mid LAD and mid RCA, normal LV    CHOLECYSTECTOMY, LAPAROSCOPIC N/A 5/19/2017    CHOLECYSTECTOMY LAPAROSCOPIC performed by Megan Weston MD at [address] [place] [address]  05/28/2015    Dr. Vergara La: No Polyps   10yr recall    CORONARY ANGIOPLASTY WITH STENT PLACEMENT  3/16    stent to LAD    JOINT REPLACEMENT      Left thumb    JOINT REPLACEMENT Right     JOINT REPLACEMENT      KNEE SURGERY      s/p Rt.  knee replacement    LEG SURGERY Right     Broken leg with surgical repair    LUMBAR SPINE SURGERY N/A 12/4/2018    L1-5 LAMINECTOMY performed by Eve Sherman MD at [address] EGD TRANSORAL BIOPSY SINGLE/MULTIPLE N/A 5/18/2017    Dr Berta Alvarado, Amelie (-), biliary colic, surgical referral    [place] EGD TRANSORAL BIOPSY SINGLE/MULTIPLE N/A 11/29/2018    Dr JAIDEN Torres-w/dilation over wire-51 [place]-Distal esophagitis, gastritis    REMOVE HARDWARE SPINE N/A 12/20/2018    I AND D LUMBAR INCISION SEROMA performed by Eve Sherman MD at [address] Right 12/12/2019    RIGHT REVERSE TOTAL SHOULDER ARTHROPLASTY performed by Felipe Bauer MD at [facility]  03/30/2015    Dr. Malgorzata Louis: amelie neg,,normal, empiric dilatation with 51F    UPPER GASTROINTESTINAL ENDOSCOPY N/A 11/29/2018    Dr JAIDEN Torres-w/dilation over wire-51 [place]-Distal esophagitis, gastritis       Medications Prior to Admission:    Prior to Admission medications    Medication Sig Start Date End Date Taking? Authorizing Provider   predniSONE (DELTASONE) 50 MG tablet Take two tablets the day before one in the morning and one at night, then take one the morning of. 3/9/21   Angela Miguel MD   amLODIPine (NORVASC) 2.5 MG tablet Take 1 tablet by mouth daily 2/22/21   SHIMON Wall   metoprolol tartrate (LOPRESSOR) 25 MG tablet TAKE 1/2 TABLET BY MOUTH TWICE A DAY 1/4/21   SHIMON Marion - CNP   pantoprazole (PROTONIX) 40 MG tablet TAKE 1 TABLET BY MOUTH DAILY TAKE DAILY FIRST THING IN THE MORNING ON AN EMPTY STOMACH. 12/21/20   SHIMON Ortiz   magnesium citrate solution 1/2 bottle to be taken and if no Bm repeat 1/2 bottle next day.  11/18/20   SHIMON Ford   ezetimibe (ZETIA) 10 MG tablet TAKE 1 TABLET BY MOUTH EVERY DAY 10/19/20   SHIMON Cowan - TORIBIO   hydroxychloroquine (PLAQUENIL) 200 MG tablet Take 200 mg by mouth 2 times daily 6/15/20   Historical Provider, MD   clopidogrel (PLAVIX) 75 MG tablet TAKE 1 TABLET BY MOUTH EVERY DAY 6/22/20   SHIMON Wall oxyCODONE-acetaminophen (PERCOCET) 7.5-325 MG per tablet Take 1 tablet by mouth every 4 hours as needed for Pain. Historical Provider, MD   gabapentin (NEURONTIN) 800 MG tablet Take 800 mg by mouth 2 times daily . 16   Historical Provider, MD   nitroGLYCERIN (NITROSTAT) 0.4 MG SL tablet Place 1 tablet under the tongue every 5 minutes as needed 1 tablet as needed 6/18/15   Viviana Gordon APRN - CNP       Allergies:  Codeine, Iv [iodides], Hydrocodone, Hydrocodone-acetaminophen, and Statins    Past Social History:  Social History     Socioeconomic History    Marital status:      Spouse name: Not on file    Number of children: Not on file    Years of education: Not on file    Highest education level: Not on file   Occupational History    Not on file   Social Needs    Financial resource strain: Not on file    Food insecurity     Worry: Not on file     Inability: Not on file    Transportation needs     Medical: Not on file     Non-medical: Not on file   Tobacco Use    Smoking status: Former Smoker     Packs/day: 0.00     Types: Cigarettes     Quit date: 12/10/2009     Years since quittin.2    Smokeless tobacco: Never Used   Substance and Sexual Activity    Alcohol use:  Yes     Alcohol/week: 6.0 standard drinks     Types: 6 Cans of beer per week     Comment: daily    Drug use: No    Sexual activity: Yes     Partners: Female   Lifestyle    Physical activity     Days per week: Not on file     Minutes per session: Not on file    Stress: Not on file   Relationships    Social connections     Talks on phone: Not on file     Gets together: Not on file     Attends Gnosticism service: Not on file     Active member of club or organization: Not on file     Attends meetings of clubs or organizations: Not on file     Relationship status: Not on file    Intimate partner violence     Fear of current or ex partner: Not on file     Emotionally abused: Not on file     Physically abused: Not on file Forced sexual activity: Not on file   Other Topics Concern    Not on file   Social History Narrative    Not on file       Family History:       Problem Relation Age of Onset    Cancer Mother     Lung Cancer Mother     Heart Disease Father     Cancer Father     Liver Cancer Father     Lung Cancer Father     Diabetes Maternal Grandmother     Heart Disease Maternal Grandfather     Cancer Maternal Grandfather     Stroke Paternal Grandmother     Stomach Cancer Sister     Colon Cancer Neg Hx     Colon Polyps Neg Hx     Esophageal Cancer Neg Hx     Liver Disease Neg Hx     Rectal Cancer Neg Hx      Physical Exam:    Vitals: There were no vitals taken for this visit. 24HR INTAKE/OUTPUT:  No intake or output data in the 24 hours ending 03/16/21 4814    Physical Exam  Constitutional:       Appearance: He is well-developed. HENT:      Mouth/Throat:      Pharynx: No oropharyngeal exudate. Eyes:      General: No scleral icterus. Right eye: No discharge. Left eye: No discharge. Neck:      Thyroid: No thyromegaly. Vascular: No JVD. Cardiovascular:      Rate and Rhythm: Normal rate and regular rhythm. Heart sounds: Murmur present. No friction rub. No gallop. Pulmonary:      Effort: No respiratory distress. Breath sounds: No stridor. No wheezing or rales. Abdominal:      General: Bowel sounds are normal. There is no distension. Palpations: Abdomen is soft. There is no mass. Tenderness: There is no abdominal tenderness. There is no guarding or rebound. Musculoskeletal:         General: No deformity. Skin:     General: Skin is warm. Coloration: Skin is not pale. Findings: No erythema or rash. Neurological:      Mental Status: He is alert and oriented to person, place, and time. Motor: No abnormal muscle tone.       Coordination: Coordination normal.      Deep Tendon Reflexes: Reflexes normal.         LAB DATA:  CBC: No results for input(s): WBC, HGB, PLT in the last 72 hours. BMP:  No results for input(s): NA, K, CL, CO2, BUN, CREATININE, GLUCOSE in the last 72 hours. Hepatic: No results for input(s): AST, ALT, ALB, BILITOT, ALKPHOS in the last 72 hours. CK, CKMB, Troponin: @LABRCNT (CKTOTAL:3, CKMB:3, TROPONINI:3)@  Pro-BNP: No results for input(s): BNP in the last 72 hours. Lipids: No results for input(s): CHOL, HDL in the last 72 hours. Invalid input(s): LDL  ABGs: No results for input(s): PHART, LJN5BEU, PO2ART, WTF3EHU, BEART, HGBAE, S5ZADQRF, CARBOXHGBART, 02THERAPY in the last 72 hours. INR: No results for input(s): INR in the last 72 hours. A1c:Invalid input(s): HEMOGLOBIN A1C  URINALYSIS:   Lab Results   Component Value Date    NITRU Negative 11/18/2020    WBCUA 0 11/18/2020    BACTERIA NEGATIVE 11/18/2020    RBCUA 0 11/18/2020    BLOODU TRACE 11/18/2020    SPECGRAV 1.010 11/18/2020    GLUCOSEU Negative 11/18/2020     -----------------------------------------------------------------  IMAGING:  No orders to display     Procedure     Procedure Type      Diagnostic procedure:DCA, Coronary Angiogram, Left Heart Cath, Left   Ventriculogram, Left Ventricular Pressure Measurement, Right Heart Cath,   Pressure Measurement, Right Heart O2 Saturation Measurement      PCI procedure:Right Coronary, RCA, LINDA, LAD, LAD, LINDA      Conclusions      Double vessel disease with in-stent restenosis in mid LAD and mid RCA. Normal LV ejection fraction. Moderate aortic stenosis. Successful PCI with drug-eluting stent to mid LAD and mid RCA. Medical management. Recommendations      Medical management.       Signatures      ----------------------------------------------------------------   Electronically signed by Maricarmen Ponce MD(Performing Physician) on   07/11/2019 13:28   ----------------------------------------------------------------      Angiographic Findings      Cardiac Arteries and Lesion Findings     LMCA: Left main mild luminal irregularities     LAD: LAD mid in-stent 70% restenosis.       Lesion on Mid LAD: 70% stenosis reduced to 0%. The guidewire cross was    successful. The lesion was diagnosed as Low Risk (A). Treatment results:Interventional treatment was successful. Comments:Mid LAD 70% in-stent restenosis. Mid LAD stented with 3.0 x 15 mm resolute integrity stent. Devices used       - Balanced Middleweight Universal. Number of passes: 1.       - Medtronic Euphora 2.0mm x12mm RX Balloon Catheter. 1 inflation(s) to    a max pressure of: 12 leia. - Resolute Integrity 3.0 x 15 LINDA. Diameter: 3 mm. Length: 12 mm. 1    inflation(s) to a max pressure of: 14 leia. LCx: Circumflex mild luminal irregularities. OM1 proximal in-stent 30% restenosis.       Lesion on 1st Ob [place]en% stenosis 15 mm length. Comments[de-identified] Proximal 35% in-stent restenosis     RCA: RCA mid 70% in-stent restenosis. RCA distal calcific 40% stenosis. Lesion on Mid RCA: 70% stenosis reduced to 0%. The lesion was diagnosed    as Low Risk (A). Treatment results:Interventional treatment was successful. Comments:Mid RCA 70% instent restenosis. Mid RCA stented with 4.0 x 18 mm resolute integrity stent. Devices used       - Balanced Middleweight Universal. Number of passes: 1.       - Medtronic Euphora 2.0mm x12mm RX Balloon Catheter. 1 inflation(s) to    a max pressure of: 12 leia. - Resolute Integrity 4.0 x 18 LINDA. 1 inflation(s) to a max pressure of:    16 leia. - Abbott [id number] NC Trek RX Balloon Catheter. Diameter: 4 mm. Length: 12 mm. 2 inflation(s) to a max pressure of: 21 leia. Lesion on Dist RCA: 40% stenosis 6 mm length.       [facility]     Ventriculography Findings  RA equals 10 mmHg,  RV equals 27 over 10 mmHg,  PA equals 27 over 15 mmHg,  PCWP equals 15 mmHg,  LVEDP equals 15 mmHg,     Aortic valve mean gradient equals 25 mmHg  Aortic valve area equals 1.5 cm^2     No constrictive or restrictive features. No step up in oxygen saturations between chambers.     LV function assessed as:Normal.  Ejection Fraction    - Method: LV gram. EF%: 60.  Procedure     Type of Study      TTE procedure:ECHO NO CONTRAST WITH DOP/COLR. Study Location: Echo Lab  Technical Quality: Adequate visualization     Patient Status: Outpatient     Indications: Aortic stenosis.      Conclusions      Summary   Normal left ventricular chamber size and systolic function. Mild concentric left ventricular hypertrophy. Left ventricular ejection fraction is visually estimated at 60%. Aortic valve leaflets are severely thickened. There is moderately   decreased excursion. Moderate aortic stenosis is present. The peak gradient across the aortic valve is 75 mmHg. The mean gradient across the aortic valve is 36 mmHg. Mild aortic regurgitation. The left atrium is moderately dilated. Mild right atrial enlargement. Signature      ----------------------------------------------------------------   Electronically signed by Pavan Salazar DO(Interpreting   physician) on 03/04/2021 01:21 PM   ----------------------------------------------------------------       Assessment and Recommendations: This is a 76y.o. year old male with past medical history of prior inferior MI with multiple PCI's to RCA, LAD and OM with last PCI 7/11/2019 for restenotic mid LAD and mid RCA with LINDA, normal LV ejection fraction, previously moderate aortic stenosis with recent onset of chest pain with noted lower blood pressure readings and echo showing significant progression in aortic stenosis with mean gradient of 36 mmHg consistent with moderate to severe aortic stenosis being referred for cardiac catheterization. Risks, benefits, alternatives of cardiac catheterization/PCI discussed with the patient and full informed consent obtained.   Acceptable Mallampati score  Consent for moderate conscious sedation  ASA 3

## 2021-03-22 NOTE — PROGRESS NOTES
[facility], [address] [place] [address], [address] [id number]  Phone: [phone]  Fax: [phone]    OFFICE VISIT:  3/22/2021    Niko Webb - : 1955    Reason For Visit:  Maxim Friedman is a 76 y.o. male who is here for the aortic valve stenosis      HPI    Mr. Kirsten Bowen is a 70-year-old-year-old male who has been referred to valve clinic for consultation regarding severe aortic stenosis, he was seen by Dr. Kathy Frazier for surgical risk assessment and was not referred to me for evaluation to consider TAVR    He is known to have a history of CAD, aortic regurgitation, hypertension, hyperlipidemia, aortic stenosis. Catheterization 2019 showed normal LV systolic function. Moderate aortic stenosis. PCI with drug-eluting stents placed to mid LAD and mid RCA. Recent echo on 2021 showed evidence of aortic stenosis with valve area of 0.8 cm² and peak gradient of 75 mmHg and mean of 36 mmHg, ejection fraction 60%    He is accompanied by his wife today, they tell me that he has been symptomatic recently with exam shortness of breath with slightest exertion, he also complained of dizziness and near syncope at one time, also complained of occasional leg swelling, he denies any active angina at this time    Based on that he was referred for aortic valve replacement    SHIMON Nichols is PCP.   Niko Webb has the following history as recorded in [facility]:    Patient Active Problem List    Diagnosis Date Noted    Abnormal myocardial perfusion study      Priority: High    Chest discomfort      Priority: High    Aortic valve stenosis      Priority: High    Current use of proton pump inhibitor 2020     Priority: Low    Renal cyst, left 2020     Priority: Low    Complex renal cyst 2020     Priority: Low    Hypertrophy of prostate with urinary obstruction 2020     Priority: Low    Osteoarthritis of both shoulders 2019 Priority: Low    DDD (degenerative disc disease), lumbar 12/04/2018     Priority: Low    Spinal stenosis of lumbar region with neurogenic claudication 12/04/2018     Priority: Low    Lumbar stenosis with neurogenic claudication 12/04/2018     Priority: Low    Alternating constipation and diarrhea 10/31/2018     Priority: Low    Arthritis of knee 07/16/2018     Priority: Low    Gastroesophageal reflux disease 12/19/2017     Priority: Low    Gout 12/19/2017     Priority: Low    Neuropathy 12/19/2017     Priority: Low    Rheumat[facility] arthritis ([facility] 12/19/2017     Priority: Low    Acalculous cholecystitis 05/19/2017     Priority: Low    Right upper quadrant abdominal pain 05/12/2017     Priority: Low    Mixed hyperlipidemia 04/26/2016     Priority: Low    S/P coronary artery stent placement 04/26/2016     Priority: Low    SOB (shortness of breath) 04/26/2016     Priority: Low    Coronary artery disease involving native coronary artery of native heart without angina pectoris      Priority: Low    Hiatal hernia 04/20/2015     Priority: Low    Dysphagia 03/13/2015     Priority: Low    Hoarseness, chronic 03/13/2015     Priority: Low    Chronic heartburn 03/13/2015     Priority: Low    History of tobacco abuse      Priority: Low    Leg pain 08/25/2011     Priority: Low    Hyperlipidemia      Priority: Low    Coronary atherosclerosis of native coronary artery      Priority: Low    MI (myocardial infarction) ([facility]      Priority: Low    COPD (chronic obstructive pulmonary disease) (HCC)      Priority: Low    Bronchitis      Priority: Low     Past Medical History:   Diagnosis Date    Arthritis     Bronchitis     Cancer ([facility]     Skin Cancer REMOVED RT ARM & LT EAR    Chronic back pain     Chronic cholecystitis without calculus 5/19/2017    Chronic GERD     COPD [facility]chronic obstructive [facility] disease) ([facility]     Coronary artery disease of native artery of native heart with stable angina pectoris [facility])     Coronary atherosclerosis of native coronary artery     s/p PTCA and stent placement to the LAD and RCA/7 stents    DDD (degenerative disc disease), lumbar 12/4/2018    History of blood transfusion     WITH RT SHOULDER SURGERY    History of tobacco abuse 2010    Hyperlipidemia     Hypertension     MI (myocardial infarction) ([address]     Old, inferior wall    Moderate aortic regurgitation 08/14/2017    mod-severe AR.  Moderate aortic stenosis 08/14/2017    mod-severe aortic stenosis.  Pain management 2021    PT SEES PAIN MANAGEMENT FOR CHRONIC BACK PAIN    Spinal stenosis of lumbar region with neurogenic claudication 12/4/2018     Past Surgical History:   Procedure Laterality Date    BACK SURGERY      s/p laminectomy    CARDIAC CATHETERIZATION  08/10/04 [facility]      CARDIAC [facility]HETERIZATION  12/10/03  [facility]    CARDIAC CATHETERIZATION  08/29/03 [facility]    CARDIAC CATHETERIZATION  02/16/01 [facility]    CARDIAC CATHETERIZATION  05/25/2009    EF is estimated to be 50%. See scanned document.  CARDIAC CATHETERIZATION N/A 03/2016    stent placement    CARDIAC CATHETERIZATION  08/2017    no PCI    CARDIAC CATHETERIZATION  07/11/2019    Drug-eluting stents placed to mid LAD and mid RCA, normal LV    CHOLECYSTECTOMY, LAPAROSCOPIC N/A 5/19/2017    CHOLECYSTECTOMY LAPAROSCOPIC performed by Molly Archuleta MD at [facility]  05/28/2015    Dr. Wyatt Lindquist: No Polyps   10yr recall    CORONARY ANGIOPLASTY WITH STENT PLACEMENT  3/16    stent to LAD    JOINT REPLACEMENT      Left thumb    JOINT REPLACEMENT Right     JOINT REPLACEMENT      KNEE SURGERY      s/p Rt.  knee replacement    LEG SURGERY Right     Broken leg with surgical repair    LUMBAR SPINE SURGERY N/A 12/4/2018    L1-5 LAMINECTOMY performed by Monica Tim MD at [address] [place] [address] [place] EGD TRANSORAL BIOPSY SINGLE/MULTIPLE N/A 5/18/2017    Dr Gabrielle Valentine, Amelie (-), biliary colic, surgical referral    UT EGD TRANSORAL BIOPSY SINGLE/MULTIPLE N/A 2018    Dr JAIDEN Torres-w/dilation over wire-51 French-Distal esophagitis, gastritis    REMOVE HARDWARE SPINE N/A 2018    I AND D LUMBAR INCISION SEROMA performed by Monica Tim MD at [address] Right 2019    RIGHT REVERSE TOTAL SHOULDER ARTHROPLASTY performed by Rebeca Hugo MD at [address] [place] [address] Dr ENDOSCOPY  2015    Dr. Wyatt Lindquist: urkhsana neg,,normal, empiric dilatation with 51F    UPPER GASTROINTESTINAL ENDOSCOPY N/A 2018    Dr JAIDEN Torres-w/dilation over wire-51 French-Distal esophagitis, gastritis     Family History   Problem Relation Age of Onset    Cancer Mother     Lung Cancer Mother     Heart Disease Father     Cancer Father     Liver Cancer Father     Lung Cancer Father     Diabetes Maternal Grandmother     Heart Disease Maternal Grandfather     Cancer Maternal Grandfather     Stroke Paternal Grandmother     Stomach Cancer Sister     Colon Cancer Neg Hx     Colon Polyps Neg Hx     Esophageal Cancer Neg Hx     Liver Disease Neg Hx     Rectal Cancer Neg Hx      Social History     Tobacco Use    Smoking status: Former Smoker     Packs/day: 0.00     Types: Cigarettes     Quit date: 12/10/2009     Years since quittin.2    Smokeless tobacco: Never Used   Substance Use Topics    Alcohol use:  Yes     Alcohol/week: 42.0 standard drinks     Types: 42 Cans of beer per week     Drinks per session: 5 or 6     Comment: daily      Current Outpatient Medications   Medication Sig Dispense Refill    predniSONE (DELTASONE) 20 MG tablet Take 2 tablets QID day prior and 2 tablets morning of procedure 10 tablet 0    diphenhydrAMINE (BENADRYL ALLERGY) 25 MG tablet Take 1 tablet QID day prior to procedure and 1 tablet morning of 5 tablet 0    cimetidine (TAGAMET) 300 MG tablet Take 1 tablet QID day prior to procedure 4 tablet 0    indomethacin (INDOCIN) 50 MG capsule Take 50 mg by mouth daily as needed  Multiple Vitamin (MULTIVITAMIN) TABS tablet Take 1 tablet by mouth daily      amLODIPine (NORVASC) 2.5 MG tablet Take 1 tablet by mouth daily 90 tablet 3    metoprolol tartrate (LOPRESSOR) 25 MG tablet TAKE 1/2 TABLET BY MOUTH TWICE A DAY 90 tablet 0    pantoprazole (PROTONIX) 40 MG tablet TAKE 1 TABLET BY MOUTH DAILY TAKE DAILY FIRST THING IN THE MORNING ON AN EMPTY STOMACH. 90 tablet 3    ezetimibe (ZETIA) 10 MG tablet TAKE 1 TABLET BY MOUTH EVERY DAY (Patient taking differently: Take 10 mg by mouth nightly PT TAKES AT NIGHT) 30 tablet 5    hydroxychloroquine (PLAQUENIL) 200 MG tablet Take 200 mg by mouth 2 times daily      clopidogrel (PLAVIX) 75 MG tablet TAKE 1 TABLET BY MOUTH EVERY DAY 90 tablet 3    oxyCODONE-acetaminophen (PERCOCET) 7.5-325 MG per tablet Take 1 tablet by mouth every 4 hours as needed for Pain.  gabapentin (NEURONTIN) 800 MG tablet Take 800 mg by mouth 2 times daily .  nitroGLYCERIN (NITROSTAT) 0.4 MG SL tablet Place 1 tablet under the tongue every 5 minutes as needed 1 tablet as needed 25 tablet 3     No current facility-administered medications for this visit. Allergies: Codeine, Iv [iodides], Hydrocodone, and Statins    Review of Systems  Review of Systems   Constitutional: Negative for activity change, diaphoresis, fatigue, fever and unexpected weight change. HENT: Negative for facial swelling and nosebleeds. Eyes: Negative for redness and visual disturbance. Respiratory: Positive for shortness of breath (mild). Negative for cough, chest tightness and wheezing. Cardiovascular: Positive for chest pain (burning type). Negative for palpitations and leg swelling. Gastrointestinal: Negative for abdominal pain, nausea and vomiting. Endocrine: Negative for cold intolerance and heat intolerance. Genitourinary: Negative for dysuria and hematuria. Musculoskeletal: Negative for arthralgias and myalgias. Skin: Negative for pallor and rash. Neurological: Negative for dizziness, seizures, syncope, weakness and light-headedness. Hematological: Bruises/bleeds easily. Psychiatric/Behavioral: Negative for agitation. The patient is not nervous/anxious. Objective  Vital Signs - /68   Pulse 58   Ht 5' 7\" (1.702 m)   Wt 179 lb (81.2 kg)   BMI 28.04 kg/m²   Physical Exam  Vitals signs and nursing note reviewed. Constitutional:       General: He is not in acute distress. Appearance: Normal appearance. He is well-developed. HENT:      Head: Normocephalic and atraumatic. Right Ear: Hearing and external ear normal.      Left Ear: Hearing and external ear normal.      Nose: Nose normal.   Eyes:      General:         Right eye: No discharge. Left eye: No discharge. Pupils: Pupils are equal, round, and reactive to light. Neck:      Thyroid: No thyromegaly. Vascular: No JVD. Trachea: No tracheal deviation. Cardiovascular:      Rate and Rhythm: Normal rate and regular rhythm. Heart sounds: Murmur (2/6 systolic) present. No friction rub. No gallop. Comments: No carotid bruit  Pulmonary:      Effort: Pulmonary effort is normal. No respiratory distress. Breath sounds: Normal breath sounds. No wheezing or rales. Abdominal:      Palpations: Abdomen is soft. Tenderness: There is no abdominal tenderness. Musculoskeletal:         General: No swelling or deformity. Right lower leg: No edema. Left lower leg: No edema. Comments: Normal gait and station   Skin:     General: Skin is warm and dry. Findings: Bruising (arms) present. No rash. Neurological:      Mental Status: He is alert and oriented to person, place, and time. Cranial Nerves: No cranial nerve deficit. Psychiatric:         Behavior: Behavior normal.         Judgment: Judgment normal.       Data:  Cardiac Cath 7/11/19  Conclusions    Double vessel disease with in-stent restenosis in mid LAD and mid RCA.  Normal LV ejection fraction.   Moderate aortic stenosis.   Successful PCI with drug-eluting stent to mid LAD and mid RCA.   Medical management. Heart catheterization on March 16, 2021   Conclusions      Nonobstructive CAD. Patent stent mid-RCA. Patent stent in mid LAD. Intermediate restenosis proximal OM1 stent. Normal LV systolic function. Severe aortic stenosis (mean gradient 40 mmHg)     Echo 5/19  Summary   The aortic valve is trileaflet. Moderate calcification with mildly reduced leaflet separation. Mild-moderate aortic stenosis is present. The aortic valve area is 1.5 cm2 with a maximum gradient of 47 mmHg and a   mean gradient of 27 mmHg. Mild aortic regurgitation noted. Normal left ventricular size with preserved LV function and an estimated   ejection fraction of approximately 55-60%. No regional wall motion abnormalities identified. Grade I diastolic dysfunction. No evidence of left ventricular mass or thrombus noted. 2D echo on March 4, 2021   Conclusions      Summary   Normal left ventricular chamber size and systolic function. Mild concentric left ventricular hypertrophy. Left ventricular ejection fraction is visually estimated at 60%. Aortic valve leaflets are severely thickened. There is moderately   decreased excursion. Moderate aortic stenosis is present. The peak gradient across the aortic valve is 75 mmHg. The mean gradient across the aortic valve is 36 mmHg. Mild aortic regurgitation. The left atrium is moderately dilated. Mild right atrial enlargement.       Signature     Doppler Measurements:      AV Velocity:0.84 cm/s                MV Peak E-Wave: 71.1 cm/s   AV Peak Gradient: 75 mmHg            MV Peak A-Wave: 104 cm/s   AV Mean Gradient: 36 mmHg            MV E/A Ratio: 0.68 %   AV Area (Continuity):0.84 cm^2       MV Peak Gradient: 2.02 mmHg   TR Velocity:199 cm/s                 MV P1/2t: 32 msec   TR Gradient:15.84 mmHg MVA by VLI8.79 cm^2   Estimated RAP:3 mmHg   RVSP:19 mmHg   ----------------------------------------------------------------   Electronically signed by Collin Brennan DO(Interpreting   physician) on 03/04/2021 01:21 PM   ----------------------------------------------------------------      Lab Results   Component Value Date    CHOL 188 01/13/2021    TRIG 53 01/13/2021    HDL 81 01/13/2021    [address] 96 01/13/2021     Lab Results   Component Value Date    ALT 15 01/13/2021    AST 25 01/13/2021     Assessment:      Severe symptomatic degenerative aortic stenosis:    I agree with the referring cardiologist that the patient's aortic stenosis has progressed significantly over the past couple of years, the patient is becoming symptomatic now with limitation of daily activities, I reviewed the last echo which showed Aortic Valve area of 0.8 cm² and mean/peak gradient of 38/75 mmHg. 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     Case will be formally presented at our weekly structural heart / TAVR team meeting for decision making and planning of the case Our structural heart coordinator will help arrange for follow up on all this plans including scheduling the procedure.        Both patient and family expressed understanding, answered all their questions  They agreed to proceed with the evaluation and testing      Neo Weiner MD, [place] [id number] Cardiologist, Endovascular Specialist   Medical Director, Sara Riojas

## 2021-03-22 NOTE — PROGRESS NOTES
[facility] Cardiothoracic Surgery  [address], [place] [address]  Phone: [phone]  Fax: [phone]    Name: Casper Lim  : 1955    DATE: 3/22/2021        The patient is a pleasant 69-year-old male former cigarette smoker who has a known history of coronary disease as well as aortic stenosis. He has a history of undergoing multivessel PCI and stenting over the last several years. Had a known cardiac murmur and an echocardiogram in the past is demonstrated mild to moderate calcific aortic stenosis. He has recently presented with increasing episodes of weakness shortness of breath dyspnea on exertion as well as chest discomfort. An echocardiogram performed on 3/4/2021 reveals a transaortic gradient mean of 36 mmHg consistent with severe calcific aortic stenosis. He was seen and evaluated by Dr. Alisha Rivas who performed cardiac catheterization on 3/16/2021. This revealed normal right-sided heart pressures with a right atrial pressure of 8 a pulmonary artery pressure 41/16 the transvalvular gradient was peak to peak 54 mmHg with a mean gradient of 42 mmHg a calculated valve area 0.98 cm². 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 The left ventricle shows mild global hypokinesis with ejection fraction of 50%. The left main artery is calcified but not stenotic the LAD has multiple proximal stents with mild disease the circumflex artery has multiple stents with a large obtuse marginal branch but no areas of occlusion the RCA also has multiple stents with proximal mid and distal vessels involved but nothing stenotic.   Because the presence of severe calcific aortic stenosis which is now quite symptomatic the patient was sent to me for surgical evaluation to discuss the options available for treatment. Past Medical History:   Diagnosis Date    Arthritis     Bronchitis     Cancer ([facility]     Skin Cancer REMOVED RT ARM & LT EAR    Chronic back pain     Chronic cholecystitis without calculus 5/19/2017    Chronic GERD     COPD (chronic obstructive pulmonary disease) (HCC)     Coronary artery disease of native artery of native heart with stable angina pectoris ([facility]     Coronary atherosclerosis of native coronary artery     s/p PTCA and stent placement to the LAD and RCA/7 stents    DDD (degenerative disc disease), lumbar 12/4/2018    History of blood transfusion     WITH RT SHOULDER SURGERY    History of tobacco abuse 2010    Hyperlipidemia     Hypertension     MI (myocardial infarction) ([facility]     Old, inferior wall    Moderate aortic regurgitation 08/14/2017    mod-severe AR.  Moderate aortic stenosis 08/14/2017    mod-severe aortic stenosis.  Pain management 2021    PT SEES PAIN MANAGEMENT FOR CHRONIC BACK PAIN    Spinal stenosis of lumbar region with neurogenic claudication 12/4/2018     Past Surgical History:   Procedure Laterality Date    BACK SURGERY      s/p laminectomy    CARD[facility]AC CATHETERIZATION  08/10/04 [facility]      CARDIAC CATHETERIZATION  12/10/03  [facility]    CARDIAC CATHETERIZATION  08/29/03 [facility]    CARDIAC CATHETERIZATION  02/16/01 [facility]    CARDIAC CATHETERIZATION  05/25/2009    EF is estimated to be 50%. See scanned document.     CARDIAC CATHETERIZATION N/A 03/2016    stent placement    CARDIAC CATHETERIZATION  08/2017    no PCI    CARDIAC CATHETERIZATION  07/11/2019    Drug-eluting stents placed to mid LAD and mid RCA, normal LV    CHOLECYSTECTOMY, LAPAROSCOPIC N/A 5/19/2017    CHOLECYSTECTOMY LAPAROSCOPIC performed by Zach Hoyos MD at [address] [place] [address]  05/28/2015    Dr. Yasmin Clark: No Polyps   10yr recall    CORONARY ANGIOPLASTY WITH STENT PLACEMENT  3/16    stent to LAD    JOINT REPLACEMENT      Left thumb    JOINT REPLACEMENT Right     JOINT REPLACEMENT      KNEE SURGERY      s/p Rt. knee replacement    LEG SURGERY Right     Broken leg with surgical repair    LUMBAR SPINE SURGERY N/A 12/4/2018    L1-5 LAMINECTOMY performed by Ja Herbert MD at [address] EGD TRANSORAL BIOPSY SINGLE/MULTIPLE N/A 5/18/2017    Dr Cecilia Machado, Amelie (-), biliary colic, surgical referral    MI EGD TRANSORAL BIOPSY SINGLE/MULTIPLE N/A 11/29/2018    Dr JAIDEN Torres-w/dilation over wire-51 French-Distal esophagitis, gastritis    REMOVE HARDWARE SPINE N/A 12/20/2018    I AND D LUMBAR INCISION SEROMA performed by Ja Herbert MD at [address] Right 12/12/2019    RIGHT REVERSE TOTAL SHOULDER ARTHROPLASTY performed by Leonela Ortiz MD at [address] [place] [address] Dr ENDOSCOPY  03/30/2015    Dr. Henry Alert: amelie neg,,normal, empiric dilatation with 51F    UPPER GASTROINTESTINAL ENDOSCOPY N/A 11/29/2018    Dr JAIDEN Cantu/dilation over wire-51 French-Distal esophagitis, gastritis     Current Outpatient Medications   Medication Sig Dispense Refill    indomethacin (INDOCIN) 50 MG capsule Take 50 mg by mouth daily as needed      Multiple Vitamin (MULTIVITAMIN) TABS tablet Take 1 tablet by mouth daily      amLODIPine (NORVASC) 2.5 MG tablet Take 1 tablet by mouth daily 90 tablet 3    metoprolol tartrate (LOPRESSOR) 25 MG tablet TAKE 1/2 TABLET BY MOUTH TWICE A DAY 90 tablet 0    pantoprazole (PROTONIX) 40 MG tablet TAKE 1 TABLET BY MOUTH DAILY TAKE DAILY FIRST THING IN THE MORNING ON AN EMPTY STOMACH.  90 tablet 3    ezetimibe (ZETIA) 10 MG tablet TAKE 1 TABLET BY MOUTH EVERY DAY (Patient taking differently: Take 10 mg by mouth nightly PT TAKES AT NIGHT) 30 tablet 5    hydroxychloroquine (PLAQUENIL) 200 MG tablet Take 200 mg by mouth 2 times daily      clopidogrel (PLAVIX) 75 MG tablet TAKE 1 TABLET BY MOUTH EVERY DAY 90 tablet 3    oxyCODONE-acetaminophen (PERCOCET) 7.5-325 MG per tablet Take 1 tablet by mouth every 4 hours as needed for Pain.  gabapentin (NEURONTIN) 800 MG tablet Take 800 mg by mouth 2 times daily .  nitroGLYCERIN (NITROSTAT) 0.4 MG SL tablet Place 1 tablet under the tongue every 5 minutes as needed 1 tablet as needed 25 tablet 3    predniSONE (DELTASONE) 20 MG tablet Take 2 tablets QID day prior and 2 tablets morning of procedure 10 tablet 0    diphenhydrAMINE (BENADRYL ALLERGY) 25 MG tablet Take 1 tablet QID day prior to procedure and 1 tablet morning of 5 tablet 0    cimetidine (TAGAMET) 300 MG tablet Take 1 tablet QID day prior to procedure 4 tablet 0     No current facility-administered medications for this visit. Allergies   Allergen Reactions    Codeine Swelling     Lips swelling    Iv [Iodides] Swelling     Contrast dye used for heart cath. Caused face to swell.     Hydrocodone Swelling     lips swelling    Statins      Myalgias       Family History   Problem Relation Age of Onset    Cancer Mother     Lung Cancer Mother     Heart Disease Father     Cancer Father     Liver Cancer Father     Lung Cancer Father     Diabetes Maternal Grandmother     Heart Disease Maternal Grandfather     Cancer Maternal Grandfather     Stroke Paternal Grandmother     Stomach Cancer Sister     Colon Cancer Neg Hx     Colon Polyps Neg Hx     Esophageal Cancer Neg Hx     Liver Disease Neg Hx     Rectal Cancer Neg Hx      Social History     Socioeconomic History    Marital status:      Spouse name: Not on file    Number of children: Not on file    Years of education: Not on file    Highest education level: Not on file   Occupational History    Not on file   Social Needs    Financial resource strain: Not on file    Food insecurity     Worry: Not on file     Inability: Not on file    Transportation needs     Medical: Not on file     Non-medical: Not on file   Tobacco Use    Smoking status: Former Smoker     Packs/day: 0.00     Types: Cigarettes     Quit date: 12/10/2009 Years since quittin.2    Smokeless tobacco: Never Used   Substance and Sexual Activity    Alcohol use: Yes     Alcohol/week: 42.0 standard drinks     Types: 42 Cans of beer per week     Drinks per session: 5 or 6     Comment: daily    Drug use: No    Sexual activity: Yes     Partners: Female   Lifestyle    Physical activity     Days per week: Not on file     Minutes per session: Not on file    Stress: Not on file   Relationships    Social connections     Talks on phone: Not on file     Gets together: Not on file     Attends Shinto service: Not on file     Active member of club or organization: Not on file     Attends meetings of clubs or organizations: Not on file     Relationship status: Not on file    Intimate partner violence     Fear of current or ex partner: Not on file     Emotionally abused: Not on file     Physically abused: Not on file     Forced sexual activity: Not on file   Other Topics Concern    Not on file   Social History Narrative    Not on file         ROS:   HEENT: denies neck pain, sore throat, blurred vision, diplopia. Respiratory: Increasing shortness of breath over the last 6 months. Denies any hemoptysis or wheezes. Cardiovascular: denies angina, palpitations, lower extremity edema. GI: denies abdominal pain, nausea, vomiting, constipation. : denies urinary frequency, or dysuria. Musculoskeletal: No joint pain or swelling. Neurologic: denies diplopia, headache, or ataxia. The other 14 systems have been reviewed and are negative    Physical Exam: /68 (Site: Left Upper Arm, Position: Sitting, Cuff Size: Medium Adult)   Pulse 58   Resp 20   Ht 5' 7\" (1.702 m)   Wt 179 lb (81.2 kg)   SpO2 97%   BMI 28.04 kg/m²   Pleasant middle-age male who appears no acute distress  ENT: Throat is clear., Mucosa clear., Septum intact., No pyorrhea or abscess. Neck: Neck is soft., No cervical masses. , Throat is clear., Neck veins are not distended. , Thyroid is normal size. Respiratory: Clear lung sounds., Normal., No wheezes or rhonchi., No use of accessory muscles. Cardiovascular: Cardiac sounds are clear., Regular rate and rhythm., Peripheral pulses are intact., Extremities exhibit no edema or varicosities. ,  There is a grade 3-4 systolic murmur that radiates into the carotid arteries more so on the right side than the left  Abdomen: Abdomen is soft., No masses or tenderness. , Liver and spleen not palpable., Normal bowel sounds. Lymphatic: No lymphadenopathy in the cervical, axillary, or femoral areas. Musculoskeletal: No clubbing or cyanosis. , Normal muscle tone., Normal range of motion upper and lower extremities. , No joint inflammation or swelling. Skin: No rashes, lesions, or ulcers., No swelling or edema. Neurologic exam: No lateralizing neurologic findings. , Cranial nerves II-XII are normal., Examination of sensation is normal.  Psychiatric: Patient exhibits normal judgement and is oriented to name, time, and place., No anxiety or depression. This 60-year-old male has severe calcific aortic stenosis from  which she is quite symptomatic. He has multivessel stenting but there is no evidence of in-stent restenosis at this time. Considering that he has a \"porcelain\" aorta he would be at high risk for complications with the open surgical approach to replace his aortic valve. I therefore feel that his best option is to undergo TAVR for treatment of his aortic stenosis. I discussed this procedure at length with the patient and his wife I am going to have Dr. Celia Brush,  our structural heart cardiologist see him after be for his evaluation. Further preoperative work-up includes CTA of the chest abdomen and pelvis looking at the carotid arteries as well.      Electronically signed by Damien Shah MD on 3/22/2021 at 4:04 PM

## 2021-04-01 NOTE — PROGRESS NOTES
Called and spoke with patient, have TAVR scheduled for 4/7/2021 at 0900 with arrival of 0730. Patient is to be NPO after midnight. Patient instructed to arrive through front entrance of hospital and make immediate left. Patient advised they can have one person with them but they both must wear a mask. Patient advised may take morning medications with sip of water. Also advised patient must have COVID testing completed on 4/6/2021 anywhere from 800-1100 am at [facility]. Advised patient that they will be able to proceed with procedure as long as test results are negative. Patient made aware that if testing is not resulted evening prior to procedure that they may have to be rescheduled and possibly retested. Given instructions on where to go and to self quarantine between testing and procedure. Patient verbally understood. Called and spoke to Hortencia in cath lab on 4/1/2021 and scheduled procedure. For IV contrast allergy:    Have patient take 25 mg of benadryl QID day prior to procedure. 25 mg of benadryl one tab day of procedure. Tagamet 300mg QID day prior to procedure. Prednisone 20 mg 2 tablets QID day prior to procedure and 2 tablets morning of procedure.

## 2021-04-06 NOTE — H&P
SINGLE/MULTIPLE N/A 2018    Dr JAIDEN Torres-w/dilation over wire-51 French-Distal esophagitis, gastritis    REMOVE HARDWARE SPINE N/A 2018    I AND D LUMBAR INCISION SEROMA performed by Jessie Nur MD at [facility] Right 2019    RIGHT REVERSE TOTAL SHOULDER ARTHROPLASTY performed by Mary Anne Romero MD at [address] [place] [address] Dr ENDOSCOPY  2015    Dr. Kaylee Camejo: rukhsana neg,,normal, empiric dilatation with 51F    UPPER GASTROINTESTINAL ENDOSCOPY N/A 2018    Dr JAIDEN Torres-w/dilation over wire-51 French-Distal esophagitis, gastritis     Family History   Problem Relation Age of Onset    Cancer Mother     Lung Cancer Mother     Heart Disease Father     Cancer Father     Liver Cancer Father     Lung Cancer Father     Diabetes Maternal Grandmother     Heart Disease Maternal Grandfather     Cancer Maternal Grandfather     Stroke Paternal Grandmother     Stomach Cancer Sister     Colon Cancer Neg Hx     Colon Polyps Neg Hx     Esophageal Cancer Neg Hx     Liver Disease Neg Hx     Rectal Cancer Neg Hx      Social History     Tobacco Use    Smoking status: Former Smoker     Packs/day: 0.00     Types: Cigarettes     Quit date: 12/10/2009     Years since quittin.3    Smokeless tobacco: Never Used   Substance Use Topics    Alcohol use: Yes     Alcohol/week: 42.0 standard drinks     Types: 42 Cans of beer per week     Drinks per session: 5 or 6     Comment: daily      Current Outpatient Medications   Medication Sig Dispense Refill    diphenhydrAMINE (BENADRYL ALLERGY) 25 MG tablet Take 1 tablet by mouth 4 times daily Take 25 mg of benadryl 4x day prior to TAVR and 1 tablet the morning of procedure. 5 tablet 0    cimetidine (TAGAMET) 300 MG tablet Take 1 tablet by mouth 4 times daily for 4 doses Take 1 tablet 4x the day prior to TAVR.  4 tablet 0    indomethacin (INDOCIN) 50 MG capsule Take 50 mg by mouth daily as needed      Multiple Vitamin (MULTIVITAMIN) TABS tablet Take 1 tablet by mouth daily      amLODIPine (NORVASC) 2.5 MG tablet Take 1 tablet by mouth daily 90 tablet 3    metoprolol tartrate (LOPRESSOR) 25 MG tablet TAKE 1/2 TABLET BY MOUTH TWICE A DAY 90 tablet 0    pantoprazole (PROTONIX) 40 MG tablet TAKE 1 TABLET BY MOUTH DAILY TAKE DAILY FIRST THING IN THE MORNING ON AN EMPTY STOMACH. 90 tablet 3    ezetimibe (ZETIA) 10 MG tablet TAKE 1 TABLET BY MOUTH EVERY DAY (Patient taking differently: Take 10 mg by mouth nightly PT TAKES AT NIGHT) 30 tablet 5    hydroxychloroquine (PLAQUENIL) 200 MG tablet Take 200 mg by mouth 2 times daily      clopidogrel (PLAVIX) 75 MG tablet TAKE 1 TABLET BY MOUTH EVERY DAY 90 tablet 3    oxyCODONE-acetaminophen (PERCOCET) 7.5-325 MG per tablet Take 1 tablet by mouth every 4 hours as needed for Pain.  gabapentin (NEURONTIN) 800 MG tablet Take 800 mg by mouth 2 times daily .  nitroGLYCERIN (NITROSTAT) 0.4 MG SL tablet Place 1 tablet under the tongue every 5 minutes as needed 1 tablet as needed 25 tablet 3     No current facility-administered medications for this encounter. Allergies: Codeine, Iv [iodides], Hydrocodone, and Statins    Review of Systems  Review of Systems   Constitutional: Negative for activity change, diaphoresis, fatigue, fever and unexpected weight change. HENT: Negative for facial swelling and nosebleeds. Eyes: Negative for redness and visual disturbance. Respiratory: Positive for shortness of breath (mild). Negative for cough, chest tightness and wheezing. Cardiovascular: Positive for chest pain (burning type). Negative for palpitations and leg swelling. Gastrointestinal: Negative for abdominal pain, nausea and vomiting. Endocrine: Negative for cold intolerance and heat intolerance. Genitourinary: Negative for dysuria and hematuria. Musculoskeletal: Negative for arthralgias and myalgias. Skin: Negative for pallor and rash.    Neurological: Negative PCI with drug-eluting stent to mid LAD and mid RCA.   Medical management. Heart catheterization on March 16, 2021   Conclusions      Nonobstructive CAD. Patent stent mid-RCA. Patent stent in mid LAD. Intermediate restenosis proximal OM1 stent. Normal LV systolic function. Severe aortic stenosis (mean gradient 40 mmHg)     Echo 5/19  Summary   The aortic valve is trileaflet. Moderate calcification with mildly reduced leaflet separation. Mild-moderate aortic stenosis is present. The aortic valve area is 1.5 cm2 with a maximum gradient of 47 mmHg and a   mean gradient of 27 mmHg. Mild aortic regurgitation noted. Normal left ventricular size with preserved LV function and an estimated   ejection fraction of approximately 55-60%. No regional wall motion abnormalities identified. Grade I diastolic dysfunction. No evidence of left ventricular mass or thrombus noted. 2D echo on March 4, 2021   Conclusions      Summary   Normal left ventricular chamber size and systolic function. Mild concentric left ventricular hypertrophy. Left ventricular ejection fraction is visually estimated at 60%. Aortic valve leaflets are severely thickened. There is moderately   decreased excursion. Moderate aortic stenosis is present. The peak gradient across the aortic valve is 75 mmHg. The mean gradient across the aortic valve is 36 mmHg. Mild aortic regurgitation. The left atrium is moderately dilated. Mild right atrial enlargement.       Signature     Doppler Measurements:      AV Velocity:0.84 cm/s                MV Peak E-Wave: 71.1 cm/s   AV Peak Gradient: 75 mmHg            MV Peak A-Wave: 104 cm/s   AV Mean Gradient: 36 mmHg            MV E/A Ratio: 0.68 %   AV Area (Continuity):0.84 cm^2       MV Peak Gradient: 2.02 mmHg   TR Velocity:199 cm/s                 MV P1/2t: 32 msec   TR Gradient:15.84 mmHg               MVA by PHT6.88 cm^2   Estimated RAP:3 mmHg   RVSP:19 mmHg ----------------------------------------------------------------   Electronically signed by Eleno Katz DO(Interpreting   physician) on 03/04/2021 01:21 PM   ----------------------------------------------------------------      Lab Results   Component Value Date    CHOL 188 01/13/2021    TRIG 53 01/13/2021    HDL 81 01/13/2021    LDLCALC 96 01/13/2021     Lab Results   Component Value Date    ALT 15 01/13/2021    AST 25 01/13/2021     Assessment:      Severe symptomatic degenerative aortic stenosis:    I agree with the referring cardiologist that the patient's aortic stenosis has progressed significantly over the past couple of years, the patient is becoming symptomatic now with limitation of daily activities, I reviewed the last echo which showed Aortic Valve area of 0.8 cm² and mean/peak gradient of 38/75 mmHg. 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     Case will be formally presented at our weekly structural heart / TAVR team meeting for decision making and planning of the case    Our structural heart coordinator will help arrange for follow up on all this plans including scheduling the procedure. Both patient and family expressed understanding, answered all their questions  They agreed to proceed with the evaluation and testing    ----------------------------    Addendum:  ----------------     Risks, benefits, alternatives of transcatheter TAVR discussed with the patient   I explained the procedure to the patient in detail and fully informed consent obtained.   Acceptable Mallampati score  Consent for general anesthesia will be obtained by anesthesia team  ASA 3      Laura Wells MD, [facility]  Interventional Cardiologist, Endovascular Specialist   Medical Director, [facility]

## 2021-04-07 PROBLEM — Z95.2 S/P TAVR (TRANSCATHETER AORTIC VALVE REPLACEMENT): Status: ACTIVE | Noted: 2021-01-01

## 2021-04-07 NOTE — ANESTHESIA POSTPROCEDURE EVALUATION
Department of Anesthesiology  Postprocedure Note    Patient: Meghna Kerr  MRN: [id number]  YOB: 1955  Date of evaluation: 4/7/2021  Time:  3:10 PM     Procedure Summary     Date: 04/07/21 Room / Location: [facility] CATH LAB    Anesthesia Start: 1128 Anesthesia Stop: 0095    Procedure: CATH LAB WITH ANESTHESIA Diagnosis:       Nonrheumatic aortic (valve) stenosis      S/P TAVR (transcatheter aortic valve replacement)    Scheduled Providers:  Responsible Provider: SHIMON Chappell CRNA    Anesthesia Type: MAC ASA Status: 4          Anesthesia Type: MAC    Danna Phase I: Danna Score: 9    Danna Phase II:      Last vitals: Reviewed and per EMR flowsheets.        Anesthesia Post Evaluation    Patient location during evaluation: PACU  Patient participation: complete - patient participated  Level of consciousness: sleepy but conscious  Pain score: 0  Airway patency: patent  Nausea & Vomiting: no nausea and no vomiting  Complications: no  Cardiovascular status: hemodynamically stable  Respiratory status: acceptable, spontaneous ventilation and face mask  Hydration status: euvolemic

## 2021-04-07 NOTE — PROGRESS NOTES
Hue Ervin arrived to room # 0681 [id number]. Presented with: tavr  Mental Status: Patient is oriented and alert. Vitals:    04/07/21 1606   BP: (!) 113/52   Pulse: 66   Resp: 12   Temp: 97.5 °F (36.4 °C)   SpO2: 93%     Patient safety contract and falls prevention contract reviewed with patient No.  Oriented Patient and Family to room. Call light within reach. Yes.   Needs, issues or concerns expressed at this time: no.      Electronically signed by Rogelio Casas RN on 4/7/2021 at 4:34 PM

## 2021-04-07 NOTE — OP NOTE
Operative Note      Patient: Sima Le  YOB: 1955  MRN: [id number]    Date of Procedure: 4/7/2021    Operators:     : Alexandra Rose   Co-Surgeon: Cliff Clay MD         PREOPERATIVE DIAGNOSIS:    Severe symptomatic aortic stenosis due to bicuspid aortic valve with heavy calcification -severe per recent echo. Discussed all options with patient and family including surgery and TAVR. Patient was evaluated by heart team and deemed not good candidate for open heart surgery due to advanced age and comorbidity. Patient expressed interest in pursuing minimally invasive transcatheter option with TAVR. CT scan showed bicuspid aortic valve with heavy calcification, his echo showed severe AS with aortic valve area of 0.8 cm² peak gradient of 75 mmHg mean of 36 mmHg and ejection fraction of 60%    STS score for mortality 3.08%    CAD-status post intervention to the LAD and RCA with drug-eluting stents stable without symptoms of angina. On medical therapy. Chronic diastolic congestive heart failure due to valvular heart disease NYHA class III, stage C- EF 60% per echo      COPD with history of chronic bronchitis due to long smoking history  Peripheral arterial disease with heavy calcification and tortuosity of both iliac arteries    The patient clinical case & all data was discussed with the team during our weekly multidisciplinary structural heart meeting and TAVR planned with team after discussing all the anatomical and echo, cath data with for appropriate valve type and size selection, access and closure planning and type of anesthesia/echo during the procedure  All team members were in agreement to proceed with the the plan. POSTOPERATIVE DIAGNOSIS:     1. Same        PROCEDURE PERFORMED:       1. Transcatheter aortic valve replacement using a 29 mm CoreValve Evolut Pro+ via Right Femoral approach.   Balloon aortic valve plasty due to bicuspid valve using difficulty in proper position. Post - deployment, there was moderate to severe leak, we decided to post dilate the valve, post TAVR balloon venoplasty was performed using 24 mm balloon which was advanced over the wire and underlying pacing at 180 beats per minutes, this was achieved with 1 inflation, balloon was removed and the valve was assessed with echo, we are pleased with the gradient and there was a mild PVL, this was confirmed with hemodynamic assessment, TTE echo finding. Final mean valve gradient was 12 mmHg     Arterial access was then examined, and closure of the primary access using double Perclose and secondary access with femoral artery using Angio-Seal with complete hemostasis. At the end pigtail catheter was used to take aortofemoral iliac angiogram which showed fully patent both femoral & iliac arteries without evidence of thrombosis or dissection or bleeding     There was done with no complication. The protamine was then administered     The patient tolerated the procedure well. Patient transferred to the PACU area for recovery, after full recovery, patient will be transferred to PCU for observation overnight     Complication-none    Estimated blood loss-100 cc      IMPRESSION:    Successful Transcatheter Artic valve replacement using 29 mm CoreValve Evolut Pro+ without any immediate complications     RECOMMENDATIONS:  Post TAVR protocol       Plan   Discussed with patient's family and nursing staff. Patient will stay overnight in the PCU with Telemetry monitoring, limited 2D echo in the morning at bedside per protocol    Will start on Dual anti-platelet agents including ASA and Plavix for 3-6 months, then Aspirin indefinelty afterward.      Will be discharged home after 1 or 2 days         Cyndie Myers MD, [facility] [place] [address] Cardiologist, Endovascular Specialist   Medical Director, Sara Riojas        Electronically signed by Alexandra Cuevas MD on 4/7/2021 at 4:09 PM

## 2021-04-07 NOTE — PROGRESS NOTES
4 Eyes Skin Assessment    Lashonda Mota is being assessed upon: Admission    I agree that I, Jose Moyer, along with *** (either 2 RN's or 1 LPN and 1 RN) have performed a thorough Head to Toe Skin Assessment on the patient. ALL assessment sites listed below have been assessed. Areas assessed by both nurses:     [x]   Head, Face, and Ears   [x]   Shoulders, Back, and Chest  [x]   Arms, Elbows, and Hands   [x]   Coccyx, Sacrum, and Ischium  [x]   Legs, Feet, and Heels    Does the Patient have Skin Breakdown?  No    Andi Prevention initiated: No  Wound Care Orders initiated: No    WOC nurse consulted for Pressure Injury (Stage 3,4, Unstageable, DTI, NWPT, and Complex wounds) and New or Established Ostomies: No        Primary Nurse eSignature: Jose Moyer RN on 4/7/2021 at 4:34 PM      Co-Signer eSignature: {Esignature:[id number]}

## 2021-04-07 NOTE — ANESTHESIA PRE PROCEDURE
Department of Anesthesiology  Preprocedure Note       Name:  Sima Le   Age:  76 y.o.  :  1955                                          MRN:  [id number]         Date:  2021      Surgeon: * Surgery not found *    Procedure:     Medications prior to admission:   Prior to Admission medications    Medication Sig Start Date End Date Taking? Authorizing Provider   diphenhydrAMINE (BENADRYL ALLERGY) 25 MG tablet Take 1 tablet by mouth 4 times daily Take 25 mg of benadryl 4x day prior to TAVR and 1 tablet the morning of procedure. 21  SHIMON Smith CNP   cimetidine (TAGAMET) 300 MG tablet Take 1 tablet by mouth 4 times daily for 4 doses Take 1 tablet 4x the day prior to TAVR. 21  SHIMON Marion CNP   indomethacin (INDOCIN) 50 MG capsule Take 50 mg by mouth daily as needed 3/1/21   Historical Provider, MD   Multiple Vitamin (MULTIVITAMIN) TABS tablet Take 1 tablet by mouth daily    Historical Provider, MD   amLODIPine (NORVASC) 2.5 MG tablet Take 1 tablet by mouth daily 21   SHIMON Nettles   metoprolol tartrate (LOPRESSOR) 25 MG tablet TAKE 1/2 TABLET BY MOUTH TWICE A DAY 21   SHIMON Smith CNP   pantoprazole (PROTONIX) 40 MG tablet TAKE 1 TABLET BY MOUTH DAILY TAKE DAILY FIRST THING IN THE MORNING ON AN EMPTY STOMACH. 20   Floretta Buerger, APRN   ezetimibe (ZETIA) 10 MG tablet TAKE 1 TABLET BY MOUTH EVERY DAY  Patient taking differently: Take 10 mg by mouth nightly PT TAKES AT NIGHT 10/19/20   SHIMON Estes NP   hydroxychloroquine (PLAQUENIL) 200 MG tablet Take 200 mg by mouth 2 times daily 6/15/20   Historical Provider, MD   clopidogrel (PLAVIX) 75 MG tablet TAKE 1 TABLET BY MOUTH EVERY DAY 20   SHIMON Nettles   oxyCODONE-acetaminophen (PERCOCET) 7.5-325 MG per tablet Take 1 tablet by mouth every 4 hours as needed for Pain.     Historical Provider, MD   gabapentin (NEURONTIN) 800 MG tablet Take 800 mg by mouth 2 times daily . 6/13/16   Historical Provider, MD   nitroGLYCERIN (NITROSTAT) 0.4 MG SL tablet Place 1 tablet under the tongue every 5 minutes as needed 1 tablet as needed 6/18/15   SHIMON Jauregui CNP       Current medications:    No current facility-administered medications for this encounter. Allergies: Allergies   Allergen Reactions    Codeine Swelling     Lips swelling    Iv [Iodides] Swelling     Contrast dye used for heart cath. Caused face to swell.     Hydrocodone Swelling     lips swelling    Statins      Myalgias         Problem List:    Patient Active Problem List   Diagnosis Code    Hyperlipidemia E78.5    Coronary atherosclerosis of native coronary artery I25.10    MI (myocardial infarction) ([facility] I21.9    COPD (chronic obstructive pulmonary disease) ([facility] J44.9    Bronchitis J40    Leg pain M79.606    History of tobacco abuse Z87.891    Dysphagia R13.10    Hoarseness, chronic R49.0    Chronic heartburn R12    Hiatal hernia K44.9    Coronary artery disease involving native coronary artery of native heart without angina pectoris I25.10    Mixed hyperlipidemia E78.2    S/P coronary artery stent placement Z95.5    SOB (shortness of breath) R06.02    Right upper quadrant abdominal pain R10.11    Acalculous cholecystitis K81.9    Chest discomfort R07.89    Aortic valve stenosis I35.0    Arthritis of knee M17.10    Gastroesophageal reflux disease K21.9    Gout M10.9    Neuropathy G62.9    Rheumatoid arthritis ([facility] [facility] M06.9    Alternating constipation and diarrhea R19.8    DDD (degenerative disc disease), lumbar M51.36    Spinal stenosis of lumbar region with neurogenic claudication M48.062    Lumbar stenosis with neurogenic claudication M48.062    Abnormal myocardial perfusion study R94.39    Osteoarthritis of both shoulders M19.011, M19.012    Renal cyst, left N28.1    Complex renal cyst N28.1    Hypertrophy of prostate with urinary obstruction N40.1, N13.8 REPLACEMENT      Left thumb    JOINT REPLACEMENT Right     JOINT REPLACEMENT      KNEE SURGERY      s/p Rt. knee replacement    LEG SURGERY Right     Broken leg with surgical repair    LUMBAR SPINE SURGERY N/A 2018    L1-5 LAMINECTOMY performed by Pauline Sanchez MD at [facility] 43 EGD TRANSORAL BIOPSY SINGLE/MULTIPLE N/A 2017    Dr Lorelei Mckeon, Amelie (-), biliary colic, surgical referral    [facility] E[facility] TRANSORAL BIOPSY SINGLE/MULTIPLE N/A 2018    Dr JAIDEN Ramirezw/dilation over wire-51 Slovenian-Distal esophagitis, gastritis    REMOVE HARDWARE SPINE N/A 2018    I AND D LUMBAR INCISION SEROMA performed by Pauline Sanchez MD at [facility] Right 2019    RIGHT REVERSE TOTAL SHOULDER ARTHROPLASTY performed by Yelitza Laboy MD at [address] [place] [address] Dr ENDOSCOPY  2015    Dr. Lidia Rebolledo: amelie neg,,normal, empiric dilatation with 51F    UPPER GASTROINTESTINAL ENDOSCOPY N/A 2018    Dr JAIDEN Cantu/dilation over wire-51 Slovenian-Distal esophagitis, gastritis       Social History:    Social History     Tobacco Use    Smoking status: Former Smoker     Packs/day: 0.00     Types: Cigarettes     Quit date: 12/10/2009     Years since quittin.3    Smokeless tobacco: Never Used   Substance Use Topics    Alcohol use: Yes     Alcohol/week: 42.0 standard drinks     Types: 42 Cans of beer per week     Drinks per session: 5 or 6     Comment: daily                                Counseling given: Not Answered      Vital Signs (Current): There were no vitals filed for this visit.                                            BP Readings from Last 3 Encounters:   21 119/68   21 119/68   21 138/72       NPO Status:                                                                                 BMI:   Wt Readings from Last 3 Encounters:   21 179 lb (81.2 kg)   21 179 lb (81.2 kg)   21 168 lb (76.2 kg)     There is no height or weight on file to calculate BMI.    CBC:   Lab Results   Component Value Date    WBC 6.3 03/16/2021    RBC 4.39 03/16/2021    HGB 13.6 03/16/2021    HCT 42.3 03/16/2021    MCV 96.4 03/16/2021    RDW 14.1 03/16/2021     03/16/2021       CMP:   Lab Results   Component Value Date     03/16/2021    K 4.7 03/16/2021     03/16/2021    CO2 27 03/16/2021    BUN 13 03/16/2021    CREATININE 0.6 03/16/2021    CREATININE 0.7 03/16/2021    GFRAA >60 03/16/2021    LABGLOM >60 03/16/2021    GLUCOSE 132 03/16/2021    PROT 5.0 12/12/2019    CALCIUM 9.4 03/16/2021    BILITOT 0.3 12/12/2019    ALKPHOS 78 12/12/2019    AST 25 01/13/2021    ALT 15 01/13/2021       POC Tests: No results for input(s): POCGLU, POCNA, POCK, POCCL, POCBUN, POCHEMO, POCHCT in the last 72 hours. Coags:   Lab Results   Component Value Date    PROTIME 12.9 12/10/2019    INR 1.03 12/10/2019    APTT 26.4 11/12/2018       HCG (If Applicable): No results found for: PREGTESTUR, PREGSERUM, HCG, HCGQUANT     ABGs:   Lab Results   Component Value Date    PHART 7.388 03/16/2021    PO2ART 68 03/16/2021    DWT3JUF 40 03/16/2021    BEART -1 03/16/2021    Q3KTEXJY 93 03/16/2021        Type & Screen (If Applicable):  No results found for: LABABO, LABRH    Drug/Infectious Status (If Applicable):  No results found for: HIV, HEPCAB    COVID-19 Screening (If Applicable):   Lab Results   Component Value Date    COVID19 Not Detected 04/07/2021    COVID19 Not Detected 03/12/2021           Anesthesia Evaluation  Patient summary reviewed and Nursing notes reviewed no history of anesthetic complications:   Airway: Mallampati: I  TM distance: >3 FB   Neck ROM: full  Mouth opening: > = 3 FB Dental:    (+) upper dentures and lower dentures      Pulmonary:   (+) COPD:  shortness of breath: chronic,      (-) sleep apnea and not a current smoker                          ROS comment: CTA chest:  Impression  Impression:  1.  Images will be submitted to an outside institution where precise  measurements will be obtained for TAVR planning. 2. Ectatic ascending aorta measuring 4.1 cm diameter. 3. Moderate aortic valve calcification. 4. Heavy coronary artery calcification. 5. Moderate emphysema. Cardiovascular:  Exercise tolerance: poor (<4 METS),   (+) hypertension:, valvular problems/murmurs: AS, angina:, past MI:, CAD:, CABG/stent (stenting x 7 with various dates last placed 2 years ago):, HURLEY: after ambulating 1 flight of stairs, murmur,     (-) pacemaker    ECG reviewed      Echocardiogram reviewed    Cleared by cardiology     Beta Blocker:  Dose within 24 Hrs      ROS comment: Echo:   Summary   Normal left ventricular chamber size and systolic function. Mild concentric left ventricular hypertrophy. Left ventricular ejection fraction is visually estimated at 60%. Aortic valve leaflets are severely thickened. There is moderately   decreased excursion. Moderate aortic stenosis is present. The peak gradient across the aortic valve is 75 mmHg. The mean gradient across the aortic valve is 36 mmHg. Mild aortic regurgitation. The left atrium is moderately dilated. Mild right atrial enlargement. Neuro/Psych:      (-) seizures and CVA           GI/Hepatic/Renal:   (+) hiatal hernia, GERD: well controlled,      (-) liver disease and no renal disease (renal cyst surviellance only)      ROS comment: Hx of esophageal dilation    No dysphagia. Endo/Other:    (+) blood dyscrasia (plavix use last dose this am): arthritis:., no malignancy/cancer (skin cancer). (-) diabetes mellitus, no malignancy/cancer (skin cancer)               Abdominal:           Vascular:     - DVT and PE.       ROS comment: CTA abdomen and pelvis:  Impression:  1.  Images will be submitted to an outside institution where precise  measurements will be obtained for TAVR planning. 2.  Nonaneurysmal mildly ectatic abdominal aorta.  Heavy  atherosclerotic calcification throughout the arterial vasculature of  the abdomen and pelvis. 3.  Colonic diverticulosis without evidence of diverticulitis. .                               Anesthesia Plan      MAC     ASA 4     (Case discussed with Dr. Tomasa De La Paz. We will proceed with MAC and surface echo for this case. Spoke with Dr. Tomasa De La Paz regarding pt's carotid bruit. He will review pt's imaging prior to procedure.)  Induction: intravenous. MIPS: Postoperative opioids intended and Prophylactic antiemetics administered. Anesthetic plan and risks discussed with patient. Use of blood products discussed with patient whom consented to blood products.                  Elena Mireles,    4/7/2021

## 2021-04-07 NOTE — LETTER
[facility]  Cardiac Rehab Department  [address] [place] [address], [address]  [phone]  Toll Free [phone]          April 9, 2021    Dear Meghna Kerr,    Please find this informational packet that has been sent to you on heart disease and the guidelines you are to follow concerning your present cardiac condition and immediate recovery. Due to your recent transcatheter aortic valve replacement (TAVR) you now qualify for participation in a Phase II Outpatient Cardiac Rehab Program.  This elective service has been shown to significantly reduce cardiac mortality by 26-31% and increase longevity by as much as 5 years among patients such as yourself! A brochure and info sheet have been included in this mailing for the purpose of providing you with a brief overview of program components. The [facility] is currently caring for patients in accordance with all mandated regulatory COVID-19 guidelines and practices. You may contact us at [phone] to set up a free, no obligation \"orientation & assessment\" appointment to learn more and take full advantage of this life changing opportunity. Since you live beyond a 25 mile radius of [facility] you may opt to contact [facility] at [phone] to check on program availability and thus enroll at that site. Furthermore, feel free to reach out to us with any questions or concerns and our staff will be more than pleased to assist you. Thank you. To the betterment of your health,        [facility] Cardiac Rehab Staff    Demario Diaz Cap, BS, MA            Laura Tang, RN  Registered Nurse  Exercise Physiologist  Registered Nurse

## 2021-04-07 NOTE — PROGRESS NOTES
Patient brought to [facility] and placed in room 1. Covid-19 rapid test obtained and awaiting results.

## 2021-04-07 NOTE — PROGRESS NOTES
Patient complained of mid chest burning. EKG done and shown to Dr. Ferdinand Davis. GI cocktail ordered and given. Patient states pain worse when breathing in and that GI cocktail is helping \"somewhat. \"

## 2021-04-07 NOTE — OP NOTE
Operative Note      Patient: Katlyn Napier  YOB: 1955  MRN: [id number]    Date of Procedure: 2021                                                                                         TAVR Operative Note           Patient Name: Katlyn Napier   MRN: [id number]   Age: 76 y.o. : 1955   Admission Date: 2021   Room/Bed: [facility] CATH POOL/NONE   PRIMARY CARE PROVIDER   SHIMON Saucedo       DATE OF PROCEDURE: 2021       Operators:    : Dr. Ankit Turcios   Co-Surgeon: Konrad Bishop MD      PREOPERATIVE DIAGNOSIS:     1. Severe symptomatic aortic stenosis. With mild to moderate aortic valve insufficiency  2. Advanced Age. Yes  3. Frailty. Yes  4. 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 Same        PROCEDURE PERFORMED:       1. Transcatheter aortic valve replacement using a 29  mm CoreValve Pro+ via Right Femoral approach. 2. Arterial access both common femoral arteries. 3. Selective angiography both femoral and iliac arteries  4. Multiple aortic root angiograms. 5.   Temporary venous pacemaker placement via Left Femoral  vein  6. Closure of the primary access using double Perclose Proglide devices             7.   Predeployment balloon valvuloplasty of the aortic valve using a 22 mm balloon              8.   Post procedure balloon valvuloplasty of the 29 mm evolute aortic valve     Anesthesia: MAC   anesthesia         DESCRIPTION OF PROCEDURE:     After obtaining consent, patient was brought to the cardiac catheterization lab the patient was sedated with intravenous medications, a sterile prep and drape and administration of appropriate preoperative prophylactic antibiotics, the patient was prepped and draped in a sterile manner. A time - out procedure was completed. Arterial cannulation in both femoral arteries and vein were obtained. Fluoroscopy and US were utilized.   It was noted the patient had severe tortuosity of his aortoiliac system with severe calcification of all vessels   A temporary transvenous pacemaker lead was advanced through the left femoral vein and advanced into the apex of the right ventricle. He was then tested for pacing thresholds  A pigtail catheter was then positioned over the noncoronary cusp of the aortic valve. The patient then was fully heparinized, the right common femoral artery access was used to advance 18 Latvian sheath over the Amplatz super stiff wire. The aortic valve was crossed using AL-1 catheter and straight tipped wire, then AL1 catheter was advanced through the sheath to cross the aortic valve and measure the transvalvular gradient and LVEDP. Having completed this, we then put the [facility] wire in the left ventricle after exchange with pigtail catheter and exchange length J-wire a balloon valvuloplasty was then performed advancing a 22 mm balloon over the guidewire inflating the balloon while rapid pacing. There appeared to be a good result. There was an attempt to insert the 34 mm evolute transcatheter valve over the guidewire, using the sheathless approach. However the tip of the nose, would not advance beyond the midportion of the right iliac artery because of severe calcification and tortuosity. The decision was made to downsize to a 29 mm valve such that it could be passed through the sheath that had been deployed in the right iliac artery up to the aortic bifurcation. This was successful in advancing the 29 mm valve across the native aortic valve without difficulty. After that the valve was deployed 80% under rapid ventricular pacing. The first 2 attempts deployment resulted in what appeared to be the valve being deployed to deep and 2 more attempts at repositioning were made unsuccessfully. Finally the valve was deployed in about 3 to 4 mm in depth but there was no rhythm change the patient maintain a normal sinus rhythm.   The pigtail catheter was advanced across the valve and LVEDP was measured but the result was at least 50 mmHg the transthoracic echo suggested a central area of insufficiency on the valve without evidence of paravalvular leak. The decision was therefore made to try to balloon this valve to see if one of the leaflets had been \"hung up\". Therefore a 24 mm balloon was advanced over the guidewire inflated at least twice under rapid ventricular pacing. A transthoracic echo now demonstrated a significant reduction in the aortic insufficiency and a pigtail catheter placed to the left ventricle now showed that the LVEDP had gone from 52 down to 35 mmHg. There was discussion as to whether or not a second TAVR valve should be inserted inside the newly deployed valve. But because of difficulty negotiating the patient's vasculature this option was not desirable. . The valve appeared to be functioning fairly well at this point and the patient remained hemodynamically stable in sinus rhythm          Arterial access was then examined, and closure of the right femoral primary access site using double Perclose and secondary access with femoral artery using Angio-Seal with complete hemostasis. Completion angiography demonstrated good vascularity on the right femoral and iliac system without evidence of bleeding  Left femoral venous access hemostasis was obtained with manual compression    There was done with no complication. Estimated blood loss was approximately 500 cc    The protamine was then administered     The patient tolerated the procedure well. IMPRESSION:    1. Successful Transcatheter Artic valve replacement using 29 mm  CoreValve evolut Pro+             2.   Successful post deployment balloon valvuloplasty  RECOMMENDATIONS:  1. Post TAVR protocol     Discussed with patient family and nursing.

## 2021-04-08 NOTE — CARE COORDINATION
Informed by pt's RN Breana and RT pt qualifies for home O2 needs. Spoke with pt and spouse present at bedside who state they have no preference for supplier and agreeable to using [facility]. SANDRA notified Delfino requesting portable DME delivered to [facility] prior to dc. SANDRA faxed referral to [facility] for needs.    Delfino Ph: [phone]  Fa: [phone]

## 2021-04-08 NOTE — DISCHARGE SUMMARY
Discharge Summary    Hue Ervin  :    MRN:  [id number]    Admit date:  2021  Discharge date:      Admitting Physician:  Renetta Hernandez MD    Advance Directive: Full Code    Consults: none    Primary Care Physician:  SHIMON Thompson    Discharge Diagnoses: Active Problems:    S/P TAVR (transcatheter aortic valve replacement)  Resolved Problems:    * No resolved hospital problems.  *      Problem List:   Patient Active Problem List    Diagnosis Date Noted    S/P TAVR (transcatheter aortic valve replacement) 2021     Priority: High    Abnormal myocardial perfusion study      Priority: High    Chest discomfort      Priority: High    Aortic valve stenosis      Priority: High    Current use of proton pump inhibitor 2020     Priority: Low    Renal cyst, left 2020     Priority: Low    Complex renal cyst 2020     Priority: Low    Hypertrophy of prostate with urinary obstruction 2020     Priority: Low    Osteoarthritis of both shoulders 2019     Priority: Low    DDD (degenerative disc disease), lumbar 2018     Priority: Low    Spinal stenosis of lumbar region with neurogenic claudication 2018     Priority: Low    Lumbar stenosis with neurogenic claudication 2018     Priority: Low    Alternating constipation and diarrhea 10/31/2018     Priority: Low    Arthritis of knee 2018     Priority: Low    Gastroesophageal reflux disease 2017     Priority: Low    Gout 2017     Priority: Low    Neuropathy 2017     Priority: Low    Rheumatoid arthritis ([facility] [address] 2017     Priority: Low    Acalculous cholecystitis 2017     Priority: Low    Right upper quadrant abdominal pain 2017     Priority: Low    Mixed hyperlipidemia 2016     Priority: Low    S/P coronary artery stent placement 2016     Priority: Low    SOB (shortness of breath) 2016     Priority: Low    Coronary artery disease Home Medication Instructions DENNIS:[id number]    Printed on:04/08/21 8779   Medication Information                      aspirin 81 MG chewable tablet  Take 1 tablet by mouth daily             clopidogrel (PLAVIX) 75 MG tablet  Take 1 tablet by mouth daily             ezetimibe (ZETIA) 10 MG tablet  TAKE 1 TABLET BY MOUTH EVERY DAY             furosemide (LASIX) 20 MG tablet  Take 1 tablet by mouth daily             gabapentin (NEURONTIN) 800 MG tablet  Take 800 mg by mouth 2 times daily . hydroxychloroquine (PLAQUENIL) 200 MG tablet  Take 200 mg by mouth 2 times daily             indomethacin (INDOCIN) 50 MG capsule  Take 50 mg by mouth daily as needed             Multiple Vitamin (MULTIVITAMIN) TABS tablet  Take 1 tablet by mouth daily             nitroGLYCERIN (NITROSTAT) 0.4 MG SL tablet  Place 1 tablet under the tongue every 5 minutes as needed 1 tablet as needed             oxyCODONE-acetaminophen (PERCOCET) 7.5-325 MG per tablet  Take 1 tablet by mouth every 4 hours as needed for Pain.             pantoprazole (PROTONIX) 40 MG tablet  TAKE 1 TABLET BY MOUTH DAILY TAKE DAILY FIRST THING IN THE MORNING ON AN EMPTY STOMACH. Discharge Instructions:   Agustin Cam MD  [address] [place] [address] [address] [facility] [place]  [phone]    On 4/23/2021  10:30 AM        Take medications as directed. Resume activity as tolerated. Diet: DIET CARDIAC; Disposition: Patient is medically stable and will be discharged home. 1.  Follow-up in the cardiology office in 2 weeks with Dr Obdulio Trevizo   2. Aspirin 81 mg daily. Plavix 75 mg daily. 3.  Lasix 20 mg daily    Electronically signed by SHIMON Huff NP on 4/8/2021 at 11:56 AM      Physician's attestation/substantial contribution:  I, Dr. Obdulio Trevizo, independently performed an evaluation on Mr. Noemi Suarez.  I have reviewed relevant medical information/data to include but not limited to medication list, relevant appropriate labs and imaging when applicable. I reviewed other physician's notes, ancillary services and nurses assessment. I have reviewed the above documentation completed by the Nurse Practitioner . Please see my additional contributions to the history of present illness, physical examination, and assessment/medical decision-making that reflect my findings and impressions. I discussed essential elements of the care plan with the NP and the patient as well. I answered all the questions to the patient's satisfaction. I concur with above stated.      Ev Villa MD, [facility]  Interventional Cardiologist, Endovascular Specialist   Medical Director, Structural Heart Program   [facility]

## 2021-04-08 NOTE — PROGRESS NOTES
POST OP TAVR PROGRESS NOTE    Post op day 1    SUBJECTIVE:  Resting in bed. Denies any complaints. /61   Pulse 69   Temp 97.4 °F (36.3 °C) (Temporal)   Resp 16   Ht 5' 7\" (1.702 m)   Wt 174 lb 9.6 oz (79.2 kg)   SpO2 92%   BMI 27.35 kg/m²   Average, Min, and Max for last 24 hours Vitals:  TEMPERATURE:  Temp  Av.2 °F (36.2 °C)  Min: 96.9 °F (36.1 °C)  Max: 97.9 °F (36.6 °C)  RESPIRATIONS RANGE: Resp  Avg: 10.9  Min: 0  Max: 20  PULSE RANGE: Pulse  Av.1  Min: 64  Max: 70  BLOOD PRESSURE RANGE:  Systolic (04EHU), MRF:593 , Min:85 , UWH:867   ; Diastolic (30JEW), ZSQ:28, Min:47, Max:83    PULSE OXIMETRY RANGE: SpO2  Av.4 %  Min: 89 %  Max: 97 %    I/O last 3 completed shifts: In: 1300 [P.O.:300; I.V.:1000]  Out: 1100 [Urine:1100]    CHEST: Clear bilateral breath sounds without wheezes or rhonchi. CARDIOVASCULAR: Normal HT with RRR. Soft systolic murmur, no gallops or rubs. INCISION: Right groin puncture site soft without evidence of bleeding or hematoma. Dressing with small amount of bloody drainage. Strong palpable PT pedal pulse. Left groin puncture site with large hematoma with ecchymosis. Dressing saturated with bloody drainage. Strong palpable PT pedal pulse. LABS:  CBC:   Lab Results   Component Value Date    WBC 12.2 2021    RBC 3.57 2021    HGB 11.4 2021    HCT 35.1 2021    MCV 98.3 2021    MCH 31.9 2021    MCHC 32.5 2021    RDW 14.1 2021     2021    MPV 9.5 2021     BMP:    Lab Results   Component Value Date     2021    K 3.9 2021     2021    CO2 25 2021    BUN 17 2021    CREATININE 0.7 2021    CALCIUM 8.2 2021    GFRAA >59 2021    LABGLOM >60 2021    GLUCOSE 135 2021     PT/INR:    Lab Results   Component Value Date    PROTIME 13.8 2021    INR 1.06 2021         ASSESSMENT:     1.  Severe Symptomatic Aortic Stenosis with Mild to Moderate AI - S/P TAVR using a 29 mm CoreValve Pro+ on 04/07/2021  2. CAD - Hx PCI/Stents to LAD and RCA  3. Essential HTN  4. Mixed Hyperlipidemia  5. Chronic Back Pain - Followed by Pain Management  6. COPD with Chronic Tobacco Use      PLAN:     1. Check A-scan of Left Groin  2.  Bedside 2D Echocardiogram      Electronically signed by SHIMON Chapa on 4/8/21 at 7:14 AM CDT

## 2021-04-08 NOTE — PROGRESS NOTES
Vascular Preliminary Report      Bilateral Carotid Artery Duplex Completed. Predicted Degree of Stenosis:     RT ICA: 50-69%                      LT ICA: 50-69%    Bilateral Vertebral Arteries appear antegrade. Final Report Pending.

## 2021-04-08 NOTE — PROGRESS NOTES
Vascular preliminary report:    Bilateral ankle brachial index and left groin duplex scan completed. No evidence of pseudoaneurysms, fistulas, hematomas, or DVT noted at this time. RT: PT - 0.91         DP - 0.90  LT:  PT - 1.19         DP - 1.15    Final report pending.

## 2021-04-08 NOTE — PLAN OF CARE
Problem: Pain:  Goal: Pain level will decrease  Description: Pain level will decrease  4/8/2021 1129 by Genaro Paez RN  Outcome: Ongoing  4/7/2021 2326 by Nacho Brizuela RN  Outcome: Ongoing  Goal: Control of acute pain  Description: Control of acute pain  4/8/2021 1129 by Genaro Paez RN  Outcome: Ongoing  4/7/2021 2326 by Nacho Brizuela RN  Outcome: Ongoing  Goal: Control of chronic pain  Description: Control of chronic pain  4/8/2021 1129 by Genaro Paez RN  Outcome: Ongoing  4/7/2021 2326 by Nacho Brizuela RN  Outcome: Ongoing     Problem: Cardiac:  Goal: Ability to maintain an adequate cardiac output will improve  Description: Ability to maintain an adequate cardiac output will improve  4/8/2021 1129 by Genaro Paez RN  Outcome: Ongoing  4/7/2021 2326 by Nacho Brizuela RN  Outcome: Ongoing     Problem: Falls - Risk of:  Goal: Will remain free from falls  Description: Will remain free from falls  Outcome: Ongoing  Goal: Absence of physical injury  Description: Absence of physical injury  Outcome: Ongoing

## 2021-04-08 NOTE — PROGRESS NOTES
15:45p - Home oxygen evaluation performed and results are as follows: SaO2 = 88% on R/A at rest, SaO2 = 92% on O2 at 2 lpm(NC) at rest, SaO2 = 86% on R/A while ambulating, SaO2 = 92% on 2 lpm O2(NC) while ambulating. (Recovery SaO2).  TS RRT/RCP

## 2021-04-08 NOTE — PLAN OF CARE
Problem: Pain:  Goal: Pain level will decrease  Description: Pain level will decrease  Outcome: Ongoing  Goal: Control of acute pain  Description: Control of acute pain  Outcome: Ongoing  Goal: Control of chronic pain  Description: Control of chronic pain  Outcome: Ongoing     Problem: Cardiac:  Goal: Ability to maintain an adequate cardiac output will improve  Description: Ability to maintain an adequate cardiac output will improve  Outcome: Ongoing

## 2021-04-09 NOTE — CONSULTS
Cardiac Rehab TAVR education packet was sent to the patient's address on record. Handout included was titled; \"Transcatheter Aortic Valve Replacement: What to Expect at [facility]\". Patient was instructed to contact [facility] or [facility] for the opportunity to enroll in Phase II Outpatient Cardiac Rehab.

## 2021-04-12 NOTE — PROGRESS NOTES
Q she is no problem also [facility], [address] [place] [address], Agatha Carrasquillo  [id number]  Phone: [phone]  Fax: [phone]    OFFICE VISIT:  2021    Jacquelin Kali - : 1955    Reason For Visit:  Maricarmen Paz is a 76 y.o. male who is here for Follow-Up from Hospital (Patient is here post heart cath, Patient states he is very SOB and occasional chest pain. ), Hypotension, and Coronary Artery Disease       Diagnosis Orders   1. Shortness of breath  Brain Natriuretic Peptide    XR CHEST (2 VW)    EKG 12 lead   2. Aortic valve stenosis, etiology of cardiac valve disease unspecified     3. S/P TAVR (transcatheter aortic valve replacement)     4. Coronary artery disease involving native coronary artery of native heart without angina pectoris     5. Mixed hyperlipidemia     6. Gout of left hand due to drug, unspecified chronicity     7. Bilateral carotid artery stenosis  [facility], [facility], Vascular Surgery, [facility]  1. Coronary artery disease with prior inferior STEMI , multiple PCI's to RCA, LAD and OM with catheterization 2019 with PCI to restenotic mid LAD and mid RCA with LINDA, normal LV ejection fraction. Heart Cath 3/16/21 with nonobstructive CAD  2. Moderate to severe aortic stenosis, prior mean gradient 25 mmHg 2019, recent echo 3/2021 with mean gradient of 36 mmHg. Severe per Cath 3/16/21. TAVR 5/3/14- complicated due to atherosclerosis- moderate paravalvular leak post procedure    Patient presents for follow-up after a recent TAVR on 2021. He states he is not feeling well since the procedure. He has noticed a fever of 101-102 degrees at home. He complains of a nonproductive cough and shortness of breath. He had some chest discomfort overnight last night while he was sleeping. He denies any signs of fluid overload such as sudden weight gain, orthopnea, PND, edema. He was placed on Lasix at discharge.   Complaining of gout Flareup left ring finger. Asking if he can take a couple of doses of Indocin which generally relieves symptoms    SHIMON Xiong is PCP.   Quincy Parada has the following history as recorded in [facility]:    Patient Active Problem List    Diagnosis Date Noted    S/P TAVR (transcatheter aortic valve replacement) 04/07/2021     Priority: High    Abnormal myocardial perfusion study      Priority: High    Aortic valve stenosis      Priority: High    Current use of proton pump inhibitor 12/03/2020    Renal cyst, left 09/17/2020    Complex renal cyst 09/17/2020    Hypertrophy of prostate with urinary obstruction 09/17/2020    Osteoarthritis of both shoulders 11/07/2019    DDD (degenerative disc disease), lumbar 12/04/2018    Spinal stenosis of lumbar region with neurogenic claudication 12/04/2018    Lumbar stenosis with neurogenic claudication 12/04/2018    Alternating constipation and diarrhea 10/31/2018    Arthritis of knee 07/16/2018    Gastroesophageal reflux disease 12/19/2017    Gout 12/19/2017    Neuropathy 12/19/2017    Rheumatoid arthritis ([address] 12/19/2017    Acalculous cholecystitis 05/19/2017    Right upper quadrant abdominal pain 05/12/2017    Mixed hyperlipidemia 04/26/2016    S/P coronary artery stent placement 04/26/2016    SOB (shortness of breath) 04/26/2016    Coronary artery disease involving native coronary artery of native heart without angina pectoris     Hiatal hernia 04/20/2015    Hoarseness, chronic 03/13/2015    Chronic heartburn 03/13/2015    History of tobacco abuse     Leg pain 08/25/2011    Coronary atherosclerosis of native coronary artery     MI (myocardial infarction) ([address]     COPD (chronic obstructive pulmonary disease) ([address]     Bronchitis      Past Medical History:   Diagnosis Date    Arthritis     Bronchitis     Cancer ([address]     Skin Cancer REMOVED RT ARM & LT EAR    Chronic back pain     Chronic cholecystitis without calculus 5/19/2017    Chronic GERD     COPD (chronic obstructive pulmonary disease) (HCC)     Coronary artery disease of native artery of native heart with stable angina pectoris [facility]     Coronary atherosclerosis of native coronary artery     s/p PTCA and stent placement to the LAD and RCA/7 stents    DDD (degenerative disc disease), lumbar 12/4/2018    History of blood transfusion     WITH RT SHOULDER SURGERY    History of tobacco abuse 2010    Hyperlipidemia     Hypertension     MI (myocardial infarction) ([facility]     Old, inferior wall    Moderate aortic regurgitation 08/14/2017    mod-severe AR.  Moderate aortic stenosis 08/14/2017    mod-severe aortic stenosis.  Pain management 2021    PT SEES PAIN MANAGEMENT FOR CHRONIC BACK PAIN    Spinal stenosis of lumbar region with neurogenic claudication 12/4/2018     Past Surgical History:   Procedure Laterality Date    BACK SURGERY      s/p laminectomy    CARDIAC CATHETERIZATION  08/10/04 [facility]    [facility]      C[facility]    CARDIAC CATHETERIZATION  08/29/03 [facility]    CARDIAC CATHETERIZATION  02/16/01 [facility]    [facility]    CARDIAC CATHETERIZATION  05/25/2009    EF is estimated to be 50%. See scanned document.  CARDIAC CATHETERIZATION N/A 03/2016    stent placement    CARDIAC CATHETERIZATION  08/2017    no PCI    CARDIAC CATHETERIZATION  07/11/2019    Drug-eluting stents placed to mid LAD and mid RCA, normal LV    CHOLECYSTECTOMY, LAPAROSCOPIC N/A 05/19/2017    CHOLECYSTECTOMY LAPAROSCOPIC performed by Julissa Leavitt MD at [address] [place] [address]  05/28/2015    Dr. Salgado Better: No Polyps   10yr recall    CORONARY ANGIOPLASTY WITH STENT PLACEMENT  03/2016    stent to LAD    JOINT REPLACEMENT      Left thumb    JOINT REPLACEMENT Left     knee    JOINT REPLACEMENT      KNEE SURGERY      s/p Rt.  knee replacement    LEG SURGERY Right     Broken leg with surgical repair    LUMBAR SPINE SURGERY N/A 12/04/2018    L1-5 LAMINECTOMY performed by Patti Smith MD at [facility] 43 EGD TRANSORAL BIOPSY SINGLE/MULTIPLE N/A 2017    Dr Emilee Ponce, Amelie (-), biliary colic, surgical referral    [place] EGD TRANSORAL BIOPSY SINGLE/MULTIPLE N/A 2018    Dr JAIDEN Torres-w/dilation over wire-51 French-Distal esophagitis, gastritis    REMOVE HARDWARE SPINE N/A 2018    I AND D LUMBAR INCISION SEROMA performed by Patti Smith MD at [facility] Right 2019    RIGHT REVERSE TOTAL SHOULDER ARTHROPLASTY performed by Cheryl Lindquist MD at [address] [place] [address] Dr ENDOSCOPY  2015    Dr. Centeno Marie: amelie neg,,normal, empiric dilatation with 51F    UPPER GASTROINTESTINAL ENDOSCOPY N/A 2018    Dr JAIDEN Cantu/dilation over wire-51 French-Distal esophagitis, gastritis     Family History   Problem Relation Age of Onset    Cancer Mother     Lung Cancer Mother     Heart Disease Father     Cancer Father     Liver Cancer Father     Lung Cancer Father     Diabetes Maternal Grandmother     Heart Disease Maternal Grandfather     Cancer Maternal Grandfather     Stroke Paternal Grandmother     Stomach Cancer Sister     No Known Problems Brother     COPD Sister     Heart Disease Paternal Grandfather     Colon Cancer Neg Hx     Colon Polyps Neg Hx     Esophageal Cancer Neg Hx     Liver Disease Neg Hx     Rectal Cancer Neg Hx      Social History     Tobacco Use    Smoking status: Former Smoker     Packs/day: 0.00     Types: Cigarettes     Quit date: 12/10/2009     Years since quittin.3    Smokeless tobacco: Never Used   Substance Use Topics    Alcohol use:  Yes     Alcohol/week: 42.0 standard drinks     Types: 42 Cans of beer per week     Drinks per session: 5 or 6     Comment: daily      Current Outpatient Medications   Medication Sig Dispense Refill    nitroGLYCERIN (NITROSTAT) 0.4 MG SL tablet Place 1 tablet under the tongue every 5 minutes as needed for Chest pain 1 tablet as needed 25 tablet 3    ezetimibe (ZETIA) 10 MG tablet TAKE 1 TABLET BY MOUTH EVERY DAY 90 tablet 1    aspirin 81 MG chewable tablet Take 1 tablet by mouth daily 30 tablet 3    clopidogrel (PLAVIX) 75 MG tablet Take 1 tablet by mouth daily 30 tablet 3    furosemide (LASIX) 20 MG tablet Take 1 tablet by mouth daily 60 tablet 3    indomethacin (INDOCIN) 50 MG capsule Take 50 mg by mouth daily as needed      Multiple Vitamin (MULTIVITAMIN) TABS tablet Take 1 tablet by mouth daily      pantoprazole (PROTONIX) 40 MG tablet TAKE 1 TABLET BY MOUTH DAILY TAKE DAILY FIRST THING IN THE MORNING ON AN EMPTY STOMACH. 90 tablet 3    hydroxychloroquine (PLAQUENIL) 200 MG tablet Take 200 mg by mouth 2 times daily      oxyCODONE-acetaminophen (PERCOCET) 7.5-325 MG per tablet Take 1 tablet by mouth every 4 hours as needed for Pain.  gabapentin (NEURONTIN) 800 MG tablet Take 800 mg by mouth 2 times daily . No current facility-administered medications for this visit. Allergies: Codeine, Iv [iodides], Hydrocodone, and Statins    Review of Systems  Review of Systems   Constitutional: Positive for fatigue and fever. Negative for activity change, diaphoresis and unexpected weight change. HENT: Negative for facial swelling and nosebleeds. Eyes: Negative for redness and visual disturbance. Respiratory: Positive for cough and shortness of breath. Negative for chest tightness and wheezing. Cardiovascular: Negative for chest pain, palpitations and leg swelling. Gastrointestinal: Negative for abdominal pain, nausea and vomiting. Endocrine: Negative for cold intolerance and heat intolerance. Genitourinary: Negative for dysuria and hematuria. Musculoskeletal: Negative for arthralgias and myalgias. Complains of  gout flareup left ring finger   Skin: Negative for pallor and rash. Neurological: Negative for dizziness, seizures, syncope, weakness and light-headedness.    Hematological: Does not bruise/bleed easily. Psychiatric/Behavioral: Negative for agitation. The patient is not nervous/anxious. Objective  Vital Signs - BP (!) 110/52   Pulse 78   Temp 96.4 °F (35.8 °C)   Ht 5' 7\" (1.702 m)   Wt 175 lb (79.4 kg)   SpO2 98%   BMI 27.41 kg/m²    Wt Readings from Last 3 Encounters:   04/12/21 175 lb (79.4 kg)   04/08/21 174 lb 9.6 oz (79.2 kg)   03/22/21 179 lb (81.2 kg)      Physical Exam  Vitals signs and nursing note reviewed. Constitutional:       General: He is not in acute distress. Appearance: Normal appearance. He is well-developed. He is not diaphoretic. HENT:      Head: Normocephalic and atraumatic. Right Ear: Hearing and external ear normal.      Left Ear: Hearing and external ear normal.      Nose: Nose normal.   Eyes:      General:         Right eye: No discharge. Left eye: No discharge. Pupils: Pupils are equal, round, and reactive to light. Neck:      Musculoskeletal: Neck supple. No muscular tenderness. Thyroid: No thyromegaly. Vascular: Carotid bruit (bilateral) present. No JVD. Trachea: No tracheal deviation. Cardiovascular:      Rate and Rhythm: Normal rate and regular rhythm. Heart sounds: Murmur (2/6 systolic) present. No friction rub. No gallop. Comments: Bilateral groins-bruising noted, but soft. No signs of infection. No erythema, drainage  Pulmonary:      Effort: Pulmonary effort is normal. No respiratory distress. Breath sounds: Normal breath sounds. No wheezing or rales. Abdominal:      Palpations: Abdomen is soft. Tenderness: There is no abdominal tenderness. Musculoskeletal:         General: No swelling or deformity. Right lower leg: No edema. Left lower leg: No edema. Comments: Normal gait and station  Edema left ring finger   Skin:     General: Skin is warm and dry. Findings: No rash. Neurological:      General: No focal deficit present.       Mental Status: He is alert and oriented to person, place, and time. Cranial Nerves: No cranial nerve deficit. Psychiatric:         Mood and Affect: Mood normal.         Behavior: Behavior normal.         Judgment: Judgment normal.         Data:  Heart Cath 3/16/21  Conclusions    Nonobstructive CAD. Patent stent mid-RCA. Patent stent in mid LAD. Intermediate restenosis proximal OM1 stent. Normal LV systolic function. Severe aortic stenosis (mean gradient 40 mmHg)    Carotids 4/8/21  Impression       Merian Rouge is mixed plaque visualized in the bilateral internal carotid    arteries.   Merian Rouge is 50-69% stenosis in the right internal carotid artery.    There is 50-69% stenosis of the left internal carotid artery.    There is normal antegrade flow in the bilateral vertebral arteries         Lab Results   Component Value Date    CHOL 188 01/13/2021    TRIG 53 01/13/2021    HDL 81 01/13/2021    LDLCALC 96 01/13/2021     Lab Results   Component Value Date    ALT 15 01/13/2021    AST 25 01/13/2021     Assessment:     Diagnosis Orders   1. Shortness of breath  Brain Natriuretic Peptide    XR CHEST (2 VW)    EKG 12 lead   2. Aortic valve stenosis, etiology of cardiac valve disease unspecified     3. S/P TAVR (transcatheter aortic valve replacement)     4. Coronary artery disease involving native coronary artery of native heart without angina pectoris     5. Mixed hyperlipidemia     6. Gout of left hand due to drug, unspecified chronicity     7. Bilateral carotid artery stenosis  SHIMON Lew, Vascular Surgery, [facility] records reviewed    Shortness of breathwith cough and fever at home. No fever noted here in the office. Lungs sound clear. No signs of volume overload. Discussed case with Dr. Jose Lima. We will check a chest x-ray and BNP, BMP today    Aortic valve stenosis/ status post TAVR-last week no signs of infection in her groin site although running fever at home.   Complications due to significant atherosclerosis in multiple arteries. Moderate paravalvular leak post echo. Dr. Ellen Wallis states consideration for stent in-stent procedure with axillary entry in the future. Patient has close follow-up with Dr. Ellen Wallis on April 23, 2021    CADnonobstructive disease per recent heart cath in March. No angina symptoms. Hyperlipidemiapatient has statin allergy. Currently on Zetia. Last lipids showed an LDL of 96. Goutcould be related to recent start of Lasix. Checking labs today with BNP. Could consider discontinuation of Lasix pending results. Okay to take Indocin for a couple of days that patient keeps on hand for flareups. Bilateral carotid stenosisnew finding. Will refer to vascular surgery for monitoring      Plan    Orders Placed This Encounter   Procedures    XR CHEST (2 VW)     Standing Status:   Future     Number of Occurrences:   1     Standing Expiration Date:   4/12/2022     Order Specific Question:   Reason for exam:     Answer:   shortness of breath    Brain Natriuretic Peptide     Standing Status:   Future     Number of Occurrences:   1     Standing Expiration Date:   4/12/2022   [facility] SHIMON Chen, Vascular Surgery, [place]     Referral Priority:   Routine     Referral Type:   Eval and Treat     Referral Reason:   Specialty Services Required     Requested Specialty:   Nurse Practitioner     Number of Visits Requested:   1    EKG 12 lead     Order Specific Question:   Reason for Exam?     Answer:   Chest pain     Return for as scheduled, Dr. Ellen Wallis. Chest Xray, BNP, BMP  Refer to Vascular Surgery (carotids)    Call with any questionsor concerns  Follow up with SHIMON Sprague for non cardiac problems  Report any new problems  Cardiovascular Fitness-Exercise as tolerated. Strive for 15 minutes of exercise most days of the week.     Cardiac / HealthyDiet  Continue current medications as directed  Continue plan of treatment  It is always recommended that you bring your medicationsbottles with you to each visit - this is for your safety!        SHIMON Marroquin

## 2021-04-13 NOTE — TELEPHONE ENCOUNTER
[facility] Transitions Initial Follow Up Call    Outreach made within 2 business days of discharge: No    Patient: Neil Joyce Patient : 1955   MRN: [id number]  Reason for Admission: There are no discharge diagnoses documented for the most recent discharge. Discharge Date: 21       Spoke with: Patient    Discharge department/facility: [facility]    TCM Interactive Patient Contact:  Was patient able to fill all prescriptions: Yes  Was patient instructed to bring all medications to the follow-up visit: Yes  Is patient taking all medications as directed in the discharge summary? Yes  Does patient understand their discharge instructions: Yes  Does patient have questions or concerns that need addressed prior to 7-14 day follow up office visit: no    Scheduled appointment with PCP within 7-14 days    Follow Up  Future Appointments   Date Time Provider Lazara Gandara   2021 10:30 AM Tavo Chahal MD [facility] Card[facility]   2021 10:15 AM SHIMON Vargas N [facility]MA [facility]   Patient canceled appt that was scheduled with Mumtaz ROBINS on 4/15/21. Pt stated that Trae Gomez stated that there was no need to have another appt because she was taking care of it. Patient stated that if he needed anything he would call our office.     Seema Diego

## 2021-04-15 PROBLEM — I38 ENDOCARDITIS: Status: ACTIVE | Noted: 2021-01-01

## 2021-04-15 NOTE — ED NOTES
Patient reports increased SOB, PA notified; pt placed on 2L o2 via NC per verbal order from [address] RN  04/15/21 1429

## 2021-04-15 NOTE — TELEPHONE ENCOUNTER
Pt wife called stating pt has had chest pain and SOA since he had TAVR on 4-7-21. Pt has had a fever over 100.00 since TAVR. Pt advised to report to ER. Pt verbally understood and wife states she will take him.

## 2021-04-15 NOTE — ED PROVIDER NOTES
Negative for color change, pallor and rash. Neurological: Negative for dizziness, seizures and syncope. Psychiatric/Behavioral: Negative for agitation. The patient is not nervous/anxious. Except as noted above the remainder of the review of systems was reviewed and negative. PAST MEDICAL HISTORY     Past Medical History:   Diagnosis Date    Arthritis     Bronchitis     Cancer ([facility]     Skin Cancer REMOVED RT ARM & LT EAR    Chronic back pain     Chronic cholecystitis without calculus 5/19/2017    Chronic GERD     COPD (chronic obstructive pulmonary disease) (HCC)     Coronary artery disease of native artery of native heart with stable angina pectoris ([facility]     Coronary atherosclerosis of native coronary artery     s/p PTCA and stent placement to the LAD and RCA/7 stents    DDD (degenerative disc disease), lumbar 12/4/2018    History of blood transfusion     WITH RT SHOULDER SURGERY    History of tobacco abuse 2010    Hyperlipidemia     Hypertension     MI (myocardial infarction) ([facility]     Old, inferior wall    Moderate aortic regurgitation 08/14/2017    mod-severe AR.  Moderate aortic stenosis 08/14/2017    mod-severe aortic stenosis.  Pain management 2021    PT SEES PAIN MANAGEMENT FOR CHRONIC BACK PAIN    Spinal stenosis of lumbar region with neurogenic claudication 12/4/2018         SURGICAL HISTORY       Past Surgical History:   Procedure Laterality Date    BACK SURGERY      s/p laminectomy    C[facility]  [facility]10[facility]04 [facility]      CARD[facility]  [facility]10/03  [facility]    CARDIAC CATHETERIZATION  08/29/03 [facility]    CARDIAC CATHETERIZATION  02/16/01 [facility]    CARDIAC CATHETERIZATION  05/25/2009    EF is estimated to be 50%. See scanned document.     CARDIAC CATHETERIZATION N/A 03/2016    stent placement    CARDIAC CATHETERIZATION  08/2017    no PCI    CARDIAC CATHETERIZATION  07/11/2019    Drug-eluting stents placed to mid LAD and mid RCA, normal LV    CHOLECYSTECTOMY, LAPAROSCOPIC N/A 05/19/2017    CHOLECYSTECTOMY LAPAROSCOPIC performed by Virginia Peñaloza MD at [address] [place] [address]  05/28/2015    Dr. Libia Faria: No Polyps   10yr recall    CORONARY ANGIOPLASTY WITH STENT PLACEMENT  03/2016    stent to LAD    JOINT REPLACEMENT      Left thumb    JOINT REPLACEMENT Left     knee    JOINT REPLACEMENT      KNEE SURGERY      s/p Rt.  knee replacement    LEG SURGERY Right     Broken leg with surgical repair    LUMBAR SPINE SURGERY N/A 12/04/2018    L1-5 LAMINECTOMY performed by Zac Zepeda MD at \[facility]\"" 43 EGD TRANSORAL BIOPSY SINGLE/MULTIPLE N/A 05/18/2017    Dr Oscar Herrera, Amelie (-), biliary colic, surgical referral    MD EGD TRANSORAL BIOPSY SINGLE/MULTIPLE N/A 11/29/2018    Dr JAIDEN Cantu/dilation over wire-51 French-Distal esophagitis, gastritis    REMOVE HARDWARE SPINE N/A 12/20/2018    I AND D LUMBAR INCISION SEROMA performed by Zac Zepeda MD at [address] [place] [address] Right 12/12/2019    RIGHT REVERSE TOTAL SHOULDER ARTHROPLASTY performed by Gerber Barrera MD at [address] [place] [address] Dr ENDOSCOPY  03/30/2015    Dr. Libia Faria: amelie neg,,normal, empiric dilatation with 51F    UPPER GASTROINTESTINAL ENDOSCOPY N/A 11/29/2018    Dr JAIDEN Cantu/dilation over wire-51 French-Distal esophagitis, gastritis         CURRENT MEDICATIONS       Current Discharge Medication List      CONTINUE these medications which have NOT CHANGED    Details   nitroGLYCERIN (NITROSTAT) 0.4 MG SL tablet Place 1 tablet under the tongue every 5 minutes as needed for Chest pain 1 tablet as needed  Qty: 25 tablet, Refills: 3      ezetimibe (ZETIA) 10 MG tablet TAKE 1 TABLET BY MOUTH EVERY DAY  Qty: 90 tablet, Refills: 1      aspirin 81 MG chewable tablet Take 1 tablet by mouth daily  Qty: 30 tablet, Refills: 3      clopidogrel (PLAVIX) 75 MG tablet Take 1 tablet by mouth daily  Qty: 30 tablet, Refills: 3      furosemide (LASIX) 20 MG Packs/day: 0.00     Types: Cigarettes     Quit date: 12/10/2009     Years since quittin.3    Smokeless tobacco: Never Used   Substance and Sexual Activity    Alcohol use: Yes     Alcohol/week: 42.0 standard drinks     Types: 42 Cans of beer per week     Drinks per session: 5 or 6     Comment: daily    Drug use: No    Sexual activity: Yes     Partners: Female   Lifestyle    Physical activity     Days per week: Not on file     Minutes per session: Not on file    Stress: Not on file   Relationships    Social connections     Talks on phone: Not on file     Gets together: Not on file     Attends Judaism service: Not on file     Active member of club or organization: Not on file     Attends meetings of clubs or organizations: Not on file     Relationship status: Not on file    Intimate partner violence     Fear of current or ex partner: Not on file     Emotionally abused: Not on file     Physically abused: Not on file     Forced sexual activity: Not on file   Other Topics Concern    Not on file   Social History Narrative    Not on file       SCREENINGS    Manjeet Coma Scale  Eye Opening: Spontaneous  Best Verbal Response: Oriented  Best Motor Response: Obeys commands  [place] Coma Scale Score: 15      PHYSICAL EXAM    (up to 7 forlevel 4, 8 or more for level 5)     ED Triage Vitals [04/15/21 1035]   BP Temp Temp Source Pulse Resp SpO2 Height Weight   134/60 98.6 °F (37 °C) Oral 86 16 94 % 5' 7\" (1.702 m) 175 lb (79.4 kg)       Physical Exam  Vitals signs and nursing note reviewed. Constitutional:       General: He is not in acute distress. Appearance: Normal appearance. He is well-developed. He is not diaphoretic. HENT:      Head: Normocephalic and atraumatic.       Right Ear: Tympanic membrane, ear canal and external ear normal.      Left Ear: Tympanic membrane, ear canal and external ear normal.      Nose: Nose normal.      Mouth/Throat:      Mouth: Mucous membranes are moist.   Eyes: Pupils: Pupils are equal, round, and reactive to light. Neck:      Musculoskeletal: Normal range of motion and neck supple. Trachea: No tracheal deviation. Cardiovascular:      Rate and Rhythm: Normal rate and regular rhythm. Pulses: Normal pulses. Heart sounds: Normal heart sounds. No murmur. Pulmonary:      Effort: Pulmonary effort is normal.      Breath sounds: Normal breath sounds. No stridor. No wheezing. Chest:      Chest wall: No tenderness. Abdominal:      General: Bowel sounds are normal. There is no distension. Palpations: Abdomen is soft. Tenderness: There is no abdominal tenderness. Musculoskeletal: Normal range of motion. Skin:     General: Skin is warm and dry. Capillary Refill: Capillary refill takes less than 2 seconds. Neurological:      General: No focal deficit present. Mental Status: He is alert and oriented to person, place, and time. Mental status is at baseline. Psychiatric:         Mood and Affect: Mood normal.         Behavior: Behavior normal.         Thought Content: Thought content normal.         Judgment: Judgment normal.           DIAGNOSTIC RESULTS     RADIOLOGY:   Non-plain film images such as CT, Ultrasound and MRI are read by the radiologist. Plain radiographic images are visualized and preliminarilyinterpreted by Rancho Hargrove MD with the below findings:      Interpretation per the Radiologist below, if available at the time of this note:    CTA PULMONARY W CONTRAST   Final Result   1. No evidence of pulmonary embolus. 2. The patient is status post TAVR procedure. No evidence of   complications. There is atheromatous calcification of the aortic arch   without evidence of dissection or aneurysm. 3. Trace bilateral pleural effusions with bibasilar atelectasis. A   nodular opacity within the right middle lobe abutting the minor   fissure was not present on a previous exam of late March favoring an   inflammatory process.  There are changes of paraseptal and   centrilobular emphysema within the upper lobes. 4. Pedunculated lesion off the upper pole of the right kidney   warranting follow-up as it does demonstrate some complexity perhaps   containing some calcification. .   Signed by Dr Rosales Peters on 4/15/2021 7:53 PM      XR CHEST PORTABLE   Final Result   Bilateral lower lung infiltrate may represent an   inflammatory/infectious process. A small ill-defined nodule in the right lower lung was not noted in   the previous study. This may represent a nipple shadow? . Further   follow-up is recommended. Signed by Dr Noris Stevens on 4/15/2021 11:42 AM          LABS:  Labs Reviewed   CBC WITH AUTO DIFFERENTIAL - Abnormal; Notable for the following components:       Result Value    RBC 3.51 (*)     Hemoglobin 11.0 (*)     Hematocrit 34.4 (*)     MCV 98.0 (*)     MCH 31.3 (*)     MCHC 32.0 (*)     Neutrophils % 69.5 (*)     Lymphocytes % 12.4 (*)     Monocytes % 15.0 (*)     Lymphocytes Absolute 1.0 (*)     Monocytes Absolute 1.30 (*)     All other components within normal limits   COMPREHENSIVE METABOLIC PANEL W/ REFLEX TO MG FOR LOW K - Abnormal; Notable for the following components:    Glucose 115 (*)     All other components within normal limits   TROPONIN - Abnormal; Notable for the following components:    Troponin 0.22 (*)     All other components within normal limits    Narrative:     CALL  [employer] tel. ,  Chemistry results called to and read back by [facility] RN AT [address], 04/15/2021  14:02, by JOANNA  .   BRAIN NATRIURETIC PEPTIDE - Abnormal; Notable for the following components:    Pro-BNP 2,540 (*)     All other components within normal limits    Narrative:     .    TROPONIN - Abnormal; Notable for the following components:    Troponin 0.22 (*)     All other components within normal limits    Narrative:     CALL  [employer] tel. ,  Chemistry results called to and read back by David/RN/ER, 04/15/2021 15:30, by  Juan Jose MANLEY-19, RAPID CULTURE, BLOOD 1   CULTURE, BLOOD 2   CULTURE, BLOOD 3   STREP PNEUMONIAE ANTIGEN   LACTATE, SEPSIS   LACTATE, SEPSIS   BASIC METABOLIC PANEL W/ REFLEX TO MG FOR LOW K   TROPONIN   TROPONIN   CBC WITH AUTO DIFFERENTIAL       All other labs were within normal range or notreturned as of this dictation. RE-ASSESSMENT        EMERGENCY DEPARTMENT COURSE and DIFFERENTIAL DIAGNOSIS/MDM:   Vitals:    Vitals:    04/15/21 1601 04/15/21 1924 04/15/21 2033 04/15/21 2100   BP: (!) 94/50 (!) 107/58 (!) 100/51 (!) 115/48   Pulse: 73 77 78 77   Resp: 19 19 20 17   Temp:   98.9 °F (37.2 °C) 97.5 °F (36.4 °C)   TempSrc:   Oral Temporal   SpO2: 93% 98% 96% 93%   Weight:    168 lb 6.4 oz (76.4 kg)   Height:    5' 7\" (1.702 m)       MDM  Dr. Araceli Arguello with hospitalist agrees to take over care of this patient he will start antibiotics based on the concern for bilateral lower lobe consolidation Dr. Preethi Eason with cardiology recommends admission and consult himself there is concern for endocarditis. He also has an elevated troponin that is not trending at this time but a typically higher than it should be post TAVR. PROCEDURES:    Procedures      FINAL IMPRESSION      1. Shortness of breath    2. Fever, unspecified fever cause    3. S/P aortic valve repair    4. Elevated troponin          DISPOSITION/PLAN   DISPOSITION Admitted 04/15/2021 07:10:29 PM      PATIENT REFERRED TO:  No follow-up provider specified.     DISCHARGE MEDICATIONS:  Current Discharge Medication List          (Please note that portions of this note were completed with a voice recognition program.  Efforts were made to edit the dictations but occasionallywords are mis-transcribed.)    Agustin Burns [phone], [phone]  04/15/21 8231

## 2021-04-15 NOTE — ED NOTES
Pt ambulated around ED, approx 100 feet; Patient showed signs of dypnea with exertion, began coughing and RR up to 26, Sat dropped to 90% from 95%  Pt returned to room and helped back to bed.  O2 sat returned to 96 after three minutes of rest       Nirali Murray RN  04/15/21 6710

## 2021-04-16 PROBLEM — R09.89 ENDOCARDITIS, SUSPECTED: Status: ACTIVE | Noted: 2021-01-01

## 2021-04-16 PROBLEM — R50.9 FEVER AND CHILLS: Status: ACTIVE | Noted: 2021-01-01

## 2021-04-16 PROBLEM — R30.0 DYSURIA: Status: ACTIVE | Noted: 2021-01-01

## 2021-04-16 PROBLEM — R39.89 SUSPECTED UTI: Status: ACTIVE | Noted: 2021-01-01

## 2021-04-16 PROBLEM — I38 ACUTE ON CHRONIC COMBINED SYSTOLIC AND DIASTOLIC CONGESTIVE HEART FAILURE DUE TO VALVULAR DISEASE (HCC): Status: ACTIVE | Noted: 2021-01-01

## 2021-04-16 PROBLEM — I50.43 ACUTE ON CHRONIC COMBINED SYSTOLIC AND DIASTOLIC CONGESTIVE HEART FAILURE DUE TO VALVULAR DISEASE (HCC): Status: ACTIVE | Noted: 2021-01-01

## 2021-04-16 NOTE — PROGRESS NOTES
TR band removed, small bruise at radial sick site, soft, no oozing noted at ulna site, pulses palpable. Hand warm and no numbness or tingling noted. Cotton ball to radial stick site and Tegaderm applied to wrist with slight presssure. Pt lower forearm wrapped with ace wrap to remind pt not to use hand tonight. Wrist site is not covered with ace wrap.  Electronically signed by Elizabeth Mar RN on 4/16/2021 at 6:49 PM

## 2021-04-16 NOTE — PROGRESS NOTES
Pt denies pain. HR sinus. Lungs diminished in RLL, no edema, pulses palpable. Pedal weak pulses, toes dusky and cool. Pt states toes are always cool.  Electronically signed by Avis Iglesias RN on 4/16/2021 at 10:27 AM

## 2021-04-16 NOTE — H&P
[facility]      Hospitalist - History & Physical      PCP: Linde Rubinstein, APRN    Date of Admission: 4/15/2021    Date of Service: 4/15/2021    Chief Complaint:  SOB, fever, night sweats     History Of Present Illness: The patient is a 76 y.o. male with past medical history of S/P TAVR on 4/7/2021, CAD with stents, COPD, HTN, and HLD, who presented to [address] [place] [address] ED complaining of SOB. He reports dyspnea at rest and exertional, chest pressure, fever, chills, night sweats, and loss of appetite. Endorses on and off evening fever up to 101 orally for about a week now. He admits to having a panic attack couple days due to SOB in spite of 4L oxygen use at home. He was discharged on 2L oxygen as needed s/p TAVR on 4/8/2021. He also reported decrease in urine output in spite of being put on lasix on discharge. He denies chest pain, recent sick contacts, nausea, vomiting, or diarrhea. Workup in ED revealed troponin 0.22 X 2, BNP 2540. CTA ruled out PE, however showed trace bilateral pleural effusions with bibasilar atelectasis and pedunculated lesion off the upper pole of right kidney. Past Medical History:        Diagnosis Date    Arthritis     Bronchitis     Cancer ([address]     Skin Cancer REMOVED RT ARM & LT EAR    Chronic back pain     Chronic cholecystitis without calculus 5/19/2017    Chronic GERD     COPD (chronic obstructive pulmonary disease) (HCC)     Coronary artery disease of native artery of native heart with stable angina pectoris ([address]     Coronary atherosclerosis of native coronary artery     s/p PTCA and stent placement to the LAD and RCA/7 stents    DDD (degenerative disc disease), lumbar 12/4/2018    History of blood transfusion     WITH RT SHOULDER SURGERY    History of tobacco abuse 2010    Hyperlipidemia     Hypertension     MI (myocardial infarction) ([address]     Old, inferior wall    Moderate aortic regurgitation 08/14/2017    mod-severe AR.      Moderate aortic stenosis 08/14/2017    mod-severe aortic stenosis.  Pain management 2021    PT SEES PAIN MANAGEMENT FOR CHRONIC BACK PAIN    Spinal stenosis of lumbar region with neurogenic claudication 12/4/2018       Past Surgical History:        Procedure Laterality Date    BACK SURGERY      s/p laminectomy    [facility]  [facility]04 [facility]      [facility]    CARDIAC [facility]IZATION  08/29/03 [facility]    CARDIAC CATHETERIZATION  02/16/01 [facility]    CARDIAC CATHETERIZATION  05/25/2009    EF is estimated to be 50%. See scanned document.  CARDIAC CATHETERIZATION N/A 03/2016    stent placement    CARDIAC CATHETERIZATION  08/2017    no PCI    CARDIAC CATHETERIZATION  07/11/2019    Drug-eluting stents placed to mid LAD and mid RCA, normal LV    CHOLECYSTECTOMY, LAPAROSCOPIC N/A 05/19/2017    CHOLECYSTECTOMY LAPAROSCOPIC performed by Addi Lazaro MD at [address] [place] [address]  05/28/2015    Dr. Shikha Salazar: No Polyps   10yr recall    CORONARY ANGIOPLASTY WITH STENT PLACEMENT  03/2016    stent to LAD    JOINT REPLACEMENT      Left thumb    JOINT REPLACEMENT Left     knee    JOINT REPLACEMENT      KNEE SURGERY      s/p Rt.  knee replacement    LEG SURGERY Right     Broken leg with surgical repair    LUMBAR SPINE SURGERY N/A 12/04/2018    L1-5 LAMINECTOMY performed by Coco Bruno MD at [facility] 43 EGD TRANSORAL BIOPSY SINGLE/MULTIPLE N/A 05/18/2017    Dr Nancy Ramirez, Amelie (-), biliary colic, surgical referral    AR EGD TRANSORAL BIOPSY SINGLE/MULTIPLE N/A 11/29/2018    Dr JAIDEN Torres-w/dilation over wire-51 Kyrgyz-Distal esophagitis, gastritis    REMOVE HARDWARE SPINE N/A 12/20/2018    I AND D LUMBAR INCISION SEROMA performed by Coco Bruno MD at [facility] Right 12/12/2019    RIGHT REVERSE TOTAL SHOULDER ARTHROPLASTY performed by Laury Perdomo MD at [address] [place] [address] Dr ENDOSCOPY  03/30/2015    Dr. Shikha Salazar: amelie of alcohol per week. Illicit Drugs: denies    Family History:      Problem Relation Age of Onset    Cancer Mother     Lung Cancer Mother     Heart Disease Father     Cancer Father     Liver Cancer Father     Lung Cancer Father     Diabetes Maternal Grandmother     Heart Disease Maternal Grandfather     Cancer Maternal Grandfather     Stroke Paternal Grandmother     Stomach Cancer Sister     No Known Problems Brother     COPD Sister     Heart Disease Paternal Grandfather     Colon Cancer Neg Hx     Colon Polyps Neg Hx     Esophageal Cancer Neg Hx     Liver Disease Neg Hx     Rectal Cancer Neg Hx        Review of Systems   Constitutional: Positive for appetite change, chills, diaphoresis, fatigue and fever. Reports night sweats    HENT: Negative for congestion and ear pain. Eyes: Negative for visual disturbance. Respiratory: Positive for cough and shortness of breath. Negative for wheezing. Cardiovascular: Positive for chest pain. Negative for palpitations and leg swelling. Reports chest pressure    Gastrointestinal: Negative for abdominal distention, abdominal pain, blood in stool, constipation, diarrhea, nausea and vomiting. Endocrine: Negative for cold intolerance and heat intolerance. Genitourinary: Negative for difficulty urinating, flank pain, frequency and urgency. Musculoskeletal: Negative for arthralgias and myalgias. Skin: Negative for color change and wound. Neurological: Positive for weakness. Negative for dizziness, syncope, light-headedness, numbness and headaches. Hematological: Does not bruise/bleed easily. Psychiatric/Behavioral: Negative for agitation, confusion and dysphoric mood. Physical Examination:   BP (!) 107/58   Pulse 77   Temp 98.6 °F (37 °C) (Oral)   Resp 19   Ht 5' 7\" (1.702 m)   Wt 175 lb (79.4 kg)   SpO2 98%   BMI 27.41 kg/m²     Physical Exam  Constitutional:       General: He is not in acute distress. Appearance: Normal appearance. He is not ill-appearing. HENT:      Head: Normocephalic and atraumatic. Right Ear: External ear normal.      Left Ear: External ear normal.      Nose: Nose normal.      Mouth/Throat:      Mouth: Mucous membranes are moist.   Eyes:      Extraocular Movements: Extraocular movements intact. Conjunctiva/sclera: Conjunctivae normal.      Pupils: Pupils are equal, round, and reactive to light. Neck:      Musculoskeletal: Normal range of motion and neck supple. No muscular tenderness. Cardiovascular:      Rate and Rhythm: Normal rate and regular rhythm. Pulses: Normal pulses. Heart sounds: Murmur present. Comments: Soft systolic murmur noted   Pulmonary:      Effort: Pulmonary effort is normal. No respiratory distress. Breath sounds: Rales present. No wheezing or rhonchi. Comments: Bilateral lower lobe crackles noted   Decreased lung sounds bilaterally   Abdominal:      General: Bowel sounds are normal. There is no distension. Palpations: Abdomen is soft. Tenderness: There is no abdominal tenderness. Musculoskeletal: Normal range of motion. General: No swelling, tenderness or deformity. Right lower leg: No edema. Left lower leg: No edema. Skin:     General: Skin is warm and dry. Findings: No bruising or lesion. Neurological:      Mental Status: He is alert and oriented to person, place, and time. Psychiatric:         Mood and Affect: Mood normal.         Behavior: Behavior normal.         Thought Content:  Thought content normal.          Diagnostic Data:  CBC:  Recent Labs     04/15/21  1100   WBC 8.4   HGB 11.0*   HCT 34.4*        BMP:  Recent Labs     04/15/21  1100      K 3.7      CO2 27   BUN 12   CREATININE 0.8   CALCIUM 9.0     Recent Labs     04/15/21  1100   AST 21   ALT 19   BILITOT 0.5   ALKPHOS 105     Cardiac Enzymes:   Recent Labs     04/15/21  1100 04/15/21  1503   TROPONINI 0.22* 0.22*     ABGs:  Lab Results   Component Value Date    PHART 7.388 03/16/2021    PO2ART 68 03/16/2021    EMR7IXC 40 03/16/2021     Urinalysis:  Lab Results   Component Value Date    NITRU Negative 11/18/2020    WBCUA 0 11/18/2020    BACTERIA NEGATIVE 11/18/2020    RBCUA 0 11/18/2020    BLOODU TRACE 11/18/2020    SPECGRAV 1.010 11/18/2020    GLUCOSEU Negative 11/18/2020         RAD:    CTA PULMONARY W CONTRAST 4/15/2021 2:31 PM    Impression   1. No evidence of pulmonary embolus. 2. The patient is status post TAVR procedure. No evidence of   complications. There is atheromatous calcification of the aortic arch   without evidence of dissection or aneurysm. 3. Trace bilateral pleural effusions with bibasilar atelectasis. A   nodular opacity within the right middle lobe abutting the minor   fissure was not present on a previous exam of late March favoring an   inflammatory process. There are changes of paraseptal and   centrilobular emphysema within the upper lobes. 4. Pedunculated lesion off the upper pole of the right kidney   warranting follow-up as it does demonstrate some complexity perhaps   containing some calcification. .   Signed by Dr Iraj Graff on 4/15/2021 7:53 PM       Xr Chest Portable    Result Date: 4/15/2021    Bilateral lower lung infiltrate may represent an inflammatory/infectious process. A small ill-defined nodule in the right lower lung was not noted in the previous study. This may represent a nipple shadow? . Further follow-up is recommended. Signed by Dr Efrain Banks on 4/15/2021 11:42 AM      Assessment/Plan:  Principal Problem:    Endocarditis  Active Problems:    S/P TAVR (transcatheter aortic valve replacement)    COPD (chronic obstructive pulmonary disease) (HCC)    Coronary artery disease involving native coronary artery of native heart without angina pectoris    Mixed hyperlipidemia    SOB (shortness of breath)  Resolved Problems:    * No resolved hospital problems.  * Principal Problem:    Endocarditis/ SOB (shortness of breath)/ S/P TAVR (transcatheter aortic valve replacement)-    - Cardiology consultation and recommendations appreciated    - Cefepime and Vancomycin ordered    - ECHO ordered    - Blood cultures obtained from ED    - Lactate   - Supplemental oxygen as needed    - Telemetry monitoring     Active Problems:    COPD (chronic obstructive pulmonary disease) (HCC)-    - Duoneb   - Incentive spirometry   - Encourage deep breathing and cough     Coronary artery disease involving native coronary artery of native heart without angina pectoris- noted, resume home meds      Mixed hyperlipidemia- noted, resume home meds        Further Orders per Clinical course/attending.      Signed:  Electronically signed by SHIMON Boyce CNP on 4/15/21 at 7:44 PM [place]

## 2021-04-16 NOTE — PROGRESS NOTES
Physical Therapy  Name: Gunner Garcia  MRN:  [id number]  Date of service:  4/16/2021  Pt. off the floor for heart cath. Will f/u at a later time.     Electronically signed by Usha Handley PT on 4/16/2021 at 11:20 AM

## 2021-04-16 NOTE — PROGRESS NOTES
Vancomycin Day: 2  Current Dosing: Vancomycin 1250 mg IV every 12 hours    Temp max:  98.9    Recent Labs     04/15/21  1100 04/16/21  0316   BUN 12 17       Recent Labs     04/15/21  1100 04/16/21  0316   CREATININE 0.8 0.8       Recent Labs     04/15/21  1100 04/16/21  0316   WBC 8.4 7.5         Intake/Output Summary (Last 24 hours) at 4/16/2021 5253  Last data filed at 4/16/2021 0105  Gross per 24 hour   Intake --   Output 325 ml   Net -325 ml     Culture Date Source Results   04/15/21 Blood Sent    04/16/21  Strep Pneumoniae Antigen - urine   Presumptive Negative                  Ht Readings from Last 1 Encounters:   04/15/21 5' 7\" (1.702 m)        Wt Readings from Last 1 Encounters:   04/15/21 168 lb 6.4 oz (76.4 kg)         Body mass index is 26.38 kg/m². Estimated Creatinine Clearance: 85 mL/min (based on SCr of 0.8 mg/dL). Assessment/Plan:  Change Vancomycin to 1000 mg IV every 12 hours. This regimen has a predicted AUC of 514 and trough of 16.5. Trough level ordered.     Electronically signed by VAHID Jensen [facility] on 4/16/2021 at 6:34 AM

## 2021-04-16 NOTE — PROGRESS NOTES
All air now removed from R TR band. No bleeding noted. Will leave for 1 hr and then dress if no bleeding.  Electronically signed by Jamie Meyer RN on 4/16/2021 at 6:21 PM

## 2021-04-16 NOTE — CONSULTS
artery of native heart without angina pectoris        Mixed hyperlipidemia        * (Principal) Endocarditis              Past Medical History:  Past Medical History:   Diagnosis Date    Arthritis     Bronchitis     Cancer ([facility]     Skin Cancer REMOVED RT ARM & LT EAR    Chronic back pain     Chronic cholecystitis without calculus 5/19/2017    Chronic GERD     COPD (chronic obstructive pulmonary disease) (HCC)     Coronary artery disease of native artery of native heart with stable angina pectoris ([facility]     Coronary atherosclerosis of native coronary artery     s/p PTCA and stent placement to the LAD and RCA/7 stents    DDD (degenerative disc disease), lumbar 12/4/2018    History of blood transfusion     WITH RT SHOULDER SURGERY    History of tobacco abuse 2010    Hyperlipidemia     Hypertension     MI (myocardial infarction) ([facility]     Old, inferior wall    Moderate aortic regurgitation 08/14/2017    mod-severe AR.  Moderate aortic stenosis 08/14/2017    mod-severe aortic stenosis.  Pain management 2021    PT SEES PAIN MANAGEMENT FOR CHRONIC BACK PAIN    Spinal stenosis of lumbar region with neurogenic claudication 12/4/2018        Past Surgical History:  Past Surgical History:   Procedure Laterality Date    BACK SURGERY      s/p laminectomy    [facility]ATION  08/10/04 [facility]      CARD[facility]AC [facility]  [facility]10/03  [facility]    CARDIAC CATHETERIZATION  08/29/03 [facility]    CARDIAC CATHETERIZATION  02/16/01 [facility]    CARDIAC CATHETERIZATION  05/25/2009    EF is estimated to be 50%. See scanned document.     CARDIAC CATHETERIZATION N/A 03/2016    stent placement    CARDIAC CATHETERIZATION  08/2017    no PCI    CARDIAC CATHETERIZATION  07/11/2019    Drug-eluting stents placed to mid LAD and mid RCA, normal LV    CHOLECYSTECTOMY, LAPAROSCOPIC N/A 05/19/2017    CHOLECYSTECTOMY LAPAROSCOPIC performed by Machelle Jones MD at [address] [place] [address]  05/28/2015     Mary: No Polyps   10yr recall    CORONARY ANGIOPLASTY WITH STENT PLACEMENT  03/2016    stent to LAD    JOINT REPLACEMENT      Left thumb    JOINT REPLACEMENT Left     knee    JOINT REPLACEMENT      KNEE SURGERY      s/p Rt.  knee replacement    LEG SURGERY Right     Broken leg with surgical repair    LUMBAR SPINE SURGERY N/A 12/04/2018    L1-5 LAMINECTOMY performed by Ayla Fisher MD at [facility] 43 EGD TRANSORAL BIOPSY SINGLE/MULTIPLE N/A 05/18/2017    Dr Kaylan Stephens, Amelie (-), biliary colic, surgical referral    MT EGD TRANSORAL BIOPSY SINGLE/MULTIPLE N/A 11/29/2018    Dr JAIDEN Torres-w/dilation over wire-51 Dominican-Distal esophagitis, gastritis    REMOVE HARDWARE SPINE N/A 12/20/2018    I AND D LUMBAR INCISION SEROMA performed by Ayla Fisher MD at [address] [place] [address] Right 12/12/2019    RIGHT REVERSE TOTAL SHOULDER ARTHROPLASTY performed by Parvin Petersen MD at [address] [place] [address] Dr ENDOSCOPY  03/30/2015    Dr. Yann Boyer: amelie neg,,normal, empiric dilatation with 51F    UPPER GASTROINTESTINAL ENDOSCOPY N/A 11/29/2018    Dr JAIDEN Torres-w/dilation over wire-51 Dominican-Distal esophagitis, gastritis       Past Family History:  Family History   Problem Relation Age of Onset    Cancer Mother     Lung Cancer Mother     Heart Disease Father     Cancer Father     Liver Cancer Father     Lung Cancer Father     Diabetes Maternal Grandmother     Heart Disease Maternal Grandfather     Cancer Maternal Grandfather     Stroke Paternal Grandmother     Stomach Cancer Sister     No Known Problems Brother     COPD Sister     Heart Disease Paternal Grandfather     Colon Cancer Neg Hx     Colon Polyps Neg Hx     Esophageal Cancer Neg Hx     Liver Disease Neg Hx     Rectal Cancer Neg Hx        Past Social History:  Social History     Socioeconomic History    Marital status:      Spouse name: Not on file    Number of children: Not on file    Years of education: Not on file    Highest education level: Not on file   Occupational History    Not on file   Social Needs    Financial resource strain: Not on file    Food insecurity     Worry: Not on file     Inability: Not on file    Transportation needs     Medical: Not on file     Non-medical: Not on file   Tobacco Use    Smoking status: Former Smoker     Packs/day: 0.00     Types: Cigarettes     Quit date: 12/10/2009     Years since quittin.3    Smokeless tobacco: Never Used   Substance and Sexual Activity    Alcohol use: Yes     Alcohol/week: 42.0 standard drinks     Types: 42 Cans of beer per week     Drinks per session: 5 or 6     Comment: daily    Drug use: No    Sexual activity: Yes     Partners: Female   Lifestyle    Physical activity     Days per week: Not on file     Minutes per session: Not on file    Stress: Not on file   Relationships    Social connections     Talks on phone: Not on file     Gets together: Not on file     Attends Quaker service: Not on file     Active member of club or organization: Not on file     Attends meetings of clubs or organizations: Not on file     Relationship status: Not on file    Intimate partner violence     Fear of current or ex partner: Not on file     Emotionally abused: Not on file     Physically abused: Not on file     Forced sexual activity: Not on file   Other Topics Concern    Not on file   Social History Narrative    Not on file       Allergies: Allergies   Allergen Reactions    Codeine Swelling     Lips swelling    Iv [Iodides] Swelling     Contrast dye used for heart cath. Caused face to swell.  Hydrocodone Swelling     lips swelling    Statins      Myalgias         Home Meds:  Prior to Admission medications    Medication Sig Start Date End Date Taking?  Authorizing Provider   nitroGLYCERIN (NITROSTAT) 0.4 MG SL tablet Place 1 tablet under the tongue every 5 minutes as needed for Chest pain 1 tablet as needed 21   SHIMON Miranda Intake/Output Summary (Last 24 hours) at 4/15/2021 2353  Last data filed at 4/15/2021 2135  Gross per 24 hour   Intake --   Output 150 ml   Net -150 ml     Estimated body mass index is 26.38 kg/m² as calculated from the following:    Height as of this encounter: 5' 7\" (1.702 m). Weight as of this encounter: 168 lb 6.4 oz (76.4 kg). Physical Exam  Vitals signs reviewed. Constitutional:       Appearance: Normal appearance. HENT:      Head: Normocephalic. Mouth/Throat:      Mouth: Mucous membranes are moist.   Cardiovascular:      Rate and Rhythm: Normal rate and regular rhythm. Pulses: Normal pulses. Heart sounds: Murmur present. Pulmonary:      Effort: Pulmonary effort is normal.      Breath sounds: Normal breath sounds. Abdominal:      General: Bowel sounds are normal.      Palpations: Abdomen is soft. Tenderness: There is no abdominal tenderness. Musculoskeletal: Normal range of motion. Right lower leg: No edema. Left lower leg: No edema. Skin:     General: Skin is warm. Neurological:      General: No focal deficit present. Mental Status: He is alert and oriented to person, place, and time. Psychiatric:         Mood and Affect: Mood normal.         Behavior: Behavior normal.         Labs:  Recent Labs     04/15/21  1100   WBC 8.4   HGB 11.0*          Recent Labs     04/15/21  1100      K 3.7      CO2 27   BUN 12   CREATININE 0.8   LABGLOM >60   CALCIUM 9.0       CK, CKMB, Troponin:   Recent Labs     04/15/21  1100 04/15/21  1503   TROPONINI 0.22* 0.22*       Last 3 BNP:  Recent Labs     04/15/21  1100   PROBNP 2,540*         IMAGING:    EKG -   ECHO  BP: 101/61 mmHg     Indications:S/P TAVR.      Conclusions      Summary   Mitral valve leaflets are mildly thickened, unrestricted and motion. There is mild mitral regurgitation. Mitral annular calcification is present.    Normal functioning bioprosthetic valve in the aortic position. There is moderate perivalvular leak PVL   Mean gradient of 19 mmHg peak of 37 mmHg   Normal tricuspid valve leaflet thickness and excursion. No significant pulmonic regurgitation. The left atrium is moderately dilated. Normal left ventricular chamber size and systolic function. Mild concentric left ventricular hypertrophy. Left ventricular ejection fraction is visually estimated at 60%. Grade 1 LV diastolic dysfunction   Mild right atrial enlargement. Normal right ventricular chamber size and function. No pericardial effusion. Signature      ----------------------------------------------------------------   Electronically signed by Jose Izaguirre MD(Interpreting   physician) on 04/08/2021 01:04 PM   ----------------------------------------------------------------         Xr Chest (2 Vw)    Result Date: 4/12/2021  1. Minimal interstitial lines in the costophrenic angles bilaterally may indicate very minimal interstitial edema. 2. Stable bronchial wall thickening. 3. Prior TAVR procedure. Prior right shoulder arthroplasty. Signed by Dr Donny Malone on 4/12/2021 11:56 AM    Cta Chest W Wo Contrast    Result Date: 3/26/2021  Impression: 1. Images will be submitted to an outside institution where precise measurements will be obtained for TAVR planning. 2.  Ectatic ascending aorta measuring 4.1 cm diameter. 3.  Moderate aortic valve calcification. 4.  Heavy coronary artery calcification. 5.  Moderate emphysema. Signed by Dr Wendie Ortiz on 3/26/2021 2:29 PM    Xr Chest Portable    Result Date: 4/15/2021  Bilateral lower lung infiltrate may represent an inflammatory/infectious process. A small ill-defined nodule in the right lower lung was not noted in the previous study. This may represent a nipple shadow? . Further follow-up is recommended. Signed by Dr Damaris Genao on 4/15/2021 11:42 AM    Cta Pulmonary W Contrast    Result Date: 4/15/2021  1. No evidence of pulmonary embolus. 2. The patient is status post TAVR procedure. No evidence of complications. There is atheromatous calcification of the aortic arch without evidence of dissection or aneurysm. 3. Trace bilateral pleural effusions with bibasilar atelectasis. A nodular opacity within the right middle lobe abutting the minor fissure was not present on a previous exam of late March favoring an inflammatory process. There are changes of paraseptal and centrilobular emphysema within the upper lobes. 4. Pedunculated lesion off the upper pole of the right kidney warranting follow-up as it does demonstrate some complexity perhaps containing some calcification. . Signed by Dr Elias Nolasco on 4/15/2021 7:53 PM    Cta Abdomen Pelvis W Wo Contrast    Result Date: 3/26/2021  Impression: 1. Images will be submitted to an outside institution where precise measurements will be obtained for TAVR planning. 2.  Nonaneurysmal mildly ectatic abdominal aorta. Heavy atherosclerotic calcification throughout the arterial vasculature of the abdomen and pelvis. 3.  Colonic diverticulosis without evidence of diverticulitis. Signed by Dr Jessika Gonzales on 3/26/2021 2:18 PM       Assessment and Plan:  1. Severe aortic stenosis status post recent TAVR on April 7, 2021 with CoreValve evolut pro plus - significant perivalvular leak and aortic regurgitation was noted due to his bicuspid anatomy and the need to pre and post dilate the valve, also he had a challenging access with calcific aortoiliac system with tortuosity, patient may need TAVR in TAVR next week if his JESSICA confirmed this finding and no evidence of endocarditis, due to his access challenges we might use sapient S3 valve inside CoreValve via percutaneous left axillary approach to avoid previous issue with his iliofemoral system. 2. Acute on chronic congestive heart failure -secondary to above  3.  Fever and chills -admitted to the hospital to rule out infective endocarditis plan on doing JESSICA today, I am suspecting he might have underlying UTI because of previous Le catheter, also could be pneumonia based on his CT scan finding  4. Acute coronary syndrome ACS -patient was admitted with atypical chest pain and was found to have elevated troponin, his chest pain is persistent over the past few days, last troponin 0.22, will do coronary angiogram today to rule out any obstructive coronary artery disease         Risks, benefits, alternatives of Heart cath/PCI JESSICA discussed with the patient   I explained the procedure to the patient in detail and fully informed consent obtained. Acceptable Mallampati score  Consent for moderate conscious sedation  ASA 3      Thank you for the consult, we appreciate the opportunity to provide care to your patients. Feel free to contact me if I can be of any further assistance.       Electronically signed by Denice Mayer MD on 4/15/2021 at 11:53 PM    Denice Mayer MD, [facility]  Interventional Cardiologist, Endovascular Specialist   Medical Director, [facility] [address]

## 2021-04-16 NOTE — PROGRESS NOTES
Normal rate and regular rhythm. Pulses: Normal pulses. Heart sounds: Murmur present. Comments: Soft systolic murmur noted   Pulmonary:      Effort: Pulmonary effort is normal. No respiratory distress. Breath sounds: No wheezing, rhonchi or rales. Comments: Decreased lung sounds bilaterally   Abdominal:      General: Bowel sounds are normal. There is no distension. Palpations: Abdomen is soft. Tenderness: There is no abdominal tenderness. Musculoskeletal: Normal range of motion. General: No swelling, tenderness or deformity. Right lower leg: No edema. Left lower leg: No edema. Skin:     General: Skin is warm and dry. Findings: No bruising or lesion. Neurological:      Mental Status: He is alert and oriented to person, place, and time. Psychiatric:         Mood and Affect: Mood normal.         Behavior: Behavior normal.         Thought Content:  Thought content normal.            Medications:      sodium chloride        [START ON 4/17/2021] furosemide  40 mg Intravenous Once    cefepime  2,000 mg Intravenous Q8H    clopidogrel  75 mg Oral Daily    aspirin  81 mg Oral Daily    ezetimibe  10 mg Oral Daily    gabapentin  800 mg Oral BID    hydroxychloroquine  200 mg Oral BID    [Held by provider] furosemide  20 mg Oral Daily    multivitamin  1 tablet Oral Daily    pantoprazole  40 mg Oral Daily    sodium chloride flush  5-40 mL Intravenous 2 times per day    enoxaparin  40 mg Subcutaneous Daily    ipratropium-albuterol  1 ampule Inhalation Q4H WA     oxyCODONE-acetaminophen, naloxone, sodium chloride flush, sodium chloride, acetaminophen **OR** acetaminophen, potassium chloride **OR** potassium alternative oral replacement **OR** potassium chloride, magnesium sulfate, ondansetron **OR** ondansetron, polyethylene glycol, nitroGLYCERIN  DIET CARDIAC; No Caffeine     Lab and other Data:     Recent Labs     04/15/21  1100 04/16/21  0316   WBC 8.4 7. 5   HGB 11.0* 9.9*    206     Recent Labs     04/15/21  1100 04/16/21  0316    140   K 3.7 4.7    103   CO2 27 25   BUN 12 17   CREATININE 0.8 0.8   GLUCOSE 115* 144*     Recent Labs     04/15/21  1100   AST 21   ALT 19   BILITOT 0.5   ALKPHOS 105     Troponin T:   Recent Labs     04/15/21  1503 04/16/21  0043 04/16/21  0316   TROPONINI 0.22* 0.10* 0.10*       RAD:   Echo Transesophageal    Result Date: 4/16/2021  Summary  Mitral valve leaflets are mildly thickened, unrestricted and motion. There is mild to moderate mitral regurgitation. Mitral annular calcification is present. Normal functioning bioprosthetic valve in the aortic position. There is Severe central leak, there is moderate perivalvular leak PVL  Mean gradient of 16 mmHg peak of 34 mmHg  Normal tricuspid valve leaflet thickness and excursion. No significant pulmonic regurgitation. The left atrium is moderately dilated. Left atrial appendage well visualized. Normal size with reduced Doppler  velocities. No evidence of thrombus or spontaneous contrast noted within  the WIN. Normal left ventricular chamber size and systolic function. Mild concentric left ventricular hypertrophy. Left ventricular ejection fraction is visually estimated at 60%. Mild right atrial enlargement. Normal right ventricular chamber size and function. No pericardial effusion. Signature   ----------------------------------------------------------------  Electronically signed by Albina Garrett MD(Interpreting  physician) on 04/16/2021 04:41 PM      Xr Chest Portable    Result Date: 4/15/2021    Bilateral lower lung infiltrate may represent an inflammatory/infectious process. A small ill-defined nodule in the right lower lung was not noted in the previous study. This may represent a nipple shadow? . Further follow-up is recommended. Signed by Dr Claudia Alexandra on 4/15/2021 11:42 AM    Cta Pulmonary W Contrast    Result Date: 4/15/2021    1.  No evidence of pulmonary embolus. 2. The patient is status post TAVR procedure. No evidence of complications. There is atheromatous calcification of the aortic arch without evidence of dissection or aneurysm. 3. Trace bilateral pleural effusions with bibasilar atelectasis. A nodular opacity within the right middle lobe abutting the minor fissure was not present on a previous exam of late March favoring an inflammatory process. There are changes of paraseptal and centrilobular emphysema within the upper lobes. 4. Pedunculated lesion off the upper pole of the right kidney warranting follow-up as it does demonstrate some complexity perhaps containing some calcification. . Signed by Dr Chavo Ro on 4/15/2021 7:53 PM      Micro:    Blood cultures (4/15/2021)- no growth to date     Assessment/Plan   Active Problems:    S/P TAVR (transcatheter aortic valve replacement)    COPD (chronic obstructive pulmonary disease) (HCC)    Coronary artery disease involving native coronary artery of native heart without angina pectoris    Mixed hyperlipidemia    SOB (shortness of breath)    Acute on chronic combined systolic and diastolic congestive heart failure due to valvular disease ([address]    Acute endocarditis  Resolved Problems:    * No resolved hospital problems. *      Principal Problem:    Endocarditis/ SOB (shortness of breath)/ S/P TAVR (transcatheter aortic valve replacement)/ Acute on chronic systolic and diastolic congestive heart failure due to valvular disease-               - Cardiology on board               - Continue Cefepime              - Blood cultures (4/15/2021)- no growth to date               - Lactate 1.2 today               - Supplemental oxygen as needed               - Telemetry monitoring    - JESSICA consistent with Severe central leak and moderate perivalvular leak PVL.  Cardiac cath showed patent stents, no indication for interventions at this time.        Active Problems:    COPD (chronic obstructive pulmonary disease) ([facility]-               - continue Duoneb              - continue Incentive spirometry              - continue Encourage deep breathing and cough      Coronary artery disease involving native coronary artery of native heart without angina pectoris- noted, continue home meds      Mixed hyperlipidemia- noted, continue home meds         Antibiotic: Cefepime     DVT Prophylaxis: on Lovenox     GI prophylaxis:  On Protonix     Further Orders per Clinical course/attending.      Electronically signed by SHIMON Bach CNP on 4/16/2021 at 4:37 PM

## 2021-04-16 NOTE — PROGRESS NOTES
Pt returned from [address] [place] [address] on bed with cath lab RN and transport. Pt denies pain. Has dry cough, occasional. HR sinus 83. Pt c/o R 3rd and 4tth finger feeling numb. Cap refill <3, but fingers are cool and dusky. TR band to R wrist with 11ml air. I removed 2ml no bleeding, will monitor for feeling returning to those fingers. Pt instructed not to bend R wrist, not to push or pull or use hand until TR band removed. Pt verbalized understanding. NC O2 at 2L placed.  Electronically signed by Álvaro Kevin RN on 4/16/2021 at 12:47 PM

## 2021-04-17 PROBLEM — D64.9 ANEMIA: Status: ACTIVE | Noted: 2021-01-01

## 2021-04-17 NOTE — MANAGEMENT PLAN
Today I discussed with the patient, his wife and the hospitalist Dr. Johny James  Patient will be ready for TAVR valve in valve on this coming Wednesday to treat his severe aortic valve regurgitation  Heart catheterization and JESSICA finding was discussed with the patient in detail    He received antibiotics for possible UTI  No more fevers  CT scan of the abdomen was performed today to evaluate for anemia  Patient can be discharged from cardiology standpoint if his work-up is completed by the medicine team    Over structural coordinator will call him to schedule the TAVR procedure this week        Taiwo Arias MD, [facility], [facility]  Interventional Cardiologist, Endovascular Specialist   Medical Director, Structural Heart Program   [facility]

## 2021-04-17 NOTE — DISCHARGE SUMMARY
Cardiology consultation was placed from ED. He underwent Transesophageal ECHO and Cardiac cath today. JESSICA consistent with Severe central leak and moderate perivalvular leak PVL, however, no evidence of vegetation. Cardiac cath showed aortic valve not functioning properly and will need to repeat procedure next week, patent stents, no indication for interventions at this time. Patient is being discharged on [address] for 5 days for suspected UTI given his symptomology with recent valencia catheter. Patient is instructed to follow up with PCP for outpatient surveillance imaging for a pulmonary nodular opacity within Right middle lobe and a lesion on upper pole of right kidney. Patient is currently in stable condition to be discharged home. Significant Diagnostic Studies:     Echo Transesophageal    Result Date: 4/16/2021    Summary  Mitral valve leaflets are mildly thickened, unrestricted and motion. There is mild to moderate mitral regurgitation. Mitral annular calcification is present. Normal functioning bioprosthetic valve in the aortic position. There is Severe central leak, there is moderate perivalvular leak PVL  Mean gradient of 16 mmHg peak of 34 mmHg  Normal tricuspid valve leaflet thickness and excursion. No significant pulmonic regurgitation. The left atrium is moderately dilated. Left atrial appendage well visualized. Normal size with reduced Doppler  velocities. No evidence of thrombus or spontaneous contrast noted within  the WIN. Normal left ventricular chamber size and systolic function. Mild concentric left ventricular hypertrophy. Left ventricular ejection fraction is visually estimated at 60%. Mild right atrial enlargement. Normal right ventricular chamber size and function. No pericardial effusion.    Signature   ----------------------------------------------------------------  Electronically signed by Lake Oates MD(Interpreting  physician) on 04/16/2021 04:41 PM     Xr Chest Portable    Result Date: 4/15/2021    Bilateral lower lung infiltrate may represent an inflammatory/infectious process. A small ill-defined nodule in the right lower lung was not noted in the previous study. This may represent a nipple shadow? . Further follow-up is recommended. Signed by Dr Alden Jett on 4/15/2021 11:42 AM    Cta Pulmonary W Contrast    Result Date: 4/15/2021    1. No evidence of pulmonary embolus. 2. The patient is status post TAVR procedure. No evidence of complications. There is atheromatous calcification of the aortic arch without evidence of dissection or aneurysm. 3. Trace bilateral pleural effusions with bibasilar atelectasis. A nodular opacity within the right middle lobe abutting the minor fissure was not present on a previous exam of late March favoring an inflammatory process. There are changes of paraseptal and centrilobular emphysema within the upper lobes. 4. Pedunculated lesion off the upper pole of the right kidney warranting follow-up as it does demonstrate some complexity perhaps containing some calcification. . Signed by Dr Barry Harper on 4/15/2021 7:53 PM      Pertinent Labs:   CBC:   Recent Labs     04/15/21  1100 04/16/21  0316 04/17/21  0200 04/17/21  1607   WBC 8.4 7.5 9.6  --    HGB 11.0* 9.9* 8.6* 8.9*    206 206  --      BMP:    Recent Labs     04/15/21  1100 04/16/21  0316    140   K 3.7 4.7    103   CO2 27 25   BUN 12 17   CREATININE 0.8 0.8   GLUCOSE 115* 144*         Physical Exam:   Vital Signs: BP (!) 114/57   Pulse 93   Temp 96.4 °F (35.8 °C) (Temporal)   Resp 16   Ht 5' 7\" (1.702 m)   Wt 171 lb (77.6 kg)   SpO2 96%   BMI 26.78 kg/m²   General appearance:. Alert and Cooperative   HEENT: Normocephalic. Chest: Lung sounds clear bilaterally without wheezes or rhonchi. Cardiac: RRR, S1, S2 normal. No murmurs, gallops, or rubs auscultated.    Abdomen:soft, non-tender; non-distended normal bowel sounds no masses, no organomegaly. Extremities: No clubbing or cyanosis. No peripheral edema. Peripheral pulses palpable. Neurologic: Grossly intact. Discharge Medications:          Medication List      START taking these medications    cefdinir 300 MG capsule  Commonly known as: OMNICEF  Take 1 capsule by mouth 2 times daily for 5 days        CONTINUE taking these medications    aspirin 81 MG chewable tablet  Take 1 tablet by mouth daily     clopidogrel 75 MG tablet  Commonly known as: PLAVIX  Take 1 tablet by mouth daily     ezetimibe 10 MG tablet  Commonly known as: ZETIA  TAKE 1 TABLET BY MOUTH EVERY DAY     furosemide 20 MG tablet  Commonly known as: Lasix  Take 1 tablet by mouth daily     gabapentin 800 MG tablet  Commonly known as: NEURONTIN     hydroxychloroquine 200 MG tablet  Commonly known as: PLAQUENIL     multivitamin Tabs tablet     nitroGLYCERIN 0.4 MG SL tablet  Commonly known as: NITROSTAT  Place 1 tablet under the tongue every 5 minutes as needed for Chest pain 1 tablet as needed     oxyCODONE-acetaminophen 7.5-325 MG per tablet  Commonly known as: PERCOCET     pantoprazole 40 MG tablet  Commonly known as: PROTONIX  TAKE 1 TABLET BY MOUTH DAILY TAKE DAILY FIRST THING IN THE MORNING ON AN EMPTY STOMACH. STOP taking these medications    indomethacin 50 MG capsule  Commonly known as: INDOCIN           Where to Get Your Medications      These medications were sent to [address], [address]    Phone: [phone]   · cefdinir 300 MG capsule            Discharge Instructions: Follow up with SHIMON Torres within 2 business days of Discharge. Follow up with Cardiology as scheduled. Take medications as directed. Resume activity as tolerated. Diet: DIET CARDIAC; No Caffeine     Disposition: Patient is medically stable and will be discharged home.     Time spent on discharge 35 minutes spent in assessing patient, reviewing medications, discussion with nursing, confirming safe discharge plan and preparation of discharge summary.     Signed:  Electronically signed by SHIMON Villa CNP on 4/17/21 at 6:01 PM CDT

## 2021-04-17 NOTE — DISCHARGE INSTR - ACTIVITY
PATIENT INSTRUCTIONS- POST RADIAL CARDIAC CATH/ANGIOPLASTY      · Do not subject hand/arm to any forceful movements for 24 hours (i.e. Supporting weight) when rising from a chair or bed. · For 2 days following discharge:  · Do Not  Operate a motor vehicle, tractor, lawnmower, motorcycle or all-terrain vehicle. · Do Not  Lift anything heavier than 1 pound with affected arm. · Avoid  Excessive (extension/flexion) wrist movement. · Avoid heavy lifting with affected arm for 3 days following discharge. · Do Not engage in vigorous exercise (i.e. Tennis, ect.) using affected arm for 5 days following discharge. · If bleeding should occur while in the hospital, apply firm pressure to the site an call your nurse. · If bleeding should occur following discharge:  · Sit down and apply firm pressure to site with your fingers x 10 mins. · If the bleeding stops, continue to sit quietly, keeping your wrist straight for 2 hours. Notify your physician as soon as possible. · If bleeding DOES NOT STOP after 10 mins or if there is a large amount of bleeding or spurting , CALL 911 immediately. DO  [address] · Remove bandage 24 hours following application and replace with a band aid. · You may shower on the day following your procedure. Do not take a tub bath for 3 days following discharge. Do not submerge arm in dishwater or other water sources for 5 days. · Expect mild tingling of hand and tenderness at the puncture site for up to 3 days. If this persists or other symptoms develop, notify your nurse/physician. · If any signs of infection should occur, such as : redness, swelling, drainage, fever > 101 degrees, or pain , notify your physician immediately.

## 2021-04-18 NOTE — PROGRESS NOTES
Pt now discharged home. Pt instructed on discharge medications, care of R radial post cath site, follow up appt for labs and Covid test prior to schedule OR next Wed . 4-21-21.  and wife verbalize understanding and signed and given copy of discharge.  Electronically signed by Francisco Caban RN on 4/17/2021 at 8:39 PM

## 2021-04-19 NOTE — TELEPHONE ENCOUNTER
Called and spoke with patient, have TAVR scheduled for 4/21/2021 at 0930 with arrival of 0830. Patient is to be NPO after midnight. Patient instructed to arrive through front entrance of hospital and make immediate left. Patient advised they can have one person with them but they both must wear a mask. Patient advised may take morning medications with sip of water. Also advised patient must have COVID testing completed on 4/20/2021 anywhere from 800-1100 am at [facility]. Advised patient that they will be able to proceed with procedure as long as test results are negative. Patient made aware that if testing is not resulted evening prior to procedure that they may have to be rescheduled and possibly retested. Given instructions on where to go and to self quarantine between testing and procedure. Patient does not have IV dye allergy. Patient verbally understood. Patient expressed concerns of his breathing and advised to go to ER. He did not want to go at this time.  Discussed with Dr. Jonathan Lopez and TAVR will stay scheduled for 4/21/2021

## 2021-04-20 NOTE — TELEPHONE ENCOUNTER
Called and spoke with patient's wife who stated patient had a better night last night. He will be here tomorrow as planned for TAVR.

## 2021-04-21 PROBLEM — Z95.3 STATUS POST TRANSCATHETER AORTIC VALVE REPLACEMENT (TAVR) USING BIOPROSTHESIS: Status: ACTIVE | Noted: 2021-01-01

## 2021-04-21 NOTE — OP NOTE
Operative Note         TRANSCATHETER AORTIC VALVE REPLACEMENT -TAVR      Patient Name: Ramirez Bhandari   MRN: [id number]   Age: 76 y.o. : 1955   Admission Date: 2021   Room/Bed: [facility] POOL/NONE   PRIMARY CARE PROVIDER   SHIMON Alvarado       DATE OF PROCEDURE: 2021     : Edmund Corcoran MD    Co-/Surgeon: Linda Arellano MD      Pre-operative diagnosis: Severe symptomatic aortic regurgitation status post failed TAVR valve  Post-operative diagnosis: Successful percutaneous left axillary TAVR in TAVR using a 26 mm Amaral Ervin S3 valve with 2 extra cc to treat failed Medtronic prosthetic valve with severe aortic regurgitation    snf (Major co morbidities and conditions):   Acute diastolic congestive heart failure due to valvular heart disease   Porcelain aorta severe diffuse multivessel peripheral vascular disease congestive heart failure pulmonary edema  STS score 3.04% for mortality    Procedure:   Access of femoral artery and femoral vein on the left side. Left subclavian/axillary angiogram  Temporary transvenous pacer insertion in the RV   Aortic root angiography. Percutaneous left axillary artery transcatheter aortic valve replacment(TAVR) using a 26 mm Amaral Ervin S3 valve deployed inside 29 mm CoreValve evolut Pro+ to treat severe AI. Perclose device placement in the primary access in the left axillary access with complete hemostasis. Manual compression on the left femoral access artery and vein    Anesthesia: General anesthesia with JESSICA     Percutaneous left axillary TAVR      DESCRIPTION OF PROCEDURE:     After obtaining consent, patient was brought to the cath lab room, a sterile prep and drape and administration of appropriate preoperative prophylactic antibiotics, the patient was prepped and draped in a sterile manner. A time - out procedure was completed.      Arterial cannulation in left femoral artery and left femoral vein were obtained. Fluoroscopy and US were utilized. A pigtail catheter was then positioned over the right coronary cusp of the aortic valve. Due to presence of severe PAD and heavy calcification and tortuosity of the aortoiliac system, we decided to use left axillary access of the primary access for valve deployment, access was obtained the left axillary artery using ultrasound guidance micropuncture needle and fluoroscopy, through the micropuncture sheath, left axillary angiogram was performed to confirm the access site, this was followed by deployment of double Perclose in orthogonal fashion and placement of 7 [place] sheath in the axillary artery. Following this we used Amplatzer stiff wire through the sheath which was placed in the ascending aorta, 14 [place] Amaral sheath was placed in the axillary artery without difficulty after using dilator. The patient then was fully heparinized, therapeutic ACT was achieved     Through this access the in left axillary artery, the aortic valve was crossed using AL-1 catheter and straight tipped wire, then AL1 catheter was advanced through the sheath to cross the aortic valve and measure the transvalvular gradient and LVEDP. Having completed this, we then put the Lunderquist wire in the left ventricle after exchange with pigtail catheter and exchange length J-wire     After that we brought the 26 mm Amaral S3 valve was advanced through the sheath all the way across the previous evolute valve, position was confirmed by echo and by angiogram using pigtail catheter, we delopoyed the S3 valve inside core valve with rapid pacing to 180 bpm  without difficulty in proper position. Post - deployment, we were quite pleased with both the depth and the absence of significant perivalvular leak, this was confirmed with hemodynamic assessment, JESSICA echo finding.   His mean valve gradient was 12 mmHg    JESSICA showed no evidence of any central leak and trace perivalvular leak Arterial access was then examined, and closure of the primary access using double Perclose, after achieving excellent hemostasis, left subclavian/axillary angiogram was performed from the femoral artery by a JR4 catheter, showing fully patent subclavian and axillary artery with no limitation of flow, no stenosis or dissection or bleeding. We were satisfied with results and equipments were removed. Secondary access at left femoral artery was managed with minimal compression with complete hemostasis. There was done with no complication. The protamine was then administered     The patient tolerated the procedure well. Patient transferred to the PACU area for recovery, after full recovery he will be transferred to PCU for observation overnight     Complication-none  Estimated blood loss-100 cc       Plan:   1. PCU observation overnight for further management per TAVR protocol. 2. Echo in the morning prior to discharge   3.  Plan for discharge in 1 to 2 days if hemodynamically stable    Electronically signed by Uzair Wheeler MD on 4/21/2021 at 7:02 PM       Uzair Wheeelr MD, [facility] [place] [address] Cardiologist, Endovascular Specialist   Medical Director, Sara Riojas

## 2021-04-21 NOTE — OP NOTE
Operative Note      Patient: Dionicio Nuñez  YOB: 1955  MRN: [id number]    Date of Procedure: 2021     TRANSCATHETER AORTIC VALVE REPLACEMENT -TAVR      Patient Name: Dionicio Nuñez   MRN: [id number]   Age: 76 y.o. : 1955   Admission Date: 2021   Room/Bed: [facility]/NONE   PRIMARY CARE PROVIDER   SHIMON Gonzales       DATE OF PROCEDURE: 2021     : Dr. Callie Mcfarlane    Co-/Surgeon: Dr. Beth Barnard      Pre-operative diagnosis: Severe prosthetic aortic valve regurgitation with large paravalvular leak status post TAVR 2021. Inserting a 29 mm Medtronic evolute prosthetic aortic valve  Post-operative diagnosis: Same    nursing home (Major co morbidities and conditions):   Porcelain aorta, severe diffuse multivessel peripheral vascular disease.,  Congestive heart failure with pulmonary edema. Procedure:   Access of left femoral artery  and femoral vein. Left axillary and aortic angiography. Temporary transvenous pacer insertion in the RV   Aortic root angiography. Percutaneous transaxillary left axillary artery transcatheter aortic valve-in -valve replacment(TAVR) inserting a 29 mm Amaral Ervin S3 valve inside a 29 mm Medtronic evolute transcatheter valve which was inserted on 2021  Perclose device placement in the left axillary artery primary access. .     Anesthesia: General anesthesia     Indication for procedure:   Severe symptomatic aortic valvular and paravalvular leak status post insertion of a Medtronic evolute transcatheter valve on 2021      Description of the procedure: Following informed consent, the patient was bought to the cardiac Cath Lab and placed under general anesthesia a transesophageal echo probe was then passed.  : and appropriate monitoring lines/IVs were placed. The left femoral artery was then cannulated and a guidewire was placed under fluoroscopic guidance into the left axillary artery.   The left femoral vein was then cannulated and a transvenous temporary pacemaker was then placed into the apex of the right ventricle pacing thresholds were then measured. .   Using US guidance and micro-puncture techniques, a micropuncture needle was used to access the left axillary artery  and the arteriotomy was confirmed using  angiography. A 6 Fr sheath was placed in the left axillary artery. . Using US guidance, t      . A 6 Fr sheath was placed. 2 Perclose sutures were deployed at right angles using the \"pre-close\" technique. Weight based heparin bolus was then administered to ensure ACT was within therapeutic range. The Primary access sheath was up sized over stiff wire in the a sending aorta using serial dilatation and finally  A 18 Fr Amaral delivery system was advanced into the left axillary artery and directed into the ascending thoracic aorta  without difficulty. A 6 Fr pigtail catheter was then advanced into the aorta and placed at the level of the aortic cusps. A baseline aortic root angiogram was performed in the optimal views. Next, an AL1 catheter with a straight tipped J wire was advanced through the large sheath and used to cross the aortic valve. AL1 was then advanced into the left ventricle. AL1 catheter was exchanged for a pigtail catheter over a J wire. The J wire was exchanged for a 0.035 pre shaped Safari wire that was advanced into the LV cavity. The pigtail catheter was then removed. Over the [facility] wire a balloon angioplasty catheter was then advanced and placed across the defective prosthetic aortic valve. A 29 mm Laruth Burger valve delivery system was then advanced into the ascending aorta and the valve was then positioned across the defective prosthetic valve. . With rapid pacing at 200 beats per minute, the valve was deployed in the standard technique with full inflation.      A post-procedure JESSICA revealed no central aortic regurgitation and trivial perivalvular

## 2021-04-21 NOTE — ANESTHESIA PRE PROCEDURE
Department of Anesthesiology  Preprocedure Note       Name:  Hue Ervin   Age:  76 y.o.  :  1955                                          MRN:  [id number]         Date:  2021      Surgeon: * Surgery not found *    Procedure:     Medications prior to admission:   Prior to Admission medications    Medication Sig Start Date End Date Taking? Authorizing Provider   cefdinir (OMNICEF) 300 MG capsule Take 1 capsule by mouth 2 times daily for 5 days 21  Valentina Guy MD   nitroGLYCERIN (NITROSTAT) 0.4 MG SL tablet Place 1 tablet under the tongue every 5 minutes as needed for Chest pain 1 tablet as needed 21   SHIMON Van   ezetimibe (ZETIA) 10 MG tablet TAKE 1 TABLET BY MOUTH EVERY DAY 21   SHIMON Van   aspirin 81 MG chewable tablet Take 1 tablet by mouth daily 21   Renetta Hernandez MD   clopidogrel (PLAVIX) 75 MG tablet Take 1 tablet by mouth daily 21   Renetta Hernandez MD   furosemide (LASIX) 20 MG tablet Take 1 tablet by mouth daily 21   Renetta Hernandez MD   Multiple Vitamin (MULTIVITAMIN) TABS tablet Take 1 tablet by mouth daily    Historical Provider, MD   pantoprazole (PROTONIX) 40 MG tablet TAKE 1 TABLET BY MOUTH DAILY TAKE DAILY FIRST THING IN THE MORNING ON AN EMPTY STOMACH. 20   SHIMON Thomas   hydroxychloroquine (PLAQUENIL) 200 MG tablet Take 200 mg by mouth 2 times daily 6/15/20   Historical Provider, MD   oxyCODONE-acetaminophen (PERCOCET) 7.5-325 MG per tablet Take 1 tablet by mouth every 4 hours as needed for Pain. Historical Provider, MD   gabapentin (NEURONTIN) 800 MG tablet Take 800 mg by mouth 2 times daily . 16   Historical Provider, MD       Current medications:    No current facility-administered medications for this visit. No current outpatient medications on file.      Facility-Administered Medications Ordered in Other Visits   Medication Dose Route Frequency Provider Last Rate Last Admin    0.9 % renal cyst N28.1    Hypertrophy of prostate with urinary obstruction N40.1, N13.8    Current use of proton pump inhibitor Z79.899    S/P TAVR (transcatheter aortic valve replacement) Z95.2    Acute on chronic combined systolic and diastolic congestive heart failure due to valvular disease ([facility]) I50.43, I38    Fever and chills R50.9    Dysuria R30.0    Suspected UTI R39.89    Anemia D64.9    Status post transcatheter aortic valve replacement (TAVR) using bioprosthesis Z95.3       Past Medical History:        Diagnosis Date    Acute on chronic combined systolic and diastolic congestive heart failure due to valvular disease ([address] 4/16/2021    Arthritis     Bronchitis     Cancer ([facility])     Skin Cancer REMOVED RT ARM & LT EAR    Chronic back pain     Chronic cholecystitis without calculus 5/19/2017    Chronic GERD     COPD (chronic obstructive pulmonary disease) ([facility])     Coronary artery disease of native artery of native heart with stable angina pectoris ([facility])     Coronary atherosclerosis of native coronary artery     s/p PTCA and stent placement to the LAD and RCA/7 stents    DDD (degenerative disc disease), lumbar 12/4/2018    History of blood transfusion     WITH RT SHOULDER SURGERY    History of tobacco abuse 2010    Hyperlipidemia     Hypertension     MI [facility]myocardial infarction) ([facility]     Old, inferior wall    Moderate aortic regurgitation 08/14/2017    mod-severe AR.  Moderate aortic stenosis 08/14/2017    mod-severe aortic stenosis.      Pain management 2021    PT SEES PAIN MANAGEMENT FOR CHRONIC BACK PAIN    Spinal stenosis of lumbar region with neurogenic claudication 12/4/2018       Past Surgical History:        Procedure Laterality Date    BACK SURGERY      s/p laminectomy    [facility]04 [facility]      [facility]    [facility]ATION  [facility]    CARDIAC [facility]HETERIZATION  02/16/01 [facility]    [facility]    CARDIAC CATHETERIZATION  2009    EF is estimated to be 50%. See scanned document.  CARDIAC CATHETERIZATION N/A 2016    stent placement    CARDIAC CATHETERIZATION  2017    no PCI    CARDIAC CATHETERIZATION  2019    Drug-eluting stents placed to mid LAD and mid RCA, normal LV    CHOLECYSTECTOMY, LAPAROSCOPIC N/A 2017    CHOLECYSTECTOMY LAPAROSCOPIC performed by Dayanara Hwang MD at [address] [place] [address]  2015    Dr. Lobito Aguillon: No Polyps   10yr recall    CORONARY ANGIOPLASTY WITH STENT PLACEMENT  2016    stent to LAD    JOINT REPLACEMENT      Left thumb    JOINT REPLACEMENT Left     knee    JOINT REPLACEMENT      KNEE SURGERY      s/p Rt. knee replacement    LEG SURGERY Right     Broken leg with surgical repair    LUMBAR SPINE SURGERY N/A 2018    L1-5 LAMINECTOMY performed by Mariaelena Ramon MD at [facility] EGD TRANSORAL BIOPSY SINGLE/MULTIPLE N/A 2017    Dr Umang Marrufo, Amelie (-), biliary colic, surgical referral    CT EGD TRANSORAL BIOPSY SINGLE/MULTIPLE N/A 2018    Dr JAIDEN Cantu/dilation over wire-51 [place]-Distal esophagitis, gastritis    REMOVE HARDWARE SPINE N/A 2018    I AND D LUMBAR INCISION SEROMA performed by Mariaelena Ramon MD at [address] [place] [address] Right 2019    RIGHT REVERSE TOTAL SHOULDER ARTHROPLASTY performed by Juana Rivera MD at [address] [place] [address] Dr ENDOSCOPY  2015    Dr. Lobito Aguillon: amelie neg,,normal, empiric dilatation with 51F    UPPER GASTROINTESTINAL ENDOSCOPY N/A 2018    Dr JAIDEN Cantu/dilation over wire-51 [place]-Distal esophagitis, gastritis       Social History:    Social History     Tobacco Use    Smoking status: Former Smoker     Packs/day: 0.00     Types: Cigarettes     Quit date: 12/10/2009     Years since quittin.3    Smokeless tobacco: Never Used   Substance Use Topics    Alcohol use:  Yes     Alcohol/week: 42.0 standard drinks     Types: 42 Cans of beer per week Drinks per session: 5 or 6     Comment: daily                                Counseling given: Not Answered      Vital Signs (Current): There were no vitals filed for this visit. BP Readings from Last 3 Encounters:   04/21/21 (!) 143/56   04/17/21 (!) 106/53   04/12/21 (!) 110/52       NPO Status:                                                                                 BMI:   Wt Readings from Last 3 Encounters:   04/21/21 171 lb (77.6 kg)   04/17/21 171 lb (77.6 kg)   04/12/21 175 lb (79.4 kg)     There is no height or weight on file to calculate BMI.    CBC:   Lab Results   Component Value Date    WBC 7.8 04/21/2021    RBC 3.67 04/21/2021    HGB 11.4 04/21/2021    HCT 35.9 04/21/2021    MCV 97.8 04/21/2021    RDW 13.6 04/21/2021     04/21/2021       CMP:   Lab Results   Component Value Date     04/21/2021    K 4.5 04/21/2021    K 4.7 04/16/2021     04/21/2021    CO2 28 04/21/2021    BUN 12 04/21/2021    CREATININE 0.7 04/21/2021    GFRAA >59 04/21/2021    LABGLOM >60 04/21/2021    GLUCOSE 94 04/21/2021    PROT 6.9 04/21/2021    CALCIUM 9.6 04/21/2021    BILITOT 0.4 04/21/2021    ALKPHOS 117 04/21/2021    AST 19 04/21/2021    ALT 15 04/21/2021       POC Tests: No results for input(s): POCGLU, POCNA, POCK, POCCL, POCBUN, POCHEMO, POCHCT in the last 72 hours.     Coags:   Lab Results   Component Value Date    PROTIME 13.7 04/21/2021    INR 1.06 04/21/2021    APTT 26.4 11/12/2018       HCG (If Applicable): No results found for: PREGTESTUR, PREGSERUM, HCG, HCGQUANT     ABGs:   Lab Results   Component Value Date    PHART 7.388 03/16/2021    PO2ART 68 03/16/2021    GCW2XGL 40 03/16/2021    BEART -1 03/16/2021    D2TOJZVS 93 03/16/2021        Type & Screen (If Applicable):  No results found for: LABABO, LABRH    Drug/Infectious Status (If Applicable):  No results found for: HIV, HEPCAB    COVID-19 Screening (If Applicable):   Lab Results   Component Value Date    COVID19 NEG 04/20/2021           Anesthesia Evaluation  Patient summary reviewed and Nursing notes reviewed no history of anesthetic complications:   Airway: Mallampati: I  TM distance: >3 FB   Neck ROM: full  Mouth opening: > = 3 FB Dental:    (+) upper dentures and lower dentures      Pulmonary:   (+) COPD:  shortness of breath: chronic,      (-) sleep apnea and not a current smoker                          ROS comment: CTA chest:  Impression  Impression:  1. Images will be submitted to an outside institution where precise  measurements will be obtained for TAVR planning. 2. Ectatic ascending aorta measuring 4.1 cm diameter. 3. Moderate aortic valve calcification. 4. Heavy coronary artery calcification. 5. Moderate emphysema. Pt wearing oxgen   Cardiovascular:  Exercise tolerance: poor (<4 METS),   (+) hypertension:, valvular problems/murmurs: AS, angina:, past MI:, CAD:, CABG/stent (stenting x 7 with various dates last placed 2 years ago):, HURLEY: after ambulating 1 flight of stairs, murmur,     (-) pacemaker    ECG reviewed      Echocardiogram reviewed    Cleared by cardiology     Beta Blocker:  Dose within 24 Hrs      ROS comment: Echo:   Summary   Normal left ventricular chamber size and systolic function. Mild concentric left ventricular hypertrophy. Left ventricular ejection fraction is visually estimated at 60%. Aortic valve leaflets are severely thickened. There is moderately   decreased excursion. Moderate aortic stenosis is present. The peak gradient across the aortic valve is 75 mmHg. The mean gradient across the aortic valve is 36 mmHg. Mild aortic regurgitation. The left atrium is moderately dilated. Mild right atrial enlargement. JESSICA 4/16/2021:  Summary   Mitral valve leaflets are mildly thickened, unrestricted and motion. There is mild to moderate mitral regurgitation. Mitral annular calcification is present.    Normal functioning bioprosthetic valve in the aortic position. There is Severe central leak, there is moderate perivalvular leak PVL   Mean gradient of 16 mmHg peak of 34 mmHg   Normal tricuspid valve leaflet thickness and excursion. No significant pulmonic regurgitation. The left atrium is moderately dilated. Left atrial appendage well visualized. Normal size with reduced Doppler   velocities. No evidence of thrombus or spontaneous contrast noted within   the WIN. Normal left ventricular chamber size and systolic function. Mild concentric left ventricular hypertrophy. Left ventricular ejection fraction is visually estimated at 60%. Mild right atrial enlargement. Normal right ventricular chamber size and function. No pericardial effusion. Neuro/Psych:      (-) seizures and CVA           GI/Hepatic/Renal:   (+) hiatal hernia, GERD: well controlled,      (-) liver disease and no renal disease (renal cyst surviellance only)      ROS comment: Hx of esophageal dilation    No dysphagia    CT abdomen/ pelvis:  Impression  1. No evidence of retroperitoneal hematoma. 2. Small bilateral pleural effusions. Cardiomegaly. Prior TAVR aortic  valve replacement. Coronary artery calcification. Atelectasis in both  lung bases. 3. Partially imaged possible 1.1 cm nodule on image #1 in the right  lung laterally either in the right upper lobe or right middle lobe. This nodule was not seen on 3/26/2021. Follow-up CT chest recommended. It would be definitely unusual to develop a nodule of this size in  less than one month. It could be a partially imaged area of  atelectasis or other infiltrate. 4. Prior cholecystectomy. 5. Bilateral renal cysts. Renal calcifications. No obstruction. 6. Prostate is enlarged. Mild bladder wall thickening may be related  to muscular hypertrophy. Infection/inflammation less likely. 7. Diverticulosis of the colon. No small bowel distention. 8. Atheromatous disease of the aortoiliac vessels and branches.

## 2021-04-21 NOTE — LETTER
[facility]  Cardiac Rehab Department  [address] [address], [address]  [phone]  Toll Free [phone]          April 26, 2021    Dear Rosa Granados,    Please find this informational packet that has been sent to you on heart disease and the guidelines you are to follow concerning your present cardiac condition and immediate recovery. Due to your recent redo-TAVR you once again qualify for participation in a Phase II Outpatient Cardiac Rehab Program.  This elective service has been shown to significantly reduce cardiac mortality by 26-31% and increase longevity by as much as 5 years among patients such as yourself! A brochure and info sheet have been included in this mailing for the purpose of providing you with a brief overview of program components. The [facility] is currently caring for patients in accordance with all mandated regulatory COVID-19 guidelines and practices. Seeing that you live beyond a 25 mile radius of [facility] you may opt to contact [facility] at [phone] to check on program availability and thus enroll at that site. Furthermore, feel free to reach out to us with any questions or concerns and our staff will be more than pleased to assist you. Thank you. To the betterment of your health,        [facility] Staff    Demario Cervantes, BS, MA            Stefania Parks, RN  Registered Nurse  Exercise Physiologist  Registered Nurse

## 2021-04-21 NOTE — ANESTHESIA POSTPROCEDURE EVALUATION
Department of Anesthesiology  Postprocedure Note    Patient: Grayson Meyer  MRN: [id number]  YOB: 1955  Date of evaluation: 4/21/2021  Time:  5:10 PM     Procedure Summary     Date: 04/21/21 Room / Location: [facility] CATH LAB    Anesthesia Start: 6453 Anesthesia Stop: 9373    Procedure: CATH LAB WITH ANESTHESIA Diagnosis:       Nonrheumatic aortic (valve) stenosis      Status post transcatheter aortic valve replacement (TAVR) using bioprosthesis    Scheduled Providers: SHIMON Leblanc CRNA Responsible Provider: SHIMON Daigle CRNA    Anesthesia Type: general ASA Status: 4          Anesthesia Type: general    Danna Phase I:      Danna Phase II:      Last vitals: Reviewed and per EMR flowsheets.        Anesthesia Post Evaluation    Patient location during evaluation: PACU  Patient participation: complete - patient participated  Level of consciousness: sleepy but conscious  Pain score: 0  Airway patency: patent  Nausea & Vomiting: no nausea and no vomiting  Complications: no  Cardiovascular status: hemodynamically stable  Respiratory status: acceptable, spontaneous ventilation and face mask  Hydration status: euvolemic

## 2021-04-21 NOTE — PROGRESS NOTES
Pre TAVR 5 meter Walk test      1st lap (16.5 feet): 7.6 sec  2nd lap (16.5 feet): 8.6 sec  3rd lap (16.5 feet): 9.1 sec      Average time: 8.4 sec      Symptoms: SOB,

## 2021-04-21 NOTE — H&P
[facility], [address] [place] [address]  Phone: [phone]  Fax: [phone]    OFFICE VISIT:  2021    Susan Ying - : 1955    Reason For Visit:  Mimi Lyles is a 76 y.o. male who is here for the aortic valve stenosis      HPI    Mr. Babs Regalado is a 70-year-old-year-old male who has been referred to valve clinic for consultation regarding severe aortic stenosis, he was seen by Dr. Rosanna Diaz for surgical risk assessment and was not referred to me for evaluation to consider TAVR    He is known to have a history of CAD, aortic regurgitation, hypertension, hyperlipidemia, aortic stenosis. Catheterization 2019 showed normal LV systolic function. Moderate aortic stenosis. PCI with drug-eluting stents placed to mid LAD and mid RCA. Recent echo on 2021 showed evidence of aortic stenosis with valve area of 0.8 cm² and peak gradient of 75 mmHg and mean of 36 mmHg, ejection fraction 60%    He is accompanied by his wife today, they tell me that he has been symptomatic recently with exam shortness of breath with slightest exertion, he also complained of dizziness and near syncope at one time, also complained of occasional leg swelling, he denies any active angina at this time    Based on that he was referred for aortic valve replacement    SHIMON Zelaya is PCP.   Susan Ying has the following history as recorded in [facility]:    Patient Active Problem List    Diagnosis Date Noted    Status post transcatheter aortic valve replacement (TAVR) using bioprosthesis 2021     Priority: High    Acute on chronic combined systolic and diastolic congestive heart failure due to valvular disease ([address] 2021     Priority: High    Fever and chills 2021     Priority: High    Dysuria 2021     Priority: High    Suspected UTI 2021     Priority: High    S/P TAVR (transcatheter aortic valve replacement) 2021 Priority: High    Abnormal myocardial perfusion study      Priority: High    Aortic valve stenosis      Priority: High    Anemia 04/17/2021     Priority: Low    Current use of proton pump inhibitor 12/03/2020     Priority: Low    Renal cyst, left 09/17/2020     Priority: Low    Complex renal cyst 09/17/2020     Priority: Low    Hypertrophy of prostate with urinary obstruction 09/17/2020     Priority: Low    Osteoarthritis of both shoulders 11/07/2019     Priority: Low    DDD (degenerative disc disease), lumbar 12/04/2018     Priority: Low    Spinal stenosis of lumbar region with neurogenic claudication 12/04/2018     Priority: Low    Lumbar stenosis with neurogenic claudication 12/04/2018     Priority: Low    Alternating constipation and diarrhea 10/31/2018     Priority: Low    Arthritis of knee 07/16/2018     Priority: Low    Gastroesophageal reflux disease 12/19/2017     Priority: Low    Gout 12/19/2017     Priority: Low    Neuropathy 12/19/2017     Priority: Low    Rheumatoid arthritis ([facility] 12/19/2017     Priority: Low    Acalculous cholecystitis 05/19/2017     Priority: Low    Right upper quadrant abdominal pain 05/12/2017     Priority: Low    Mixed hyperlipidemia 04/26/2016     Priority: Low    S/P coronary artery stent placement 04/26/2016     Priority: Low    SOB (shortness of breath) 04/26/2016     Priority: Low    Coronary artery disease involving native coronary artery of native heart without angina pectoris      Priority: Low    Hiatal hernia 04/20/2015     Priority: Low    Hoarseness, chronic 03/13/2015     Priority: Low    Chronic heartburn 03/13/2015     Priority: Low    History of tobacco abuse      Priority: Low    Leg pain 08/25/2011     Priority: Low    Coronary atherosclerosis of native coronary artery      Priority: Low    MI (myocardial infarction) [facility]      Priority: Low    COPD (chronic obstructive pulmonary disease) ([facility]      Priority: Low    Bronchitis Priority: Low     Past Medical History:   Diagnosis Date    Acute on chronic combined systolic and diastolic congestive heart failure due to valvular disease ([facility] 4/16/2021    Arthritis     Bronchitis     Cancer (HCC)     Skin Cancer REMOVED RT ARM & LT EAR    Chronic back pain     Chronic cholecystitis without calculus 5/19/2017    Chronic GERD     COPD (chronic obstructive pulmonary disease) (HCC)     Coronary artery disease of native artery of native heart with stable angina pectoris ([address]     Coronary atherosclerosis of native coronary artery     s/p PTCA and stent placement to the LAD and RCA/7 stents    DDD (degenerative disc disease), lumbar 12/4/2018    History of blood transfusion     WITH RT SHOULDER SURGERY    History of tobacco abuse 2010    Hyperlipidemia     Hypertension     MI (myocardial infarction) ([facility]     Old, inferior wall    Moderate aortic regurgitation 08/14/2017    mod-severe AR.  Moderate aortic stenosis 08/14/2017    mod-severe aortic stenosis.  Pain management 2021    PT SEES PAIN MANAGEMENT FOR CHRONIC BACK PAIN    Spinal stenosis of lumbar region with neurogenic claudication 12/4/2018     Past Surgical History:   Procedure Laterality Date    BACK SURGERY      s/p laminectomy    CARDIAC CATHETERIZATION  08/10/04 [facility]    [facility]      [facility]    CARDIAC CATHETERIZATION  08/29/03 [facility]    CARDIAC CATHETERIZATION  02/16/01 [facility]DL    w[facility]    CARDIAC CATHETERIZATION  05/25/2009    EF is estimated to be 50%. See scanned document.     CARDIAC CATHETERIZATION N/A 03/2016    stent placement    CARDIAC CATHETERIZATION  08/2017    no PCI    CARDIAC CATHETERIZATION  07/11/2019    Drug-eluting stents placed to mid LAD and mid RCA, normal LV    CHOLECYSTECTOMY, LAPAROSCOPIC N/A 05/19/2017    CHOLECYSTECTOMY LAPAROSCOPIC performed by Redd Vizcarra MD at [address] [place] [address]  05/28/2015    Dr. Angela Cali: No Polyps   10yr recall    CORONARY ANGIOPLASTY WITH STENT PLACEMENT  2016    stent to LAD    JOINT REPLACEMENT      Left thumb    JOINT REPLACEMENT Left     knee    JOINT REPLACEMENT      KNEE SURGERY      s/p Rt. knee replacement    LEG SURGERY Right     Broken leg with surgical repair    LUMBAR SPINE SURGERY N/A 2018    L1-5 LAMINECTOMY performed by Mohit Westfall MD at [facility] EGD TRANSORAL BIOPSY SINGLE/MULTIPLE N/A 2017    Dr Reshma Darnell, Amelie (-), biliary colic, surgical referral    [place] EGD TRANSORAL BIOPSY SINGLE/MULTIPLE N/A 2018    Dr JAIDEN Torres-w/dilation over wire-51 Iranian-Distal esophagitis, gastritis    REMOVE HARDWARE SPINE N/A 2018    I AND D LUMBAR INCISION SEROMA performed by Mohit Westfall MD at [address] [facility] Right 2019    RIGHT REVERSE TOTAL SHOULDER ARTHROPLASTY performed by Yudith Judge MD at [address] [place] [address] Dr ENDOSCOPY  2015    Dr. Katharine Rossi: amelie neg,,normal, empiric dilatation with 51F    UPPER GASTROINTESTINAL ENDOSCOPY N/A 2018    Dr JAIDEN Torres-w/dilation over wire-51 Iranian-Distal esophagitis, gastritis     Family History   Problem Relation Age of Onset    Cancer Mother     Lung Cancer Mother     Heart Disease Father     Cancer Father     Liver Cancer Father     Lung Cancer Father     Diabetes Maternal Grandmother     Heart Disease Maternal Grandfather     Cancer Maternal Grandfather     Stroke Paternal Grandmother     Stomach Cancer Sister     No Known Problems Brother     COPD Sister     Heart Disease Paternal Grandfather     Colon Cancer Neg Hx     Colon Polyps Neg Hx     Esophageal Cancer Neg Hx     Liver Disease Neg Hx     Rectal Cancer Neg Hx      Social History     Tobacco Use    Smoking status: Former Smoker     Packs/day: 0.00     Types: Cigarettes     Quit date: 12/10/2009     Years since quittin.3    Smokeless tobacco: Never Used   Substance Use Topics    Alcohol use:  Yes Alcohol/week: 42.0 standard drinks     Types: 42 Cans of beer per week     Drinks per session: 5 or 6     Comment: daily      Current Facility-Administered Medications   Medication Dose Route Frequency Provider Last Rate Last Admin    0.9 % sodium chloride infusion   Intravenous Continuous Tyler Penny ParmarSHIMON - CNP 75 mL/hr at 04/21/21 1102 New Bag at 04/21/21 1102    sodium chloride flush 0.9 % injection 5-40 mL  5-40 mL Intravenous 2 times per day Kenny Hidalgo APRN - CNP   10 mL at 04/21/21 1259    sodium chloride flush 0.9 % injection 5-40 mL  5-40 mL Intravenous PRN Tyler Kaplan APRN - CNP        0.9 % sodium chloride infusion  25 mL Intravenous PRN Tyler Kaplan APRN - CNP        meperidine (DEMEROL) injection 12.5 mg  12.5 mg Intravenous Q5 Min PRN Hafsa Gómez, APRN - CRNA        promethazine (PHENERGAN) injection 6.25 mg  6.25 mg Intravenous Once PRN Hafsa Gómez, APRN - CRNA        metoclopramide (REGLAN) injection 10 mg  10 mg Intravenous Once PRN Hafsa Gómez, APRN - CRNA        diphenhydrAMINE (BENADRYL) injection 12.5 mg  12.5 mg Intravenous Once PRN Hafsa Gómez, APRN - CRNA        labetalol (NORMODYNE;TRANDATE) injection 5 mg  5 mg Intravenous Q10 Min PRN Hafsa Gómez, APRN - CRNA        hydrALAZINE (APRESOLINE) injection 5 mg  5 mg Intravenous Q10 Min PRN Hafsa Gómez, APRN - CRNA        enalaprilat (VASOTEC) injection 1.25 mg  1.25 mg Intravenous Once PRN Hafsa Gómez, APRN - CRNA         Allergies: Codeine, Iv [iodides], Hydrocodone, and Statins    Review of Systems  Review of Systems   Constitutional: Negative for activity change, diaphoresis, fatigue, fever and unexpected weight change. HENT: Negative for facial swelling and nosebleeds. Eyes: Negative for redness and visual disturbance. Respiratory: Positive for shortness of breath (mild). Negative for cough, chest tightness and wheezing. Cardiovascular: Positive for chest pain (burning type).  Negative for palpitations and leg swelling. Gastrointestinal: Negative for abdominal pain, nausea and vomiting. Endocrine: Negative for cold intolerance and heat intolerance. Genitourinary: Negative for dysuria and hematuria. Musculoskeletal: Negative for arthralgias and myalgias. Skin: Negative for pallor and rash. Neurological: Negative for dizziness, seizures, syncope, weakness and light-headedness. Hematological: Bruises/bleeds easily. Psychiatric/Behavioral: Negative for agitation. The patient is not nervous/anxious. Objective  Vital Signs - BP (!) 143/56   Pulse 80   Temp 97.3 °F (36.3 °C) (Temporal)   Resp 21   Ht 5' 7\" (1.702 m)   Wt 171 lb (77.6 kg)   SpO2 93%   BMI 26.78 kg/m²   Physical Exam  Vitals signs and nursing note reviewed. Constitutional:       General: He is not in acute distress. Appearance: Normal appearance. He is well-developed. HENT:      Head: Normocephalic and atraumatic. Right Ear: Hearing and external ear normal.      Left Ear: Hearing and external ear normal.      Nose: Nose normal.   Eyes:      General:         Right eye: No discharge. Left eye: No discharge. Pupils: Pupils are equal, round, and reactive to light. Neck:      Thyroid: No thyromegaly. Vascular: No JVD. Trachea: No tracheal deviation. Cardiovascular:      Rate and Rhythm: Normal rate and regular rhythm. Heart sounds: Murmur (2/6 systolic) present. No friction rub. No gallop. Comments: No carotid bruit  Pulmonary:      Effort: Pulmonary effort is normal. No respiratory distress. Breath sounds: Normal breath sounds. No wheezing or rales. Abdominal:      Palpations: Abdomen is soft. Tenderness: There is no abdominal tenderness. Musculoskeletal:         General: No swelling or deformity. Right lower leg: No edema. Left lower leg: No edema. Comments: Normal gait and station   Skin:     General: Skin is warm and dry.       Findings: Bruising (arms) present. No rash. Neurological:      Mental Status: He is alert and oriented to person, place, and time. Cranial Nerves: No cranial nerve deficit. Psychiatric:         Behavior: Behavior normal.         Judgment: Judgment normal.       Data:  Cardiac Cath 7/11/19  Conclusions    Double vessel disease with in-stent restenosis in mid LAD and mid RCA.   Normal LV ejection fraction.   Moderate aortic stenosis.   Successful PCI with drug-eluting stent to mid LAD and mid RCA.   Medical management. Heart catheterization on March 16, 2021   Conclusions      Nonobstructive CAD. Patent stent mid-RCA. Patent stent in mid LAD. Intermediate restenosis proximal OM1 stent. Normal LV systolic function. Severe aortic stenosis (mean gradient 40 mmHg)     Echo 5/19  Summary   The aortic valve is trileaflet. Moderate calcification with mildly reduced leaflet separation. Mild-moderate aortic stenosis is present. The aortic valve area is 1.5 cm2 with a maximum gradient of 47 mmHg and a   mean gradient of 27 mmHg. Mild aortic regurgitation noted. Normal left ventricular size with preserved LV function and an estimated   ejection fraction of approximately 55-60%. No regional wall motion abnormalities identified. Grade I diastolic dysfunction. No evidence of left ventricular mass or thrombus noted. 2D echo on March 4, 2021   Conclusions      Summary   Normal left ventricular chamber size and systolic function. Mild concentric left ventricular hypertrophy. Left ventricular ejection fraction is visually estimated at 60%. Aortic valve leaflets are severely thickened. There is moderately   decreased excursion. Moderate aortic stenosis is present. The peak gradient across the aortic valve is 75 mmHg. The mean gradient across the aortic valve is 36 mmHg. Mild aortic regurgitation. The left atrium is moderately dilated. Mild right atrial enlargement.       Signature Doppler Measurements:      AV Velocity:0.84 cm/s                MV Peak E-Wave: 71.1 cm/s   AV Peak Gradient: 75 mmHg            MV Peak A-Wave: 104 cm/s   AV Mean Gradient: 36 mmHg            MV E/A Ratio: 0.68 %   AV Area (Continuity):0.84 cm^2       MV Peak Gradient: 2.02 mmHg   TR Velocity:199 cm/s                 MV P1/2t: 32 msec   TR Gradient:15.84 mmHg               MVA by GIO5.84 cm^2   Estimated RAP:3 mmHg   RVSP:19 mmHg   ----------------------------------------------------------------   Electronically signed by Inge Thornton DO(Interpreting   physician) on 03/04/2021 01:21 PM   ----------------------------------------------------------------      Lab Results   Component Value Date    CHOL 188 01/13/2021    TRIG 53 01/13/2021    HDL 81 01/13/2021    LDLCALC 96 01/13/2021     Lab Results   Component Value Date    ALT 15 04/21/2021    AST 19 04/21/2021     Assessment:      Severe symptomatic degenerative aortic stenosis:    I agree with the referring cardiologist that the patient's aortic stenosis has progressed significantly over the past couple of years, the patient is becoming symptomatic now with limitation of daily activities, I reviewed the last echo which showed Aortic Valve area of 0.8 cm² and mean/peak gradient of 38/75 mmHg.    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 Case will be formally presented at our weekly structural heart / TAVR team meeting for decision making and planning of the case    Our structural heart coordinator will help arrange for follow up on all this plans including scheduling the procedure. Both patient and family expressed understanding, answered all their questions  They agreed to proceed with the evaluation and testing    ----------------------------    Addendum:  ----------------     Risks, benefits, alternatives of transcatheter TAVR discussed with the patient  Planning on doing percutaneous left axillary access for TAVR valve in valve under general anesthesia and JESSICA guidance  I explained the procedure to the patient in detail and fully informed consent obtained.   Acceptable Mallampati score  Consent for general anesthesia will be obtained by anesthesia team  ASA 3      Bhupendra Murphy MD, [facility]  Interventional Cardiologist, Endovascular Specialist   Medical Director, Sara Riojas

## 2021-04-22 NOTE — PROGRESS NOTES
Patient attempting to urinate with no success. Bladder scan preformed and showed >999ml. Dr. Tressa Smith notified again and straight cath order obtained. Patient had out 800ml of clear, yellow urine. Bladder scan then showed 32ml left.      Electronically signed by Becky Smith RN on 4/22/2021 at 3:59 AM

## 2021-04-22 NOTE — PROGRESS NOTES
POST OP CARDIOTHORACIC SURGERY PROGRESS NOTE    Post op day 1    SUBJECTIVE: The patient had a difficult night. Since emerging from anesthesia he has been somewhat combative and confused he has had a significant diuresis and had to be straight cath at least twice during the night removing nearly a liter of urine each time. This morning he is awake oriented x2 states he is very uncomfortable and has a difficult time breathing. A stat head CT scan performed last evening reportedly demonstrates no acute changes. /68   Pulse 92   Temp 97.9 °F (36.6 °C) (Temporal)   Resp 22   Ht 5' 7\" (1.702 m)   Wt 171 lb (77.6 kg)   SpO2 93%   BMI 26.78 kg/m²   Average, Min, and Max for last 24 hours Vitals:  TEMPERATURE:  Temp  Av.3 °F (37.4 °C)  Min: 97.3 °F (36.3 °C)  Max: 100.4 °F (38 °C)  RESPIRATIONS RANGE: Resp  Av.8  Min: 0  Max: 32  PULSE RANGE: Pulse  Av.2  Min: 79  Max: 118  BLOOD PRESSURE RANGE:  Systolic (47XPV), YDZ:534 , Min:93 , RQB:824   ; Diastolic (47PQM), KNO:50, Min:51, Max:99    PULSE OXIMETRY RANGE: SpO2  Av.5 %  Min: 78 %  Max: 99 %    I/O last 3 completed shifts: In: 4675 [P.O.:120; I.V.:1300]  Out: 1900 [Urine:1900]    CHEST: He does have some coarse breath sounds but I do not hear any wheezes or rhonchi. CARDIOVASCULAR: He has a regular rate and rhythm which appears to be sinus. . On auscultation I hear a very soft murmur perhaps grade 1/6 to 2/6 without a diastolic component    INCISION: The puncture site in the left axillary and left femoral areas are healing well without evidence of hematoma    DRAINS:     LABS:  CBC:   Lab Results   Component Value Date    WBC 10.9 2021    RBC 3.08 2021    HGB 9.6 2021    HCT 30.4 2021    MCV 98.7 2021    MCH 31.2 2021    MCHC 31.6 2021    RDW 13.9 2021     2021    MPV 9.8 2021     BMP:    Lab Results   Component Value Date     2021    K 4.2 04/22/2021    K 4.7 04/16/2021     04/22/2021    CO2 21 04/22/2021    BUN 19 04/22/2021    LABALBU 3.6 04/22/2021    CREATININE 1.1 04/22/2021    CALCIUM 8.4 04/22/2021    GFRAA >59 04/22/2021    LABGLOM >60 04/22/2021    GLUCOSE 127 04/22/2021       CHEST XRAY: None    ASSESSMENT: Postoperative confusion encephalopathy unknown etiology. PLAN: Obtain a transthoracic echo this morning to assess valvular function.      Electronically signed by Armen Aguila MD on 4/22/21 at 7:26 AM CDT

## 2021-04-22 NOTE — PROCEDURES
ADULT INPATIENT ELECTROENCEPHALOGRAM REPORT    Patient:   Deshaun Lund  MR#:    [id number]  Room #:    INPATIENT  YOB: 1955  Date of Evaluation:  4/22/2021  Primary Physician:     SHIMON Aguilar   Referring Physician:   Romayne Pont, DO      CLINICAL INFORMATION:     This patient is a 76 y.o. male with a history of confusion. MEDICATIONS:     See MAR. RECORDING CONDITIONS:     This EEG was performed utilizing standard International 10-20 System of electrode placement, with additional channels monitored for eye movement. One channel electrocardiogram was monitored. Data was obtained, stored, and interpreted according to ACNS guidelines (J Clin Neurophysiol 2006;23(2):) utilizing referential montage recording, with reformatting to longitudinal, transverse bipolar, and referential montages as necessary for interpretation, along with the digital/automated EEG analysis. Patient tolerated entire procedure well. Photic stimulation and hyperventilation were utilized as activation procedures unless otherwise specified below. E.E.G. DESCRIPTION:     The resting predominant posterior background frequency is a 6-7 Hz 30-40 uV rhythm. Drowsiness and sleep were not demonstrated. Hyperventilation was not performed. Photic stimulation was performed and had little change on the recording. Muscle, motion, and eye movement artifacts were noted. EEG INTERPRETATION:    Abnormal EEG due to mild diffuse slowing of the background rhythm. No overt epileptiform abnormalities were noted. CLINICAL CORRELATION:     This EEG is suggestive of a mild encephalopathy, nonspecific to etiology.        Romayne Pont, DO  Board Certified Neurologist    Date reported: 4/22/2021  Date signed: 4/22/2021

## 2021-04-22 NOTE — PROGRESS NOTES
Patient became agitated, jerking legs, and complaining of pain \"all over\". Called Dr. Felipe Alferdo earlier and received orders for pain meds via PACU orders. He states to treat patient's pain. Also informed him of patient confusion. He states patient was oriented prior to surgery, and to notify Dr. Kelly Greene of this confusion post-op. Notified Dr. Kelly Greene of above, and treatment ordered. He states he feels this is a reaction to anesthesia. Have spoken with Dr. Felipe Alfredo several times, with medications ordered. Notified him of patient complaints of itching after dose of dilaudid given. Benadryl given. Notified him of patient continued to be agitated. Ativan ordered.

## 2021-04-22 NOTE — PLAN OF CARE
Problem: Skin Integrity:  Goal: Will show no infection signs and symptoms  Description: Will show no infection signs and symptoms  Outcome: Ongoing  Goal: Absence of new skin breakdown  Description: Absence of new skin breakdown  Outcome: Ongoing     Problem: Pain:  Goal: Pain level will decrease  Description: Pain level will decrease  Outcome: Ongoing  Goal: Control of acute pain  Description: Control of acute pain  Outcome: Ongoing  Goal: Control of chronic pain  Description: Control of chronic pain  Outcome: Ongoing     Problem: Falls - Risk of:  Goal: Will remain free from falls  Description: Will remain free from falls  Outcome: Ongoing  Goal: Absence of physical injury  Description: Absence of physical injury  Outcome: Ongoing

## 2021-04-22 NOTE — PROGRESS NOTES
Pt unable to get MRI this afternoon d/t restlessness and confusion. He is starting to become more alert this evening and if still needed may be able to try in the morning.

## 2021-04-22 NOTE — PROGRESS NOTES
Cardiology Progress Note   Tanja Kelley MD      Patient:  Susan Ying  [id number]    Patient Active Problem List    Diagnosis Date Noted    Status post transcatheter aortic valve replacement (TAVR) using bioprosthesis 04/21/2021     Priority: High    Acute on chronic combined systolic and diastolic congestive heart failure due to valvular disease ([address] 04/16/2021     Priority: High    Fever and chills 04/16/2021     Priority: High    Dysuria 04/16/2021     Priority: High    Suspected UTI 04/16/2021     Priority: High    S/P TAVR (transcatheter aortic valve replacement) 04/07/2021     Priority: High    Abnormal myocardial perfusion study      Priority: High    Aortic valve stenosis      Priority: High    Anemia 04/17/2021     Priority: Low    Current use of proton pump inhibitor 12/03/2020     Priority: Low    Renal cyst, left 09/17/2020     Priority: Low    Complex renal cyst 09/17/2020     Priority: Low    Hypertrophy of prostate with urinary obstruction 09/17/2020     Priority: Low    Osteoarthritis of both shoulders 11/07/2019     Priority: Low    DDD (degenerative disc disease), lumbar 12/04/2018     Priority: Low    Spinal stenosis of lumbar region with neurogenic claudication 12/04/2018     Priority: Low    Lumbar stenosis with neurogenic claudication 12/04/2018     Priority: Low    Alternating constipation and diarrhea 10/31/2018     Priority: Low    Arthritis of knee 07/16/2018     Priority: Low    Gastroesophageal reflux disease 12/19/2017     Priority: Low    Gout 12/19/2017     Priority: Low    Neuropathy 12/19/2017     Priority: Low    Rheumatoid arthritis ([facility] [address] 12/19/2017     Priority: Low    Acalculous cholecystitis 05/19/2017     Priority: Low    Right upper quadrant abdominal pain 05/12/2017     Priority: Low    Mixed hyperlipidemia 04/26/2016     Priority: Low    S/P coronary artery stent placement 04/26/2016     Priority: Low    SOB (shortness of breath) hours) at 4/22/2021 1154  Last data filed at 4/22/2021 0936  Gross per 24 hour   Intake 1420 ml   Output 1900 ml   Net -480 ml       Prior to Admission medications    Medication Sig Start Date End Date Taking? Authorizing Provider   cefdinir (OMNICEF) 300 MG capsule Take 1 capsule by mouth 2 times daily for 5 days 4/17/21 4/22/21 Yes Micheline Lizama MD   ezetimibe (ZETIA) 10 MG tablet TAKE 1 TABLET BY MOUTH EVERY DAY 4/9/21  Yes SHIMON Gale   furosemide (LASIX) 20 MG tablet Take 1 tablet by mouth daily 4/9/21  Yes Uzair Wheeler MD   Multiple Vitamin (MULTIVITAMIN) TABS tablet Take 1 tablet by mouth daily   Yes Historical Provider, MD   pantoprazole (PROTONIX) 40 MG tablet TAKE 1 TABLET BY MOUTH DAILY TAKE DAILY FIRST THING IN THE MORNING ON AN EMPTY STOMACH. 12/21/20  Yes SHIMON Chance   hydroxychloroquine (PLAQUENIL) 200 MG tablet Take 200 mg by mouth 2 times daily 6/15/20  Yes Historical Provider, MD   oxyCODONE-acetaminophen (PERCOCET) 7.5-325 MG per tablet Take 1 tablet by mouth every 4 hours as needed for Pain. Yes Historical Provider, MD   gabapentin (NEURONTIN) 800 MG tablet Take 800 mg by mouth 2 times daily .  6/13/16  Yes Historical Provider, MD   nitroGLYCERIN (NITROSTAT) 0.4 MG SL tablet Place 1 tablet under the tongue every 5 minutes as needed for Chest pain 1 tablet as needed 4/12/21   SHIMON Gale   aspirin 81 MG chewable tablet Take 1 tablet by mouth daily 4/9/21   Uzair hWeeler MD   clopidogrel (PLAVIX) 75 MG tablet Take 1 tablet by mouth daily 4/9/21   Uzair Wheeler MD        sodium chloride flush  5-40 mL Intravenous 2 times per day    sodium chloride flush  5-40 mL Intravenous 2 times per day    aspirin  81 mg Oral Daily    cefdinir  300 mg Oral BID    clopidogrel  75 mg Oral Daily    ezetimibe  10 mg Oral Daily    furosemide  20 mg Oral Daily    gabapentin  800 mg Oral BID    hydroxychloroquine  200 mg Oral BID    pantoprazole  40 mg Oral Daily TELEMETRY: Sinus     Physical Exam:    Left Axillary access left femoral access looks dry without bleeding or hematoma or swelling    Physical Exam  Vitals signs and nursing note reviewed. Constitutional:       Appearance: Normal appearance. Comments: Patient seen agitated, all questions regarding date and time to define his wife   HENT:      Head: Normocephalic. Mouth/Throat:      Mouth: Mucous membranes are moist.   Cardiovascular:      Rate and Rhythm: Normal rate and regular rhythm. Pulses: Normal pulses. Heart sounds: Normal heart sounds. No murmur. Pulmonary:      Effort: Pulmonary effort is normal.      Breath sounds: Normal breath sounds. Abdominal:      General: Bowel sounds are normal.      Palpations: Abdomen is soft. Tenderness: There is no abdominal tenderness. Musculoskeletal: Normal range of motion. Right lower leg: No edema. Left lower leg: No edema. Skin:     General: Skin is warm. Neurological:      General: No focal deficit present. Mental Status: He is alert and oriented to person, place, and time. Psychiatric:         Mood and Affect: Mood normal.         Behavior: Behavior normal.         Lab Data:  CBC:   Recent Labs     04/21/21  0924 04/21/21  1805 04/22/21 0225   WBC 7.8 22.0* 10.9*   HGB 11.4* 10.7* 9.6*   HCT 35.9* 33.9* 30.4*   MCV 97.8* 98.5* 98.7*    117* 121*     BMP:   Recent Labs     04/21/21  0924 04/22/21 0225    142   K 4.5 4.2    103   CO2 28 21*   BUN 12 19   CREATININE 0.7 1.1     LIVER PROFILE:   Recent Labs     04/21/21  0924 04/22/21 0225   AST 19 56*   ALT 15 21   BILITOT 0.4 0.3   ALKPHOS 117 95     PT/INR:   Recent Labs     04/21/21 0924   PROTIME 13.7   INR 1.06     APTT: No results for input(s): APTT in the last 72 hours. CK, CKMB, Troponin: No results for input(s): CKTOTAL, CKMB, TROPONINI in the last 72 hours.     Last 3 BNP:  No results for input(s): BNP in the last 72 hours.    IMAGING:  Echo Transesophageal    Result Date: 4/16/2021  Transesophageal Echocardiography Report (JESSICA)   Demographics   Patient Name  Nellie Parker Date of Study         04/16/2021   MRN           [id number]         Gender                Male   Date of Birth 1955     Room Number           Susanna Jansen   Age           76 year(s)   Height:       67 inches      Referring Physician   Weight:       168 pounds     Sonographer   BSA:          1.88 m^2       Interpreting          Trav Gonzalez MD                               Physician   BMI:          26.31 kg/m^2  Procedure Type of Study   JESSICA procedure:ECHOCARDIOGRAM TRANSESOPHAGEAL. Conclusions   Summary  Mitral valve leaflets are mildly thickened, unrestricted and motion. There is mild to moderate mitral regurgitation. Mitral annular calcification is present. Normal functioning bioprosthetic valve in the aortic position. There is Severe central leak, there is moderate perivalvular leak PVL  Mean gradient of 16 mmHg peak of 34 mmHg  Normal tricuspid valve leaflet thickness and excursion. No significant pulmonic regurgitation. The left atrium is moderately dilated. Left atrial appendage well visualized. Normal size with reduced Doppler  velocities. No evidence of thrombus or spontaneous contrast noted within  the WIN. Normal left ventricular chamber size and systolic function. Mild concentric left ventricular hypertrophy. Left ventricular ejection fraction is visually estimated at 60%. Mild right atrial enlargement. Normal right ventricular chamber size and function. No pericardial effusion. Signature   ----------------------------------------------------------------  Electronically signed by Trav Gonzalez MD(Interpreting  physician) on 04/16/2021 04:41 PM  ----------------------------------------------------------------   Findings   Mitral Valve  Mitral valve leaflets are mildly thickened, unrestricted and motion.   There is mild to moderate mitral regurgitation. Mitral annular calcification is present. Aortic Valve  Normal functioning bioprosthetic valve in the aortic position. There is Severe central leak, there is moderate perivalvular leak PVL  Mean gradient of 16 mmHg peak of 34 mmHg   Tricuspid Valve  Normal tricuspid valve leaflet thickness and excursion. Pulmonic Valve  No significant pulmonic regurgitation. Left Atrium  The left atrium is moderately dilated. Left atrial appendage well visualized. Normal size with reduced Doppler  velocities. No evidence of thrombus or spontaneous contrast noted within  the WIN. Left Ventricle  Normal left ventricular chamber size and systolic function. Mild concentric left ventricular hypertrophy. Left ventricular ejection fraction is visually estimated at 60%. Right Atrium  Mild right atrial enlargement. Right Ventricle  Normal right ventricular chamber size and function. Pericardial Effusion  No pericardial effusion. Allergies   - Codeine.   - Iodine.   - Other allergy:(hydrocodone). Doppler Measurements:   AV Peak Velocity:290 cm/s  AV Peak Gradient: 33.64 mmHg  AV Mean Gradient: 16 mmHg      Echo Complete    Result Date: 4/22/2021  Transthoracic Echocardiography Report (TTE)  Demographics   Patient Name  [facility] Date of Study         04/22/2021   MRN           [id number]         Gender                Male   Date of Birth 1955     Room Number           MHL-0728   Age           76 year(s)   Height:       67 inches      Referring Physician   Bayron Frost MD   Weight:       171 pounds     Sonographer           Ninoska Kimball RDCS   BSA:          1.89 m^2       Interpreting          Wes Jolley MD                               Physician   BMI:          26.78 kg/m^2  Procedure Type of Study   TTE procedure:ECHO NO CONTRAST WITH DOP/COLR.   Study Location: Echo Lab Technical Quality: Adequate visualization Patient Status: Inpatient BP: 127/68 mmHg Indications:S/P TAVR.  Conclusions   Summary  Mild left ventricular enlargement with global systolic dysfunction EF  68-07%  Mild concentric left ventricular hypertrophy  Mild left atrial enlargement  Jeremias with an attempt have her in the aortic outflow tract with peak/mean  gradients of 29/51 mmHg consistent with mild to moderate stenosis with  mild insufficiency  Mild thickening but normally mobile mitral valve with moderate  regurgitation and recorded mean gradient of 4 mmHg consistent with mild  stenosis  Nonvisualization pulmonic valve  Mild right atrial enlargement with normal right ventricular size and  systolic function  Mild tricuspid regurgitation  Nonvisualization of the IVC   Signature   ----------------------------------------------------------------  Electronically signed by Rosalio Bales MD(Interpreting physician)  on 04/22/2021 10:17 AM  ----------------------------------------------------------------  Allergies   - Codeine.   - Iodine.   - Other allergy:(hydrocodone). Echo Complete    Result Date: 4/8/2021  Transthoracic Echocardiography Report (TTE)  Demographics   Patient Name  Lawrence Pichardo Date of Study         04/08/2021   MRN           [id number]         Gender                Male   Date of Birth 1955     Room Number           Yassine Scottie   Age           76 year(s)   Height:       67 inches      Referring Physician   Sidney Hooks MD   Weight:       175 pounds     Sonographer           Ninoska Kimball RDCS   BSA:          1.91 m^2       Interpreting          Rosalio Bales MD                               Physician             Sidney Hooks MD   BMI:          27.41 kg/m^2  Procedure Type of Study   TTE procedure:ECHO NO CONTRAST WITH DOP/COLR. Study Location: Echo Lab Technical Quality: Adequate visualization Patient Status: Inpatient BP: 101/61 mmHg Indications:S/P TAVR. Conclusions   Summary  Mitral valve leaflets are mildly thickened, unrestricted and motion.   There is mild mitral regurgitation. Mitral annular calcification is present. Normal functioning bioprosthetic valve in the aortic position. There is moderate perivalvular leak PVL  Mean gradient of 19 mmHg peak of 37 mmHg  Normal tricuspid valve leaflet thickness and excursion. No significant pulmonic regurgitation. The left atrium is moderately dilated. Normal left ventricular chamber size and systolic function. Mild concentric left ventricular hypertrophy. Left ventricular ejection fraction is visually estimated at 60%. Grade 1 LV diastolic dysfunction  Mild right atrial enlargement. Normal right ventricular chamber size and function. No pericardial effusion. Signature   ----------------------------------------------------------------  Electronically signed by Anna Hurt MD(Interpreting  physician) on 04/08/2021 01:04 PM  ----------------------------------------------------------------   Findings   Mitral Valve  Mitral valve leaflets are mildly thickened, unrestricted and motion. There is mild mitral regurgitation. Mitral annular calcification is present. Aortic Valve  Normal functioning bioprosthetic valve in the aortic position. There is moderate perivalvular leak PVL  Mean gradient of 19 mmHg peak of 37 mmHg   Tricuspid Valve  Normal tricuspid valve leaflet thickness and excursion. Pulmonic Valve  No significant pulmonic regurgitation. Left Atrium  The left atrium is moderately dilated. Left Ventricle  Normal left ventricular chamber size and systolic function. Mild concentric left ventricular hypertrophy. Left ventricular ejection fraction is visually estimated at 60%. Grade 1 LV diastolic dysfunction   Right Atrium  Mild right atrial enlargement. Right Ventricle  Normal right ventricular chamber size and function. Pericardial Effusion  No pericardial effusion. Allergies   - Codeine.   - Iodine.   - Other allergy:(hydrocodone).  M-Mode Measurements (cm)   LVIDd: 5.27 cm 4/22/2021  CT head 4/21/2021 10:17 PM HISTORY: Altered mental status, confusion TECHNIQUE: Axial images of the head were obtained without IV contrast. Coronal and sagittal reformatted images are reconstructed and reviewed. Mild patient motion artifact. COMPARISON: None. DLP: 609 mGy cm Automated exposure control was utilized to minimize patient radiation dose. FINDINGS: Mild age-related cerebral volume loss. Periventricular hypodensities favoring chronic small vessel ischemia. No intracranial hemorrhage or mass effect. No convincing acute signs of ischemia. No extra-axial hematoma or subarachnoid hemorrhage. No air-fluid levels of the visible paranasal sinuses. Small osteoid osteoma considered within the inferior frontal sinuses. Mastoid air cells are well-aerated. Calcifications of the distal internal carotid arteries. No depressed skull fracture. 1. No acute intracranial abnormality identified. Mild age-related cerebral volume loss with chronic small vessel ischemia. Comments: A preliminary report is issued to the ER by the [facility] radiology service. I agree with this impression. Signed by Dr Alida Malone on 4/22/2021 7:19 AM    Cta Chest W Wo Contrast    Result Date: 3/26/2021  Exam: CT angiography with 3D MIP images chest with IV contrast- 3/26/2021 10:56 AM Indication: Preoperative TAVR planning Comparison: 7/22/2009. DLP: 2610 mGy cm. In order to have a CT radiation dose as low as reasonably achievable, Automated Exposure Control was utilized for adjustment of the mA and/or KV according to patient size. Findings: Images will be submitted to an outside institution where precise measurements will be obtained for TAVR planning. Ectatic ascending aorta measuring 4.1 cm diameter. No evidence of thoracic aortic aneurysm. Scattered atherosclerotic calcification throughout the thoracic aorta. Heavy coronary artery calcification. Moderate aortic valve calcification. No pericardial effusion.  Main pulmonary artery is normal caliber. Aortic arch branch origins are widely patent. Atherosclerotic calcification both carotid bifurcations. Central airways are clear. No consolidation or pleural effusion. Moderate emphysema. Stable 2 mm RIGHT middle lobe pulmonary nodule on image 151 of series 603. No enlarged thoracic lymph nodes. Uniform thyroid. No acute chest wall soft tissue normality. RIGHT shoulder arthroplasty hardware. Scoliotic curvature and multilevel degenerative change the thoracic spine. No acute osseous finding. Impression: 1. Images will be submitted to an outside institution where precise measurements will be obtained for TAVR planning. 2.  Ectatic ascending aorta measuring 4.1 cm diameter. 3.  Moderate aortic valve calcification. 4.  Heavy coronary artery calcification. 5.  Moderate emphysema. Signed by Dr Sobeida Christie on 3/26/2021 2:29 PM    Vl Dup Lower Extremity Arteries Left    Result Date: 4/8/2021  Vascular Lower Extremities Arterial Duplex and Lower Extremities DVT Study Procedure  Demographics   Patient Name    Chhaya Diaz Age                  76   Patient Number  [id number]         Gender               Male   Visit Number    [id number]      Margie Chavez MD                                 Physician   Date of Birth   1955     Referring Physician  Fadia Bermudez MD   Accession       [id number]     [address] [place], [place]  Number  Procedure Type of Study:   Extremities Arteries:Lower Extremities Arterial Duplex, VL LOWER EXTREMITY  ARTERIES DUPLEX LEFT. Veins:Lower Extremities DVT Study, VL EXTREMITY VENOUS DUPLEX LEFT. Indications for Study:Questionable pseudoaneurysm. Risk Factors   - The patient's risk factor(s) include: dyslipidemia, arterial     hypertension and prior MI .   - The patient has a former tobacco history. Allergies   - Codeine.   - Iodine.   - Other allergy:(hydrocodone).   Impression No evidence for pseudoaneurysm, av fistula, nor hematoma left groin. Signature   ----------------------------------------------------------------  Electronically signed by Gerhardt Sovereign MD(Interpreting  physician) on 04/08/2021 04:22 PM  ----------------------------------------------------------------  Velocities are measured in cm/s ; Diameters are measured in mm LE Duplex Measurements +---------------++-----+-----+----+-----------++----+-----+---+------------+ ! ! !Right! ! Left!           !!    !     !   !            ! +---------------++-----+-----+----+-----------++----+-----+---+------------+ ! Location       ! !PSV  ! Ratio! EDV ! Wave Desc. !!PSV ! Ratio! EDV! Wave Desc.  ! +---------------++-----+-----+----+-----------++----+-----+---+------------+ ! Common Femoral !!     !     !    !           !!134 !     !   !            ! +---------------++-----+-----+----+-----------++----+-----+---+------------+ ! Prox PFA       !!     !     !    !           !!81.7!     !   !            ! +---------------++-----+-----+----+-----------++----+-----+---+------------+ ! Prox SFA       !!     !     !    !           !!123 !     !   !            ! +---------------++-----+-----+----+-----------++----+-----+---+------------+ Velocities are measured in cm/s ; Diameters are measured in mm Left Lower Extremities DVT Study Measurements Left 2D Measurements +------------------------------------+----------+---------------+----------+ ! Location                            ! Visualized! Compressibility! Thrombosis! +------------------------------------+----------+---------------+----------+ ! Common Femoral                      !Yes       ! Yes            ! None      ! +------------------------------------+----------+---------------+----------+ ! Prox Femoral                        !Yes       ! Yes            ! None      ! +------------------------------------+----------+---------------+----------+    Xr Chest Portable    Result Date: carotid artery. There is 50-69% stenosis of the left internal carotid artery. There is normal antegrade flow in the bilateral vertebral arteries. Signature   ----------------------------------------------------------------  Electronically signed by Graciela Hays MD(Interpreting  physician) on 04/08/2021 04:11 PM  ----------------------------------------------------------------  Blood Pressure:Right arm 118/62 mmHg. Left arm 120/68 mmHg. Velocities are measured in cm/s ; Diameters are measured in mm Carotid Right Measurements +------------+------+-------+--------+-------+------------+----------------+ ! Location    ! PSV   ! EDV    ! Angle   ! RI     !%Stenosis   ! Tortuosity      ! +------------+------+-------+--------+-------+------------+----------------+ ! Prox CCA    !114   !14.1   ! 60      !0.88   !            !                ! +------------+------+-------+--------+-------+------------+----------------+ ! Mid CCA     !136   !9.43   !60      !0.93   !            !                ! +------------+------+-------+--------+-------+------------+----------------+ ! Prox ICA    !186   !15.7   !60      !0.92   !            !                ! +------------+------+-------+--------+-------+------------+----------------+ ! Mid ICA     ! 122   !18.7   ! 60      !0.85   !            !                ! +------------+------+-------+--------+-------+------------+----------------+ ! Dist ICA    !105   !16.8   !52      !0.84   !            !                ! +------------+------+-------+--------+-------+------------+----------------+ ! Prox ECA    ! 54    !       !60      !       !            !                ! +------------+------+-------+--------+-------+------------+----------------+ ! Vertebral   !68    !8.79   !60      !0.87   !            !                ! +------------+------+-------+--------+-------+------------+----------------+   - There is antegrade vertebral flow noted on the right side.    - Additional Measurements:ICAPSV/CCAPSV 1.63.ICAEDV/CCAEDV 1.33. Carotid Left Measurements +------------+-------+-------+--------+-------+------------+---------------+ ! Location    ! PSV    ! EDV    ! Angle   ! RI     !%Stenosis   ! Tortuosity     ! +------------+-------+-------+--------+-------+------------+---------------+ ! Prox CCA    !196    !11.8   !60      !0.94   !            !               ! +------------+-------+-------+--------+-------+------------+---------------+ ! Mid CCA     !150    !13.7   !60      !0.91   !            !               ! +------------+-------+-------+--------+-------+------------+---------------+ ! Prox ICA    !125    !13.7   !60      !0.89   !            !               ! +------------+-------+-------+--------+-------+------------+---------------+ ! Mid ICA     !169    !23.6   ! 60      !0.86   !            !               ! +------------+-------+-------+--------+-------+------------+---------------+ ! Dist ICA    !78     !14.7   !60      !0.81   !            !               ! +------------+-------+-------+--------+-------+------------+---------------+ ! Prox ECA    !167    !       !61      !       !            !               ! +------------+-------+-------+--------+-------+------------+---------------+ ! Vertebral   !77.4   !12.9   !60      !0.83   !            !               ! +------------+-------+-------+--------+-------+------------+---------------+   - There is antegrade vertebral flow noted on the left side. - Additional Measurements:ICAPSV/CCAPSV 0.86. ICAEDV/CCAEDV 2.    Vl Evita Bilateral Limited 1-2 Levels    Result Date: 4/8/2021  Vascular Lower Arterial Plethysmography Procedure  Demographics   Patient Name    Juan Manuel Thibodeaux Age                  76   Patient Number  [id number]         Gender               Male   Visit Number    [id number]      Khari Wilson MD                                 Physician   Date of Birth   1955     Referring Physician  Bayron Frost, +--------------------------------------++--------+-----+----+--------+-----+   - Brachial Pressure:Right: 117. Left:120.   - TAISHA:Right: 0.91. Left: 1.19. Plethysmographic Digit Evaluation +---------++--------+-----+---------------++--------+-----+----------------+ ! ! !Right   ! ! Left           !!        !     !                ! +---------++--------+-----+---------------++--------+-----+----------------+ ! Location ! !Pressure! Ratio! PPG Wave Form  ! !Pressure! Ratio! PPG Wave Form   ! +---------++--------+-----+---------------++--------+-----+----------------+ ! Great Toe!!50      !0.42 ! !!66      !0.55 !                ! +---------++--------+-----+---------------++--------+-----+----------------+    Cta Pulmonary W Contrast    Result Date: 4/15/2021  CTA PULMONARY W CONTRAST 4/15/2021 2:31 PM HISTORY: Pleurisy. Recent valve repair. COMPARISON: 3/26/2021. DLP: 574 mGy cm. Automated exposure control was utilized to diminish patient radiation dose. TECHNIQUE: Helical tomographic images of the chest were obtained after the administration of intravenous contrast following angiogram protocol. Additionally, 3D MIP reconstructions in the coronal and sagittal planes were provided. FINDINGS:  Pulmonary arteries: There is adequate enhancement of the pulmonary arteries to evaluate for central and segmental pulmonary emboli. There are no filling defects within the main, lobar, segmental or visualized subsegmental pulmonary arteries. The pulmonary vessels are within normal limits. Aorta and great vessels: The patient is status post TAVR procedure. There is atheromatous calcification of the ascending thoracic aorta as well as of the aortic arch and descending thoracic aorta. No evidence of aortic dissection or aneurysm. No evidence of mediastinal hematoma. . The great vessels are normal in appearance. Extensive coronary artery calcifications are present. . Neck base:  The imaged portion of the base of the neck appears unremarkable. Lungs: Trace bilateral pleural effusions are present. There are changes of paraseptal and centrilobular emphysema within the upper lobes. Left lingular and bilateral lower lobe atelectasis is present. A 9 x 5 mm irregular nodule abutting the minor fissure within the right middle lobe was not present on a previous examination of 3/26/2021. Given its rapid development I would favor an inflammatory process. . The trachea and bronchial tree are patent. Heart: There is mild cardiomegaly. Elevated right heart pressures suspected with some reflux of contrast into the intrahepatic IVC. [place] There is no pericardial effusion. Lymph nodes: No pathologically enlarged mediastinal, hilar, or axillary lymph nodes are present. Bones and soft tissues: The osseous structures of the thorax and surrounding soft tissues demonstrate no acute process. Upper abdomen: There is a 2 cm nodule pedunculated off the upper pole of the right kidney which demonstrate some complexity warranting follow-up with ultrasound if not previously documented. The adrenals are unremarkable. The patient's undergone prior cholecystectomy. .     1. No evidence of pulmonary embolus. 2. The patient is status post TAVR procedure. No evidence of complications. There is atheromatous calcification of the aortic arch without evidence of dissection or aneurysm. 3. Trace bilateral pleural effusions with bibasilar atelectasis. A nodular opacity within the right middle lobe abutting the minor fissure was not present on a previous exam of late March favoring an inflammatory process. There are changes of paraseptal and centrilobular emphysema within the upper lobes. 4. Pedunculated lesion off the upper pole of the right kidney warranting follow-up as it does demonstrate some complexity perhaps containing some calcification. . Signed by Dr Chance Christie on 4/15/2021 7:53 PM    Cta Abdomen Pelvis W Wo Contrast    Result Date: 3/26/2021  Exam: CT angiography with 3D MIP images abdomen and pelvis with IV contrast - 3/26/2021 10:56 AM Indication: Preoperative TAVR planning Comparison: None available. DLP: 1589 mGy cm. In order to have a CT radiation dose as low as reasonably achievable, Automated Exposure Control was utilized for adjustment of the mA and/or KV according to patient size. Findings: Images will be submitted to an outside institution where precise measurement will be obtained for TAVR planning. Abdominal aorta is nonaneurysmal, mildly tortuous, and contains heavy vascular calcification. LEFT common iliac artery is mildly ectatic measuring 1.7 cm diameter. Heavy vascular calcification is present throughout the major arterial vasculature of the pelvis. No evidence of pelvic arterial flow-limiting stenosis. Celiac artery is widely patent. Atherosclerotic calcification is present at the SMA origin but no flow-limiting stenosis is appreciated. MUSA is widely patent. Renal arteries are widely patent. Lower chest as described in a same-day separate report. Liver is steatotic. Prior cholecystectomy. No biliary ductal dilatation. Pancreas, spleen, and adrenal glands are unremarkable. Multiple bilateral renal cysts. No arterial enhancing renal lesion. No urolithiasis or hydronephrosis. Mild circumferential urinary bladder wall thickening. Enlarged prostate measuring 4.7 cm transverse dimension. Colonic diverticulosis without evidence of diverticulitis. Normal appendix. No abnormal bowel distention or adjacent inflammation. No ascites or free pelvic fluid. No pelvic mass or pelvic collection. No enlarged retroperitoneal, mesenteric, pelvic, or inguinal lymph nodes. Mild postprocedural stranding/scarring in the RIGHT inguinal region. Advanced degenerative change of the lumbar spine with dextrocurvature. No acute osseous finding. Impression: 1. Images will be submitted to an outside institution where precise measurements will be obtained for TAVR planning.  2.  Nonaneurysmal mildly ectatic abdominal aorta. Heavy atherosclerotic calcification throughout the arterial vasculature of the abdomen and pelvis. 3.  Colonic diverticulosis without evidence of diverticulitis.  Signed by Dr Modesta Garcia on 3/26/2021 2:18 PM        Assessment and Plan:    Status post TAVR in TAVR postoperative day 1 with implanting S3 26 valve inside 29 mm CoreValve evolut valve with excellent results to treat his severe aortic valve regurgitation paravalvular leak    Echo done this morning showed no evidence of regurgitation or leak  Exercise from left axillary and left femoral artery both without any hematoma or bleeding  His hemoglobin is stable    Patient is confused post procedure since he recovered from anesthesia, possibly postoperative encephalopathy    Head CT scan showed no acute changes, neurologically patient moving all his limbs with no focal signs  Consult neurology for further management  Remains hemodynamically stable      Uzair Wheeler MD 4/22/2021 11:54 AM      Uzair Wheeler MD, [facility]  Interventional Cardiologist, Endovascular Specialist   Medical Director, Structural Heart Program   [facility]

## 2021-04-22 NOTE — CONSULTS
[facility] Neurology Consult      Patient:   Anastasiya Nieto  MR#:    [id number]  Account Number:                   [id number]      Room:    [address] [place] [address]   YOB: 1955  Date of Progress Note: 4/22/2021  Time of Note                           1:41 PM  Attending Physician:  Uzair Wheeler MD  Consulting Physician:  Farzana Yeung DO       CHIEF COMPLAINT:  Confusion     HISTORY OF PRESENT ILLNESS:   This is a 76 y.o. male who was admitted with severe aortic stenosis and is status post TAVR. He has a history of CAD and hypertension. Post op that patient has noted confusion, agitation, and delirium. No focal weakness or facial drooping. No prior stroke or seizure history. No history of carotid disease or atrial fibrillation. No clear underlying history of dementia is noted. No recent alcohol use. Head CT with nothing acute. Today he is less agitated and confusion seems to be improving. He complains of diffuse pain and more focal right shoulder pain. He denies overt chest or abdominal pain. Some back pain, chronic issue, prior surgery noted. Metabolicly his electrolytes, renal function are normal.  He has had a very mild elevation in AST noted. Some urinary retention noted. No fever. He does complain of a mild headache. No neck stiffness. He does complain of some shortness of breath. REVIEW OF SYSTEMS:  Constitutional - No fever or chills. HENT -  No Scalp tenderness. No tinnitus or significant hearing loss. No nose bleeding, no sore throat. Eyes - No sudden vision change or eye pain  Respiratory - Some shortness of breath   Cardiovascular - No chest pain. Gastrointestinal - No abdominal swelling or pain. Genitourinary - No difficulty urinating, dysuria  Musculoskeletal - Chronic back pain or myalgia. Skin - No color change or rash  Neurologic - No seizures. No lateralizing weakness. No numbness. Hematologic - No easy bruising or spontaneous bleeding.   Psychiatric - Delerium    PAST MEDICAL HISTORY:      Diagnosis Date    Acute on chronic combined systolic and diastolic congestive heart failure due to valvular disease ([facility] [address] 4/16/2021    Arthritis     Bronchitis     Cancer (HCC)     Skin Cancer REMOVED RT ARM & LT EAR    Chronic back pain     Chronic cholecystitis without calculus 5/19/2017    Chronic GERD     COPD (chronic obstructive pulmonary disease) (HCC)     Coronary artery disease of native artery of native heart with stable angina pectoris ([address]     Coronary atherosclerosis of native coronary artery     s/p PTCA and stent placement to the LAD and RCA/7 stents    DDD (degenerative disc disease), lumbar 12/4/2018    History of blood transfusion     WITH RT SHOULDER SURGERY    History of tobacco abuse 2010    Hyperlipidemia     Hypertension     MI (myocardial infarction) ([facility] [address]     Old, inferior wall    Moderate aortic regurgitation 08/14/2017    mod-severe AR.  Moderate aortic stenosis 08/14/2017    mod-severe aortic stenosis.  Pain management 2021    PT SEES PAIN MANAGEMENT FOR CHRONIC BACK PAIN    Spinal stenosis of lumbar region with neurogenic claudication 12/4/2018       PAST SURGICAL HISTORY:      Procedure Laterality Date    BACK SURGERY      s/p laminectomy    CARDIAC CATHETERIZATION  08/10/04 [facility]    w[facility]      CARDIAC [facility]HETERIZATION  12/10/03  [facility]    CARDIAC CATHETERIZATION  08/29/03 [facility]    CARDIAC CATHETERIZATION  02/16/01 [facility]    w[facility]    CARDIAC CATHETERIZATION  05/25/2009    EF is estimated to be 50%. See scanned document.     CARDIAC CATHETERIZATION N/A 03/2016    stent placement    CARDIAC CATHETERIZATION  08/2017    no PCI    CARDIAC CATHETERIZATION  07/11/2019    Drug-eluting stents placed to mid LAD and mid RCA, normal LV    CHOLECYSTECTOMY, LAPAROSCOPIC N/A 05/19/2017    CHOLECYSTECTOMY LAPAROSCOPIC performed by Aixa Crawford MD at [address] [place] [address]  05/28/2015    Dr. Roney Hernandez: No Polyps   10yr recall    CORONARY ANGIOPLASTY WITH STENT PLACEMENT  03/2016    stent to LAD    JOINT REPLACEMENT      Left thumb    JOINT REPLACEMENT Left     knee    JOINT REPLACEMENT      KNEE SURGERY      s/p Rt. knee replacement    LEG SURGERY Right     Broken leg with surgical repair    LUMBAR SPINE SURGERY N/A 12/04/2018    L1-5 LAMINECTOMY [facility] EGD TRANSORAL BIOPSY SINGLE/MULTIPLE N/A 05/18/2017    Dr Yasmin Clifford, Amelie (-), biliary colic, surgical referral    FL EGD TRANSORAL BIOPSY SINGLE/MULTIPLE N/A 11/29/2018    Dr JAIDEN Torres-w/dilation over wire-51 Ghanaian-Distal esophagitis, gastritis    REMOVE HARDWARE SPINE N/A 12/20/2018    I AND D LUMBAR INCISION SEROMA performed by Jessica Thornton MD at [facility] Right 12/12/2019    RIGHT REVERSE TOTAL SHOULDER ARTHROPLASTY performed by Taylor Dorado MD at [address] [place] [address] Dr ENDOSCOPY  03/30/2015    Dr. Benson Ruff: amelie neg,,normal, empiric dilatation with 51F    UPPER GASTROINTESTINAL ENDOSCOPY N/A 11/29/2018    Dr JAIDEN Torres-w/dilation over wire-51 [place]ian-Distal esophagitis, gastritis       SOCIAL HISTORY:   TOBACCO:   reports that he quit smoking about 11 years ago. His smoking use included cigarettes. He smoked 0.00 packs per day. He has never used smokeless tobacco.  ETOH:   reports current alcohol use of about 42.0 standard drinks of alcohol per week.   DRUG:    Social History     Substance and Sexual Activity   Drug Use No       FAMILY HISTORY:       Problem Relation Age of Onset   Martha White Cancer Mother     Lung Cancer Mother     Heart Disease Father     Cancer Father     Liver Cancer Father     Lung Cancer Father     Diabetes Maternal Grandmother     Heart Disease Maternal Grandfather     Cancer Maternal Grandfather     Stroke Paternal Grandmother     Stomach Cancer Sister     No Known Problems Brother     COPD Sister     Heart Disease Paternal Grandfather     Colon Cancer Neg Hx Oral, BID, Ana Cristina Matute, APRN - CNP, 200 mg at 04/22/21 0945    oxyCODONE-acetaminophen (PERCOCET) 7.5-325 MG per tablet 1 tablet, 1 tablet, Oral, Q4H PRN, Ana Cristina Matute, APRN - CNP, 1 tablet at 04/22/21 0954    pantoprazole (PROTONIX) tablet 40 mg, 40 mg, Oral, Daily, Tyler Kaplan, APRN - CNP, 40 mg at 04/22/21 0944    meperidine (DEMEROL) injection 12.5 mg, 12.5 mg, Intravenous, Q5 Min PRN, Nona Santana APRN - CRNA    labetalol (NORMODYNE;TRANDATE) injection 5 mg, 5 mg, Intravenous, Q10 Min PRN, Nona Santana, APRN - CRNA    hydrALAZINE (APRESOLINE) injection 5 mg, 5 mg, Intravenous, Q10 Min PRN, Nona Santana APRN - CRNA    meperidine (DEMEROL) injection 12.5 mg, 12.5 mg, Intravenous, Q5 Min PRN, Esther Punches, DO    fentaNYL (SUBLIMAZE) injection 25 mcg, 25 mcg, Intravenous, Q5 Min PRN, Esther Punches, DO, 25 mcg at 04/21/21 1809    fentaNYL (SUBLIMAZE) injection 50 mcg, 50 mcg, Intravenous, Q5 Min PRN, Esther Punches, DO, 50 mcg at 04/21/21 1817    HYDROmorphone HCl PF (DILAUDID) injection 0.25 mg, 0.25 mg, Intravenous, Q5 Min PRN, Esther Punches, DO, 0.25 mg at 04/21/21 1826    HYDROmorphone HCl PF (DILAUDID) injection 0.5 mg, 0.5 mg, Intravenous, Q5 Min PRN, Esther Punches, DO    labetalol (NORMODYNE;TRANDATE) injection 5 mg, 5 mg, Intravenous, Q10 Min PRN, Esther Punches, DO    hydrALAZINE (APRESOLINE) injection 5 mg, 5 mg, Intravenous, Q10 Min PRN, Esther Punches, DO    ALLERGIES:    Codeine, Iv [iodides], Hydrocodone, and Statins    PHYSICAL EXAM:    Constitutional -   /68   Pulse 92   Temp 97.9 °F (36.6 °C) (Temporal)   Resp 22   Ht 5' 7\" (1.702 m)   Wt 171 lb (77.6 kg)   SpO2 93%   BMI 26.78 kg/m²   General appearance: No acute distress   EYES -   Conjunctiva normal  Pupillary exam as below, see CN exam in the neurologic exam  ENT-    No scars, masses, or lesions over external nose or ears  Oropharynx without erythema, palate midline  Cardiovascular -   No clubbing, cyanosis, or edema   Pulmonary-   Good expansion, normal effort without use of accessory muscles  Musculoskeletal -   No significant wasting of muscles noted  Gait as below, see gait exam in the neurologic exam  Muscle strength, tone, stability as below see the motor exam in the neurologic exam.   No bony deformities  Skin -   Warm, dry, and intact to inspection and palpation. No rash, erythema, or pallor  Psychiatric -   Mood, affect, and behavior :  Mild agitation   Memory as below see mental status examination in the neurologic exam      NEUROLOGICAL EXAM    Mental status   [] Awake, alert, oriented   [] Affect attention and concentration appear appropriate  [] Recent and remote memory appears unremarkable  [] Speech normal without dysarthria or aphasia, comprehension and repetition intact.    COMMENTS: Awake, following commands, mildly agitated, speech fluent, ortiented to person, place, time \"2021\"   Cranial Nerves [x] No VF deficit to confrontation  [x] PERRLA, EOMI, no nystagmus, conjugate eye movements, no ptosis  [x] Face symmetric  [x] Facial sensation intact  [x] Tongue midline no atrophy or fasciculations present  [x] Palate midline, hearing to finger rub normal  [x] Shoulder shrug and SCM testing normal  COMMENTS:   Motor   [x] 5/5 strength x 4 extremities  [x] Normal bulk and tone  [x] No tremor present  [x] No rigidity or bradykinesia noted  COMMENTS:   Sensory  [x] Sensation intact to light touch, pin prick, vibration, and proprioception BLE  [] Sensation intact to light touch, pin prick, vibration, and proprioception BUE  COMMENTS:   Coordination [x] FTN normal bilaterally   [] HTS normal bilaterally  [] ADELA normal.   COMMENTS:   Reflexes  [x] Symmetric and non-pathological  [x] Toes downgoing bilaterally  [x] No clonus present  COMMENTS:   Gait                  [] Normal steady gait    [] Ataxic    [] Spastic     [] Magnetic     [] Shuffling  [x] Not assessed  COMMENTS: LABS/IMAGING:    As below and per HPI    Echo Complete    Result Date: 4/22/2021  Transthoracic Echocardiography Report (TTE)  Demographics   Patient Name  Duayne Sandifer Date of Study         04/22/2021   MRN           [id number]         Gender                Male   Date of Birth 1955     Room Number           MHL-0728   Age           76 year(s)   Height:       67 inches      Referring Physician   Alfreda Biggs MD   Weight:       171 pounds     Sonographer           Ninoska Kimball JERAMIE   BSA:          1.89 m^2       Interpreting          Billie Bolanos MD                               Physician   BMI:          26.78 kg/m^2  Procedure Type of Study   TTE procedure:ECHO NO CONTRAST WITH DOP/COLR. Study Location: Echo Lab Technical Quality: Adequate visualization Patient Status: Inpatient BP: 127/68 mmHg Indications:S/P TAVR. Conclusions   Summary  Mild left ventricular enlargement with global systolic dysfunction EF  87-47%  Mild concentric left ventricular hypertrophy  Mild left atrial enlargement  Jeremias with an attempt have her in the aortic outflow tract with peak/mean  gradients of 29/51 mmHg consistent with mild to moderate stenosis with  mild insufficiency  Mild thickening but normally mobile mitral valve with moderate  regurgitation and recorded mean gradient of 4 mmHg consistent with mild  stenosis  Nonvisualization pulmonic valve  Mild right atrial enlargement with normal right ventricular size and  systolic function  Mild tricuspid regurgitation  Nonvisualization of the IVC   Signature   ----------------------------------------------------------------  Electronically signed by Billie Bolanos MD(Interpreting physician)  on 04/22/2021 10:17 AM  ----------------------------------------------------------------  Allergies   - Codeine.   - Iodine.   - Other allergy:(hydrocodone).     Ct Head Wo Contrast    Result Date: 4/22/2021  CT head 4/21/2021 10:17 PM HISTORY: Altered mental status, confusion TECHNIQUE: Axial images of the head were obtained without IV contrast. Coronal and sagittal reformatted images are reconstructed and reviewed. Mild patient motion artifact. COMPARISON: None. DLP: 609 mGy cm Automated exposure control was utilized to minimize patient radiation dose. FINDINGS: Mild age-related cerebral volume loss. Periventricular hypodensities favoring chronic small vessel ischemia. No intracranial hemorrhage or mass effect. No convincing acute signs of ischemia. No extra-axial hematoma or subarachnoid hemorrhage. No air-fluid levels of the visible paranasal sinuses. Small osteoid osteoma considered within the inferior frontal sinuses. Mastoid air cells are well-aerated. Calcifications of the distal internal carotid arteries. No depressed skull fracture. 1. No acute intracranial abnormality identified. Mild age-related cerebral volume loss with chronic small vessel ischemia. Comments: A preliminary report is issued to the ER by the [facility] radiology service. I agree with this impression. Signed by Dr Yessi Martinez on 4/22/2021 7:19 AM      Recent Labs     04/21/21 0924 04/21/21  1805 04/22/21 0225   WBC 7.8 22.0* 10.9*   HGB 11.4* 10.7* 9.6*    117* 121*     Recent Labs     04/21/21  0924 04/22/21  0225    142   K 4.5 4.2    103   CO2 28 21*   BUN 12 19   CREATININE 0.7 1.1   GLUCOSE 94 127*     Recent Labs     04/21/21  0924 04/22/21  0225   AST 19 56*   ALT 15 21   BILITOT 0.4 0.3   ALKPHOS 117 95     Recent Labs     04/21/21 0924   INR 1.06         ASSESSMENT:  76 y.o. admitted with aortic stenosis s/p TAVR. Post op delirium noted. Head CT with nothing acute, has improved overnight but still remains mildly agitated and confused. Suspect possible effects from anesthesia but will plan further workup to exclude other source for delirium. PLAN:  1. MRI / MRA brain if no contraindication from a TAVR standpoint. 2.  EEG  3. Additional labs, check UA, CXR  4.   Limit

## 2021-04-22 NOTE — PROGRESS NOTES
Patient attempts climbing out of bed and begins thrashing about in bed stating \"I can't breathe! I'm suffocating! \"    Patient's o2 sat was 91% when checked. Patient resituated multiple times and this nurse attempted to reassure patient he was getting oxygen while trying to calm him down.      Electronically signed by Marina Kim RN on 4/22/2021 at 6:17 AM

## 2021-04-23 NOTE — PROGRESS NOTES
Physical Therapy    Facility/Department: [facility] PROGRESSIVE CARE  Initial Assessment    NAME: Megan Domingo  : 1955  MRN: [id number]    Date of Service: 2021    Discharge Recommendations:  Continue to assess pending progress, Patient would benefit from continued therapy after discharge, Home with Home health PT        Assessment   Body structures, Functions, Activity limitations: Decreased functional mobility ; Decreased ADL status; Decreased ROM; Decreased strength;Decreased safe awareness;Decreased endurance;Decreased balance;Decreased posture;Decreased sensation  Assessment: Pt VERY WEAK OVERALL. ABLE TO STAND AND TAKE A FEW STEPS WITH HHA, SHUFFLING STEPS. BUE'S VERY WEAK, L WORSE THAN R. WILL BENEFIT FROM FURTHER THERAPY AT HOME. PT Education: PT Role;Plan of Care  REQUIRES PT FOLLOW UP: Yes  Activity Tolerance  Activity Tolerance: Patient limited by fatigue;Patient limited by endurance       Patient Diagnosis(es): There were no encounter diagnoses. 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 heart failure due to valvular disease ([address] Arthritis, Bronchitis, Cancer ([address] Chronic back pain, Chronic cholecystitis without calculus, Chronic GERD, COPD (chronic obstructive pulmonary disease) ([address] Coronary artery disease of native artery of native heart with stable angina pectoris ([address] Coronary atherosclerosis of native coronary artery, DDD (degenerative disc disease), lumbar, History of blood transfusion, History of tobacco abuse, Hyperlipidemia, Hypertension, MI (myocardial infarction) ([address] Moderate aortic regurgitation, Moderate aortic stenosis, Pain management, and Spinal stenosis of lumbar region with neurogenic claudication. has a past surgical history that includes knee surgery; back surgery; Upper gastrointestinal endoscopy (2015); Leg Surgery (Right);  Coronary angioplasty with stent (2016); joint replacement; joint replacement (Left); Colonoscopy (05/28/2015); Cardiac catheterization (08/10/04 [facility]); Cardiac catheterization (12/10/03  [facility]); Cardiac catheterization (08/29/03 [facility]); Cardiac catheterization (02/16/01 [facility]); Cardiac catheterization (05/25/2009); Cardiac catheterization (N/A, 03/2016); pr egd transoral biopsy single/multiple (N/A, 05/18/2017); Cholecystectomy, laparoscopic (N/A, 05/19/2017); Cardiac catheterization (08/2017); joint replacement; pr egd transoral biopsy single/multiple (N/A, 11/29/2018); Upper gastrointestinal endoscopy (N/A, 11/29/2018); Lumbar spine surgery (N/A, 12/04/2018); REMOVE HARDWARE SPINE (N/A, 12/20/2018); Cardiac catheterization (07/11/2019); shoulder surgery (Right, 12/12/2019); and skin biopsy.     Restrictions  Restrictions/Precautions  Restrictions/Precautions: Fall Risk  Vision/Hearing        Subjective  General  Diagnosis: TAVR  Subjective  Subjective: Pt WILLING TO PARTICIPATE  Pain Screening  Patient Currently in Pain: Yes  Oxygen Therapy  O2 Device: Nasal cannula  O2 Flow Rate (L/min): 4 L/min       Orientation  Orientation  Overall Orientation Status: Within Functional Limits  Social/Functional History  Social/Functional History  Lives With: Spouse  ADL Assistance: Independent  Ambulation Assistance: Independent  Transfer Assistance: Independent  Active : Yes  Cognition   Cognition  Cognition Comment: ALERT, APPEARS SLIGHTLY CONFUSED AT TIMES    Objective     Observation/Palpation  Posture: Fair    AROM RLE (degrees)  RLE AROM: WFL  AROM LLE (degrees)  LLE AROM : WFL  AROM RUE (degrees)  RUE General AROM: BUE'S LIMITED AGAINST GRAVITY, L WORSE THAN R        Sensation  Overall Sensation Status: Impaired(BUE'S BUT INCONSISTENT WITH TESTING)  Bed mobility  Rolling to Left: Independent  Rolling to Right: Independent  Supine to Sit: Supervision  Sit to Supine: Supervision  Transfers  Sit to Stand: Minimal Assistance  Stand to sit: Minimal Assistance  Bed to Chair: Minimal assistance  Stand Pivot Transfers: Minimal Assistance  Ambulation 1  Device: Hand-Held Assist  Assistance: Minimal assistance  Quality of Gait: FATIGUES QUICKLY  Gait Deviations: Slow Ayanna;Decreased step length;Shuffles  Distance: 15'     Balance  Sitting - Dynamic: Fair  Standing - Dynamic: Fair;-        Plan   Plan  Times per week: AT LEAST 6-7  Current Treatment Recommendations: Strengthening, ROM, Balance Training, Functional Mobility Training, Transfer Training, Gait Training, Patient/Caregiver Education & Training, Safety Education & Training  Safety Devices  Type of devices: Bed alarm in place, Call light within reach    G-Code       OutComes Score                                                  AM-PAC Score             Goals  Short term goals  Time Frame for Short term goals: 14 DAYS  Short term goal 1: BED MOB MOD IND  Short term goal 2: TRANSFERS SUPERVISION  Short term goal 3: ' AAD SUPERVISION       Therapy Time   Individual Concurrent Group Co-treatment   Time In           Time Out           Minutes                   Cachorro Solorzano, PT

## 2021-04-23 NOTE — CARE COORDINATION
Spoke with patient regarding MD orders for [facility] services. Patient agreeable and has chosen [facility]. Referral Faxed. [address] [place] [address] [phone]. [phone]. Please notify [address] [place] [address] when patient discharges and fax DC Summary,  DC med list and any new [facility] orders. The Patient was provided with a choice of provider and agrees   with the discharge plan. [x] Yes [] No    Freedom of choice list was provided with basic dialogue that supports the patient's individualized plan of care/goals, treatment preferences and shares the quality data associated with the providers.  [x] Yes [] No  Electronically signed by Amaris Sims on 4/23/2021 at 2:06 PM

## 2021-04-23 NOTE — PROGRESS NOTES
[facility] Progress Note      Patient:   Ramirez Bhandari  MR#:    [id number]   Room:    [address] [place] [address]   YOB: 1955  Date of Progress Note: 4/23/2021  Time of Note                           4:03 PM  Consulting Physician:  Ash Douglass DO  Attending Physician:  Edmund Corcoran MD      INTERVAL HISTORY:  Doing better overnight, pain improved, less agitated, confusion improved. REVIEW OF SYSTEMS:  Constitutional: No fevers No chills  Neck: No stiffness  Respiratory: No shortness of breath  Cardiovascular: No palpitations  Gastrointestinal: No abdominal pain    Genitourinary: No Dysuria  Neurological: No headache    PHYSICAL EXAM:    Constitutional -   /70   Pulse 90   Temp 99 °F (37.2 °C) (Temporal)   Resp 20   Ht 5' 7\" (1.702 m)   Wt 154 lb 1.6 oz (69.9 kg)   SpO2 97%   BMI 24.14 kg/m²   General appearance: No acute distress   EYES -   Conjunctiva normal  Pupillary exam as below, see CN exam in the neurologic exam  ENT-    No scars, masses, or lesions over external nose or ears  Oropharynx without erythema, palate midline  Cardiovascular -   No clubbing, cyanosis, or edema   Pulmonary-   Good expansion, normal effort without use of accessory muscles  Musculoskeletal -   No significant wasting of muscles noted  Gait as below, see gait exam in the neurologic exam  Muscle strength, tone, stability as below see the motor exam in the neurologic exam.   No bony deformities  Skin -   Warm, dry, and intact to inspection and palpation. No rash, erythema, or pallor  Psychiatric -   Mood, affect, and behavior :  Mild agitation   Memory as below see mental status examination in the neurologic exam        NEUROLOGICAL EXAM     Mental status    []? Awake, alert, oriented   []? Affect attention and concentration appear appropriate  []? Recent and remote memory appears unremarkable  []? Speech normal without dysarthria or aphasia, comprehension and repetition intact.    COMMENTS: Awake, following commands, speech fluent, ortiented to person, place, time    Cranial Nerves [x]? No VF deficit to confrontation  [x]? PERRLA, EOMI, no nystagmus, conjugate eye movements, no ptosis  [x]? Face symmetric  [x]? Facial sensation intact  [x]? Tongue midline no atrophy or fasciculations present  [x]? Palate midline, hearing to finger rub normal  [x]? Shoulder shrug and SCM testing normal  COMMENTS:   Motor   [x]? 5/5 strength x 4 extremities  [x]? Normal bulk and tone  [x]? No tremor present  [x]? No rigidity or bradykinesia noted  COMMENTS:   Sensory  [x]? Sensation intact to light touch, pin prick, vibration, and proprioception BLE  []? Sensation intact to light touch, pin prick, vibration, and proprioception BUE  COMMENTS:   Coordination [x]? FTN normal bilaterally   []? HTS normal bilaterally  []? ADELA normal.   COMMENTS:   Reflexes  [x]? Symmetric and non-pathological  [x]? Toes downgoing bilaterally  [x]? No clonus present  COMMENTS:   Gait                  []? Normal steady gait    []? Ataxic    []? Spastic     []? Magnetic     []? Shuffling  [x]? Not assessed  COMMENTS:        LABS/IMAGING:    Echo Complete    Result Date: 4/22/2021  Transthoracic Echocardiography Report (TTE)  Demographics   Patient Name  Pauline Weston Date of Study         04/22/2021   MRN           [id number]         Gender                Male   Date of Birth 1955     Room Number           [place] [address]   Age           76 year(s)   Height:       67 inches      Referring Physician   Anna Hurt MD   Weight:       171 pounds     Sonographer           Ninoska Kimball JERAMIE   BSA:          1.89 m^2       Interpreting          Francisco Gupta MD                               Physician   BMI:          26.78 kg/m^2  Procedure Type of Study   TTE procedure:ECHO NO CONTRAST WITH DOP/COLR. Study Location: Echo Lab Technical Quality: Adequate visualization Patient Status: Inpatient BP: 127/68 mmHg Indications:S/P TAVR.   Conclusions   Summary Mild age-related cerebral volume loss. Periventricular hypodensities favoring chronic small vessel ischemia. No intracranial hemorrhage or mass effect. No convincing acute signs of ischemia. No extra-axial hematoma or subarachnoid hemorrhage. No air-fluid levels of the visible paranasal sinuses. Small osteoid osteoma considered within the inferior frontal sinuses. Mastoid air cells are well-aerated. Calcifications of the distal internal carotid arteries. No depressed skull fracture. 1. No acute intracranial abnormality identified. Mild age-related cerebral volume loss with chronic small vessel ischemia. Comments: A preliminary report is issued to the ER by the [facility] radiology service. I agree with this impression. Signed by Dr Ирина Garg on 4/22/2021 7:19 AM    Xr Chest Portable    Result Date: 4/22/2021  EXAMINATION: Chest one view 4/22/2021 HISTORY: Altered mental status FINDINGS: Today's exam is compared to previous study of 4/15/2021. There is cardiomegaly with vascular redistribution and interstitial pulmonary edema. More confluent disease within the lower lobes likely represents early alveolar edema. Small effusions are present. There is no free air in a 30 hemidiaphragms. 1.. Congestive heart failure with interstitial and early alveolar pulmonary edema. Small effusions are present.  Signed by Dr Sangita Mosley on 4/22/2021 5:01 PM      Lab Results   Component Value Date    WBC 10.9 (H) 04/22/2021    HGB 9.6 (L) 04/22/2021    HCT 30.4 (L) 04/22/2021    MCV 98.7 (H) 04/22/2021     (L) 04/22/2021     Lab Results   Component Value Date     04/22/2021    K 4.2 04/22/2021     04/22/2021    CO2 21 (L) 04/22/2021    BUN 19 04/22/2021    CREATININE 1.1 04/22/2021    GLUCOSE 127 (H) 04/22/2021    CALCIUM 8.4 (L) 04/22/2021    PROT 6.0 (L) 04/22/2021    LABALBU 3.6 04/22/2021    BILITOT 0.3 04/22/2021    ALKPHOS 95 04/22/2021    AST 56 (H) 04/22/2021    ALT 21 04/22/2021    LABGLOM >60 04/22/2021    GFRAA >59 04/22/2021    GLOB 2.8 03/25/2016     Lab Results   Component Value Date    INR 1.06 04/21/2021    INR 1.06 04/08/2021    INR 0.99 04/07/2021    PROTIME 13.7 04/21/2021    PROTIME 13.8 04/08/2021    PROTIME 13.0 04/07/2021     Shoulder XR negative. CXR c/w chf, pulmonary edema      RECORD REVIEW:   Previous medical records, medications were reviewed at today's visit. Nursing/physician notes, imaging, labs and vitals reviewed. PT,OT and/or speech notes reviewed    ASSESSMENT:  76 y.o. admitted with aortic stenosis s/p TAVR. Post op delirium noted. Head CT with nothing acute. EEG with slowing, nothing epileptiform. Improved overnight, less agitated, less confused. Suspect possible effects from anesthesia.       PLAN:  1. Patient refused MRI   2. Supportive care, continue addressing medical issues, limit sedating medications. 3.  Follow exam, improving. Please feel free to call with any questions. [phone] (cell phone).       Adonay Fontana DO  Board Certified Neurology

## 2021-04-24 NOTE — PROGRESS NOTES
[id number] [facility] Neurology Progress Note      Patient:   Hue Ervin  MR#:    [id number]   Room:    [facility]-   YOB: 1955  Date of Progress Note: 4/24/2021  Time of Note                           1:01 PM  Consulting Physician:  Augusto Ortiz DO  Attending Physician:  Renetta Hernandez MD      INTERVAL HISTORY:    4/24 no acute changes feels left hand is weak> right    REVIEW OF SYSTEMS:  Constitutional: No fevers No chills  Neck: No stiffness  Respiratory: No shortness of breath  Cardiovascular: No palpitations  Gastrointestinal: No abdominal pain    Genitourinary: No Dysuria  Neurological: No headache    PHYSICAL EXAM:    Constitutional -   BP (!) 147/93   Pulse 88   Temp 97.2 °F (36.2 °C) (Temporal)   Resp 18   Ht 5' 7\" (1.702 m)   Wt 163 lb 6 oz (74.1 kg)   SpO2 92%   BMI 25.59 kg/m²     Constitutional - well developed, well nourished. Eyes - conjunctiva normal.   Ear, nose, throat - No scars, masses, or lesions over external nose or ears, no atrophy of tongue  Neck-symmetric, no masses noted, no jugular vein distension  Respiration- chest wall appears symmetric, good expansion,   normal effort without use of accessory muscles  Musculoskeletal - no significant wasting of muscles noted, no bony deformities  Extremities-no clubbing, cyanosis or edema  Skin - warm, dry, and intact. No rash, erythema, or pallor.   Psychiatric - mood, affect, and behavior appear normal.      Neurological exam  Awake, alert, fluent oriented appropriate affect  Attention and concentration appear appropriate  Recent and remote memory appears unremarkable  Speech normal without dysarthria  No clear issues with language of fund of knowledge     Cranial Nerve Exam     CN III, IV,VI-EOMI, No nystagmus, conjugate eye movements, no ptosis    CN VII-no facial assymetry       Motor Exam  antigravity throughout upper and lower extremities bilaterally    Weakness in wrist and finger extension on the left with some weakness in finger and wrist flexion left hand      Tremors- no tremors in hands or head noted     Gait  Not tested      LABS/IMAGING:    Echo Complete    Result Date: 4/22/2021  Transthoracic Echocardiography Report (TTE)  Demographics   Patient Name  Tommy Leach Date of Study         04/22/2021   MRN           [id number]         Gender                Male   Date of Birth 1955     Room Number           [facility]   Age           76 year(s)   Height:       67 inches      Referring Physician   Genoveva Hendricks MD   Weight:       171 pounds     Sonographer           Ninoska Kimball [facility]   BSA:          1.89 m^2       Interpreting          Nina Hinton MD                               Physician   BMI:          26.78 kg/m^2  Procedure Type of Study   TTE procedure:ECHO NO CONTRAST WITH DOP/COLR. Study Location: Echo Lab Technical Quality: Adequate visualization Patient Status: Inpatient BP: 127/68 mmHg Indications:S/P TAVR. Conclusions   Summary  Mild left ventricular enlargement with global systolic dysfunction EF  20-75%  Mild concentric left ventricular hypertrophy  Mild left atrial enlargement  Jeremias with an attempt have her in the aortic outflow tract with peak/mean  gradients of 29/51 mmHg consistent with mild to moderate stenosis with  mild insufficiency  Mild thickening but normally mobile mitral valve with moderate  regurgitation and recorded mean gradient of 4 mmHg consistent with mild  stenosis  Nonvisualization pulmonic valve  Mild right atrial enlargement with normal right ventricular size and  systolic function  Mild tricuspid regurgitation  Nonvisualization of the IVC   Signature   ----------------------------------------------------------------  Electronically signed by Nina Hinton MD(Interpreting physician)  on 04/22/2021 10:17 AM  ----------------------------------------------------------------  Allergies   - Codeine.   - Iodine.   - Other allergy:(hydrocodone).     Xr Shoulder Right (min 2 cardiomegaly with vascular redistribution and interstitial pulmonary edema. More confluent disease within the lower lobes likely represents early alveolar edema. Small effusions are present. There is no free air in a 30 hemidiaphragms. 1.. Congestive heart failure with interstitial and early alveolar pulmonary edema. Small effusions are present. Signed by Dr Jorge Smith on 4/22/2021 5:01 PM      Lab Results   Component Value Date    WBC 10.9 (H) 04/22/2021    HGB 9.6 (L) 04/22/2021    HCT 30.4 (L) 04/22/2021    MCV 98.7 (H) 04/22/2021     (L) 04/22/2021     Lab Results   Component Value Date     04/22/2021    K 4.2 04/22/2021     04/22/2021    CO2 21 (L) 04/22/2021    BUN 19 04/22/2021    CREATININE 1.1 04/22/2021    GLUCOSE 127 (H) 04/22/2021    CALCIUM 8.4 (L) 04/22/2021    PROT 6.0 (L) 04/22/2021    LABALBU 3.6 04/22/2021    BILITOT 0.3 04/22/2021    ALKPHOS 95 04/22/2021    AST 56 (H) 04/22/2021    ALT 21 04/22/2021    LABGLOM >60 04/22/2021    GFRAA >59 04/22/2021    GLOB 2.8 03/25/2016     Lab Results   Component Value Date    INR 1.06 04/21/2021    INR 1.06 04/08/2021    INR 0.99 04/07/2021    PROTIME 13.7 04/21/2021    PROTIME 13.8 04/08/2021    PROTIME 13.0 04/07/2021     Shoulder XR negative. CXR c/w chf, pulmonary edema      RECORD REVIEW:   Previous medical records, medications were reviewed at today's visit. Nursing/physician notes, imaging, labs and vitals reviewed. PT,OT and/or speech notes reviewed    ASSESSMENT:  76 y.o. admitted with aortic stenosis s/p TAVR. Post op delirium noted. Head CT with nothing acute. EEG with slowing, nothing epileptiform. Improved overnight, less agitated, less confused. Suspect possible effects from anesthesia. Suspect left wrist drop-suggest PT       Ok to DC from neuro standpoint   F/u PRN    Please feel free to call with any questions. [phone] (cell phone).       Jaylin Trivedi MD  Board Certified Neurology

## 2021-04-24 NOTE — DISCHARGE SUMMARY
Discharge Summary    Marlene Deleon  :  1955  MRN:  [id number]    Admit date:  2021  Discharge date:      Admitting Physician:  Romaine Chang MD    Advance Directive: Full Code    Consults: Neurology for post Anesthesia Delirium/confusion, CT scan ruled out stroke, patient had no focal neurological deficit      Primary Care Physician:  SHIMON Obrien    Discharge Diagnoses: Active Problems:    Status post transcatheter aortic valve replacement (TAVR) using bioprosthesis    Confusion  Resolved Problems:    * No resolved hospital problems.  *      Problem List:   Patient Active Problem List    Diagnosis Date Noted    Status post transcatheter aortic valve replacement (TAVR) using bioprosthesis 2021     Priority: High    Acute on chronic combined systolic and diastolic congestive heart failure due to valvular disease ([address] 2021     Priority: High    Fever and chills 2021     Priority: High    Dysuria 2021     Priority: High    Suspected UTI 2021     Priority: High    S/P TAVR (transcatheter aortic valve replacement) 2021     Priority: High    Abnormal myocardial perfusion study      Priority: High    Aortic valve stenosis      Priority: High    Confusion      Priority: Low    Anemia 2021     Priority: Low    Current use of proton pump inhibitor 2020     Priority: Low    Renal cyst, left 2020     Priority: Low    Complex renal cyst 2020     Priority: Low    Hypertrophy of prostate with urinary obstruction 2020     Priority: Low    Osteoarthritis of both shoulders 2019     Priority: Low    DDD (degenerative disc disease), lumbar 2018     Priority: Low    Spinal stenosis of lumbar region with neurogenic claudication 2018     Priority: Low    Lumbar stenosis with neurogenic claudication 2018     Priority: Low    Alternating constipation and diarrhea 10/31/2018     Priority: Low    Arthritis of knee 07/16/2018     Priority: Low    Gastroesophageal reflux disease 12/19/2017     Priority: Low    Gout 12/19/2017     Priority: Low    Neuropathy 12/19/2017     Priority: Low    Rheumatoid arthritis ([facility] 12/19/2017     Priority: Low    Acalculous cholecystitis 05/19/2017     Priority: Low    Right upper quadrant abdominal pain 05/12/2017     Priority: Low    Mixed hyperlipidemia 04/26/2016     Priority: Low    S/P coronary artery stent placement 04/26/2016     Priority: Low    SOB (shortness of breath) 04/26/2016     Priority: Low    Coronary artery disease involving native coronary artery of native heart without angina pectoris      Priority: Low    Hiatal hernia 04/20/2015     Priority: Low    Hoarseness, chronic 03/13/2015     Priority: Low    Chronic heartburn 03/13/2015     Priority: Low    History of tobacco abuse      Priority: Low    Leg pain 08/25/2011     Priority: Low    Coronary atherosclerosis of native coronary artery      Priority: Low    MI (myocardial infarction) ([facility]      Priority: Low    COPD (chronic obstructive pulmonary disease) ([facility])      Priority: Low    Bronchitis      Priority: Low       Cardiology Specific Data:  Specialty Problems        Cardiology Problems    Aortic valve stenosis        Acute on chronic combined systolic and diastolic congestive heart failure due to valvular disease ([facility])        Coronary atherosclerosis of native coronary artery        MI (myocardial infarction) ([facility]        Coronary artery disease involving native coronary artery of native heart without angina pectoris        Mixed hyperlipidemia              Significant Diagnostic Studies:   Echo Complete    Result Date: 4/22/2021  Transthoracic Echocardiography Report (TTE)  Demographics   Patient Name  Kt Thomas Date of Study         04/22/2021   MRN           [id number]         Gender                Male   Date of Birth 1955     Room Number           MHL-0728   Age           76 year(s)   Height:       67 inches      Referring Physician   Anna Hurt MD   Weight:       171 pounds     Sonographer           Ninoska Kimball [facility]   BSA:          1.89 m^2       Interpreting          Francisco Gupta MD                               Physician   BMI:          26.78 kg/m^2  Procedure Type of Study   TTE procedure:ECHO NO CONTRAST WITH DOP/COLR. Study Location: Echo Lab Technical Quality: Adequate visualization Patient Status: Inpatient BP: 127/68 mmHg Indications:S/P TAVR. Conclusions   Summary  Mild left ventricular enlargement with global systolic dysfunction EF  40-46%  Mild concentric left ventricular hypertrophy  Mild left atrial enlargement  Jeremias with an attempt have her in the aortic outflow tract with peak/mean  gradients of 29/51 mmHg consistent with mild to moderate stenosis with  mild insufficiency  Mild thickening but normally mobile mitral valve with moderate  regurgitation and recorded mean gradient of 4 mmHg consistent with mild  stenosis  Nonvisualization pulmonic valve  Mild right atrial enlargement with normal right ventricular size and  systolic function  Mild tricuspid regurgitation  Nonvisualization of the IVC     Signature     ----------------------------------------------------------------    Electronically signed by Francisco Gupta MD(Interpreting physician)  on 04/22/2021 10:17 AM  ----------------------------------------------------------------    Allergies   - Codeine.   - Iodine.   - Other allergy:(hydrocodone). Xr Shoulder Right (min 2 Views)    Result Date: 4/22/2021  EXAMINATION: Right shoulder 3 views 4/22/2021 HISTORY: Shoulder pain FINDINGS: Today's exam is compared to previous study 12/12/2019. The patient is status post reverse right total shoulder arthroplasty. There appears to be normal articulation of the prosthesis. The [facility] joint is intact. There is no evidence of occult fracture. . Status post previous reverse right total shoulder arthroplasty.  No evidence of complications. No evidence of acute fracture or dislocation. Signed by Dr George Kathleen on 4/22/2021 5:02 PM    Ct Head Wo Contrast    Result Date: 4/22/2021  CT head 4/21/2021 10:17 PM HISTORY: Altered mental status, confusion TECHNIQUE: Axial images of the head were obtained without IV contrast. Coronal and sagittal reformatted images are reconstructed and reviewed. Mild patient motion artifact. COMPARISON: None. DLP: 609 mGy cm Automated exposure control was utilized to minimize patient radiation dose. FINDINGS: Mild age-related cerebral volume loss. Periventricular hypodensities favoring chronic small vessel ischemia. No intracranial hemorrhage or mass effect. No convincing acute signs of ischemia. No extra-axial hematoma or subarachnoid hemorrhage. No air-fluid levels of the visible paranasal sinuses. Small osteoid osteoma considered within the inferior frontal sinuses. Mastoid air cells are well-aerated. Calcifications of the distal internal carotid arteries. No depressed skull fracture. 1. No acute intracranial abnormality identified. Mild age-related cerebral volume loss with chronic small vessel ischemia. Comments: A preliminary report is issued to the ER by the [facility] radiology service. I agree with this impression. Signed by Dr Shivani Parra on 4/22/2021 7:19 AM    Xr Chest Portable    Result Date: 4/22/2021  EXAMINATION: Chest one view 4/22/2021 HISTORY: Altered mental status FINDINGS: Today's exam is compared to previous study of 4/15/2021. There is cardiomegaly with vascular redistribution and interstitial pulmonary edema. More confluent disease within the lower lobes likely represents early alveolar edema. Small effusions are present. There is no free air in a 30 hemidiaphragms. 1.. Congestive heart failure with interstitial and early alveolar pulmonary edema. Small effusions are present.  Signed by Dr George Kathleen on 4/22/2021 5:01 PM      Pertinent Labs:   CBC:   Recent Labs 04/21/21  1805 04/22/21 0225   WBC 22.0* 10.9*   HGB 10.7* 9.6*   * 121*     BMP:    Recent Labs     04/22/21 0225      K 4.2      CO2 21*   BUN 19   CREATININE 1.1   GLUCOSE 127*     INR: No results for input(s): INR in the last 72 hours. Lipids: No results for input(s): CHOL, HDL in the last 72 hours. Invalid input(s): LDLCALCU  ABGs:No results for input(s): PHART, ASQ8KKI, PO2ART, LFI5IFI, BEART, HGBAE, S3DVZSED, CARBOXHGBART, 02THERAPY in the last 72 hours. HgBA1c:  No results for input(s): LABA1C in the last 72 hours. Procedures:   DATE OF PROCEDURE: 4/21/2021      : Nora Godinez MD  Co-/Surgeon: Oleg Sal MD     Pre-operative diagnosis: Severe symptomatic aortic regurgitation status post failed TAVR valve  Post-operative diagnosis: Successful percutaneous left axillary TAVR in TAVR using a 26 mm Amaral Ervin S3 valve with 2 extra cc to treat failed Med[employer]onic prosthetic valve with severe aortic regurgitation     prison (Major co morbidities and conditions):   Acute diastolic congestive heart failure due to valvular heart disease   Porcelain aorta severe diffuse multivessel peripheral vascular disease congestive heart failure pulmonary edema        Procedure:   Access of femoral artery and femoral vein on the left side. Left subclavian/axillary angiogram  Temporary transvenous pacer insertion in the RV   Aortic root angiography. Percutaneous left axillary artery transcatheter aortic valve replacment(TAVR) using a 26 mm Amaral Ervin S3 valve deployed inside 29 mm CoreValve evolut Pro+ to treat severe AI. Perclose device placement in the primary access in the left axillary access with complete hemostasis.    Manual compression on the left femoral access artery and vein     Anesthesia: General anesthesia with JESSICA      Percutaneous left axillary TAVR  -------------------------    Hospital Course:     Patient was admitted electively on April 21, 2021 to undergo TAVR -in- TAVR because he was diagnosed with congestive heart failure with moderate to severe aortic regurgitation and severe paravalvular leak relieved after procedure, he was medically stable fatigue and edema  Due to his severe vascular disease and calcification in the aortic iliac and ascending aorta, he underwent this TAVR procedure using left the continuous transaxillary approach with deployment of 26 mm Amaral S3 valve inside 29 mm CoreValve evolute pro plus with successful resolution of his central AI and paravalvular leak. Postoperatively patient developed confusion and agitation, neurology consulted. Neurology believed it is related to post Anesthesia Delirium/confusion, CT scan ruled out stroke, patient had no focal neurological deficit. Patient and wife refused MRI    His confusion improved on second postoperative day and he was fully awake and oriented without any focal neurological deficits or cardiovascular complications postoperative echo showed no leak    He was discharged home on April 24, 2021 in a stable condition      Physical Exam:    Vital Signs: BP (!) 147/93   Pulse 88   Temp 97.2 °F (36.2 °C) (Temporal)   Resp 18   Ht 5' 7\" (1.702 m)   Wt 163 lb 6 oz (74.1 kg)   SpO2 92%   BMI 25.59 kg/m²     Physical Exam  Vitals signs reviewed. Constitutional:       Appearance: Normal appearance. HENT:      Head: Normocephalic and atraumatic. Mouth/Throat:      Mouth: Mucous membranes are moist.   Cardiovascular:      Rate and Rhythm: Normal rate and regular rhythm. Pulses: Normal pulses. Heart sounds: Murmur present. Pulmonary:      Effort: Pulmonary effort is normal.      Breath sounds: Normal breath sounds. Abdominal:      General: Bowel sounds are normal.      Palpations: Abdomen is soft. Tenderness: There is no abdominal tenderness. Musculoskeletal: Normal range of motion. Right lower leg: No edema. Left lower leg: No edema.

## 2021-04-24 NOTE — PROGRESS NOTES
Physical Therapy  Hugo Gianna  [id number]     04/24/21 1112   Subjective   Subjective Patient up in chair and agrees to work with therapy. Transfers   Sit to Stand Contact guard assistance;Stand by assistance   Stand to sit Stand by assistance   Ambulation   Ambulation? Yes   Ambulation 1   Surface level tile   Device Hand-Held Assist   Assistance Contact guard assistance   Quality of Gait FATIGUES QUICKLY   Gait Deviations Slow Ayanna   Distance 40'   Other Activities   Comment Patient with c/o SOA upon arrival and using nasal cannula, SpO2 97%. Patient states wanting to ambulate. Ambulated in miller without O2, patient required x2 standing rest breaks secondary to SOA. Patient returned to chair, positioned for comfort with all needs in reach, SPO2 94-97% after ambulating, O2 reapplied per patient's request. Spouse present with patient. Short term goals   Time Frame for Short term goals 14 DAYS   Short term goal 1 BED MOB MOD IND   Short term goal 2 TRANSFERS SUPERVISION   Short term goal 3 ' AAD SUPERVISION   Activity Tolerance   Activity Tolerance Patient limited by endurance   Safety Devices   Type of devices Call light within reach; Left in chair   Electronically signed by Melissa Goode PTA on 4/24/2021 at 11:24 AM

## 2021-04-24 NOTE — PROGRESS NOTES
Patient up ambulating around room with assistance. Patient's wife states \"supposed to have physical therapy at home\". He is weak, but picking up cup of water unassisted. Left patient in chair all his things are within his reach.

## 2021-04-26 NOTE — TELEPHONE ENCOUNTER
He was supposed to be seen on 04/15/2021 . Priti called him about the hospital follow up visit and Patient told her he was told by Massimo Pederson he didn't need that appointment because she was following up on his care .

## 2021-04-26 NOTE — TELEPHONE ENCOUNTER
Received a message from Home care. Isreal Forrest has an appointment tomorrow . However , without some at home physical therapy his wife can not move him herself. He is not ambulatory until he gets PT . Can we have Home care get this started ?

## 2021-04-26 NOTE — CONSULTS
Cardiac Rehab TAVR education packet was sent to the patient's address on record. Handout included was titled; \"Transcatheter Aortic Valve Replacement: What to Expect at [address] [facility]\". Patient was instructed to contact [facility] for the opportunity to enroll in Phase II Outpatient Cardiac Rehab.

## 2021-04-27 NOTE — PROGRESS NOTES
Post-Discharge Transitional Care Management Services or Hospital Follow Up      Marlene Deleon   YOB: 1955    Date of Office Visit:  4/27/2021  Date of Hospital Admission: 4/21/21  Date of Hospital Discharge: 4/24/21  Readmission Risk Score(high >=14%.  Medium >=10%):Readmission Risk Score: 18      Care management risk score Rising risk (score 2-5) and Complex Care (Scores >=6): 6     Non face to face  following discharge, date last encounter closed (first attempt may have been earlier): 4/13/2021 10:11 AM 4/13/2021 10:11 AM    Call initiated 2 business days of discharge: No     Patient Active Problem List   Diagnosis    Coronary atherosclerosis of native coronary artery    MI (myocardial infarction) ([address]    COPD (chronic obstructive pulmonary disease) ([address]    Bronchitis    Leg pain    History of tobacco abuse    Hoarseness, chronic    Chronic heartburn    Hiatal hernia    Coronary artery disease involving native coronary artery of native heart without angina pectoris    Mixed hyperlipidemia    S/P coronary artery stent placement    SOB (shortness of breath)    Right upper quadrant abdominal pain    Acalculous cholecystitis    Aortic valve stenosis    Arthritis of knee    Gastroesophageal reflux disease    Gout    Neuropathy    Rheumatoid arthritis ([address]    Alternating constipation and diarrhea    DDD (degenerative disc disease), lumbar    Spinal stenosis of lumbar region with neurogenic claudication    Lumbar stenosis with neurogenic claudication    Abnormal myocardial perfusion study    Osteoarthritis of both shoulders    Renal cyst, left    Complex renal cyst    Hypertrophy of prostate with urinary obstruction    Current use of proton pump inhibitor    S/P TAVR (transcatheter aortic valve replacement)    Acute on chronic combined systolic and diastolic congestive heart failure due to valvular disease (HCC)    Fever and chills    Dysuria    Suspected UTI    Anemia    Status post transcatheter aortic valve replacement (TAVR) using bioprosthesis    Confusion       Allergies   Allergen Reactions    Codeine Swelling     Lips swelling    Iv [Iodides] Swelling     Contrast dye used for heart cath. Caused face to swell.  Hydrocodone Swelling     lips swelling    Statins      Myalgias         Medications listed as ordered at the time of discharge from [facility] Medication Instructions DENNIS:    Printed on:04/27/21 8387   Medication Information                      aspirin 81 MG chewable tablet  Take 1 tablet by mouth daily             clopidogrel (PLAVIX) 75 MG tablet  Take 1 tablet by mouth daily             ezetimibe (ZETIA) 10 MG tablet  TAKE 1 TABLET BY MOUTH EVERY DAY             furosemide (LASIX) 20 MG tablet  Take 1 tablet by mouth daily             gabapentin (NEURONTIN) 800 MG tablet  Take 800 mg by mouth 2 times daily . hydroxychloroquine (PLAQUENIL) 200 MG tablet  Take 200 mg by mouth 2 times daily             Multiple Vitamin (MULTIVITAMIN) TABS tablet  Take 1 tablet by mouth daily             nitroGLYCERIN (NITROSTAT) 0.4 MG SL tablet  Place 1 tablet under the tongue every 5 minutes as needed for Chest pain 1 tablet as needed             oxyCODONE-acetaminophen (PERCOCET) 7.5-325 MG per tablet  Take 1 tablet by mouth every 4 hours as needed for Pain.             pantoprazole (PROTONIX) 40 MG tablet  TAKE 1 TABLET BY MOUTH DAILY TAKE DAILY FIRST THING IN THE MORNING ON AN EMPTY STOMACH.                    Medications marked \"taking\" at this time  Outpatient Medications Marked as Taking for the 4/27/21 encounter (Office Visit) with SHIMON Jaramillo   Medication Sig Dispense Refill    nitroGLYCERIN (NITROSTAT) 0.4 MG SL tablet Place 1 tablet under the tongue every 5 minutes as needed for Chest pain 1 tablet as needed 25 tablet 3    ezetimibe (ZETIA) 10 MG tablet TAKE 1 TABLET BY MOUTH EVERY DAY 90 tablet 1    aspirin 81 MG chewable tablet Take 1 tablet by mouth daily 30 tablet 3    clopidogrel (PLAVIX) 75 MG tablet Take 1 tablet by mouth daily 30 tablet 3    furosemide (LASIX) 20 MG tablet Take 1 tablet by mouth daily 60 tablet 3    Multiple Vitamin (MULTIVITAMIN) TABS tablet Take 1 tablet by mouth daily      pantoprazole (PROTONIX) 40 MG tablet TAKE 1 TABLET BY MOUTH DAILY TAKE DAILY FIRST THING IN THE MORNING ON AN EMPTY STOMACH. 90 tablet 3    hydroxychloroquine (PLAQUENIL) 200 MG tablet Take 200 mg by mouth 2 times daily      oxyCODONE-acetaminophen (PERCOCET) 7.5-325 MG per tablet Take 1 tablet by mouth every 4 hours as needed for Pain.  gabapentin (NEURONTIN) 800 MG tablet Take 800 mg by mouth 2 times daily . Medications patient taking as of now reconciled against medications ordered at time of hospital discharge: Yes    Chief Complaint   Patient presents with    Hallucinations     x 5 days since heart surgery    Fall     this am. hurt left side a little       HPI    Inpatient course: Discharge summary reviewed- see chart.     Interval history/Current status: stable   Mr. Deborah Espino comes in today after being released from the hospital he had 2 surgeries back-to-back for an aortic valve replacement and aortic valve was initially leaking the and they had to replace it since that time he has been having more of a problem with confusion he says he is having a hard time figuring out how to  things he will come in from outside and forget how to get around in the home he is not able to feed himself his hands he says are not able to  things his gait is unsteady he did fall this morning chair slipped out from under him and he has a bruise on the side of his hip area which they been told to watch and follow seen as he is on a blood thinner  I have reviewed his medications we discussed coming in on a more regular basis so I can stay on top of everything that is going on he is still short of breath however his hemoglobin is down into the 9 area I have home health come out and visit him  Review of Systems   Constitutional: Positive for fatigue. Negative for fever and unexpected weight change. HENT: Negative for congestion and rhinorrhea. Eyes: Negative for photophobia and visual disturbance. Respiratory: Positive for shortness of breath. Negative for cough. Cardiovascular: Negative for chest pain and leg swelling. Gastrointestinal: Negative for abdominal pain, constipation and diarrhea. Genitourinary: Negative for difficulty urinating and frequency. Musculoskeletal: Positive for arthralgias, gait problem and myalgias. Neurological: Positive for weakness. Negative for dizziness, speech difficulty and headaches. Psychiatric/Behavioral: Positive for confusion, dysphoric mood, hallucinations and sleep disturbance. Vitals:    04/27/21 1351   BP: 120/64   Site: Left Upper Arm   Position: Sitting   Cuff Size: Large Adult   Pulse: 76   Resp: 16   Temp: 97.5 °F (36.4 °C)   TempSrc: Skin   SpO2: 98%   Weight: 161 lb (73 kg)     Body mass index is 25.22 kg/m². Wt Readings from Last 3 Encounters:   04/27/21 161 lb (73 kg)   04/24/21 163 lb 6 oz (74.1 kg)   04/17/21 171 lb (77.6 kg)     BP Readings from Last 3 Encounters:   04/27/21 120/64   04/24/21 (!) 147/93   04/21/21 (!) 144/81       Physical Exam  Vitals signs reviewed. Constitutional:       Appearance: Normal appearance. He is well-developed. HENT:      Head: Normocephalic and atraumatic. Right Ear: Tympanic membrane, ear canal and external ear normal. There is no impacted cerumen. Left Ear: Tympanic membrane, ear canal and external ear normal. There is no impacted cerumen. Nose: Nose normal.      Mouth/Throat:      Lips: Pink. Mouth: Mucous membranes are moist.      Dentition: Normal dentition. Tongue: No lesions. Pharynx: Oropharynx is clear. Uvula midline.       Tonsils: No tonsillar exudate or tonsillar abscesses. Eyes:      General: Lids are normal.         Right eye: No discharge. Left eye: No discharge. Extraocular Movements:      Right eye: Normal extraocular motion. Left eye: Normal extraocular motion. Conjunctiva/sclera: Conjunctivae normal.      Right eye: Right conjunctiva is not injected. Left eye: Left conjunctiva is not injected. Pupils: Pupils are equal, round, and reactive to light. Neck:      Musculoskeletal: Normal range of motion and neck supple. Thyroid: No thyromegaly. Vascular: No carotid bruit or JVD. Cardiovascular:      Rate and Rhythm: Normal rate and regular rhythm. Pulses:           Carotid pulses are 2+ on the right side and 2+ on the left side. Radial pulses are 2+ on the right side and 2+ on the left side. Heart sounds: Normal heart sounds, S1 normal and S2 normal. No murmur. Pulmonary:      Effort: Pulmonary effort is normal. No accessory muscle usage. Breath sounds: Normal breath sounds. Abdominal:      General: Bowel sounds are normal. There is no distension or abdominal bruit. Palpations: Abdomen is soft. There is no mass. Tenderness: There is no abdominal tenderness. Hernia: No hernia is present. Musculoskeletal:      Right hand: He exhibits decreased range of motion. Left hand: He exhibits decreased range of motion. Right lower leg: No edema. Left lower leg: No edema. Lymphadenopathy:      Cervical:      Right cervical: No superficial cervical adenopathy. Left cervical: No superficial cervical adenopathy. Skin:     General: Skin is warm and dry. Coloration: Skin is not jaundiced or pale. Findings: Ecchymosis present. No lesion or rash. Nails: There is no clubbing. Neurological:      Mental Status: He is alert and oriented to person, place, and time. Cranial Nerves: No facial asymmetry. Motor: No weakness or tremor.       Coordination: Coordination normal.      Gait: Gait normal.      Deep Tendon Reflexes: Reflexes are normal and symmetric. Psychiatric:         Attention and Perception: Attention normal.         Mood and Affect: Mood normal.         Speech: Speech normal.         Behavior: Behavior normal.         Thought Content: Thought content normal.         Cognition and Memory: Memory normal.         Judgment: Judgment normal.             Assessment/Plan:   Diagnosis Orders   1. Chronic diastolic congestive heart failure ([facility]  External Referral To Home Health    DC DISCHARGE MEDS RECONCILED W/ CURRENT OUTPATIENT MED LIST   2. Chronic obstructive pulmonary disease, unspecified COPD type ([facility]  External Referral To Home Health    DC DISCHARGE MEDS RECONCILED W/ CURRENT OUTPATIENT MED LIST   3. Confusion  External Referral To Home Health    DC DISCHARGE MEDS RECONCILED W/ CURRENT OUTPATIENT MED LIST   4. Gait abnormality  External Referral To Home Health    DC DISCHARGE MEDS RECONCILED W/ CURRENT OUTPATIENT MED LIST   5.  Anemia, unspecified type  DC DISCHARGE MEDS RECONCILED W/ CURRENT OUTPATIENT MED LIST         Medical Decision Making: moderate complexity

## 2021-04-27 NOTE — TELEPHONE ENCOUNTER
Tyler Kaplan, APRN - CNP  You 1 minute ago (9:17 AM)     Let her know I advise to go to ER with mental status changes.

## 2021-04-27 NOTE — TELEPHONE ENCOUNTER
Left message for patients wife that pt needs to go to ER and that we were still waiting to hear back from Dr. Marciano Rausch about the MRI. Asked that she return my call to verify she got my message.

## 2021-04-27 NOTE — TELEPHONE ENCOUNTER
Patients wife called stating that since sx patient has been hallucinating, doesn't know how to do anything such as flush the toilet, not sleeping, and he's pacing around. She said he was doing this in the hospital but not before surgery. I told her to take him to the ER and she said she didn't want to. She wanted to know if there was a doctor he could be referred to and why Dr. Kelly Greene said he couldn't have an MRI.

## 2021-04-29 NOTE — TELEPHONE ENCOUNTER
Dejah Yeboah with [facility] has done an intake on Mayur George . They have declined skilled nursing due to wife being able to handle this and she does have everything in control . PT and OT still to evaluate . The only thing Mayur George is C/O is Anxiety . Wife is giving him a xanax . She is asking for this to be increased . I dont know where they are getting this from as we do not prescribe this .  It is not on his med list .      Is there something you can prescribe for this   Please advise

## 2021-04-29 NOTE — TELEPHONE ENCOUNTER
This was not listed  s a med from his hospital stay. I do not prescribe Xanax but there are other meds that can help with anxiety I could prescribe.

## 2021-04-29 NOTE — TELEPHONE ENCOUNTER
Physical Therapy called they will be seeing him 2 times a week for 3 weeks . They are requesting a Speech Therapy Eval and treat order for organization , memory and speech . [phone].

## 2021-05-03 NOTE — TELEPHONE ENCOUNTER
Cedrick Anderson with [facility] is wanting an order for Urinalysis for foul odor urine .

## 2021-05-05 NOTE — TELEPHONE ENCOUNTER
I had received a phone call from [facility] stating that Lily Kenyon' wife Roberto Noble wanted an urinalysis due to odor and cloudy . This was approved . Then yesterday received a call that Roberto Noble wanted blood work done . CBC, and BMP ordered . Today she was requesting a B1 level . I called to her to explain that I do not have a diagnosis for that .she asked me to make something up. I told her that it would it was not worth the risk of losing my job over something like that . It is illegal . She laughed it off . She said he is an alcoholic and with his confusion worsening she wanted things checked . I explained that this was never brought up before and we would need to have an office visit or V.V to have documentation and there would be a few labs that would need to be done. She understood. She is wanting to do this .

## 2021-05-10 NOTE — TELEPHONE ENCOUNTER
Ramón's wife called requesting that  return their call. The best time to reach him is Anytime. Pt's wife needing to schedule appt with Sebastian Lenz, Pt was seen in hospital 04/21/21. Pt is still having confusion, doesn't remember how to do simple task (shutting off light). Wanting to be seen sooner than 1st available 07/06/21. Please contact. Thank you.

## 2021-05-10 NOTE — TELEPHONE ENCOUNTER
Called patients wife back to let her know that I would get them in as soon as I could I would leave them on the cancellation list.

## 2021-05-13 NOTE — TELEPHONE ENCOUNTER
Pt had surgery April thiis year, has had problems since, pt has seen Gaby Alicea & thinking she may order MRI, is pt ok to have MRI? Please call & speak with pts wife, she is very worried.

## 2021-05-18 NOTE — PROGRESS NOTES
Post TAVR 5 meter Walk test      1st lap (16.5 feet): 16.29 sec  2nd lap (16.5 feet): 16.83 sec  3rd lap (16.5 feet): 16.43 sec      Average time: 16.51 sec      Symptoms: shortness of breath

## 2021-05-20 NOTE — TELEPHONE ENCOUNTER
Called to inform patient of chest xray and lab results. Spoke with wife. Dr. Siva Thompson recommends continuing Lasix 20 mg daily. Voiced understanding.

## 2021-05-28 NOTE — Clinical Note
Maico Al! Just wanted to make sure you saw my note- we're thinking embolic stroke s/p TAVR. Have a good day!      Valente Huizar

## 2021-05-28 NOTE — PROGRESS NOTES
without calculus 5/19/2017    Chronic GERD     COPD (chronic obstructive pulmonary disease) (HCC)     Coronary artery disease of native artery of native heart with stable angina pectoris ([facility]     Coronary atherosclerosis of native coronary artery     s/p PTCA and stent placement to the LAD and RCA/7 stents    DDD (degenerative disc disease), lumbar 12/4/2018    History of blood transfusion     WITH RT SHOULDER SURGERY    History of tobacco abuse 2010    Hyperlipidemia     Hypertension     MI (myocardial infarction) ([facility]     Old, inferior wall    Moderate aortic regurgitation 08/14/2017    mod-severe AR.  Moderate aortic stenosis 08/14/2017    mod-severe aortic stenosis.  Pain management 2021    PT SEES PAIN MANAGEMENT FOR CHRONIC BACK PAIN    Spinal stenosis of lumbar region with neurogenic claudication 12/4/2018       Past Surgical History:   Procedure Laterality Date    BACK SURGERY      s/p laminectomy    CARDIAC [facility]HETERIZATION  [facility]/10/04 [facility]      CARDIAC [facility]HETERIZATION  12/10/03  [facility]    CARDIAC CATHETERIZATION  08/29/03 [facility]    CARDIAC CATHETERIZATION  02/16/01 [facility]    [facility]    CARDIAC CATHETERIZATION  05/25/2009    EF is estimated to be 50%. See scanned document.  CARDIAC CATHETERIZATION N/A 03/2016    stent placement    CARDIAC CATHETERIZATION  08/2017    no PCI    CARDIAC CATHETERIZATION  07/11/2019    Drug-eluting stents placed to mid LAD and mid RCA, normal LV    CHOLECYSTECTOMY, LAPAROSCOPIC N/A 05/19/2017    CHOLECYSTECTOMY LAPAROSCOPIC performed by Madhuri Marquez MD at [address] [place] [address]  05/28/2015    Dr. Hugo Swann: No Polyps   10yr recall    CORONARY ANGIOPLASTY WITH STENT PLACEMENT  03/2016    stent to LAD    JOINT REPLACEMENT      Left thumb    JOINT REPLACEMENT Left     knee    JOINT REPLACEMENT      KNEE SURGERY      s/p Rt.  knee replacement    LEG SURGERY Right     Broken leg with surgical repair    LUMBAR SPINE SURGERY N/A 12/04/2018 L1-5 LAMINECTOMY performed by Jessie Nur MD at [facility] [place] EGD TRANSORAL BIOPSY SINGLE/MULTIPLE N/A 2017    Dr Bo Brenner, Amelie (-), biliary colic, surgical referral    [place] E[place] TRANSORAL BIOPSY SINGLE/MULTIPLE N/A 2018    Dr JAIDEN Torres-w/dilation over wire-51 Greek-Distal esophagitis, gastritis    REMOVE HARDWARE SPINE N/A 2018    I AND D LUMBAR INCISION SEROMA performed by Jessie Nur MD at [facility] Right 2019    RIGHT REVERSE TOTAL SHOULDER ARTHROPLASTY performed by Mary Anne Romero MD at [address] [place] [address] Dr ENDOSCOPY  2015    Dr. Kaylee Camejo: amelie neg,,normal, empiric dilatation with 51F    UPPER GASTROINTESTINAL ENDOSCOPY N/A 2018    Dr JAIDEN Torres-w/dilation over wire-51 Greek-Distal esophagitis, gastritis       Family History   Problem Relation Age of Onset    Cancer Mother     Lung Cancer Mother     Heart Disease Father     Cancer Father     Liver Cancer Father     Lung Cancer Father     Diabetes Maternal Grandmother     Heart Disease Maternal Grandfather     Cancer Maternal Grandfather     Stroke Paternal Grandmother     Stomach Cancer Sister     No Known Problems Brother     COPD Sister     Heart Disease Paternal Grandfather     Colon Cancer Neg Hx     Colon Polyps Neg Hx     Esophageal Cancer Neg Hx     Liver Disease Neg Hx     Rectal Cancer Neg Hx        Social History     Socioeconomic History    Marital status:      Spouse name: Not on file    Number of children: Not on file    Years of education: Not on file    Highest education level: Not on file   Occupational History    Not on file   Tobacco Use    Smoking status: Former Smoker     Packs/day: 0.00     Types: Cigarettes     Quit date: 12/10/2009     Years since quittin.4    Smokeless tobacco: Never Used   Vaping Use    Vaping Use: Never used   Substance and Sexual Activity    Alcohol use:  Yes     Alcohol/week: 42.0 standard oxyCODONE-acetaminophen (PERCOCET) 7.5-325 MG per tablet Take 1 tablet by mouth every 4 hours as needed for Pain.  gabapentin (NEURONTIN) 800 MG tablet Take 800 mg by mouth 2 times daily . No current facility-administered medications for this visit. Allergies   Allergen Reactions    Codeine Swelling     Lips swelling    Iv [Iodides] Swelling     Contrast dye used for heart cath. Caused face to swell.  Hydrocodone Swelling     lips swelling    Statins      Myalgias       REVIEW OF SYSTEMS  Constitutional: []? Fever []? Sweats []? Chills []? Recent Injury [x]? Denies all unless marked  HEENT:[]? Headache  []? Head Injury []? Hearing Loss  []? Sore Throat  []? Ear Ache [x]? Denies all unless marked  Spine:  []? Neck pain  []? Back pain  []? Sciaticia  [x]? Denies all unless marked  Cardiovascular:[]? Heart Disease []? Palpitations []? Chest Pain   [x]? Denies all unless marked  Pulmonary: []? Shortness of Breath []? Cough   [x]? Denies all unless marked  Psychiatric/Behavioral:[]? Depression []? Anxiety [x]? Denies all unless marked  Gastrointestinal: []? Nausea  []? Vomiting  []? Abdominal Pain  []? Constipation  []? Diarrhea  [x]? Denies all unless marked  Genitourinary:   []? Frequency  []? Urgency  []? Dysuria []? Incontinence  [x]? Denies all unless marked  Extremities: []? Pain  []? Swelling  [x]? Denies all unless marked  Musculoskeletal: []? Myalgias  []? Joint Pain  []? Arthritis []? Muscle Cramps []? Muscle Twitches  [x]? Denies all unless marked  Sleep: []? Insomnia[]? Snoring []? Restless Legs  []? Sleep Apnea  []? Daytime Sleepiness  [x]? Denies all unless marked  Skin:[]? Rash []? Color Change [x]? Denies all unless marked   Neurological:[x]? Visual Disturbance [x]? Memory Loss [x]? Loss of Balance []? Slurred Speech []? Weakness []? Seizures  []? Dizziness [x]? Denies all unless marked    The MA has completed the ROS with the patient.  I have reviewed it in its' entirety with the patient and agree with the documentation. PHYSICAL EXAM  BP (!) 165/77   Pulse 56   Temp 97.4 °F (36.3 °C)   Ht 5' 7\" (1.702 m)   Wt 164 lb (74.4 kg)   SpO2 98%   BMI 25.69 kg/m²       Constitutional  No acute distress    HEENT- Conjunctiva normal.  No scars, masses, or lesions over external nose or ears, no neck masses noted, no jugular vein distension, no bruit  Cardiac- Regular rate and rhythm  Pulmonary- Good expansion, normal effort without use of accessory muscles  Musculoskeletal  No significant wasting of muscles noted, no bony deformities  Extremities - No clubbing, cyanosis or edema  Skin  Warm, dry, and intact. No rash, erythema, or pallor  Psychiatric  Mood, affect, and behavior appear normal      NEUROLOGICAL EXAM     Mental status   [x] Awake, alert, oriented   [x]Affect attention and concentration appear appropriate  []Recent and remote memory appears unremarkable  []Speech normal without dysarthria or aphasia, comprehension and repetition intact.    COMMENTS: MoCA 23/30- question if difficulty in executive function portion is due to visual problems    Cranial Nerves []No VF deficit to confrontation,  no papilledema on fundoscopic exam.  [x]PERRLA, EOMI, no nystagmus, conjugate eye movements, no ptosis  [x]Face symmetric  [x]Facial sensation intact  [x]Tongue midline no atrophy or fasciculations present  [x]Palate midline, hearing to finger rub normal bilaterally  [x]Shoulder shrug and SCM testing normal bilaterally  COMMENTS: LLQ visual field cut    Motor   [x]5/5 strength x 4 extremities  [x]Normal bulk and tone  [x]No tremor present  [x]No rigidity or bradykinesia noted  COMMENTS: subtle weakness over the left    Sensory  [x]Sensation intact to light touch, pin prick, vibration, and proprioception BLE  [x]Sensation intact to light touch, pin prick, vibration, and proprioception BUE  COMMENTS: patchy decreased PP BUE, BLE    Coordination [x]FTN normal bilaterally   [x]HTS normal bilaterally  [x]ADELA normal bilaterally. COMMENTS:   Reflexes  [x]Symmetric and non-pathological  [x]Toes down going bilaterally  [x]No clonus present  COMMENTS:   Gait                  [x]Normal steady gait    []Ataxic    []Spastic     []Magnetic     []Shuffling  COMMENTS:       LABS RECORD AND IMAGING REVIEW (As below and per HPI)    Lab Results   Component Value Date    RAQOJMDZ98 746 2021     Lab Results   Component Value Date    WBC 6.6 2021    HGB 11.6 (L) 2021    HCT 36.6 (L) 2021    MCV 98.1 (H) 2021     2021     Lab Results   Component Value Date     2021    K 4.0 2021     2021    CO2 26 2021    BUN 12 2021    CREATININE 0.8 2021    GLUCOSE 95 2021    CALCIUM 9.4 2021    PROT 6.0 (L) 2021    LABALBU 3.6 2021    BILITOT 0.3 2021    ALKPHOS 95 2021    AST 56 (H) 2021    ALT 21 2021    LABGLOM >60 2021    GFRAA >59 2021    GLOB 2.8 2016     Lab Results   Component Value Date    CHOL 188 2021    TRIG 53 2021    HDL 81 2021    LDLCALC 96 2021     Lab Results   Component Value Date    TSH 1.960 2019    T4FREE 1.0 2019     Lab Results   Component Value Date    CRP 0.58 (H) 2019    SEDRATE 21 (H) 2019      MRI brain (2021)- abnormal cortical and subcortical signal within bilateral occipital lobes with possible minimal enhancement and a few tiny foci of restricted diffusion which could reflect a process such as PRES, subacute infarcts or areas of gliosis of inflammatory/infectious etiologies. Moderate , generalized atrophy.      Carotid artery u/s (2021)- 50-69% stenosis in bilateral internal carotid arteries; normal antegrade flow in bilateral vertebral arteries     Echocardiogram (2021)- EF 55-60%; s/p TAVR, mild mitral regurgitation     Reviewed referral records, reviewed recent hospitalization records      ASSESSMENT:    Dru Meckel

## 2021-05-28 NOTE — PROGRESS NOTES
REVIEW OF SYSTEMS    Constitutional: []Fever []Sweats []Chills [] Recent Injury [x] Denies all unless marked  HEENT:[]Headache  [] Head Injury [] Hearing Loss  [] Sore Throat  [] Ear Ache [x] Denies all unless marked  Spine:  [] Neck pain  [] Back pain  [] Sciaticia  [x] Denies all unless marked  Cardiovascular:[]Heart Disease []Palpitations [] Chest Pain   [x] Denies all unless marked  Pulmonary: []Shortness of Breath []Cough   [x] Denies all unless marked  Psychiatric/Behavioral:[] Depression [] Anxiety [x] Denies all unless marked  Gastrointestinal: []Nausea  []Vomiting  []Abdominal Pain  []Constipation  []Diarrhea  [x] Denies all unless marked  Genitourinary:   [] Frequency  [] Urgency  [] Dysuria [] Incontinence  [x] Denies all unless marked  Extremities: []Pain  []Swelling  [x] Denies all unless marked  Musculoskeletal: [] Myalgias  [] Joint Pain  [] Arthritis [] Muscle Cramps [] Muscle Twitches  [x] Denies all unless marked  Sleep: []Insomnia[]Snoring []Restless Legs  []Sleep Apnea  []Daytime Sleepiness  [x] Denies all unless marked  Skin:[] Rash [] Color Change [x] Denies all unless marked   Neurological:[x]Visual Disturbance [x] Memory Loss [x]Loss of Balance []Slurred Speech []Weakness []Seizures  [] Dizziness [x] Denies all unless marked

## 2021-06-03 NOTE — LETTER
[facility]  MD Yaz Carpio MD  [facility], SHIMON Pagan PA-C  Just a friendly reminder that you have an upcoming appointment with:  [facility] [place], Vascular Surgery  [address], [address] [place] [address]  Office:  [phone]     Fax:  [phone]  A procedure has been scheduled for:  Patient Name: Grayson Meyer  Appointment Date:6/9/21  Registration Time:7:30  Study Time: 8:00  []  Mon             []  Tue             [x]  Wed             []  Thu             []  Fri  SCHEDULED PROCEDURE:  [] Bilateral Carotid Ultrasound [] Renal Ultrasound  [] Bilateral Lower Arterial Study [] Ultrasound of the Aorta  [] Venous Scan     Right          Left          Leg [x] CTA  Aorta / Chest  [] Heike Gonzalez                                                                       [] TAISHA's  [x] Nothing to eat or drink after midnight                        [] Do not take your Glucophage the day of and two days after the test  [] Do not wear compression stockings two days prior to testing   [] Other     LOCATION:  [x] [facility] I[place] [] [facility]  [] [address] [facility]   [] Outpatient Registration- [address], [address] [place] [address] in the [facility]      If you need to change or cancel your appointment, please contact our office within 24 hours of your scheduled appointment, or a charge may be incurred. This allows us to see another patient during your scheduled time. Thank you for your cooperation.

## 2021-06-03 NOTE — LETTER
[facility]  MD Marcie Barfield MD  [facility], SHIMON Belcher PA-C  Just a friendly reminder that you have an upcoming appointment with:  [facility] [place], Vascular Surgery  [address] [place] [address], [facility] [address] [place] [address]  Office:  [phone]     Fax:  [phone]  A procedure has been scheduled for:  Patient Name: Ramirez Bhandari  Appointment Date:6/11/21  Registration Time:9:50  Study Time: 10:20  []  Mon             []  Tue             []  Wed             []  Thu             [x]  Fri  SCHEDULED PROCEDURE:  [] Bilateral Carotid Ultrasound [] Renal Ultrasound  [] Bilateral Lower Arterial Study [] Ultrasound of the Aorta  [] Venous Scan     Right          Left          Leg [x] CTA  Head/Neck  [] Murel Fuel                                                                       [] TAISHA's  [x] Nothing to eat or drink after midnight                        [] Do not take your Glucophage the day of and two days after the test  [] Do not wear compression stockings two days prior to testing   [] Other     LOCATION:  [x] [facility] [] [facility]  [] [address]   [x] [address] in the [address] [place] [address] [address]         If you need to change or cancel your appointment, please contact our office within 24 hours of your scheduled appointment, or a charge may be incurred. This allows us to see another patient during your scheduled time. Thank you for your cooperation.

## 2021-06-03 NOTE — PROGRESS NOTES
Derrek Byrd (:  1955) is a 76 y.o. male,New patient, here for evaluation of the following chief complaint(s):  New Patient (referral from denise malhotra, bilateral carotid artery stenosis)            SUBJECTIVE/OBJECTIVE:  He presents for follow up of carotid artery stenosis. This was found after TAVR. He had post op confusion and agitated. Since then has had visual changes. He reports distorted images. This affects both eyes. He has had memory issues since then as well. He has a known history of carotid artery stenosis for 1 - 5 years. His current treatment includes clopidogrel 75 mg po qd, ASA EC daily. He reports  a history of CVA. He reports no  episodes of lateralizing weakness.     Derrek Byrd is a 76 y.o. male with the following history as recorded in [facility]:  Patient Active Problem List    Diagnosis Date Noted    Status post transcatheter aortic valve replacement (TAVR) using bioprosthesis 2021    Fever and chills 2021    Dysuria 2021    Suspected UTI 2021    S/P TAVR (transcatheter aortic valve replacement) 2021    Abnormal myocardial perfusion study     Aortic valve stenosis     Confusion     Anemia 2021    Acute on chronic combined systolic and diastolic congestive heart failure due to valvular disease ([address] 2021    Current use of proton pump inhibitor 2020    Renal cyst, left 2020    Complex renal cyst 2020    Hypertrophy of prostate with urinary obstruction 2020    Osteoarthritis of both shoulders 2019    DDD (degenerative disc disease), lumbar 2018    Spinal stenosis of lumbar region with neurogenic claudication 2018    Lumbar stenosis with neurogenic claudication 2018    Alternating constipation and diarrhea 10/31/2018    Arthritis of knee 2018    Gastroesophageal reflux disease 2017    Gout 2017    Neuropathy 2017    Rheumatoid arthritis ([address] 12/19/2017    Acalculous cholecystitis 05/19/2017    Right upper quadrant abdominal pain 05/12/2017    Mixed hyperlipidemia 04/26/2016    S/P coronary artery stent placement 04/26/2016    SOB (shortness of breath) 04/26/2016    Coronary artery disease involving native coronary artery of native heart without angina pectoris     Hiatal hernia 04/20/2015    Hoarseness, chronic 03/13/2015    Chronic heartburn 03/13/2015    History of tobacco abuse     Leg pain 08/25/2011    Coronary atherosclerosis of native coronary artery     [facility]     [facility]     Bronchitis      Current Outpatient Medications   Medication Sig Dispense Refill    predniSONE (DELTASONE) 50 MG tablet 1 tablet by mouth 24, 12, and 1 hour before procedure. 3 tablet 0    diphenhydrAMINE (BENADRYL) 50 MG tablet 1 Tablet by mouth to be taken with last prednisone dose 1 hour before procedure. 1 tablet 0    nitroGLYCERIN (NITROSTAT) 0.4 MG SL tablet Place 1 tablet under the tongue every 5 minutes as needed for Chest pain 1 tablet as needed 25 tablet 3    ezetimibe (ZETIA) 10 MG tablet TAKE 1 TABLET BY MOUTH EVERY DAY 90 tablet 1    aspirin 81 MG chewable tablet Take 1 tablet by mouth daily 30 tablet 3    clopidogrel (PLAVIX) 75 MG tablet Take 1 tablet by mouth daily 30 tablet 3    furosemide (LASIX) 20 MG tablet Take 1 tablet by mouth daily 60 tablet 3    Multiple Vitamin (MULTIVITAMIN) TABS tablet Take 1 tablet by mouth daily      pantoprazole (PROTONIX) 40 MG tablet TAKE 1 TABLET BY MOUTH DAILY TAKE DAILY FIRST THING IN THE MORNING ON AN EMPTY STOMACH. 90 tablet 3    hydroxychloroquine (PLAQUENIL) 200 MG tablet Take 200 mg by mouth 2 times daily      oxyCODONE-acetaminophen (PERCOCET) 7.5-325 MG per tablet Take 1 tablet by mouth every 4 hours as needed for Pain.  gabapentin (NEURONTIN) 800 MG tablet Take 800 mg by mouth 2 times daily .        No current Lawson Alegria MD at [facility]  05/28/2015    Dr. Taryn Salazar: No Polyps   10yr recall    CORONARY ANGIOPLASTY WITH STENT PLACEMENT  03/2016    stent to LAD    JOINT REPLACEMENT      Left thumb    JOINT REPLACEMENT Left     knee    JOINT REPLACEMENT      KNEE SURGERY      s/p Rt.  knee replacement    LEG SURGERY Right     Broken leg with surgical repair    LUMBAR SPINE SURGERY N/A 12/04/2018    L1-5 LAMINECTOMY performed [facility] Patti Smith [facility] EGD TRANSORAL BIOPSY SINGLE/MULTIPLE N/A 05/18/2017    Dr Emilee Ponce, Amelie (-), biliary colic, surgical referral    [place] EGD TRANSORAL BIOPSY SINGLE/MULTIPLE N/A 11/29/2018    Dr JAIDEN Torres-w/dilation over wire-51 Guatemalan-Distal esophagitis, gastritis    REMOVE HARDWARE SPINE N/A 12/20/2018    I AND D LUMBAR INCISION SEROMA performed by Patti Smith MD at [facility] [place] [facility] Right 12/12/2019    RIGHT REVERSE TOTAL SHOULDER ARTHROPLASTY performed by Cheryl Lindquist MD at [address] [place] [address] Dr ENDOSCOPY  03/30/2015    Dr. Taryn Salazar: amelie neg,,normal, empiric dilatation with 51F    UPPER GASTROINTESTINAL ENDOSCOPY N/A 11/29/2018    Dr JAIDEN Torres-w/dilation over wire-51 Guatemalan-Distal esophagitis, gastritis     Family History   Problem Relation Age of Onset    Cancer Mother     Lung Cancer Mother     Heart Disease Father     Cancer Father     Liver Cancer Father     Lung Cancer Father     Diabetes Maternal Grandmother     Heart Disease Maternal Grandfather     Cancer Maternal Grandfather     Stroke Paternal Grandmother     Stomach Cancer Sister     No Known Problems Brother     COPD Sister     Heart Disease Paternal Grandfather     Colon Cancer Neg Hx     Colon Polyps Neg Hx     Esophageal Cancer Neg Hx     Liver Disease Neg Hx     Rectal Cancer Neg Hx      Social History     Tobacco Use    Smoking status: Former Smoker     Packs/day: 0.00     Types: Cigarettes     Quit date: 12/10/2009     Years since quittin.4    Smokeless tobacco: Never Used   Substance Use Topics    Alcohol use: Yes     Alcohol/week: 42.0 standard drinks     Types: 42 Cans of beer per week     Comment: daily         Eyes  no sudden vision change or amaurosis. Respiratory  no significant shortness of breath,  Cardiovascular  no chest pain or syncope. No  significant leg swelling. No claudication. Musculoskeletal  no gait disturbance  Skin  no new wound. Neurologic   No speech difficulty or lateralizing weakness. All other review of systems are negative. Physical Exam    BP (!) 171/81 (Site: Right Upper Arm)   Pulse 59   Temp 98.2 °F (36.8 °C)   SpO2 99%       Neck- carotid pulses 2+ to palpation with no bruit  Cardiovascular  Regular rate and rhythm. Pulmonary  effort appears normal.  No respiratory distress. Lungs - Breath sounds normal. No wheezes or rales. Extremities - without edema   Neurologic  alert and oriented X 3. Physiologic. Face symmetric. Skin  warm, dry, and intact. no wound  Psychiatric  mood, affect, and behavior appear normal.  Judgment and thought processes appear normal.    Risk factors for atherosclerosis of all vascular beds have been reviewed with the patient including:  Family history, tobacco abuse in all forms, elevated cholesterol, hyperlipidemia, and diabetes. Doppler results:    Right CCA/ICA 50-69% stenotic  Left CCA/ICA 50-69% stenotic  Right verterbral artery flow is antegrade  Left verterbral artery flow is antegrade  Individual velocities reviewed: Yes. Results were reviewed with the patient. Disease process is chronic illness with exacerbation or progression    CT - shows bilateral occipital strokes   Reviewed previous studies including: CT scan and MRI      Reviewed on this visit: speciality care notes from neurology and cardiology      ASSESSMENT/PLAN:  1. Bilateral carotid artery stenosis  -     CTA HEAD W WO CONTRAST;  Future  -     CTA NECK W WO CONTRAST; Future  -     predniSONE (DELTASONE) 50 MG tablet; 1 tablet by mouth 24, 12, and 1 hour before procedure., Disp-3 tablet, R-0Normal  -     diphenhydrAMINE (BENADRYL) 50 MG tablet; 1 Tablet by mouth to be taken with last prednisone dose 1 hour before procedure., Disp-1 tablet, R-0Normal        Discussed management of carotid u/s which includes:  continue asa and plavix to reduce risk of TIA/CVA, to reduce risk of arterial thrombosis and to decrease rate of plaque buildup  Strongly encourage start/continue statin therapy - zeia  Recommend no smoking - discussed the effect tobacco has on illness;   Proceed with cta head and neck -   Atherosclerotic plaque extending over the proximal 2-3 cm of each   internal carotid artery with 60% stenosis on the right and 50%   stenosis on the left     Individual images were reviewed. Results were reviewed with the patient. Reviewed by Dr. Soha Abbott. We will plan to repeat cvls in 6 months. Patient instructed to call or proceed to the emergency room with any symptoms of lateralizing weakness, loss of vision in one eye, or episodes slurred speech. Continue asa/plavix zetia    Will give dye prep         Patient instructed to call or proceed to the emergency room with any symptoms of lateralizing weakness, loss of vision in one eye, or episodes slurred speech. An electronic signature was used to authenticate this note.     --SHIMON Delgado

## 2021-06-03 NOTE — Clinical Note
Please call patient. Let him know dr. Kwame Hooker thinks everything is okay.   We can see in 6 months with cvls

## 2021-06-10 NOTE — PROGRESS NOTES
[facility], [address] [place] [address], [email or web address] [id number]  Phone: [phone]  Fax: [phone]    OFFICE VISIT:  2021    Hugo Katz - : 1955    Reason For Visit:  Td Jean Baptiste is a 76 y.o. male who is here for Follow-up (1 mo TAVR)       Diagnosis Orders   1. Shortness of breath  proBNP, N-TERMINAL    XR CHEST (2 VW)   2. Congestive heart failure, unspecified HF chronicity, unspecified heart failure type (HCC)  proBNP, N-TERMINAL    XR CHEST (2 VW)       HPI  1. Coronary artery disease with prior inferior STEMI , multiple PCI's to RCA, LAD and OM with catheterization 2019 with PCI to restenotic mid LAD and mid RCA with LINDA, normal LV ejection fraction. Heart Cath 3/16/21 with nonobstructive CAD  2.  severe aortic stenosis-he underwent initial TAVR 86- complicated due to severe peripheral vascular disease and moderate paravalvular leak post procedure, patient was in congestive heart failure and he was evaluated again in the hospital, he deemed to need another valve replacement with TAVR and TAVR.     Given his progressive symptoms including worsening shortness of breath leg edema and pulmonary congestion, he was brought back to the hospital and underwent another TAVR on 2021, this time his TAVR was performed via percutaneous left axillary approach with deployment of 26 mm Amaral S3 inside 29 mm TAVR evolute pro plus with successful resolution of central and paravalvular leak, he was discharged home on 2021 and he came today for follow-up visit after 1 month since his TAVR in TAVR procedure    His proBNP is 1332 down from 2773 in  when he was in congestive heart failure    He is accompanied by his wife, he tells me that his symptoms has improved and his shortness of breath is better, he is able to sleep unable to walk with minimal shortness of breath, he denies any dizziness or leg swelling or palpitation  He is supposed to have MRI of the brain due to forgetfulness and numbness      SHIMON Owens is PCP.   Ervin Tiffanie has the following history as recorded in [facility]:    Patient Active Problem List    Diagnosis Date Noted    Status post transcatheter aortic valve replacement (TAVR) using bioprosthesis 04/21/2021    Acute on chronic combined systolic and diastolic congestive heart failure due to valvular disease ([address] 04/16/2021    Fever and chills 04/16/2021    Dysuria 04/16/2021    Suspected UTI 04/16/2021    S/P TAVR (transcatheter aortic valve replacement) 04/07/2021    Abnormal myocardial perfusion study     Aortic valve stenosis     Confusion     Anemia 04/17/2021    Current use of proton pump inhibitor 12/03/2020    Renal cyst, left 09/17/2020    Complex renal cyst 09/17/2020    Hypertrophy of prostate with urinary obstruction 09/17/2020    Osteoarthritis of both shoulders 11/07/2019    DDD (degenerative disc disease), lumbar 12/04/2018    Spinal stenosis of lumbar region with neurogenic claudication 12/04/2018    Lumbar stenosis with neurogenic claudication 12/04/2018    Alternating constipation and diarrhea 10/31/2018    Arthritis of knee 07/16/2018    Gastroesophageal reflux disease 12/19/2017    Gout 12/19/2017    Neuropathy 12/19/2017    Rheumatoid arthritis ([address] 12/19/2017    Acalculous cholecystitis 05/19/2017    Right upper quadrant abdominal pain 05/12/2017    Mixed hyperlipidemia 04/26/2016    S/P coronary artery stent placement 04/26/2016    SOB (shortness of breath) 04/26/2016    Coronary artery disease involving native coronary artery of native heart without angina pectoris     Hiatal hernia 04/20/2015    Hoarseness, chronic 03/13/2015    Chronic heartburn 03/13/2015    History of tobacco abuse     Leg pain 08/25/2011    Coronary atherosclerosis of native coronary artery     MI (myocardial infarction) ([address]     COPD (chronic obstructive pulmonary disease) ([address]     Bronchitis      Past Medical History:   Diagnosis Date    Acute on chronic combined systolic and diastolic congestive heart failure due to valvular disease ([facility] 4/16/2021    Arthritis     Bronchitis     Cancer (HCC)     Skin Cancer REMOVED RT ARM & LT EAR    Chronic back pain     Chronic cholecystitis without calculus 5/19/2017    Chronic GERD     COPD (chronic obstructive pulmonary disease) (HCC)     Coronary artery disease of native artery of native heart with stable angina pectoris ([address]     Coronary atherosclerosis of native coronary artery     s/p PTCA and stent placement to the LAD and RCA/7 stents    DDD (degenerative disc disease), lumbar 12/4/2018    History of blood transfusion     WITH RT SHOULDER SURGERY    History of tobacco abuse 2010    Hyperlipidemia     Hypertension     MI (myocardial infarction) ([facility]     Old, inferior wall    Moderate aortic regurgitation 08/14/2017    mod-severe AR.  Moderate aortic stenosis 08/14/2017    mod-severe aortic stenosis.  Pain management 2021    PT SEES PAIN MANAGEMENT FOR CHRONIC BACK PAIN    Spinal stenosis of lumbar region with neurogenic claudication 12/4/2018     Past Surgical History:   Procedure Laterality Date    BACK SURGERY      s/p laminectomy    [facility]      [facility]    CARDIAC CATHETERIZATION  08/29/03 [facility]    CARDIAC CATHETERIZATION  02/16/01 [facility]    [facility]    CARDIAC CATHETERIZATION  05/25/2009    EF is estimated to be 50%. See scanned document.     CARDIAC CATHETERIZATION N/A 03/2016    stent placement    CARDIAC CATHETERIZATION  08/2017    no PCI    CARDIAC CATHETERIZATION  07/11/2019    Drug-eluting stents placed to mid LAD and mid RCA, normal LV    CHOLECYSTECTOMY, LAPAROSCOPIC N/A 05/19/2017    CHOLECYSTECTOMY LAPAROSCOPIC performed by Lillian Smith MD at [address] [place] [address]  05/28/2015    Dr. Chris Santa: No Polyps   10yr recall    CORONARY ANGIOPLASTY WITH STENT PLACEMENT  2016    stent to LAD    JOINT REPLACEMENT      Left thumb    JOINT REPLACEMENT Left     knee    JOINT REPLACEMENT      KNEE SURGERY      s/p Rt. knee replacement    LEG SURGERY Right     Broken leg with surgical repair    LUMBAR SPINE SURGERY N/A 2018    L1-5 LAMINECTOMY performed by Jessica Thornton MD at [facility] EGD TRANSORAL BIOPSY SINGLE/MULTIPLE N/A 2017    Dr Yasmin Clifford, Amelie (-), biliary colic, surgical referral    [place] EGD TRANSORAL BIOPSY SINGLE/MULTIPLE N/A 2018    Dr JAIDEN Torres-w/dilation over wire-51 Czech-Distal esophagitis, gastritis    REMOVE HARDWARE SPINE N/A 2018    I AND D LUMBAR INCISION SEROMA performed by Jessica Thornton MD at [facility] 2019    RIGHT REVERSE TOTAL SHOULDER ARTHROPLASTY performed by Taylor Dorado MD at [address] [place] [address] Dr ENDOSCOPY  2015    Dr. Benson Ruff: amelie neg,,normal, empiric dilatation with 51F    UPPER GASTROINTESTINAL ENDOSCOPY N/A 2018    Dr JAIDEN Torres-w/dilation over wire-51 Czech-Distal esophagitis, gastritis     Family History   Problem Relation Age of Onset    Cancer Mother     Lung Cancer Mother     Heart Disease Father     Cancer Father     Liver Cancer Father     Lung Cancer Father     Diabetes Maternal Grandmother     Heart Disease Maternal Grandfather     Cancer Maternal Grandfather     Stroke Paternal Grandmother     Stomach Cancer Sister     No Known Problems Brother     COPD Sister     Heart Disease Paternal Grandfather     Colon Cancer Neg Hx     Colon Polyps Neg Hx     Esophageal Cancer Neg Hx     Liver Disease Neg Hx     Rectal Cancer Neg Hx      Social History     Tobacco Use    Smoking status: Former Smoker     Packs/day: 0.00     Types: Cigarettes     Quit date: 12/10/2009     Years since quittin.5    Smokeless tobacco: Never Used   Substance Use Topics    Alcohol use:  Yes Alcohol/week: 42.0 standard drinks     Types: 42 Cans of beer per week     Comment: daily      Current Outpatient Medications   Medication Sig Dispense Refill    nitroGLYCERIN (NITROSTAT) 0.4 MG SL tablet Place 1 tablet under the tongue every 5 minutes as needed for Chest pain 1 tablet as needed 25 tablet 3    ezetimibe (ZETIA) 10 MG tablet TAKE 1 TABLET BY MOUTH EVERY DAY 90 tablet 1    aspirin 81 MG chewable tablet Take 1 tablet by mouth daily 30 tablet 3    clopidogrel (PLAVIX) 75 MG tablet Take 1 tablet by mouth daily 30 tablet 3    Multiple Vitamin (MULTIVITAMIN) TABS tablet Take 1 tablet by mouth daily      pantoprazole (PROTONIX) 40 MG tablet TAKE 1 TABLET BY MOUTH DAILY TAKE DAILY FIRST THING IN THE MORNING ON AN EMPTY STOMACH. 90 tablet 3    hydroxychloroquine (PLAQUENIL) 200 MG tablet Take 400 mg by mouth daily       oxyCODONE-acetaminophen (PERCOCET) 7.5-325 MG per tablet Take 1 tablet by mouth every 4 hours as needed for Pain.  gabapentin (NEURONTIN) 800 MG tablet Take 800 mg by mouth 3 times daily.  predniSONE (DELTASONE) 50 MG tablet 1 tablet by mouth 24, 12, and 1 hour before procedure. 3 tablet 0    diphenhydrAMINE (BENADRYL) 50 MG tablet 1 Tablet by mouth to be taken with last prednisone dose 1 hour before procedure. 1 tablet 0    furosemide (LASIX) 20 MG tablet Take 1 tablet by mouth daily 60 tablet 3     No current facility-administered medications for this visit. Allergies: Codeine, Iv [iodides], Hydrocodone, and Statins    Review of Systems  Review of Systems   Constitutional: Negative for activity change, diaphoresis, fatigue, fever and unexpected weight change. HENT: Negative for facial swelling and nosebleeds. Eyes: Negative for redness and visual disturbance. Respiratory: Positive for shortness of breath. Negative for cough, chest tightness and wheezing. Cardiovascular: Negative for chest pain, palpitations and leg swelling.    Gastrointestinal: Negative for abdominal pain, nausea and vomiting. Endocrine: Negative for cold intolerance and heat intolerance. Genitourinary: Negative for dysuria and hematuria. Musculoskeletal: Negative for arthralgias and myalgias. Complains of  gout flareup left ring finger   Skin: Negative for pallor and rash. Neurological: Negative for dizziness, seizures, syncope, weakness and light-headedness. Hematological: Does not bruise/bleed easily. Psychiatric/Behavioral: Negative for agitation. The patient is not nervous/anxious. Objective  Vital Signs - BP (!) 178/71   Pulse 69   Ht 5' 7\" (1.702 m)   Wt 161 lb (73 kg)   BMI 25.22 kg/m²    Wt Readings from Last 3 Encounters:   05/28/21 164 lb (74.4 kg)   05/18/21 161 lb (73 kg)   04/27/21 161 lb (73 kg)      Physical Exam  Vitals and nursing note reviewed. Constitutional:       General: He is not in acute distress. Appearance: Normal appearance. He is well-developed. He is not diaphoretic. HENT:      Head: Normocephalic and atraumatic. Right Ear: Hearing and external ear normal.      Left Ear: Hearing and external ear normal.      Nose: Nose normal.   Eyes:      General:         Right eye: No discharge. Left eye: No discharge. Pupils: Pupils are equal, round, and reactive to light. Neck:      Thyroid: No thyromegaly. Vascular: Carotid bruit (bilateral) present. No JVD. Trachea: No tracheal deviation. Cardiovascular:      Rate and Rhythm: Normal rate and regular rhythm. Heart sounds: Murmur (1/6 systolic) heard. No friction rub. No gallop. Comments: Bilateral groins-bruising noted, but soft. No signs of infection. No erythema, drainage  Pulmonary:      Effort: Pulmonary effort is normal. No respiratory distress. Breath sounds: Normal breath sounds. No wheezing or rales. Abdominal:      Palpations: Abdomen is soft. Tenderness: There is no abdominal tenderness.    Musculoskeletal:         General: No swelling or deformity. Cervical back: Neck supple. No muscular tenderness. Right lower leg: No edema. Left lower leg: No edema. Comments: Normal gait and station  Edema left ring finger   Skin:     General: Skin is warm and dry. Findings: No rash. Neurological:      General: No focal deficit present. Mental Status: He is alert and oriented to person, place, and time. Cranial Nerves: No cranial nerve deficit. Psychiatric:         Mood and Affect: Mood normal.         Behavior: Behavior normal.         Judgment: Judgment normal.         Data:  Heart Cath 3/16/21  Conclusions    Nonobstructive CAD. Patent stent mid-RCA. Patent stent in mid LAD. Intermediate restenosis proximal OM1 stent. Normal LV systolic function. Severe aortic stenosis (mean gradient 40 mmHg)    Carotids 4/8/21  Impression       Elham Promise is mixed plaque visualized in the bilateral internal carotid    arteries.   Leham Promise is 50-69% stenosis in the right internal carotid artery.    There is 50-69% stenosis of the left internal carotid artery.    There is normal antegrade flow in the bilateral vertebral arteries     Chest x-ray on May 19, 2021  Impression   Impression:   1.  Mild bibasilar atelectasis. 2.  Mild central vascular congestion without evidence of pulmonary   edema. Signed by Dr Oneta Hammans on 5/19/2021 12:31          Lab Results   Component Value Date    CHOL 188 01/13/2021    TRIG 53 01/13/2021    HDL 81 01/13/2021    LDLCALC 96 01/13/2021     Lab Results   Component Value Date    ALT 21 04/22/2021    AST 56 (H) 04/22/2021        Indications:S/P TAVR.      Conclusions      Summary   Mitral valve leaflets are mildly thickened with preserved leaflet   mobility. Mild mitral regurgitation is present. S/P TAVR   Mean gradient 28 mm hg   Mild aortic regurgitation is noted. Tricuspid valve is structurally normal.   Mild tricuspid regurgitation with estimated RVSP of 33 mm Hg.    Moderately dilated left atrium. Normal left ventricular size with preserved LV function and an estimated   ejection fraction of approximately 55-60%. Mild concentric left ventricular hypertrophy. No regional wall motion abnormalities. Grade II diastolic dysfunction   Mildly enlarged right atrium size. Mildly enlarged right ventricle cavity. Signature      ----------------------------------------------------------------   Electronically signed by Lazarus Fox MD(Interpreting   physician) on 05/19/2021 11:55 AM   ----------------------------------------------------------------      Assessment:      Severe aortic valve stenosis  Status post initial TAVR in April complicated by severe paravalvular leak and central leak, this was clipping corrected with TAVR in TAVR using a continuous left axillary approach with resolution of his perivalvular leak and his congestive heart failure sinus symptoms    He is making gradual recovery, shortness of breath has improved but he still have underlying some dyspnea on exertion due to his COPD  I reviewed his last labs including BNP and chest x-ray      CADnonobstructive disease per recent heart cath in March. No angina symptoms. Hyperlipidemiapatient has statin allergy. Currently on Zetia. Last lipids showed an LDL of 96. Continue with current medical management      Call with any questionsor concerns  Follow up with SHIMON Flowers for non cardiac problems  Report any new problems  Cardiovascular Fitness-Exercise as tolerated. Strive for 15 minutes of exercise most days of the week. Cardiac / HealthyDiet  Continue current medications as directed  Continue plan of treatment  It is always recommended that you bring your medicationsbottles with you to each visit - this is for your safety!          Fab Forrest MD, [facility]  Interventional Cardiologist, Endovascular Specialist   Medical Director, [facility]

## 2021-06-17 NOTE — TELEPHONE ENCOUNTER
Spoke with patient to let him know Dr. Marcin Rosales thinks everything is ok and we will do a repeat carotid us in 6 months.  Patient voiced understanding and agreed

## 2021-06-17 NOTE — TELEPHONE ENCOUNTER
----- Message from SHIMON Hazel sent at 6/17/2021  3:16 PM CDT -----  Please call patient. Let him know dr. Queen Espinoza thinks everything is okay.   We can see in 6 months with cvls

## 2021-06-25 NOTE — PROGRESS NOTES
[facility] Neurology Office Note      Patient:   Albertina Arshad  MR#:    [id number]  Account Number:                         YOB: 1955  Date of Evaluation:  06/25/2021  Time of Note:                          2:20 PM  Primary/Referring Physician:  SHIMON Chaudhry   Consulting Physician:  Meredith Powell DNP, APRN    FOLLOW UP    Chief Complaint   Patient presents with    1 Month Follow-Up     pt states things are about the same    Cerebrovascular Accident       HISTORY OF PRESENT ILLNESS    Albertina Arshad is a 76y.o. year old male here for follow up. Doing about the same since last visit. Denies further stroke like symptoms. Has LLQ visual field cut. Has noted altered depth perception. Noting mild left hand weakness and numbness, fine motor impairment. Notes left sided numbness in buttock as well, no leg weakness. No facial drooping, no dysarthria. No dysphagia. Has been noting more headaches recently. Pain is left sided, retro orbital/occipital in nature. No clear migrainous features. Frequency has improved recently, was noting them daily. his wife recently resumed his blood pressure medications, had been high when checking it at home. Prior history- Had TAVR x2 in April. Post operatively he was confused and agitated. Neurology was consulted during admission, felt to be anesthesia related. Patient and wife refused MRI at that time. Since April has noted vision changes. He saw optometry who noted LLQ visual field cut and recommended MRI brain which found embolic appearing stroke. Former smoker, quit in 2009. He does drink daily- 4-5 beers a day, was drinking more then that prior to surgery.       Past Medical History:   Diagnosis Date    Acute on chronic combined systolic and diastolic congestive heart failure due to valvular disease (HCC) 4/16/2021    Arthritis     Bronchitis     Cancer (HCC)     Skin Cancer REMOVED RT ARM & LT EAR    Chronic back pain     Chronic cholecystitis without calculus 5/19/2017    Chronic GERD     COPD (chronic obstructive pulmonary disease) (HCC)     Coronary artery disease of native artery of native heart with stable angina pectoris ([address]     Coronary atherosclerosis of native coronary artery     s/p PTCA and stent placement to the LAD and RCA/7 stents    DDD (degenerative disc disease), lumbar 12/4/2018    History of blood transfusion     WITH RT SHOULDER SURGERY    History of tobacco abuse 2010    Hyperlipidemia     Hypertension     MI (myocardial infarction) ([facility] [address]     Old, inferior wall    Moderate aortic regurgitation 08/14/2017    mod-severe AR.  Moderate aortic stenosis 08/14/2017    mod-severe aortic stenosis.  Pain management 2021    PT SEES PAIN MANAGEMENT FOR CHRONIC BACK PAIN    Spinal stenosis of lumbar region with neurogenic claudication 12/4/2018       Past Surgical History:   Procedure Laterality Date    BACK SURGERY      s/p laminectomy    CARDIAC CATHETERIZATION  08/10/04 [facility]DL    [facility]      C[facility]    CARDIAC CATHETERIZATION  08/29/03 [facility]    CARDIAC [facility]HETERIZATION  02/16/01 [facility]    [facility]    CARDIAC CATHETERIZATION  05/25/2009    EF is estimated to be 50%. See scanned document.  CARDIAC CATHETERIZATION N/A 03/2016    stent placement    CARDIAC CATHETERIZATION  08/2017    no PCI    CARDIAC CATHETERIZATION  07/11/2019    Drug-eluting stents placed to mid LAD and mid RCA, normal LV    CHOLECYSTECTOMY, LAPAROSCOPIC N/A 05/19/2017    CHOLECYSTECTOMY LAPAROSCOPIC performed by Elda Barrios MD at [address] [place] [address]  05/28/2015    Dr. Raheel Martinez: No Polyps   10yr recall    CORONARY ANGIOPLASTY WITH STENT PLACEMENT  03/2016    stent to LAD    JOINT REPLACEMENT      Left thumb    JOINT REPLACEMENT Left     knee    JOINT REPLACEMENT      KNEE SURGERY      s/p Rt.  knee replacement    LEG SURGERY Right     Broken leg with surgical repair    LUMBAR SPINE SURGERY N/A 12/04/2018    L1-5 LAMINECTOMY performed by Chata Eubanks MD at [facility] EGD TRANSORAL BIOPSY SINGLE/MULTIPLE N/A 2017    Dr Delphine Lou, Amelie (-), biliary colic, surgical referral    [place] EGD TRANSORAL BIOPSY SINGLE/MULTIPLE N/A 2018    Dr JAIDEN Torres-w/dilation over wire-51 Spanish-Distal esophagitis, gastritis    REMOVE HARDWARE SPINE N/A 2018    I AND D LUMBAR INCISION SEROMA performed by Chata Eubanks MD at [facility] 2019    RIGHT REVERSE TOTAL SHOULDER ARTHROPLASTY performed by Simona Smith MD at [address] [place] [address] Dr ENDOSCOPY  2015    Dr. Boss Lab: amelie neg,,normal, empiric dilatation with 51F    UPPER GASTROINTESTINAL ENDOSCOPY N/A 2018    Dr JAIDEN Torres-w/dilation over wire-51 Spanish-Distal esophagitis, gastritis       Family History   Problem Relation Age of Onset    Cancer Mother     Lung Cancer Mother     Heart Disease Father     Cancer Father     Liver Cancer Father     Lung Cancer Father     Diabetes Maternal Grandmother     Heart Disease Maternal Grandfather     Cancer Maternal Grandfather     Stroke Paternal Grandmother     Stomach Cancer Sister     No Known Problems Brother     COPD Sister     Heart Disease Paternal Grandfather     Colon Cancer Neg Hx     Colon Polyps Neg Hx     Esophageal Cancer Neg Hx     Liver Disease Neg Hx     Rectal Cancer Neg Hx        Social History     Socioeconomic History    Marital status:      Spouse name: Not on file    Number of children: Not on file    Years of education: Not on file    Highest education level: Not on file   Occupational History    Not on file   Tobacco Use    Smoking status: Former Smoker     Packs/day: 0.00     Types: Cigarettes     Quit date: 12/10/2009     Years since quittin.5    Smokeless tobacco: Never Used   Vaping Use    Vaping Use: Never used   Substance and Sexual Activity    Alcohol use:  Yes     Alcohol/week: 42.0 standard  Multiple Vitamin (MULTIVITAMIN) TABS tablet Take 1 tablet by mouth daily      pantoprazole (PROTONIX) 40 MG tablet TAKE 1 TABLET BY MOUTH DAILY TAKE DAILY FIRST THING IN THE MORNING ON AN EMPTY STOMACH. 90 tablet 3    hydroxychloroquine (PLAQUENIL) 200 MG tablet Take 400 mg by mouth daily       oxyCODONE-acetaminophen (PERCOCET) 7.5-325 MG per tablet Take 1 tablet by mouth every 4 hours as needed for Pain.  gabapentin (NEURONTIN) 800 MG tablet Take 800 mg by mouth 3 times daily. No current facility-administered medications for this visit. Allergies   Allergen Reactions    Codeine Swelling     Lips swelling    Iv [Iodides] Swelling     Contrast dye used for heart cath. Caused face to swell.  Hydrocodone Swelling     lips swelling    Statins      Myalgias       REVIEW OF SYSTEMS  Constitutional: []? Fever []? Sweats []? Chills []? Recent Injury [x]? Denies all unless marked  HEENT:[]? Headache  []? Head Injury []? Hearing Loss  []? Sore Throat  []? Ear Ache [x]? Denies all unless marked  Spine:  []? Neck pain  []? Back pain  []? Sciaticia  [x]? Denies all unless marked  Cardiovascular:[]? Heart Disease []? Palpitations []? Chest Pain   [x]? Denies all unless marked  Pulmonary: []? Shortness of Breath []? Cough   [x]? Denies all unless marked  Psychiatric/Behavioral:[]? Depression []? Anxiety [x]? Denies all unless marked  Gastrointestinal: []? Nausea  []? Vomiting  []? Abdominal Pain  []? Constipation  []? Diarrhea  [x]? Denies all unless marked  Genitourinary:   []? Frequency  []? Urgency  []? Dysuria []? Incontinence  [x]? Denies all unless marked  Extremities: []? Pain  []? Swelling  [x]? Denies all unless marked  Musculoskeletal: []? Myalgias  []? Joint Pain  []? Arthritis []? Muscle Cramps []? Muscle Twitches  [x]? Denies all unless marked  Sleep: []? Insomnia[]? Snoring []? Restless Legs  []? Sleep Apnea  []? Daytime Sleepiness  [x]? Denies all unless marked  Skin:[]? Rash []? Color Change [x]?  Denies all unless marked   Neurological:[x]? Visual Disturbance []? Memory Loss []? Loss of Balance []? Slurred Speech []? Weakness []? Seizures  []? Dizziness [x]? Denies all unless marked    The MA has completed the ROS with the patient. I have reviewed it in its' entirety with the patient and agree with the documentation. PHYSICAL EXAM  /68   Pulse 62   Temp 96.9 °F (36.1 °C)   Ht 5' 7\" (1.702 m)   Wt 164 lb (74.4 kg)   SpO2 97%   BMI 25.69 kg/m²       Constitutional  No acute distress    HEENT- Conjunctiva normal.  No scars, masses, or lesions over external nose or ears, no neck masses noted, no jugular vein distension, no bruit  Cardiac- Regular rate and rhythm  Pulmonary- Good expansion, normal effort without use of accessory muscles  Musculoskeletal  No significant wasting of muscles noted, no bony deformities  Extremities - No clubbing, cyanosis or edema  Skin  Warm, dry, and intact. No rash, erythema, or pallor  Psychiatric  Mood, affect, and behavior appear normal      NEUROLOGICAL EXAM     Mental status   [x] Awake, alert, oriented   [x]Affect attention and concentration appear appropriate  []Recent and remote memory appears unremarkable  []Speech normal without dysarthria or aphasia, comprehension and repetition intact.    COMMENTS: MoCA 23/30- question if difficulty in executive function portion is due to visual problems    Cranial Nerves []No VF deficit to confrontation,  no papilledema on fundoscopic exam.  [x]PERRLA, EOMI, no nystagmus, conjugate eye movements, no ptosis  [x]Face symmetric  [x]Facial sensation intact  [x]Tongue midline no atrophy or fasciculations present  [x]Palate midline, hearing to finger rub normal bilaterally  [x]Shoulder shrug and SCM testing normal bilaterally  COMMENTS: LLQ visual field cut    Motor   [x]5/5 strength x 4 extremities  [x]Normal bulk and tone  [x]No tremor present  [x]No rigidity or bradykinesia noted  COMMENTS: subtle weakness over the left    Sensory [x]Sensation intact to light touch, pin prick, vibration, and proprioception BLE  [x]Sensation intact to light touch, pin prick, vibration, and proprioception BUE  COMMENTS: patchy decreased PP BUE, BLE    Coordination [x]FTN normal bilaterally   [x]HTS normal bilaterally  [x]ADELA normal bilaterally. COMMENTS:   Reflexes  [x]Symmetric and non-pathological  [x]Toes down going bilaterally  [x]No clonus present  COMMENTS:   Gait                  [x]Normal steady gait    []Ataxic    []Spastic     []Magnetic     []Shuffling  COMMENTS:       LABS RECORD AND IMAGING REVIEW (As below and per HPI)    Lab Results   Component Value Date    XHGFQURQ07 746 2021     Lab Results   Component Value Date    WBC 6.6 2021    HGB 11.6 (L) 2021    HCT 36.6 (L) 2021    MCV 98.1 (H) 2021     2021     Lab Results   Component Value Date     2021    K 4.0 2021     2021    CO2 26 2021    BUN 12 2021    CREATININE 0.8 2021    GLUCOSE 95 2021    CALCIUM 9.4 2021    PROT 6.0 (L) 2021    LABALBU 3.6 2021    BILITOT 0.3 2021    ALKPHOS 95 2021    AST 56 (H) 2021    ALT 21 2021    LABGLOM >60 2021    GFRAA >60 2021    GLOB 2.8 2016     Lab Results   Component Value Date    CHOL 188 2021    TRIG 53 2021    HDL 81 2021    LDLCALC 96 2021     Lab Results   Component Value Date    TSH 1.960 2019    T4FREE 1.0 2019     Lab Results   Component Value Date    CRP 0.58 (H) 2019    SEDRATE 21 (H) 2019      MRI brain (2021)- abnormal cortical and subcortical signal within bilateral occipital lobes with possible minimal enhancement and a few tiny foci of restricted diffusion which could reflect a process such as PRES, subacute infarcts or areas of gliosis of inflammatory/infectious etiologies. Moderate , generalized atrophy.      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 Mild restricted diffusion in both occipital lobes. No contrast enhancement. Carotid artery u/s (4/2021)- 50-69% stenosis in bilateral internal carotid arteries; normal antegrade flow in bilateral vertebral arteries     Echocardiogram (4/2021)- EF 55-60%; s/p TAVR, mild mitral regurgitation     Reviewed referral records, reviewed recent hospitalization records, reviewed vascular records       ASSESSMENT:    Grayson Meyer is a 76y.o. year old male here for follow up of stroke. Exam is largely unchanged today- left lower quadrant visual field cut. No overt confusion noted but some word finding difficulty. Prior MoCA 23/30, difficulty with executive function portion but question if some of this is related to LLQ visual field cut and vision changes. MRI brain with embolic appearing stroke, suspect it is related to TAVR procedure. Repeat MRI brain with expected progression of stroke areas. Given occipital location this would be the source of vision changes, loss. He likely had some degree of delirium while in the hospital s/p TAVR r/t alcohol withdrawal and anesthesia. Will hold off on anti coagulation today given no new strokes on MRI brain. Will further discuss with Dr. Letha Head as well. Prior carotid artery stenosis noted, following with vascular, medical management. Echocardiogram unremarkable. ICD-10-CM    1. Cerebrovascular accident (CVA), unspecified mechanism ([facility] [address]  I63.9    2. Other headache syndrome  G44.89        PLAN:  1. Continue stroke risk factor maximization- ASA, Plavix, statin and antihypertensive    2. Hold off on anti coagulant today, will further discuss with Dr. Letha Head   3. Safety concerns discussed, no driving, wife to help manage medications   4. OTC medications prn headaches.  Had been hypertensive, restarted medications recently, questioned if this played a role in headaches, has improved. Hold off on preventative medication   5. Return in about 3 months (around 9/25/2021) for follow up, sooner if worsening.     Lizeth Garza DNP, APRN

## 2021-07-16 PROBLEM — M00.9 SEPTIC ARTHRITIS (HCC): Status: ACTIVE | Noted: 2021-01-01

## 2021-07-16 NOTE — ED NOTES
Paged Dr Sebastian Rosales again with Deandra [phone] @ 0651 [facility]  07/16/21 [id number]

## 2021-07-16 NOTE — H&P
[id number] [facility]      Hospitalist - History & Physical      PCP: SHIMON Oleary    Date of Admission: 7/16/2021    Date of Service: 7/16/2021    Chief Complaint: Right knee pain    History Of Present Illness: The patient is a 76 y.o. male with past medical history of TAVR, CAD with stents, COPD, HTN, and HLD who presented to [address] [place] [address] ED complaining of right knee pain. Mr. Miguel A Alvarez reports increased swelling and pain in right knee since last 2 days. Reports fever of 103.4 at home yesterday. States he has history of bilateral knee replacement. However, right knee has had need for joint aspiration on occasion, but none recently. Reports he was seen at [facility] yesterday and was sent home. Denies any specific injury to his right knee. Reports severe pain, limited range of motion due to pain and swelling. Denies lower than usual shortness of breath, chest pain, abdominal pain, nausea, or vomiting. Work-up in ED revealed lactic 2.3, CRP 34.75, troponin 0.05, WBC 9.5, sed rate 30, unremarkable chest x-ray, right knee x-ray showed small knee joint effusion with anterior soft tissue swelling. Orthopedic surgery was consulted from ED whom recommended arthrocentesis for lab studies. Dr. Gold Ruiz performed arthrocentesis. Fluid study showed nucl cell 19,560, 40,000 RBC. Synovial fluid culture showed many gram-positive cocci in pair.        Past Medical History:        Diagnosis Date    Acute on chronic combined systolic and diastolic congestive heart failure due to valvular disease ([address] 4/16/2021    Arthritis     Bronchitis     Cancer (HCC)     Skin Cancer REMOVED RT ARM & LT EAR    Chronic back pain     Chronic cholecystitis without calculus 5/19/2017    Chronic GERD     COPD (chronic obstructive pulmonary disease) (HCC)     Coronary artery disease of native artery of native heart with stable angina pectoris (HCC)     Coronary atherosclerosis of native coronary artery     s/p PTCA and stent placement to the LAD and RCA/7 stents    DDD (degenerative disc disease), lumbar 12/4/2018    History of blood transfusion     WITH RT SHOULDER SURGERY    History of tobacco abuse 2010    Hyperlipidemia     Hypertension     MI (myocardial infarction) ([address]     Old, inferior wall    Moderate aortic regurgitation 08/14/2017    mod-severe AR.  Moderate aortic stenosis 08/14/2017    mod-severe aortic stenosis.  Pain management 2021    PT SEES PAIN MANAGEMENT FOR CHRONIC BACK PAIN    Spinal stenosis of lumbar region with neurogenic claudication 12/4/2018       Past Surgical History:        Procedure Laterality Date    BACK SURGERY      s/p laminectomy    CARD[facility]AC [facility]HETERIZATION  08/10/04 [facility]      CARDIAC CATHETERIZATION  12/10/03  [facility]    CARDIAC CATHETERIZATION  08/29/03 [facility]    CARDIAC CATHETERIZATION  02/16/01 [facility]    CARDIAC CATHETERIZATION  05/25/2009    EF is estimated to be 50%. See scanned document.  CARDIAC CATHETERIZATION N/A 03/2016    stent placement    CARDIAC CATHETERIZATION  08/2017    no PCI    CARDIAC CATHETERIZATION  07/11/2019    Drug-eluting stents placed to mid LAD and mid RCA, normal LV    CHOLECYSTECTOMY, LAPAROSCOPIC N/A 05/19/2017    CHOLECYSTECTOMY LAPAROSCOPIC performed by Margie Payne MD at [address] [place] [address]  05/28/2015    Dr. Carson Hernandez: No Polyps   10yr recall    CORONARY ANGIOPLASTY WITH STENT PLACEMENT  03/2016    stent to LAD    JOINT REPLACEMENT      Left thumb    JOINT REPLACEMENT Left     knee    JOINT REPLACEMENT      KNEE SURGERY      s/p Rt.  knee replacement    LEG SURGERY Right     Broken leg with surgical repair    LUMBAR SPINE SURGERY N/A 12/04/2018    L1-5 LAMINECTOMY performed by Courtney Garza MD at [address] [place] [address] [place] EGD TRANSORAL BIOPSY SINGLE/MULTIPLE N/A 05/18/2017    Dr Lester Cutler, Amelie (-), biliary colic, surgical referral    [place] EGD TRANSORAL BIOPSY SINGLE/MULTIPLE N/A 11/29/2018    Dr JAIDEN Torres-w/dilation over wire-51 French-Distal esophagitis, gastritis    REMOVE HARDWARE SPINE N/A 12/20/2018    I AND D LUMBAR INCISION SEROMA performed by Venkat Cuellar MD at [address] Right 12/12/2019    RIGHT REVERSE TOTAL SHOULDER ARTHROPLASTY performed by Eladio Rossi MD at [address] [place] [address] Dr ENDOSCOPY  03/30/2015    Dr. Radha Portillo: rukhsana neg,,normal, empiric dilatation with 51F    UPPER GASTROINTESTINAL ENDOSCOPY N/A 11/29/2018    Dr JAIDEN Torres-w/dilation over wire-51 French-Distal esophagitis, gastritis       Home Medications:  Prior to Admission medications    Medication Sig Start Date End Date Taking? Authorizing Provider   indomethacin (INDOCIN) 50 MG capsule TAKE 1 CAPSULE BY MOUTH EVERY DAY AS NEEDED 6/2/21   Historical Provider, MD   predniSONE (DELTASONE) 50 MG tablet 1 tablet by mouth 24, 12, and 1 hour before procedure. 6/3/21   May SHIMON Cortes   diphenhydrAMINE (BENADRYL) 50 MG tablet 1 Tablet by mouth to be taken with last prednisone dose 1 hour before procedure.  6/3/21   May SHIMON Cortes   nitroGLYCERIN (NITROSTAT) 0.4 MG SL tablet Place 1 tablet under the tongue every 5 minutes as needed for Chest pain 1 tablet as needed 4/12/21   SHIMON Torres   ezetimibe (ZETIA) 10 MG tablet TAKE 1 TABLET BY MOUTH EVERY DAY 4/9/21   SHIMON Torres   aspirin 81 MG chewable tablet Take 1 tablet by mouth daily 4/9/21   Deneen Mcleod MD   clopidogrel (PLAVIX) 75 MG tablet Take 1 tablet by mouth daily 4/9/21   Deneen Mcleod MD   furosemide (LASIX) 20 MG tablet Take 1 tablet by mouth daily 4/9/21   Deneen Mcleod MD   Multiple Vitamin (MULTIVITAMIN) TABS tablet Take 1 tablet by mouth daily    Historical Provider, MD   pantoprazole (PROTONIX) 40 MG tablet TAKE 1 TABLET BY MOUTH DAILY TAKE DAILY FIRST THING IN THE MORNING ON AN EMPTY STOMACH. 12/21/20   SHIMON Roger   hydroxychloroquine (PLAQUENIL) 200 MG tablet Take 400 mg by mouth daily 6/15/20   Historical Provider, MD   oxyCODONE-acetaminophen (PERCOCET) 7.5-325 MG per tablet Take 1 tablet by mouth every 4 hours as needed for Pain. Historical Provider, MD   gabapentin (NEURONTIN) 800 MG tablet Take 800 mg by mouth 3 times daily. 6/13/16   Historical Provider, MD       Allergies:    Codeine, Iv [iodides], Hydrocodone, and Statins    Social History:    The patient currently lives at home. Tobacco:   reports that he quit smoking about 11 years ago. His smoking use included cigarettes. He smoked 0.00 packs per day. He has never used smokeless tobacco.  Alcohol:   reports current alcohol use of about 42.0 standard drinks of alcohol per week. Illicit Drugs: denies    Family History:      Problem Relation Age of Onset    Cancer Mother     Lung Cancer Mother     Heart Disease Father     Cancer Father     Liver Cancer Father     Lung Cancer Father     Diabetes Maternal Grandmother     Heart Disease Maternal Grandfather     Cancer Maternal Grandfather     Stroke Paternal Grandmother     Stomach Cancer Sister     No Known Problems Brother     COPD Sister     Heart Disease Paternal Grandfather     Colon Cancer Neg Hx     Colon Polyps Neg Hx     Esophageal Cancer Neg Hx     Liver Disease Neg Hx     Rectal Cancer Neg Hx            Review of Systems   Constitutional: Positive for chills and fever. Negative for diaphoresis and fatigue. HENT: Negative for congestion and ear pain. Eyes: Negative for visual disturbance. Respiratory: Negative for cough, shortness of breath and wheezing. Cardiovascular: Negative for chest pain, palpitations and leg swelling. Gastrointestinal: Negative for abdominal distention, abdominal pain, blood in stool, constipation, diarrhea, nausea and vomiting. Endocrine: Negative for cold intolerance and heat intolerance. Genitourinary: Negative for difficulty urinating, flank pain, frequency and urgency.    Musculoskeletal: Positive for arthralgias Mental Status: He is alert and oriented to person, place, and time. Psychiatric:         Mood and Affect: Mood normal.         Behavior: Behavior normal.         Thought Content: Thought content normal.          Diagnostic Data:  CBC:  Recent Labs     07/16/21  1237   WBC 9.5   HGB 13.6*   HCT 41.4*   PLT 57*     BMP:  Recent Labs     07/16/21  1237      K 3.8      CO2 22   BUN 19   CREATININE 0.8   CALCIUM 9.1     Recent Labs     07/16/21  1237   AST 63*   ALT 33   BILITOT 0.4   ALKPHOS 91     Coag Panel:   Recent Labs     07/16/21  1237   INR 1.11   PROTIME 14.5   APTT 29.5     Cardiac Enzymes:   Recent Labs     07/16/21  1237   TROPONINI 0.05*     ABGs:  Lab Results   Component Value Date    PHART 7.388 03/16/2021    PO2ART 68 03/16/2021    IFN4JVF 40 03/16/2021     Urinalysis:  Lab Results   Component Value Date    NITRU Negative 05/05/2021    WBCUA 0 11/18/2020    BACTERIA NEGATIVE 11/18/2020    RBCUA 0 11/18/2020    BLOODU Negative 05/05/2021    SPECGRAV 1.005 05/05/2021    GLUCOSEU Negative 05/05/2021       RAD:    XR KNEE RIGHT (1-2 VIEWS)  Result Date: 7/16/2021    1. Appropriate alignment of the right knee prosthesis with no acute osseous pathology. Small knee joint effusion with anterior soft tissue swelling. Signed by Dr Isreal Choudhury      XR CHEST PORTABLE  Result Date: 7/16/2021    Poor lung expansion and left lower lung discoid atelectasis. No active infiltrate or pulmonary congestion. Signed by Dr Sylvia Ferrer      Assessment/Plan:  Principal Problem:    Septic arthritis [facility])  Active Problems:    S/P TAVR (transcatheter aortic valve replacement)    COPD (chronic obstructive pulmonary disease) (HCC)    Coronary artery disease involving native coronary artery of native heart without angina pectoris    Mixed hyperlipidemia    Essential hypertension  Resolved Problems:    * No resolved hospital problems.  *     Principal Problem:    Septic arthritis [facility])-    - Orthopedic surgery consultation and recommendations appreciated   - IV cefepime   - Follow fluid cultures   - Trend lactic acid       Active Problems:    S/P TAVR (transcatheter aortic valve replacement)- noted, cardiology consultation for elevated troponin       COPD (chronic obstructive pulmonary disease) (HCC)- noted, supplemental oxygen as needed, bronchodilators as needed      Coronary artery disease involving native coronary artery of native heart without angina pectoris- noted, denies chest pain at this time      Mixed hyperlipidemia- noted, resume home medications      Essential hypertension- noted, resume home medications            DVT Prophylaxis:  SCDs     Further Orders per Clinical course/attending. Signed:  Electronically signed by SHIMON Sotomayor CNP on 7/16/21 at 5:40 PM CDT       EMR Dragon/Transcription disclaimer:   Much of this encounter note is an electronic transcription/translation of spoken language to printed text.  The electronic translation of spoken language may permit erroneous, or at times, nonsensical words or phrases to be inadvertently transcribed; although attempts have made to review the note for such errors, some may still exist.

## 2021-07-16 NOTE — Clinical Note
Patient Class: Inpatient [101]   REQUIRED: Diagnosis: Septic arthritis ([facility] [address] [[id number]]   Estimated Length of Stay: Estimated stay of more than 2 midnights   Future Attending Provider: Mack Vergara [[id number]]   Telemetry/Cardiac Monitoring Required?: Yes

## 2021-07-16 NOTE — ED PROVIDER NOTES
[facility] EMERGENCY DEPT  EMERGENCY DEPARTMENT ENCOUNTER      Pt Name: Jamie Rosario  MRN: [id number]  [place] 1955  Date of evaluation: 7/16/2021  Provider: SHIMON Joshi 4655       Chief Complaint   Patient presents with    Knee Pain     Right Knee. seen at [facility] yesterday. HISTORY OF PRESENT ILLNESS   (Location/Symptom, Timing/Onset, Context/Setting, Quality, Duration, Modifying Factors, Severity)  Note limiting factors. Jamie Rosario is a 76 y.o. male who presents to the emergency department who reports several days of increased right knee pain. Cannot recall a specific fall or injury. States hx of knee replacement in 2012 by a now retired orthopedist, has had need for aspiration of joint on occasion, but none in recent history. Reports was seen at [facility] and had fever of 103.2 and WBC was 12, blood pressure was a little low at the time. He was reportedly sent home told he likely had some sort of infection, but they couldn't find a source, wife says his knee wasn't really evaluated at the time. He is a daily drinker although he has cut back since April. HPI    Nursing Notes were reviewed. REVIEW OF SYSTEMS    (2-9 systems for level 4, 10 or more for level 5)     Review of Systems   Constitutional: Positive for chills and fever. HENT: Negative for congestion. Respiratory: Negative for cough, chest tightness and shortness of breath. Cardiovascular: Negative for chest pain and palpitations. Gastrointestinal: Negative for abdominal pain, diarrhea, nausea and vomiting. Genitourinary: Negative for dysuria, flank pain, frequency and urgency. Musculoskeletal: Positive for joint swelling. Negative for back pain and neck pain. Neurological: Negative for dizziness, syncope, weakness, light-headedness, numbness and headaches. Except as noted above the remainder of the review of systems was reviewed and negative.        PAST MEDICAL HISTORY     Past Medical History:   Diagnosis Date    Acute on chronic combined systolic and diastolic congestive heart failure due to valvular disease ([facility] 4/16/2021    Arthritis     Bronchitis     Cancer (HCC)     Skin Cancer REMOVED RT ARM & LT EAR    Chronic back pain     Chronic cholecystitis without calculus 5/19/2017    Chronic GERD     COPD (chronic obstructive pulmonary disease) (HCC)     Coronary artery disease of native artery of native heart with stable angina pectoris ([address]     Coronary atherosclerosis of native coronary artery     s/p PTCA and stent placement to the LAD and RCA/7 stents    DDD (degenerative disc disease), lumbar 12/4/2018    History of blood transfusion     WITH RT SHOULDER SURGERY    History of tobacco abuse 2010    Hyperlipidemia     Hypertension     MI (myocardial infarction) ([facility]     Old, inferior wall    Moderate aortic regurgitation 08/14/2017    mod-severe AR.  Moderate aortic stenosis 08/14/2017    mod-severe aortic stenosis.  Pain management 2021    PT SEES PAIN MANAGEMENT FOR CHRONIC BACK PAIN    Spinal stenosis of lumbar region with neurogenic claudication 12/4/2018         SURGICAL HISTORY       Past Surgical History:   Procedure Laterality Date    BACK SURGERY      s/p laminectomy    CARDIAC CATHETERIZATION  08/10/04 [facility]    [facility]      CARDIAC [facility]TER[facility]    CARDIAC CATHETERIZATION  08/29/03 [facility]    CARDIAC CATHETERIZATION  02/16/01 [facility]    [facility]    CARDIAC CATHETERIZATION  05/25/2009    EF is estimated to be 50%. See scanned document.     CARDIAC CATHETERIZATION N/A 03/2016    stent placement    CARDIAC CATHETERIZATION  08/2017    no PCI    CARDIAC CATHETERIZATION  07/11/2019    Drug-eluting stents placed to mid LAD and mid RCA, normal LV    CHOLECYSTECTOMY, LAPAROSCOPIC N/A 05/19/2017    CHOLECYSTECTOMY LAPAROSCOPIC performed by Margie Payne MD at [address] [place] [address]  05/28/2015    Dr. Carson Hernandez: No (NITROSTAT) 0.4 MG SL TABLET    Place 1 tablet under the tongue every 5 minutes as needed for Chest pain 1 tablet as needed    OXYCODONE-ACETAMINOPHEN (PERCOCET) 7.5-325 MG PER TABLET    Take 1 tablet by mouth every 4 hours as needed for Pain. PANTOPRAZOLE (PROTONIX) 40 MG TABLET    TAKE 1 TABLET BY MOUTH DAILY TAKE DAILY FIRST THING IN THE MORNING ON AN EMPTY STOMACH. PREDNISONE (DELTASONE) 50 MG TABLET    1 tablet by mouth 24, 12, and 1 hour before procedure. ALLERGIES     Codeine, Iv [iodides], Hydrocodone, and Statins    FAMILY HISTORY       Family History   Problem Relation Age of Onset    Cancer Mother     Lung Cancer Mother     Heart Disease Father     Cancer Father     Liver Cancer Father     Lung Cancer Father     Diabetes Maternal Grandmother     Heart Disease Maternal Grandfather     Cancer Maternal Grandfather     Stroke Paternal Grandmother     Stomach Cancer Sister     No Known Problems Brother     COPD Sister     Heart Disease Paternal Grandfather     Colon Cancer Neg Hx     Colon Polyps Neg Hx     Esophageal Cancer Neg Hx     Liver Disease Neg Hx     Rectal Cancer Neg Hx           SOCIAL HISTORY       Social History     Socioeconomic History    Marital status:      Spouse name: Not on file    Number of children: Not on file    Years of education: Not on file    Highest education level: Not on file   Occupational History    Not on file   Tobacco Use    Smoking status: Former Smoker     Packs/day: 0.00     Types: Cigarettes     Quit date: 12/10/2009     Years since quittin.6    Smokeless tobacco: Never Used   Vaping Use    Vaping Use: Never used   Substance and Sexual Activity    Alcohol use:  Yes     Alcohol/week: 42.0 standard drinks     Types: 42 Cans of beer per week     Comment: daily    Drug use: No    Sexual activity: Yes     Partners: Female   Other Topics Concern    Not on file   Social History Narrative    Not on file     Social Determinants of Health     Financial Resource Strain:     Difficulty of Paying Living Expenses:    Food Insecurity:     Worried About Running Out of Food in the Last Year:     [address] in the Last Year:    Transportation Needs:     Lack of Transportation (Medical):  Lack of Transportation (Non-Medical):    Physical Activity:     Days of Exercise per Week:     Minutes of Exercise per Session:    Stress:     Feeling of Stress :    Social Connections:     Frequency of Communication with Friends and Family:     Frequency of Social Gatherings with Friends and Family:     Attends [employer] Services:     Active Member of Clubs or Organizations:     Attends Club or Organization Meetings:     Marital Status:    Intimate Partner Violence:     Fear of Current or Ex-Partner:     Emotionally Abused:     Physically Abused:     Sexually Abused:        SCREENINGS        [place] Coma Scale  Eye Opening: Spontaneous  Best Verbal Response: Oriented  Best Motor Response: Obeys commands  [place] Coma Scale Score: 15               PHYSICAL EXAM    (up to 7 for level 4, 8 or more for level 5)     ED Triage Vitals [07/16/21 1123]   BP Temp Temp src Pulse Resp SpO2 Height Weight   (!) 106/52 97.9 °F (36.6 °C) -- 77 -- -- 5' 7\" (1.702 m) 166 lb (75.3 kg)       Physical Exam  Vitals reviewed. Constitutional:       General: He is in acute distress (right knee pain). Appearance: Normal appearance. He is not ill-appearing, toxic-appearing or diaphoretic. HENT:      Head: Normocephalic and atraumatic. Right Ear: Tympanic membrane normal.      Left Ear: Tympanic membrane normal.      Nose: Nose normal.      Mouth/Throat:      Mouth: Mucous membranes are moist.      Pharynx: Oropharynx is clear. Eyes:      Extraocular Movements: Extraocular movements intact. Conjunctiva/sclera: Conjunctivae normal.      Pupils: Pupils are equal, round, and reactive to light.    Cardiovascular:      Rate and Rhythm: Normal rate and regular rhythm. Pulses: Normal pulses. Heart sounds: Normal heart sounds. Pulmonary:      Effort: Pulmonary effort is normal.      Breath sounds: Normal breath sounds. Abdominal:      General: Bowel sounds are normal.      Palpations: Abdomen is soft. Tenderness: There is no abdominal tenderness. Musculoskeletal:      Cervical back: Normal range of motion and neck supple. No rigidity or tenderness. Comments: Right knee is swollen, there is a vertical linear well healed scar present. There is maybe mild warmth to knee. There is no redness. There is a bruise to lateral right thigh just adjacent to knee. There are no abrasions present. Decreased ROM r/t pain and swelling. Distal NVI. Joint above and below not affected. Neurological:      Mental Status: He is alert. DIAGNOSTIC RESULTS     EKG: All EKG's are interpreted by the Emergency Department Physician who either signs or Co-signs this chart in the absence of a cardiologist.    Sinus rhythm with LBBB rate 68, similar to previous. Reviewed by Dr Guerrero Daughters:   Non-plain film images such as CT, Ultrasound and MRI are read by the radiologist. Plain radiographic images are visualized and preliminarily interpreted by the emergency physician with the below findings:        Interpretation per the Radiologist below, if available at the time of this note:    XR CHEST PORTABLE   Final Result   Poor lung expansion and left lower lung discoid   atelectasis. No active infiltrate or pulmonary congestion. Signed by Dr Claude Ervin (1-2 VIEWS)   Final Result   1. Appropriate alignment of the right knee prosthesis with no acute   osseous pathology. Small knee joint effusion with anterior soft tissue   swelling.    Signed by Dr Bernard House            ED BEDSIDE ULTRASOUND:   Performed by ED Physician - none    LABS:  Labs Reviewed   CBC WITH AUTO DIFFERENTIAL - Abnormal; Notable for the following components:       Result Value    RBC 4.36 (*)     Hemoglobin 13.6 (*)     Hematocrit 41.4 (*)     MCV 95.0 (*)     MCH 31.2 (*)     MCHC 32.9 (*)     Platelets 57 (*)     Lymphocytes % 1.0 (*)     Neutrophils Absolute 8.7 (*)     Lymphocytes Absolute 0.3 (*)     Bands Relative 39 (*)     Toxic Granulation Occasional (*)     Macrocytes 1+ (*)     Hypochromia 1+ (*)     All other components within normal limits   COMPREHENSIVE METABOLIC PANEL W/ REFLEX TO MG FOR LOW K - Abnormal; Notable for the following components:    AST 63 (*)     All other components within normal limits   TROPONIN - Abnormal; Notable for the following components:    Troponin 0.05 (*)     All other components within normal limits   LACTIC ACID, PLASMA - Abnormal; Notable for the following components:    Lactic Acid 2.3 (*)     All other components within normal limits    Narrative:     CALL  [facility]  [facility] tel. ,  Chemistry results called to and read back by Mark Rebollar in ED, 07/16/2021  13:42, by [address] - Abnormal; Notable for the following components:    Sed Rate 30 (*)     All other components within normal limits   C-REACTIVE PROTEIN - Abnormal; Notable for the following components:    CRP 34.75 (*)     All other components within normal limits   COVID-19, RAPID   CULTURE, BLOOD 1   CULTURE, BLOOD 2   GRAM STAIN   CULTURE, BODY FLUID   PROTIME-INR   APTT   BODY FLUID CELL COUNT WITH DIFFERENTIAL   BODY FLUID CRYSTAL       All other labs were within normal range or not returned as of this dictation.     EMERGENCY DEPARTMENT COURSE and DIFFERENTIAL DIAGNOSIS/MDM:   Vitals:    Vitals:    07/16/21 1123 07/16/21 1524   BP: (!) 106/52    Pulse: 77    Resp:  24   Temp: 97.9 °F (36.6 °C)    SpO2:  93%   Weight: 166 lb (75.3 kg)    Height: 5' 7\" (1.702 m)            MDM      REASSESSMENT     1330 Dr William Zaldivar has seen an examined patient and reached out to Dr Kelin No (ortho) to determine if knee should be tapped in department or if Dr Kelin No would like to do this inpatient. Although WBC is normal, there is decreased ROM to R knee with effusion present and warmth to knee. Awaiting return call from Dr Hansen Francisco. [address] Dr Sergei Nieves performs right knee tap with 60+ cc of cloudy blood tinged fluid. 1600 still awaiting results of knee tap. Patient is not ambulatory with concern for septic presentation with question of right knee as source. Case d/w Dr Capo Moreau for admission who requests vanc/cefipime. CRITICAL CARE TIME       CONSULTS:  None    PROCEDURES:  Unless otherwise noted below, none     Procedures         FINAL IMPRESSION      1. Effusion of right knee    2. Acute pain of right knee          DISPOSITION/PLAN   DISPOSITION        PATIENT REFERRED TO:  No follow-up provider specified. DISCHARGE MEDICATIONS:  New Prescriptions    No medications on file     Controlled Substances Monitoring:     No flowsheet data found.     (Please note that portions of this note were completed with a voice recognition program.  Efforts were made to edit the dictations but occasionally words are mis-transcribed.)    SHIMON West CNP (electronically signed)  Attending Emergency Physician         SHIMON West CNP  07/16/21 7119

## 2021-07-16 NOTE — ED PROVIDER NOTES
Attending Supervisory Note/Shared Visit   I have personally performed a face to face diagnostic evaluation on this patient. I have reviewed the mid-levels findings and agree. Mr. Travis Alford is a very pleasant 77 yo male that presents to the ED for right knee pain and swelling for past several days. No specific injury. States did bend down onto knee few times recently while out on their boat. Seen at [facility] yesterday for same but was ultimately discharged. Fever of 103 yesterday per report. Has b/l knee prosthesis. Right knee done in 2009 by retired ortho in [facility]. Right knee edematous and unable to range due to pain and swelling, feels warmer than left but not erythematous    D/w Dr. Tyler Anderson request to go ahead and tap knee, lab studies pending 1514    nuc cells 19,560, 40,000 rbc, still pending gram stain, micro doing it now we have called again to ask, pt with difficulty ambulating due to pain and still concern for sepsis will go ahead and admit for further work up and evaluation. Sherrilyn Severin has d/w Dr. Marco A Landaverde for admission      Arthrocentesis    Date/Time: 7/16/2021 2:55 PM  Performed by: Ant Mane MD  Authorized by: Ant Mane MD     Consent:     Consent obtained:  Verbal    Consent given by:  Patient    Risks discussed:  Bleeding, incomplete drainage, nerve damage, infection and pain  Location:     Location:  Knee    Knee:  R knee  Anesthesia (see MAR for exact dosages): Anesthesia method:  Local infiltration    Local anesthetic:  Lidocaine 1% w/o epi  Procedure details:     Preparation: Patient was prepped and draped in usual sterile fashion      Needle gauge:  18 G    Ultrasound guidance: no      Approach:  Lateral    Aspirate amount:  58    Aspirate characteristics:  Blood-tinged and cloudy    Steroid injected: no      Specimen collected: yes    Post-procedure details:     Dressing:  Adhesive bandage    Patient tolerance of procedure:   Tolerated well, no immediate complications          FINAL IMPRESSION      1. Effusion of right knee    2.  Acute pain of right knee          Cadence Kingsley MD  Attending Emergency Physician       Noelle Garcia MD  07/16/21 4537

## 2021-07-17 NOTE — OP NOTE
OPERATIVE NOTE    Patient:  Renetta Huynh    Date:  7/17/2021    Medical Record Number:  [id number]    Primary Pre-Operative Diagnosis: Infected right total knee arthroplasty. Primary Post-Operative Diagnosis:  Same    Procedure: Right knee explant antibiotic spacer with a size 4 right TC 3 femoral component by DePuy with 8 mm buildup distal lateral and 4 mm distal  medial.  12 x 75 press-fit stem. Size 4 x 15 tibial mobile-bearing polyposterior stabilized. Assistant: Babs Pike      Anesthesia:  Local with sedation    Estimated Blood Loss:  Minimal  Tourniquet time: 2 hours. Complications:  None  Specimens: 3 culture sent. Findings:  As above      Procedure:  Patient was brought into the operating room and general endotracheal anesthetic was placed. The extremity was prepped and draped with chlorhexidine/alcohol and ioban draping. The leg had a 10 degree flexion contracture. Had a curvilinear incision over the front of the knee. The leg was exsanguinated with an Ace wrap tourniquet inflated 300 mmHg. Anterior incision was made generous fasciocutaneous flaps were developed. Mid parapatellar arthrotomy was made with the Bovie. Capsulectomy performed. There was a lot of pus in the knee. Eventually able to logan the patella. The patellar button was removed the metal-backed patellar base for the DePuy LCS mobile-bearing knee was teased off with 1/4 inch and thin flexible osteotome as well as a reciprocating saw there is quite a bit of bone loss on the patella less than 10 mm left for later reconstruction. High-speed bur was used to remove any remaining cement. Patella was irrigated. The knee was then placed in flexion retractors placed around the femur the tibial polywas removed by transecting the stem that went down to the tibia using an oscillating saw than the tibial polywas removed.   Attention turned back to the femur bone implant interface was cleaned up there was a lot of osteolysis around the anterior femoral flange as well as laterally around the implant did not take much to clean up the bone implant interface with 1/2 inch curved osteotome thin flexible osteotomes offset osteotomes and the implant could be easily removed. The underlying bone was very soft. There is a lot of central bone loss on the femur after using a high-speed bur to clean up all the cement. Distal femur is also irrigated. PCL retractor was placed in the medial lateral tibial retractors placed the tibia came off by hand without any cement attached to it. The remaining cement was well fixed to the tibia took a while to check that away with 1/4 inch osteotome and hip rondure. We then reamed the canals up to 13 mm and used a high-speed bur on the tibia to get any extraneous cement. Spent 6 L of irrigation on the bone surfaces. We sized the femur and tibia and determine the size as above would give full range of motion and I stability. Patient had about 1 inch flexion extension gaps with all the instruments removed. The femoral component was put together on the back table then cemented using antibiotic cement the contained 3 g of Vanco 2 g of gent and 1 g of Ancef per batch the femur was cemented separate from the tibia once in femur was in place and the cemented dried excess cement had been removed we placed retraction on the tibia and cemented the tibial poly we did pockmark it surface with a drill. We also placed [employer] antibiotic impregnated beads down the tibial canals the patient had a previous tibial fracture. There is quite a bit of cavitary bone loss on the tibia. The knee tracked well was stable throughout motion arc to varus valgus stress the patella remnant also tracked well. Also we did bring C arm in with our trials to make sure everything looked to be in good position.   Tourniquet was released wound irrigated one last time a deep drain placed the arthrotomy was closed #2 Vicryl sutures subclosed 2-0 Vicryl skin closed with 3-0 Vicryl and pernio. Sterile dressings applied. Plan be for weightbearing as tolerated IV antibiotic therapy.           Electronically signed by Td Hilton MD on 7/17/2021 at 2:24 PM

## 2021-07-17 NOTE — PROGRESS NOTES
[facility]      Progress Note    Patient:  Jessica Hall  YOB: 1955  Date of Service: 7/17/2021  MRN: [id number]   Acct: [de-identified]   Primary Care Physician: SHIMON Brewster  Advance Directive: Full Code  Admit Date: 7/16/2021       Hospital Day: 1    Portions of this note have been copied forward, however, updated to reflect the most current clinical status of this patient. CHIEF COMPLAINT R knee pain     SUBJECTIVE:  Narcisa Goldstein was seen after his surgery this afternoon. He reports some postop nausea and pain. Reports mild SOB. Denies fever, or chills at this time. Denies chest pain. CUMULATIVE HOSPITAL COURSE:   The patient is a 76 y.o. male with past medical history of TAVR, CAD with stents, COPD, HTN, and HLD who presented to [address] [place] [address] ED complaining of right knee pain. Mr. Narcisa Rivero reported increased swelling and pain in right knee since last 2 days. Reported fever of 103.4 at home. Stated he has history of bilateral knee replacement. However, right knee has had need for joint aspiration on occasion, but none recently. Reported he was seen at [facility] and was sent home. Denied any specific injury to his right knee. Reported severe pain, limited range of motion due to pain and swelling. Denied no more than usual shortness of breath, chest pain, abdominal pain, nausea, or vomiting. Work-up in ED revealed lactic 2.3, CRP 34.75, troponin 0.05, WBC 9.5, sed rate 30, unremarkable chest x-ray, right knee x-ray showed small knee joint effusion with anterior soft tissue swelling. Orthopedic surgery was consulted from ED whom recommended arthrocentesis for lab studies. Dr. Kiet Jenkins performed arthrocentesis. Fluid study showed nucl cell 19,560, 40,000 RBC. Patient was admitted to hospital medicine with Septic arthritis. Dr. Aure Haskins performed right knee explant antibiotic spacer on 7/17/21.  Blood culture returned positive for gram positive cocci in clusters resembling staphylococcus. Synovial fluid returned positive for staphylococcus aureus. ID was consulted whom recommended discontinuing vancomycin and cefepime and changed to Cefazolin. Surveillance blood cultures ordered for Sunday morning and Monday morning. Review of Systems   Constitutional: Negative for chills, diaphoresis, fatigue and fever. HENT: Negative for congestion and ear pain. Eyes: Negative for visual disturbance. Respiratory: Positive for shortness of breath. Negative for cough and wheezing. Cardiovascular: Negative for chest pain, palpitations and leg swelling. Gastrointestinal: Positive for nausea. Negative for abdominal distention, abdominal pain, blood in stool, constipation, diarrhea and vomiting. Endocrine: Negative for cold intolerance and heat intolerance. Genitourinary: Negative for difficulty urinating, flank pain, frequency and urgency. Musculoskeletal: Negative for arthralgias and myalgias. Skin: Negative for color change and wound. Neurological: Negative for dizziness, syncope, weakness, light-headedness, numbness and headaches. Hematological: Does not bruise/bleed easily. Psychiatric/Behavioral: Negative for agitation, confusion and dysphoric mood. Objective:   VITALS:  BP (!) 180/102 Comment: manual  Pulse 86   Temp 98.5 °F (36.9 °C) (Temporal)   Resp 20   Ht 5' 7\" (1.702 m)   Wt 177 lb 7 oz (80.5 kg)   SpO2 97%   BMI 27.79 kg/m²   24HR INTAKE/OUTPUT:      Intake/Output Summary (Last 24 hours) at 7/17/2021 1704  Last data filed at 7/17/2021 1603  Gross per 24 hour   Intake 3350 ml   Output 1710 ml   Net 1640 ml           Physical Exam  Constitutional:       General: He is not in acute distress. Appearance: Normal appearance. He is not ill-appearing. HENT:      Head: Normocephalic and atraumatic.       Right Ear: External ear normal.      Left Ear: External ear normal.      Nose: Nose normal.      Mouth/Throat:      Mouth: Mucous membranes are moist.   Eyes:      Extraocular Movements: Extraocular movements intact. Conjunctiva/sclera: Conjunctivae normal.      Pupils: Pupils are equal, round, and reactive to light. Cardiovascular:      Rate and Rhythm: Normal rate and regular rhythm. Pulses: Normal pulses. Heart sounds: Murmur heard. Pulmonary:      Effort: Pulmonary effort is normal. No respiratory distress. Breath sounds: Normal breath sounds. No wheezing, rhonchi or rales. Abdominal:      General: Bowel sounds are normal. There is no distension. Palpations: Abdomen is soft. Tenderness: There is no abdominal tenderness. Musculoskeletal:         General: Swelling and tenderness present. No deformity. Normal range of motion. Cervical back: Normal range of motion and neck supple. No muscular tenderness. Right lower leg: No edema. Left lower leg: No edema. Comments: S/p Right knee explant antibiotic spacer,   Limited ROM due to pain    Skin:     General: Skin is warm and dry. Findings: No bruising or lesion. Neurological:      Mental Status: He is alert and oriented to person, place, and time. Psychiatric:         Mood and Affect: Mood normal.         Behavior: Behavior normal.         Thought Content:  Thought content normal.            Medications:      sodium chloride      sodium chloride      sodium chloride 75 mL/hr (07/17/21 1610)    sodium chloride        ceFAZolin  2,000 mg Intravenous Q8H    sodium chloride flush  10 mL Intravenous 2 times per day    acetaminophen  650 mg Oral Q6H    sennosides-docusate sodium  1 tablet Oral BID    aspirin  325 mg Oral BID    lidocaine 1 % injection  5 mL Intradermal Once    lidocaine PF  5 mL Intradermal Once    [Held by provider] clopidogrel  75 mg Oral Daily    ezetimibe  10 mg Oral Daily    gabapentin  800 mg Oral TID    hydroxychloroquine  400 mg Oral Daily    pantoprazole  40 mg Oral Daily    budesonide  0.5 mg Nebulization healed proximal fibula shaft fracture. 3. Diffuse vascular calcifications Signed by Dr Pantera Ham      XR KNEE RIGHT (1-2 VIEWS)  Result Date: 7/16/2021    1. Appropriate alignment of the right knee prosthesis with no acute osseous pathology. Small knee joint effusion with anterior soft tissue swelling. Signed by Dr Pantera Ham      XR CHEST PORTABLE  Result Date: 7/16/2021    Poor lung expansion and left lower lung discoid atelectasis. No active infiltrate or pulmonary congestion. Signed by Dr Everlena Mcardle:    Simpson Homans- negative     Culture, Blood 1 [[id number]] (Abnormal) Collected: 07/16/21 1256   Order Status: Completed Specimen: Blood Updated: 07/17/21 1339    Blood Culture, Routine --Abnormal      Bottle volume = 8 ml   Gram stain Aerobic bottle and Anarobic bottle   Gram positive cocci in clusters   resembling Staphylococcus   Culture in progress   Please notify Physician   Abnormal    Narrative:     ORDER#: [id number]                          ORDERED BY: Louis Suarez   SOURCE: Blood lac                          COLLECTED:  07/16/21 12:56   ANTIBIOTICS AT [place]JATA. :                      RECEIVED :  07/16/21 12:56        Culture, Blood 2 [[id number]] (Abnormal) Collected: 07/16/21 1237   Order Status: Completed Specimen: Blood Updated: 07/17/21 1340    Culture, Blood 2 --Abnormal      Bottle volume = 9 ml   Gram stain Aerobic bottle and Anaerobic bottle   Gram positive cocci in clusters   resembling Staphylococcus   Culture in progress   Please notify Physician   Abnormal    Narrative:     ORDER#: [id number]                          ORDERED BY: Louis Suarez   SOURCE: Blood rac                          COLLECTED:  07/16/21 12:37   ANTIBIOTICS AT [place]JATA. :                      RECEIVED :  07/16/21 12:56        Culture, Body Fluid [[id number]] (Abnormal) Collected: 07/16/21 1447   Order Status: Completed Specimen:  Body Fluid from Synovial Fluid Updated: 07/17/21 1300    Gram Stain Result Many WBC's (Polymorphonuclear)   Many Gram positive cocci  in pairs   Abnormal     Anaerobic Culture No anaerobes to date,will hold 4 days    Organism Staphylococcus aureusAbnormal     Body Fluid Culture, Sterile --    Moderate growth   Sensitivity to follow    Narrative:     ORDER#: [id number]                          ORDERED BY: SHANNON PIRES   SOURCE: Synovial Fluid Right Knee          COLLECTED:  07/16/21 14:47   ANTIBIOTICS AT [facility]JATA. :                      RECEIVED :  07/16/21 14:54         Assessment/Plan   Principal Problem:    Septic arthritis (HCC)  Active Problems:    S/P TAVR (transcatheter aortic valve replacement)    COPD (chronic obstructive pulmonary disease) (HCC)    Coronary artery disease involving native coronary artery of native heart without angina pectoris    Mixed hyperlipidemia    Essential hypertension  Resolved Problems:    * No resolved hospital problems.  *      Principal Problem:    Septic arthritis ([facility] [address]-               - Orthopedic surgery following     - Dr. Shameka Hernandez performed right knee explant antibiotic spacer on 7/17/21    - Blood culture returned positive for gram positive cocci in clusters resembling staphylococcus    - Synovial fluid returned positive for staphylococcus aureus    - ID was consulted whom recommended discontinuing vancomycin and cefepime and changed to Cefazolin    - Surveillance blood cultures ordered by ID for Sunday morning and Monday morning.     - Lactic 2.4 postop, trend                    Active Problems:    S/P TAVR (transcatheter aortic valve replacement)- noted, cardiology consultation for elevated troponin        COPD (chronic obstructive pulmonary disease) ([facility] [address]- noted, supplemental oxygen as needed, bronchodilators as needed       Coronary artery disease involving native coronary artery of native heart without angina pectoris- noted, denies chest pain at this time, continue home medications        Mixed hyperlipidemia- noted, continue home medications

## 2021-07-17 NOTE — CONSULTS
[facility]) Cardiology   Consult Note   Carey Mera       Requesting MD:  Rupert Villarreal MD   Admit Status:  Inpatient [101]       History obtained from:   [x] Patient  [] Other (specify):      Patient:  Avel Sorensen  [id number]     Chief Complaint:   Chief Complaint   Patient presents with    Knee Pain     Right Knee. seen at [facility] yesterday. HPI: Mr. Laura Ellsworth is a 76 y.o. male with a history of severe aortic valve stenosis status post TAVR 2 months ago, coronary artery disease with previous stents, hypertension hyperlipidemia, history of prior right knee replacement  Severe chronic smoking and heavy alcohol drinking    He was admitted to the hospital with right knee septic arthritis with severe pain joint swelling fever fatigue  Cardiology consulted for clearance prior to his right knee surgery    Review of Systems:  Review of Systems   Constitutional: Positive for chills, fatigue and fever. Respiratory: Positive for shortness of breath. Cardiovascular: Negative. Gastrointestinal: Negative. Endocrine: Negative. Genitourinary: Negative. Musculoskeletal: Positive for arthralgias and joint swelling. Symptom involving right knee joint   Skin: Negative. Neurological: Negative. Hematological: Negative. All other systems reviewed and are negative.       Cardiac Specific Data:  Specialty Problems        Cardiology Problems    Aortic valve stenosis        Acute on chronic combined systolic and diastolic congestive heart failure due to valvular disease (HCC)        Coronary atherosclerosis of native coronary artery        MI (myocardial infarction) ([facility] [address]        Coronary artery disease involving native coronary artery of native heart without angina pectoris        Essential hypertension        Mixed hyperlipidemia              Past Medical History:  Past Medical History:   Diagnosis Date    Acute on chronic combined systolic and diastolic congestive heart failure due to valvular disease ([facility] [address] 4/16/2021    Arthritis     Bronchitis     Cancer (HCC)     Skin Cancer REMOVED RT ARM & LT EAR    Chronic back pain     Chronic cholecystitis without calculus 5/19/2017    Chronic GERD     COPD (chronic obstructive pulmonary disease) (HCC)     Coronary artery disease of native artery of native heart with stable angina pectoris ([facility] [address]     Coronary atherosclerosis of native coronary artery     s/p PTCA and stent placement to the LAD and RCA/7 stents    DDD (degenerative disc disease), lumbar 12/4/2018    History of blood transfusion     WITH RT SHOULDER SURGERY    History of tobacco abuse 2010    Hyperlipidemia     Hypertension     MI (myocardial infarction) ([facility] [address]     Old, inferior wall    Moderate aortic regurgitation 08/14/2017    mod-severe AR.  Moderate aortic stenosis 08/14/2017    mod-severe aortic stenosis.  Pain management 2021    PT SEES PAIN MANAGEMENT FOR CHRONIC BACK PAIN    Spinal stenosis of lumbar region with neurogenic claudication 12/4/2018        Past Surgical History:  Past Surgical History:   Procedure Laterality Date    BACK SURGERY      s/p laminectomy    CARDIAC [facility]HETERIZATION  08/10/04 [facility]      CARDIAC [facility]HETERIZATION  12/10/03  [facility]    CARDIAC CATHETERIZATION  08/29/03 [facility]    CARDIAC CATHETERIZATION  02/16/01 [facility]    CARDIAC CATHETERIZATION  05/25/2009    EF is estimated to be 50%. See scanned document.     CARDIAC CATHETERIZATION N/A 03/2016    stent placement    CARDIAC CATHETERIZATION  08/2017    no PCI    CARDIAC CATHETERIZATION  07/11/2019    Drug-eluting stents placed to mid LAD and mid RCA, normal LV    CHOLECYSTECTOMY, LAPAROSCOPIC N/A 05/19/2017    CHOLECYSTECTOMY LAPAROSCOPIC performed by Amanda Brown MD at [address] [place] [address]  05/28/2015    Dr. Gabrielle Shelton: No Polyps   10yr recall    CORONARY ANGIOPLASTY WITH STENT PLACEMENT  03/2016    stent to LAD    JOINT REPLACEMENT      Left thumb    JOINT REPLACEMENT Left     knee    JOINT REPLACEMENT      KNEE SURGERY      s/p Rt.  knee replacement    LEG SURGERY Right     Broken leg with surgical repair    LUMBAR SPINE SURGERY N/A 12/04/2018    L1-5 LAMINECTOMY performed by Mary Al MD at [address] EGD TRANSORAL BIOPSY SINGLE/MULTIPLE N/A 05/18/2017    Dr Rosa Maria Love, Amelie (-), biliary colic, surgical referral    MO EGD TRANSORAL BIOPSY SINGLE/MULTIPLE N/A 11/29/2018    Dr JAIDEN Torres-w/dilation over wire-51 C[address]-Distal esophagitis, gastritis    REMOVE HARDWARE SPINE N/A 12/20/2018    I AND D LUMBAR INCISION SEROMA performed by Mary Al MD at [facility] Right 12/12/2019    RIGHT REVERSE TOTAL SHOULDER ARTHROPLASTY performed by Rosalva Tyler MD at [address] [place] [address] Dr ENDOSCOPY  03/30/2015    Dr. Cain Flower: amelie neg,,normal, empiric dilatation with 51F    UPPER GASTROINTESTINAL ENDOSCOPY N/A 11/29/2018    Dr JAIDEN Torres-w/dilation over wire-51 Cymraes-Distal esophagitis, gastritis       Past Family History:  Family History   Problem Relation Age of Onset    Cancer Mother     Lung Cancer Mother     Heart Disease Father     Cancer Father     Liver Cancer Father     Lung Cancer Father     Diabetes Maternal Grandmother     Heart Disease Maternal Grandfather     Cancer Maternal Grandfather     Stroke Paternal Grandmother     Stomach Cancer Sister     No Known Problems Brother     COPD Sister     Heart Disease Paternal Grandfather     Colon Cancer Neg Hx     Colon Polyps Neg Hx     Esophageal Cancer Neg Hx     Liver Disease Neg Hx     Rectal Cancer Neg Hx        Past Social History:  Social History     Socioeconomic History    Marital status:      Spouse name: Not on file    Number of children: Not on file    Years of education: Not on file    Highest education level: Not on file   Occupational History    Not on file   Tobacco Use    Smoking status: Former Smoker     Packs/day: 0.00     Types: Cigarettes     Quit date: 12/10/2009     Years since quittin.6    Smokeless tobacco: Never Used   Vaping Use    Vaping Use: Never used   Substance and Sexual Activity    Alcohol use: Yes     Alcohol/week: 42.0 standard drinks     Types: 42 Cans of beer per week     Comment: daily    Drug use: No    Sexual activity: Yes     Partners: Female   Other Topics Concern    Not on file   Social History Narrative    Not on file     Social Determinants of Health     Financial Resource Strain:     Difficulty of Paying Living Expenses:    Food Insecurity:     Worried About Running Out of Food in the Last Year:     [address] in the Last Year:    Transportation Needs:     Lack of Transportation (Medical):  Lack of Transportation (Non-Medical):    Physical Activity:     Days of Exercise per Week:     Minutes of Exercise per Session:    Stress:     Feeling of Stress :    Social Connections:     Frequency of Communication with Friends and Family:     Frequency of Social Gatherings with Friends and Family:     Attends Sabianism Services:     Active Member of Clubs or Organizations:     Attends Club or Organization Meetings:     Marital Status:    Intimate Partner Violence:     Fear of Current or Ex-Partner:     Emotionally Abused:     Physically Abused:     Sexually Abused: Allergies: Allergies   Allergen Reactions    Codeine Swelling     Lips swelling    Iv [Iodides] Swelling     Contrast dye used for heart cath. Caused face to swell.  Hydrocodone Swelling     lips swelling    Statins      Myalgias         Home Meds:  Prior to Admission medications    Medication Sig Start Date End Date Taking? Authorizing Provider   indomethacin (INDOCIN) 50 MG capsule TAKE 1 CAPSULE BY MOUTH EVERY DAY AS NEEDED 21   Historical Provider, MD   predniSONE (DELTASONE) 50 MG tablet 1 tablet by mouth 24, 12, and 1 hour before procedure.  6/3/21   Gay Deleon, SHIMON diphenhydrAMINE (BENADRYL) 50 MG tablet 1 Tablet by mouth to be taken with last prednisone dose 1 hour before procedure. 6/3/21   SHIMON Garrett   nitroGLYCERIN (NITROSTAT) 0.4 MG SL tablet Place 1 tablet under the tongue every 5 minutes as needed for Chest pain 1 tablet as needed 4/12/21   SHIMON Gonzalez   ezetimibe (ZETIA) 10 MG tablet TAKE 1 TABLET BY MOUTH EVERY DAY 4/9/21   SHIMON Gonzalez   aspirin 81 MG chewable tablet Take 1 tablet by mouth daily 4/9/21   Sandi Valles MD   clopidogrel (PLAVIX) 75 MG tablet Take 1 tablet by mouth daily 4/9/21   Sandi Valles MD   furosemide (LASIX) 20 MG tablet Take 1 tablet by mouth daily 4/9/21   Sandi Valles MD   Multiple Vitamin (MULTIVITAMIN) TABS tablet Take 1 tablet by mouth daily    Historical Provider, MD   pantoprazole (PROTONIX) 40 MG tablet TAKE 1 TABLET BY MOUTH DAILY TAKE DAILY FIRST THING IN THE MORNING ON AN EMPTY STOMACH. 12/21/20   SHIMON Acharya   hydroxychloroquine (PLAQUENIL) 200 MG tablet Take 400 mg by mouth daily  6/15/20   Historical Provider, MD   oxyCODONE-acetaminophen (PERCOCET) 7.5-325 MG per tablet Take 1 tablet by mouth every 4 hours as needed for Pain. Historical Provider, MD   gabapentin (NEURONTIN) 800 MG tablet Take 800 mg by mouth 3 times daily.   6/13/16   Historical Provider, MD       Current Meds:   lidocaine PF  5 mL Intradermal Once    lidocaine PF        cefepime  1,000 mg Intravenous Q12H    aspirin  81 mg Oral Daily    [Held by provider] clopidogrel  75 mg Oral Daily    ezetimibe  10 mg Oral Daily    gabapentin  800 mg Oral TID    hydroxychloroquine  400 mg Oral Daily    pantoprazole  40 mg Oral Daily    budesonide  0.5 mg Nebulization BID    Arformoterol Tartrate  15 mcg Nebulization BID    sodium chloride flush  5-40 mL Intravenous 2 times per day    thiamine  100 mg Intravenous Daily    multivitamin  1 tablet Oral Daily    folic acid  1 mg Oral Daily    vancomycin 750 mg Intravenous Q12H    vancomycin (VANCOCIN) intermittent dosing (placeholder)   Other RX Placeholder       Current Infused Meds:   sodium chloride 75 mL/hr at 07/16/21 1830    sodium chloride         Physical Exam:  Vitals:    07/16/21 1700   BP: 129/73   Pulse: 86   Resp: 17   Temp: 99.9 °F (37.7 °C)   SpO2: 94%     No intake or output data in the 24 hours ending 07/16/21 2116  Estimated body mass index is 26 kg/m² as calculated from the following:    Height as of this encounter: 5' 7\" (1.702 m). Weight as of this encounter: 166 lb (75.3 kg). Physical Exam    Labs:  Recent Labs     07/16/21  1237   WBC 9.5   HGB 13.6*   PLT 57*       Recent Labs     07/16/21  1237      K 3.8      CO2 22   BUN 19   CREATININE 0.8   LABGLOM >60   CALCIUM 9.1       CK, CKMB, Troponin:   Recent Labs     07/16/21  1237 07/16/21  1912   TROPONINI 0.05* 0.04*       Last 3 BNP:  No results for input(s): PROBNP in the last 72 hours. IMAGING:  Reviewed all the imaging      XR KNEE RIGHT (1-2 VIEWS)    Result Date: 7/16/2021  1. Appropriate alignment of the right knee prosthesis with no acute osseous pathology. Small knee joint effusion with anterior soft tissue swelling. Signed by Dr Richa Watt    XR CHEST PORTABLE    Result Date: 7/16/2021  Poor lung expansion and left lower lung discoid atelectasis. No active infiltrate or pulmonary congestion. Signed by Dr Erickson Mask and Plan:  1. Severe aortic valve stenosis status post TAVR -patient is hemodynamically stable cardiac standpoint reviewed his last echo showed normally functioning bioprosthetic valve with no evidence significant leak, currently patient is not in congestive heart failure without any shortness of breath  2.  Coronary artery disease with prior stents-patient stable from cardiac standpoint with no active chest pain, EKG showed no acute ischemic changes his troponin is borderline, at this point he is cleared to proceed with pulse surgery for right knee septic arthritis with bacteremia with methicillin susceptible staph aureus, patient is at risk for infective endocarditis    Patient need to be on antibiotics after the surgery to avoid risk of TAVR valve infection with endocarditis  Recommend ID consultation    Discussed the plan of care with the patient, his wife and his nurse    He will follow up with me in the office after 4 weeks in the valve clinic      Thank you for the consult, we appreciate the opportunity to provide care to your patients. Feel free to contact me if I can be of any further assistance.       Electronically signed by Keyla Martines MD on 7/16/2021 at 9:16 PM    Keyla Martines MD, [facility]  Interventional Cardiologist, Endovascular Specialist   Medical Director, Sara Riojas

## 2021-07-17 NOTE — CONSULTS
Orthopaedic Inpatient Consultation    NAME:  Keenan Todd   : 1955  MRN: [id number]    2021 11:14 AM        CHIEF COMPLAINT:  right knee pain and fever      HISTORY OF PRESENT ILLNESS:   The patient is a 76 y.o. male who presents with the above complaint the past 3 days. He had his right knee replaced in  by Dr. Shakira Magaña. He had no immediate postoperative complications. Over the years he has had off-and-on pain and swelling in that knee. He has had the knee aspirated a few times and it showed pseudogout. 3 days ago he started having increased pain and swelling in the right knee with a temperature of 103.4 at home. He was seen in the ER today and the knee aspirated and shows 19,000 white cells and gram-positive cocci on Gram stain. His CRP is elevated at 34. He had a TAVR procedure done twice during April of this year here at [facility].  There were no reported complications. He does remember being on antibiotics in the recent past but does not remember the reason. He has had his left knee replaced but it is not bothering him. His right shoulder is been replaced and it has been bothering him since the TAVR procedure.   Past Medical History:        Diagnosis Date    Acute on chronic combined systolic and diastolic congestive heart failure due to valvular disease ([address] 2021    Arthritis     Bronchitis     Cancer (HCC)     Skin Cancer REMOVED RT ARM & LT EAR    Chronic back pain     Chronic cholecystitis without calculus 2017    Chronic GERD     COPD [facility]ch[facility] obstructive [facility] disease) ([facility]     Coronary artery disease of native artery of native heart with stable angina pectoris ([address]     Coronary atherosclerosis of native coronary artery     s/p PTCA and stent placement to the LAD and RCA/7 stents    DDD (degenerative disc disease), lumbar 2018    History of blood transfusion     WITH RT SHOULDER SURGERY    History of tobacco abuse     Hyperlipidemia     Hypertension     MI (myocardial infarction) [facility]     Old, inferior wall    Moderate aortic regurgitation 08/14/2017    mod-severe AR.  Moderate aortic stenosis 08/14/2017    mod-severe aortic stenosis.  Pain management 2021    PT SEES PAIN MANAGEMENT FOR CHRONIC BACK PAIN    Spinal stenosis of lumbar region with neurogenic claudication 12/4/2018       Past Surgical History:        Procedure Laterality Date    BACK SURGERY      s/p laminectomy    [facility]ARD[facility]AC [facility]HETERIZATION  08/10/04 [facility]      CARDIAC [facility]HETERIZATION  12/10/03  [facility]    CARDIAC CATHETERIZATION  08/29/03 [facility]    CARDIAC CATHETERIZATION  02/16/01 [facility]    CARDIAC CATHETERIZATION  05/25/2009    EF is estimated to be 50%. See scanned document.  CARDIAC CATHETERIZATION N/A 03/2016    stent placement    CARDIAC CATHETERIZATION  08/2017    no PCI    CARDIAC CATHETERIZATION  07/11/2019    Drug-eluting stents placed to mid LAD and mid RCA, normal LV    CHOLECYSTECTOMY, LAPAROSCOPIC N/A 05/19/2017    CHOLECYSTECTOMY LAPAROSCOPIC performed by Yaw Vera MD at [facility]  05/28/2015    Dr. Danny Stringer: No Polyps   10yr recall    CORONARY ANGIOPLASTY WITH STENT PLACEMENT  03/2016    stent to LAD    JOINT REPLACEMENT      Left thumb    JOINT REPLACEMENT Left     knee    JOINT REPLACEMENT      KNEE SURGERY      s/p Rt.  knee replacement    LEG SURGERY Right     Broken leg with surgical repair    LUMBAR SPINE SURGERY N/A 12/04/2018    L1-5 LAMINECTOMY performed by Lila Mayo MD at [address] EGD TRANSORAL BIOPSY SINGLE/MULTIPLE N/A 05/18/2017    Dr Mary Cronin, Amelie (-), biliary colic, surgical referral    [facility] TRANSORAL [facility]SY [facility]INGLE/MULTIPLE N/A 11/29/2018    Dr JAIDEN Torres-w/dilation over wire-51 Syriac-Distal esophagitis, gastritis    REMOVE HARDWARE SPINE N/A 12/20/2018    I AND D LUMBAR INCISION SEROMA performed by Lila Mayo MD at [address] [place] [address] 12/12/2019    RIGHT REVERSE TOTAL SHOULDER ARTHROPLASTY performed by Richy Solomon MD at [address] [place] [address] Dr ENDOSCOPY  03/30/2015    Dr. José Miguel Cheung: rukhsana neg,,normal, empiric dilatation with 51F    UPPER GASTROINTESTINAL ENDOSCOPY N/A 11/29/2018    Dr JAIDEN Torres-w/dilation over wire-51 French-Distal esophagitis, gastritis       Current Medications:   Prior to Admission medications    Medication Sig Start Date End Date Taking? Authorizing Provider   indomethacin (INDOCIN) 50 MG capsule TAKE 1 CAPSULE BY MOUTH EVERY DAY AS NEEDED 6/2/21   Historical Provider, MD   predniSONE (DELTASONE) 50 MG tablet 1 tablet by mouth 24, 12, and 1 hour before procedure. 6/3/21   SHIMON Mcleod   diphenhydrAMINE (BENADRYL) 50 MG tablet 1 Tablet by mouth to be taken with last prednisone dose 1 hour before procedure. 6/3/21   SHIMON Mcleod   nitroGLYCERIN (NITROSTAT) 0.4 MG SL tablet Place 1 tablet under the tongue every 5 minutes as needed for Chest pain 1 tablet as needed 4/12/21   Verner Lyons, APRN   ezetimibe (ZETIA) 10 MG tablet TAKE 1 TABLET BY MOUTH EVERY DAY 4/9/21   Verner Lyons, APRN   aspirin 81 MG chewable tablet Take 1 tablet by mouth daily 4/9/21   Carroll Alba MD   clopidogrel (PLAVIX) 75 MG tablet Take 1 tablet by mouth daily 4/9/21   Carroll Alba MD   furosemide (LASIX) 20 MG tablet Take 1 tablet by mouth daily 4/9/21   Carroll Alba MD   Multiple Vitamin (MULTIVITAMIN) TABS tablet Take 1 tablet by mouth daily    Historical Provider, MD   pantoprazole (PROTONIX) 40 MG tablet TAKE 1 TABLET BY MOUTH DAILY TAKE DAILY FIRST THING IN THE MORNING ON AN EMPTY STOMACH. 12/21/20   SHIMON Loyd   hydroxychloroquine (PLAQUENIL) 200 MG tablet Take 400 mg by mouth daily  6/15/20   Historical Provider, MD   oxyCODONE-acetaminophen (PERCOCET) 7.5-325 MG per tablet Take 1 tablet by mouth every 4 hours as needed for Pain.     Historical Provider, MD   gabapentin (NEURONTIN) 800 MG tablet Take 800 mg by mouth 3 times daily. 16   Historical Provider, MD       Allergies:  Codeine, Iv [iodides], Hydrocodone, and Statins    Social History:   Social History     Socioeconomic History    Marital status:      Spouse name: Not on file    Number of children: Not on file    Years of education: Not on file    Highest education level: Not on file   Occupational History    Not on file   Tobacco Use    Smoking status: Former Smoker     Packs/day: 0.00     Types: Cigarettes     Quit date: 12/10/2009     Years since quittin.6    Smokeless tobacco: Never Used   Vaping Use    Vaping Use: Never used   Substance and Sexual Activity    Alcohol use: Yes     Alcohol/week: 42.0 standard drinks     Types: 42 Cans of beer per week     Comment: daily    Drug use: No    Sexual activity: Yes     Partners: Female   Other Topics Concern    Not on file   Social History Narrative    Not on file     Social Determinants of Health     Financial Resource Strain:     Difficulty of Paying Living Expenses:    Food Insecurity:     Worried About Running Out of Food in the Last Year:     [address] in the Last Year:    Transportation Needs:     Lack of Transportation (Medical):      Lack of Transportation (Non-Medical):    Physical Activity:     Days of Exercise per Week:     Minutes of Exercise per Session:    Stress:     Feeling of Stress :    Social Connections:     Frequency of Communication with Friends and Family:     Frequency of Social Gatherings with Friends and Family:     Attends Jain Services:     Active Member of Clubs or Organizations:     Attends Club or Organization Meetings:     Marital Status:    Intimate Partner Violence:     Fear of Current or Ex-Partner:     Emotionally Abused:     Physically Abused:     Sexually Abused:        Family History:   Family History   Problem Relation Age of Onset    Cancer Mother    Maura Messina Mother     Heart Disease Father     Cancer Father     Liver Cancer Father     Lung Cancer Father     Diabetes Maternal Grandmother     Heart Disease Maternal Grandfather     Cancer Maternal Grandfather     Stroke Paternal Grandmother     Stomach Cancer Sister     No Known Problems Brother     COPD Sister     Heart Disease Paternal Grandfather     Colon Cancer Neg Hx     Colon Polyps Neg Hx     Esophageal Cancer Neg Hx     Liver Disease Neg Hx     Rectal Cancer Neg Hx        REVIEW OF SYSTEMS:  14 point review of systems has been reviewed from the patient's emergency room visit, reviewed with the patient on today's date with no new changes. PHYSICAL EXAM:      Physical Examination:  Vitals:   Vitals:    07/16/21 1123 07/16/21 1524 07/16/21 1700   BP: (!) 106/52  129/73   Pulse: 77  86   Resp:  24 17   Temp: 97.9 °F (36.6 °C)  99.9 °F (37.7 °C)   TempSrc:   Temporal   SpO2:  93% 94%   Weight: 166 lb (75.3 kg)     Height: 5' 7\" (1.702 m)       General:  Appears stated age, no distress. Orientation:  Alert and oriented to time, place, and person. Mood and Affect:  Cooperative and pleasant. Gait:  Resting comfortably in bed. Cardiovascular:  Symmetric 1-2 plus pulses in upper and lower extremities. Lymph:  No cervical or inguinal lymphadenopathy noted. Sensation:  Grossly intact to light touch. DTR:  Normal, no pathologic reflexes. Coordination/balance:  Normal    Musculoskeletal:  Right upper extremity exam:  There is no tenderness to palpation about the shoulder, elbow, wrist or hand. Range of motion normal except for a very stiff right shoulder. His incision is well-healed. No tenderness about the shoulder. .  5/5 strength, normal sensation, good radial pulse and skin is normal.      Left upper extremity exam:  There is no tenderness to palpation about the shoulder, elbow, wrist or hand.  Range of motion normal .   5/5 strength, normal sensation, good radial pulse and skin is normal.     Right lower extremity exam: Well-healed curvilinear incision about his right knee. There are anterior tibial and ankle scars from an intramedullary lela. There is  tenderness to palpation about the  knee, but not the hip, ankle or foot. Range of motion very limited range of motion of his right knee with exquisite pain. Severe tenderness to palpation about his right knee. Large amount of swelling right knee. Knee is warm. .  5/5 strength, normal sensation, good dorsalis pedis pulse  and skin is normal.     Left lower extremity exam: Well-healed anterior left knee incision without effusion. There is no tenderness to palpation about the hip, knee, ankle or foot.  Range of motion normal .   5/5 strength normal sensation, good dorsalis pedis pulse and skin is normal.      DATA:    CBC with Differential:    Lab Results   Component Value Date    WBC 9.5 07/16/2021    RBC 4.36 07/16/2021    HGB 13.6 07/16/2021    HCT 41.4 07/16/2021    PLT 57 07/16/2021    MCV 95.0 07/16/2021    MCH 31.2 07/16/2021    MCHC 32.9 07/16/2021    RDW 14.2 07/16/2021    BANDSPCT 39 07/16/2021    LYMPHOPCT 1.0 07/16/2021    MONOPCT 5.0 07/16/2021    BASOPCT 0.0 07/16/2021    MONOSABS 0.50 07/16/2021    LYMPHSABS 0.3 07/16/2021    EOSABS 0.00 07/16/2021    BASOSABS 0.00 07/16/2021     CMP:    Lab Results   Component Value Date     07/16/2021    K 3.8 07/16/2021     07/16/2021    CO2 22 07/16/2021    BUN 19 07/16/2021    CREATININE 0.8 07/16/2021    GFRAA >59 07/16/2021    LABGLOM >60 07/16/2021    GLUCOSE 97 07/16/2021    PROT 6.7 07/16/2021    CALCIUM 9.1 07/16/2021    BILITOT 0.4 07/16/2021    ALKPHOS 91 07/16/2021    AST 63 07/16/2021    ALT 33 07/16/2021     BMP:    Lab Results   Component Value Date     07/16/2021    K 3.8 07/16/2021     07/16/2021    CO2 22 07/16/2021    BUN 19 07/16/2021    CREATININE 0.8 07/16/2021    CALCIUM 9.1 07/16/2021    GFRAA >59 07/16/2021    LABGLOM >60 07/16/2021    GLUCOSE 97 07/16/2021 Radiology: XR KNEE RIGHT (1-2 VIEWS)    Result Date: 7/16/2021  History: Swelling and pain with fever Right knee: 2 views right knee are obtained. COMPARISON: 6/18/2019 FINDINGS: There is anterior soft tissue swelling with a small knee joint effusion. Alignment of the prosthesis is preserved. No evidence of hardware loosening. Similar remodeling along the femoral trochlea. Bony spurring of the proximal tibia. No bony fracture or dislocation. Chronic cortical thickening of the proximal fibula shaft Diffuse gastric calcification. 1. Appropriate alignment of the right knee prosthesis with no acute osseous pathology. Small knee joint effusion with anterior soft tissue swelling. Signed by Dr Sid Horan there is a large radiolucent line under the anterior femoral flange. This is a [employer] L[employer] mobile-bearing knee with metal-backed patella. XR CHEST PORTABLE    Result Date: 7/16/2021  Examination. XR CHEST PORTABLE 7/16/2021 12:19 AM History: Fever. A single frontal portable upright view of the chest is compared with the previous study dated 5/19/2021. The lungs are poorly inflated. The linear parenchymal density in the left lower lung are similar to the previous study and represent discoid atelectasis. There is no evidence of recent infiltrate, pleural effusion, pulmonary congestion or pneumothorax. Heart size is not optimally evaluated due to the portable projection. There is no acute bony abnormality. The right shoulder arthroplasty is incompletely visualized and not noted. Poor lung expansion and left lower lung discoid atelectasis. No active infiltrate or pulmonary congestion. Signed by Dr Donna Valdez:   Prosthetic infection right knee joint. This is likely subacute with acute exacerbation. Plan: Option of I&D of right knee with retention of implants with 50% chance of success. Better option is two-stage reimplantation with an antibiotic spacer knee.   Upwards of 80 to 90%

## 2021-07-17 NOTE — ANESTHESIA POSTPROCEDURE EVALUATION
Department of Anesthesiology  Postprocedure Note    Patient: Michael Mcguire  MRN: [id number]  YOB: 1955  Date of evaluation: 7/17/2021  Time:  2:49 PM     Procedure Summary     Date: 07/17/21 Room / Location: [address]    Anesthesia Start: 1111 Anesthesia Stop: 1449    Procedure: KNEE TOTAL ARTHROPLASTY REVISION (Right Knee) Diagnosis: (right knee infection)    Surgeons: Isabel Suh MD Responsible Provider: SHIMON Piper CRNA    Anesthesia Type: general ASA Status: 4          Anesthesia Type: general    Danna Phase I:      Danna Phase II:      Last vitals: Reviewed and per EMR flowsheets.        Anesthesia Post Evaluation

## 2021-07-17 NOTE — PROGRESS NOTES
Subjective:     No complaints. Right shoulder hurts some as well as right knee    Objective:     Patient Vitals for the past 24 hrs:   BP Temp Temp src Pulse Resp SpO2 Height Weight   07/17/21 0739 111/66 98.3 °F (36.8 °C) Temporal 94 18 100 %     07/17/21 0338 123/66 98.9 °F (37.2 °C) Temporal 82 17 97 %     07/16/21 2149 129/68 97.9 °F (36.6 °C) Temporal 84 17 97 %     07/16/21 1700 129/73 99.9 °F (37.7 °C) Temporal 86 17 94 %     07/16/21 1524     24 93 %     07/16/21 1123 (!) 106/52 97.9 °F (36.6 °C)  77   5' 7\" (1.702 m) 166 lb (75.3 kg)       General: Alert cooperative   Wound: na   Neurovascular: Exam normal   DVT Exam: No evidence of DVT    Right shoulder painful with internal/external rotation. No swelling incision well-healed no redness. Data Review:  Recent Labs     07/16/21  1237 07/17/21  0408   HGB 13.6* 12.1*     Recent Labs     07/16/21  1237 07/16/21  1237 07/17/21  0408      < > 137   K 3.8   < > 3.2*   CREATININE 0.8   < > 0.8   GLUCOSE 97   < > 99   INR 1.11  --   --     < > = values in this interval not displayed. No results for input(s): POCGLU in the last 72 hours. XR CHEST PORTABLE   Final Result   Poor lung expansion and left lower lung discoid   atelectasis. No active infiltrate or pulmonary congestion. Signed by Dr Moises Sherwood (1-2 VIEWS)   Final Result   1. Appropriate alignment of the right knee prosthesis with no acute   osseous pathology. Small knee joint effusion with anterior soft tissue   swelling. Signed by Dr Pamela Kim          Assessment:     Infected right total Knee Arthroplasty. Malnourished. History of EtOH. Peripheral vascular disease. Severe heart disease. Plan:   Await cardiology consult and proceed with explant right knee arthroplasty and placement of antibiotic spacer.   Pain control  Tight blood glucose control

## 2021-07-17 NOTE — PROGRESS NOTES
Rounded with Dr. Preethi Lomas, he stated patient had cardiac clearance for surgery today with Dr. Gilbert Garcia. He stated he would be putting in a note soon.    Electronically signed by Magan Piper RN on 7/17/2021 at 10:23 AM     Electronically signed by Elizabeth Melchor RN on 7/17/2021 at 10:28 AM

## 2021-07-17 NOTE — PROGRESS NOTES
Pharmacy Note  Vancomycin Consult    Khanh Cherry is a 76 y.o. male started on Vancomycin for bone and joint infection; consult received from Dr. Trey Govea to manage therapy. Also receiving the following antibiotics: cefepime. Principal Problem:    Septic arthritis ([address]  Active Problems:    S/P TAVR (transcatheter aortic valve replacement)    COPD (chronic obstructive pulmonary disease) (HCC)    Coronary artery disease involving native coronary artery of native heart without angina pectoris    Mixed hyperlipidemia    Essential hypertension  Resolved Problems:    * No resolved hospital problems. *      Allergies:  Codeine, Iv [iodides], Hydrocodone, and Statins     Temp max: 99.9    Recent Labs     07/16/21  1237   BUN 19       Recent Labs     07/16/21  1237   CREATININE 0.8       Recent Labs     07/16/21  1237   WBC 9.5       No intake or output data in the 24 hours ending 07/16/21 1939    Culture Date Source Results   07/16/21 Body fluid Gm + cocci in pairs   07/16/21 blood collected            Ht Readings from Last 1 Encounters:   07/16/21 5' 7\" (1.702 m)        Wt Readings from Last 1 Encounters:   07/16/21 166 lb (75.3 kg)         Body mass index is 26 kg/m². Estimated Creatinine Clearance: 85 mL/min (based on SCr of 0.8 mg/dL). Assessment/Plan:  Will initiate vancomycin 1750  mg load then 750 mg IV every 12 hours. Timing of trough level will be determined based on culture results, renal function, and clinical response. Thank you for the consult. Will continue to follow.     Electronically signed by José Davis, [address] [place] [address] on 7/16/2021 at 7:39 PM

## 2021-07-17 NOTE — CONSULTS
disease of native artery of native heart with stable angina pectoris ([facility] [address]     Coronary atherosclerosis of native coronary artery     s/p PTCA and stent placement to the LAD and RCA/7 stents    DDD (degenerative disc disease), lumbar 12/4/2018    History of blood transfusion     WITH RT SHOULDER SURGERY    History of tobacco abuse 2010    Hyperlipidemia     Hypertension     MI (myocardial infarction) ([facility] [address]     Old, inferior wall    Moderate aortic regurgitation 08/14/2017    mod-severe AR.  Moderate aortic stenosis 08/14/2017    mod-severe aortic stenosis.  Pain management 2021    PT SEES PAIN MANAGEMENT FOR CHRONIC BACK PAIN    Spinal stenosis of lumbar region with neurogenic claudication 12/4/2018       Past Surgical History:   Procedure Laterality Date    BACK SURGERY      s/p laminectomy    [facility]HETER[facility]  08/10/04 [facility]      CARDIAC [facility]  12/10/03  [facility]    CARDIAC CATHETERIZATION  08/29/03 [facility]    CARDIAC CATHETERIZATION  02/16/01 [facility]    CARDIAC CATHETERIZATION  05/25/2009    EF is estimated to be 50%. See scanned document.  CARDIAC CATHETERIZATION N/A 03/2016    stent placement    CARDIAC CATHETERIZATION  08/2017    no PCI    CARDIAC CATHETERIZATION  07/11/2019    Drug-eluting stents placed to mid LAD and mid RCA, normal LV    CHOLECYSTECTOMY, LAPAROSCOPIC N/A 05/19/2017    CHOLECYSTECTOMY LAPAROSCOPIC performed by Gary Foreman MD at [facility]  05/28/2015    Dr. Yann Boyer: No Polyps   10yr recall    CORONARY ANGIOPLASTY WITH STENT PLACEMENT  03/2016    stent to LAD    JOINT REPLACEMENT      Left thumb    JOINT REPLACEMENT Left     knee    JOINT REPLACEMENT      KNEE SURGERY      s/p Rt.  knee replacement    LEG SURGERY Right     Broken leg with surgical repair    LUMBAR SPINE SURGERY N/A 12/04/2018    L1-5 LAMINECTOMY performed by Ayla Fisher MD at [address] EGD TRANSORAL BIOPSY SINGLE/MULTIPLE N/A 05/18/2017 Dr Diana Butler, Amelie (-), biliary colic, surgical referral    MA EGD TRANSORAL BIOPSY SINGLE/MULTIPLE N/A 11/29/2018    Dr JAIDEN Torres-w/dilation over wire-51 French-Distal esophagitis, gastritis    REMOVE HARDWARE SPINE N/A 12/20/2018    I AND D LUMBAR INCISION SEROMA performed by Lavonne Pichardo MD at [address] Right 12/12/2019    RIGHT REVERSE TOTAL SHOULDER ARTHROPLASTY performed by Richy Solomon MD at [address] [place] [address] Dr ENDOSCOPY  03/30/2015    Dr. José Miguel Cheung: amelie neg,,normal, empiric dilatation with 51F    UPPER GASTROINTESTINAL ENDOSCOPY N/A 11/29/2018    Dr JADIEN Cantu/dilation over wire-51 French-Distal esophagitis, gastritis         MEDICATIONS :       Scheduled Meds:   acetaminophen  1,000 mg Oral Once    celecoxib  100 mg Oral Once    tranexamic acid  1,950 mg Oral On Call to OR    ceFAZolin (ANCEF) IVPB  2,000 mg Intravenous On Call to OR    lidocaine PF  5 mL Intradermal Once    cefepime  1,000 mg Intravenous Q12H    aspirin  81 mg Oral Daily    [Held by provider] clopidogrel  75 mg Oral Daily    ezetimibe  10 mg Oral Daily    gabapentin  800 mg Oral TID    hydroxychloroquine  400 mg Oral Daily    pantoprazole  40 mg Oral Daily    budesonide  0.5 mg Nebulization BID    Arformoterol Tartrate  15 mcg Nebulization BID    sodium chloride flush  5-40 mL Intravenous 2 times per day    thiamine  100 mg Intravenous Daily    multivitamin  1 tablet Oral Daily    folic acid  1 mg Oral Daily    vancomycin  750 mg Intravenous Q12H    vancomycin (VANCOCIN) intermittent dosing (placeholder)   Other RX Placeholder     Continuous Infusions:   lactated ringers 125 mL/hr at 07/17/21 0758    sodium chloride 75 mL/hr at 07/16/21 1830    sodium chloride       PRN Meds:ondansetron **OR** ondansetron, polyethylene glycol, acetaminophen **OR** acetaminophen, magnesium sulfate, potassium chloride **OR** potassium alternative oral replacement **OR** potassium chloride, oxyCODONE-acetaminophen, albuterol, morphine, sodium chloride flush, sodium chloride, LORazepam **OR** LORazepam **OR** LORazepam **OR** LORazepam **OR** LORazepam **OR** LORazepam **OR** LORazepam **OR** LORazepam    ALLERGIES:      Codeine, Iv [iodides], Hydrocodone, and Statins      SOCIAL HISTORY:     TOBACCO:   reports that he quit smoking about 11 years ago. His smoking use included cigarettes. He smoked 0.00 packs per day. He has never used smokeless tobacco.     ETOH:   reports current alcohol use of about 42.0 standard drinks of alcohol per week. Patient currently lives with wife    FAMILY HISTORY:         Problem Relation Age of Onset    Cancer Mother     Lung Cancer Mother     Heart Disease Father     Cancer Father     Liver Cancer Father     Lung Cancer Father     Diabetes Maternal Grandmother     Heart Disease Maternal Grandfather     Cancer Maternal Grandfather     Stroke Paternal Grandmother     Stomach Cancer Sister     No Known Problems Brother     COPD Sister     Heart Disease Paternal Grandfather     Colon Cancer Neg Hx     Colon Polyps Neg Hx     Esophageal Cancer Neg Hx     Liver Disease Neg Hx     Rectal Cancer Neg Hx        REVIEW OF SYSTEMS:     Constitutional: Fever and fatigue  Ears: hearing difficulty  Mouth/throat: no dysphagia  Lungs: no cough  no shortness of breath  CVS: no palpitation, no chest pain  GI:  no diarrhea  BRUCE: no dysuria  Musculoskeletal: Right knee pain and swelling. Right shoulder pain  Neuro: no headache  Endocrine: no polyuria, polydipsia  Hematology: easy brusing   Dermatology: See HPI.   Patient has multiple skin lesions and skin cancers and preskin cancers        PHYSICAL EXAM:     /66   Pulse 94   Temp 98.3 °F (36.8 °C) (Temporal)   Resp 18   Ht 5' 7\" (1.702 m)   Wt 166 lb (75.3 kg)   SpO2 100%   BMI 26.00 kg/m²  Temp (24hrs), Av.6 °F (37 °C), Min:97.9 °F (36.6 °C), Max:99.9 °F (37.7 °C)    General:  Awake, alert, not in distress, lying in bed with his wife and another family member at bedside. Cecille DELEON: pink conjunctiva, anicteric sclera, patient placed mask over nose and mouth  Respiratory: Effort even and unlabored. He was not conversationally dyspneic  Heart: distant Y4-I6.  2/6 systolic murmur heard best in the upper sternal borders  Abdomen: Soft, bowel sounds positive  Extremities: Right knee with effusion, increased warmth. Right shoulder well-healed anterior shoulder scar. No effusion appreciated. Patient has very limited range of motion. Skin: Patient appears very tanned throughout and actually sunburned greatest on his arms chest and face. He has multiple scattered areas of eschar anteriorly. PSYCH: alert, pleasant and cooperative.       LABS:     CBC with DIFF:   Recent Labs     07/16/21 1237 07/17/21  0408   WBC 9.5 7.4   RBC 4.36* 3.93*   HGB 13.6* 12.1*   HCT 41.4* 36.4*   MCV 95.0* 92.6   MCH 31.2* 30.8   MCHC 32.9* 33.2   RDW 14.2 14.1   PLT 57* 46*   MPV 11.6 12.7*   NEUTOPHILPCT 53.0 82.0*   LYMPHOPCT 1.0* 4.0*   MONOPCT 5.0 2.0   EOSRELPCT 0.0 0.0   BASOPCT 0.0 0.0   NEUTROABS 8.7* 6.9   LYMPHSABS 0.3* 0.4*   MONOSABS 0.50 0.10   EOSABS 0.00 0.00   BASOSABS 0.00 0.00       CMP/BMP:  Recent Labs     07/16/21  1237 07/17/21  0408    137   K 3.8 3.2*    103   CO2 22 26   ANIONGAP 13 8   GLUCOSE 97 99   BUN 19 18   CREATININE 0.8 0.8   LABGLOM >60 >60   CALCIUM 9.1 8.5*   PROT 6.7 5.1*   LABALBU 3.5 2.9*   BILITOT 0.4 0.4   ALKPHOS 91 85   ALT 33 25   AST 63* 48*          Culture:   Recent Labs     07/16/21  1237 07/16/21  1256   BC  --   Bottle volume = 8 ml  Gram stain Aerobic bottle:  Gram positive cocci in clusters  resembling Staphylococcus  Culture in progress  Please notify Physician  *   BLOODCULT2  Bottle volume = 9 ml  Gram stain Aerobic bottle:  Gram positive cocci in clusters  resembling Staphylococcus  Culture in progress  Please notify Physician  *  --      Staphylococcus aureus by PCR DETECTEDPanic   Not Detected Final 07/16/2021 12:56 PM [facility]   Staphylococcus epidermidis by PCR Not Detected  Not Detected Final 07/16/2021 12:56 PM [facility]   Staphylococcus lugdunensis by PCR Not Detected  Not Detected Final 07/16/2021 12:56 PM [facility]   Staphylococcus species by PCR DETECTEDPanic   Not Detected Final 07/16/2021 12:56 PM [facility]   Serratia marcescens by PCR Not Detected  Not Detected Final 07/16/2021 12:56 PM [facility]   Streptococcus pneumoniae by PCR Not Detected  Not Detected Final 07/16/2021 12:56 PM [facility]   Streptococcus pyogenes  by PCR Not Detected  Not Detected Final 07/16/2021 12:56 PM [facility]   Streptococcus species by PCR Not Detected  Not Detected Final 07/16/2021 12:56 PM [facility]   Stenotrophomonas maltophilia by PCR Not Detected  Not Detected Final 07/16/2021 12:56 PM [facility]   Candida albicans by PCR Not Detected  Not Detected Final 07/16/2021 12:56 PM [facility]   Candida auris by PCR Not Detected  Not Detected Final 07/16/2021 12:56 PM [facility]   Candida glabrata by PCR Not Detected  Not Detected Final 07/16/2021 12:56 PM [facility]   Candida krusei by PCR Not Detected  Not Detected Final 07/16/2021 12:56 PM [facility]   Candida parapsilosis by PCR Not Detected  Not Detected Final 07/16/2021 12:56 PM [facility]   Candida tropicalis by PCR Not Detected  Not Detected Final 07/16/2021 12:56 PM [facility]   Cryptococcus neoformans/gattii by PCR Not Detected  Not Detected Final 07/16/2021 12:56 PM [facility]   Methicillin Resistance mecA/C and MREJ by PCR Not Detected  Not Detected Final 07/16/2021 12:56  [facility] Performed By    Haroon Flowers Name Director Address Valid Date Range [address] [place] [address] [facility]   [address] [place] [address] 08/30/17 0733-Present   Narrative  Performed by: [facility] tel. [phone],   Microbiology results called to and read back by Clarence Heaton RN on   5th, 07/17/2021 04:13, by William Kramer [phone],   Microbiology results called to and read back by Yamile Bazan RN on   5th, 07/17/2021 05:30, by [facility]   Microbiology results called to and read back by Yamile Bazan RN on   5th, 07/17/2021 05:30, by [facility]   Lab and Collection    Blood ID, Molecular - 7/16/2021  Result History    Blood ID, Molecular on 7/17/2021   Result Information    Flag: CriticalPanic   Status: Final result (Collected: 7/16/2021 12:56) Provider Status: Ordered   Routing History    Priority Sent On From To Message Type    7/17/2021  4:38 AM [facility] Incoming Lab Results From Minesh Duomnt MD CC'd Results    7/16/2021  7:41 PM [facility] Incoming Lab Results From Santy Phan MD CC'd Results   Click to Print Result   View SmartLink Info    Blood ID, Molecular (Order #[id number]) on 7/16/21   Order Report              IMAGING:   XR KNEE RIGHT (1-2 VIEWS)    Result Date: 7/16/2021  History: Swelling and pain with fever Right knee: 2 views right knee are obtained. COMPARISON: 6/18/2019 FINDINGS: There is anterior soft tissue swelling with a small knee joint effusion. Alignment of the prosthesis is preserved. No evidence of hardware loosening. Similar remodeling along the femoral trochlea. Bony spurring of the proximal tibia. No bony fracture or dislocation. Chronic cortical thickening of the proximal fibula shaft Diffuse gastric calcification. 1. Appropriate alignment of the right knee prosthesis with no acute osseous pathology. Small knee joint effusion with anterior soft tissue swelling.  Signed by Dr Jeff Canela    XR CHEST PORTABLE    Result Date: 7/16/2021  Examination. XR CHEST PORTABLE 7/16/2021 12:19 AM History: Fever. A single frontal portable upright view of the chest is compared with the previous study dated 5/19/2021. The lungs are poorly inflated. The linear parenchymal density in the left lower lung are similar to the previous study and represent discoid atelectasis. There is no evidence of recent infiltrate, pleural effusion, pulmonary congestion or pneumothorax. Heart size is not optimally evaluated due to the portable projection. There is no acute bony abnormality. The right shoulder arthroplasty is incompletely visualized and not noted. Poor lung expansion and left lower lung discoid atelectasis. No active infiltrate or pulmonary congestion.  Signed by Dr Tania Ferrara     Patient Active Problem List   Diagnosis    Coronary atherosclerosis of native coronary artery    MI (myocardial infarction) ([address]    COPD (chronic obstructive pulmonary disease) (HCC)    Bronchitis    Leg pain    History of tobacco abuse    Hoarseness, chronic    Chronic heartburn    Hiatal hernia    Coronary artery disease involving native coronary artery of native heart without angina pectoris    Mixed hyperlipidemia    Essential hypertension    S/P coronary artery stent placement    SOB (shortness of breath)    Right upper quadrant abdominal pain    Acalculous cholecystitis    Aortic valve stenosis    Arthritis of knee    Gastroesophageal reflux disease    Gout    Neuropathy    Rheumatoid arthritis ([address]    Alternating constipation and diarrhea    DDD (degenerative disc disease), lumbar    Spinal stenosis of lumbar region with neurogenic claudication    Lumbar stenosis with neurogenic claudication    Abnormal myocardial perfusion study    Osteoarthritis of both shoulders    Renal cyst, left    Complex renal cyst    Hypertrophy of prostate with urinary obstruction    Current use of proton pump inhibitor    S/P TAVR (transcatheter aortic valve replacement)    Acute on chronic combined systolic and diastolic congestive heart failure due to valvular disease (HCC)    Fever and chills    Dysuria    Suspected UTI    Anemia    Status post transcatheter aortic valve replacement (TAVR) using bioprosthesis    Confusion    Septic arthritis (HCC)     Methicillin susceptible Staphylococcus aureus bacteremia. Apparently blood cultures were called overnight to nursing and then hospitalist was notified. BC ID reports the Staph aureus was detected but the methicillin-resistant genes were not detected. Infected right total knee arthroplastyacute onset inpatient that had knee replaced in 2009 per Dr. Shakira Magaña    Right shoulder painno evidence of effusion. Dr. Braswell Aren aware. .    · Discontinue vancomycin  · Discontinue cefepime  · Change to cefazolin  · Ordered surveillance blood culture for Sunday morning and Monday morning    Thank you Moe Espinosa MD for allowing me to participate in this patient's care  Electronically signed by Chadd Lopez MD on 7/17/2021 at 10:22 AM

## 2021-07-17 NOTE — ANESTHESIA PRE PROCEDURE
by mouth 3 times daily.   6/13/16   Historical Provider, MD       Current medications:    Current Facility-Administered Medications   Medication Dose Route Frequency Provider Last Rate Last Admin    acetaminophen (TYLENOL) tablet 1,000 mg  1,000 mg Oral Once Mart Carroll MD        celecoxib (CELEBREX) capsule 100 mg  100 mg Oral Once Mart Carroll MD        tranexamic acid (LYSTEDA) tablet 1,950 mg  1,950 mg Oral On Call to OR Mart Carroll MD        lactated ringers infusion   Intravenous Continuous Mart Carroll  mL/hr at 07/17/21 0758 New Bag at 07/17/21 0758    ceFAZolin (ANCEF) 2000 mg in 0.9% sodium chloride 50 mL IVPB  2,000 mg Intravenous On Call to OR Mart Carroll MD        meperidine (DEMEROL) injection 12.5 mg  12.5 mg Intravenous Q5 Min PRN [facility], APRN - CRNA        HYDROmorphone HCl PF (DILAUDID) injection 0.25 mg  0.25 mg Intravenous Q5 Min PRN [facility], APRN - CRNA        HYDROmorphone HCl PF (DILAUDID) injection 0.5 mg  0.5 mg Intravenous Q5 Min PRN [facility], APRN - CRNA        morphine injection 2 mg  2 mg Intravenous Q5 Min PRN [facility], APRN - CRNA        morphine injection 4 mg  4 mg Intravenous Q5 Min PRN [facility], APRN - CRNA        promethazine (PHENERGAN) injection 6.25 mg  6.25 mg Intravenous Once PRN [facility], APRN - CRNA        metoclopramide (REGLAN) injection 10 mg  10 mg Intravenous Once PRN [facility], APRN - CRNA        diphenhydrAMINE (BENADRYL) injection 12.5 mg  12.5 mg Intravenous Once PRN [facility], APRN - CRNA        labetalol (NORMODYNE;TRANDATE) injection 5 mg  5 mg Intravenous Q10 Min [facility]N [facility]        hydrALAZINE (APRESOLINE) injection 5 mg  5 mg Intravenous Q10 Min PRN [facility], APRN - CRNA        enalaprilat (VASOTEC) injection 1.25 mg  1.25 mg Intravenous Once PRN [facility]        lidocaine PF 1 % injection 5 mL  5 mL Intradermal Once Edith Pastor MD        cefepime (MAXIPIME) 1,000 mg in sterile water 10 mL IV syringe  1,000 mg Intravenous Q12H SHIMON Wolff CNP   Stopped at 07/17/21 0401    ondansetron (ZOFRAN-ODT) disintegrating tablet 4 mg  4 mg Oral Q8H PRN SHIMON Nicholson CNP        Or    ondansetron (ZOFRAN) injection 4 mg  4 mg Intravenous Q6H PRN SHIMON Nicholson CNP        polyethylene glycol (GLYCOLAX) packet 17 g  17 g Oral Daily PRN SHIMON Nicholson CNP        acetaminophen (TYLENOL) tablet 650 mg  650 mg Oral Q6H PRN SHIMON Nicholson CNP        Or    acetaminophen (TYLENOL) suppository 650 mg  650 mg Rectal Q6H PRN SHIMON Nicholson CNP        magnesium sulfate 2000 mg in 50 mL IVPB premix  2,000 mg Intravenous PRN SHIMON Nicholson CNP        potassium chloride (KLOR-CON M) extended release tablet 40 mEq  40 mEq Oral PRN SHIMON Nicholson CNP   40 mEq at 07/17/21 0756    Or    potassium bicarb-citric acid (EFFER-K) effervescent tablet 40 mEq  40 mEq Oral PRN SHIMON Nicholson CNP        Or    potassium chloride 10 mEq/100 mL IVPB (Peripheral Line)  10 mEq Intravenous PRN SHIMON Nicholson CNP        aspirin chewable tablet 81 mg  81 mg Oral Daily SHIMON Nicholson CNP   81 mg at 07/16/21 1830    [Held by provider] clopidogrel (PLAVIX) tablet 75 mg  75 mg Oral Daily SHIMON Nicholson CNP        ezetimibe (ZETIA) tablet 10 mg  10 mg Oral Daily SHIMON Nicholson CNP   10 mg at 07/16/21 1829    gabapentin (NEURONTIN) capsule 800 mg  800 mg Oral TID SHIMON Nicholson CNP   800 mg at 07/16/21 2133    hydroxychloroquine (PLAQUENIL) tablet 400 mg  400 mg Oral Daily SHIMON Nicholson CNP   400 mg at 07/16/21 1830    oxyCODONE-acetaminophen (PERCOCET) 7.5-325 MG per tablet 1 tablet  1 tablet Oral Q4H PRN Rolando Bass APRN - CNP   1 tablet at 07/16/21 1829    pantoprazole (PROTONIX) tablet 40 mg  40 mg Oral Daily Brettqiana Peres, APRN - CNP   40 mg at 07/16/21 1829    albuterol (PROVENTIL) nebulizer solution 2.5 mg  2.5 mg Nebulization Q6H PRN Swapna Peres APRN - CNP        0.9 % sodium chloride infusion   Intravenous Continuous Breomkar Peres APRN - CNP 75 mL/hr at 07/16/21 1830 New Bag at 07/16/21 1830    morphine injection 2 mg  2 mg Intravenous Q4H PRN Kimmy Chow MD        budesonide (PULMICORT) nebulizer suspension 500 mcg  0.5 mg Nebulization BID Kimmy Chow MD   500 mcg at 07/17/21 0626    Arformoterol Tartrate (BROVANA) nebulizer solution 15 mcg  15 mcg Nebulization BID Kimmy Chow MD   15 mcg at 07/17/21 9896    sodium chloride flush 0.9 % injection 5-40 mL  5-40 mL Intravenous 2 times per day Kimmy Chow MD        sodium chloride flush 0.9 % injection 5-40 mL  5-40 mL Intravenous PRN Kimmy Chow MD        0.9 % sodium chloride infusion  25 mL Intravenous PRN Kimmy Chow MD        thiamine (B-1) injection 100 mg  100 mg Intravenous Daily Kimmy Chow MD   100 mg at 07/17/21 0756    multivitamin 1 tablet  1 tablet Oral Daily Kimmy Chow MD   1 tablet at 23/29/35 9142    folic acid (FOLVITE) tablet 1 mg  1 mg Oral Daily Kimmy Chow MD   1 mg at 07/16/21 1911    LORazepam (ATIVAN) tablet 1 mg  1 mg Oral Q1H PRN Kimmy Chow MD        Or    LORazepam (ATIVAN) injection 1 mg  1 mg Intravenous Q1H PRN Kimmy Chow MD        Or    LORazepam (ATIVAN) tablet 2 mg  2 mg Oral Q1H PRN Kimmy Chow MD        Or    LORazepam (ATIVAN) injection 2 mg  2 mg Intravenous Q1H PRN Kimmy Chow MD        Or    LORazepam (ATIVAN) tablet 3 mg  3 mg Oral Q1H PRN Kimmy Chow MD        Or    LORazepam (ATIVAN) injection 3 mg  3 mg Intravenous Q1H PRN Kimmy Chow MD        Or    LORazepam (ATIVAN) tablet 4 mg  4 mg Oral Q1H PRN Kimmy Chow MD        Or    LORazepam (ATIVAN) injection 4 mg  4 mg Intravenous Q1H PRN Kimmy Chow MD        vancomycin (VANCOCIN) 750 mg in dextrose 5 % 250 mL IVPB  750 mg Intravenous Q12H Jonathan Arshad  mL/hr at 07/17/21 0934 750 mg at 07/17/21 0934    vancomycin (VANCOCIN) intermittent dosing (placeholder)   Other RX Placeholder Jonathan Arshad MD           Allergies: Allergies   Allergen Reactions    Codeine Swelling     Lips swelling    Iv [Iodides] Swelling     Contrast dye used for heart cath. Caused face to swell.     Hydrocodone Swelling     lips swelling    Statins      Myalgias         Problem List:    Patient Active Problem List   Diagnosis Code    Coronary atherosclerosis of native coronary artery I25.10    MI (myocardial infarction) ([facility] I21.9    COPD (chronic obstructive pulmonary disease) ([facility] J44.9    Bronchitis J40    Leg pain M79.606    History of tobacco abuse Z87.891    Hoarseness, chronic R49.0    Chronic heartburn R12    Hiatal hernia K44.9    Coronary artery disease involving native coronary artery of native heart without angina pectoris I25.10    Mixed hyperlipidemia E78.2    Essential hypertension I10    S/P coronary artery stent placement Z95.5    SOB (shortness of breath) R06.02    Right upper quadrant abdominal pain R10.11    Acalculous cholecystitis K81.9    Aortic valve stenosis I35.0    Arthritis of knee M17.10    Gastroesophageal reflux disease K21.9    Gout M10.9    Neuropathy G62.9    Rheumatoid arthritis (HCC) M06.9    Alternating constipation and diarrhea R19.8    DDD (degenerative disc disease), lumbar M51.36    Spinal stenosis of lumbar region with neurogenic claudication M48.062    Lumbar stenosis with neurogenic claudication M48.062    Abnormal myocardial perfusion study R94.39    Osteoarthritis of both shoulders M19.011, M19.012    Renal cyst, left N28.1    Complex renal cyst N28.1    Hypertrophy of prostate with urinary obstruction N40.1, N13.8    Current use of proton pump inhibitor Z79.899    S/P TAVR (transcatheter aortic valve replacement) Z95.2    Acute on chronic combined systolic and diastolic congestive heart failure due to valvular disease (HCC) I50.43, I38    Fever and chills R50.9    Dysuria R30.0    Suspected UTI R39.89    Anemia D64.9    Status post transcatheter aortic valve replacement (TAVR) using bioprosthesis Z95.3    Confusion R41.0    Septic arthritis (HCC) M00.9       Past Medical History:        Diagnosis Date    Acute on chronic combined systolic and diastolic congestive heart failure due to valvular disease ([address] 4/16/2021    Arthritis     Bronchitis     Cancer (HCC)     Skin Cancer REMOVED RT ARM & LT EAR    Chronic back pain     Chronic cholecystitis without calculus 5/19/2017    Chronic GERD     [facility]     Coronary artery disease of native artery of native heart with stable angina pectoris ([address]     Coronary atherosclerosis of native coronary artery     s/p PTCA and stent placement to the LAD and RCA/7 stents    DDD (degenerative disc disease), lumbar 12/4/2018    History of blood transfusion     WITH RT SHOULDER SURGERY    History of tobacco abuse 2010    Hyperlipidemia     Hypertension     MI (myocardial infarction) ([facility] [address]     Old, inferior wall    Moderate aortic regurgitation 08/14/2017    mod-severe AR.  Moderate aortic stenosis 08/14/2017    mod-severe aortic stenosis.  Pain management 2021    PT SEES PAIN MANAGEMENT FOR CHRONIC BACK PAIN    Spinal stenosis of lumbar region with neurogenic claudication 12/4/2018       Past Surgical History:        Procedure Laterality Date    BACK SURGERY      s/p laminectomy    CARDIAC [facility]HETERIZATION  08/10/04 [facility]      CARDIAC CATHETERIZATION  12/10/03  [facility]    CARDIAC CATHETERIZATION  08/29/03 [facility]    CARDIAC CATHETERIZATION  02/16/01 [facility]    CARDIAC CATHETERIZATION  05/25/2009    EF is estimated to be 50%. See scanned document.     CARDIAC CATHETERIZATION N/A 03/2016    stent placement    CARDIAC CATHETERIZATION  2017    no PCI    CARDIAC CATHETERIZATION  2019    Drug-eluting stents placed to mid LAD and mid RCA, normal LV    CHOLECYSTECTOMY, LAPAROSCOPIC N/A 2017    CHOLECYSTECTOMY LAPAROSCOPIC performed by Shiva Olson MD at [address] [place] [address]  2015    Dr. Shanon Gonzalez: No Polyps   10yr recall    CORONARY ANGIOPLASTY WITH STENT PLACEMENT  2016    stent to LAD    JOINT REPLACEMENT      Left thumb    JOINT REPLACEMENT Left     knee    JOINT REPLACEMENT      KNEE SURGERY      s/p Rt. knee replacement    LEG SURGERY Right     Broken leg with surgical repair    LUMBAR SPINE SURGERY N/A 2018    L1-5 LAMINECTOMY performed by Simone Lowe MD at [facility] EGD TRANSORAL BIOPSY SINGLE/MULTIPLE N/A 2017    Dr Master Bello, Amelie (-), biliary colic, surgical referral    AR EGD TRANSORAL BIOPSY SINGLE/MULTIPLE N/A 2018    Dr JAIDEN Cantu/dilation over wire-51 Slovenian-Distal esophagitis, gastritis    REMOVE HARDWARE SPINE N/A 2018    I AND D LUMBAR INCISION SEROMA performed by Simone Lowe MD at [address] [place] [address] Right 2019    RIGHT REVERSE TOTAL SHOULDER ARTHROPLASTY performed by Ivory Nick MD at [address] [place] [address] Dr ENDOSCOPY  2015    Dr. Shanon Gonzalez: amelie neg,,normal, empiric dilatation with 51F    UPPER GASTROINTESTINAL ENDOSCOPY N/A 2018    Dr JAIDEN Cantu/dilation over wire-51 [place]-Distal esophagitis, gastritis       Social History:    Social History     Tobacco Use    Smoking status: Former Smoker     Packs/day: 0.00     Types: Cigarettes     Quit date: 12/10/2009     Years since quittin.6    Smokeless tobacco: Never Used   Substance Use Topics    Alcohol use:  Yes     Alcohol/week: 42.0 standard drinks     Types: 42 Cans of beer per week     Comment: daily                                Counseling given: Not Answered      Vital Signs (Current):   Vitals:    21 1700 21 2149 (If Applicable):   Lab Results   Component Value Date    COVID19 Not Detected 07/16/2021    COVID19 NEG 04/20/2021           Anesthesia Evaluation  Patient summary reviewed and Nursing notes reviewed  Airway: Mallampati: II  TM distance: >3 FB   Neck ROM: full  Mouth opening: > = 3 FB Dental:    (+) edentulous      Pulmonary:   (+) COPD: moderate,  shortness of breath: chronic,                             Cardiovascular:  Exercise tolerance: poor (<4 METS),   (+) hypertension:, valvular problems/murmurs: AS, past MI:, CAD:, CABG/stent:, hyperlipidemia    (-)  angina       Beta Blocker:  Not on Beta Blocker      ROS comment: S/P TVR 5/2021     Neuro/Psych:   (+) psychiatric history:             ROS comment: Neuropathy  Spinal Stenosis of lumbar region  DDD  Confusion GI/Hepatic/Renal:   (+) hiatal hernia, GERD: well controlled,          ROS comment: BPH  . Endo/Other:    (+) blood dyscrasia: anemia, arthritis: rheumatoid and OA., malignancy/cancer. Pt had no PAT visit       Abdominal:             Vascular: Other Findings:           Anesthesia Plan      general     ASA 4     (Pt has been cleared by Dr. Meghana Taylor for surgery as noted by Clinical House Supervisor Kathy Dominguez RN. This pt is a very pleasant, very cognitively coherent older male aware of the intended surgery and risks associated with the surgery/anesthesia.  )  Induction: intravenous. Anesthetic plan and risks discussed with patient. Use of blood products discussed with patient whom.                  SHIMON Raya - CRNA   7/17/2021

## 2021-07-17 NOTE — PROGRESS NOTES
Notified Dr. Kristi Cox of positive Blood Cultures. Patient is on Vancomycin and Maxipime. No new orders at this time.

## 2021-07-18 NOTE — PROGRESS NOTES
suppository 650 mg, Q6H PRN  magnesium sulfate 2000 mg in 50 mL IVPB premix, PRN  potassium chloride (KLOR-CON M) extended release tablet 40 mEq, PRN   Or  potassium bicarb-citric acid (EFFER-K) effervescent tablet 40 mEq, PRN   Or  potassium chloride 10 mEq/100 mL IVPB (Peripheral Line), PRN  [Held by provider] clopidogrel (PLAVIX) tablet 75 mg, Daily  ezetimibe (ZETIA) tablet 10 mg, Daily  gabapentin (NEURONTIN) capsule 800 mg, TID  hydroxychloroquine (PLAQUENIL) tablet 400 mg, Daily  oxyCODONE-acetaminophen (PERCOCET) 7.5-325 MG per tablet 1 tablet, Q4H PRN  pantoprazole (PROTONIX) tablet 40 mg, Daily  albuterol (PROVENTIL) nebulizer solution 2.5 mg, Q6H PRN  0.9 % sodium chloride infusion, Continuous  morphine injection 2 mg, Q4H PRN  budesonide (PULMICORT) nebulizer suspension 500 mcg, BID  Arformoterol Tartrate (BROVANA) nebulizer solution 15 mcg, BID  sodium chloride flush 0.9 % injection 5-40 mL, PRN  0.9 % sodium chloride infusion, PRN  thiamine (B-1) injection 100 mg, Daily  multivitamin 1 tablet, Daily  folic acid (FOLVITE) tablet 1 mg, Daily  LORazepam (ATIVAN) tablet 1 mg, Q1H PRN   Or  LORazepam (ATIVAN) injection 1 mg, Q1H PRN   Or  LORazepam (ATIVAN) tablet 2 mg, Q1H PRN   Or  LORazepam (ATIVAN) injection 2 mg, Q1H PRN   Or  LORazepam (ATIVAN) tablet 3 mg, Q1H PRN   Or  LORazepam (ATIVAN) injection 3 mg, Q1H PRN   Or  LORazepam (ATIVAN) tablet 4 mg, Q1H PRN   Or  LORazepam (ATIVAN) injection 4 mg, Q1H PRN      Review of Systems see HPI. No cardiopulmonary complaints. VitalSigns:  /81   Pulse 76   Temp 96.8 °F (36 °C) (Temporal)   Resp 16   Ht 5' 7\" (1.702 m)   Wt 177 lb 7 oz (80.5 kg)   SpO2 96%   BMI 27.79 kg/m²      Physical Exam  Line/IV (peripheral) site: No erythema, warmth, induration, or tenderness.   Lungs clear without crackles  Heart regular rhythm without murmur  Abdomen soft nontender without hepatosplenomegaly  Extremities without significant edema    Lab Results:  CBC: Recent Labs     07/16/21  1237 07/17/21  0408 07/18/21  0252   WBC 9.5 7.4 7.3   HGB 13.6* 12.1* 10.5*   PLT 57* 46* 43*     BMP:  Recent Labs     07/16/21  1237 07/17/21  0408 07/18/21  0252    137 140   K 3.8 3.2* 3.7    103 106   CO2 22 26 25   BUN 19 18 13   CREATININE 0.8 0.8 0.6   GLUCOSE 97 99 146*     CultureResults:  Blood cultures today no growth to date  Blood cultures July 16, 2021-[facility]  Surgical cultures-Staph aureus  Body fluid culture (right knee) on July 16, 2021:  Susceptibility    Staphylococcus aureus (1)    Antibiotic Interpretation JONATHAN Status    benzylpenicillin Sensitive 0.12 mcg/mL     clindamycin Sensitive 0.25 mcg/mL     erythromycin Sensitive <=0.25 mcg/mL     inducible clindamycin resistance  Neg mcg/mL     moxifloxacin Sensitive <=0.25 mcg/mL     oxacillin Sensitive <=0.25 mcg/mL     tetracycline Sensitive <=1 mcg/mL     trimethoprim-sulfamethoxazole Sensitive <=10 mcg/mL     vancomycin Sensitive 1 mcg/mL         Radiology: None    Additional Studies Reviewed:  None    Impression:  1. 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 Chronic obstructive pulmonary disease  3. Coronary artery disease  4. Rheumatoid arthritis  5.   History transcatheter aortic valve replacement    Recommendations:  Continue supportive care  Continue cefazolin  No change in antibiotic treatment at present  Follow blood cultures to make sure bacteremia clearing  Continue to follow    Zita Malik MD

## 2021-07-18 NOTE — PROGRESS NOTES
[facility]      [facility] Note    Patient:  Sharmin Zaldivar  YOB: 1955  Date of Service: 7/18/2021  MRN: [id number]   Acct: [de-identified]   Primary Care Physician: SHIMON Fernandez  Advance Directive: Full Code  Admit Date: 7/16/2021       Hospital Day: 2    Portions of this note have been copied forward, however, updated to reflect the most current clinical status of this patient. CHIEF COMPLAINT R knee pain     SUBJECTIVE:  Dev Goldstein was seen working with PT this afternoon. States he has some shortness of breath with exertion. Postop right knee pain. Denies chest pain, fever, nausea, vomiting. CUMULATIVE HOSPITAL COURSE:   The patient is a 76 y.o. male with past medical history of TAVR, CAD with stents, COPD, HTN, and HLD who presented to [address] [place] [address] ED complaining of right knee pain. Mr. Dev Miguel reported increased swelling and pain in right knee since last 2 days. Reported fever of 103.4 at home. Stated he has history of bilateral knee replacement. However, right knee has had need for joint aspiration on occasion, but none recently. Reported he was seen at [facility] and was sent home. Denied any specific injury to his right knee. Reported severe pain, limited range of motion due to pain and swelling. Denied no more than usual shortness of breath, chest pain, abdominal pain, nausea, or vomiting. Work-up in ED revealed lactic 2.3, CRP 34.75, troponin 0.05, WBC 9.5, sed rate 30, unremarkable chest x-ray, right knee x-ray showed small knee joint effusion with anterior soft tissue swelling. Orthopedic surgery was consulted from ED whom recommended arthrocentesis for lab studies. Dr. Pritesh Aguilar performed arthrocentesis. Fluid study showed nucl cell 19,560, 40,000 RBC. Patient was admitted to hospital medicine with Septic arthritis. Dr. Yudy Bright performed right knee explant antibiotic spacer on 7/17/21, with ANA drain.  Blood culture returned positive for gram positive cocci in clusters resembling staphylococcus. Synovial fluid returned positive for staphylococcus aureus. ID was consulted whom recommended discontinuing vancomycin and cefepime and changed to Cefazolin. Surveillance blood cultures ordered for Sunday morning and Monday morning. Orthopedic surgery recommends leaving drain in for now, weightbearing as tolerated, DVT prophylaxis, PT/OT, ice and elevation. Review of Systems   Constitutional: Negative for chills, diaphoresis, fatigue and fever. HENT: Negative for congestion and ear pain. Eyes: Negative for visual disturbance. Respiratory: Positive for shortness of breath. Negative for cough and wheezing. Cardiovascular: Negative for chest pain, palpitations and leg swelling. Gastrointestinal: Negative for abdominal distention, abdominal pain, blood in stool, constipation, diarrhea, nausea and vomiting. Endocrine: Negative for cold intolerance and heat intolerance. Genitourinary: Negative for difficulty urinating, flank pain, frequency and urgency. Musculoskeletal: Positive for arthralgias. Negative for myalgias. Postop right knee pain   Skin: Negative for color change and wound. Neurological: Negative for dizziness, syncope, weakness, light-headedness, numbness and headaches. Hematological: Does not bruise/bleed easily. Psychiatric/Behavioral: Negative for agitation, confusion and dysphoric mood. Objective:   VITALS:  BP (!) 146/76   Pulse 76   Temp 96 °F (35.6 °C) (Temporal)   Resp 18   Ht 5' 7\" (1.702 m)   Wt 177 lb 7 oz (80.5 kg)   SpO2 98%   BMI 27.79 kg/m²   24HR INTAKE/OUTPUT:      Intake/Output Summary (Last 24 hours) at 7/18/2021 1247  Last data filed at 7/18/2021 1036  Gross per 24 hour   Intake 1330 ml   Output 2930 ml   Net -1600 ml           Physical Exam  Constitutional:       General: He is not in acute distress. Appearance: Normal appearance. He is not ill-appearing. HENT:      Head: Normocephalic and atraumatic. Right Ear: External ear normal.      Left Ear: External ear normal.      Nose: Nose normal.      Mouth/Throat:      Mouth: Mucous membranes are moist.   Eyes:      Extraocular Movements: Extraocular movements intact. Conjunctiva/sclera: Conjunctivae normal.      Pupils: Pupils are equal, round, and reactive to light. Cardiovascular:      Rate and Rhythm: Normal rate and regular rhythm. Pulses: Normal pulses. Heart sounds: Murmur heard. Pulmonary:      Effort: Pulmonary effort is normal. No respiratory distress. Breath sounds: Normal breath sounds. No wheezing, rhonchi or rales. Abdominal:      General: Bowel sounds are normal. There is no distension. Palpations: Abdomen is soft. Tenderness: There is no abdominal tenderness. Musculoskeletal:         General: Swelling and tenderness present. No deformity. Normal range of motion. Cervical back: Normal range of motion and neck supple. No muscular tenderness. Right lower leg: No edema. Left lower leg: No edema. Comments: S/p Right knee explant antibiotic spacer with ANA drain in place  Limited ROM due to pain    Skin:     General: Skin is warm and dry. Findings: No bruising or lesion. Neurological:      Mental Status: He is alert and oriented to person, place, and time. Psychiatric:         Mood and Affect: Mood normal.         Behavior: Behavior normal.         Thought Content:  Thought content normal.            Medications:      sodium chloride      sodium chloride      sodium chloride 75 mL/hr at 07/18/21 0335    sodium chloride        ceFAZolin  2,000 mg Intravenous Q8H    sodium chloride flush  10 mL Intravenous 2 times per day    acetaminophen  650 mg Oral Q6H    sennosides-docusate sodium  1 tablet Oral BID    aspirin  325 mg Oral BID    lidocaine 1 % injection  5 mL Intradermal Once    lidocaine PF  5 mL Intradermal Once    [Held by provider] clopidogrel  75 mg Oral Daily    ezetimibe  10 mg Oral Daily    gabapentin  800 mg Oral TID    hydroxychloroquine  400 mg Oral Daily    pantoprazole  40 mg Oral Daily    budesonide  0.5 mg Nebulization BID    Arformoterol Tartrate  15 mcg Nebulization BID    thiamine  100 mg Intravenous Daily    multivitamin  1 tablet Oral Daily    folic acid  1 mg Oral Daily     sodium chloride flush, sodium chloride, oxyCODONE **OR** oxyCODONE, morphine **OR** morphine, magnesium hydroxide, sodium chloride flush, sodium chloride, ondansetron **OR** ondansetron, polyethylene glycol, acetaminophen **OR** acetaminophen, magnesium sulfate, potassium chloride **OR** potassium alternative oral replacement **OR** potassium chloride, oxyCODONE-acetaminophen, albuterol, morphine, sodium chloride flush, sodium chloride, LORazepam **OR** LORazepam **OR** LORazepam **OR** LORazepam **OR** LORazepam **OR** LORazepam **OR** LORazepam **OR** LORazepam  ADULT DIET; Regular  Adult Oral Nutrition Supplement; Standard High Calorie/High Protein Oral Supplement     Lab and other Data:     Recent Labs     07/16/21  1237 07/17/21  0408 07/18/21  0252   WBC 9.5 7.4 7.3   HGB 13.6* 12.1* 10.5*   PLT 57* 46* 43*     Recent Labs     07/16/21  1237 07/17/21  0408 07/18/21  0252    137 140   K 3.8 3.2* 3.7    103 106   CO2 22 26 25   BUN 19 18 13   CREATININE 0.8 0.8 0.6   GLUCOSE 97 99 146*     Recent Labs     07/16/21  1237 07/17/21  0408 07/18/21  0252   AST 63* 48* 47*   ALT 33 25 22   BILITOT 0.4 0.4 <0.2   ALKPHOS 91 85 88     Troponin T:   Recent Labs     07/16/21  1237 07/16/21  1912 07/17/21  0010   TROPONINI 0.05* 0.04* 0.05*     INR:   Recent Labs     07/16/21  1237   INR 1.11     A1C:   Recent Labs     07/17/21  0408   LABA1C 5.1       RAD:     XR SHOULDER RIGHT (MIN 2 VIEWS)  Result Date: 7/17/2021    1. Appropriate alignment of the right shoulder prosthesis with no acute bony pathology identified. 2. Improving aeration right lung base compared to 4/22/2021. Right infrahilar opacities may relate to vascular crowding and atelectasis. Signed by Dr Suan Duverney      XR KNEE RIGHT (1-2 VIEWS)  Result Date: 7/17/2021    1. Revision of right knee arthroplasty with placement of antibiotic beads. Anterior surgical drain. 2. Old healed proximal fibula shaft fracture. 3. Diffuse vascular calcifications Signed by Dr Suan Duverney      XR KNEE RIGHT (1-2 VIEWS)  Result Date: 7/16/2021    1. Appropriate alignment of the right knee prosthesis with no acute osseous pathology. Small knee joint effusion with anterior soft tissue swelling. Signed by Dr Suan Duverney      XR CHEST PORTABLE  Result Date: 7/16/2021    Poor lung expansion and left lower lung discoid atelectasis. No active infiltrate or pulmonary congestion. Signed by Dr Cesar Barboza:    Jose L Semen- negative   Blood culture (7/18/2021) pending      Culture, Surgical [[id number]] (Abnormal) Collected: 07/17/21 1107   Order Status: Completed Specimen: Body Fluid from Joint, Knee Updated: 07/18/21 1130    Gram Stain Result Many WBC's (Polymorphonuclear) present   Moderate Gram positive cocci  in clusters   No Epithelial Cells seen   Abnormal     Anaerobic Culture No anaerobes to date,will hold 4 days    Organism Staphylococcus aureusAbnormal     Culture Surgical --    Moderate growth   No further workup   Refer to (Surgical culture Knee #3 R knee medial gutter collected   [email or web address]) for sensitivity results    Narrative:     ORDER#: [id number]                          ORDERED BY: ROCIO TREVIÑO   SOURCE: Knee #1 R knee fluid               COLLECTED:  07/17/21 11:07   ANTIBIOTICS AT [facility]. :                      RECEIVED :  07/17/21 12:41         Culture, Surgical [[id number]] (Abnormal) Collected: 07/17/21 1110   Order Status: Completed Specimen: Tissue from Joint, Knee Updated: 07/18/21 1130    Gram Stain Result Few WBC's (Polymorphonuclear) present   Few Gram positive cocci  in clusters   No Epithelial Cells seen Abnormal     Anaerobic Culture No anaerobes to date,will hold 4 days    Organism Staphylococcus aureusAbnormal     Culture Surgical --    Moderate growth   No further workup   Refer to (Surgical culture Knee #3 R knee medial gutter collected   [email or web address]) for sensitivity results    Narrative:     ORDER#: [id number]                          ORDERED BY: ROCIO TREVIÑO   SOURCE: Knee #2 R knee fat pad             COLLECTED:  07/17/21 11:10   ANTIBIOTICS AT [facility]. :                      RECEIVED :  07/17/21 12:43     Culture, Surgical [[id number]] (Abnormal) Collected: 07/17/21 1111   Order Status: Completed Specimen: Tissue from Joint, Knee Updated: 07/18/21 1130    Gram Stain Result Few WBC's (Polymorphonuclear) present   Rare Gram positive cocci  in clusters   No Epithelial Cells seen   Abnormal     Anaerobic Culture No anaerobes to date,will hold 4 days    Organism Staphylococcus aureusAbnormal     Culture Surgical --    Moderate growth   Sensitivity to follow    Narrative:     ORDER#: [id number]                          ORDERED BY: ROCIO TREVIÑO   SOURCE: Knee #3 R knee medial gutter       COLLECTED:  07/17/21 11:11   ANTIBIOTICS AT [facility]. :                      RECEIVED :  07/17/21 12:43     Culture, Blood 2 [[id number]] (Abnormal) Collected: 07/16/21 1237   Order Status: Completed Specimen: Blood Updated: 07/18/21 0803    Culture, Blood 2  Bottle volume = 9 mlAbnormal     Organism Staphylococcus aureusAbnormal     Culture, Blood 2 --    No further workup   Isolated from Aerobic and Anaerobic bottle   Refer to (Blood culture collected [email or web address]) for sensitivity results    Narrative:     ORDER#: [id number]                          ORDERED BY: Rickey Sun   SOURCE: Blood rac                          COLLECTED:  07/16/21 12:37   ANTIBIOTICS AT [facility]. :                      RECEIVED :  07/16/21 12:56   CALL Fran Story [phone],   Microbiology results called to and read back by Misa Hall RN on   5th, 07/17/2021 04:15, by CARLOS ALBERTO       Culture, Blood 1 [[id number]] (Abnormal) Collected: 07/16/21 1256   Order Status: Completed Specimen: Blood Updated: 07/18/21 0802    Blood Culture, Routine --Abnormal      Bottle volume = 8 ml   Gram stain Aerobic bottle and Anarobic bottle   Gram positive cocci in clusters   resembling Staphylococcus   Culture in progress   Please notify Physician   Abnormal     Organism Staphylococcus aureusAbnormal     Blood Culture, Routine --    Sensitivity to follow   Isolated from Aerobic and Anaerobic bottle    Narrative:     ORDER#: [id number]                          ORDERED BY: Pritesh Biggs   SOURCE: Blood lac                          COLLECTED:  07/16/21 12:56   ANTIBIOTICS AT [facility]. :                      RECEIVED :  07/16/21 12:56   CALL Ramírez Gordon [phone],   Microbiology results called to and read back by John Moyer RN on   5th, 07/17/2021 04:13, by Saranya King       Culture, Body Fluid [[id number]] (Abnormal)  Collected: 07/16/21 1447   Order Status: Completed Specimen: Body Fluid from Synovial Fluid Updated: 07/18/21 0718    Gram Stain Result Many WBC's (Polymorphonuclear)   Many Gram positive cocci  in pairs   Abnormal     Anaerobic Culture No anaerobes to date,will hold 4 days    Organism Staphylococcus aureusAbnormal     Body Fluid Culture, Sterile Moderate growth   Narrative:     ORDER#: [id number]                          ORDERED BY: SHANNON PIRES   SOURCE: Synovial Fluid Right Knee          COLLECTED:  07/16/21 14:47   ANTIBIOTICS AT [facility]. :                      RECEIVED :  07/16/21 14:54           Assessment/Plan   Principal Problem:    Septic arthritis (HCC)  Active Problems:    S/P TAVR (transcatheter aortic valve replacement)    COPD (chronic obstructive pulmonary disease) (HCC)    Coronary artery disease involving native coronary artery of native heart without angina pectoris    Mixed hyperlipidemia    Essential hypertension  Resolved Problems:    * No resolved hospital problems. *      Principal Problem:    Septic arthritis ([facility] [address]-               - Orthopedic surgery following     - Dr. Stephanie Tong performed right knee explant antibiotic spacer with ANA drain on 7/17/21     -Recommends leaving ANA drain, weightbearing as tolerated, DVT prophylaxis, PT/OT, ice and elevation   - Blood culture returned positive for staph aureus   - Synovial fluid returned positive for staphylococcus aureus    -Surgical culture positive for staph varus   - ID following whom recommended discontinuing vancomycin and cefepime and changed to Cefazolin    - Surveillance blood cultures ordered by ID for Sunday morning and Monday morning.     - Lactic 2.4 postop, down to 1.0 this morning                   Active Problems:    S/P TAVR (transcatheter aortic valve replacement)- noted, cardiology consultation for elevated troponin        COPD (chronic obstructive pulmonary disease) ([facility] [address]- noted, supplemental oxygen as needed, bronchodilators as needed       Coronary artery disease involving native coronary artery of native heart without angina pectoris- noted, denies chest pain at this time, continue current medications        Mixed hyperlipidemia- noted, continue current medications       Essential hypertension- noted, continue current medications, elevated at times, monitor BP closely           Antibiotic: Cefazolin     DVT Prophylaxis: ASA    GI prophylaxis:  Protonix     Further Orders per Clinical course/attending. Electronically signed by SHIMON Bautista CNP on 7/18/2021 at 12:47 PM       EMR Dragon/Transcription disclaimer:   Much of this encounter note is an electronic transcription/translation of spoken language to printed text.  The electronic translation of spoken language may permit erroneous, or at times, nonsensical words or phrases to be inadvertently transcribed; although attempts have made to review the note for such errors, some may still exist.

## 2021-07-18 NOTE — PROGRESS NOTES
Subjective:     Post-Operative Day: 1 No complaints    Objective:     Patient Vitals for the past 24 hrs:   BP Temp Temp src Pulse Resp SpO2 Weight   07/18/21 0702 133/81 96.8 °F (36 °C) Temporal 76 16 96 %    07/18/21 0131 118/82         07/18/21 0036 (!) 142/80 98.5 °F (36.9 °C) Temporal 83 16 95 %    07/17/21 2051 120/80 98.2 °F (36.8 °C) Temporal 71 16 98 %    07/17/21 1712 (!) 152/82 98.7 °F (37.1 °C) Temporal 83 18 98 %    07/17/21 1631 (!) 180/102         07/17/21 1630 (!) 170/81 98.5 °F (36.9 °C) Temporal 86 20 97 % 177 lb 7 oz (80.5 kg)   07/17/21 1600 134/83 97.8 °F (36.6 °C) Temporal 82 22 95 %    07/17/21 1542 135/77 98.3 °F (36.8 °C) Temporal 89 20 93 %    07/17/21 1525 130/70   88 25 94 %    07/17/21 1520 132/71 97.8 °F (36.6 °C) Temporal 89 15 90 %    07/17/21 1515 (!) 168/79   94 22 91 %    07/17/21 1506 121/79   91 14 90 %    07/17/21 1501 134/79   91 16 90 %    07/17/21 1456 130/73   89 16 91 %    07/17/21 1450 132/80   99 14 90 %    07/17/21 1444 (!) 143/85 98.3 °F (36.8 °C) Temporal 99 15 90 %        General: Alert cooperative   Wound: Clean dry intact. Moderate swelling   Neurovascular: Exam normal   DVT Exam: No evidence of DVT         Data Review:  Recent Labs     07/17/21  0408 07/18/21  0252   HGB 12.1* 10.5*     Recent Labs     07/16/21  1237 07/17/21  0408 07/18/21  0252      < > 140   K 3.8   < > 3.7   CREATININE 0.8   < > 0.6   GLUCOSE 97   < > 146*   INR 1.11  --   --     < > = values in this interval not displayed. No results for input(s): POCGLU in the last 72 hours. drain 160 then 170 r knee  Xray right knee shows antbx spacer in good position. Assessment:     Status Post explant , I&D, antbx spacer for MSSA infected right Total Knee Arthroplasty. Doing well postop without complication.    Plan:   WBAT / ROM ok  Leave drain in for now  pic line  IV antbx (ancef)  Pain control  Tight blood glucose control  PT/OT  DVT prophylaxis  Ice and elevate  Discharge Home or rehab this week

## 2021-07-18 NOTE — PROGRESS NOTES
Patient states that he is \"seeing things. \" Reports seeing ants on the ceiling and lizards on the walls. Patient and his wife state that this has been intermittent since patient had a mini stroke after surgery in April of this year, but he feels it is worse since anesthesia yesterday.      Electronically signed by Leopold Das, RN on 7/18/2021 at 11:12 AM

## 2021-07-18 NOTE — PROGRESS NOTES
Physical Therapy    Facility/Department: [facility] 3 CHUY/VAS/MED  Initial Assessment    NAME: Chandler Oro  : 1955  MRN: [id number]    Date of Service: 2021    Discharge Recommendations:  Continue to assess pending progress, Patient would benefit from continued therapy after discharge        Assessment   Body structures, Functions, Activity limitations: Decreased functional mobility ; Increased pain;Decreased strength;Decreased ROM  Assessment: pt WOULD BENEFIT FROM SKILLED PT TO ADDRESS HIS MOBILITY DEFICITS  Prognosis: Good  Decision Making: Low Complexity  REQUIRES PT FOLLOW UP: Yes  Activity Tolerance  Activity Tolerance: Patient Tolerated treatment well;Patient limited by endurance       Patient Diagnosis(es): The primary encounter diagnosis was Effusion of right knee. A diagnosis of Acute pain of right knee was also pertinent to this visit. 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 heart failure due to valvular disease ([address] Arthritis, Bronchitis, Cancer ([address] Chronic back pain, Chronic cholecystitis without calculus, Chronic GERD, COPD (chronic obstructive pulmonary disease) ([address] Coronary artery disease of native artery of native heart with stable angina pectoris ([address] Coronary atherosclerosis of native coronary artery, DDD (degenerative disc disease), lumbar, History of blood transfusion, History of tobacco abuse, Hyperlipidemia, Hypertension, MI (myocardial infarction) ([address] Moderate aortic regurgitation, Moderate aortic stenosis, Pain management, and Spinal stenosis of lumbar region with neurogenic claudication. has a past surgical history that includes knee surgery; back surgery; Upper gastrointestinal endoscopy (2015); Leg Surgery (Right); Coronary angioplasty with stent (2016); joint replacement; joint replacement (Left); Colonoscopy (2015); Cardiac catheterization (08/10/04 [facility]);  [facility] Cardiac catheterization (08/29/03 [facility]); Cardiac catheterization (02/16/01 [facility]); Cardiac catheterization (05/25/2009); Cardiac catheterization (N/A, 03/2016); pr egd transoral biopsy single/multiple (N/A, 05/18/2017); Cholecystectomy, laparoscopic (N/A, 05/19/2017); Cardiac catheterization (08/2017); joint replacement; pr egd transoral biopsy single/multiple (N/A, 11/29/2018); Upper gastrointestinal endoscopy (N/A, 11/29/2018); Lumbar spine surgery (N/A, 12/04/2018); REMOVE HARDWARE SPINE (N/A, 12/20/2018); Cardiac catheterization (07/11/2019); shoulder surgery (Right, 12/12/2019); and skin biopsy. Restrictions  Restrictions/Precautions  Restrictions/Precautions: Weight Bearing  Lower Extremity Weight Bearing Restrictions  Right Lower Extremity Weight Bearing: Weight Bearing As Tolerated  Vision/Hearing        Subjective  General  Diagnosis: EXPLANT R KNEE WITH PLACEMENT OF ANTIBIOTIC SPACERS  Follows Commands: Within Functional Limits  Subjective  Subjective: pt STATES HIS PAIN IS CONTROLLED AND HE IS READY TO GET OOB AND SIT IN RECLINER.  DOES REPORT FEELING WEAK  Pain Screening  Patient Currently in Pain: Yes  Pain Assessment  Pain Assessment: 0-10  Pain Level: 4  Patient's Stated Pain Goal: 1  Pain Type: Acute pain;Surgical pain  Pain Location: Knee  Pain Orientation: Right  Pain Frequency: Intermittent  Clinical Progression: Not changed  Functional Pain Assessment: Prevents or interferes some active activities and ADLs  Non-Pharmaceutical Pain Intervention(s): Cold applied;Repositioned  Response to Pain Intervention: Patient Satisfied  Vital Signs  Patient Currently in Pain: Yes  Pre Treatment Pain Screening  Intervention List: Patient able to continue with treatment;Nurse called to administer meds    Orientation     Social/Functional History  Social/Functional History  Lives With: Spouse  Home Access: Stairs to enter without rails  Entrance Stairs - Number of Steps: 2  Bathroom Toilet: Standard  Bathroom Equipment: Toilet raiser  Home Equipment: Rolling walker  Cognition        Objective          AROM RLE (degrees)  RLE General AROM: ABLE TO SIT EOB AND FLEX R KNEE TO GROSSLY 70 DEG  AROM LLE (degrees)  LLE AROM : WFL  Strength RLE  Comment: ANTIGRAVITY  Strength LLE  Strength LLE: WFL        Bed mobility  Supine to Sit: Minimal assistance  Transfers  Sit to Stand: Minimal Assistance; Moderate Assistance  Stand to sit: Minimal Assistance  Bed to Chair: Contact guard assistance;Minimal assistance  Ambulation  Ambulation?: Yes  Ambulation 1  Surface: level tile  Device: Rolling Walker  Assistance: Contact guard assistance;Minimal assistance  Gait Deviations: Slow Ayanna;Decreased step length;Decreased step height  Distance: 3 FT  Comments: NOTED SP02 ON RA TO BE 89 %.  RETURNED O2 TO Pt AFTER TF              Plan   Plan  Times per week: 5-7  Plan weeks: 2  Current Treatment Recommendations: Strengthening, Functional Mobility Training, Gait Training, Transfer Training, Pain Management, Positioning, Safety Education & Training, Patient/Caregiver Education & Training  Plan Comment: WBAT  Safety Devices  Type of devices: Call light within reach, Left in chair, Chair alarm in place, Gait belt      Goals  Short term goals  Time Frame for Short term goals: 2 WKS  Short term goal 1:  FT WITH RW, CGA       Therapy Time   Individual Concurrent Group Co-treatment   Time In           Time Out           Minutes                   Traci Shay PT     Electronically signed by Traci Shay PT on 7/18/2021 at 1:13 PM

## 2021-07-19 NOTE — PROGRESS NOTES
Patient seen and examined at bedside. Still with right lower extremity pain to be expected. Denies chest pain or shortness of breath. Tolerated breakfast this morning without difficulty. Wife at bedside. Vitals/labs/imaging reviewed. Alert/BLAE/RRR/+murmur    Septic arthritis/MSSA Bacteremia: ID/Ortho/Cardiology on board. Continue cefazolin. Monitor surveillance cultures. PT/OT as tolerated. As needed pain management. Hypokalemia: Replacement protocol in place. Monitor BMP. Full progress note to follow.

## 2021-07-19 NOTE — CONSULTS
[facility] Neurology  [address] [place] [address] [address], [address]  [place], [place] [address]  Phone [phone]     Neurology Consultation     Date of Admission: 2021 11:14 AM  Date of Consultation: 21    Attending Provider: Yessi Sharma MD  Consulting Provider: Lydia Thornton M.D. Patient: Chacho Swann  :  1955  Age:  76 y.o. MRN:  [id number]    CHIEF COMPLAINT:  Stroke    History Source: History obtained from chart review, the patient and wife. PCP: SHIMON Bryan    HISTORY OF PRESENT ILLNESS:   Chacho Swann is a 76y.o. year old man with a history of CAD, TAVR, and stroke who was admitted  with a right knee septic joint and has had worsening visual hallucinations. He had a TAVR on  that initially went well. However, he presented a week later with dyspnea and was found to have a perivalvular leak. He had to have that repaired on . He had postoperative confusion and agitation. He had gradual improvement. He has however had visual hallucintation, cognitive impairment, and balance difficulties as well as visual impairment since the second TAVR procedure. It has gradually improved. He had outpatient MRIs of the head that did disclose bilateral cortical occipital infarcts. CTA head was normal and CTA head showed proximal ICA stenosis of 50% on the left and 60% on the right which is to be followed with US in 6 months. He is taking ASA and Plavix. He has had improving but continued impaired vision, impaired cognition, and visual hallucinations. At home, he was seeing movements and animals as well as general distortions like heat waves and smudges. He was admitted  with a septic right knee joint. Since then, he has not slept well. He has had worsened hallucinations. He realizes the animals are not real.  His memory and thinking ability are also worse than his baseline. He has had no new weakness or numbness. He has had a constant dull global headache every since April. PAST MEDICAL HISTORY:    Medical History:      Diagnosis Date    Acute on chronic combined systolic and diastolic congestive heart failure due to valvular disease ([facility] [address] 4/16/2021    Arthritis     Bronchitis     Cancer (HCC)     Skin Cancer REMOVED RT ARM & LT EAR    Chronic back pain     Chronic cholecystitis without calculus 5/19/2017    Chronic GERD     COPD (chronic obstructive pulmonary disease) (HCC)     Coronary artery disease of native artery of native heart with stable angina pectoris ([address]     Coronary atherosclerosis of native coronary artery     s/p PTCA and stent placement to the LAD and RCA/7 stents    DDD (degenerative disc disease), lumbar 12/4/2018    History of blood transfusion     WITH RT SHOULDER SURGERY    History of tobacco abuse 2010    Hyperlipidemia     Hypertension     MI (myocardial infarction) ([facility] [address]     Old, inferior wall    Moderate aortic regurgitation 08/14/2017    mod-severe AR.  Moderate aortic stenosis 08/14/2017    mod-severe aortic stenosis.  Pain management 2021    PT SEES PAIN MANAGEMENT FOR CHRONIC BACK PAIN    Spinal stenosis of lumbar region with neurogenic claudication 12/4/2018       Surgical History:      Procedure Laterality Date    BACK SURGERY      s/p laminectomy    CARDIAC [facility]HETERIZATION  08/10/04 [facility]      CARDIAC [facility]HETERIZATION  12/10/03  [facility]    CARDIAC CATHETERIZATION  08/29/03 [facility]    CARDIAC CATHETERIZATION  02/16/01 [facility]    CARDIAC CATHETERIZATION  05/25/2009    EF is estimated to be 50%. See scanned document.     CARDIAC CATHETERIZATION N/A 03/2016    stent placement    CARDIAC CATHETERIZATION  08/2017    no PCI    CARDIAC CATHETERIZATION  07/11/2019    Drug-eluting stents placed to mid LAD and mid RCA, normal LV    CHOLECYSTECTOMY, LAPAROSCOPIC N/A 05/19/2017    CHOLECYSTECTOMY LAPAROSCOPIC performed by Sara Buchanan MD at [address] [place] [address]  05/28/2015    Dr. Karina Haq: No Polyps   10yr chewable tablet Take 1 tablet by mouth daily 4/9/21   Deneen Mcleod MD   clopidogrel (PLAVIX) 75 MG tablet Take 1 tablet by mouth daily 4/9/21   Deneen Mcleod MD   furosemide (LASIX) 20 MG tablet Take 1 tablet by mouth daily 4/9/21   Deneen Mcleod MD   Multiple Vitamin (MULTIVITAMIN) TABS tablet Take 1 tablet by mouth daily    Historical Provider, MD   pantoprazole (PROTONIX) 40 MG tablet TAKE 1 TABLET BY MOUTH DAILY TAKE DAILY FIRST THING IN THE MORNING ON AN EMPTY STOMACH. 12/21/20   SHIMON Roger   hydroxychloroquine (PLAQUENIL) 200 MG tablet Take 400 mg by mouth daily  6/15/20   Historical Provider, MD   oxyCODONE-acetaminophen (PERCOCET) 7.5-325 MG per tablet Take 1 tablet by mouth every 4 hours as needed for Pain. Historical Provider, MD   gabapentin (NEURONTIN) 800 MG tablet Take 800 mg by mouth 3 times daily.   6/13/16   Historical Provider, MD       Current Medications:  Current Facility-Administered Medications: baclofen (LIORESAL) tablet 5 mg, 5 mg, Oral, TID PRN  ceFAZolin (ANCEF) 2000 mg in 0.9% sodium chloride 50 mL IVPB, 2,000 mg, Intravenous, Q8H  sodium chloride flush 0.9 % injection 10 mL, 10 mL, Intravenous, 2 times per day  sodium chloride flush 0.9 % injection 10 mL, 10 mL, Intravenous, PRN  0.9 % sodium chloride infusion, 25 mL, Intravenous, PRN  acetaminophen (TYLENOL) tablet 650 mg, 650 mg, Oral, Q6H  oxyCODONE (ROXICODONE) immediate release tablet 5 mg, 5 mg, Oral, Q4H PRN **OR** oxyCODONE (ROXICODONE) immediate release tablet 10 mg, 10 mg, Oral, Q4H PRN  morphine injection 2 mg, 2 mg, Intravenous, Q1H PRN **OR** morphine injection 4 mg, 4 mg, Intravenous, Q1H PRN  sennosides-docusate sodium (SENOKOT-S) 8.6-50 MG tablet 1 tablet, 1 tablet, Oral, BID  magnesium hydroxide (MILK OF MAGNESIA) 400 MG/5ML suspension 30 mL, 30 mL, Oral, Daily PRN  aspirin EC tablet 325 mg, 325 mg, Oral, BID  lidocaine PF 1 % injection 5 mL, 5 mL, Intradermal, Once  sodium chloride flush 0.9 % injection 10 mL, 10 mL, Intravenous, PRN  0.9 % sodium chloride infusion, 25 mL, Intravenous, PRN  lidocaine PF 1 % injection 5 mL, 5 mL, Intradermal, Once  ondansetron (ZOFRAN-ODT) disintegrating tablet 4 mg, 4 mg, Oral, Q8H PRN **OR** ondansetron (ZOFRAN) injection 4 mg, 4 mg, Intravenous, Q6H PRN  polyethylene glycol (GLYCOLAX) packet 17 g, 17 g, Oral, Daily PRN  acetaminophen (TYLENOL) tablet 650 mg, 650 mg, Oral, Q6H PRN **OR** acetaminophen (TYLENOL) suppository 650 mg, 650 mg, Rectal, Q6H PRN  magnesium sulfate 2000 mg in 50 mL IVPB premix, 2,000 mg, Intravenous, PRN  potassium chloride (KLOR-CON M) extended release tablet 40 mEq, 40 mEq, Oral, PRN **OR** potassium bicarb-citric acid (EFFER-K) effervescent tablet 40 mEq, 40 mEq, Oral, PRN **OR** potassium chloride 10 mEq/100 mL IVPB (Peripheral Line), 10 mEq, Intravenous, PRN  [Held by provider] clopidogrel (PLAVIX) tablet 75 mg, 75 mg, Oral, Daily  ezetimibe (ZETIA) tablet 10 mg, 10 mg, Oral, Daily  gabapentin (NEURONTIN) capsule 800 mg, 800 mg, Oral, TID  hydroxychloroquine (PLAQUENIL) tablet 400 mg, 400 mg, Oral, Daily  oxyCODONE-acetaminophen (PERCOCET) 7.5-325 MG per tablet 1 tablet, 1 tablet, Oral, Q4H PRN  pantoprazole (PROTONIX) tablet 40 mg, 40 mg, Oral, Daily  albuterol (PROVENTIL) nebulizer solution 2.5 mg, 2.5 mg, Nebulization, Q6H PRN  0.9 % sodium chloride infusion, , Intravenous, Continuous  morphine injection 2 mg, 2 mg, Intravenous, Q4H PRN  budesonide (PULMICORT) nebulizer suspension 500 mcg, 0.5 mg, Nebulization, BID  Arformoterol Tartrate (BROVANA) nebulizer solution 15 mcg, 15 mcg, Nebulization, BID  sodium chloride flush 0.9 % injection 5-40 mL, 5-40 mL, Intravenous, PRN  0.9 % sodium chloride infusion, 25 mL, Intravenous, PRN  thiamine (B-1) injection 100 mg, 100 mg, Intravenous, Daily  multivitamin 1 tablet, 1 tablet, Oral, Daily  folic acid (FOLVITE) tablet 1 mg, 1 mg, Oral, Daily  LORazepam (ATIVAN) tablet 1 mg, 1 mg, Oral, Q1H PRN **OR** LORazepam (ATIVAN) injection 1 mg, 1 mg, Intravenous, Q1H PRN **OR** LORazepam (ATIVAN) tablet 2 mg, 2 mg, Oral, Q1H PRN **OR** LORazepam (ATIVAN) injection 2 mg, 2 mg, Intravenous, Q1H PRN **OR** LORazepam (ATIVAN) tablet 3 mg, 3 mg, Oral, Q1H PRN **OR** LORazepam (ATIVAN) injection 3 mg, 3 mg, Intravenous, Q1H PRN **OR** LORazepam (ATIVAN) tablet 4 mg, 4 mg, Oral, Q1H PRN **OR** LORazepam (ATIVAN) injection 4 mg, 4 mg, Intravenous, Q1H PRN    Allergies:  Codeine, Iv [iodides], Hydrocodone, and Statins    Habits:  TOBACCO:   reports that he quit smoking about 11 years ago. His smoking use included cigarettes. He smoked 0.00 packs per day. He has never used smokeless tobacco.  ETOH:   reports current alcohol use of about 42.0 standard drinks of alcohol per week. DRUGS:    Social History     Substance and Sexual Activity   Drug Use No       SOCIAL HISTORY:   Hugo Vance is , lives in [place], [place], and is retired. FAMILY HISTORY:       Problem Relation Age of Onset    Cancer Mother     Lung Cancer Mother     Heart Disease Father     Cancer Father     Liver Cancer Father     Lung Cancer Father     Diabetes Maternal Grandmother     Heart Disease Maternal Grandfather     Cancer Maternal Grandfather     Stroke Paternal Grandmother     Stomach Cancer Sister     No Known Problems Brother     COPD Sister     Heart Disease Paternal Grandfather     Colon Cancer Neg Hx     Colon Polyps Neg Hx     Esophageal Cancer Neg Hx     Liver Disease Neg Hx     Rectal Cancer Neg Hx        REVIEW OF SYSTEMS:  Review of Systems   Constitutional: Positive for chills, fatigue and fever. HENT: Positive for hearing loss. Negative for tinnitus. Eyes: Positive for visual disturbance. Negative for photophobia. Respiratory: Positive for shortness of breath. Negative for cough. Cardiovascular: Positive for leg swelling. Negative for chest pain and palpitations.    Gastrointestinal: Negative for nausea and vomiting. Endocrine: Negative for polydipsia and polyuria. Genitourinary: Negative for frequency and urgency. Musculoskeletal: Positive for arthralgias. Negative for back pain. Skin: Negative for rash and wound. Allergic/Immunologic: Negative for environmental allergies and food allergies. Neurological: Positive for headaches. Negative for dizziness, tremors, seizures, syncope, facial asymmetry, speech difficulty, weakness, light-headedness and numbness. Hematological: Negative for adenopathy. Bruises/bleeds easily. Psychiatric/Behavioral: Negative for dysphoric mood. The patient is not nervous/anxious. PHYSICAL EXAMINATION:  Vitals: /69   Pulse 87   Temp 99.5 °F (37.5 °C) (Temporal)   Resp 18   Ht 5' 7\" (1.702 m)   Wt 177 lb 7 oz (80.5 kg)   SpO2 94%   BMI 27.79 kg/m²   General appearance:  He is alert and cooperative  Skin: Skin color, texture, turgor normal. No rashes or lesions  HEENT: Head: Normal, normocephalic, atraumatic. Neck:no adenopathy, no carotid bruit, no JVD, supple, symmetrical, trachea midline and thyroid not enlarged, symmetric, no tenderness/mass/nodules  Lungs: clear to auscultation bilaterally. He is barrel chested suggesting COPD  Heart: regular rate and rhythm, S1, S2 normal, no murmur, click, rub or gallop  Abdomen: soft, non-tender; bowel sounds normal; no masses,  no organomegaly  Extremities: right knee incision      NEUROLOGIC EXAMINATION:  Neurologic Exam     Mental Status   Speech: speech is normal   Level of consciousness: alert  Able to name object. Able to repeat. He is oriented to place and 8/2021. Speech is fluent. Cranial Nerves     CN II   Visual fields full to confrontation. CN III, IV, VI   Pupils are equal, round, and reactive to light. Extraocular motions are normal.     CN VII   Facial expression full, symmetric.      CN VIII   Hearing: intact    CN IX, X   Palate: symmetric    CN XI   Right sternocleidomastoid strength: normal  Left sternocleidomastoid strength: normal  Right trapezius strength: normal  Left trapezius strength: normal    CN XII   Tongue: not atrophic  Fasciculations: absent  Tongue deviation: none    Motor Exam   Muscle bulk: normal  Overall muscle tone: normal  Right arm pronator drift: absent  Left arm pronator drift: absent    Strength   Right neck flexion: 5/5  Left neck flexion: 5/5  Right neck extension: 5/5  Left neck extension: 5/5  Right deltoid: 5/5  Left deltoid: 5/5  Right biceps: 5/5  Left biceps: 5/5  Right triceps: 5/5  Left triceps: 5/5  Right wrist flexion: 5/5  Left wrist flexion: 5/5  Right wrist extension: 5/5  Left wrist extension: 5/5  Right interossei: 5/5  Left interossei: 5/5  Right iliopsoas: 4/5  Left iliopsoas: 4/5  Right quadriceps: 3/5  Left quadriceps: 4/5  Right glutei: 4/5  Left glutei: 4/5  Right anterior tibial: 4/5  Left anterior tibial: 4/5  Right posterior tibial: 4/5  Left posterior tibial: 4/5  Right peroneal: 4/5  Left peroneal: 4/5  Right gastroc: 4/5  Left gastroc: 4/5    Sensory Exam   Light touch normal.   Right arm vibration: normal  Left arm vibration: normal  Right leg vibration: decreased from ankle  Left leg vibration: decreased from ankle    Gait, Coordination, and Reflexes     Coordination   Finger to nose coordination: normal    Tremor   Resting tremor: absent  Intention tremor: absent  Action tremor: absent    Reflexes   Right brachioradialis: 0  Left brachioradialis: 0  Right biceps: 0  Left biceps: 0  Right triceps: 0  Left triceps: 0  Right patellar: 0  Left patellar: 0  Right achilles: 0  Left achilles: 0  Right plantar: normal  Left plantar: normal  Right Paul: absent  Left Paul: absent  Right ankle clonus: absent  Left ankle clonus: absent  Rapid alternating movements were normal.       ADDITIONAL REVIEW:  CBC:    Recent Labs     07/17/21  0408 07/18/21  0252 07/19/21  0142   WBC 7.4 7.3 9.7   HGB 12.1* 10.5* 10.4*   PLT 46* 43* 55*     BMP: Recent Labs     07/17/21  0408 07/17/21  0408 07/18/21  0252 07/19/21  0142 07/19/21  1325     --  140 140  --    K 3.2*   < > 3.7 2.9* 3.4*     --  106 106  --    CO2 26  --  25 26  --    BUN 18  --  13 15  --    CREATININE 0.8  --  0.6 0.6  --    GLUCOSE 99  --  146* 100  --     < > = values in this interval not displayed. Hepatic:  Recent Labs     07/17/21  0408 07/18/21 0252 07/19/21  0142   AST 48* 47* 34   ALT 25 22 15   BILITOT 0.4 <0.2 0.3   ALKPHOS 85 88 105     Troponin T:    Recent Labs     07/16/21  1912 07/17/21  0010   TROPONINI 0.04* 0.05*     Lipids:    Recent Labs     07/17/21 0408   CHOL 94*   HDL 29*     ABGs:    Lab Results   Component Value Date    PHART 7.388 03/16/2021    PO2ART 68 03/16/2021    QGF8GXQ 40 03/16/2021     Narrative   CT BRAIN without contrast 7/19/2021 3:12 PM   HISTORY: Hallucinations. COMPARISON: 4/21/2021    DLP: 1187 mGy cm. All CT scans are performed using dose optimization   techniques as appropriate to the performed exam and include at least   one of the following: Automated exposure control, adjustment of the mA   and/or kV according to size, and the use of the iterative   reconstruction technique. TECHNIQUE: Serial axial tomographic images of the brain were obtained   without the use of intravenous contrast.    FINDINGS:    The midline structures are nondisplaced. There is mild cerebral and   cerebellar volume loss, with an associated increase in the prominence   of the ventricles and sulci. The basilar cisterns are normal in size   and configuration. There is no evidence of intracranial hemorrhage or   mass-effect. There is low attenuation in the periventricular white   matter, consistent with chronic ischemic change. There are no abnormal   extra-axial fluid collections. There is no evidence of tonsillar   herniation. The included orbits and their contents are unremarkable.  The   visualized paranasal sinuses, mastoid air cells and middle ear   cavities are clear. The visualized osseous structures and overlying   soft tissues of the skull and face are intact.        Impression   1. Mild cerebral and cerebellar volume loss with chronic microvascular   disease but no evidence of acute intracranial process. Signed by Dr Dino Farley:  1. Subacute bilateral occipital strokes with resultant visual impairment, visual hallucinations, cognitive impairment. His symptoms were improving but now with the infected right knee prosthetic joint and sepsis, they have worsened once again. Nothing to indicate new stroke clinically or radiologically. I discussed treatment options but I did not recommend atypical antipsychotic due to risk of stroke and MI.  2. Septic arthritis right knee  3. CAD  4. AVR  5. COPD  6. Hypertension  7. Hyperlipidemia. PLAN:  1. Reassurance that symptoms are expected to improve  2. Continue ASA, Plavix  3. Abx  4.  Melany Hope M.D.

## 2021-07-19 NOTE — PROGRESS NOTES
Subjective:     Post-Operative Day: 2 No complaints    Objective:     Patient Vitals for the past 24 hrs:   BP Temp Temp src Pulse Resp SpO2   07/19/21 1103 127/69 99.5 °F (37.5 °C) Temporal 87 18 94 %   07/19/21 0620 (!) 147/70 99.1 °F (37.3 °C) Temporal 72 16 95 %   07/19/21 0230 (!) 112/54   80     07/18/21 2259 (!) 144/83 97.8 °F (36.6 °C) Temporal 83 16 93 %   07/18/21 2215 (!) 108/58   84 16 94 %   07/18/21 1926 138/77 98.4 °F (36.9 °C) Temporal 93 18 91 %   07/18/21 1925      (!) 89 %       General: Alert cooperative   Wound: Clean dry intact. Moderate swelling   Neurovascular: Exam normal   DVT Exam: No evidence of DVT         Data Review:  Recent Labs     07/18/21  0252 07/19/21  0142   HGB 10.5* 10.4*     Recent Labs     07/19/21  0142      K 2.9*   CREATININE 0.6   GLUCOSE 100     No results for input(s): POCGLU in the last 72 hours. drain 160 then 170 10 140 40  r knee      Assessment:     Status Post explant , I&D, antbx spacer for MSSA infected right Total Knee Arthroplasty. Doing well postop without complication.    Plan:   WBAT / ROM ok  Drain out today  pic line  IV antbx (ancef)  Pain control  Tight blood glucose control  PT/OT  DVT prophylaxis  Ice and elevate  Discharge Home or rehab this week

## 2021-07-19 NOTE — PROGRESS NOTES
INFECTIOUS DISEASES PROGRESS NOTE    Patient:  Tran Villalobos  YOB: 1955  MRN: [id number]   Admit date: 7/16/2021   Admitting Physician: Sharon Erickson MD  Primary Care Physician: SHIMON Dunbar    CHIEF COMPLAINT: knee and right shoulder hurting      Interval History:  Patient feels knee a little better but still with issues using right shoulder/arm - no worse    More positive blood cultures      Allergies: Allergies   Allergen Reactions    Codeine Swelling     Lips swelling    Iv [Iodides] Swelling     Contrast dye used for heart cath. Caused face to swell.     Hydrocodone Swelling     lips swelling    Statins      Myalgias         Current Meds: baclofen (LIORESAL) tablet 5 mg, TID PRN  ceFAZolin (ANCEF) 2000 mg in 0.9% sodium chloride 50 mL IVPB, Q8H  sodium chloride flush 0.9 % injection 10 mL, 2 times per day  sodium chloride flush 0.9 % injection 10 mL, PRN  0.9 % sodium chloride infusion, PRN  acetaminophen (TYLENOL) tablet 650 mg, Q6H  oxyCODONE (ROXICODONE) immediate release tablet 5 mg, Q4H PRN   Or  oxyCODONE (ROXICODONE) immediate release tablet 10 mg, Q4H PRN  morphine injection 2 mg, Q1H PRN   Or  morphine injection 4 mg, Q1H PRN  sennosides-docusate sodium (SENOKOT-S) 8.6-50 MG tablet 1 tablet, BID  magnesium hydroxide (MILK OF MAGNESIA) 400 MG/5ML suspension 30 mL, Daily PRN  aspirin EC tablet 325 mg, BID  lidocaine PF 1 % injection 5 mL, Once  sodium chloride flush 0.9 % injection 10 mL, PRN  0.9 % sodium chloride infusion, PRN  lidocaine PF 1 % injection 5 mL, Once  ondansetron (ZOFRAN-ODT) disintegrating tablet 4 mg, Q8H PRN   Or  ondansetron (ZOFRAN) injection 4 mg, Q6H PRN  polyethylene glycol (GLYCOLAX) packet 17 g, Daily PRN  acetaminophen (TYLENOL) tablet 650 mg, Q6H PRN   Or  acetaminophen (TYLENOL) suppository 650 mg, Q6H PRN  magnesium sulfate 2000 mg in 50 mL IVPB premix, PRN  potassium chloride (KLOR-CON M) extended release tablet 40 mEq, PRN   Or  potassium positive  Extremities: Right knee with dressing clean dry and intact. Left knee with minimal to no erythema, no increased warmth unable to bend the knee without difficulty. Right anterior shoulder scar well-healed. No effusion appreciated  PSYCH: alert, pleasant and cooperative.         LAB RESULTS:    CBC with DIFF:  Recent Labs     07/17/21  0408 07/18/21  0252 07/19/21  0142   WBC 7.4 7.3 9.7   RBC 3.93* 3.51* 3.41*   HGB 12.1* 10.5* 10.4*   HCT 36.4* 32.5* 31.8*   MCV 92.6 92.6 93.3   MCH 30.8 29.9 30.5   MCHC 33.2 32.3* 32.7*   RDW 14.1 14.2 14.3   PLT 46* 43* 55*   MPV 12.7* 12.3 11.5   NEUTOPHILPCT 82.0* 92.0* 79.6*   LYMPHOPCT 4.0* 6.0* 10.8*   MONOPCT 2.0 2.0 8.9   EOSRELPCT 0.0 0.0 0.0   BASOPCT 0.0 0.0 0.2   NEUTROABS 6.9 6.7 7.7*   LYMPHSABS 0.4* 0.4* 1.1   MONOSABS 0.10 0.10 0.90   EOSABS 0.00 0.00 0.00   BASOSABS 0.00 0.00 0.00       CMP/BMP:  Recent Labs     07/17/21  0408 07/18/21  0252 07/19/21  0142    140 140   K 3.2* 3.7 2.9*    106 106   CO2 26 25 26   ANIONGAP 8 9 8   GLUCOSE 99 146* 100   BUN 18 13 15   CREATININE 0.8 0.6 0.6   LABGLOM >60 >60 >60   CALCIUM 8.5* 8.4* 8.3*   PROT 5.1* 4.9* 4.7*   LABALBU 2.9* 2.5* 2.6*   BILITOT 0.4 <0.2 0.3   ALKPHOS 85 88 105   ALT 25 22 15   AST 48* 47* 34         Culture Results:    Recent Labs     07/17/21  1111   CXSURG Moderate growth       Blood Culture Recent:   Recent Labs     07/18/21  0552 07/16/21  1256   BC  Bottle volume = <5 ml  Quality of results might be  affected with low volume.   Gram stain Anaerobic bottle:  Gram positive cocci in clusters  resembling Staphylococcus  Culture in progress  Please notify Physician  *  Bottle volume = 8 ml*  Isolated from Aerobic and Anaerobic bottle   Blood cultures July 16 1256+ for methicillin susceptible Staph aureus from both bottles    Blood cultures collected July 16 at 1447+ for methicillin susceptible Staphylococcus aureus from 1 of 2 bottles    Blood cultures July 18 x1 set at 4694 positive for gram-positive cocci in clusters    Blood cultures 7/18 x1 set at 0938 negative to date      RADIOLOGY      XR SHOULDER RIGHT (MIN 2 VIEWS)    Result Date: 7/17/2021  History: Right shoulder pain Right shoulder: 3 views right shoulder are obtained. COMPARISON: 4/22/2012 FINDINGS: The alignment the prosthesis is appropriate. No malalignment or hardware complication identified. No periprosthetic fracture. Mild right acromioclavicular arthrosis. Visible right-sided ribs appear intact. There is improving aeration right lung base compared to the 4/22/2021 exam. A right infrahilar density may relate to vascular crowding versus atelectasis. 1. Appropriate alignment of the right shoulder prosthesis with no acute bony pathology identified. 2. Improving aeration right lung base compared to 4/22/2021. Right infrahilar opacities may relate to vascular crowding and atelectasis. Signed by Dr Julio Pavon    XR KNEE RIGHT (1-2 VIEWS)    Result Date: 7/17/2021  History: Knee infection, prosthesis explantation with antibiotic spacer placement TECHNIQUE: 2 views right knee are obtained. COMPARISON: 7/16/2021 There is revision of the right knee prosthesis with explantation of the tibial component. Antibiotic spacers are placed within the tibia. Old healed fibula shaft fracture. Anterior surgical drain. Anterior soft tissue swelling and fluid with subcutaneous air. Diffuse vascular calcification. 1. Revision of right knee arthroplasty with placement of antibiotic beads. Anterior surgical drain. 2. Old healed proximal fibula shaft fracture. 3. Diffuse vascular calcifications Signed by Dr Julio Pavon    XR KNEE RIGHT (1-2 VIEWS)    Result Date: 7/16/2021  History: Swelling and pain with fever Right knee: 2 views right knee are obtained. COMPARISON: 6/18/2019 FINDINGS: There is anterior soft tissue swelling with a small knee joint effusion. Alignment of the prosthesis is preserved. No evidence of hardware loosening. Similar remodeling along the femoral trochlea. Bony spurring of the proximal tibia. No bony fracture or dislocation. Chronic cortical thickening of the proximal fibula shaft Diffuse gastric calcification. 1. Appropriate alignment of the right knee prosthesis with no acute osseous pathology. Small knee joint effusion with anterior soft tissue swelling. Signed by Dr Jailyn Dow    XR CHEST PORTABLE    Result Date: 7/16/2021  Examination. XR CHEST PORTABLE 7/16/2021 12:19 AM History: Fever. A single frontal portable upright view of the chest is compared with the previous study dated 5/19/2021. The lungs are poorly inflated. The linear parenchymal density in the left lower lung are similar to the previous study and represent discoid atelectasis. There is no evidence of recent infiltrate, pleural effusion, pulmonary congestion or pneumothorax. Heart size is not optimally evaluated due to the portable projection. There is no acute bony abnormality. The right shoulder arthroplasty is incompletely visualized and not noted. Poor lung expansion and left lower lung discoid atelectasis. No active infiltrate or pulmonary congestion.  Signed by Dr Olympia Lesches                  Patient Active Problem List   Diagnosis    Coronary atherosclerosis of native coronary artery    MI (myocardial infarction) ([address]    COPD (chronic obstructive pulmonary disease) ([address]    Bronchitis    Leg pain    History of tobacco abuse    Hoarseness, chronic    Chronic heartburn    Hiatal hernia    Coronary artery disease involving native coronary artery of native heart without angina pectoris    Mixed hyperlipidemia    Essential hypertension    S/P coronary artery stent placement    SOB (shortness of breath)    Right upper quadrant abdominal pain    Acalculous cholecystitis    Aortic valve stenosis    Arthritis of knee    Gastroesophageal reflux disease    Gout    Neuropathy    Rheumatoid arthritis ([address]    Alternating constipation and diarrhea    DDD (degenerative disc disease), lumbar    Spinal stenosis of lumbar region with neurogenic claudication    Lumbar stenosis with neurogenic claudication    Abnormal myocardial perfusion study    Osteoarthritis of both shoulders    Renal cyst, left    Complex renal cyst    Hypertrophy of prostate with urinary obstruction    Current use of proton pump inhibitor    S/P TAVR (transcatheter aortic valve replacement)    Acute on chronic combined systolic and diastolic congestive heart failure due to valvular disease (HCC)    Fever and chills    Dysuria    Suspected UTI    Anemia    Status post transcatheter aortic valve replacement (TAVR) using bioprosthesis    Confusion    Septic arthritis (HCC)       IMPRESSION:  1. 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 Surgical cultures positive from July 17  2. Chronic obstructive pulmonary disease  3. Coronary artery disease  4. Rheumatoid arthritis  5. History transcatheter aortic valve replacement  6. Right shoulder painno effusion detected      RECOMMENDATIONS :  · Continue cefazolin  · Continue to monitor surveillance cultures  · If any additional cultures positive consider transesophageal echo. · Continue to monitor surgical site  · Continue to monitor right shoulder and other joints for potential metastatic focus of infection.         Cliff Richards MD

## 2021-07-19 NOTE — CARE COORDINATION
Home Health referral received. Per Nicole Millard, [facility] Planner, this patient is possibly going to rehab. No home health needed at this time. Will sign off.    Electronically signed by Ebony Zambrano RN on 7/19/21 at 11:38 AM CDT Progress Note Details  Patient Name: Ralph Reynoso  Date: 9/8/2017   Patient Number: [id number] Physician / Josr Wong: Blake Osborne   Patient YOB: 1937 Facility: [facility]    Chief Complaint  This information was obtaine 8/4/17-Minimal improvement in wound, no s/s of infection. Debridement, endoform, hydrofera ready and offload with felted foam donut in gym shoes while driving. Instructed to not use [employer] while driving.  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 - Next Friday   Other: - F/U with    F/U with  as scheduled.     Misc/Additional Orders  Increase dietary protein  Decrease salt intake  S/S of Infection  Other: - Take diuretics as prescribed by your PCP

## 2021-07-19 NOTE — PROGRESS NOTES
Positive blood cultures reported to Dr. Ivonne Vasquez. ID not called because they are aware of the positive blood cultures.       Electronically signed by Lane Burleson RN on 7/19/2021 at 5:55 AM

## 2021-07-19 NOTE — PROGRESS NOTES
07/19/21 1048   Restrictions/Precautions   Restrictions/Precautions Weight Bearing   Lower Extremity Weight Bearing Restrictions   Right Lower Extremity Weight Bearing Weight Bearing As Tolerated   Subjective   Subjective Patient in bed agrees to walk   Pain Screening   Patient Currently in Pain Yes   Intervention List Patient able to continue with treatment;Nurse called to administer meds   Pain Assessment   Pain Assessment 0-10   Pain Level 7   Patient's Stated Pain Goal 1   Pain Type Acute pain;Surgical pain   Pain Location Knee   Pain Orientation Right   Pain Frequency Intermittent   Clinical Progression Not changed   Functional Pain Assessment Prevents or interferes some active activities and ADLs   Non-Pharmaceutical Pain Intervention(s) Rest   Response to Pain Intervention Patient Satisfied   Vital Signs   Level of Consciousness Alert (0)   Oxygen Therapy   O2 Device None (Room air)   Orientation   Overall Orientation Status WNL   Bed Mobility   Supine to Sit Minimal assistance   Scooting Contact guard assistance   Transfers   Sit to Stand Moderate Assistance   Stand to sit Contact guard assistance   Ambulation   Ambulation?  Yes   Ambulation 1   Device Rolling Walker   Assistance Contact guard assistance   Quality of Gait Slow  No LOB   Gait Deviations Slow Ayanna;Decreased step length;Decreased step height   Distance 15'   Comments Patient in bed post gait   Activity Tolerance   Activity Tolerance Patient Tolerated treatment well;Patient limited by pain   Safety Devices   Type of devices Left in chair;Chair alarm in place;Call light within reach  (Family present)   Physical Therapy    Electronically signed by Rui Lees PTA on 7/19/2021 at 10:57 AM

## 2021-07-19 NOTE — PROCEDURES
PICC Line Insertion Procedure Note    Procedure: Insertion of 4.5 Polish Single Lumen PICC    Indications:  Home IV therapy    Procedure Details   Informed consent was obtained for the procedure, including sedation. Risks of lung perforation, hemorrhage, and adverse drug reaction were discussed. 4.5 Polish Single Lumen PICC inserted to the L Basilic vein per hospital protocol. Blood return:  yes    Findings:  Catheter inserted to 42 cm, with 3 cm. Exposed. Mid upper arm circumference is 28 cm. There were no changes to vital signs. Catheter was flushed with 10 cc NS. Patient did tolerate procedure well. Tip location confirmed within the SVC by VPS technology. Recommendations:  CXR ordered to verify placement. PICC Brochure given to patient with teaching instruction.

## 2021-07-19 NOTE — PLAN OF CARE
Problem: Pain:  Goal: Pain level will decrease  Description: Pain level will decrease  Outcome: Ongoing  Goal: Control of acute pain  Description: Control of acute pain  Outcome: Ongoing  Goal: Control of chronic pain  Description: Control of chronic pain  Outcome: Ongoing     Problem: Falls - Risk of:  Goal: Will remain free from falls  Description: Will remain free from falls  Outcome: Ongoing  Goal: Absence of physical injury  Description: Absence of physical injury  Outcome: Ongoing     Problem: Infection - Surgical Site:  Goal: Will show no infection signs and symptoms  Description: Will show no infection signs and symptoms  Outcome: Ongoing

## 2021-07-19 NOTE — PROGRESS NOTES
[facility]      Patient:  Sarah Donato  YOB: 1955  Date of Service: 7/19/2021  MRN: [id number]   Acct: [de-identified]   Primary Care Physician: SHIMON Segura  Advance Directive: Full Code  Admit Date: 7/16/2021       Hospital Day: 3  Portions of this note have been copied forward, however, updated to reflect the most current clinical status of this patient. CHIEF COMPLAINT R knee pain    SUBJECTIVE:  Mr. Poli Herrera is sitting up at bedside with food tray and family at the bedside. Pt states he has some shortness of breath when he moves around. He also states that he has been having hallucinations of flies flying around the room and seeing his dog that passed away years ago. He states his pain currently is at a 6/10. Pt family states that pt has been having some hallucinations since April when he had TAVR but that it had progressively improved until his admission here. CUMULATIVE HOSPITAL COURSE:   The patient is a 75 y. o. male with past medical history of TAVR, CAD with stents, COPD, HTN, and HLD who presented to [facility] ED complaining of right knee pain.  Mr. Owens reported increased swelling and pain in right knee since last 2 days.  Reported fever of 103.4 at home. Stated he has history of bilateral knee replacement. However, right knee has had need for joint aspiration on occasion, but none recently. Reported he was seen at [facility] and was sent home. Denied any specific injury to his right knee. Reported severe pain, limited range of motion due to pain and swelling. Denied no more than usual shortness of breath, chest pain, abdominal pain, nausea, or vomiting. Work-up in ED revealed lactic 2.3, CRP 34.75, troponin 0.05, WBC 9.5, sed rate 30, unremarkable chest x-ray, right knee x-ray showed small knee joint effusion with anterior soft tissue swelling. Orthopedic surgery was consulted from ED whom recommended arthrocentesis for lab studies.  Dr. Anni Do arthrocentesis. Fluid study showed nucl cell 19,560, 40,000 RBC. Patient was admitted to hospital medicine with Septic arthritis. Dr. Chase Adan performed right knee explant antibiotic spacer on 7/17/21, with ANA drain. Blood culture returned positive for gram positive cocci in clusters resembling staphylococcus. Synovial fluid returned positive for staphylococcus aureus. ID was consulted whom recommended discontinuing vancomycin and cefepime and changed to Cefazolin. Surveillance blood cultures ordered for Sunday morning and Monday morning. Orthopedic surgery recommends leaving drain in for now, weightbearing as tolerated, DVT prophylaxis, PT/OT, ice and elevation. Pt with hallucinations. CT head ordered as well as Neurology consultation. Pt follows with Dr. Heydi Johnson outpatient. Review of Systems:   Review of Systems   Constitutional: Negative for chills, diaphoresis, fatigue and fever. HENT: Negative for congestion, ear pain, sinus pressure, sinus pain and sore throat. Eyes: Negative for photophobia, pain and itching. Respiratory: Positive for shortness of breath. Negative for cough, chest tightness and wheezing. Cardiovascular: Negative for chest pain, palpitations and leg swelling. Gastrointestinal: Negative for abdominal distention, abdominal pain, diarrhea, nausea and vomiting. Endocrine: Negative for cold intolerance and heat intolerance. Genitourinary: Negative for difficulty urinating, dysuria, flank pain, frequency and urgency. Musculoskeletal: Positive for arthralgias. Negative for joint swelling and myalgias. Neurological: Negative for dizziness, tremors, syncope, weakness, light-headedness, numbness and headaches. Hematological: Does not bruise/bleed easily. Psychiatric/Behavioral: Positive for hallucinations. Negative for agitation, confusion, dysphoric mood, self-injury and suicidal ideas.           Objective:   VITALS:  BP (!) 147/70   Pulse 72   Temp 99.1 °F (37.3 °C) (Temporal)   Resp 16   Ht 5' 7\" (1.702 m)   Wt 177 lb 7 oz (80.5 kg)   SpO2 95%   BMI 27.79 kg/m²   24HR INTAKE/OUTPUT:      Intake/Output Summary (Last 24 hours) at 7/19/2021 1026  Last data filed at 7/19/2021 1021  Gross per 24 hour   Intake 1124.84 ml   Output 2520 ml   Net -1395.16 ml       Physical Exam  Constitutional:       General: He is not in acute distress. Appearance: Normal appearance. He is not ill-appearing, toxic-appearing or diaphoretic. HENT:      Head: Normocephalic and atraumatic. Right Ear: External ear normal.      Left Ear: External ear normal.      Nose: Nose normal.      Mouth/Throat:      Mouth: Mucous membranes are moist.      Pharynx: Oropharynx is clear. Eyes:      General: No scleral icterus. Extraocular Movements: Extraocular movements intact. Conjunctiva/sclera: Conjunctivae normal.   Cardiovascular:      Rate and Rhythm: Normal rate and regular rhythm. Pulses: Normal pulses. Heart sounds: Normal heart sounds. No murmur heard. No friction rub. No gallop. Pulmonary:      Effort: Pulmonary effort is normal. No respiratory distress. Breath sounds: Normal breath sounds. No stridor. No wheezing or rhonchi. Abdominal:      General: Abdomen is flat. Bowel sounds are normal. There is no distension. Palpations: Abdomen is soft. There is no mass. Tenderness: There is no abdominal tenderness. Hernia: No hernia is present. Musculoskeletal:         General: No swelling or signs of injury. Cervical back: Normal range of motion and neck supple. Right lower leg: No edema. Left lower leg: No edema. Comments: R knee wrapped with drain in place. Blood tinged fluid in drain. Dressing c/d/i. Skin:     General: Skin is warm and dry. Coloration: Skin is not jaundiced or pale. Findings: No erythema, lesion or rash. Neurological:      General: No focal deficit present. Mental Status: He is alert. Cranial Nerves: No cranial nerve deficit. Sensory: No sensory deficit. Motor: No weakness. Psychiatric:         Mood and Affect: Mood normal.            Medications:      sodium chloride      sodium chloride      sodium chloride 75 mL/hr at 07/19/21 0434    sodium chloride        ceFAZolin  2,000 mg Intravenous Q8H    sodium chloride flush  10 mL Intravenous 2 times per day    acetaminophen  650 mg Oral Q6H    sennosides-docusate sodium  1 tablet Oral BID    aspirin  325 mg Oral BID    lidocaine 1 % injection  5 mL Intradermal Once    lidocaine PF  5 mL Intradermal Once    [Held by provider] clopidogrel  75 mg Oral Daily    ezetimibe  10 mg Oral Daily    gabapentin  800 mg Oral TID    hydroxychloroquine  400 mg Oral Daily    pantoprazole  40 mg Oral Daily    budesonide  0.5 mg Nebulization BID    Arformoterol Tartrate  15 mcg Nebulization BID    thiamine  100 mg Intravenous Daily    multivitamin  1 tablet Oral Daily    folic acid  1 mg Oral Daily     baclofen, sodium chloride flush, sodium chloride, oxyCODONE **OR** oxyCODONE, morphine **OR** morphine, magnesium hydroxide, sodium chloride flush, sodium chloride, ondansetron **OR** ondansetron, polyethylene glycol, acetaminophen **OR** acetaminophen, magnesium sulfate, potassium chloride **OR** potassium alternative oral replacement **OR** potassium chloride, oxyCODONE-acetaminophen, albuterol, morphine, sodium chloride flush, sodium chloride, LORazepam **OR** LORazepam **OR** LORazepam **OR** LORazepam **OR** LORazepam **OR** LORazepam **OR** LORazepam **OR** LORazepam  ADULT DIET;  Regular  Adult Oral Nutrition Supplement; Standard High Calorie/High Protein Oral Supplement     Lab and other Data:     Recent Labs     07/17/21  0408 07/18/21  0252 07/19/21  0142   WBC 7.4 7.3 9.7   HGB 12.1* 10.5* 10.4*   PLT 46* 43* 55*     Recent Labs     07/17/21  0408 07/18/21  0252 07/19/21  0142    140 140   K 3.2* 3.7 2.9*    106 106 CO2 26 25 26   BUN 18 13 15   CREATININE 0.8 0.6 0.6   GLUCOSE 99 146* 100     Recent Labs     07/17/21  0408 07/18/21  0252 07/19/21  0142   AST 48* 47* 34   ALT 25 22 15   BILITOT 0.4 <0.2 0.3   ALKPHOS 85 88 105     Troponin T:   Recent Labs     07/16/21  1237 07/16/21  1912 07/17/21  0010   TROPONINI 0.05* 0.04* 0.05*     INR:   Recent Labs     07/16/21  1237   INR 1.11     A1C:   Recent Labs     07/17/21  0408   LABA1C 5.1       RAD:   XR SHOULDER RIGHT (MIN 2 VIEWS)  Result Date: 7/17/2021  1. Appropriate alignment of the right shoulder prosthesis with no acute bony pathology identified. 2. Improving aeration right lung base compared to 4/22/2021. Right infrahilar opacities may relate to vascular crowding and atelectasis. Signed by Dr Pantera Ham    XR KNEE RIGHT (1-2 VIEWS)  Result Date: 7/17/2021  1. Revision of right knee arthroplasty with placement of antibiotic beads. Anterior surgical drain. 2. Old healed proximal fibula shaft fracture. 3. Diffuse vascular calcifications   Signed by Dr Pantera Ham    XR KNEE RIGHT (1-2 VIEWS)  Result Date: 7/16/2021  1. Appropriate alignment of the right knee prosthesis with no acute osseous pathology. Small knee joint effusion with anterior soft tissue swelling. Signed by Dr Pantera Ham    XR CHEST PORTABLE  Result Date: 7/16/2021  Poor lung expansion and left lower lung discoid atelectasis. No active infiltrate or pulmonary congestion. Signed by Dr Yessi Sidhu:     Procedure Component Value Units Date/Time   Culture, Blood 2 [[id number]] Collected: 07/18/21 0938   Order Status: Completed Specimen: Blood Updated: 07/19/21 1114    Culture, Blood 2 No Growth to date.  Any change in status will be called. Narrative:     ORDER#: [id number]                          ORDERED BY: ROCIO TREVIÑO   SOURCE: Blood Antecubital-Rig              COLLECTED:  07/18/21 09:38   ANTIBIOTICS AT [facility]. :                      RECEIVED :  07/18/21 10:17 Culture, Surgical [[id number]] (Abnormal) Collected: 07/17/21 1107   Order Status: Completed Specimen: Body Fluid from Joint, Knee Updated: 07/19/21 0717    Gram Stain Result Many WBC's (Polymorphonuclear) present   Moderate Gram positive cocci  in clusters   No Epithelial Cells seen   Abnormal     Anaerobic Culture No anaerobes to date,will hold 4 days    Organism Staphylococcus aureusAbnormal     Culture Surgical --    Moderate growth   No further workup   Refer to (Surgical culture Knee #3 R knee medial gutter collected   [email or web address]) for sensitivity results    Narrative:     ORDER#: [id number]                          ORDERED BY: ROCIO TREVIÑO   SOURCE: Knee #1 R knee fluid               COLLECTED:  07/17/21 11:07   ANTIBIOTICS AT [facility]. :                      RECEIVED :  07/17/21 12:41   Culture, Surgical [[id number]] (Abnormal) Collected: 07/17/21 1110   Order Status: Completed Specimen: Tissue from Joint, Knee Updated: 07/19/21 0716    Gram Stain Result Few WBC's (Polymorphonuclear) present   Few Gram positive cocci  in clusters   No Epithelial Cells seen   Abnormal     Anaerobic Culture No anaerobes to date,will hold 4 days    Organism Staphylococcus aureusAbnormal     Culture Surgical --    Moderate growth   No further workup   Refer to (Surgical culture Knee #3 R knee medial gutter collected   [employer]) for sensitivity results    Narrative:     ORDER#: [id number]                          ORDERED BY: ROCIO TREVIÑO   SOURCE: Knee #2 R knee fat pad             COLLECTED:  07/17/21 11:10   ANTIBIOTICS AT [facility]. :                      RECEIVED :  07/17/21 12:43   Culture, Surgical [[id number]] (Abnormal)  Collected: 07/17/21 1111   Order Status: Completed Specimen: Tissue from Joint, Knee Updated: 07/19/21 0716    Gram Stain Result Few WBC's (Polymorphonuclear) present   Rare Gram positive cocci  in clusters   No Epithelial Cells seen   Abnormal     Anaerobic Culture No anaerobes to date,will hold 4 days    Organism Staphylococcus aureusAbnormal     Culture Surgical Moderate growth   Narrative:     ORDER#: [id number]                          ORDERED BY: ROCIO TREVIÑO   SOURCE: Knee #3 R knee medial gutter       COLLECTED:  07/17/21 11:11   ANTIBIOTICS AT [facility]. :                      RECEIVED :  07/17/21 12:43   Culture, Blood 1 [[id number]] (Abnormal)  Collected: 07/16/21 1256   Order Status: Completed Specimen: Blood Updated: 07/19/21 0714    Blood Culture, Routine  Bottle volume = 8 mlAbnormal     Organism Staphylococcus aureusAbnormal     Blood Culture, Routine Isolated from Aerobic and Anaerobic bottle   Narrative:     ORDER#: [id number]                          ORDERED BY: Ariela Pham   SOURCE: Blood lac                          COLLECTED:  07/16/21 12:56   ANTIBIOTICS AT [facility]. :                      RECEIVED :  07/16/21 12:56   CALL Edilberto Moody [phone],   Microbiology results called to and read back by Patricia Whittington RN on   5th, 07/17/2021 04:13, by [facility]   Culture, Blood 1 [[id number]] Collected: 07/19/21 0631   Order Status: Sent Specimen: Blood Updated: 07/19/21 0644   Culture, Blood 1 [[id number]] (Abnormal) Collected: 07/18/21 0552   Order Status: Completed Specimen: Blood Updated: 07/19/21 0546    Blood Culture, Routine --Abnormal      Bottle volume = <5 ml   Quality of results might be   affected with low volume. Gram stain Anaerobic bottle:   Gram positive cocci in clusters   resembling Staphylococcus   Culture in progress   Please notify Physician   Abnormal    Narrative:     ORDER#: [id number]                          ORDERED BY: ROCIO TREVIÑO   SOURCE: Blood Hand, Left                   COLLECTED:  07/18/21 05:52   ANTIBIOTICS AT [facility]. :                      RECEIVED :  07/18/21 06:57   CALL Ishmael Camargo [phone],   Microbiology results called to and read back by Yusra Cintron RN ON 3RD, 07/19/2021 05:46, by [facility]   Culture, Blood 2 [[id number]] (Abnormal) Collected: 07/16/21 1237   Order Status: Completed Specimen: Blood Updated: 07/18/21 0803    Culture, Blood 2  Bottle volume = 9 mlAbnormal     Organism Staphylococcus aureusAbnormal     Culture, Blood 2 --    No further workup   Isolated from Aerobic and Anaerobic bottle   Refer to (Blood culture collected [email or web address]) for sensitivity results    Narrative:     ORDER#: [id number]                          ORDERED BY: Ben Cheema   SOURCE: Blood rac                          COLLECTED:  07/16/21 12:37   ANTIBIOTICS AT [facility]. :                      RECEIVED :  07/16/21 12:56   CALL Martha Villarreal [phone],   Microbiology results called to and read back by Bryn Thomas RN on   5th, 07/17/2021 04:15, by Sergey Martinez   Culture, Body Fluid [[id number]] (Abnormal)  Collected: 07/16/21 1447   Order Status: Completed Specimen: Body Fluid from Synovial Fluid Updated: 07/18/21 0718    Gram Stain Result Many WBC's (Polymorphonuclear)   Many Gram positive cocci  in pairs   Abnormal     Anaerobic Culture No anaerobes to date,will hold 4 days    Organism Staphylococcus aureusAbnormal     Body Fluid Culture, Sterile Moderate growth   Narrative:     ORDER#: [id number]                          ORDERED BY: SHANNON PIRES   SOURCE: Synovial Fluid Right Knee          COLLECTED:  07/16/21 14:47   ANTIBIOTICS AT [facility]. :                      RECEIVED :  07/16/21 14:54   Culture, Blood 1 [[id number]] Collected: 07/18/21 0552   Order Status: Canceled Specimen: Blood    Culture, Fungus [[id number]] Collected: 07/17/21 1110   Order Status: Sent Specimen: Tissue from Joint, Knee Updated: 07/17/21 1418    KOH Prep No Fungal elements seen   Narrative:     ORDER#: [id number]                          ORDERED BY: ROCIO Jones   SOURCE: Knee #2 R knee fat pad             COLLECTED:  07/17/21 11:10   ANTIBIOTICS AT [facility]. :                      RECEIVED :  07/17/21 12:40   Culture, Fungus [[id number]] Collected: 07/17/21 1107   Order Status: Sent Specimen:  Body Fluid from Joint, Knee Updated: 07/17/21 1417    KOH Prep No Fungal elements seen   Narrative:     ORDER#: [id number]                          ORDERED BY: ROCIO TREVIÑO   SOURCE: Knee #1 R knee fluid               COLLECTED:  07/17/21 11:07   ANTIBIOTICS AT [facility]. :                      RECEIVED :  07/17/21 12:39      Gram Stain [[id number]]    Order Status: Sent Specimen: Aspirate    COVID-19, Rapid [[id number]] Collected: 07/16/21 1153   Order Status: Completed Specimen: Nasopharyngeal Swab Updated: 07/16/21 1233    SARS-CoV-2, NAAT Not Detected       Assessment/Plan   Principal Problem:    Septic arthritis ([facility] [address]- Orthopedics performed R knee explant antibiotic spacer with ANA drain on 7/17/21. Weightbearing as tolerated, DVT prophylaxis, PT/OT, ice and elevation. ID consulted and continuing Cefazolin    Active Problems:    Coronary artery disease involving native coronary artery of native heart without angina pectoris    S/P TAVR (transcatheter aortic valve replacement)   - Cardiology consulted and notes pt will need follow up in 4 weeks in the valve clinic       COPD (chronic obstructive pulmonary disease) ([facility] [address]- O2 and nebulizers.  IS      Essential hypertension-continue home medications and monitor closely       Antibiotic: Soraida Crowe PA-C  7/19/2021, 10:26 AM

## 2021-07-20 NOTE — PROGRESS NOTES
[address], [address]  [place], [place]  Phone [phone]     Neurology Progress Note  2021 1:37 PM  Information:   Patient Name: Marybeth Celeste  :   1955  Age:   76 y.o. MRN:   [id number]  Account #:  [de-identified]  Admit Date:   2021  Today:  21     ADMIT DX:   Septic arthritis [facility])    Subjective:     Marybeth Celeste is a 76y.o. year old man with a history of CAD, TAVR, and stroke who was admitted  with a right knee septic joint and has had worsening visual hallucinations. Interval History:   He slept well although he apparently was confused and trying to get out of bed. He is still seeing small animals - kittens today. Objective:     Past Medical History:  Past Medical History:   Diagnosis Date    Acute on chronic combined systolic and diastolic congestive heart failure due to valvular disease ([address] 2021    Arthritis     Bronchitis     Cancer (HCC)     Skin Cancer REMOVED RT ARM & LT EAR    Chronic back pain     Chronic cholecystitis without calculus 2017    Chronic GERD     COPD [facility]ch[facility] obstructive [facility]) ([facility]     Coronary artery disease of native artery of native heart with stable angina pectoris ([address]     Coronary atherosclerosis of native coronary artery     s/p PTCA and stent placement to the LAD and RCA/7 stents    DDD (degenerative disc disease), lumbar 2018    History of blood transfusion     WITH RT SHOULDER SURGERY    History of tobacco abuse     Hyperlipidemia     Hypertension     MI (myocardial infarction) ([address]     Old, inferior wall    Moderate aortic regurgitation 2017    mod-severe AR.  Moderate aortic stenosis 2017    mod-severe aortic stenosis.      Pain management     PT SEES PAIN MANAGEMENT FOR CHRONIC BACK PAIN    Spinal stenosis of lumbar region with neurogenic claudication 2018       Past Surgical History:   Procedure Laterality Date    BACK SURGERY s/p laminectomy    CARDIAC CATHETERIZATION  08/10/04 [facility]      CARDIAC [facility]HETERIZATION  [facility]/10/03  [facility]    CARDIAC CATHETERIZATION  08/29/03 [facility]    CARDIAC CATHETERIZATION  02/16/01 [facility]    CARDIAC CATHETERIZATION  05/25/2009    EF is estimated to be 50%. See scanned document.  CARDIAC CATHETERIZATION N/A 03/2016    stent placement    CARDIAC CATHETERIZATION  08/2017    no PCI    CARDIAC CATHETERIZATION  07/11/2019    Drug-eluting stents placed to mid LAD and mid RCA, normal LV    CHOLECYSTECTOMY, LAPAROSCOPIC N/A 05/19/2017    CHOLECYSTECTOMY LAPAROSCOPIC performed by Jennifer Thurman MD at [address] [place] [address]  05/28/2015    Dr. Holliday Hallmark: No Polyps   10yr recall    CORONARY ANGIOPLASTY WITH STENT PLACEMENT  03/2016    stent to LAD    JOINT REPLACEMENT      Left thumb    JOINT REPLACEMENT Left     knee    JOINT REPLACEMENT      KNEE SURGERY      s/p Rt.  knee replacement    LEG SURGERY Right     Broken leg with surgical repair    LUMBAR SPINE SURGERY N/A 12/04/2018    L1-5 LAMINECTOMY performed by [facility]on [facility]ira MD at [facility] EGD TRANSORAL BIOPSY SINGLE/MULTIPLE N/A 05/18/2017    Dr Maddy Bustos, Amelie (-), biliary colic, surgical referral    NC EGD TRANSORAL BIOPSY SINGLE/MULTIPLE N/A 11/29/2018    Dr JAIDEN Torres-w/dilation over wire-51 Ghanaian-Distal esophagitis, gastritis    REMOVE HARDWARE SPINE N/A 12/20/2018    I AND D LUMBAR INCISION SEROMA performed by Meron Vieira MD at [address] [place] [address] Right 12/12/2019    RIGHT REVERSE TOTAL SHOULDER ARTHROPLASTY performed by Mane Vicente MD at [address] [place] [address] Dr ENDOSCOPY  03/30/2015    Dr. Holliday Hallmark: amelie neg,,normal, empiric dilatation with 51F    UPPER GASTROINTESTINAL ENDOSCOPY N/A 11/29/2018    Dr JAIDEN Torres-w/dilation over wire-51 [place]ian-Distal esophagitis, gastritis       Family History   Problem Relation Age of Onset    Cancer Mother     Lung Cancer Mother     Heart Disease Father     Cancer Father     Liver Cancer Father     Lung Cancer Father     Diabetes Maternal Grandmother     Heart Disease Maternal Grandfather     Cancer Maternal Grandfather     Stroke Paternal Grandmother     Stomach Cancer Sister     No Known Problems Brother     COPD Sister     Heart Disease Paternal Grandfather     Colon Cancer Neg Hx     Colon Polyps Neg Hx     Esophageal Cancer Neg Hx     Liver Disease Neg Hx     Rectal Cancer Neg Hx        Social History     Socioeconomic History    Marital status:      Spouse name: Not on file    Number of children: Not on file    Years of education: Not on file    Highest education level: Not on file   Occupational History    Not on file   Tobacco Use    Smoking status: Former Smoker     Packs/day: 0.00     Types: Cigarettes     Quit date: 12/10/2009     Years since quittin.6    Smokeless tobacco: Never Used   Vaping Use    Vaping Use: Never used   Substance and Sexual Activity    Alcohol use: Yes     Alcohol/week: 42.0 standard drinks     Types: 42 Cans of beer per week     Comment: daily    Drug use: No    Sexual activity: Yes     Partners: Female   Other Topics Concern    Not on file   Social History Narrative    Not on file     Social Determinants of Health     Financial Resource Strain:     Difficulty of Paying Living Expenses:    Food Insecurity:     Worried About Running Out of Food in the Last Year:     [address] in the Last Year:    Transportation Needs:     Lack of Transportation (Medical):      Lack of Transportation (Non-Medical):    Physical Activity:     Days of Exercise per Week:     Minutes of Exercise per Session:    Stress:     Feeling of Stress :    Social Connections:     Frequency of Communication with Friends and Family:     Frequency of Social Gatherings with Friends and Family:     Attends Yazdanism Services:     Active Member of Clubs or Organizations:     Attends Club or Organization Meetings:     Marital Status:    Intimate Partner Violence:     Fear of Current or Ex-Partner:     Emotionally Abused:     Physically Abused:     Sexually Abused:        Medications:   sodium chloride      sodium chloride      sodium chloride 75 mL/hr at 07/20/21 1219    sodium chloride        ceFAZolin  2,000 mg Intravenous Q8H    sodium chloride flush  10 mL Intravenous 2 times per day    acetaminophen  650 mg Oral Q6H    sennosides-docusate sodium  1 tablet Oral BID    aspirin  325 mg Oral BID    lidocaine 1 % injection  5 mL Intradermal Once    lidocaine PF  5 mL Intradermal Once    [Held by provider] clopidogrel  75 mg Oral Daily    ezetimibe  10 mg Oral Daily    gabapentin  800 mg Oral TID    hydroxychloroquine  400 mg Oral Daily    pantoprazole  40 mg Oral Daily    budesonide  0.5 mg Nebulization BID    Arformoterol Tartrate  15 mcg Nebulization BID    thiamine  100 mg Intravenous Daily    multivitamin  1 tablet Oral Daily    folic acid  1 mg Oral Daily       Diagnostic Studies:  Reviewed all labs/diagnositcs since last 24hrs:  Recent Results (from the past 24 hour(s))   Comprehensive Metabolic Panel w/ Reflex to MG    Collection Time: 07/20/21  2:49 AM   Result Value Ref Range    Sodium 138 136 - 145 mmol/L    Potassium reflex Magnesium 4.3 3.5 - 5.0 mmol/L    Chloride 99 98 - 111 mmol/L    CO2 27 22 - 29 mmol/L    Anion Gap 12 7 - 19 mmol/L    Glucose 91 74 - 109 mg/dL    BUN 11 8 - 23 mg/dL    CREATININE 0.6 0.5 - 1.2 mg/dL    GFR Non-African American >60 >60    GFR African American >59 >59    Calcium 9.1 8.8 - 10.2 mg/dL    Total Protein 6.1 (L) 6.6 - 8.7 g/dL    Albumin 3.3 (L) 3.5 - 5.2 g/dL    Total Bilirubin 0.7 0.2 - 1.2 mg/dL    Alkaline Phosphatase 178 (H) 40 - 130 U/L    ALT 21 5 - 41 U/L    AST 61 (H) 5 - 40 U/L   CBC auto differential    Collection Time: 07/20/21  2:49 AM   Result Value Ref Range    WBC 10.9 (H) 4.8 - 10.8 K/uL    RBC 3.92 (L) 4.70 - 6.10 M/uL    Hemoglobin 11.7 (L) 14.0 - 18.0 g/dL    Hematocrit 35.7 (L) 42.0 - 52.0 %    MCV 91.1 80.0 - 94.0 fL    MCH 29.8 27.0 - 31.0 pg    MCHC 32.8 (L) 33.0 - 37.0 g/dL    RDW 14.5 11.5 - 14.5 %    Platelets 87 (L) 317 - 400 K/uL    MPV 12.4 9.4 - 12.4 fL    PLATELET SLIDE REVIEW Decreased     Neutrophils % 71.7 (H) 50.0 - 65.0 %    Lymphocytes % 15.0 (L) 20.0 - 40.0 %    Monocytes % 11.8 (H) 0.0 - 10.0 %    Eosinophils % 0.3 0.0 - 5.0 %    Basophils % 0.4 0.0 - 1.0 %    Neutrophils Absolute 7.8 (H) 1.5 - 7.5 K/uL    Immature Granulocytes # 0.1 K/uL    Lymphocytes Absolute 1.6 1.1 - 4.5 K/uL    Monocytes Absolute 1.30 (H) 0.00 - 0.90 K/uL    Eosinophils Absolute 0.00 0.00 - 0.60 K/uL    Basophils Absolute 0.00 0.00 - 0.20 K/uL   SPECIMEN REJECTION    Collection Time: 07/20/21  5:02 AM   Result Value Ref Range    Rejected Test P2Y12     Reason for Rejection see below    Platelet D4H31 Inhibition    Collection Time: 07/20/21  5:55 AM   Result Value Ref Range    P2Y12 Result 137 (L) 194 - 418 PRU   Hematocrit    Collection Time: 07/20/21  5:56 AM   Result Value Ref Range    Hematocrit 32.7 (L) 42.0 - 52.0 %   Platelet count    Collection Time: 07/20/21  5:56 AM   Result Value Ref Range    Platelets 61 (L) 889 - 400 K/uL       Diet:  ADULT DIET; Regular  Adult Oral Nutrition Supplement; Standard High Calorie/High Protein Oral Supplement    Examination:  Vitals: /68   Pulse 86   Temp 99.5 °F (37.5 °C) (Temporal)   Resp 18   Ht 5' 7\" (1.702 m)   Wt 178 lb 1 oz (80.8 kg)   SpO2 93%   BMI 27.89 kg/m²      Intake/Output Summary (Last 24 hours) at 7/20/2021 1337  Last data filed at 7/20/2021 1126  Gross per 24 hour   Intake 739 ml   Output 1350 ml   Net -611 ml     General appearance:  He is alert and cooperative  Skin: Skin color, texture, turgor normal. No rashes or lesions  HEENT: Head: Normal, normocephalic, atraumatic.   Neck:no adenopathy, no carotid bruit, no JVD, supple, symmetrical, trachea midline and

## 2021-07-20 NOTE — PROGRESS NOTES
Occupational Therapy   Occupational Therapy Initial Assessment  Date: 2021   Patient Name: Raúl White  MRN: [id number]     : 1955    Date of Service: 2021    Discharge Recommendations:  Patient would benefit from continued therapy after discharge       Assessment   Assessment: Evaluation completed and tx initiated. The patient would benefit from further therapy to upgrade skills and safety for home. Treatment Diagnosis: Septic arthritis, s/p explant/I & D/placement of antibiotic spacer RLE  REQUIRES OT FOLLOW UP: Yes  Activity Tolerance  Activity Tolerance: Patient Tolerated treatment well  Safety Devices  Safety Devices in place: Not Applicable (left with P.T.)           Patient Diagnosis(es): The primary encounter diagnosis was Effusion of right knee. A diagnosis of Acute pain of right knee was also pertinent to this visit. 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 heart failure due to valvular disease ([address] Arthritis, Bronchitis, Cancer ([address] Chronic back pain, Chronic cholecystitis without calculus, Chronic GERD, COPD (chronic obstructive pulmonary disease) ([address] Coronary artery disease of native artery of native heart with stable angina pectoris ([address] Coronary atherosclerosis of native coronary artery, DDD (degenerative disc disease), lumbar, History of blood transfusion, History of tobacco abuse, Hyperlipidemia, Hypertension, MI (myocardial infarction) ([address] Moderate aortic regurgitation, Moderate aortic stenosis, Pain management, and Spinal stenosis of lumbar region with neurogenic claudication. has a past surgical history that includes knee surgery; back surgery; Upper gastrointestinal endoscopy (2015); Leg Surgery (Right); Coronary angioplasty with stent (2016); joint replacement; joint replacement (Left); Colonoscopy (2015); Cardiac catheterization (08/10/04 [facility]); Cardiac catheterization (12/10/03  [facility]);  Cardiac catheterization (08/29/03 [facility]); Cardiac catheterization (02/16/01 [facility]); Cardiac catheterization (05/25/2009); Cardiac catheterization (N/A, 03/2016); pr egd transoral biopsy single/multiple (N/A, 05/18/2017); Cholecystectomy, laparoscopic (N/A, 05/19/2017); Cardiac catheterization (08/2017); joint replacement; pr egd transoral biopsy single/multiple (N/A, 11/29/2018); Upper gastrointestinal endoscopy (N/A, 11/29/2018); Lumbar spine surgery (N/A, 12/04/2018); REMOVE HARDWARE SPINE (N/A, 12/20/2018); Cardiac catheterization (07/11/2019); shoulder surgery (Right, 12/12/2019); and skin biopsy.     Treatment Diagnosis: Septic arthritis, s/p explant/I & D/placement of antibiotic spacer RLE      Restrictions  Restrictions/Precautions  Restrictions/Precautions: Weight Bearing, Fall Risk  Lower Extremity Weight Bearing Restrictions  Right Lower Extremity Weight Bearing: Weight Bearing As Tolerated    Subjective   General  Chart Reviewed: Yes  Patient assessed for rehabilitation services?: Yes  Family / Caregiver Present: Yes (spouse)  Patient Currently in Pain: Yes  Pain Assessment  Pain Assessment: 0-10  Pain Level: 7  Pain Type: Surgical pain  Pain Location: Knee  Pain Orientation: Right  Pain Descriptors: Aching;Tender  Functional Pain Assessment: Prevents or interferes with all active and some passive activities  Non-Pharmaceutical Pain Intervention(s): Ambulation/Increased Activity;Repositioned  Response to Pain Intervention: Patient Satisfied (left with PT for final positioning)  Pre Treatment Pain Screening  Pain at present: 7  Scale Used: Numeric Score  Intervention List: Nurse called to administer meds  Vital Signs  Patient Currently in Pain: Yes  Social/Functional History  Social/Functional History  Lives With: Spouse  Home Access: Stairs to enter without rails  Entrance Stairs - Number of Steps: 2 each step accomodates the walker  Bathroom Shower/Tub:  (roll in shower)  Bathroom Toilet: Standard  Bathroom Equipment: Toilet raiser  Home Equipment: Rolling walker  ADL Assistance: Needs assistance       Objective        Orientation  Overall Orientation Status: Within Functional Limits     Balance  Sitting Balance: Supervision  Standing Balance: Contact guard assistance (to min A)  Functional Mobility  Assist Level: Contact guard assistance (to min A)  Toilet Transfers  Toilet - Technique: Stand step  Toilet Transfer: Contact guard assistance;Minimal assistance  ADL  Feeding: Independent  Grooming: Supervision;Minimal assistance  UE Bathing: Minimal assistance  LE Bathing: Moderate assistance  UE Dressing: Minimal assistance  LE Dressing: Moderate assistance  Toileting: Minimal assistance; Moderate assistance        Bed mobility  Supine to Sit: Minimal assistance  Transfers  Stand Step Transfers: Contact guard assistance;Minimal assistance     Cognition  Cognition Comment: Requires supervision and structure                 LUE AROM (degrees)  LUE General AROM: shoulder flexion 0-90, distally WFL  RUE AROM (degrees)  RUE General AROM: Shoulder flexion 0-90, distally WFL                   Tx initiated: household mobility training, positioning training (15 mins)     Plan   Plan  Times per week: 3-5    G-Code     OutComes Score                                                  AM-PAC Score             Goals  Short term goals  Short term goal 1: Patient will be CGA for dressing with AE  Short term goal 2: CGA for toileting  Short term goal 3: CGA for bed mobility  Short term goal 4: CGA for transfers  Short term goal 5: CGA for light ambulatory ADL  Long term goals  Long term goal 1: Upgrade as tolerated       Therapy Time   Individual Concurrent Group Co-treatment   Time In           Time Out           Minutes                   Raymundo Goldberg, OT Electronically signed by Raymundo Goldberg, OT on 7/20/2021 at 10:15 AM

## 2021-07-20 NOTE — PROGRESS NOTES
[address] [facility]      Patient:  Maritza Stapleton  YOB: 1955  Date of Service: 7/20/2021  MRN: [id number]   Acct: [de-identified]   Primary Care Physician: SHIMON Garcia  Advance Directive: Full Code  Admit Date: 7/16/2021       Hospital Day: 4  Portions of this note have been copied forward, however, updated to reflect the most current clinical status of this patient. CHIEF COMPLAINT R knee pain    SUBJECTIVE:  Mr. Kizzy Johnson is laying in bed in no acute distress. He denies pain, chest pain or shortness of breath at this time. Family at bedside states pt has continued to hallucinate and that he had an episode of shortness of breath earlier this am.    [address]:   The patient is a 75 y. o. male with past medical history of TAVR, CAD with stents, COPD, HTN, and HLD who presented to [facility] ED complaining of right knee pain.  Mr. Owens reported increased swelling and pain in right knee since last 2 days.  Reported fever of 103.4 at home. Stated he has history of bilateral knee replacement. However, right knee has had need for joint aspiration on occasion, but none recently. Reported he was seen at [facility] and was sent home. Denied any specific injury to his right knee. Reported severe pain, limited range of motion due to pain and swelling. Denied no more than usual shortness of breath, chest pain, abdominal pain, nausea, or vomiting. Work-up in ED revealed lactic 2.3, CRP 34.75, troponin 0.05, WBC 9.5, sed rate 30, unremarkable chest x-ray, right knee x-ray showed small knee joint effusion with anterior soft tissue swelling. Orthopedic surgery was consulted from ED whom recommended arthrocentesis for lab studies. Dr. Nelsy Tovar arthrocentesis. Fluid study showed nucl cell 19,560, 40,000 RBC. Patient was admitted to hospital medicine with Septic arthritis. Dr. Bradley Flannery performed right knee explant antibiotic spacer on 7/17/21, with ANA drain.  Blood culture returned positive for gram positive cocci in clusters resembling staphylococcus. Synovial fluid returned positive for staphylococcus aureus. ID was consulted whom recommended discontinuing vancomycin and cefepime and changed to Cefazolin. Surveillance blood cultures ordered for Sunday morning and Monday morning. Orthopedic surgery recommends leaving drain in for now, weightbearing as tolerated, DVT prophylaxis, PT/OT, ice and elevation. Pt with hallucinations. CT head showed no acute process and  Neurology notes that symptoms will continue to improve. Drain has been removed and picc line in place. ID continuing pt on Cefazolin, ordered 2D echo and monitor R shoulder and other joints for potential metastatic focus of infection. Review of Systems:   Review of Systems   Constitutional: Negative for chills, diaphoresis, fatigue and fever. HENT: Negative for congestion, ear pain, sinus pressure, sinus pain and sore throat. Eyes: Negative for photophobia, pain and itching. Respiratory: Positive for shortness of breath. Negative for cough, chest tightness and wheezing. Cardiovascular: Negative for chest pain, palpitations and leg swelling. Gastrointestinal: Negative for abdominal distention, abdominal pain, diarrhea, nausea and vomiting. Endocrine: Negative for cold intolerance and heat intolerance. Genitourinary: Negative for difficulty urinating, dysuria, flank pain, frequency and urgency. Musculoskeletal: Positive for arthralgias. Negative for joint swelling and myalgias. Neurological: Negative for dizziness, tremors, syncope, weakness, light-headedness, numbness and headaches. Hematological: Does not bruise/bleed easily. Psychiatric/Behavioral: Positive for hallucinations. Negative for agitation, confusion, dysphoric mood, self-injury and suicidal ideas.           Objective:   VITALS:  /77   Pulse 103   Temp 99.7 °F (37.6 °C) (Temporal)   Resp 18   Ht 5' 7\" (1.702 m)   Wt 178 lb 1 oz (80.8 kg) SpO2 95%   BMI 27.89 kg/m²   24HR INTAKE/OUTPUT:      Intake/Output Summary (Last 24 hours) at 7/20/2021 1038  Last data filed at 7/20/2021 0757  Gross per 24 hour   Intake 748.01 ml   Output 1350 ml   Net -601.99 ml       Physical Exam  Constitutional:       General: He is not in acute distress. Appearance: He is not ill-appearing, toxic-appearing or diaphoretic. Comments: Sleepy/fatigued   HENT:      Head: Normocephalic and atraumatic. Right Ear: External ear normal.      Left Ear: External ear normal.      Nose: Nose normal.      Mouth/Throat:      Mouth: Mucous membranes are moist.      Pharynx: Oropharynx is clear. Eyes:      General: No scleral icterus. Extraocular Movements: Extraocular movements intact. Conjunctiva/sclera: Conjunctivae normal.   Cardiovascular:      Rate and Rhythm: Normal rate and regular rhythm. Pulses: Normal pulses. Heart sounds: Normal heart sounds. No murmur heard. No friction rub. No gallop. Pulmonary:      Effort: Pulmonary effort is normal. No respiratory distress. Breath sounds: Normal breath sounds. No stridor. No wheezing or rhonchi. Abdominal:      General: Abdomen is flat. Bowel sounds are normal. There is no distension. Palpations: Abdomen is soft. There is no mass. Tenderness: There is no abdominal tenderness. Hernia: No hernia is present. Musculoskeletal:         General: No swelling or signs of injury. Cervical back: Normal range of motion and neck supple. Right lower leg: No edema. Left lower leg: No edema. Comments: R knee with ice on. Dressing c/d/i. Skin:     General: Skin is warm and dry. Coloration: Skin is not jaundiced or pale. Findings: No erythema, lesion or rash. Neurological:      General: No focal deficit present. Mental Status: He is alert. Cranial Nerves: No cranial nerve deficit. Sensory: No sensory deficit. Motor: No weakness. Psychiatric:         Mood and Affect: Mood normal.            Medications:      sodium chloride      sodium chloride      sodium chloride 75 mL/hr at 07/19/21 0434    sodium chloride        ceFAZolin  2,000 mg Intravenous Q8H    sodium chloride flush  10 mL Intravenous 2 times per day    acetaminophen  650 mg Oral Q6H    sennosides-docusate sodium  1 tablet Oral BID    aspirin  325 mg Oral BID    lidocaine 1 % injection  5 mL Intradermal Once    lidocaine PF  5 mL Intradermal Once    [Held by provider] clopidogrel  75 mg Oral Daily    ezetimibe  10 mg Oral Daily    gabapentin  800 mg Oral TID    hydroxychloroquine  400 mg Oral Daily    pantoprazole  40 mg Oral Daily    budesonide  0.5 mg Nebulization BID    Arformoterol Tartrate  15 mcg Nebulization BID    thiamine  100 mg Intravenous Daily    multivitamin  1 tablet Oral Daily    folic acid  1 mg Oral Daily     baclofen, sodium chloride flush, sodium chloride, oxyCODONE **OR** oxyCODONE, morphine **OR** morphine, magnesium hydroxide, sodium chloride flush, sodium chloride, ondansetron **OR** ondansetron, polyethylene glycol, acetaminophen **OR** acetaminophen, magnesium sulfate, potassium chloride **OR** potassium alternative oral replacement **OR** potassium chloride, oxyCODONE-acetaminophen, albuterol, morphine, sodium chloride flush, sodium chloride, LORazepam **OR** LORazepam **OR** LORazepam **OR** LORazepam **OR** LORazepam **OR** LORazepam **OR** LORazepam **OR** LORazepam  ADULT DIET; Regular  Adult Oral Nutrition Supplement; Standard High Calorie/High Protein Oral Supplement     Lab and other Data:     Recent Labs     07/18/21  0252 07/18/21  0252 07/19/21  0142 07/20/21  0249 07/20/21  0556   WBC 7.3  --  9.7 10.9*  --    HGB 10.5*  --  10.4* 11.7*  --    PLT 43*   < > 55* 87* 61*    < > = values in this interval not displayed.      Recent Labs     07/18/21  0252 07/18/21  0252 07/19/21  0142 07/19/21  1325 07/20/21  0249     -- 140  --  138   K 3.7   < > 2.9* 3.4* 4.3     --  106  --  99   CO2 25  --  26  --  27   BUN 13  --  15  --  11   CREATININE 0.6  --  0.6  --  0.6   GLUCOSE 146*  --  100  --  91    < > = values in this interval not displayed. Recent Labs     07/18/21  0252 07/19/21  0142 07/20/21  0249   AST 47* 34 61*   ALT 22 15 21   BILITOT <0.2 0.3 0.7   ALKPHOS 88 105 178*       RAD:   CT Head WO Contrast  Result Date: 7/19/21  Impression:  1. Mild cerebral and cerebellar volume loss with chronic microvascular disease but no evidence of acute intracranial process. Signed by Dr Luigi Ramos (MIN 2 VIEWS)  Result Date: 7/17/2021  1. Appropriate alignment of the right shoulder prosthesis with no acute bony pathology identified. 2. Improving aeration right lung base compared to 4/22/2021. Right infrahilar opacities may relate to vascular crowding and atelectasis. Signed by Dr Michelle Brantley    XR KNEE RIGHT (1-2 VIEWS)  Result Date: 7/17/2021  1. Revision of right knee arthroplasty with placement of antibiotic beads. Anterior surgical drain. 2. Old healed proximal fibula shaft fracture. 3. Diffuse vascular calcifications   Signed by Dr Michelle Brantley    XR KNEE RIGHT (1-2 VIEWS)  Result Date: 7/16/2021  1. Appropriate alignment of the right knee prosthesis with no acute osseous pathology. Small knee joint effusion with anterior soft tissue swelling. Signed by Dr Michelle Brantley    XR CHEST PORTABLE  Result Date: 7/16/2021  Poor lung expansion and left lower lung discoid atelectasis. No active infiltrate or pulmonary congestion. Signed by Dr Franco Hilton:    Gabi Romero #[id number] (GBW:[id number]) (76 y.o.  M) (Adm: 07/16/21)  [facility] MED [id number]  Results    Procedure Component Value Units Date/Time   Culture, Blood 1 [[id number]] Collected: 07/19/21 0631   Order Status: Completed Specimen: Blood Updated: 07/20/21 0914    Blood Culture, Routine No Growth to date. [place] change in status will be called. Narrative:     ORDER#: [id number]                          ORDERED BY: ROCIO TREVIÑO   SOURCE: Blood LFA                          COLLECTED:  07/19/21 06:31   ANTIBIOTICS AT [place]. :                      RECEIVED :  07/19/21 06:43   Culture, Body Fluid [[id number]] (Abnormal)  Collected: 07/16/21 1447   Order Status: Completed Specimen: Body Fluid from Synovial Fluid Updated: 07/20/21 0711    Gram Stain Result Many WBC's (Polymorphonuclear)   Many Gram positive cocci  in pairs   Abnormal     Anaerobic Culture No anaerobes isolated  4 days    Organism Staphylococcus aureusAbnormal     Body Fluid Culture, Sterile Moderate growth   Narrative:     ORDER#: [id number]                          ORDERED BY: SHANNON PIRES   SOURCE: Synovial Fluid Right Knee          COLLECTED:  07/16/21 14:47   ANTIBIOTICS AT [place]. :                      RECEIVED :  07/16/21 14:54   Culture, Blood 1 [[id number]] (Abnormal) Collected: 07/18/21 0552   Order Status: Completed Specimen: Blood Updated: 07/20/21 0701    Blood Culture, Routine --Abnormal      Bottle volume = <5 ml   Quality of results might be   affected with low volume. Abnormal     Organism Staphylococcus aureusAbnormal     Blood Culture, Routine --    No further workup   Isolated from Anaerobic bottle   Refer to (Blood culture collected [email or web address]) for sensitivity results    Narrative:     ORDER#: [id number]                          ORDERED BY: Stephon Lewis   SOURCE: Blood Hand, Left                   COLLECTED:  07/18/21 05:52   ANTIBIOTICS AT [place]. :                      RECEIVED :  07/18/21 06:57   CALL Francine Viveros [phone],   Microbiology results called to and read back by Graciela Smith RN ON 3RD, 07/19/2021 05:46, by [facility]   Culture, Blood 1 [[id number]] Collected: 07/20/21 0556   Order Status: Sent Specimen: Blood Updated: 07/20/21 0629   Culture, Blood 1 [[id number]] Collected: 07/20/21 0616   Order Status: Sent Specimen: Blood Updated: 07/20/21 0629   Culture, Blood 2 [[id number]] Collected: 07/18/21 0938   Order Status: Completed Specimen: Blood Updated: 07/19/21 1114    Culture, Blood 2 No Growth to date.  Any change in status will be called. Narrative:     ORDER#: [id number]                          ORDERED BY: ROCIO TREVIÑO   SOURCE: Blood Antecubital-Rig              COLLECTED:  07/18/21 09:38   ANTIBIOTICS AT [place]. :                      RECEIVED :  07/18/21 10:17   Culture, Surgical [[id number]] (Abnormal) Collected: 07/17/21 1107   Order Status: Completed Specimen: Body Fluid from Joint, Knee Updated: 07/19/21 0717    Gram Stain Result Many WBC's (Polymorphonuclear) present   Moderate Gram positive cocci  in clusters   No Epithelial Cells seen   Abnormal     Anaerobic Culture No anaerobes to date,will hold 4 days    Organism Staphylococcus aureusAbnormal     Culture Surgical --    Moderate growth   No further workup   Refer to (Surgical culture Knee #3 R knee medial gutter collected   [email or web address]) for sensitivity results    Narrative:     ORDER#: [id number]                          ORDERED BY: ROCIO TREVIÑO   SOURCE: Knee #1 R knee fluid               COLLECTED:  07/17/21 11:07   ANTIBIOTICS AT [place]. :                      RECEIVED :  07/17/21 12:41   Culture, Surgical [[id number]] (Abnormal) Collected: 07/17/21 1110   Order Status: Completed Specimen: Tissue from Joint, Knee Updated: 07/19/21 0716    Gram Stain Result Few WBC's (Polymorphonuclear) present   Few Gram positive cocci  in clusters   No Epithelial Cells seen   Abnormal     Anaerobic Culture No anaerobes to date,will hold 4 days    Organism Staphylococcus aureusAbnormal     Culture Surgical --    Moderate growth   No further workup   Refer to (Surgical culture Knee #3 R knee medial gutter collected   [email or web address]) for sensitivity results    Narrative:     ORDER#: [id number]                          ORDERED BY: ROCIO Martinez   SOURCE: Knee #2 R knee fat pad             COLLECTED:  07/17/21 11:10   ANTIBIOTICS AT [place]. :                      RECEIVED :  07/17/21 12:43   Culture, Surgical [[id number]] (Abnormal)  Collected: 07/17/21 1111   Order Status: Completed Specimen: Tissue from Joint, Knee Updated: 07/19/21 0716    Gram Stain Result Few WBC's (Polymorphonuclear) present   Rare Gram positive cocci  in clusters   No Epithelial Cells seen   Abnormal     Anaerobic Culture No anaerobes to date,will hold 4 days    Organism Staphylococcus aureusAbnormal     Culture Surgical Moderate growth   Narrative:     ORDER#: [id number]                          ORDERED BY: ROCIO TREVIÑO   SOURCE: Knee #3 R knee medial gutter       COLLECTED:  07/17/21 11:11   ANTIBIOTICS AT [place]. :                      RECEIVED :  07/17/21 12:43   Culture, Blood 1 [[id number]] (Abnormal)  Collected: 07/16/21 1256   Order Status: Completed Specimen: Blood Updated: 07/19/21 0714    Blood Culture, Routine  Bottle volume = 8 mlAbnormal     Organism Staphylococcus aureusAbnormal     Blood Culture, Routine Isolated from Aerobic and Anaerobic bottle   Narrative:     ORDER#: [id number]                          ORDERED BY: Ingris Morataya   SOURCE: Blood lac                          COLLECTED:  07/16/21 12:56   ANTIBIOTICS AT [place]. :                      RECEIVED :  07/16/21 12:56   CALL Clotilda Phalen [phone],   Microbiology results called to and read back by Clarence Heaton RN on   5th, 07/17/2021 04:13, by [facility]   Culture, Blood 2 [[id number]] (Abnormal) Collected: 07/16/21 1237   Order Status: Completed Specimen: Blood Updated: 07/18/21 0803    Culture, Blood 2  Bottle volume = 9 mlAbnormal     Organism Staphylococcus aureusAbnormal     Culture, Blood 2 --    No further workup   Isolated from Aerobic and Anaerobic bottle   Refer to (Blood culture collected [email or web address]) for sensitivity results    Narrative:     ORDER#: [id number]                          ORDERED BY: Ingris Morataya   SOURCE: Blood rac                          COLLECTED:  07/16/21 12:37   ANTIBIOTICS AT [place]. :                      RECEIVED :  07/16/21 12:56   CALL Jesus Parksjason [phone],   Microbiology results called to and read back by Maynor Singh RN on   5th, 07/17/2021 04:15, by CARLOS ALBERTO   Culture, Blood 1 [[id number]] Collected: 07/18/21 0552   Order Status: Canceled Specimen: Blood    Culture, Fungus [[id number]] Collected: 07/17/21 1110   Order Status: Sent Specimen: Tissue from Joint, Knee Updated: 07/17/21 1418    KOH Prep No Fungal elements seen   Narrative:     ORDER#: [id number]                          ORDERED BY: ROCIO Benoit   SOURCE: Knee #2 R knee fat pad             COLLECTED:  07/17/21 11:10   ANTIBIOTICS AT [place]. :                      RECEIVED :  07/17/21 12:40   Culture, Fungus [[id number]] Collected: 07/17/21 1107   Order Status: Sent Specimen: Body Fluid from Joint, Knee Updated: 07/17/21 1417    KOH Prep No Fungal elements seen   Narrative:     ORDER#: [id number]                          ORDERED BY: ROCIO TREVIÑO   SOURCE: Knee #1 R knee fluid               COLLECTED:  07/17/21 11:07   ANTIBIOTICS AT [place]. :                      RECEIVED :  07/17/21 12:39   Culture, Fungus [[id number]] Collected: 07/17/21 1111   Order Status: Sent Specimen: Tissue from Joint, Knee Updated: 07/17/21 1355    KOH Prep No Fungal elements seen   Narrative:     ORDER#: [id number]                          ORDERED BY: ROCIO TREVIÑO   SOURCE: Knee #3 R knee medial gutter       COLLECTED:  07/17/21 11:11   ANTIBIOTICS AT [place]. :                      RECEIVED :  07/17/21 12:41   Gram Stain [[id number]]    Order Status: Sent Specimen: Aspirate    COVID-19, Rapid [[id number]] Collected: 07/16/21 1153   Order Status: Completed Specimen: Nasopharyngeal Swab Updated: 07/16/21 1233    SARS-CoV-2, NAAT Not Detected               Assessment/Plan   Principal Problem:    Septic arthritis ([address]- Orthopedics performed R knee explant antibiotic spacer with ANA drain on 7/17/21.  Weightbearing as tolerated, DVT prophylaxis, PT/OT, ice and elevation. ID consulted and continuing Cefazolin. Drain removed, picc line placed. Continue to monitor blood cultures. 2D echo ordered. Monitoring R shoulder and other joints for possible metastatic focus of infection. Active Problems:    Coronary artery disease involving native coronary artery of native heart without angina pectoris    S/P TAVR (transcatheter aortic valve replacement)   - Cardiology consulted and notes pt will need follow up in 4 weeks in the valve clinic. 2D echo       COPD (chronic obstructive pulmonary disease) ([address]- O2 and nebulizers.  IS      Essential hypertension-continue home medications and monitor closely       Antibiotic: Soraida Rust PA-C  7/20/2021, 10:38 AM

## 2021-07-20 NOTE — PROGRESS NOTES
Subjective:     Post-Operative Day: 3 No complaints going down for echo. Knee still hurts. Shoulder sore since aortic valve procedures in april    Objective:     Patient Vitals for the past 24 hrs:   BP Temp Temp src Pulse Resp SpO2 Weight   07/20/21 1151 107/68 99.5 °F (37.5 °C) Temporal 86 18 93 %    07/20/21 0741 121/77 99.7 °F (37.6 °C) Temporal 103 18 95 %    07/20/21 0700     20 97 %    07/20/21 0208 98/60 98.7 °F (37.1 °C) Temporal 86 18 96 % 178 lb 1 oz (80.8 kg)   07/19/21 2227 104/67 101.1 °F (38.4 °C) Oral 87 16 94 %    07/19/21 1832 (!) 108/57 100.1 °F (37.8 °C) Oral 83 16 96 %    07/19/21 1624 (!) 120/95 99.9 °F (37.7 °C) Oral 82 18 94 %        General: Alert cooperative   Wound: Clean dry intact. Moderate swelling   Neurovascular: Exam normal   DVT Exam: No evidence of DVT         Data Review:  Recent Labs     07/19/21  0142 07/20/21  0249   HGB 10.4* 11.7*     Recent Labs     07/20/21  0249      K 4.3   CREATININE 0.6   GLUCOSE 91     No results for input(s): POCGLU in the last 72 hours. Assessment:     Status Post explant , I&D, antbx spacer for MSSA infected right Total Knee Arthroplasty. Doing well postop without complication.    Plan:   WBAT / ROM ok  IV antbx (ancef)  Pain control  Tight blood glucose control  PT/OT  DVT prophylaxis  Ice and elevate  Discharge Home or rehab this week   May aspirate right shoulder if he remains bacteremic

## 2021-07-20 NOTE — PROGRESS NOTES
INFECTIOUS DISEASES PROGRESS NOTE    Patient:  Chandler Oro  YOB: 1955  MRN: [id number]   Admit date: 7/16/2021   Admitting Physician: Trevor Lowery MD  Primary Care Physician: SHIMON Finn    CHIEF COMPLAINT: knee and right shoulder \"sore\"    Interval History: Patient says he is doing okay but his knee is a bit sore. His right shoulder is as well. He said he slept fairly well last night however his wife states that she was told he was somewhat agitated, trying to crawl out of the bed and confused last night. She was trying to hold a urinal to get him to urinate earlier and he kept falling asleep. He had a PICC line placed yesterday      Allergies: Allergies   Allergen Reactions    Codeine Swelling     Lips swelling    Iv [Iodides] Swelling     Contrast dye used for heart cath. Caused face to swell.     Hydrocodone Swelling     lips swelling    Statins      Myalgias         Current Meds: baclofen (LIORESAL) tablet 5 mg, TID PRN  ceFAZolin (ANCEF) 2000 mg in 0.9% sodium chloride 50 mL IVPB, Q8H  sodium chloride flush 0.9 % injection 10 mL, 2 times per day  sodium chloride flush 0.9 % injection 10 mL, PRN  0.9 % sodium chloride infusion, PRN  acetaminophen (TYLENOL) tablet 650 mg, Q6H  oxyCODONE (ROXICODONE) immediate release tablet 5 mg, Q4H PRN   Or  oxyCODONE (ROXICODONE) immediate release tablet 10 mg, Q4H PRN  morphine injection 2 mg, Q1H PRN   Or  morphine injection 4 mg, Q1H PRN  sennosides-docusate sodium (SENOKOT-S) 8.6-50 MG tablet 1 tablet, BID  magnesium hydroxide (MILK OF MAGNESIA) 400 MG/5ML suspension 30 mL, Daily PRN  aspirin EC tablet 325 mg, BID  lidocaine PF 1 % injection 5 mL, Once  sodium chloride flush 0.9 % injection 10 mL, PRN  0.9 % sodium chloride infusion, PRN  lidocaine PF 1 % injection 5 mL, Once  ondansetron (ZOFRAN-ODT) disintegrating tablet 4 mg, Q8H PRN   Or  ondansetron (ZOFRAN) injection 4 mg, Q6H PRN  polyethylene glycol (GLYCOLAX) packet 17 g, Daily PRN  acetaminophen (TYLENOL) tablet 650 mg, Q6H PRN   Or  acetaminophen (TYLENOL) suppository 650 mg, Q6H PRN  magnesium sulfate 2000 mg in 50 mL IVPB premix, PRN  potassium chloride (KLOR-CON M) extended release tablet 40 mEq, PRN   Or  potassium bicarb-citric acid (EFFER-K) effervescent tablet 40 mEq, PRN   Or  potassium chloride 10 mEq/100 mL IVPB (Peripheral Line), PRN  [Held by provider] clopidogrel (PLAVIX) tablet 75 mg, Daily  ezetimibe (ZETIA) tablet 10 mg, Daily  gabapentin (NEURONTIN) capsule 800 mg, TID  hydroxychloroquine (PLAQUENIL) tablet 400 mg, Daily  oxyCODONE-acetaminophen (PERCOCET) 7.5-325 MG per tablet 1 tablet, Q4H PRN  pantoprazole (PROTONIX) tablet 40 mg, Daily  albuterol (PROVENTIL) nebulizer solution 2.5 mg, Q6H PRN  0.9 % sodium chloride infusion, Continuous  morphine injection 2 mg, Q4H PRN  budesonide (PULMICORT) nebulizer suspension 500 mcg, BID  Arformoterol Tartrate (BROVANA) nebulizer solution 15 mcg, BID  sodium chloride flush 0.9 % injection 5-40 mL, PRN  0.9 % sodium chloride infusion, PRN  thiamine (B-1) injection 100 mg, Daily  multivitamin 1 tablet, Daily  folic acid (FOLVITE) tablet 1 mg, Daily  LORazepam (ATIVAN) tablet 1 mg, Q1H PRN   Or  LORazepam (ATIVAN) injection 1 mg, Q1H PRN   Or  LORazepam (ATIVAN) tablet 2 mg, Q1H PRN   Or  LORazepam (ATIVAN) injection 2 mg, Q1H PRN   Or  LORazepam (ATIVAN) tablet 3 mg, Q1H PRN   Or  LORazepam (ATIVAN) injection 3 mg, Q1H PRN   Or  LORazepam (ATIVAN) tablet 4 mg, Q1H PRN   Or  LORazepam (ATIVAN) injection 4 mg, Q1H PRN        Review of Systems   Musculoskeletal: Positive for arthralgias. Skin: Positive for wound.        Vital Signs:  /68   Pulse 86   Temp 99.5 °F (37.5 °C) (Temporal)   Resp 18   Ht 5' 7\" (1.702 m)   Wt 178 lb 1 oz (80.8 kg)   SpO2 93%   BMI 27.89 kg/m²   Temp (24hrs), Av.8 °F (37.7 °C), Min:98.7 °F (37.1 °C), Max:101.1 °F (38.4 °C)      Physical Exam  General:  Awake, alert, lying on his left side  HEENT: Sclera anicteric. No conjunctival petechiae. Respiratory: Effort even and unlabored. He is not conversationally dyspneic  Heart: S1 is two: 2/6 systolic murmur heard throughout the precordium predominantly in the upper sternal borders  Right knee dressing clean dry and intact  Right shoulder no pain elicited with minimal range of motion. No effusion appreciated        LAB RESULTS:    CBC with DIFF:  Recent Labs     07/18/21  0252 07/18/21  0252 07/19/21  0142 07/20/21  0249 07/20/21  0556   WBC 7.3  --  9.7 10.9*  --    RBC 3.51*  --  3.41* 3.92*  --    HGB 10.5*  --  10.4* 11.7*  --    HCT 32.5*   < > 31.8* 35.7* 32.7*   MCV 92.6  --  93.3 91.1  --    MCH 29.9  --  30.5 29.8  --    MCHC 32.3*  --  32.7* 32.8*  --    RDW 14.2  --  14.3 14.5  --    PLT 43*   < > 55* 87* 61*   MPV 12.3  --  11.5 12.4  --    NEUTOPHILPCT 92.0*  --  79.6* 71.7*  --    LYMPHOPCT 6.0*  --  10.8* 15.0*  --    MONOPCT 2.0  --  8.9 11.8*  --    EOSRELPCT 0.0  --  0.0 0.3  --    BASOPCT 0.0  --  0.2 0.4  --    NEUTROABS 6.7  --  7.7* 7.8*  --    LYMPHSABS 0.4*  --  1.1 1.6  --    MONOSABS 0.10  --  0.90 1.30*  --    EOSABS 0.00  --  0.00 0.00  --    BASOSABS 0.00  --  0.00 0.00  --     < > = values in this interval not displayed. CMP/BMP:  Recent Labs     07/18/21  0252 07/18/21  0252 07/19/21  0142 07/19/21  1325 07/20/21  0249     --  140  --  138   K 3.7   < > 2.9* 3.4* 4.3     --  106  --  99   CO2 25  --  26  --  27   ANIONGAP 9  --  8  --  12   GLUCOSE 146*  --  100  --  91   BUN 13  --  15  --  11   CREATININE 0.6  --  0.6  --  0.6   LABGLOM >60  --  >60  --  >60   CALCIUM 8.4*  --  8.3*  --  9.1   PROT 4.9*  --  4.7*  --  6.1*   LABALBU 2.5*  --  2.6*  --  3.3*   BILITOT <0.2  --  0.3  --  0.7   ALKPHOS 88  --  105  --  178*   ALT 22  --  15  --  21   AST 47*  --  34  --  61*    < > = values in this interval not displayed.          Culture Results:    Recent Labs     07/17/21  1111   CXSURG Moderate pulmonary congestion. Signed by Dr Farmer Ours                  Patient Active Problem List   Diagnosis    Coronary atherosclerosis of native coronary artery    MI [facility] infarction) [facility] [address]    COPD (chronic obstructive pulmonary disease) (HCC)    Bronchitis    Leg pain    History of tobacco abuse    Hoarseness, chronic    Chronic heartburn    Hiatal hernia    Coronary artery disease involving native coronary artery of native heart without angina pectoris    Mixed hyperlipidemia    Essential hypertension    S/P coronary artery stent placement    SOB (shortness of breath)    Right upper quadrant abdominal pain    Acalculous cholecystitis    Aortic valve stenosis    Arthritis of knee    Gastroesophageal reflux disease    Gout    Neuropathy    Rheumatoid arthritis ([facility] [address]    Alternating constipation and diarrhea    DDD (degenerative disc disease), lumbar    Spinal stenosis of lumbar region with neurogenic claudication    Lumbar stenosis with neurogenic claudication    Abnormal myocardial perfusion study    Osteoarthritis of both shoulders    Renal cyst, left    Complex renal cyst    Hypertrophy of prostate with urinary obstruction    Current use of proton pump inhibitor    S/P TAVR (transcatheter aortic valve replacement)    Acute on chronic combined systolic and diastolic congestive heart failure due to valvular disease (HCC)    Fever and chills    Dysuria    Suspected UTI    Anemia    Status post transcatheter aortic valve replacement (TAVR) using bioprosthesis    Confusion    Sept[facility] ([facility])       IMPRESSION:  1. 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 Surgical cultures positive from July 17  2. Chronic obstructive pulmonary disease  3. Coronary artery disease  4. Rheumatoid arthritis  5.   History transcatheter aortic valve replacement  6. Right shoulder painno effusion detected. No progression in pain      RECOMMENDATIONS :  · Continue cefazolin  · Continue to monitor surveillance cultures  · Check 2D echo  · Continue to monitor surgical site  · Continue to monitor right shoulder and other joints for potential metastatic focus of infection.         Edd Rahman MD

## 2021-07-20 NOTE — PROGRESS NOTES
Physical Therapy    Name: Susana Ellis  MRN: [id number]  Date of Service:  7/20/2021 07/20/21 1033   Restrictions/Precautions   Restrictions/Precautions Weight Bearing; Fall Risk   Lower Extremity Weight Bearing Restrictions   Right Lower Extremity Weight Bearing Weight Bearing As Tolerated   General   Patient assessed for rehabilitation services? Yes   Diagnosis EXPLANT R KNEE WITH PLACEMENT OF ANTIBIOTIC SPACERS   Follows Commands WFL   Subjective   Subjective Pt sitting EOB, working with OT upon entering. Wife present also. Pt agrees to participate in PT. Pain Screening   Patient Currently in Pain Yes  (Pt presents with facial grimacing with ther-ex and into bed)   Pain Assessment   Pain Assessment 0-10   Pain Level 6   Pain Type Acute pain;Surgical pain   Pain Location Knee   Pain Orientation Right   Pain Descriptors Throbbing;Aching   Pain Frequency Continuous   Pain Onset On-going   Clinical Progression Not changed   Functional Pain Assessment Prevents or interferes some active activities and ADLs   Non-Pharmaceutical Pain Intervention(s) Ambulation/Increased Activity; Distraction;Repositioned;Rest;Elevation   Response to Pain Intervention Patient Satisfied  (Pt and wife report he had pain meds pre therapy)   Pre Treatment Pain Screening   Intervention List Patient able to continue with treatment;Nurse called to administer meds   Oxygen Therapy   O2 Device Nasal cannula   O2 Flow Rate (L/min) 2 L/min  (Doffed during amb. and post tx (MD took off))   Bed Mobility   Rolling Maximal assistance   Supine to Sit Maximal assistance   Sit to Supine Maximal assistance   Scooting Maximal assistance  (Supine in bed)   Comment V/c's for use of B bedrails. Pt cognition and hx R shoulder surgery    Transfers   Sit to Stand Contact guard assistance;Minimal Assistance  (X2)   Stand to sit Contact guard assistance;Minimal Assistance  (X2)   Ambulation   Ambulation?  Yes   WB Status WBAT   Ambulation 1   Surface level tile Device Rolling Walker   Assistance Minimal assistance   Quality of Gait Slow  No LOB   Gait Deviations Slow Ayanna;Decreased step length;Decreased step height   Distance 20'    Comments In room. Including 1 R and 1 L turn. Pt presents with antalgic gait (with WB on RLE) and decreased B step height/length. Pt requires Min A for balance during turns and equal wt shift   Exercises   Hip Flexion 1 X 10 reps   Hip Abduction 1 X 10 reps   Knee Passive Range of Motion Knee extension1 X 10 reps   Ankle Pumps 1 X 20 reps   Comments AROM LLE and PROM-AAROM (some AROM) RLE. Sitting EOB   Short term goals   Time Frame for Short term goals 2 WKS   Short term goal 1  FT WITH RW, CGA   Conditions Requiring Skilled Therapeutic Intervention   Body structures, Functions, Activity limitations Decreased functional mobility ; Increased pain;Decreased strength;Decreased ROM; Decreased cognition;Decreased balance;Decreased posture   Assessment Pt wife states pt cognition is not very good today, pt does present with some minor hallucinations. Pt has coughing episode towards end of tx, he can't catch his breath. Wife, MD, and PTA assist pt to EOB. Pt okay, once he  catches breath and drinks some water  and O2 checked (91-93%). MD doffs pt O2. Pt positioned in supine with RLE elevated on pillow and cryocuff donned. Prognosis Good   REQUIRES PT FOLLOW UP Yes   Discharge Recommendations Continue to assess pending progress; Patient would benefit from continued therapy after discharge   Activity Tolerance   Activity Tolerance Patient Tolerated treatment well;Patient limited by pain   Plan   Times per week 5-7   Plan weeks 2   Current Treatment Recommendations Strengthening; Functional Mobility Training;Gait Training;Transfer Training;Pain Management;Positioning; Safety Education & Training;Patient/Caregiver Education & Training   Plan Comment WBAT   Safety Devices   Type of devices Patient at risk for falls;Gait belt;Left in bed;Bed alarm in place;Call light within reach; Sitter present  (Wife present. )   PT Whiteboard Notes   Therapy Whiteboard R KNEE ANTIBIOTIC SPACERS, WBAT RE 8-1         Electronically signed by Baljit Gary PTA on 7/20/2021 at 11:42 AM

## 2021-07-21 NOTE — PROGRESS NOTES
Occupational Therapy     07/21/21 1518   Pre Treatment Pain Screening   Pain at present 5   Scale Used Numeric Score   Intervention List Patient able to continue with treatment   Restrictions/Precautions   Restrictions/Precautions Weight Bearing; Fall Risk   Hearing   Hearing [facility]   General   Chart Reviewed Yes   Patient assessed for rehabilitation services? Yes   Response to previous treatment Patient with no complaints from previous session   Family / Caregiver Present Yes   Subjective   Subjective Pt in bed upon arrival for therapy with wife present. Pt agreeable to participate. Pain Assessment   Patient Currently in Pain Yes   Pain Assessment 0-10   Pain Level 5   Pain Type Acute pain;Surgical pain   Pain Location Knee; Shoulder   Pain Orientation Right   Pain Descriptors Aching;Discomfort   Pain Frequency Continuous   Clinical Progression Gradually improving   Response to Pain Intervention Patient Satisfied   Pre Treatment Pain Screening   Pain at present 5   Scale Used Numeric Score   Intervention List Patient able to continue with treatment   Comments / Details Pt received medication prior to tx.     Orientation   Overall Orientation Status WFL   ADL   Feeding Independent   Grooming Supervision;Minimal assistance   UE Bathing Minimal assistance   LE Bathing Moderate assistance   UE Dressing Minimal assistance   LE Dressing Moderate assistance   Toileting Minimal assistance   Balance   Sitting Balance Supervision   Standing Balance Contact guard assistance   Functional Mobility   Functional - Mobility Device Rolling Walker   Activity Other  (around room)   Assist Level Contact guard assistance   Bed mobility   Rolling to Left Moderate assistance   Rolling to Right Moderate assistance   Supine to Sit Minimal assistance   Sit to Supine Minimal assistance   Scooting Minimal assistance   Transfers   Stand Step Transfers Contact guard assistance   Sit to stand Contact guard assistance   Stand to sit Contact guard assistance   Toilet Transfers   Toilet - Technique Stand step; Ambulating   Equipment Used Raised toilet seat with rails   Toilet Transfer Contact guard assistance   Assessment   Performance deficits / Impairments Decreased functional mobility ; Decreased ADL status; Decreased strength;Decreased cognition;Decreased endurance;Decreased balance   Assessment Pt will progress as tolerated; limited by pain but motivated to participate. Treatment Diagnosis Septic arthritis, s/p explant/I & D/placement of antibiotic spacer RLE   Prognosis Good   REQUIRES OT FOLLOW UP Yes   Treatment Initiated  Tx focused on bed mobility, functional mobility and toileting task at RW level. Discharge Recommendations Patient would benefit from continued therapy after discharge   Activity Tolerance   Activity Tolerance Patient Tolerated treatment well;Patient limited by pain   Safety Devices   Safety Devices in place Yes   Type of devices Call light within reach; Bed alarm in place   Plan   Times per week 3-5   Times per day Daily   Electronically signed by VALERIE Candelario on 7/21/2021 at 3:29 PM

## 2021-07-21 NOTE — PROGRESS NOTES
Subjective:     Post-Operative Day: 4   Knee still hurts. Shoulder sore    Objective:     Patient Vitals for the past 24 hrs:   BP Temp Temp src Pulse Resp SpO2 Weight   07/21/21 1045 109/61 99.1 °F (37.3 °C)  80 16 96 %    07/21/21 0239 (!) 142/75 99.9 °F (37.7 °C) Oral 104 16 91 %    07/21/21 0030       175 lb 8 oz (79.6 kg)   07/20/21 2203 129/66 98.5 °F (36.9 °C) Temporal 94 20 92 %    07/20/21 1859 103/61 98.7 °F (37.1 °C) Oral 87 16 91 %    07/20/21 1617 (!) 104/52 98.3 °F (36.8 °C) Temporal 76 16 92 %    07/20/21 1513     15 94 %        General: Alert cooperative   Wound: Clean dry intact. Moderate swelling   Neurovascular: Exam normal   DVT Exam: No evidence of DVT         Data Review:  Recent Labs     07/20/21  0249 07/21/21  0319   HGB 11.7* 9.8*     Recent Labs     07/21/21  0319   *   K 3.3*   CREATININE 0.6   GLUCOSE 126*     No results for input(s): POCGLU in the last 72 hours. Wbc elevated      Assessment:     Status Post explant , I&D, antbx spacer for MSSA infected right Total Knee Arthroplasty. Doing well postop without complication. Plan:   WBAT / ROM ok  IV antbx (ancef)  Pain control  Tight blood glucose control  PT/OT  DVT prophylaxis  Ice and elevate  Discharge Home or rehab this week   Will aspirate right shoulder.  Ordered more lidocaine so I can rinse if needed

## 2021-07-21 NOTE — PROGRESS NOTES
[facility]      Patient:  Tran Villalobos  YOB: 1955  Date of Service: 7/21/2021  MRN: [id number]   Acct: [de-identified]   Primary Care Physician: SHIMON Dunbar  Advance Directive: Full Code  Admit Date: 7/16/2021       Hospital Day: 5  Portions of this note have been copied forward, however, updated to reflect the most current clinical status of this patient. CHIEF COMPLAINT R knee pain    SUBJECTIVE:  Mr. Halie Newsome is laying in bed this morning in no acute distress. He states that he has pain to his R knee and R shoulder. Pt is A&O x3. He states his visual hallucinations are improved this morning so far. CUMULATIVE HOSPITAL COURSE:   The patient is a 75 y. o. male with past medical history of TAVR, CAD with stents, COPD, HTN, and HLD who presented to [facility] ED complaining of right knee pain.  Mr. Owens reported increased swelling and pain in right knee since last 2 days.  Reported fever of 103.4 at home. Stated he has history of bilateral knee replacement. However, right knee has had need for joint aspiration on occasion, but none recently. Reported he was seen at [facility] and was sent home. Denied any specific injury to his right knee. Reported severe pain, limited range of motion due to pain and swelling. Denied no more than usual shortness of breath, chest pain, abdominal pain, nausea, or vomiting. Work-up in ED revealed lactic 2.3, CRP 34.75, troponin 0.05, WBC 9.5, sed rate 30, unremarkable chest x-ray, right knee x-ray showed small knee joint effusion with anterior soft tissue swelling. Orthopedic surgery was consulted from ED whom recommended arthrocentesis for lab studies. Dr. Nancy Dorantes arthrocentesis. Fluid study showed nucl cell 19,560, 40,000 RBC. Patient was admitted to hospital medicine with Septic arthritis. Dr. Stephanie Tong performed right knee explant antibiotic spacer on 7/17/21, with ANA drain.  Blood culture returned positive for gram positive cocci in clusters resembling staphylococcus. Synovial fluid returned positive for staphylococcus aureus. ID was consulted whom recommended discontinuing vancomycin and cefepime and changed to Cefazolin. Surveillance blood cultures ordered for Sunday morning and Monday morning. Orthopedic surgery recommends leaving drain in for now, weightbearing as tolerated, DVT prophylaxis, PT/OT, ice and elevation. Pt with hallucinations. CT head showed no acute process and  Neurology notes that symptoms will continue to improve. Drain has been removed and picc line in place. ID continuing pt on Cefazolin, ordered 2D echo and monitor R shoulder and other joints for potential metastatic focus of infection. Review of Systems:   Review of Systems   Constitutional: Negative for chills, diaphoresis, fatigue and fever. HENT: Negative for congestion, ear pain, sinus pressure, sinus pain and sore throat. Eyes: Negative for photophobia, pain and itching. Respiratory: Positive for shortness of breath. Negative for cough, chest tightness and wheezing. Cardiovascular: Negative for chest pain, palpitations and leg swelling. Gastrointestinal: Negative for abdominal distention, abdominal pain, diarrhea, nausea and vomiting. Endocrine: Negative for cold intolerance and heat intolerance. Genitourinary: Negative for difficulty urinating, dysuria, flank pain, frequency and urgency. Musculoskeletal: Positive for arthralgias. Negative for joint swelling and myalgias. Neurological: Negative for dizziness, tremors, syncope, weakness, light-headedness, numbness and headaches. Hematological: Does not bruise/bleed easily. Psychiatric/Behavioral: Positive for hallucinations. Negative for agitation, confusion, dysphoric mood, self-injury and suicidal ideas.           Objective:   VITALS:  BP (!) 142/75   Pulse 104   Temp 99.9 °F (37.7 °C) (Oral)   Resp 16   Ht 5' 7\" (1.702 m)   Wt 175 lb 8 oz (79.6 kg)   SpO2 91%   BMI 27.49 kg/m²   24HR INTAKE/OUTPUT:      Intake/Output Summary (Last 24 hours) at 7/21/2021 0951  Last data filed at 7/21/2021 0800  Gross per 24 hour   Intake 2116 ml   Output 1275 ml   Net 841 ml       Physical Exam  Constitutional:       General: He is not in acute distress. Appearance: Normal appearance. He is not ill-appearing, toxic-appearing or diaphoretic. HENT:      Head: Normocephalic and atraumatic. Right Ear: External ear normal.      Left Ear: External ear normal.      Nose: Nose normal.      Mouth/Throat:      Mouth: Mucous membranes are moist.      Pharynx: Oropharynx is clear. Eyes:      General: No scleral icterus. Extraocular Movements: Extraocular movements intact. Conjunctiva/sclera: Conjunctivae normal.   Cardiovascular:      Rate and Rhythm: Normal rate and regular rhythm. Pulses: Normal pulses. Heart sounds: Normal heart sounds. No murmur heard. No friction rub. No gallop. Pulmonary:      Effort: Pulmonary effort is normal. No respiratory distress. Breath sounds: Normal breath sounds. No stridor. No wheezing or rhonchi. Abdominal:      General: Abdomen is flat. Bowel sounds are normal. There is no distension. Palpations: Abdomen is soft. There is no mass. Tenderness: There is no abdominal tenderness. Hernia: No hernia is present. Musculoskeletal:         General: Swelling and tenderness present. No signs of injury. Cervical back: Normal range of motion and neck supple. Right lower leg: No edema. Left lower leg: No edema. Comments: Dressing c/d/i. Skin:     General: Skin is warm and dry. Coloration: Skin is not jaundiced or pale. Findings: No erythema, lesion or rash. Neurological:      General: No focal deficit present. Mental Status: He is alert and oriented to person, place, and time. Cranial Nerves: No cranial nerve deficit. Sensory: No sensory deficit. Motor: No weakness.    Psychiatric: Mood and Affect: Mood normal.         Behavior: Behavior normal.         Thought Content: Thought content normal.         Judgment: Judgment normal.            Medications:      sodium chloride      sodium chloride 75 mL/hr at 07/20/21 1219      magnesium sulfate  1,000 mg Intravenous Once    ceFAZolin  2,000 mg Intravenous Q8H    sodium chloride flush  10 mL Intravenous 2 times per day    acetaminophen  650 mg Oral Q6H    sennosides-docusate sodium  1 tablet Oral BID    aspirin  325 mg Oral BID    [Held by provider] clopidogrel  75 mg Oral Daily    ezetimibe  10 mg Oral Daily    gabapentin  800 mg Oral TID    hydroxychloroquine  400 mg Oral Daily    pantoprazole  40 mg Oral Daily    budesonide  0.5 mg Nebulization BID    Arformoterol Tartrate  15 mcg Nebulization BID    thiamine  100 mg Intravenous Daily    multivitamin  1 tablet Oral Daily    folic acid  1 mg Oral Daily     baclofen, sodium chloride flush, oxyCODONE **OR** oxyCODONE, morphine **OR** morphine, magnesium hydroxide, sodium chloride, ondansetron **OR** ondansetron, polyethylene glycol, acetaminophen **OR** acetaminophen, magnesium sulfate, potassium chloride **OR** potassium alternative oral replacement **OR** potassium chloride, oxyCODONE-acetaminophen, albuterol, LORazepam **OR** LORazepam **OR** LORazepam **OR** LORazepam **OR** LORazepam **OR** LORazepam **OR** LORazepam **OR** LORazepam  ADULT DIET; Regular  Adult Oral Nutrition Supplement; Standard High Calorie/High Protein Oral Supplement     Lab and other Data:     Recent Labs     07/19/21  0142 07/19/21  0142 07/20/21  0249 07/20/21  0556 07/21/21  0319   WBC 9.7  --  10.9*  --  13.8*   HGB 10.4*  --  11.7*  --  9.8*   PLT 55*   < > 87* 61* 97*    < > = values in this interval not displayed.      Recent Labs     07/19/21  0142 07/19/21  1325 07/20/21  0249 07/21/21  0319     --  138 133*   K 2.9* 3.4* 4.3 3.3*     --  99 97*   CO2 26  --  27 27   BUN 15  --  11 12 CREATININE 0.6  --  0.6 0.6   GLUCOSE 100  --  91 126*     Recent Labs     07/19/21  0142 07/20/21  0249 07/21/21  0319   AST 34 61* 40   ALT 15 21 19   BILITOT 0.3 0.7 0.5   ALKPHOS 105 178* 139*       RAD:   CT Head WO Contrast  Result Date: 7/19/21  Impression:  1. Mild cerebral and cerebellar volume loss with chronic microvascular disease but no evidence of acute intracranial process. Signed by Dr David Bello (MIN 2 VIEWS)  Result Date: 7/17/2021  1. Appropriate alignment of the right shoulder prosthesis with no acute bony pathology identified. 2. Improving aeration right lung base compared to 4/22/2021. Right infrahilar opacities may relate to vascular crowding and atelectasis. Signed by Dr Pantera Ham    XR KNEE RIGHT (1-2 VIEWS)  Result Date: 7/17/2021  1. Revision of right knee arthroplasty with placement of antibiotic beads. Anterior surgical drain. 2. Old healed proximal fibula shaft fracture. 3. Diffuse vascular calcifications   Signed by Dr Pantera Ham    XR KNEE RIGHT (1-2 VIEWS)  Result Date: 7/16/2021  1. Appropriate alignment of the right knee prosthesis with no acute osseous pathology. Small knee joint effusion with anterior soft tissue swelling. Signed by Dr Pantera Ham    XR CHEST PORTABLE  Result Date: 7/16/2021  Poor lung expansion and left lower lung discoid atelectasis. No active infiltrate or pulmonary congestion. Signed by Dr Vern Raines    ECHO:  Completed, pending reading. [place] [facility] #[id number] ([id number]) (76 y.o. M) (Adm: 07/16/21)  [facility] MED [id number]  Results    Procedure Component Value Units Date/Time   Culture, Blood 1 [[id number]] Collected: 07/20/21 0556   Order Status: Completed Specimen: Blood Updated: 07/21/21 0914    Blood Culture, Routine No Growth to date.  Any change in status will be called.    Narrative:     ORDER#: [id number]                          ORDERED BY: ROCIO TREVIÑO   SOURCE: Blood LAC                          COLLECTED:  07/20/21 05:56   ANTIBIOTICS AT [facility]. :                      RECEIVED :  07/20/21 06:28   Culture, Blood 1 [[id number]] Collected: 07/20/21 0616   Order Status: Completed Specimen: Blood Updated: 07/21/21 0914    Blood Culture, Routine No Growth to date.  Any change in status will be called. Narrative:     ORDER#: [id number]                          ORDERED BY: SUHAS Melgar   SOURCE: Blood LAC                          COLLECTED:  07/20/21 06:16   ANTIBIOTICS AT [facility]. :                      RECEIVED :  07/20/21 06:28   Culture, Surgical [[id number]] (Abnormal) Collected: 07/17/21 1110   Order Status: Completed Specimen: Tissue from Joint, Knee Updated: 07/21/21 0805    Gram Stain Result Few WBC's (Polymorphonuclear) present   Few Gram positive cocci  in clusters   No Epithelial Cells seen   Abnormal     Anaerobic Culture No anaerobes isolated  4 days    Organism Staphylococcus aureusAbnormal     Culture Surgical --    Moderate growth   No further workup   Refer to (Surgical culture Knee #3 R knee medial gutter collected   [employer]) for sensitivity results    Narrative:     ORDER#: [id number]                          ORDERED BY: ROCIO TREVIÑO   SOURCE: Knee #2 R knee fat pad             COLLECTED:  07/17/21 11:10   ANTIBIOTICS AT [facility]. :                      RECEIVED :  07/17/21 12:43   Culture, Surgical [[id number]] (Abnormal) Collected: 07/17/21 1107   Order Status: Completed Specimen:  Body Fluid from Joint, Knee Updated: 07/21/21 0804    Gram Stain Result Many WBC's (Polymorphonuclear) present   Moderate Gram positive cocci  in clusters   No Epithelial Cells seen   Abnormal     Anaerobic Culture No anaerobes isolated  4 days    Organism Staphylococcus aureusAbnormal     Culture Surgical --    Moderate growth   No further workup   Refer to (Surgical culture Knee #3 R knee medial gutter collected   [employer]) for sensitivity results    Narrative:     ORDER#: [id number]                          ORDERED BY: ROCIO TREVIÑO   SOURCE: Knee #1 R knee fluid               COLLECTED:  07/17/21 11:07   ANTIBIOTICS AT [facility]. :                      RECEIVED :  07/17/21 12:41   Culture, Surgical [[id number]] (Abnormal)  Collected: 07/17/21 1111   Order Status: Completed Specimen: Tissue from Joint, Knee Updated: 07/21/21 0804    Gram Stain Result Few WBC's (Polymorphonuclear) present   Rare Gram positive cocci  in clusters   No Epithelial Cells seen   Abnormal     Anaerobic Culture No anaerobes isolated  4 days    Organism Staphylococcus aureusAbnormal     Culture Surgical Moderate growth   Narrative:     ORDER#: [id number]                          ORDERED BY: ROCIO TREVIÑO   SOURCE: Knee #3 R knee medial gutter       COLLECTED:  07/17/21 11:11   ANTIBIOTICS AT [facility]. :                      RECEIVED :  07/17/21 12:43   Culture, Blood 1 [[id number]] Collected: 07/19/21 0631   Order Status: Completed Specimen: Blood Updated: 07/20/21 0914    Blood Culture, Routine No Growth to date.  Any change in status will be called. Narrative:     ORDER#: [id number]                          ORDERED BY: ROCIO TREVIÑO   SOURCE: Blood LFA                          COLLECTED:  07/19/21 06:31   ANTIBIOTICS AT [facility]. :                      RECEIVED :  07/19/21 06:43   Culture, Body Fluid [[id number]] (Abnormal)  Collected: 07/16/21 1447   Order Status: Completed Specimen: Body Fluid from Synovial Fluid Updated: 07/20/21 0711    Gram Stain Result Many WBC's (Polymorphonuclear)   Many Gram positive cocci  in pairs   Abnormal     Anaerobic Culture No anaerobes isolated  4 days    Organism Staphylococcus aureusAbnormal     Body Fluid Culture, Sterile Moderate growth   Narrative:     ORDER#: [id number]                          ORDERED BY: SHANNON PIRES   SOURCE: Synovial Fluid Right Knee          COLLECTED:  07/16/21 14:47   ANTIBIOTICS AT [facility]. :                      RECEIVED :  07/16/21 14:54   Culture, Blood 1 [[id number]] (Abnormal) Collected: 07/16/21 1237   Order Status: Completed Specimen: Blood Updated: 07/18/21 0803    Culture, Blood 2  Bottle volume = 9 mlAbnormal     Organism Staphylococcus aureusAbnormal     Culture, Blood 2 --    No further workup   Isolated from Aerobic and Anaerobic bottle   Refer to (Blood culture collected [email or web address]) for sensitivity results    Narrative:     ORDER#: [id number]                          ORDERED BY: Debbie Jules   SOURCE: Blood rac                          COLLECTED:  07/16/21 12:37   ANTIBIOTICS AT [facility]. :                      RECEIVED :  07/16/21 12:56   CALL [employer] [id number],   Microbiology results called to and read back by Nellie Faith RN on   5th, 07/17/2021 04:15, by [facility]   Culture, Blood 1 [[id number]] Collected: 07/18/21 0552   Order Status: Canceled Specimen: Blood    Culture, Fungus [[id number]] Collected: 07/17/21 1110   Order Status: Sent Specimen: Tissue from Joint, Knee Updated: 07/17/21 1418    KOH Prep No Fungal elements seen   Narrative:     ORDER#: [id number]                          ORDERED BY: ROCIO Kline   SOURCE: Knee #2 R knee fat pad             COLLECTED:  07/17/21 11:10   ANTIBIOTICS AT [facility]. :                      RECEIVED :  07/17/21 12:40   Culture, Fungus [[id number]] Collected: 07/17/21 1107   Order Status: Sent Specimen: Body Fluid from Joint, Knee Updated: 07/17/21 1417    KOH Prep No Fungal elements seen   Narrative:     ORDER#: [id number]                          ORDERED BY: ROCIO TREVIÑO   SOURCE: Knee #1 R knee fluid               COLLECTED:  07/17/21 11:07   ANTIBIOTICS AT [facility]. :                      RECEIVED :  07/17/21 12:39   Culture, Fungus [[id number]] Collected: 07/17/21 1111   Order Status: Sent Specimen: Tissue from Joint, Knee Updated: 07/17/21 1355    KOH Prep No Fungal elements seen   Narrative:     ORDER#: [id number]                          ORDERED BY: ROCIO TREVIÑO   SOURCE: Knee #3 R knee medial gutter       COLLECTED:  07/17/21 11:11 ANTIBIOTICS AT [facility]. :                      RECEIVED :  07/17/21 12:41   Gram Stain [[id number]]    Order Status: Sent Specimen: Aspirate    COVID-19, Rapid [[id number]] Collected: 07/16/21 1153   Order Status: Completed Specimen: Nasopharyngeal Swab Updated: 07/16/21 1233    SARS-CoV-2, NAAT Not Detected             Assessment/Plan   Principal Problem:    Septic arthritis ([facility] [address]- Orthopedics performed R knee explant antibiotic spacer with ANA drain on 7/17/21. Weightbearing as tolerated, DVT prophylaxis, PT/OT, ice and elevation. ID consulted and continuing Cefazolin. 7/20 Drain removed, picc line placed. Continue to monitor blood cultures. 2D echo ordered. Monitoring R shoulder and other joints for possible metastatic focus of infection. Ortho planning to aspirate R shoulder. Active Problems:    Coronary artery disease involving native coronary artery of native heart without angina pectoris    S/P TAVR (transcatheter aortic valve replacement)   - Cardiology consulted and notes pt will need follow up in 4 weeks in the valve clinic. 2D echo       COPD (chronic obstructive pulmonary disease) ([facility] [address]- O2 and nebulizers.  IS      Essential hypertension-continue home medications and monitor closely       Antibiotic: Soraida Wheatley PA-C  7/21/2021, 9:51 AM

## 2021-07-21 NOTE — PROCEDURES
INJECTION OPERATIVE NOTE    NAME OF SURGEON / : Carson Watts MD  PATIENT:   Yumiko Potts  Date: 7/21/2021        Time: 4:54 PM   Referring Physician: ________________________    PREOP DIAGNOSIS:   right shoulder  Possible septic arthritis after reverse arthroplasty  POSTOP DIAGNOSIS:  Same     PROCEDURE:  right shoulder  aspiration injection    FINDINGS: None  ANESTHESIA: local  COMPLICATIONS:  None          INDICATIONS:  Patient consents to the above procedure. Patient understands the risks of bleeding, infection, nerve injury, stiffness, and blood clots. Procedure in Detail:     The  area was prepped with chlorhexidine and alcohol . Ten milliliters of 1% lidocaine was injected posteriorly using a 21 gauge spinal needle into the skin and then into through capsule. The glenohumeral joint   was then injected with 20cc of lidocaine. ATtempted to withdrawal this fluid and only got back 1cc pink fluid even with tilting his body and moving shoulder gently. The needle was withdrawn and a bandaid applied. Will send fluid for routine culture Patient tolerated well. .       Electronically signed by Ruben Barrios MD on 7/21/2021 at 4:54 PM

## 2021-07-21 NOTE — PROGRESS NOTES
INFECTIOUS DISEASES PROGRESS NOTE      Patient:  Delphine Henry  YOB: 1955  MRN: [id number]   Admit date: 7/16/2021   Admitting Physician: Irma Kent MD  Primary Care Physician: SHIMON Medrano    CHIEF COMPLAINT: Radha Pepper not able to get fluid removed from shoulder\"    Interval History: Patient notes that there was enough lidocaine available for Dr. Naresh De Dios to aspirate his right shoulder therefore it is going to be done tomorrow. He still has quite a bit of discomfort in his knee. His wife reports that he had a better night and was less confused. Allergies: Allergies   Allergen Reactions    Codeine Swelling     Lips swelling    Iv [Iodides] Swelling     Contrast dye used for heart cath. Caused face to swell.     Hydrocodone Swelling     lips swelling    Statins      Myalgias         Current Meds: baclofen (LIORESAL) tablet 5 mg, TID PRN  ceFAZolin (ANCEF) 2000 mg in 0.9% sodium chloride 50 mL IVPB, Q8H  sodium chloride flush 0.9 % injection 10 mL, 2 times per day  sodium chloride flush 0.9 % injection 10 mL, PRN  acetaminophen (TYLENOL) tablet 650 mg, Q6H  oxyCODONE (ROXICODONE) immediate release tablet 5 mg, Q4H PRN   Or  oxyCODONE (ROXICODONE) immediate release tablet 10 mg, Q4H PRN  morphine injection 2 mg, Q1H PRN   Or  morphine injection 4 mg, Q1H PRN  sennosides-docusate sodium (SENOKOT-S) 8.6-50 MG tablet 1 tablet, BID  magnesium hydroxide (MILK OF MAGNESIA) 400 MG/5ML suspension 30 mL, Daily PRN  aspirin EC tablet 325 mg, BID  0.9 % sodium chloride infusion, PRN  ondansetron (ZOFRAN-ODT) disintegrating tablet 4 mg, Q8H PRN   Or  ondansetron (ZOFRAN) injection 4 mg, Q6H PRN  polyethylene glycol (GLYCOLAX) packet 17 g, Daily PRN  acetaminophen (TYLENOL) tablet 650 mg, Q6H PRN   Or  acetaminophen (TYLENOL) suppository 650 mg, Q6H PRN  magnesium sulfate 2000 mg in 50 mL IVPB premix, PRN  potassium chloride (KLOR-CON M) extended release tablet 40 mEq, PRN   Or  potassium bicarb-citric acid (EFFER-K) effervescent tablet 40 mEq, PRN   Or  potassium chloride 10 mEq/100 mL IVPB (Peripheral Line), PRN  [Held by provider] clopidogrel (PLAVIX) tablet 75 mg, Daily  ezetimibe (ZETIA) tablet 10 mg, Daily  gabapentin (NEURONTIN) capsule 800 mg, TID  hydroxychloroquine (PLAQUENIL) tablet 400 mg, Daily  oxyCODONE-acetaminophen (PERCOCET) 7.5-325 MG per tablet 1 tablet, Q4H PRN  pantoprazole (PROTONIX) tablet 40 mg, Daily  albuterol (PROVENTIL) nebulizer solution 2.5 mg, Q6H PRN  0.9 % sodium chloride infusion, Continuous  budesonide (PULMICORT) nebulizer suspension 500 mcg, BID  Arformoterol Tartrate (BROVANA) nebulizer solution 15 mcg, BID  thiamine (B-1) injection 100 mg, Daily  multivitamin 1 tablet, Daily  folic acid (FOLVITE) tablet 1 mg, Daily  LORazepam (ATIVAN) tablet 1 mg, Q1H PRN   Or  LORazepam (ATIVAN) injection 1 mg, Q1H PRN   Or  LORazepam (ATIVAN) tablet 2 mg, Q1H PRN   Or  LORazepam (ATIVAN) injection 2 mg, Q1H PRN   Or  LORazepam (ATIVAN) tablet 3 mg, Q1H PRN   Or  LORazepam (ATIVAN) injection 3 mg, Q1H PRN   Or  LORazepam (ATIVAN) tablet 4 mg, Q1H PRN   Or  LORazepam (ATIVAN) injection 4 mg, Q1H PRN        Review of Systems   Musculoskeletal: Positive for arthralgias. Skin: Positive for wound. Vital Signs:  /61   Pulse 80   Temp 99.1 °F (37.3 °C)   Resp 16   Ht 5' 7\" (1.702 m)   Wt 175 lb 8 oz (79.6 kg)   SpO2 96%   BMI 27.49 kg/m²   Temp (24hrs), Av.9 °F (37.2 °C), Min:98.3 °F (36.8 °C), Max:99.9 °F (37.7 °C)      Physical Exam  General:  Awake, alert, sitting up at bedside eating some lunch. HEENT: Sclera anicteric. No conjunctival petechiae. Respiratory: Effort even and unlabored. He is not conversationally dyspneic  Right knee dressing clean dry and intact  Right shoulder no pain elicited with minimal range of motion.   No effusion appreciated        LAB RESULTS:    CBC with DIFF:  Recent Labs     21  0142 21  0142 21  0243 07/20/21  0556 07/21/21  0319   WBC 9.7  --  10.9*  --  13.8*   RBC 3.41*  --  3.92*  --  3.25*   HGB 10.4*  --  11.7*  --  9.8*   HCT 31.8*   < > 35.7* 32.7* 29.3*   MCV 93.3  --  91.1  --  90.2   MCH 30.5  --  29.8  --  30.2   MCHC 32.7*  --  32.8*  --  33.4   RDW 14.3  --  14.5  --  14.7*   PLT 55*   < > 87* 61* 97*   MPV 11.5  --  12.4  --  11.3   NEUTOPHILPCT 79.6*  --  71.7*  --  74.7*   LYMPHOPCT 10.8*  --  15.0*  --  12.8*   MONOPCT 8.9  --  11.8*  --  11.0*   EOSRELPCT 0.0  --  0.3  --  0.2   BASOPCT 0.2  --  0.4  --  0.2   NEUTROABS 7.7*  --  7.8*  --  10.3*   LYMPHSABS 1.1  --  1.6  --  1.8   MONOSABS 0.90  --  1.30*  --  1.50*   EOSABS 0.00  --  0.00  --  0.00   BASOSABS 0.00  --  0.00  --  0.00    < > = values in this interval not displayed. CMP/BMP:  Recent Labs     07/19/21  0142 07/19/21  1325 07/20/21  0249 07/21/21  0319     --  138 133*   K 2.9* 3.4* 4.3 3.3*     --  99 97*   CO2 26  --  27 27   ANIONGAP 8  --  12 9   GLUCOSE 100  --  91 126*   BUN 15  --  11 12   CREATININE 0.6  --  0.6 0.6   LABGLOM >60  --  >60 >60   CALCIUM 8.3*  --  9.1 8.6*   PROT 4.7*  --  6.1* 5.4*   LABALBU 2.6*  --  3.3* 2.9*   BILITOT 0.3  --  0.7 0.5   ALKPHOS 105  --  178* 139*   ALT 15  --  21 19   AST 34  --  61* 40         Culture Results:    Recent Labs     07/17/21  1111   CXSURG Moderate growth       Blood Culture Recent:   Recent Labs     07/20/21  0616 07/20/21  0556 07/19/21  0631 07/18/21  0552 07/16/21  1256   BC No Growth to date. Any change in status will be called. No Growth to date. Any change in status will be called. No Growth to date. Any change in status will be called. Bottle volume = <5 ml  Quality of results might be  affected with low volume.   *  No further workup  Isolated from Anaerobic bottle  Refer to (Blood culture collected [email or web address]) for sensitivity results    Bottle volume = 8 ml*  Isolated from Aerobic and Anaerobic bottle   Blood cultures July 16 1256+ for methicillin susceptible Staph aureus from both bottles    Blood cultures  July 16 at 1237+ for methicillin susceptible Staphylococcus aureus from both bottles    Blood cultures July 18 + for Staph aureus collected at [address] [facility]    Blood cultures July 19 - today    Blood cultures 7/18 x1 set at 0938 negative to date    Blood cultures July 20 - to date        RADIOLOGY      XR SHOULDER RIGHT (MIN 2 VIEWS)    Result Date: 7/17/2021  History: Right shoulder pain Right shoulder: 3 views right shoulder are obtained. COMPARISON: 4/22/2012 FINDINGS: The alignment the prosthesis is appropriate. No malalignment or hardware complication identified. No periprosthetic fracture. Mild right acromioclavicular arthrosis. Visible right-sided ribs appear intact. There is improving aeration right lung base compared to the 4/22/2021 exam. A right infrahilar density may relate to vascular crowding versus atelectasis. 1. Appropriate alignment of the right shoulder prosthesis with no acute bony pathology identified. 2. Improving aeration right lung base compared to 4/22/2021. Right infrahilar opacities may relate to vascular crowding and atelectasis. Signed by Dr Carlos Parada    XR KNEE RIGHT (1-2 VIEWS)    Result Date: 7/17/2021  History: Knee infection, prosthesis explantation with antibiotic spacer placement TECHNIQUE: 2 views right knee are obtained. COMPARISON: 7/16/2021 There is revision of the right knee prosthesis with explantation of the tibial component. Antibiotic spacers are placed within the tibia. Old healed fibula shaft fracture. Anterior surgical drain. Anterior soft tissue swelling and fluid with subcutaneous air. Diffuse vascular calcification. 1. Revision of right knee arthroplasty with placement of antibiotic beads. Anterior surgical drain. 2. Old healed proximal fibula shaft fracture.  3. Diffuse vascular calcifications Signed by Dr Carlos Parada    XR KNEE RIGHT (1-2 VIEWS)    Result Date: 7/16/2021  History: Swelling and pain with fever Right knee: 2 views right knee are obtained. COMPARISON: 6/18/2019 FINDINGS: There is anterior soft tissue swelling with a small knee joint effusion. Alignment of the prosthesis is preserved. No evidence of hardware loosening. Similar remodeling along the femoral trochlea. Bony spurring of the proximal tibia. No bony fracture or dislocation. Chronic cortical thickening of the proximal fibula shaft Diffuse gastric calcification. 1. Appropriate alignment of the right knee prosthesis with no acute osseous pathology. Small knee joint effusion with anterior soft tissue swelling. Signed by Dr Suan Duverney    XR CHEST PORTABLE    Result Date: 7/16/2021  Examination. XR CHEST PORTABLE 7/16/2021 12:19 AM History: Fever. A single frontal portable upright view of the chest is compared with the previous study dated 5/19/2021. The lungs are poorly inflated. The linear parenchymal density in the left lower lung are similar to the previous study and represent discoid atelectasis. There is no evidence of recent infiltrate, pleural effusion, pulmonary congestion or pneumothorax. Heart size is not optimally evaluated due to the portable projection. There is no acute bony abnormality. The right shoulder arthroplasty is incompletely visualized and not noted. Poor lung expansion and left lower lung discoid atelectasis. No active infiltrate or pulmonary congestion.  Signed by Dr Ambar Horvath                  Patient Active Problem List   Diagnosis    Coronary atherosclerosis of native coronary artery    MI (myocardial infarction) ([address]    COPD (chronic obstructive pulmonary disease) (HCC)    Bronchitis    Leg pain    History of tobacco abuse    Hoarseness, chronic    Chronic heartburn    Hiatal hernia    Coronary artery disease involving native coronary artery of native heart without angina pectoris    Mixed hyperlipidemia    Essential hypertension    S/P coronary artery stent placement  SOB (shortness of breath)    Right upper quadrant abdominal pain    Acalculous cholecystitis    Aortic valve stenosis    Arthritis of knee    Gastroesophageal reflux disease    Gout    Neuropathy    Rheumatoid arthritis (HCC)    Alternating constipation and diarrhea    DDD (degenerative disc disease), lumbar    Spinal stenosis of lumbar region with neurogenic claudication    Lumbar stenosis with neurogenic claudication    Abnormal myocardial perfusion study    Osteoarthritis of both shoulders    Renal cyst, left    Complex renal cyst    Hypertrophy of prostate with urinary obstruction    Current use of proton pump inhibitor    S/P TAVR (transcatheter aortic valve replacement)    Acute on chronic combined systolic and diastolic congestive heart failure due to valvular disease (HCC)    Fever and chills    Dysuria    Suspected UTI    Anemia    Status post transcatheter aortic valve replacement (TAVR) using bioprosthesis    Confusion    Septic arthritis (HCC)       IMPRESSION:  1. 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 Surgical cultures positive from July 17  2. Chronic obstructive pulmonary disease  3. Coronary artery disease  4. Rheumatoid arthritis  5. History transcatheter aortic valve replacement  6. Right shoulder pain      RECOMMENDATIONS :  · Continue cefazolin  · Continue to monitor surveillance cultures  · Follow up on 2D echo  · Continue to monitor surgical site  · Continue to monitor right shoulder -plan for aspiration tomorrow.         Malaika Richey MD

## 2021-07-21 NOTE — PLAN OF CARE
Problem: Pain:  Goal: Pain level will decrease  Description: Pain level will decrease  Outcome: Ongoing  Goal: Control of acute pain  Description: Control of acute pain  Outcome: Ongoing  Goal: Control of chronic pain  Description: Control of chronic pain  Outcome: Ongoing     Problem: Falls - Risk of:  Goal: Will remain free from falls  Description: Will remain free from falls  Outcome: Ongoing  Goal: Absence of physical injury  Description: Absence of physical injury  Outcome: Ongoing     Problem: Infection - Surgical Site:  Goal: Will show no infection signs and symptoms  Description: Will show no infection signs and symptoms  Outcome: Ongoing     Problem: Discharge Planning:  Goal: Discharged to appropriate level of care  Description: Discharged to appropriate level of care  Outcome: Ongoing     Problem: Gas Exchange - Impaired:  Goal: Levels of oxygenation will improve  Description: Levels of oxygenation will improve  Outcome: Ongoing     Problem: Infection, Septic Shock:  Goal: Will show no infection signs and symptoms  Description: Will show no infection signs and symptoms  Outcome: Ongoing     Problem: Tissue Perfusion, Altered:  Goal: Circulatory function within specified parameters  Description: Circulatory function within specified parameters  Outcome: Ongoing detailed exam details…

## 2021-07-21 NOTE — PROGRESS NOTES
Lab called regarding order for synovial fluid pulled from patients right shoulder.  stated that the fluid is in the purple top instead of the white so only a body fluid cell count can be ran on that tube.  Electronically signed by Aneesh Agarwal RN on 7/21/21 at 5:33 PM CDT

## 2021-07-21 NOTE — PROGRESS NOTES
Physical Therapy    Name: Keenan Todd  MRN: [id number]  Date of Service:  7/21/2021 07/21/21 1527   Restrictions/Precautions   Restrictions/Precautions Weight Bearing; Fall Risk   Lower Extremity Weight Bearing Restrictions   Right Lower Extremity Weight Bearing Weight Bearing As Tolerated   Hearing   Hearing [facility]   General   Chart Reviewed Yes   Patient assessed for rehabilitation services? Yes   Diagnosis EXPLANT R KNEE WITH PLACEMENT OF ANTIBIOTIC SPACERS   Follows Commands WFL   Subjective   Subjective Pt sitting EOB, VAZ and wife also present. Pt agrees to amb. Pain Screening   Patient Currently in Pain Yes   Pain Assessment   Pain Assessment 0-10   Pain Level 5   Pain Type Acute pain;Surgical pain   Pain Location Knee   Pain Orientation Right   Pain Descriptors Aching; Throbbing   Pain Frequency Continuous   Pain Onset On-going   Clinical Progression Gradually improving   Functional Pain Assessment Prevents or interferes some active activities and ADLs   Non-Pharmaceutical Pain Intervention(s) Ambulation/Increased Activity; Distraction;Repositioned; Rest   Response to Pain Intervention Patient Satisfied   Bed Mobility   Scooting Minimal assistance  (To EOB )   Transfers   Sit to Stand Contact guard assistance   Stand to sit Minimal Assistance   Ambulation   Ambulation? Yes   WB Status WBAT   Ambulation 1   Surface level tile   Device Rolling Walker   Assistance Minimal assistance   Quality of Gait Slow  No LOB   Gait Deviations Slow Ayanna;Decreased step length;Decreased step height;Decreased head and trunk rotation   Distance 75'  (EOB<amb. out in hallway<into BR<other side EOB )   Comments Including 1 L and 2 R turns. Pt continues to present with some antalgic gait and decreased B step height/length. Min A for balance when turning and equal wt shift. Pt gait pattern  improved from last visit, v/c's maintain upright posture (pt able to correct min-cannot maintain).    Short term goals   Time Frame for Short term goals 2 WKS   Short term goal 1  FT WITH RW, CGA   Conditions Requiring Skilled Therapeutic Intervention   Body structures, Functions, Activity limitations Decreased functional mobility ; Increased pain;Decreased strength;Decreased ROM; Decreased cognition;Decreased balance;Decreased posture   Assessment Pt able to tolerate TF, amb, and bed mobility with min c/o of increased pain and mod c/o and signs of fatigue. Pt gait quality and endurance improved since last visit. plan to progress pt per POC. Prognosis Good   REQUIRES PT FOLLOW UP Yes   Discharge Recommendations Continue to assess pending progress; Patient would benefit from continued therapy after discharge   Activity Tolerance   Activity Tolerance Patient Tolerated treatment well;Patient limited by fatigue   Plan   Times per week 5-7   Plan weeks 2   Current Treatment Recommendations Strengthening; Functional Mobility Training;Gait Training;Transfer Training;Pain Management;Positioning; Safety Education & Training;Patient/Caregiver Education & Training   Plan Comment WBAT   Safety Devices   Type of devices Patient at risk for falls;Gait belt;Bed alarm in place; Left in bed;Call light within reach  (On L side, cryocuff donned )   PT Whiteboard Notes   Therapy Whiteboard R KNEE ANTIBIOTIC SPACERS, WBAT RE 8-1           Electronically signed by Charley Willoughby PTA on 7/21/2021 at 3:45 PM

## 2021-07-22 NOTE — PLAN OF CARE
Problem: Pain:  Goal: Pain level will decrease  Description: Pain level will decrease  Outcome: Ongoing  Goal: Control of acute pain  Description: Control of acute pain  Outcome: Ongoing  Goal: Control of chronic pain  Description: Control of chronic pain  Outcome: Ongoing     Problem: Falls - Risk of:  Goal: Will remain free from falls  Description: Will remain free from falls  Outcome: Ongoing  Goal: Absence of physical injury  Description: Absence of physical injury  Outcome: Ongoing     Problem: Infection - Surgical Site:  Goal: Will show no infection signs and symptoms  Description: Will show no infection signs and symptoms  Outcome: Ongoing     Problem: Discharge Planning:  Goal: Discharged to appropriate level of care  Description: Discharged to appropriate level of care  Outcome: Ongoing     Problem: Gas Exchange - Impaired:  Goal: Levels of oxygenation will improve  Description: Levels of oxygenation will improve  Outcome: Ongoing     Problem: Infection, Septic Shock:  Goal: Will show no infection signs and symptoms  Description: Will show no infection signs and symptoms  Outcome: Ongoing     Problem: Tissue Perfusion, Altered:  Goal: Circulatory function within specified parameters  Description: Circulatory function within specified parameters  Outcome: Ongoing

## 2021-07-22 NOTE — PROGRESS NOTES
Physical Therapy    Name: Michael Mcguire  MRN: [id number]  Date of Service:  7/22/2021 07/22/21 1206   Subjective   Subjective Attempt in AM:Pt laying in bed upon entering. Wife and other family also present. Pt and family report pt is having to have open heart surgery. They report they will be going to [place] and will be talking to  soon. Pt reports 6/10 chest pain, and says he doesn't want to get out of bed at the moment. Crycuff refreshed and donned on R knee.             Electronically signed by Jenny Espinoza PTA on 7/22/2021 at 12:11 PM

## 2021-07-22 NOTE — PROGRESS NOTES
Cardiology consult placed given echo findings. Patient known to Dr. Valentin Espinosa who is aware of patient's admission. Have spoken to Dr. Valentin Espinosa. He will see the patient tomorrow. Have spoken to patient as well.

## 2021-07-22 NOTE — PROGRESS NOTES
Comprehensive Nutrition Assessment    Type and Reason for Visit:  Initial, RD Nutrition Re-Screen/LOS    Nutrition Recommendations/Plan: modify current Ensure Enlive to Ensure Clear for better accetance    Nutrition Assessment:  Pt is adequately nourihsed AEB fat and muscle mass. PO intake per documentation is 75-00%. Daughter in room this a.m. said breakfast was only about \"half or so. \"  Changing ONS d/t pt dislike of Ensure. Will try Ensure Clear. Malnutrition Assessment:  Malnutrition Status:  No malnutrition    Context:  Acute Illness     Findings of the 6 clinical characteristics of malnutrition:  Energy Intake:  No significant decrease in energy intake  Weight Loss:  No significant weight loss     Body Fat Loss:  No significant body fat loss     Muscle Mass Loss:  No significant muscle mass loss    Fluid Accumulation:  No significant fluid accumulation Extremities   Strength:  Not Performed    Nutrition Related Findings:  adequately nourished      Wounds:  Surgical Incision       Current Nutrition Therapies:    Diet NPO Exceptions are: Sips of Water with Meds    Anthropometric Measures:  · Height: 5' 7\" (170.2 cm)  · Current Body Weight: 175 lb 6 oz (79.5 kg)   · Admission Body Weight: 166 lb (75.3 kg) (stated)    · Usual Body Weight: 164 lb (74.4 kg) (6/2021)     · Ideal Body Weight: 148 lbs; % Ideal Body Weight 118.5 %   · BMI: 27.5  · Adjusted Body Weight:  ; No Adjustment   · BMI Categories: Overweight (BMI 25.0-29. 9)       Nutrition Diagnosis:   · Inadequate oral intake related to increase demand for energy/nutrients as evidenced by wounds      Nutrition Interventions:   Food and/or Nutrient Delivery:  Continue Current Diet, Modify Oral Nutrition Supplement  Nutrition Education/Counseling:  No recommendation at this time, Education not indicated   Coordination of Nutrition Care:  Continue to monitor while inpatient    Goals:  po intake 75% or greater.   incision healing       Nutrition Monitoring and Evaluation:   Behavioral-Environmental Outcomes:  None Identified   Food/Nutrient Intake Outcomes:  Diet Advancement/Tolerance, Food and Nutrient Intake, Supplement Intake  Physical Signs/Symptoms Outcomes:  Biochemical Data, Weight, Skin, Nutrition Focused Physical Findings     Discharge Planning:    Continue current diet     Electronically signed by Janie Ambriz MS, RD, LD on 7/22/21 at 11:59 AM CDT    Contact: [phone]

## 2021-07-22 NOTE — PROGRESS NOTES
[address] [facility]      Patient:  Mateus Dumas  YOB: 1955  Date of Service: 7/22/2021  MRN: [id number]   Acct: [de-identified]   Primary Care Physician: SHIMON Oleary  Advance Directive: Full Code  Admit Date: 7/16/2021       Hospital Day: 6  Portions of this note have been copied forward, however, updated to reflect the most current clinical status of this patient. CHIEF COMPLAINT R knee pain    SUBJECTIVE:  Mr. Miguel A Alvarez is laying in bed in no acute distress. Pt and family are concerned stating they've been told he needs open heart surgery but is not a candidate. Pt continues to have discomfort in shoulder and knee. He also continues to have shortness of breath with any exertion. He denies chest pain. CUMULATIVE HOSPITAL COURSE:   The patient is a 75 y. o. male with past medical history of TAVR, CAD with stents, COPD, HTN, and HLD who presented to [facility] ED complaining of right knee pain.  Mr. Owens reported increased swelling and pain in right knee since last 2 days.  Reported fever of 103.4 at home. Stated he has history of bilateral knee replacement. However, right knee has had need for joint aspiration on occasion, but none recently. Reported he was seen at [facility] and was sent home. Denied any specific injury to his right knee. Reported severe pain, limited range of motion due to pain and swelling. Denied no more than usual shortness of breath, chest pain, abdominal pain, nausea, or vomiting. Work-up in ED revealed lactic 2.3, CRP 34.75, troponin 0.05, WBC 9.5, sed rate 30, unremarkable chest x-ray, right knee x-ray showed small knee joint effusion with anterior soft tissue swelling. Orthopedic surgery was consulted from ED whom recommended arthrocentesis for lab studies. Dr. Debra Valdez arthrocentesis. Fluid study showed nucl cell 19,560, 40,000 RBC. Patient was admitted to hospital medicine with Septic arthritis.  Dr. Yasmin Garza performed right knee explant antibiotic spacer on 7/17/21, with ANA drain. Blood culture returned positive for gram positive cocci in clusters resembling staphylococcus. Synovial fluid returned positive for staphylococcus aureus. ID was consulted whom recommended discontinuing vancomycin and cefepime and changed to Cefazolin. Surveillance blood cultures ordered for Sunday morning and Monday morning. Orthopedic surgery recommends leaving drain in for now, weightbearing as tolerated, DVT prophylaxis, PT/OT, ice and elevation. Pt with hallucinations. CT head showed no acute process and  Neurology notes that symptoms will continue to improve. Drain has been removed and picc line in place. ID continuing pt on Cefazolin, ordered 2D echo and monitor R shoulder and other joints for potential metastatic focus of infection. Cardiology consulted due to ECHO findings. Cardiology planning JESSICA. Review of Systems:   Review of Systems   Constitutional: Negative for chills, diaphoresis, fatigue and fever. HENT: Negative for congestion, ear pain, sinus pressure, sinus pain and sore throat. Eyes: Negative for photophobia, pain and itching. Respiratory: Positive for shortness of breath. Negative for cough, chest tightness and wheezing. Cardiovascular: Negative for chest pain, palpitations and leg swelling. Gastrointestinal: Negative for abdominal distention, abdominal pain, diarrhea, nausea and vomiting. Endocrine: Negative for cold intolerance and heat intolerance. Genitourinary: Negative for difficulty urinating, dysuria, flank pain, frequency and urgency. Musculoskeletal: Positive for arthralgias. Negative for joint swelling and myalgias. Neurological: Negative for dizziness, tremors, syncope, weakness, light-headedness, numbness and headaches. Hematological: Does not bruise/bleed easily. Psychiatric/Behavioral: Positive for hallucinations. Negative for agitation, confusion, dysphoric mood, self-injury and suicidal ideas.           Objective:   VITALS: Cranial Nerves: No cranial nerve deficit. Sensory: No sensory deficit. Motor: No weakness. Psychiatric:         Mood and Affect: Mood normal.         Behavior: Behavior normal.         Thought Content: Thought content normal.         Judgment: Judgment normal.            Medications:      sodium chloride      sodium chloride 75 mL/hr at 07/20/21 1219      ceFAZolin  2,000 mg Intravenous Q8H    sodium chloride flush  10 mL Intravenous 2 times per day    acetaminophen  650 mg Oral Q6H    sennosides-docusate sodium  1 tablet Oral BID    aspirin  325 mg Oral BID    [Held by provider] clopidogrel  75 mg Oral Daily    ezetimibe  10 mg Oral Daily    gabapentin  800 mg Oral TID    hydroxychloroquine  400 mg Oral Daily    pantoprazole  40 mg Oral Daily    budesonide  0.5 mg Nebulization BID    Arformoterol Tartrate  15 mcg Nebulization BID    thiamine  100 mg Intravenous Daily    multivitamin  1 tablet Oral Daily    folic acid  1 mg Oral Daily     baclofen, sodium chloride flush, oxyCODONE **OR** oxyCODONE, morphine **OR** morphine, magnesium hydroxide, sodium chloride, ondansetron **OR** ondansetron, polyethylene glycol, acetaminophen **OR** acetaminophen, magnesium sulfate, potassium chloride **OR** potassium alternative oral replacement **OR** potassium chloride, oxyCODONE-acetaminophen, albuterol, LORazepam **OR** LORazepam **OR** LORazepam **OR** LORazepam **OR** LORazepam **OR** LORazepam **OR** LORazepam **OR** LORazepam  Diet NPO Exceptions are: Sips of Water with Meds     Lab and other Data:     Recent Labs     07/20/21  0249 07/20/21  0249 07/20/21  0556 07/21/21 0319 07/21/21 1618 07/22/21  0645   WBC 10.9*  --   --  13.8*  --  13.2*   HGB 11.7*   < >  --  9.8* 9.5* 9.0*   PLT 87*   < > 61* 97*  --  153    < > = values in this interval not displayed.      Recent Labs     07/20/21  0249 07/20/21  0249 07/21/21  0319 07/21/21  1618 07/22/21  0645     --  133*  --  136   K 4.3 < > 3.3* 3.6 3.6   CL 99  --  97*  --  100   CO2 27  --  27  --  27   BUN 11  --  12  --  13   CREATININE 0.6  --  0.6  --  0.7   GLUCOSE 91  --  126*  --  110*    < > = values in this interval not displayed. Recent Labs     07/20/21  0249 07/21/21  0319 07/22/21  0645   AST 61* 40 32   ALT 21 19 15   BILITOT 0.7 0.5 0.5   ALKPHOS 178* 139* 231*       RAD:   CT Head WO Contrast  Result Date: 7/19/21  Impression:  1. Mild cerebral and cerebellar volume loss with chronic microvascular disease but no evidence of acute intracranial process. Signed by Dr Maurice Smith (MIN 2 VIEWS)  Result Date: 7/17/2021  1. Appropriate alignment of the right shoulder prosthesis with no acute bony pathology identified. 2. Improving aeration right lung base compared to 4/22/2021. Right infrahilar opacities may relate to vascular crowding and atelectasis. Signed by Dr Dayna Chicas    XR KNEE RIGHT (1-2 VIEWS)  Result Date: 7/17/2021  1. Revision of right knee arthroplasty with placement of antibiotic beads. Anterior surgical drain. 2. Old healed proximal fibula shaft fracture. 3. Diffuse vascular calcifications   Signed by Dr Dayna Chicas    XR KNEE RIGHT (1-2 VIEWS)  Result Date: 7/16/2021  1. Appropriate alignment of the right knee prosthesis with no acute osseous pathology. Small knee joint effusion with anterior soft tissue swelling. Signed by Dr Dayna Chicas    XR CHEST PORTABLE  Result Date: 7/16/2021  Poor lung expansion and left lower lung discoid atelectasis. No active infiltrate or pulmonary congestion. Signed by Dr Jannette Turner    ECHO:  Completed, pending reading. FrequencyHarlemPipedrive Justinocarlos a #[id number] ([id number]) (76 y.o.  M) (Adm: 07/16/21)  [facility] CenterQLPO-[phone]  Results    Procedure Component Value Units Date/Time   Culture, Blood 1 [[id number]] Collected: 07/20/21 0556   Order Status: Completed Specimen: Blood Updated: 07/21/21 0914    Blood Culture, Routine No Growth to date. Doc Lard change in status will be called. Narrative:     ORDER#: [id number]                          ORDERED BY: ROCIO TREVIÑO   SOURCE: Blood LAC                          COLLECTED:  07/20/21 05:56   ANTIBIOTICS AT [facility]. :                      RECEIVED :  07/20/21 06:28   Culture, Blood 1 [[id number]] Collected: 07/20/21 0616   Order Status: Completed Specimen: Blood Updated: 07/21/21 0914    Blood Culture, Routine No Growth to date.  Any change in status will be called. Narrative:     ORDER#: [id number]                          ORDERED BY: SUHAS Bingham   SOURCE: Blood LAC                          COLLECTED:  07/20/21 06:16   ANTIBIOTICS AT [facility]. :                      RECEIVED :  07/20/21 06:28   Culture, Surgical [[id number]] (Abnormal) Collected: 07/17/21 1110   Order Status: Completed Specimen: Tissue from Joint, Knee Updated: 07/21/21 0805    Gram Stain Result Few WBC's (Polymorphonuclear) present   Few Gram positive cocci  in clusters   No Epithelial Cells seen   Abnormal     Anaerobic Culture No anaerobes isolated  4 days    Organism Staphylococcus aureusAbnormal     Culture Surgical --    Moderate growth   No further workup   Refer to (Surgical culture Knee #3 R knee medial gutter collected   Ty@[employer]) for sensitivity results    Narrative:     ORDER#: [id number]                          ORDERED BY: ROCIO TREVIÑO   SOURCE: Knee #2 R knee fat pad             COLLECTED:  07/17/21 11:10   ANTIBIOTICS AT [facility]. :                      RECEIVED :  07/17/21 12:43   Culture, Surgical [[id number]] (Abnormal) Collected: 07/17/21 1107   Order Status: Completed Specimen:  Body Fluid from Joint, Knee Updated: 07/21/21 0804    Gram Stain Result Many WBC's (Polymorphonuclear) present   Moderate Gram positive cocci  in clusters   No Epithelial Cells seen   Abnormal     Anaerobic Culture No anaerobes isolated  4 days    Organism Staphylococcus aureusAbnormal     Culture Surgical --    Moderate growth   No further workup Refer to (Surgical culture Knee #3 R knee medial gutter collected   Nury@[employer]) for sensitivity results    Narrative:     ORDER#: [id number]                          ORDERED BY: ROCIO TREVIÑO   SOURCE: Knee #1 R knee fluid               COLLECTED:  07/17/21 11:07   ANTIBIOTICS AT [facility]. :                      RECEIVED :  07/17/21 12:41   Culture, Surgical [[id number]] (Abnormal)  Collected: 07/17/21 1111   Order Status: Completed Specimen: Tissue from Joint, Knee Updated: 07/21/21 0804    Gram Stain Result Few WBC's (Polymorphonuclear) present   Rare Gram positive cocci  in clusters   No Epithelial Cells seen   Abnormal     Anaerobic Culture No anaerobes isolated  4 days    Organism Staphylococcus aureusAbnormal     Culture Surgical Moderate growth   Narrative:     ORDER#: [id number]                          ORDERED BY: ROCIO TREVIÑO   SOURCE: Knee #3 R knee medial gutter       COLLECTED:  07/17/21 11:11   ANTIBIOTICS AT [facility]. :                      RECEIVED :  07/17/21 12:43   Culture, Blood 1 [[id number]] Collected: 07/19/21 0631   Order Status: Completed Specimen: Blood Updated: 07/20/21 0914    Blood Culture, Routine No Growth to date.  Any change in status will be called. Narrative:     ORDER#: [id number]                          ORDERED BY: ROCIO TREVIÑO   SOURCE: Blood LFA                          COLLECTED:  07/19/21 06:31   ANTIBIOTICS AT [facility]. :                      RECEIVED :  07/19/21 06:43   Culture, Body Fluid [[id number]] (Abnormal)  Collected: 07/16/21 1447   Order Status: Completed Specimen:  Body Fluid from Synovial Fluid Updated: 07/20/21 0711    Gram Stain Result Many WBC's (Polymorphonuclear)   Many Gram positive cocci  in pairs   Abnormal     Anaerobic Culture No anaerobes isolated  4 days    Organism Staphylococcus aureusAbnormal     Body Fluid Culture, Sterile Moderate growth   Narrative:     ORDER#: [id number]                          ORDERED BY: SHANNON PIRES   SOURCE: Synovial Fluid Right Knee          COLLECTED:  07/16/21 14:47   ANTIBIOTICS AT [facility]. :                      RECEIVED :  07/16/21 14:54   Culture, Blood 1 [[id number]] (Abnormal) Collected: 07/18/21 0552   Order Status: Completed Specimen: Blood Updated: 07/20/21 0701    Blood Culture, Routine --Abnormal      Bottle volume = <5 ml   Quality of results might be   affected with low volume. Abnormal     Organism Staphylococcus aureusAbnormal     Blood Culture, Routine --    No further workup   Isolated from Anaerobic bottle   Refer to (Blood culture collected [email or web address]) for sensitivity results    Narrative:     ORDER#: [id number]                          ORDERED BY: Claudean House   SOURCE: Blood Hand, Left                   COLLECTED:  07/18/21 05:52   ANTIBIOTICS AT [facility]. :                      RECEIVED :  07/18/21 06:57   CALL Morales Nagy [phone],   Microbiology results called to and read back by Zakiya Carvalho RN ON 3RD, 07/19/2021 05:46, by [facility]   Culture, Blood 2 [[id number]] Collected: 07/18/21 0938   Order Status: Completed Specimen: Blood Updated: 07/19/21 1114    Culture, Blood 2 No Growth to date.  Any change in status will be called. Narrative:     ORDER#: [id number]                          ORDERED BY: ROCIO TREVIÑO   SOURCE: Blood Antecubital-Rig              COLLECTED:  07/18/21 09:38   ANTIBIOTICS AT [facility]. :                      RECEIVED :  07/18/21 10:17   Culture, Blood 1 [[id number]] (Abnormal)  Collected: 07/16/21 1256   Order Status: Completed Specimen: Blood Updated: 07/19/21 0714    Blood Culture, Routine  Bottle volume = 8 mlAbnormal     Organism Staphylococcus aureusAbnormal     Blood Culture, Routine Isolated from Aerobic and Anaerobic bottle   Narrative:     ORDER#: [id number]                          ORDERED BY: Jen Diaz   SOURCE: Blood lac                          COLLECTED:  07/16/21 12:56   ANTIBIOTICS AT [facility]. :                      RECEIVED :  07/16/21 12:56   CALL Brando Kothari [phone], Microbiology results called to and read back by Sabina Alexander RN on   5th, 07/17/2021 04:13, by [facility]   Culture, Blood 2 [[id number]] (Abnormal) Collected: 07/16/21 1237   Order Status: Completed Specimen: Blood Updated: 07/18/21 0803    Culture, Blood 2  Bottle volume = 9 mlAbnormal     Organism Staphylococcus aureusAbnormal     Culture, Blood 2 --    No further workup   Isolated from Aerobic and Anaerobic bottle   Refer to (Blood culture collected [email or web address]) for sensitivity results    Narrative:     ORDER#: [id number]                          ORDERED BY: Tushar Feliz   SOURCE: Blood rac                          COLLECTED:  07/16/21 12:37   ANTIBIOTICS AT [facility]. :                      RECEIVED :  07/16/21 12:56   CALL Valery Case [id number],   Microbiology results called to and read back by Sabina Alexander RN on   5th, 07/17/2021 04:15, by [facility]   Culture, Blood 1 [[id number]] Collected: 07/18/21 0552   Order Status: Canceled Specimen: Blood    Culture, Fungus [[id number]] Collected: 07/17/21 1110   Order Status: Sent Specimen: Tissue from Joint, Knee Updated: 07/17/21 1418    KOH Prep No Fungal elements seen   Narrative:     ORDER#: [id number]                          ORDERED BY: ROCIO Rajan   SOURCE: Knee #2 R knee fat pad             COLLECTED:  07/17/21 11:10   ANTIBIOTICS AT [facility]. :                      RECEIVED :  07/17/21 12:40   Culture, Fungus [[id number]] Collected: 07/17/21 1107   Order Status: Sent Specimen: Body Fluid from Joint, Knee Updated: 07/17/21 1417    KOH Prep No Fungal elements seen   Narrative:     ORDER#: [id number]                          ORDERED BY: ROCIO TREVIÑO   SOURCE: Knee #1 R knee fluid               COLLECTED:  07/17/21 11:07   ANTIBIOTICS AT [facility]. :                      RECEIVED :  07/17/21 12:39   Culture, Fungus [[id number]] Collected: 07/17/21 1111   Order Status: Sent Specimen: Tissue from Joint, Knee Updated: 07/17/21 1355    KOH Prep No Fungal elements seen Narrative:     ORDER#: [id number]                          ORDERED BY: ROCIO TREVIÑO   SOURCE: Knee #3 R knee medial gutter       COLLECTED:  07/17/21 11:11   ANTIBIOTICS AT [facility]. :                      RECEIVED :  07/17/21 12:41   Gram Stain [[id number]]    Order Status: Sent Specimen: Aspirate    COVID-19, Rapid [[id number]] Collected: 07/16/21 1153   Order Status: Completed Specimen: Nasopharyngeal Swab Updated: 07/16/21 1233    SARS-CoV-2, NAAT Not Detected             Assessment/Plan   Principal Problem:    Septic arthritis ([facility] [address]- Orthopedics performed R knee explant antibiotic spacer with ANA drain on 7/17/21. Weightbearing as tolerated, DVT prophylaxis, PT/OT, ice and elevation. ID consulted and continuing Cefazolin. 7/20 Drain removed, picc line placed. Continue to monitor blood cultures. 2D echo ordered. Monitoring R shoulder and other joints for possible metastatic focus of infection. Ortho aspirate R shoulder 7/21/21. Active Problems:    Coronary artery disease involving native coronary artery of native heart without angina pectoris    S/P TAVR (transcatheter aortic valve replacement)   - Cardiology consulted and notes pt will need follow up in 4 weeks in the valve clinic. 2D echo completed. Cardiology planning JESSICA to rule out endocarditis. COPD (chronic obstructive pulmonary disease) ([facility] [address]- O2 and nebulizers.  IS      Essential hypertension-continue home medications and monitor closely       Antibiotic: Soraida Casillas PA-C  7/22/2021, 2:54 PM

## 2021-07-22 NOTE — PROGRESS NOTES
Cardiology Progress Note   Carroll Alba MD      Patient:  Laly Smith  [id number]    Patient Active Problem List    Diagnosis Date Noted    Status post transcatheter aortic valve replacement (TAVR) using bioprosthesis 04/21/2021     Priority: High    Acute on chronic combined systolic and diastolic congestive heart failure due to valvular disease ([address] 04/16/2021     Priority: High    Fever and chills 04/16/2021     Priority: High    Dysuria 04/16/2021     Priority: High    Suspected UTI 04/16/2021     Priority: High    S/P TAVR (transcatheter aortic valve replacement) 04/07/2021     Priority: High    Abnormal myocardial perfusion study      Priority: High    Aortic valve stenosis      Priority: High    Confusion      Priority: Low    Anemia 04/17/2021     Priority: Low    Current use of proton pump inhibitor 12/03/2020     Priority: Low    Renal cyst, left 09/17/2020     Priority: Low    Complex renal cyst 09/17/2020     Priority: Low    Hypertrophy of prostate with urinary obstruction 09/17/2020     Priority: Low    Osteoarthritis of both shoulders 11/07/2019     Priority: Low    DDD (degenerative disc disease), lumbar 12/04/2018     Priority: Low    Spinal stenosis of lumbar region with neurogenic claudication 12/04/2018     Priority: Low    Lumbar stenosis with neurogenic claudication 12/04/2018     Priority: Low    Alternating constipation and diarrhea 10/31/2018     Priority: Low    Arthritis of knee 07/16/2018     Priority: Low    Gastroesophageal reflux disease 12/19/2017     Priority: Low    Gout 12/19/2017     Priority: Low    Neuropathy 12/19/2017     Priority: Low    Rheumatoid arthritis ([facility] 12/19/2017     Priority: Low    Acalculous cholecystitis 05/19/2017     Priority: Low    Right upper quadrant abdominal pain 05/12/2017     Priority: Low    Mixed hyperlipidemia 04/26/2016     Priority: Low    Essential hypertension 04/26/2016     Priority: Low    S/P coronary artery stent placement 04/26/2016     Priority: Low    SOB (shortness of breath) 04/26/2016     Priority: Low    Coronary artery disease involving native coronary artery of native heart without angina pectoris      Priority: Low    Hiatal hernia 04/20/2015     Priority: Low    Hoarseness, chronic 03/13/2015     Priority: Low    Chronic heartburn 03/13/2015     Priority: Low    History of tobacco abuse      Priority: Low    Leg pain 08/25/2011     Priority: Low    Coronary atherosclerosis of native coronary artery      Priority: Low    MI (myocardial infarction) ([facility] [address]      Priority: Low    COPD (chronic obstructive pulmonary disease) ([facility])      Priority: Low    Bronchitis      Priority: Low    Septic arthritis ([facility] 07/16/2021       Admit Date:  7/16/2021    Admission Problem List: Present on Admission:   Septic arthritis ([facility]   COPD (chronic obstructive pulmonary disease) ([facility]   Coronary artery disease involving native coronary artery of native heart without angina pectoris   Mixed hyperlipidemia   S/P TAVR (transcatheter aortic valve replacement)   Essential hypertension      Cardiac Specific Data:  Specialty Problems        Cardiology Problems    Aortic valve stenosis        Acute on chronic combined systolic and diastolic congestive heart failure due to valvular disease ([facility])        Coronary atherosclerosis of native coronary artery        MI (myocardial infarction) ([facility] [address]        Coronary artery disease involving native coronary artery of native heart without angina pectoris        Essential hypertension        Mixed hyperlipidemia              Subjective:  Mr. Cori Casas is laying in bed comfortably in his room with family at bedside, he denies any active chest pain or shortness of breath, still have some knee pain and shoulder pain    Objective:   /78   Pulse 85   Temp 97.4 °F (36.3 °C) (Temporal)   Resp 16   Ht 5' 7\" (1.702 m)   Wt 175 lb 6 oz (79.5 kg)   SpO2 96%   BMI 27.47 kg/m² Intake/Output Summary (Last 24 hours) at 7/22/2021 0753  Last data filed at 7/21/2021 2042  Gross per 24 hour   Intake 600 ml   Output 1475 ml   Net -875 ml       Prior to Admission medications    Medication Sig Start Date End Date Taking? Authorizing Provider   indomethacin (INDOCIN) 50 MG capsule TAKE 1 CAPSULE BY MOUTH EVERY DAY AS NEEDED 6/2/21   Historical Provider, MD   predniSONE (DELTASONE) 50 MG tablet 1 tablet by mouth 24, 12, and 1 hour before procedure. 6/3/21   SHIMON Osorio   diphenhydrAMINE (BENADRYL) 50 MG tablet 1 Tablet by mouth to be taken with last prednisone dose 1 hour before procedure. 6/3/21   SHIMON Osorio   nitroGLYCERIN (NITROSTAT) 0.4 MG SL tablet Place 1 tablet under the tongue every 5 minutes as needed for Chest pain 1 tablet as needed 4/12/21   SHIMON Fraser   ezetimibe (ZETIA) 10 MG tablet TAKE 1 TABLET BY MOUTH EVERY DAY 4/9/21   SHIMON Fraser   aspirin 81 MG chewable tablet Take 1 tablet by mouth daily 4/9/21   Chung Millard MD   clopidogrel (PLAVIX) 75 MG tablet Take 1 tablet by mouth daily 4/9/21   Chung Millard MD   furosemide (LASIX) 20 MG tablet Take 1 tablet by mouth daily 4/9/21   Chung Millard MD   Multiple Vitamin (MULTIVITAMIN) TABS tablet Take 1 tablet by mouth daily    Historical Provider, MD   pantoprazole (PROTONIX) 40 MG tablet TAKE 1 TABLET BY MOUTH DAILY TAKE DAILY FIRST THING IN THE MORNING ON AN EMPTY STOMACH. 12/21/20   SHIMON Eugene   hydroxychloroquine (PLAQUENIL) 200 MG tablet Take 400 mg by mouth daily  6/15/20   Historical Provider, MD   oxyCODONE-acetaminophen (PERCOCET) 7.5-325 MG per tablet Take 1 tablet by mouth every 4 hours as needed for Pain. Historical Provider, MD   gabapentin (NEURONTIN) 800 MG tablet Take 800 mg by mouth 3 times daily.   6/13/16   Historical Provider, MD        ceFAZolin  2,000 mg Intravenous Q8H    sodium chloride flush  10 mL Intravenous 2 times per day    acetaminophen  650 mg Oral Q6H    sennosides-docusate sodium  1 tablet Oral BID    aspirin  325 mg Oral BID    [Held by provider] clopidogrel  75 mg Oral Daily    ezetimibe  10 mg Oral Daily    gabapentin  800 mg Oral TID    hydroxychloroquine  400 mg Oral Daily    pantoprazole  40 mg Oral Daily    budesonide  0.5 mg Nebulization BID    Arformoterol Tartrate  15 mcg Nebulization BID    thiamine  100 mg Intravenous Daily    multivitamin  1 tablet Oral Daily    folic acid  1 mg Oral Daily       TELEMETRY: Sinus     Physical Exam:    Physical Exam  Vitals reviewed. Constitutional:       Appearance: Normal appearance. HENT:      Head: Normocephalic. Mouth/Throat:      Mouth: Mucous membranes are moist.   Cardiovascular:      Rate and Rhythm: Normal rate and regular rhythm. Pulses: Normal pulses. Heart sounds: Murmur heard. Pulmonary:      Effort: Pulmonary effort is normal.      Breath sounds: Normal breath sounds. Abdominal:      General: Bowel sounds are normal.      Palpations: Abdomen is soft. Tenderness: There is no abdominal tenderness. Musculoskeletal:      Right lower leg: No edema. Left lower leg: No edema. Skin:     General: Skin is warm. Neurological:      General: No focal deficit present. Mental Status: He is alert and oriented to person, place, and time. Psychiatric:         Mood and Affect: Mood normal.         Behavior: Behavior normal.         Lab Data:  CBC:   Recent Labs     07/20/21  0249 07/20/21  0249 07/20/21  0556 07/20/21  0556 07/21/21  0319 07/21/21  1618 07/22/21  0645   WBC 10.9*  --   --   --  13.8*  --  13.2*   HGB 11.7*  --   --    < > 9.8* 9.5* 9.0*   HCT 35.7*   < > 32.7*   < > 29.3* 28.7* 27.9*   MCV 91.1  --   --   --  90.2  --  92.7   PLT 87*   < > 61*  --  97*  --  153    < > = values in this interval not displayed.      BMP:   Recent Labs     07/20/21  0249 07/21/21  0319 07/21/21  1618    133*  --    K 4.3 3.3* 3.6 CL 99 97*  --    CO2 27 27  --    BUN 11 12  --    CREATININE 0.6 0.6  --      LIVER PROFILE:   Recent Labs     07/20/21  0249 07/21/21  0319   AST 61* 40   ALT 21 19   BILITOT 0.7 0.5   ALKPHOS 178* 139*     PT/INR: No results for input(s): PROTIME, INR in the last 72 hours. APTT: No results for input(s): APTT in the last 72 hours. CK, CKMB, Troponin: No results for input(s): CKTOTAL, CKMB, TROPONINI in the last 72 hours. Last 3 BNP:  No results for input(s): BNP in the last 72 hours. IMAGING:  ECHO Complete 2D W Doppler W Color    Result Date: 7/21/2021  Transthoracic Echocardiography Report (TTE)  Demographics   Patient Name   Elenita Armando  Date of Study           07/20/2021   MRN            [id number]          Gender                  Male   Date of Birth  1955      Room Number             MHL-0317   Age            76 year(s)   Height:        79 inches       Referring Physician     Theodore Looney   Weight:        178 pounds      Sonographer             Payton Light   BSA:           1.92 m^2        Interpreting Physician  Laxmi Carballo MD   BMI:           27.88 kg/m^2  Procedure Type of Study   TTE procedure:ECHO NO CONTRAST WITH DOP/COLR. Study Location: Echo Lab Technical Quality: Adequate visualization Patient Status: Inpatient Rhythm: Within normal limits Indications: Aortic regurgitation. Conclusions   Summary  Mitral valve leaflets are mildly thickened with preserved leaflet  mobility. Mild mitral regurgitation is present. S/P TAVR  Mean gradient 49 mm hg suggesting severe stenosis  Mild aortic regurgitation is noted. JUAN JOSÉ 0.95 cm2  Tricuspid valve is structurally normal.  Mild tricuspid regurgitation with estimated RVSP of 50 mm Hg. Normal left ventricular size with preserved LV function and an estimated  ejection fraction of approximately 55-60%. Mild concentric left ventricular hypertrophy. No regional wall motion abnormalities.   Impaired relaxation compatible with diastolic dysfunction. ( reversed E/A  ratio)  Normal right ventricular size with preserved RV function. IVC dilated. Signature   ----------------------------------------------------------------    Electronically signed by Leonard Joel MD(Interpreting  physician) on 07/21/2021 02:04 PM  ----------------------------------------------------------------     Findings   Mitral Valve  Mitral valve leaflets are mildly thickened with preserved leaflet  mobility. Mild mitral regurgitation is present. Aortic Valve  S/P TAVR  Mean gradient 49 mm hg suggesting severe stenosis  Mild aortic regurgitation is noted. JUAN JOSÉ 0.95 cm2   Tricuspid Valve  Tricuspid valve is structurally normal.  Mild tricuspid regurgitation with estimated RVSP of 50 mm Hg. Pulmonic Valve  The pulmonic valve was not well visualized . Left Atrium  Normal size left atrium. Left Ventricle  Normal left ventricular size with preserved LV function and an estimated  ejection fraction of approximately 55-60%. Mild concentric left ventricular hypertrophy. No regional wall motion abnormalities. Impaired relaxation compatible with diastolic dysfunction. ( reversed E/A  ratio)   Right Atrium  Normal right atrial dimension with no evidence of thrombus or mass noted. Right Ventricle  Normal right ventricular size with preserved RV function. Pericardial Effusion  No evidence of significant pericardial effusion is noted. Pleural Effusion  No evidence of pleural effusion. Miscellaneous  IVC dilated. Allergies   - Codeine.   - Iodine.   - Other allergy:(hydrocodone).  M-Mode Measurements (cm)   LVIDd: 5.67 cm                       LVIDs: 4.35 cm  IVSd: 1.4 cm  LVPWd: 1.04 cm                       AO Root Dimension: 1.9 cm  % Ejection Fraction: 55 %            LA: 3.5 cm                                       LVOT: 2 cm  Doppler Measurements:   AV Peak Velocity:361 cm/s            MV Peak E-Wave: 93 cm/s  AV Peak Gradient: 52.13 mmHg         MV Peak A-Wave: 148 cm/s  AV Mean Gradient: 49 mmHg            MV E/A Ratio: 0.63 %  AV Area (Continuity):0.95 cm^2       MV Peak Gradient: 3.46 mmHg  TR Velocity:274 cm/s                 MV P1/2t: 148 msec  TR Gradient:30.03 mmHg               MVA by PHT1.49 cm^2  Estimated RAP:20 mmHg  RVSP:50 mmHg      XR SHOULDER RIGHT (MIN 2 VIEWS)    Result Date: 7/17/2021  History: Right shoulder pain Right shoulder: 3 views right shoulder are obtained. COMPARISON: 4/22/2012 FINDINGS: The alignment the prosthesis is appropriate. No malalignment or hardware complication identified. No periprosthetic fracture. Mild right acromioclavicular arthrosis. Visible right-sided ribs appear intact. There is improving aeration right lung base compared to the 4/22/2021 exam. A right infrahilar density may relate to vascular crowding versus atelectasis. 1. Appropriate alignment of the right shoulder prosthesis with no acute bony pathology identified. 2. Improving aeration right lung base compared to 4/22/2021. Right infrahilar opacities may relate to vascular crowding and atelectasis. Signed by Dr Jeff Canela    XR KNEE RIGHT (1-2 VIEWS)    Result Date: 7/17/2021  History: Knee infection, prosthesis explantation with antibiotic spacer placement TECHNIQUE: 2 views right knee are obtained. COMPARISON: 7/16/2021 There is revision of the right knee prosthesis with explantation of the tibial component. Antibiotic spacers are placed within the tibia. Old healed fibula shaft fracture. Anterior surgical drain. Anterior soft tissue swelling and fluid with subcutaneous air. Diffuse vascular calcification. 1. Revision of right knee arthroplasty with placement of antibiotic beads. Anterior surgical drain. 2. Old healed proximal fibula shaft fracture. 3. Diffuse vascular calcifications Signed by Dr Jeff Canela    XR KNEE RIGHT (1-2 VIEWS)    Result Date: 7/16/2021  History: Swelling and pain with fever Right knee: 2 views right knee are obtained. COMPARISON: 6/18/2019 FINDINGS: There is anterior soft tissue swelling with a small knee joint effusion. Alignment of the prosthesis is preserved. No evidence of hardware loosening. Similar remodeling along the femoral trochlea. Bony spurring of the proximal tibia. No bony fracture or dislocation. Chronic cortical thickening of the proximal fibula shaft Diffuse gastric calcification. 1. Appropriate alignment of the right knee prosthesis with no acute osseous pathology. Small knee joint effusion with anterior soft tissue swelling. Signed by Dr Leydi Jacome    Result Date: 7/19/2021  CT BRAIN without contrast 7/19/2021 3:12 PM HISTORY: Hallucinations. COMPARISON: 4/21/2021 DLP: 1187 mGy cm. All CT scans are performed using dose optimization techniques as appropriate to the performed exam and include at least one of the following: Automated exposure control, adjustment of the mA and/or kV according to size, and the use of the iterative reconstruction technique. TECHNIQUE: Serial axial tomographic images of the brain were obtained without the use of intravenous contrast. FINDINGS: The midline structures are nondisplaced. There is mild cerebral and cerebellar volume loss, with an associated increase in the prominence of the ventricles and sulci. The basilar cisterns are normal in size and configuration. There is no evidence of intracranial hemorrhage or mass-effect. There is low attenuation in the periventricular white matter, consistent with chronic ischemic change. There are no abnormal extra-axial fluid collections. There is no evidence of tonsillar herniation. The included orbits and their contents are unremarkable. The visualized paranasal sinuses, mastoid air cells and middle ear cavities are clear. The visualized osseous structures and overlying soft tissues of the skull and face are intact.      1. Mild cerebral and cerebellar volume loss with chronic microvascular disease but no evidence of acute intracranial process. Signed by Dr Leticia Barton    Result Date: 7/16/2021  Examination. XR CHEST PORTABLE 7/16/2021 12:19 AM History: Fever. A single frontal portable upright view of the chest is compared with the previous study dated 5/19/2021. The lungs are poorly inflated. The linear parenchymal density in the left lower lung are similar to the previous study and represent discoid atelectasis. There is no evidence of recent infiltrate, pleural effusion, pulmonary congestion or pneumothorax. Heart size is not optimally evaluated due to the portable projection. There is no acute bony abnormality. The right shoulder arthroplasty is incompletely visualized and not noted. Poor lung expansion and left lower lung discoid atelectasis. No active infiltrate or pulmonary congestion. 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, his culture grow methicillin susceptible staph aureus on July 16-status post recent knee surgery  His blood culture has been negative since July 20 after he received a long course of IV antibiotics  PICC line was placed, plan for IV antibiotic at the home, he is being followed by ID,  Plan of care will perform JESISCA today to rule out infective endocarditis of the TAVR valve which is unlikely based on the 2D echo images but JESSICA is more sensitive and more specific for this diagnosis    Discussed the plan of care with the patient, his wife and nursing staff, discussed the plan of care with the ID team as well. Risks, benefits, alternatives of JESSICA discussed with the patient   I explained the procedure to the patient in detail and fully informed consent obtained.   Acceptable Mallampati score  Consent for moderate conscious sedation  ASA 3    Zandra Bernstein MD, MD 7/22/2021 7:53 Tyler Friedman MD, [facility]  Interventional Cardiologist, Endovascular Specialist   Medical Director, Structural Heart Program   [facility]

## 2021-07-22 NOTE — PROGRESS NOTES
Subjective:     Post-Operative Day: 5   Knee still hurts. Shoulder less sore in fact it felt great after lidocain rinse yesterday afternoon. Just got back from JESSICA    Objective:     Patient Vitals for the past 24 hrs:   BP Temp Temp src Pulse Resp SpO2 Height Weight   07/22/21 1345  97.6 °F (36.4 °C) Temporal        07/22/21 1130  101 °F (38.3 °C) Oral        07/22/21 1115       5' 7\" (1.702 m)    07/22/21 0727 133/78 97.4 °F (36.3 °C) Temporal 85 16 96 %     07/22/21 0715     16 96 %     07/22/21 0141 121/70 98.7 °F (37.1 °C) Oral 77 16 95 %  175 lb 6 oz (79.5 kg)   07/21/21 2201 110/72 99.2 °F (37.3 °C) Oral 80 16 97 %     07/21/21 1850 116/66 101.2 °F (38.4 °C) Oral 97 16 92 %     07/21/21 1630  100.6 °F (38.1 °C) Oral            General: Alert cooperative   Wound: Clean dry intact. Moderate swelling   Neurovascular: Exam normal   DVT Exam: No evidence of DVT         Data Review:  Recent Labs     07/21/21  1618 07/22/21  0645   HGB 9.5* 9.0*     Recent Labs     07/22/21  0645      K 3.6   CREATININE 0.7   GLUCOSE 110*     No results for input(s): POCGLU in the last 72 hours. Wbc elevated      Assessment:     Status Post explant , I&D, antbx spacer for MSSA infected right Total Knee Arthroplasty. Doing well postop without complication. I sent the right shoulder aspirate to the lab in the wrong tube but specifically asked them to do a culture. A cell count was done instead and looks ok. Very few wbc.    Plan:   WBAT / ROM ok  IV antbx (ancef)  Pain control  Tight blood glucose control  PT/OT  DVT prophylaxis  Ice and elevate  Micro director checking to see if any specimen left over from right shoulder they can culture and will call back

## 2021-07-22 NOTE — PROGRESS NOTES
INFECTIOUS DISEASES PROGRESS NOTE      Patient:  Johann Chinchilla  YOB: 1955  MRN: [id number]   Admit date: 7/16/2021   Admitting Physician: Liliane Amaro MD  Primary Care Physician: SHIMON Alatorre    CHIEF COMPLAINT: Frustrated      Interval History: Patient notes that he is going for JESSICA today. He reports he did talk to Dr. Tyler Haskins last night. He had been sleeping when he came in. There were concerns expressed with the possibility of needing open heart surgery. He has no new focal joint complaints. Dr. Harland Dakin was unable to locate any significant fluid aspirated from the right shoulder. Allergies: Allergies   Allergen Reactions    Codeine Swelling     Lips swelling    Iv [Iodides] Swelling     Contrast dye used for heart cath. Caused face to swell.     Hydrocodone Swelling     lips swelling    Statins      Myalgias         Current Meds: baclofen (LIORESAL) tablet 5 mg, TID PRN  ceFAZolin (ANCEF) 2000 mg in 0.9% sodium chloride 50 mL IVPB, Q8H  sodium chloride flush 0.9 % injection 10 mL, 2 times per day  sodium chloride flush 0.9 % injection 10 mL, PRN  acetaminophen (TYLENOL) tablet 650 mg, Q6H  oxyCODONE (ROXICODONE) immediate release tablet 5 mg, Q4H PRN   Or  oxyCODONE (ROXICODONE) immediate release tablet 10 mg, Q4H PRN  morphine injection 2 mg, Q1H PRN   Or  morphine injection 4 mg, Q1H PRN  sennosides-docusate sodium (SENOKOT-S) 8.6-50 MG tablet 1 tablet, BID  magnesium hydroxide (MILK OF MAGNESIA) 400 MG/5ML suspension 30 mL, Daily PRN  aspirin EC tablet 325 mg, BID  0.9 % sodium chloride infusion, PRN  ondansetron (ZOFRAN-ODT) disintegrating tablet 4 mg, Q8H PRN   Or  ondansetron (ZOFRAN) injection 4 mg, Q6H PRN  polyethylene glycol (GLYCOLAX) packet 17 g, Daily PRN  acetaminophen (TYLENOL) tablet 650 mg, Q6H PRN   Or  acetaminophen (TYLENOL) suppository 650 mg, Q6H PRN  magnesium sulfate 2000 mg in 50 mL IVPB premix, PRN  potassium chloride (KLOR-CON M) extended release tablet 40 mEq, PRN   Or  potassium bicarb-citric acid (EFFER-K) effervescent tablet 40 mEq, PRN   Or  potassium chloride 10 mEq/100 mL IVPB (Peripheral Line), PRN  [Held by provider] clopidogrel (PLAVIX) tablet 75 mg, Daily  ezetimibe (ZETIA) tablet 10 mg, Daily  gabapentin (NEURONTIN) capsule 800 mg, TID  hydroxychloroquine (PLAQUENIL) tablet 400 mg, Daily  oxyCODONE-acetaminophen (PERCOCET) 7.5-325 MG per tablet 1 tablet, Q4H PRN  pantoprazole (PROTONIX) tablet 40 mg, Daily  albuterol (PROVENTIL) nebulizer solution 2.5 mg, Q6H PRN  0.9 % sodium chloride infusion, Continuous  budesonide (PULMICORT) nebulizer suspension 500 mcg, BID  Arformoterol Tartrate (BROVANA) nebulizer solution 15 mcg, BID  thiamine (B-1) injection 100 mg, Daily  multivitamin 1 tablet, Daily  folic acid (FOLVITE) tablet 1 mg, Daily  LORazepam (ATIVAN) tablet 1 mg, Q1H PRN   Or  LORazepam (ATIVAN) injection 1 mg, Q1H PRN   Or  LORazepam (ATIVAN) tablet 2 mg, Q1H PRN   Or  LORazepam (ATIVAN) injection 2 mg, Q1H PRN   Or  LORazepam (ATIVAN) tablet 3 mg, Q1H PRN   Or  LORazepam (ATIVAN) injection 3 mg, Q1H PRN   Or  LORazepam (ATIVAN) tablet 4 mg, Q1H PRN   Or  LORazepam (ATIVAN) injection 4 mg, Q1H PRN        Review of Systems   Musculoskeletal: Positive for arthralgias. Skin: Positive for wound. Vital Signs:  /78   Pulse 85   Temp 101 °F (38.3 °C) (Oral)   Resp 16   Ht 5' 7\" (1.702 m)   Wt 175 lb 6 oz (79.5 kg)   SpO2 96%   BMI 27.47 kg/m²   Temp (24hrs), Av.7 °F (37.6 °C), Min:97.4 °F (36.3 °C), Max:101.2 °F (38.4 °C)      Physical Exam  General:  Awake, alert, sitting up at bedside with wife and daughter at bedside. HEENT: Sclera anicteric. No conjunctival petechiae. Respiratory: Effort even and unlabored.   He is not conversationally dyspneic  Right knee dressing clean dry and intact  Right shoulder essentially unchanged  Neuro: Alert and oriented, speech clear  Psych: Pleasant cooperative        LAB RESULTS:    CBC with DIFF:  Recent Labs     07/20/21 0249 07/20/21 0249 07/20/21  0556 07/20/21  0556 07/21/21 0319 07/21/21  1618 07/22/21  0645   WBC 10.9*  --   --   --  13.8*  --  13.2*   RBC 3.92*  --   --   --  3.25*  --  3.01*   HGB 11.7*  --   --    < > 9.8* 9.5* 9.0*   HCT 35.7*   < > 32.7*   < > 29.3* 28.7* 27.9*   MCV 91.1  --   --   --  90.2  --  92.7   MCH 29.8  --   --   --  30.2  --  29.9   MCHC 32.8*  --   --   --  33.4  --  32.3*   RDW 14.5  --   --   --  14.7*  --  14.9*   PLT 87*   < > 61*  --  97*  --  153   MPV 12.4  --   --   --  11.3  --  10.5   NEUTOPHILPCT 71.7*  --   --   --  74.7*  --  79.0*   LYMPHOPCT 15.0*  --   --   --  12.8*  --  10.0*   MONOPCT 11.8*  --   --   --  11.0*  --  9.0   EOSRELPCT 0.3  --   --   --  0.2  --  0.4   BASOPCT 0.4  --   --   --  0.2  --  0.2   NEUTROABS 7.8*  --   --   --  10.3*  --  10.4*   LYMPHSABS 1.6  --   --   --  1.8  --  1.3   MONOSABS 1.30*  --   --   --  1.50*  --  1.20*   EOSABS 0.00  --   --   --  0.00  --  0.10   BASOSABS 0.00  --   --   --  0.00  --  0.00    < > = values in this interval not displayed. CMP/BMP:  Recent Labs     07/20/21 0249 07/20/21 0249 07/21/21 0319 07/21/21  1618 07/22/21  0645     --  133*  --  136   K 4.3   < > 3.3* 3.6 3.6   CL 99  --  97*  --  100   CO2 27  --  27  --  27   ANIONGAP 12  --  9  --  9   GLUCOSE 91  --  126*  --  110*   BUN 11  --  12  --  13   CREATININE 0.6  --  0.6  --  0.7   LABGLOM >60  --  >60  --  >60   CALCIUM 9.1  --  8.6*  --  8.6*   PROT 6.1*  --  5.4*  --  6.0*   LABALBU 3.3*  --  2.9*  --  2.5*   BILITOT 0.7  --  0.5  --  0.5   ALKPHOS 178*  --  139*  --  231*   ALT 21  --  19  --  15   AST 61*  --  40  --  32    < > = values in this interval not displayed. Culture Results:    Recent Labs     07/17/21  1111   CXSURG Moderate growth       Blood Culture Recent:   Recent Labs     07/20/21  0616 07/20/21  0556 07/19/21  0631 07/18/21  0552 07/16/21  1256   BC No Growth to date.   Any change in status will be called. No Growth to date. Any change in status will be called. No Growth to date. Any change in status will be called. Bottle volume = <5 ml  Quality of results might be  affected with low volume. *  No further workup  Isolated from Anaerobic bottle  Refer to (Blood culture collected [email or web address]) for sensitivity results    Bottle volume = 8 ml*  Isolated from Aerobic and Anaerobic bottle   Blood cultures July 16 1256+ for methicillin susceptible Staph aureus from both bottles    Blood cultures  July 16 at 1237+ for methicillin susceptible Staphylococcus aureus from both bottles    Blood cultures July 18 + for Staph aureus collected at [facility]    Blood cultures July 19 - today    Blood cultures 7/18 x1 set at 0938 negative to date    Blood cultures July 20 - to date        RADIOLOGY      XR SHOULDER RIGHT (MIN 2 VIEWS)    Result Date: 7/17/2021  History: Right shoulder pain Right shoulder: 3 views right shoulder are obtained. COMPARISON: 4/22/2012 FINDINGS: The alignment the prosthesis is appropriate. No malalignment or hardware complication identified. No periprosthetic fracture. Mild right acromioclavicular arthrosis. Visible right-sided ribs appear intact. There is improving aeration right lung base compared to the 4/22/2021 exam. A right infrahilar density may relate to vascular crowding versus atelectasis. 1. Appropriate alignment of the right shoulder prosthesis with no acute bony pathology identified. 2. Improving aeration right lung base compared to 4/22/2021. Right infrahilar opacities may relate to vascular crowding and atelectasis. Signed by Dr Elvia Rincon    XR KNEE RIGHT (1-2 VIEWS)    Result Date: 7/17/2021  History: Knee infection, prosthesis explantation with antibiotic spacer placement TECHNIQUE: 2 views right knee are obtained. COMPARISON: 7/16/2021 There is revision of the right knee prosthesis with explantation of the tibial component.  Antibiotic spacers are placed within the tibia. Old healed fibula shaft fracture. Anterior surgical drain. Anterior soft tissue swelling and fluid with subcutaneous air. Diffuse vascular calcification. 1. Revision of right knee arthroplasty with placement of antibiotic beads. Anterior surgical drain. 2. Old healed proximal fibula shaft fracture. 3. Diffuse vascular calcifications Signed by Dr Carlos Parada    XR KNEE RIGHT (1-2 VIEWS)    Result Date: 7/16/2021  History: Swelling and pain with fever Right knee: 2 views right knee are obtained. COMPARISON: 6/18/2019 FINDINGS: There is anterior soft tissue swelling with a small knee joint effusion. Alignment of the prosthesis is preserved. No evidence of hardware loosening. Similar remodeling along the femoral trochlea. Bony spurring of the proximal tibia. No bony fracture or dislocation. Chronic cortical thickening of the proximal fibula shaft Diffuse gastric calcification. 1. Appropriate alignment of the right knee prosthesis with no acute osseous pathology. Small knee joint effusion with anterior soft tissue swelling. Signed by Dr Carlos Parada    XR CHEST PORTABLE    Result Date: 7/16/2021  Examination. XR CHEST PORTABLE 7/16/2021 12:19 AM History: Fever. A single frontal portable upright view of the chest is compared with the previous study dated 5/19/2021. The lungs are poorly inflated. The linear parenchymal density in the left lower lung are similar to the previous study and represent discoid atelectasis. There is no evidence of recent infiltrate, pleural effusion, pulmonary congestion or pneumothorax. Heart size is not optimally evaluated due to the portable projection. There is no acute bony abnormality. The right shoulder arthroplasty is incompletely visualized and not noted. Poor lung expansion and left lower lung discoid atelectasis. No active infiltrate or pulmonary congestion.  Signed by Dr Abdiel Marley                  Patient Active Problem List   Diagnosis    Coronary atherosclerosis of native coronary artery    MI (myocardial infarction) ([facility] [address] [address]    COPD [facility]chronic obstructive [facility] disease) [facility]    Bronchitis    Leg pain    History of tobacco abuse    Hoarseness, chronic    Chronic heartburn    Hiatal hernia    Coronary artery disease involving native coronary artery of native heart without angina pectoris    Mixed hyperlipidemia    Essential hypertension    S/P coronary artery stent placement    SOB (shortness of breath)    Right upper quadrant abdominal pain    Acalculous cholecystitis    Aortic valve stenosis    Arthritis of knee    Gastroesophageal reflux disease    Gout    Neuropathy    Rheumatoid arthritis (HCC)    Alternating constipation and diarrhea    DDD (degenerative disc disease), lumbar    Spinal stenosis of lumbar region with neurogenic claudication    Lumbar stenosis with neurogenic claudication    Abnormal myocardial perfusion study    Osteoarthritis of both shoulders    Renal cyst, left    Complex renal cyst    Hypertrophy of prostate with urinary obstruction    Current use of proton pump inhibitor    S/P TAVR (transcatheter aortic valve replacement)    Acute on chronic combined systolic and diastolic congestive heart failure due to valvular disease ([facility])    Fever and chills    Dysuria    Suspected UTI    Anemia    Status post transcatheter aortic valve replacement (TAVR) using bioprosthesis    Confusion    Septic arthritis ([facility])       IMPRESSION:  1. 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 Surgical cultures positive from July 17  2. Chronic obstructive pulmonary disease  3. Coronary artery disease  4. Rheumatoid arthritis  5. History transcatheter aortic valve replacement  6. Right shoulder pain  7. Feverpatient continues to have.   He is also getting around-the-clock Tylenol      RECOMMENDATIONS :  · Continue cefazolin  · Continue to monitor surveillance cultures  · Await JESSICA findings  · Oral temperatures only   · continue to monitor surgical site  · Continue to monitor for other metastatic focus as of infection.       Corinne Lacer, MD

## 2021-07-23 NOTE — PROGRESS NOTES
consulted whom recommended discontinuing vancomycin and cefepime and changed to Cefazolin. Surveillance blood cultures ordered for Sunday morning and Monday morning. Orthopedic surgery recommends leaving drain in for now, weightbearing as tolerated, DVT prophylaxis, PT/OT, ice and elevation. Pt with hallucinations. CT head showed no acute process and  Neurology notes that symptoms will continue to improve. Drain has been removed and picc line in place. ID continuing pt on Cefazolin, ordered 2D echo and monitor R shoulder and other joints for potential metastatic focus of infection. Cardiology consulted due to ECHO findings. Cardiology performed JESSICA which shows no evidence of endocarditis. Pt continues to have fever and post op pain. Working with PT, pt SpO2 drops into 80s with ambulation. Review of Systems:   Review of Systems   Constitutional: Negative for chills, diaphoresis, fatigue and fever. HENT: Negative for congestion, ear pain, sinus pressure, sinus pain and sore throat. Eyes: Negative for photophobia, pain and itching. Respiratory: Positive for shortness of breath. Negative for cough, chest tightness and wheezing. Cardiovascular: Negative for chest pain, palpitations and leg swelling. Gastrointestinal: Negative for abdominal distention, abdominal pain, diarrhea, nausea and vomiting. Endocrine: Negative for cold intolerance and heat intolerance. Genitourinary: Negative for difficulty urinating, dysuria, flank pain, frequency and urgency. Musculoskeletal: Positive for arthralgias. Negative for joint swelling and myalgias. Neurological: Negative for dizziness, tremors, syncope, weakness, light-headedness, numbness and headaches. Hematological: Does not bruise/bleed easily. Psychiatric/Behavioral: Negative for agitation, confusion, dysphoric mood, hallucinations, self-injury and suicidal ideas.           Objective:   VITALS:  /64   Pulse 97   Temp 101 °F (38.3 °C) (Oral) Resp 18   Ht 5' 7\" (1.702 m)   Wt 174 lb 7 oz (79.1 kg)   SpO2 90%   BMI 27.32 kg/m²   24HR INTAKE/OUTPUT:      Intake/Output Summary (Last 24 hours) at 7/23/2021 1020  Last data filed at 7/23/2021 0415  Gross per 24 hour   Intake 0 ml   Output 1090 ml   Net -1090 ml       Physical Exam  Constitutional:       General: He is not in acute distress. Appearance: Normal appearance. He is not ill-appearing, toxic-appearing or diaphoretic. HENT:      Head: Normocephalic and atraumatic. Right Ear: External ear normal.      Left Ear: External ear normal.      Nose: Nose normal.      Mouth/Throat:      Mouth: Mucous membranes are moist.      Pharynx: Oropharynx is clear. Eyes:      General: No scleral icterus. Extraocular Movements: Extraocular movements intact. Conjunctiva/sclera: Conjunctivae normal.   Cardiovascular:      Rate and Rhythm: Normal rate and regular rhythm. Pulses: Normal pulses. Heart sounds: Murmur heard. No friction rub. No gallop. Pulmonary:      Effort: Pulmonary effort is normal. No respiratory distress. Breath sounds: Normal breath sounds. No stridor. No wheezing or rhonchi. Abdominal:      General: Abdomen is flat. Bowel sounds are normal. There is no distension. Palpations: Abdomen is soft. There is no mass. Tenderness: There is no abdominal tenderness. Hernia: No hernia is present. Musculoskeletal:         General: Swelling and tenderness present. No signs of injury. Cervical back: Normal range of motion and neck supple. Right lower leg: No edema. Left lower leg: No edema. Comments: Dressing c/d/i. Skin:     General: Skin is warm and dry. Coloration: Skin is not jaundiced or pale. Findings: No erythema, lesion or rash. Neurological:      General: No focal deficit present. Mental Status: He is alert and oriented to person, place, and time. Cranial Nerves: No cranial nerve deficit. Sensory: No sensory deficit. Motor: No weakness. Psychiatric:         Mood and Affect: Mood normal.         Behavior: Behavior normal.         Thought Content: Thought content normal.         Judgment: Judgment normal.            Medications:      sodium chloride      sodium chloride 75 mL/hr at 07/20/21 1219      ceFAZolin  2,000 mg Intravenous Q8H    sodium chloride flush  10 mL Intravenous 2 times per day    acetaminophen  650 mg Oral Q6H    sennosides-docusate sodium  1 tablet Oral BID    aspirin  325 mg Oral BID    [Held by provider] clopidogrel  75 mg Oral Daily    ezetimibe  10 mg Oral Daily    gabapentin  800 mg Oral TID    hydroxychloroquine  400 mg Oral Daily    pantoprazole  40 mg Oral Daily    budesonide  0.5 mg Nebulization BID    Arformoterol Tartrate  15 mcg Nebulization BID    thiamine  100 mg Intravenous Daily    multivitamin  1 tablet Oral Daily    folic acid  1 mg Oral Daily     baclofen, sodium chloride flush, oxyCODONE **OR** oxyCODONE, morphine **OR** morphine, magnesium hydroxide, sodium chloride, ondansetron **OR** ondansetron, polyethylene glycol, acetaminophen **OR** acetaminophen, magnesium sulfate, potassium chloride **OR** potassium alternative oral replacement **OR** potassium chloride, oxyCODONE-acetaminophen, albuterol, LORazepam **OR** LORazepam **OR** LORazepam **OR** LORazepam **OR** LORazepam **OR** LORazepam **OR** LORazepam **OR** LORazepam  ADULT DIET; Regular     Lab and other Data:     Recent Labs     07/21/21 0319 07/21/21 0319 07/21/21 1618 07/22/21  0645 07/23/21  0812   WBC 13.8*  --   --  13.2* 14.8*   HGB 9.8*   < > 9.5* 9.0* 8.9*   PLT 97*  --   --  153 179    < > = values in this interval not displayed.      Recent Labs     07/21/21 0319 07/21/21 1618 07/22/21  0645 07/23/21  0812   *  --  136 134*   K 3.3* 3.6 3.6 3.6   CL 97*  --  100 99   CO2 27  --  27 27   BUN 12  --  13 9   CREATININE 0.6  --  0.7 0.5   GLUCOSE 126*  --  110* 112*     Recent Labs     07/21/21  0319 07/22/21  0645 07/23/21  0812   AST 40 32 28   ALT 19 15 12   BILITOT 0.5 0.5 0.5   ALKPHOS 139* 231* 211*       RAD:   XR Chest (2VW)   Result Date: 7/23/21  Impression:  Bibasilar atelectasis with a small left effusion. Signed by Dr Dieter Weston Contrast  Result Date: 7/19/21  Impression:  1. Mild cerebral and cerebellar volume loss with chronic microvascular disease but no evidence of acute intracranial process. Signed by Dr Beau Ceballos (MIN 2 VIEWS)  Result Date: 7/17/2021  1. Appropriate alignment of the right shoulder prosthesis with no acute bony pathology identified. 2. Improving aeration right lung base compared to 4/22/2021. Right infrahilar opacities may relate to vascular crowding and atelectasis. Signed by Dr Treasure Ford    XR KNEE RIGHT (1-2 VIEWS)  Result Date: 7/17/2021  1. Revision of right knee arthroplasty with placement of antibiotic beads. Anterior surgical drain. 2. Old healed proximal fibula shaft fracture. 3. Diffuse vascular calcifications   Signed by Dr Treasure Ford    XR KNEE RIGHT (1-2 VIEWS)  Result Date: 7/16/2021  1. Appropriate alignment of the right knee prosthesis with no acute osseous pathology. Small knee joint effusion with anterior soft tissue swelling. Signed by Dr Treasure Ford    XR CHEST PORTABLE  Result Date: 7/16/2021  Poor lung expansion and left lower lung discoid atelectasis. No active infiltrate or pulmonary congestion. Signed by Dr Damaris Genao    ECHO:  2D W doppler W color   Summary   Mitral valve leaflets are mildly thickened with preserved leaflet   mobility. Mild mitral regurgitation is present. S/P TAVR   Mean gradient 49 mm hg suggesting severe stenosis   Mild aortic regurgitation is noted. JUAN JOSÉ 0.95 cm2   Tricuspid valve is structurally normal.   Mild tricuspid regurgitation with estimated RVSP of 50 mm Hg.    Normal left ventricular size with preserved LV Status: Completed Specimen: Tissue from Joint, Knee Updated: 07/21/21 0804    Gram Stain Result Few WBC's (Polymorphonuclear) present   Rare Gram positive cocci  in clusters   No Epithelial Cells seen   Abnormal     Anaerobic Culture No anaerobes isolated  4 days    Organism Staphylococcus aureusAbnormal     Culture Surgical Moderate growth   Narrative:     ORDER#: [id number]                          ORDERED BY: ROCIO TREVIÑO   SOURCE: Knee #3 R knee medial gutter       COLLECTED:  07/17/21 11:11   ANTIBIOTICS AT [place]. :                      RECEIVED :  07/17/21 12:43   Culture, Blood 1 [[id number]] Collected: 07/19/21 0631   Order Status: Completed Specimen: Blood Updated: 07/20/21 0914    Blood Culture, Routine No Growth to date.  Any change in status will be called. Narrative:     ORDER#: [id number]                          ORDERED BY: ROCIO TREVIÑO   SOURCE: Blood LFA                          COLLECTED:  07/19/21 06:31   ANTIBIOTICS AT [place]. :                      RECEIVED :  07/19/21 06:43   Culture, Body Fluid [[id number]] (Abnormal)  Collected: 07/16/21 1447   Order Status: Completed Specimen: Body Fluid from Synovial Fluid Updated: 07/20/21 0711    Gram Stain Result Many WBC's (Polymorphonuclear)   Many Gram positive cocci  in pairs   Abnormal     Anaerobic Culture No anaerobes isolated  4 days    Organism Staphylococcus aureusAbnormal     Body Fluid Culture, Sterile Moderate growth   Narrative:     ORDER#: [id number]                          ORDERED BY: SHANNON PIRES   SOURCE: Synovial Fluid Right Knee          COLLECTED:  07/16/21 14:47   ANTIBIOTICS AT [place]. :                      RECEIVED :  07/16/21 14:54   Culture, Blood 1 [[id number]] (Abnormal) Collected: 07/18/21 0552   Order Status: Completed Specimen: Blood Updated: 07/20/21 0701    Blood Culture, Routine --Abnormal      Bottle volume = <5 ml   Quality of results might be   affected with low volume.    Abnormal     Organism Staphylococcus aureusAbnormal Blood Culture, Routine --    No further workup   Isolated from Anaerobic bottle   Refer to (Blood culture collected [email or web address]) for sensitivity results    Narrative:     ORDER#: [id number]                          ORDERED BY: Angie Archer   SOURCE: Blood Hand, Left                   COLLECTED:  07/18/21 05:52   ANTIBIOTICS AT [place]. :                      RECEIVED :  07/18/21 06:57   CALL Cristy Curtis [phone],   Microbiology results called to and read back by Osbaldo Polanco RN ON 3RD, 07/19/2021 05:46, by [facility]   Culture, Blood 1 [[id number]] (Abnormal)  Collected: 07/16/21 1256   Order Status: Completed Specimen: Blood Updated: 07/19/21 0714    Blood Culture, Routine  Bottle volume = 8 mlAbnormal     Organism Staphylococcus aureusAbnormal     Blood Culture, Routine Isolated from Aerobic and Anaerobic bottle   Narrative:     ORDER#: [id number]                          ORDERED BY: Grey Nguyen   SOURCE: Blood lac                          COLLECTED:  07/16/21 12:56   ANTIBIOTICS AT [place]. :                      RECEIVED :  07/16/21 12:56   CALL Manish Ang [phone],   Microbiology results called to and read back by Randi Cabral RN on   5th, 07/17/2021 04:13, by [facility]   Culture, Blood 2 [[id number]] (Abnormal) Collected: 07/16/21 1237   Order Status: Completed Specimen: Blood Updated: 07/18/21 0803    Culture, Blood 2  Bottle volume = 9 mlAbnormal     Organism Staphylococcus aureusAbnormal     Culture, Blood 2 --    No further workup   Isolated from Aerobic and Anaerobic bottle   Refer to (Blood culture collected [email or web address]) for sensitivity results    Narrative:     ORDER#: [id number]                          ORDERED BY: Grey Nguyen   SOURCE: Blood rac                          COLLECTED:  07/16/21 12:37   ANTIBIOTICS AT [place]. :                      RECEIVED :  07/16/21 12:56   CALL Manish nAg [phone],   Microbiology results called to and read back by Randi Cabral RN on 5th, 07/17/2021 04:15, by [facility]   Culture, Blood 1 [[id number]] Collected: 07/18/21 0552   Order Status: Canceled Specimen: Blood    Culture, Fungus [[id number]] Collected: 07/17/21 1110   Order Status: Sent Specimen: Tissue from Joint, Knee Updated: 07/17/21 1418    KOH Prep No Fungal elements seen   Narrative:     ORDER#: [id number]                          ORDERED BY: ROCIO Cabrera   SOURCE: Knee #2 R knee fat pad             COLLECTED:  07/17/21 11:10   ANTIBIOTICS AT [place]. :                      RECEIVED :  07/17/21 12:40   Culture, Fungus [[id number]] Collected: 07/17/21 1107   Order Status: Sent Specimen: Body Fluid from Joint, Knee Updated: 07/17/21 1417    KOH Prep No Fungal elements seen   Narrative:     ORDER#: [id number]                          ORDERED BY: ROCIO TREVIÑO   SOURCE: Knee #1 R knee fluid               COLLECTED:  07/17/21 11:07   ANTIBIOTICS AT [place]. :                      RECEIVED :  07/17/21 12:39   Culture, Fungus [[id number]] Collected: 07/17/21 1111   Order Status: Sent Specimen: Tissue from Joint, Knee Updated: 07/17/21 1355    KOH Prep No Fungal elements seen   Narrative:     ORDER#: [id number]                          ORDERED BY: ROCIO TREVIÑO   SOURCE: Knee #3 R knee medial gutter       COLLECTED:  07/17/21 11:11   ANTIBIOTICS AT [place]. :                      RECEIVED :  07/17/21 12:41   Gram Stain [[id number]]    Order Status: Canceled Specimen: Aspirate    COVID-19, Rapid [[id number]] Collected: 07/16/21 1153   Order Status: Completed Specimen: Nasopharyngeal Swab Updated: 07/16/21 1233    SARS-CoV-2, NAAT Not Detected                 Assessment/Plan   Principal Problem:    Septic arthritis ([address]- Orthopedics performed R knee explant antibiotic spacer with ANA drain on 7/17/21. Weightbearing as tolerated, DVT prophylaxis, PT/OT, ice and elevation. ID consulted and continuing Cefazolin. 7/20 Drain removed, picc line placed. Continue to monitor blood cultures.  Monitoring R shoulder and other joints for possible metastatic focus of infection. Ortho aspirate R shoulder 7/21/21. Active Problems:    Coronary artery disease involving native coronary artery of native heart without angina pectoris    S/P TAVR (transcatheter aortic valve replacement)   - Cardiology consulted and notes pt will need follow up in 4 weeks in the valve clinic. 2D echo completed. Cardiology performed JESSICA and ruled out endocarditis. COPD (chronic obstructive pulmonary disease) ([address]- O2 and nebulizers.  IS      Essential hypertension-continue home medications and monitor closely       Antibiotic: Soraida Kimbrough PA-C  7/23/2021, 10:20 AM

## 2021-07-23 NOTE — PROGRESS NOTES
Physical Therapy   Name: Maritza Stapleton  MRN:  [id number]  Date of service:  7/23/2021 07/23/21 1206   Restrictions/Precautions   Restrictions/Precautions Weight Bearing; Fall Risk   Lower Extremity Weight Bearing Restrictions   Right Lower Extremity Weight Bearing Weight Bearing As Tolerated   General   Chart Reviewed Yes   Subjective   Subjective Pt. states he is wanting pain meds since it has been a while. Pain Screening   Patient Currently in Pain Yes   Intervention List Patient able to continue with treatment;Nurse called to administer meds   Pain Assessment   Pain Assessment 0-10   Pain Level 8   Pain Type Acute pain;Surgical pain   Pain Location Knee   Pain Orientation Right   Pain Frequency Intermittent  (worse with amb)   Functional Pain Assessment Prevents or interferes some active activities and ADLs   Non-Pharmaceutical Pain Intervention(s) Ambulation/Increased Activity;Cold applied;Repositioned;Elevation   Pain Descriptors Aching; Sore   Pain Onset On-going   Oxygen Therapy   SpO2 100 %  (with amb, pt desats to 85%)   Pulse Oximeter Device Mode Continuous   Pulse Oximeter Device Location Finger   O2 Device Nasal cannula   O2 Flow Rate (L/min) 3 L/min   Patient Observation   Observations RLE edematous   Bed Mobility   Supine to Sit Moderate assistance   Sit to Supine Unable to assess   Scooting Minimal assistance   Comment pt scooted forward onto EOB and back into chair , v. cues to push up from seated surface   Transfers   Sit to Stand Minimal Assistance   Stand to sit Minimal Assistance   Bed to Chair Minimal assistance   Comment pt needing directional cues for mobility   Ambulation   Ambulation?  Yes   WB Status WBAT   Ambulation 1   Surface level tile   Device Rolling Walker   Assistance Minimal assistance   Quality of Gait antalgic   Gait Deviations Slow Ayanna;Decreased step length;Decreased step height;Decreased head and trunk rotation   Distance 20'   Comments pt needed directional cues and v. cues to keep RW close   Exercises   Quad Sets x10   Gluteal Sets x10   Hip Flexion x10   Hip Abduction x10   Knee Passive Range of Motion x10   Ankle Pumps x20   Comments AROM LLE and PROM-AAROM (some AROM) RLE. Sitting in chair, reclined for isometrics and hip abd/add. Short term goals   Time Frame for Short term goals 2 WKS   Short term goal 1  FT WITH RW, CGA   Conditions Requiring Skilled Therapeutic Intervention   Body structures, Functions, Activity limitations Decreased functional mobility ; Increased pain;Decreased strength;Decreased ROM; Decreased cognition;Decreased balance;Decreased posture;Decreased endurance   Assessment Pt. had difficulty with mobility today. Pt. unable to amb. more than 20' with RW and Min A; O2 sat dropping. Pt. needing many v. cues for transfers and amb. technique. Will most likely need cont. PT after d/c. Prognosis Good   Decision Making Low Complexity   Barriers to Learning pain, memory? REQUIRES PT FOLLOW UP Yes   Discharge Recommendations Continue to assess pending progress; Patient would benefit from continued therapy after discharge   Activity Tolerance   Activity Tolerance Patient Tolerated treatment well;Patient limited by fatigue   Plan   Times per week 5-7   Plan weeks 2   Current Treatment Recommendations Strengthening; Functional Mobility Training;Gait Training;Transfer Training;Pain Management;Positioning; Safety Education & Training;Patient/Caregiver Education & Training; Endurance Training   Plan Comment WBAT   Safety Devices   Type of devices Call light within reach; Chair alarm in place;Gait belt;Patient at risk for falls; Left in chair;Nurse notified  (wife present, pt reclined, cryocuff on)   PT Whiteboard Notes   Therapy Whiteboard R KNEE ANTIBIOTIC SPACERS, WBAT RE 8-1     Electronically signed by Pietro Smith, PT on 7/23/2021 at 12:20 PM

## 2021-07-23 NOTE — PROGRESS NOTES
INFECTIOUS DISEASES PROGRESS NOTE      Patient:  Tran Villalobos  YOB: 1955  MRN: [id number]   Admit date: 7/16/2021   Admitting Physician: Inderjit Lund MD  Primary Care Physician: SHIMON Dunbar    CHIEF COMPLAINT: \"My ribs hurting\"      Interval History: The patient underwent JESSICA yesterday. No evidence of endocarditis. Patient reports that his ribs on the left are hurting. He noticed that when he takes a deep breath. He thought he was having some increased coughing but his wife has not noticed that. Then he states after that his knee is hurting. His wife notes that he is desaturating some with standing or any movement and that he normally is not on oxygen. He continues to have fever and I have asked that they perform oral temps only. He still gets acetaminophen around-the-clock per orthopedics    Allergies: Allergies   Allergen Reactions    Codeine Swelling     Lips swelling    Iv [Iodides] Swelling     Contrast dye used for heart cath. Caused face to swell.     Hydrocodone Swelling     lips swelling    Statins      Myalgias         Current Meds: baclofen (LIORESAL) tablet 5 mg, TID PRN  ceFAZolin (ANCEF) 2000 mg in 0.9% sodium chloride 50 mL IVPB, Q8H  sodium chloride flush 0.9 % injection 10 mL, 2 times per day  sodium chloride flush 0.9 % injection 10 mL, PRN  acetaminophen (TYLENOL) tablet 650 mg, Q6H  oxyCODONE (ROXICODONE) immediate release tablet 5 mg, Q4H PRN   Or  oxyCODONE (ROXICODONE) immediate release tablet 10 mg, Q4H PRN  morphine injection 2 mg, Q1H PRN   Or  morphine injection 4 mg, Q1H PRN  sennosides-docusate sodium (SENOKOT-S) 8.6-50 MG tablet 1 tablet, BID  magnesium hydroxide (MILK OF MAGNESIA) 400 MG/5ML suspension 30 mL, Daily PRN  aspirin EC tablet 325 mg, BID  0.9 % sodium chloride infusion, PRN  ondansetron (ZOFRAN-ODT) disintegrating tablet 4 mg, Q8H PRN   Or  ondansetron (ZOFRAN) injection 4 mg, Q6H PRN  polyethylene glycol (GLYCOLAX) packet 17 g, Daily PRN  acetaminophen (TYLENOL) tablet 650 mg, Q6H PRN   Or  acetaminophen (TYLENOL) suppository 650 mg, Q6H PRN  magnesium sulfate 2000 mg in 50 mL IVPB premix, PRN  potassium chloride (KLOR-CON M) extended release tablet 40 mEq, PRN   Or  potassium bicarb-citric acid (EFFER-K) effervescent tablet 40 mEq, PRN   Or  potassium chloride 10 mEq/100 mL IVPB (Peripheral Line), PRN  [Held by provider] clopidogrel (PLAVIX) tablet 75 mg, Daily  ezetimibe (ZETIA) tablet 10 mg, Daily  gabapentin (NEURONTIN) capsule 800 mg, TID  hydroxychloroquine (PLAQUENIL) tablet 400 mg, Daily  oxyCODONE-acetaminophen (PERCOCET) 7.5-325 MG per tablet 1 tablet, Q4H PRN  pantoprazole (PROTONIX) tablet 40 mg, Daily  albuterol (PROVENTIL) nebulizer solution 2.5 mg, Q6H PRN  0.9 % sodium chloride infusion, Continuous  budesonide (PULMICORT) nebulizer suspension 500 mcg, BID  Arformoterol Tartrate (BROVANA) nebulizer solution 15 mcg, BID  thiamine (B-1) injection 100 mg, Daily  multivitamin 1 tablet, Daily  folic acid (FOLVITE) tablet 1 mg, Daily  LORazepam (ATIVAN) tablet 1 mg, Q1H PRN   Or  LORazepam (ATIVAN) injection 1 mg, Q1H PRN   Or  LORazepam (ATIVAN) tablet 2 mg, Q1H PRN   Or  LORazepam (ATIVAN) injection 2 mg, Q1H PRN   Or  LORazepam (ATIVAN) tablet 3 mg, Q1H PRN   Or  LORazepam (ATIVAN) injection 3 mg, Q1H PRN   Or  LORazepam (ATIVAN) tablet 4 mg, Q1H PRN   Or  LORazepam (ATIVAN) injection 4 mg, Q1H PRN        Review of Systems   Constitutional: Positive for fever. Respiratory: Positive for shortness of breath. Cardiovascular: Positive for chest pain (Pleuritic rib pain on the left). Gastrointestinal: Negative for diarrhea. Genitourinary: Negative for dysuria. Musculoskeletal: Positive for arthralgias. Skin: Positive for wound.        Vital Signs:  BP (!) 135/92   Pulse 90   Temp 101.5 °F (38.6 °C) (Oral)   Resp 18   Ht 5' 7\" (1.702 m)   Wt 174 lb 7 oz (79.1 kg)   SpO2 93%   BMI 27.32 kg/m²   Temp (24hrs), Av.8 °F (37.7 °C), Min:97.6 °F (36.4 °C), Max:101.5 °F (38.6 °C)      Physical Exam  General:  Awake, alert, sitting in recliner covered up with blankets. His wife is at bedside. Candy Money HEENT: Sclera anicteric. No conjunctival petechiae. Mask in place over nose and mouth  Respiratory: Effort even and unlabored. He is not conversationally dyspneic. Lungs reveal decreased breath sounds throughout. No rub appreciated, no crackles  Heart: D0-W2, systolic murmur heard throughout the precordium greatest in the upper sternal borders. Unchanged. Right knee incision dry and intact. Some evolving ecchymosis. Edema noted. Right shoulder essentially unchanged. Previous surgical completely healed. No evidence of obvious effusion  Neuro: Alert and oriented, speech clear  Psych: Pleasant cooperative        LAB RESULTS:    CBC with DIFF:  Recent Labs     21  0319 21  0319 21  1618 21  0645 21  0812   WBC 13.8*  --   --  13.2* 14.8*   RBC 3.25*  --   --  3.01* 2.96*   HGB 9.8*   < > 9.5* 9.0* 8.9*   HCT 29.3*   < > 28.7* 27.9* 27.3*   MCV 90.2  --   --  92.7 92.2   MCH 30.2  --   --  29.9 30.1   MCHC 33.4  --   --  32.3* 32.6*   RDW 14.7*  --   --  14.9* 14.8*   PLT 97*  --   --  153 179   MPV 11.3  --   --  10.5 10.4   NEUTOPHILPCT 74.7*  --   --  79.0* 83.4*   LYMPHOPCT 12.8*  --   --  10.0* 7.2*   MONOPCT 11.0*  --   --  9.0 7.9   EOSRELPCT 0.2  --   --  0.4 0.3   BASOPCT 0.2  --   --  0.2 0.2   NEUTROABS 10.3*  --   --  10.4* 12.4*   LYMPHSABS 1.8  --   --  1.3 1.1   MONOSABS 1.50*  --   --  1.20* 1.20*   EOSABS 0.00  --   --  0.10 0.00   BASOSABS 0.00  --   --  0.00 0.00    < > = values in this interval not displayed.        CMP/BMP:  Recent Labs     21  0319 21  1618 21  0645 21  0812   *  --  136 134*   K 3.3* 3.6 3.6 3.6   CL 97*  --  100 99   CO2 27  --  27 27   ANIONGAP 9  --  9 8   GLUCOSE 126*  --  110* 112*   BUN 12  --  13 9   CREATININE 0.6  -- 0.7 0.5   LABGLOM >60  --  >60 >60   CALCIUM 8.6*  --  8.6* 8.5*   PROT 5.4*  --  6.0* 6.2*   LABALBU 2.9*  --  2.5* 2.5*   BILITOT 0.5  --  0.5 0.5   ALKPHOS 139*  --  231* 211*   ALT 19  --  15 12   AST 40  --  32 28         Culture Results:    Recent Labs     07/17/21  1111   CXSURG Moderate growth       Blood Culture Recent:   Recent Labs     07/20/21  0616 07/20/21  0556 07/19/21  0631 07/18/21  0552 07/16/21  1256   BC No Growth to date. Any change in status will be called. No Growth to date. Any change in status will be called. No Growth to date. Any change in status will be called. Bottle volume = <5 ml  Quality of results might be  affected with low volume. *  No further workup  Isolated from Anaerobic bottle  Refer to (Blood culture collected [email or web address]) for sensitivity results    Bottle volume = 8 ml*  Isolated from Aerobic and Anaerobic bottle   Blood cultures July 16 1256+ for methicillin susceptible Staph aureus from both bottles    Blood cultures  July 16 at 1237+ for methicillin susceptible Staphylococcus aureus from both bottles    Blood cultures July 18 + for Staph aureus collected at [facility]    Blood cultures July 19 - today    Blood cultures 7/18 x1 set at 0938 negative to date    Blood cultures July 20 - to date        RADIOLOGY      XR SHOULDER RIGHT (MIN 2 VIEWS)    Result Date: 7/17/2021  History: Right shoulder pain Right shoulder: 3 views right shoulder are obtained. COMPARISON: 4/22/2012 FINDINGS: The alignment the prosthesis is appropriate. No malalignment or hardware complication identified. No periprosthetic fracture. Mild right acromioclavicular arthrosis. Visible right-sided ribs appear intact. There is improving aeration right lung base compared to the 4/22/2021 exam. A right infrahilar density may relate to vascular crowding versus atelectasis. 1. Appropriate alignment of the right shoulder prosthesis with no acute bony pathology identified.  2. Improving aeration right lung base compared to 4/22/2021. Right infrahilar opacities may relate to vascular crowding and atelectasis. Signed by Dr Jeff Canela    XR KNEE RIGHT (1-2 VIEWS)    Result Date: 7/17/2021  History: Knee infection, prosthesis explantation with antibiotic spacer placement TECHNIQUE: 2 views right knee are obtained. COMPARISON: 7/16/2021 There is revision of the right knee prosthesis with explantation of the tibial component. Antibiotic spacers are placed within the tibia. Old healed fibula shaft fracture. Anterior surgical drain. Anterior soft tissue swelling and fluid with subcutaneous air. Diffuse vascular calcification. 1. Revision of right knee arthroplasty with placement of antibiotic beads. Anterior surgical drain. 2. Old healed proximal fibula shaft fracture. 3. Diffuse vascular calcifications Signed by Dr Jeff Canela    XR KNEE RIGHT (1-2 VIEWS)    Result Date: 7/16/2021  History: Swelling and pain with fever Right knee: 2 views right knee are obtained. COMPARISON: 6/18/2019 FINDINGS: There is anterior soft tissue swelling with a small knee joint effusion. Alignment of the prosthesis is preserved. No evidence of hardware loosening. Similar remodeling along the femoral trochlea. Bony spurring of the proximal tibia. No bony fracture or dislocation. Chronic cortical thickening of the proximal fibula shaft Diffuse gastric calcification. 1. Appropriate alignment of the right knee prosthesis with no acute osseous pathology. Small knee joint effusion with anterior soft tissue swelling. Signed by Dr Jeff Canela    XR CHEST PORTABLE    Result Date: 7/16/2021  Examination. XR CHEST PORTABLE 7/16/2021 12:19 AM History: Fever. A single frontal portable upright view of the chest is compared with the previous study dated 5/19/2021. The lungs are poorly inflated. The linear parenchymal density in the left lower lung are similar to the previous study and represent discoid atelectasis.  There is no evidence of recent infiltrate, pleural effusion, pulmonary congestion or pneumothorax. Heart size is not optimally evaluated due to the portable projection. There is no acute bony abnormality. The right shoulder arthroplasty is incompletely visualized and not noted. Poor lung expansion and left lower lung discoid atelectasis. No active infiltrate or pulmonary congestion. Signed by Dr Damaris Genao                  Patient Active Problem List   Diagnosis    Coronary atherosclerosis of native coronary artery    [facility] infarction) [facility]    [facility]    Bronchitis    Leg pain    History of tobacco abuse    Hoarseness, chronic    Chronic heartburn    Hiatal hernia    Coronary artery disease involving native coronary artery of native heart without angina pectoris    Mixed hyperlipidemia    Essential hypertension    S/P coronary artery stent placement    SOB (shortness of breath)    Right upper quadrant abdominal pain    Acalculous cholecystitis    Aortic valve stenosis    Arthritis of knee    Gastroesophageal reflux disease    Gout    Neuropathy    Rheumatoid arthritis ([facility]    Alternating constipation and diarrhea    DDD (degenerative disc disease), lumbar    Spinal stenosis of lumbar region with neurogenic claudication    Lumbar stenosis with neurogenic claudication    Abnormal myocardial perfusion study    Osteoarthritis of both shoulders    Renal cyst, left    Complex renal cyst    Hypertrophy of prostate with urinary obstruction    Current use of proton pump inhibitor    S/P TAVR (transcatheter aortic valve replacement)    Acute on chronic combined systolic and diastolic congestive heart failure due to valvular disease (HCC)    Fever and chills    Dysuria    Suspected UTI    Anemia    Status post transcatheter aortic valve replacement (TAVR) using bioprosthesis    Confusion    Septic arthritis (HCC)       IMPRESSION:  1.  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 Surgical cultures positive from July 17  2. Chronic obstructive pulmonary disease  3. Coronary artery disease  4. Rheumatoid arthritis  5. History transcatheter aortic valve replacement  6. Right shoulder pain  7. Feverpatient continues to have. He is also getting around-the-clock Tylenol      RECOMMENDATIONS :  · Continue cefazolin  · Blood culture from PICC line now  · Follow-up on blood culture obtained around 8 this morning per nursing  · Chest x-ray PA and lateral given complaints of pleuritic chest pain. · Manual differential on CBC to assess for bands, eosinophils, etc.  This was added to existing blood in lab and CBC with manual differential ordered for tomorrow  · Oral temperatures only   · continue to monitor surgical site  · Continue to monitor for other metastatic focus as of infection.       Chadd Lopez MD

## 2021-07-23 NOTE — PROGRESS NOTES
[facility] Neurology  [address] [place] [address] Drive, [address]  [place], [place] [address]  Phone [phone]     Neurology Progress Note  2021  Information:   Patient Name: Blaire Nicole  :   1955  Age:   76 y.o. MRN:   [id number]  Account #:  [de-identified]  Admit Date:   2021  Today:  21     ADMIT DX:   Septic arthritis ([facility] [address]    Subjective:     Kerry Rosales a 76y.o. year old man with a history of CAD, TAVR, and stroke who was admitted  with a right knee septic joint and has had worsening visual hallucinations.  He sustained bilateral occipital infarcts in 2021 and has had visual hallucinations since. Interval History:   He continues to see animals - cats, dogs. He has had some forgetfulness and momentary confusion but has not had further agitation or restlessness. He was able to ambulate to chair with PT today. He continues to have an intermittent low grade fever. He had right shoulder aspiration  that returned little fluid. Objective:     Past Medical History:  Past Medical History:   Diagnosis Date    Acute on chronic combined systolic and diastolic congestive heart failure due to valvular disease ([address] 2021    Arthritis     Bronchitis     Cancer (HCC)     Skin Cancer REMOVED RT ARM & LT EAR    Chronic back pain     Chronic cholecystitis without calculus 2017    Chronic GERD     [facility]) [facility]     Coronary artery disease of native artery of native heart with stable angina pectoris ([address]     Coronary atherosclerosis of native coronary artery     s/p PTCA and stent placement to the LAD and RCA/7 stents    DDD (degenerative disc disease), lumbar 2018    History of blood transfusion     WITH RT SHOULDER SURGERY    History of tobacco abuse     Hyperlipidemia     Hypertension     MI (myocardial infarction) ([address]     Old, inferior wall    Moderate aortic regurgitation 2017    mod-severe AR.      Moderate aortic ARTHROPLASTY performed by Richard Rangel MD at [address] OR    SKIN BIOPSY      UPPER GASTROINTESTINAL ENDOSCOPY  2015    Dr. Manny Dodd: rukhsana neg,,normal, empiric dilatation with 51F    UPPER GASTROINTESTINAL ENDOSCOPY N/A 2018    Dr JAIDEN Torres-w/dilation over wire-51 Italian-Distal esophagitis, gastritis       Family History   Problem Relation Age of Onset    Cancer Mother     Lung Cancer Mother     Heart Disease Father     Cancer Father     Liver Cancer Father     Lung Cancer Father     Diabetes Maternal Grandmother     Heart Disease Maternal Grandfather     Cancer Maternal Grandfather     Stroke Paternal Grandmother     Stomach Cancer Sister     No Known Problems Brother     COPD Sister     Heart Disease Paternal Grandfather     Colon Cancer Neg Hx     Colon Polyps Neg Hx     Esophageal Cancer Neg Hx     Liver Disease Neg Hx     Rectal Cancer Neg Hx        Social History     Socioeconomic History    Marital status:      Spouse name: Not on file    Number of children: Not on file    Years of education: Not on file    Highest education level: Not on file   Occupational History    Not on file   Tobacco Use    Smoking status: Former Smoker     Packs/day: 0.00     Types: Cigarettes     Quit date: 12/10/2009     Years since quittin.6    Smokeless tobacco: Never Used   Vaping Use    Vaping Use: Never used   Substance and Sexual Activity    Alcohol use:  Yes     Alcohol/week: 42.0 standard drinks     Types: 42 Cans of beer per week     Comment: daily    Drug use: No    Sexual activity: Yes     Partners: Female   Other Topics Concern    Not on file   Social History Narrative    Not on file     Social Determinants of Health     Financial Resource Strain:     Difficulty of Paying Living Expenses:    Food Insecurity:     Worried About Running Out of Food in the Last Year:     [address] in the Last Year:    Transportation Needs:     Lack of Transportation (Medical):  Lack of Transportation (Non-Medical):    Physical Activity:     Days of Exercise per Week:     Minutes of Exercise per Session:    Stress:     Feeling of Stress :    Social Connections:     Frequency of Communication with Friends and Family:     Frequency of Social Gatherings with Friends and Family:     Attends Mu-ism Services:     Active Member of Clubs or Organizations:     Attends Club or Organization Meetings:     Marital Status:    Intimate Partner Violence:     Fear of Current or Ex-Partner:     Emotionally Abused:     Physically Abused:     Sexually Abused:        Medications:   sodium chloride      sodium chloride 75 mL/hr at 07/20/21 1219      ceFAZolin  2,000 mg Intravenous Q8H    sodium chloride flush  10 mL Intravenous 2 times per day    acetaminophen  650 mg Oral Q6H    sennosides-docusate sodium  1 tablet Oral BID    aspirin  325 mg Oral BID    [Held by provider] clopidogrel  75 mg Oral Daily    ezetimibe  10 mg Oral Daily    gabapentin  800 mg Oral TID    hydroxychloroquine  400 mg Oral Daily    pantoprazole  40 mg Oral Daily    budesonide  0.5 mg Nebulization BID    Arformoterol Tartrate  15 mcg Nebulization BID    thiamine  100 mg Intravenous Daily    multivitamin  1 tablet Oral Daily    folic acid  1 mg Oral Daily       Diagnostic Studies:  Reviewed all labs/diagnositcs since last 24hrs:  Recent Results (from the past 24 hour(s))   Comprehensive Metabolic Panel w/ Reflex to MG    Collection Time: 07/23/21  8:12 AM   Result Value Ref Range    Sodium 134 (L) 136 - 145 mmol/L    Potassium reflex Magnesium 3.6 3.5 - 5.0 mmol/L    Chloride 99 98 - 111 mmol/L    CO2 27 22 - 29 mmol/L    Anion Gap 8 7 - 19 mmol/L    Glucose 112 (H) 74 - 109 mg/dL    BUN 9 8 - 23 mg/dL    CREATININE 0.5 0.5 - 1.2 mg/dL    GFR Non-African American >60 >60    GFR African American >59 >59    Calcium 8.5 (L) 8.8 - 10.2 mg/dL    Total Protein 6.2 (L) 6.6 - 8.7 g/dL    Albumin 2.5 (L) 3.5 - 5.2 g/dL    Total Bilirubin 0.5 0.2 - 1.2 mg/dL    Alkaline Phosphatase 211 (H) 40 - 130 U/L    ALT 12 5 - 41 U/L    AST 28 5 - 40 U/L   CBC auto differential    Collection Time: 07/23/21  8:12 AM   Result Value Ref Range    WBC 14.8 (H) 4.8 - 10.8 K/uL    RBC 2.96 (L) 4.70 - 6.10 M/uL    Hemoglobin 8.9 (L) 14.0 - 18.0 g/dL    Hematocrit 27.3 (L) 42.0 - 52.0 %    MCV 92.2 80.0 - 94.0 fL    MCH 30.1 27.0 - 31.0 pg    MCHC 32.6 (L) 33.0 - 37.0 g/dL    RDW 14.8 (H) 11.5 - 14.5 %    Platelets 319 092 - 527 K/uL    MPV 10.4 9.4 - 12.4 fL    Neutrophils % 83.4 (H) 50.0 - 65.0 %    Lymphocytes % 7.2 (L) 20.0 - 40.0 %    Monocytes % 7.9 0.0 - 10.0 %    Eosinophils % 0.3 0.0 - 5.0 %    Basophils % 0.2 0.0 - 1.0 %    Neutrophils Absolute 12.4 (H) 1.5 - 7.5 K/uL    Immature Granulocytes # 0.2 K/uL    Lymphocytes Absolute 1.1 1.1 - 4.5 K/uL    Monocytes Absolute 1.20 (H) 0.00 - 0.90 K/uL    Eosinophils Absolute 0.00 0.00 - 0.60 K/uL    Basophils Absolute 0.00 0.00 - 0.20 K/uL   SPECIMEN REJECTION    Collection Time: 07/23/21  2:05 PM   Result Value Ref Range    Rejected Test CBCDM     Reason for Rejection see below      JESSICA - no evidence for vegetation or thrombus    Diet:  ADULT DIET; Regular    Examination:  Vitals: BP (!) 135/92   Pulse 90   Temp 101.5 °F (38.6 °C) (Oral)   Resp 18   Ht 5' 7\" (1.702 m)   Wt 174 lb 7 oz (79.1 kg)   SpO2 100% Comment: with amb, pt desats to 85%  BMI 27.32 kg/m²      Intake/Output Summary (Last 24 hours) at 7/23/2021 1445  Last data filed at 7/23/2021 1035  Gross per 24 hour   Intake 330 ml   Output 1090 ml   Net -760 ml     General appearance:  He is alert and cooperative  Skin: Skin color, texture, turgor normal. No rashes or lesions  HEENT: Head: Normal, normocephalic, atraumatic.   Neck:no adenopathy, no carotid bruit, no JVD, supple, symmetrical, trachea midline and thyroid not enlarged, symmetric, no tenderness/mass/nodules  Lungs: clear to auscultation bilaterally.  He is barrel chested suggesting COPD  Heart: regular rate and rhythm, S1, S2 normal, no murmur, click, rub or gallop  Abdomen: soft, non-tender; bowel sounds normal; no masses,  no organomegaly  Extremities: right knee incision  Neurologic:  Alert, oriented to place and 8/2021. No dysarthria. Speech is fluent. EOMI, PERRL, VFF. No facial weakness. Limb strength is grossly normal.  No pronator drift. Rapid alternating movements are normal.  Finger to nose testing shows no dysmetria. Assessment:   1.         Subacute bilateral occipital strokes with resultant visual impairment, visual hallucinations, cognitive impairment.  He realizes his hallucinations are not real.  The only medications that could help are antipsychotics or atypical antipsychotics and they carry a risk for MI and stroke. Pt and wife do not want to undergo that risk at this time. 2.         Septic arthritis right knee  3.         CAD  4.         AVR  5.         COPD  6.         Hypertension  7.         Hyperlipidemia. Plan:   1.         Reassurance that symptoms are expected to improve. 2.         Continue ASA, Plavix  3.         Abx  4.         PT     Discharge planning:   Home    Reviewed treatment plans with the patient and/or family. 25 minutes spent in face to face interaction and coordination of care.      Electronically signed by Nile Dillard MD on 7/23/2021

## 2021-07-23 NOTE — SIGNIFICANT EVENT
Rapid response called     Patient admitted with knee pain and infection post surgically   He has advanced copd and cad     And he has been admitted due to knee infection and development of septic arthritis     He had mild chest pain  He felt he got agitated as he was trying to sit up on the side of the bed to urinate. He was not able to move his leg to dangle it on the side of bed. The more he pulled, and no movement he got anxious and agitated. He felt a pressure sensation in chest    As soon as we all got in the room, pain had resolved. His nurse had come in room and helped move his leg and he was able to urinate in urinal    No nasuea  No vomiting   No shortness of breath     Vital signs;  stble   Alert and oriented   Answers all questions well  He appears very tanned. He likes being outside most of the time. He now is able to sit on his porch   And cannot walk around as much    cv rrr diffuse systolic and diastolic murmur heard through out precordium   Lungs coarse and clear   abd bowel sounds present,. A/P  1. Chest pain   Self limiting   Has obstructive cad and can precipitate angina from time to time   Stable and resolved now   bp is normal   He feels well     2. Copd chronic    3.   Knee pain improving

## 2021-07-23 NOTE — PROGRESS NOTES
Physician Progress Note      PATIENT:               Paula Miles  CSN #:                  [id number]  :                       1955  ADMIT DATE:       2021 11:14 AM  DISCH DATE:  RESPONDING  PROVIDER #:        Maxine Pires MD          QUERY TEXT:    Pt admitted with Septic arthritis. Pt noted to have fever of 101.2 and   tachycardia 104. WBC 12. If possible, please document in the progress notes   and discharge summary if you are evaluating and /or treating any of the   following: The medical record reflects the following:  Risk Factors: Hx of bilateral knee replacement. CAD, HTN  Clinical Indicators: Wound culture growing gram positive cocci in pairs. Methicillin susceptible Staphylococcus aureus bacteremia. Treatment: Arthrocentesis, Cefepime, Ortho consult, Wash out, I & D. Infectious disease Consult. Thank you    Katerina Kemp RN. BSN, [facility]  [phone]  Options provided:  -- Sepsis, present on admission  -- Sepsis, present on admission, now resolved  -- Septic arthritis right knee without Sepsis  -- Sepsis was ruled out  -- Other - I will add my own diagnosis  -- Disagree - Not applicable / Not valid  -- Disagree - Clinically unable to determine / Unknown  -- Refer to Clinical Documentation Reviewer    PROVIDER RESPONSE TEXT:    This patient was treated for sepsis this admission, which was present on   admission and is currently resolved.     Query created by: Isabelle Sarkar on 2021 1:08 PM      Electronically signed by:  Maxine Pires MD 2021 4:15 PM

## 2021-07-23 NOTE — MANAGEMENT PLAN
JESSICA was performed today in the Cath Lab without complication under conscious sedation  JESSICA revealed no evidence of endocarditis of the aortic, mitral, pulmonic or tricuspid valves  There was no mass or thrombus or vegetation on the TAVR valve with clearly mobile leaflets, no evidence of any significant regurgitation on the TAVR valve  Patient has mild to moderate mitral regurgitation with no evidence of endocarditis on the mitral valve  LV systolic function looks normal    Patient was transferred back to his room  Finding of the JESSICA was discussed with the patient in presence of his large family members  At this point there is no indication for any cardiac surgery    Patient need long-term antibiotics for the knee joint infection which was treated surgically and PICC line was inserted as planned with the ID team      Nola Garcia MD, [facility], [facility]  Interventional Cardiologist, Endovascular Specialist   Medical Director, Structural Heart Program   [facility]

## 2021-07-23 NOTE — PROGRESS NOTES
Subjective:     Post-Operative Day: 6   Knee still hurts. Objective:     Patient Vitals for the past 24 hrs:   BP Temp Temp src Pulse Resp SpO2 Height Weight   07/23/21 0415 137/68 99.5 °F (37.5 °C) Oral 78 16 91 %     07/23/21 0213 129/77 99.2 °F (37.3 °C) Oral 86 18 92 %  174 lb 7 oz (79.1 kg)   07/22/21 2343 (!) 147/75   87 20 91 %     07/22/21 1856 114/69 99.9 °F (37.7 °C) Oral 92 16 95 %     07/22/21 1815  100 °F (37.8 °C) Oral        07/22/21 1345  97.6 °F (36.4 °C) Temporal        07/22/21 1130  101 °F (38.3 °C) Oral Nathaly Juan     07/22/21 1115       5' 7\" (1.702 m)        General: Alert cooperative   Wound: Clean dry intact. Moderate swelling   Neurovascular: Exam normal   DVT Exam: No evidence of DVT         Data Review:  Recent Labs     07/21/21  1618 07/22/21  0645   HGB 9.5* 9.0*     Recent Labs     07/22/21  0645      K 3.6   CREATININE 0.7   GLUCOSE 110*     No results for input(s): POCGLU in the last 72 hours. Wbc elevated      Assessment:     Status Post explant , I&D, antbx spacer for MSSA infected right Total Knee Arthroplasty. Doing well postop without complication. Spoke with micro . They were unable to do culture on r shoulder fluid I sent to them .   Plan:   WBAT / ROM ok  IV antbx (ancef)  Pain control  Tight blood glucose control  PT/OT  DVT prophylaxis  Ice and elevate

## 2021-07-24 NOTE — CODE DOCUMENTATION
Prior to Rapid response, PCA [facility] Past[facility] was assisting pt to use urinal at bedside. Patient became short of breath and was unable to recover. Patient then began to complain of chest pain rating it 9/10 calling it pressure. 2158 Rapid Called  2158 Patients nasal cannula replaced for a 50% Venti Mask at 15L oxygen saturation remained at 77%. 's.  2200 Respiratory Lynn and Marcella Dear, [address] from lab arrived  2201 Dr Stephanie Kumari given for EKG, Nitroglycerin Tablet, Troponin, ABG's, Bipap, Solumedrol, Lasix  2206 Nitro given  2208 Lasix given  2209 solumedrol given  2210  O2 79%   2213 Bipap placed on patient  2215 Orders given to move patient to ICU, patient prepared for transport.

## 2021-07-24 NOTE — PROGRESS NOTES
Hospitalist Progress Note  [facility]     Patient: Jessica Hall  : 1955  MRN: [id number]  Code Status: Full Code    Hospital Day: 8   Date of Service: 2021    Subjective:   Patient seen and examined. No current complaints. Appears back to baseline. Family at bedside. All questions sought and answered. Past Medical History:   Diagnosis Date    Acute on chronic combined systolic and diastolic congestive heart failure due to valvular disease ([address] 2021    Arthritis     Bronchitis     Cancer (HCC)     Skin Cancer REMOVED RT ARM & LT EAR    Chronic back pain     Chronic cholecystitis without calculus 2017    Chronic GERD     COPD (chronic obstructive pulmonary disease) ([facility])     Coronary artery disease of native artery of native heart with stable angina pectoris ([address]     Coronary atherosclerosis of native coronary artery     s/p PTCA and stent placement to the LAD and RCA/7 stents    DDD (degenerative disc disease), lumbar 2018    History of blood transfusion     WITH RT SHOULDER SURGERY    History of tobacco abuse     Hyperlipidemia     Hypertension     MI (myocardial infarction) ([address]     Old, inferior wall    Moderate aortic regurgitation 2017    mod-severe AR.  Moderate aortic stenosis 2017    mod-severe aortic stenosis.  Pain management     PT SEES PAIN MANAGEMENT FOR CHRONIC BACK PAIN    Spinal stenosis of lumbar region with neurogenic claudication 2018       Past Surgical History:   Procedure Laterality Date    BACK SURGERY      s/p laminectomy    CARDIAC CATHETERIZATION  08/10/04 MDL    wbh      CARDIAC [facility]HE[facility]    CARDIAC CATHETERIZATION  03 [facility]    CARDIAC CATHETERIZATION  01 MDL    wbh    CARDIAC CATHETERIZATION  2009    EF is estimated to be 50%. See scanned document.     CARDIAC CATHETERIZATION N/A 2016    stent placement    CARDIAC CATHETERIZATION  08/2017    no PCI    CARDIAC CATHETERIZATION  07/11/2019    Drug-eluting stents placed to mid LAD and mid RCA, normal LV    CHOLECYSTECTOMY, LAPAROSCOPIC N/A 05/19/2017    CHOLECYSTECTOMY LAPAROSCOPIC performed by Radha Silva MD at [address] [place] [address]  05/28/2015    Dr. Darrick Davis: No Polyps   10yr recall    CORONARY ANGIOPLASTY WITH STENT PLACEMENT  03/2016    stent to LAD    JOINT REPLACEMENT      Left thumb    JOINT REPLACEMENT Left     knee    JOINT REPLACEMENT      KNEE SURGERY      s/p Rt.  knee replacement    LEG SURGERY Right     Broken leg with surgical repair    LUMBAR SPINE SURGERY N/A 12/04/2018    L1-5 LAMINECTOMY performed by Romina Thornton MD at \[facility]"" 43 EGD TRANSORAL BIOPSY SINGLE/MULTIPLE N/A 05/18/2017    Dr Negar Khan, Amelie (-), biliary colic, surgical referral    MD EGD TRANSORAL BIOPSY SINGLE/MULTIPLE N/A 11/29/2018    Dr JAIDEN Torres-w/dilation over wire-51 [place]-Distal esophagitis, gastritis    REMOVE HARDWARE SPINE N/A 12/20/2018    I AND D LUMBAR INCISION SEROMA performed by Romina Thornton MD at [place] [address] Right 7/17/2021    KNEE TOTAL ARTHROPLASTY REVISION performed by Aron Rodney MD at [address] [place] [address] Right 12/12/2019    RIGHT REVERSE TOTAL SHOULDER ARTHROPLASTY performed by Twin Mckeon MD at [address] [place] [address] Dr ENDOSCOPY  03/30/2015    Dr. Darrick Davis: amelie neg,,normal, empiric dilatation with 51F    UPPER GASTROINTESTINAL ENDOSCOPY N/A 11/29/2018    Dr JAIDEN Torres-w/dilation over wire-51 [place]-Distal esophagitis, gastritis       Family History   Problem Relation Age of Onset    Cancer Mother     Lung Cancer Mother     Heart Disease Father     Cancer Father     Liver Cancer Father     Lung Cancer Father     Diabetes Maternal Grandmother     Heart Disease Maternal Grandfather     Cancer Maternal Grandfather     Stroke Paternal Grandmother    [facility] Sister     No Known Problems Brother     COPD Sister     Heart Disease Paternal Grandfather     Colon Cancer Neg Hx     Colon Polyps Neg Hx     Esophageal Cancer Neg Hx     Liver Disease Neg Hx     Rectal Cancer Neg Hx        Social History     Socioeconomic History    Marital status:      Spouse name: Not on file    Number of children: Not on file    Years of education: Not on file    Highest education level: Not on file   Occupational History    Not on file   Tobacco Use    Smoking status: Former Smoker     Packs/day: 0.00     Types: Cigarettes     Quit date: 12/10/2009     Years since quittin.6    Smokeless tobacco: Never Used   Vaping Use    Vaping Use: Never used   Substance and Sexual Activity    Alcohol use: Yes     Alcohol/week: 42.0 standard drinks     Types: 42 Cans of beer per week     Comment: daily    Drug use: No    Sexual activity: Yes     Partners: Female   Other Topics Concern    Not on file   Social History Narrative    Not on file     Social Determinants of Health     Financial Resource Strain:     Difficulty of Paying Living Expenses:    Food Insecurity:     Worried About Running Out of Food in the Last Year:     [address] in the Last Year:    Transportation Needs:     Lack of Transportation (Medical):      Lack of Transportation (Non-Medical):    Physical Activity:     Days of Exercise per Week:     Minutes of Exercise per Session:    Stress:     Feeling of Stress :    Social Connections:     Frequency of Communication with Friends and Family:     Frequency of Social Gatherings with Friends and Family:     Attends Confucianist Services:     Active Member of Clubs or Organizations:     Attends Club or Organization Meetings:     Marital Status:    Intimate Partner Violence:     Fear of Current or Ex-Partner:     Emotionally Abused:     Physically Abused:     Sexually Abused:        Current Facility-Administered Medications   Medication Dose Route Frequency Provider Last Rate Last Admin    baclofen (LIORESAL) tablet 5 mg  5 mg Oral TID PRN Angela Song MD   5 mg at 07/23/21 1620    ceFAZolin (ANCEF) 2000 mg in 0.9% sodium chloride 50 mL IVPB  2,000 mg Intravenous Q8H Nataliia Smith MD   Stopped at 07/24/21 0418    sodium chloride flush 0.9 % injection 10 mL  10 mL Intravenous 2 times per day Nataliia Smith MD   10 mL at 07/23/21 2328    sodium chloride flush 0.9 % injection 10 mL  10 mL Intravenous PRN Nataliia Smith MD        acetaminophen (TYLENOL) tablet 650 mg  650 mg Oral Q6H Nataliia Smith MD   650 mg at 07/24/21 0559    oxyCODONE (ROXICODONE) immediate release tablet 5 mg  5 mg Oral Q4H PRN Nataliia Smith MD        Or    oxyCODONE (ROXICODONE) immediate release tablet 10 mg  10 mg Oral Q4H PRN Nataliia Smith MD   10 mg at 07/23/21 0869    morphine injection 2 mg  2 mg Intravenous Q1H PRN Nataliia Smith MD        Or    morphine injection 4 mg  4 mg Intravenous Q1H PRN Nataliia Smith MD        sennosides-docusate sodium (SENOKOT-S) 8.6-50 MG tablet 1 tablet  1 tablet Oral BID Nataliia Smith MD   1 tablet at 07/23/21 0908    magnesium hydroxide (MILK OF MAGNESIA) 400 MG/5ML suspension 30 mL  30 mL Oral Daily PRN Nataliia Smith MD        aspirin EC tablet 325 mg  325 mg Oral BID Nataliia Smith MD   325 mg at 07/23/21 2333    0.9 % sodium chloride infusion  25 mL Intravenous PRN Nataliia Smith MD        ondansetron (ZOFRAN-ODT) disintegrating tablet 4 mg  4 mg Oral Q8H PRN Nataliia Smith MD        Or    [facility]ansetron [facility] injection 4 mg  4 mg Intravenous Q6H PRN Nataliia Smith MD        polyethylene glycol [facility]) packet 17 g  17 g Oral Daily PRN Nataliia Smith MD        acetaminophen (TYLENOL) tablet 650 mg  650 mg Oral Q6H PRN Nataliia Smith MD   650 mg at 07/18/21 7476 Or    acetaminophen (TYLENOL) suppository 650 mg  650 mg Rectal Q6H PRN Rojelio Cat MD        magnesium sulfate 2000 mg in 50 mL IVPB premix  2,000 mg Intravenous PRN Rojelio Cat MD        potassium chloride (KLOR-CON M) extended release tablet 40 mEq  40 mEq Oral PRN Rojelio Cat MD   40 mEq at 07/21/21 1126    Or    potassium bicarb-citric acid (EFFER-K) effervescent tablet 40 mEq  40 mEq Oral PRN Rojelio Cat MD        Or    potassium chloride 10 mEq/100 mL IVPB (Peripheral Line)  10 mEq Intravenous PRN Rojelio Cat  mL/hr at 07/19/21 1026 10 mEq at 07/19/21 1026    [Held by provider] clopidogrel (PLAVIX) tablet 75 mg  75 mg Oral Daily SHIMON Padgett - CNP        ezetimibe (ZETIA) tablet 10 mg  10 mg Oral Daily Rojelio Cat MD   10 mg at 07/23/21 0908    gabapentin (NEURONTIN) capsule 800 mg  800 mg Oral TID Rojelio Cat MD   800 mg at 07/23/21 2334    hydroxychloroquine (PLAQUENIL) tablet 400 mg  400 mg Oral Daily Rojelio Cat MD   400 mg at 07/23/21 0908    oxyCODONE-acetaminophen (PERCOCET) 7.5-325 MG per tablet 1 tablet  1 tablet Oral Q4H PRN Rojelio Cat MD   1 tablet at 07/23/21 1620    pantoprazole (PROTONIX) tablet 40 mg  40 mg Oral Daily Rojelio Cat MD   40 mg at 07/23/21 0909    albuterol (PROVENTIL) nebulizer solution 2.5 mg  2.5 mg Nebulization Q6H PRN Rojelio Cat MD        0.9 % sodium chloride infusion   Intravenous Continuous Rojelio Cat MD   Stopped at 07/23/21 2230    budesonide (PULMICORT) nebulizer suspension 500 mcg  0.5 mg Nebulization BID Rojelio Cat MD   500 mcg at 07/24/21 3893    Arformoterol Tartrate (BROVANA) nebulizer solution 15 mcg  15 mcg Nebulization BID Rojelio Cat MD   15 mcg at 07/24/21 9965    thiamine (B-1) injection 100 mg  100 mg Intravenous Daily Hakeem Wasserman MD [place]   100 mg at 07/23/21 5255    multivitamin 1 tablet  1 tablet Oral Daily Ervin Nguyen MD   1 tablet at 74/42/31 7857    folic acid (FOLVITE) tablet 1 mg  1 mg Oral Daily Ervin Nguyen MD   1 mg at 07/23/21 0908    LORazepam (ATIVAN) tablet 1 mg  1 mg Oral Q1H PRN Ervin Nguyen MD   1 mg at 07/19/21 2054    Or    LORazepam (ATIVAN) injection 1 mg  1 mg Intravenous Q1H PRN Ervin Nguyen MD        Or    LORazepam (ATIVAN) tablet 2 mg  2 mg Oral Q1H PRN Ervin Nguyen MD        Or    LORazepam (ATIVAN) injection 2 mg  2 mg Intravenous Q1H PRN Ervin Nguyen MD        Or    LORazepam (ATIVAN) tablet 3 mg  3 mg Oral Q1H PRN Ervin Nguyen MD        Or    LORazepam (ATIVAN) injection 3 mg  3 mg Intravenous Q1H PRN Ervin Nguyen MD        Or    LORazepam (ATIVAN) tablet 4 mg  4 mg Oral Q1H PRN Ervin Nguyen MD        Or    LORazepam (ATIVAN) injection 4 mg  4 mg Intravenous Q1H PRN Ervin Nguyen MD             sodium chloride      sodium chloride Stopped (07/23/21 2230)        Objective:   /75   Pulse 81   Temp 98.3 °F (36.8 °C) (Temporal)   Resp 26   Ht 5' 7\" (1.702 m)   Wt 164 lb 8 oz (74.6 kg)   SpO2 92%   BMI 25.76 kg/m²     General: no acute distress  HEENT: normocephalic, atraumatic  Neck: supple, symmetrical, trachea midline   Lungs: clear to auscultation bilaterally   Cardiovascular: s1 and s2 normal  Abdomen: soft, positive bowel sounds  Extremities: s/p right knee revision  Neuro: aaox3, no focal deficits   Skin: normal color and texture     Recent Labs     07/23/21  0812 07/23/21  1630 07/24/21  0353   WBC 14.8* 13.3* 19.4*   RBC 2.96* 2.74* 2.87*   HGB 8.9* 8.4* 8.8*   HCT 27.3* 25.3* 27.0*   MCV 92.2 92.3 94.1*   MCH 30.1 30.7 30.7   MCHC 32.6* 33.2 32.6*    177 186     Recent Labs     07/22/21  0645 07/22/21  0645 07/23/21  6722 07/23/21  2233 07/24/21  5545   --  134*  --  137   K 3.6   < > 3.6 4.1 4.0   ANIONGAP 9  --  8  --  9     --  99  --  101   CO2 27  --  27  --  27   BUN 13  --  9  --  17   CREATININE 0.7  --  0.5  --  0.7   GLUCOSE 110*  --  112*  --  196*   CALCIUM 8.6*  --  8.5*  --  8.6*    < > = values in this interval not displayed. Recent Labs     07/21/21  1618   MG 2.0     Recent Labs     07/22/21  0645 07/23/21  0812 07/24/21  0353   AST 32 28 27   ALT 15 12 10   BILITOT 0.5 0.5 0.4   ALKPHOS 231* 211* 209*     No results for input(s): PH, PO2, PCO2, HCO3, BE, O2SAT in the last 72 hours. Recent Labs     07/24/21  0353   TROPONINI 0.23*     No results for input(s): INR in the last 72 hours. No results for input(s): LACTA in the last 72 hours. Intake/Output Summary (Last 24 hours) at 7/24/2021 0908  Last data filed at 7/24/2021 0400  Gross per 24 hour   Intake 5810.67 ml   Output 650 ml   Net 5160.67 ml       XR CHEST (2 VW)    Result Date: 7/23/2021  EXAMINATION: Chest 2 views 7/23/2021 HISTORY: Pleuritic chest pain. Hypoxia. FINDINGS: Upright frontal and lateral projections of the chest reveal the patient's undergone TAVR procedure. There is bibasilar atelectasis with a small left effusion. A PICC line is well-positioned. Lungs are otherwise clear. . Bibasilar atelectasis with a small left effusion.  Signed by Dr Keyana Delvalle and Plan:   Respiratory distress  Suspect anxiety induced  Since resolved  CXR reviewed  ABG reviewed  Currently back to baseline  Strict I's and O's  Daily weights    MSSA infected right TKA  Ortho/ID following  Ancef    Recent TAVR/history of CAD  Cardiology following    Subacute bilateral occipital strokes  Neurology following    DVT prophylaxis  SCDs    Total critical care time: 43 minutes    Sánchez Olvera MD   7/24/2021 9:08 AM

## 2021-07-24 NOTE — PROGRESS NOTES
Spoke with Dr. Dayana Rodriges regarding pt's +BC from PICC line. No new orders as this time. Will continue with Cefazolin.

## 2021-07-24 NOTE — PROGRESS NOTES
Up to [facility] with assist of two. Pt had a formed brown stool. Pt accidentally pulled off his external catheter. He wants to leave it off for now. States he might want it back on later. Pt now sitting on the side of the bed. Vitals are stable.

## 2021-07-24 NOTE — PROGRESS NOTES
Keenan Todd received from ICU to room # 708 . Mental Status: Patient is oriented, alert, coherent, logical, thought processes intact and able to concentrate and follow conversation. Vitals:    07/24/21 1530   BP: (!) 101/59   Pulse: 88   Resp: 20   Temp: 98.4 °F (36.9 °C)   SpO2: 95%     Placed on cardiac monitor: Yes. Box # [id number]. Belongings: None with patient at bedside . Family at bedside Yes. Oriented Patient and Family to room. Call light within reach. Yes. Transfer was: Well tolerated by patient. .    Electronically signed by Pastor Pranav RN on 7/24/2021 at 4:53 PM

## 2021-07-24 NOTE — PROGRESS NOTES
7/23/2021 10:34 PM - [facility] Incoming Lab Results From Leif Bailey Value Ref Range & Units Status Collected Lab   pH, Arterial 7.290Low Panic   7.350 - 7.450 Final 07/23/2021 10:33 PM [facility]   pCO2, Arterial 54. 0High   35.0 - 45.0 mmHg Final 07/23/2021 10:33 PM [facility]   pO2, Arterial 54. 0Low   80.0 - 100.0 mmHg Final 07/23/2021 10:33 PM [facility]   HCO3, Arterial 26.0  22.0 - 26.0 mmol/L Final 07/23/2021 10:33 PM [facility] Excess, Arterial -1.1  -2.0 - 2.0 mmol/L Final 07/23/2021 10:33 PM [facility]   Hemoglobin, Art, Extended 10.3Low   14.0 - 18.0 g/dL Final 07/23/2021 10:33 PM [facility]   O2 Sat, Arterial 86. 0Low Panic   >92 % Final 07/23/2021 10:33 PM [address] [facility]   Carboxyhgb, Arterial 2.1  0.0 - 5.0 % Final 07/23/2021 10:33 PM [facility]        0.0-1.5   (Smokers 1.5-5.0)    Methemoglobin, Arterial 1.5  <1.5 % Final 07/23/2021 10:33 PM [facility]   O2 Content, Arterial 12.5  Not Established mL/dL Final 07/23/2021 10:33 PM [address] [facility]     Pt on BiPAP 12/6 15 lpm Fi02  resp rate 28  Left radial puncture AT+

## 2021-07-24 NOTE — SIGNIFICANT EVENT
Rapid Response called regarding chest pain and worsening respiratory distress and shortness of breath and increased agitation when trying to sit up and use urinal     Patient with history of chf  History of valvular heart disease  Copd   Shortness of breath   Hypoxemia  With knee surgery in the recent past and hospital course complicated by infection and now has knee removed (hardware) and has an abx spacer in place  And was being evaluated for further plan of care. Pe:  Vital signs  bp difficult to obtain but is low   120/50  Pulse tachy 160  rr 35 to 40   With using accessory muscles of respirations to breathe  Oxygen saturation low and   And on 100 percent nrbm  And overall for patient stabilization and address medical care, will move to icu    cv rrr, tachy  Lungs coarse   Diminished   Wheezes   abd bowel sounds present  Sweaty and appears to have acutely decompensated. A/p;  Copd exacerbation   Needs steroids, oxygen (bipap) and bronchodilators    chf exacerbation   Stop fluids and diurese     Place valencia     Knee infection abx therapy continued     He initially told me he did not wish to be intubated, but chart had stated to contrary     Called his wife.    And reviewed and went over code status with both of them,   He is presently a full code     Wife and brother are present at bedside

## 2021-07-24 NOTE — PROGRESS NOTES
Pt trying to remove bipap. Explained to patient he needs the oxygen provided by this mask. Explained the next step would be the ventilator and asked him if he knew what that was. He stated \"no\". Explained about the ventilator and he said \"I don't want that\". He had reportedly told Dr. Robbin Meckel on the floor he did not want to be intubated.

## 2021-07-24 NOTE — PROGRESS NOTES
Temp is 101.3 axillary. I took the temp axillary because I cannot remove bipap at this time r/t resp distress.

## 2021-07-24 NOTE — PROGRESS NOTES
Subjective:     Post-Operative Day: 7   Knee still hurts. Events noted. Right shoulder not much pain    Objective:     Patient Vitals for the past 24 hrs:   BP Temp Temp src Pulse Resp SpO2 Weight   07/24/21 0800 139/75 98.3 °F (36.8 °C) Temporal       07/24/21 0622     26 92 %    07/24/21 0621     17 93 %    07/24/21 0600 (!) 147/93   81 18 100 % 164 lb 8 oz (74.6 kg)   07/24/21 0500 117/71   72 16 98 %    07/24/21 0400 (!) 142/83 96.3 °F (35.7 °C) Axillary 81 19 98 %    07/24/21 0300 129/74   75 16 97 %    07/24/21 0200 116/67   75 18 96 %    07/24/21 0100 121/79   91 20 97 %    07/24/21 0000 100/67 98.5 °F (36.9 °C) Axillary 99 15 94 %    07/23/21 2300 137/82   131 (!) 32 94 %    07/23/21 2244     29 90 %    07/23/21 2230 (!) 174/91 101.3 °F (38.5 °C) Axillary 141 (!) 32 (!) 87 % 174 lb 14.4 oz (79.3 kg)   07/23/21 2200 (!) 124/104    (!) 36     07/23/21 1743 (!) 96/54 100.3 °F (37.9 °C) Oral 85 18 94 %    07/23/21 1620  101.3 °F (38.5 °C)        07/23/21 1206      100 %    07/23/21 1153 (!) 135/92 101.5 °F (38.6 °C) Oral 90 18 93 %        General: Alert cooperative   Wound: Clean dry intact. Moderate swelling   Neurovascular: Exam normal   DVT Exam: No evidence of DVT         Data Review:  Recent Labs     07/23/21  1630 07/24/21  0353   HGB 8.4* 8.8*     Recent Labs     07/24/21  0353      K 4.0   CREATININE 0.7   GLUCOSE 196*     No results for input(s): POCGLU in the last 72 hours. Wbc elevated      Assessment:     Status Post explant , I&D, antbx spacer for MSSA infected right Total Knee Arthroplasty. Doing well postop without complication. Concerned about rising wbc. Will follow. May reaspirate right shoulder if this continues and no other source of infection identified. Low suspicion for septic right shoulder at this time.    Plan:   WBAT / ROM ok  IV antbx (ancef)  Pain control  Tight blood glucose control  PT/OT  DVT prophylaxis  Ice and elevate

## 2021-07-24 NOTE — PROGRESS NOTES
[address] [facility]  [address] [place] [address] Drive, [address]  [facility], [facility] [address]  Phone [phone]     Neurology Progress Note  2021 12:41 PM  Information:   Patient Name: Jamie Rosario  :   1955  Age:   76 y.o. MRN:   [id number]  Account #:  [de-identified]  Admit Date:   2021  Today:  21     ADMIT DX:   Septic arthritis ([facility]    Subjective:     Néstor Dinning a 76y.o. year old man with a history of CAD, TAVR, COPD, and stroke who was admitted  with a right knee septic joint and has had worsening visual hallucinations.  He sustained bilateral occipital infarcts in 2021 and has had visual hallucinations since. Interval History:   He developed dyspnea and chest pain last night. His ABG showed pCO2 of 54 and pO2 of 54. He was treated with oxygen, BiPAP, and nitroglycerin. He was moved to the ICU. He is doing better. He is back on [place] oxygen with sats of 96%. His troponin was elevated this am.  He denies chest pain. He gets dyspneic even with movement in bed. He has had a few minor hallucinations today.     Objective:     Past Medical History:  Past Medical History:   Diagnosis Date    Acute on chronic combined systolic and diastolic congestive heart failure due to valvular disease ([address] 2021    Arthritis     Bronchitis     Cancer (HCC)     Skin Cancer REMOVED RT ARM & LT EAR    Chronic back pain     Chronic cholecystitis without calculus 2017    Chronic GERD     COPD (chronic obstructive pulmonary disease) ([facility])     Coronary artery disease of native artery of native heart with stable angina pectoris ([address]     Coronary atherosclerosis of native coronary artery     s/p PTCA and stent placement to the LAD and RCA/7 stents    DDD (degenerative disc disease), lumbar 2018    History of blood transfusion     WITH RT SHOULDER SURGERY    History of tobacco abuse     Hyperlipidemia     Hypertension     MI (myocardial infarction) ([address]     Old, inferior wall    Moderate aortic regurgitation 08/14/2017    mod-severe AR.  Moderate aortic stenosis 08/14/2017    mod-severe aortic stenosis.  Pain management 2021    PT SEES PAIN MANAGEMENT FOR CHRONIC BACK PAIN    Spinal stenosis of lumbar region with neurogenic claudication 12/4/2018       Past Surgical History:   Procedure Laterality Date    BACK SURGERY      s/p laminectomy    CARDIAC CATHETERIZATION  08/10/04 [facility]      [place]    C[place]    CARDIAC CATHETERIZATION  02/16/01 [facility]    CARDIAC CATHETERIZATION  05/25/2009    EF is estimated to be 50%. See scanned document.  CARDIAC CATHETERIZATION N/A 03/2016    stent placement    CARDIAC CATHETERIZATION  08/2017    no PCI    CARDIAC CATHETERIZATION  07/11/2019    Drug-eluting stents placed to mid LAD and mid RCA, normal LV    CHOLECYSTECTOMY, LAPAROSCOPIC N/A 05/19/2017    CHOLECYSTECTOMY LAPAROSCOPIC performed by Radha Silva MD at [address] [place] [address]  05/28/2015    Dr. Darrick Davis: No Polyps   10yr recall    CORONARY ANGIOPLASTY WITH STENT PLACEMENT  03/2016    stent to LAD    JOINT REPLACEMENT      Left thumb    JOINT REPLACEMENT Left     knee    JOINT REPLACEMENT      KNEE SURGERY      s/p Rt.  knee replacement    LEG SURGERY Right     Broken leg with surgical repair    LUMBAR SPINE SURGERY N/A 12/04/2018    L1-5 LAMINECTOMY performed by Romina Thornton MD at [facility] 43 EGD TRANSORAL BIOPSY SINGLE/MULTIPLE N/A 05/18/2017    Dr Negar Khan, Amelie (-), biliary colic, surgical referral    [place] EGD TRANSORAL BIOPSY SINGLE/MULTIPLE N/A 11/29/2018    Dr JAIDEN Torres-w/dilation over wire-51 Polish-Distal esophagitis, gastritis    REMOVE HARDWARE SPINE N/A 12/20/2018    I AND D LUMBAR INCISION SEROMA performed by Romina Thornton MD at [facility] 48 Right 7/17/2021    KNEE TOTAL ARTHROPLASTY REVISION performed by Aron Rodney MD at [facility] OR    SHOULDER SURGERY Right 2019    RIGHT REVERSE TOTAL SHOULDER ARTHROPLASTY performed by Eleazar Lloyd MD at [facility] UPPER GASTROINTESTINAL ENDOSCOPY  2015    Dr. Teofilo Hutton: rukhsana neg,,normal, empiric dilatation with 51F    UPPER GASTROINTESTINAL ENDOSCOPY N/A 2018    Dr JAIDEN Torres-w/dilation over wire-51 Nigerien-Distal esophagitis, gastritis       Family History   Problem Relation Age of Onset    Cancer Mother     Lung Cancer Mother     Heart Disease Father     Cancer Father     Liver Cancer Father     Lung Cancer Father     Diabetes Maternal Grandmother     Heart Disease Maternal Grandfather     Cancer Maternal Grandfather     Stroke Paternal Grandmother     Stomach Cancer Sister     No Known Problems Brother     COPD Sister     Heart Disease Paternal Grandfather     Colon Cancer Neg Hx     Colon Polyps Neg Hx     Esophageal Cancer Neg Hx     Liver Disease Neg Hx     Rectal Cancer Neg Hx        Social History     Socioeconomic History    Marital status:      Spouse name: Not on file    Number of children: Not on file    Years of education: Not on file    Highest education level: Not on file   Occupational History    Not on file   Tobacco Use    Smoking status: Former Smoker     Packs/day: 0.00     Types: Cigarettes     Quit date: 12/10/2009     Years since quittin.6    Smokeless tobacco: Never Used   Vaping Use    Vaping Use: Never used   Substance and Sexual Activity    Alcohol use:  Yes     Alcohol/week: 42.0 standard drinks     Types: 42 Cans of beer per week     Comment: daily    Drug use: No    Sexual activity: Yes     Partners: Female   Other Topics Concern    Not on file   Social History Narrative    Not on file     Social Determinants of Health     Financial Resource Strain:     Difficulty of Paying Living Expenses:    Food Insecurity:     Worried About Running Out of Food in the Last Year:     [address] in the 11.5 - 14.5 %    Platelets 203 165 - 608 K/uL    MPV 10.5 9.4 - 12.4 fL    PLATELET SLIDE REVIEW Adequate     Neutrophils % 85.0 (H) 50.0 - 65.0 %    Lymphocytes % 12.0 (L) 20.0 - 40.0 %    Monocytes % 3.0 0.0 - 10.0 %    Eosinophils % 0.0 0.0 - 5.0 %    Basophils % 0.0 0.0 - 1.0 %    Anisocytosis 1+ (A)     Stomatocytes 1+ (A)     Neutrophils Absolute 11.3 (H) 1.5 - 7.5 K/uL    Lymphocytes Absolute 1.6 1.1 - 4.5 K/uL    Monocytes Absolute 0.40 0.00 - 0.90 K/uL    Eosinophils Absolute 0.00 0.00 - 0.60 K/uL    Basophils Absolute 0.00 0.00 - 0.20 K/uL   Blood Gas, Arterial    Collection Time: 07/23/21 10:33 PM   Result Value Ref Range    pH, Arterial 7.290 (LL) 7.350 - 7.450    pCO2, Arterial 54.0 (H) 35.0 - 45.0 mmHg    pO2, Arterial 54.0 (L) 80.0 - 100.0 mmHg    HCO3, Arterial 26.0 22.0 - 26.0 mmol/L    Base Excess, Arterial -1.1 -2.0 - 2.0 mmol/L    Hemoglobin, Art, Extended 10.3 (L) 14.0 - 18.0 g/dL    O2 Sat, Arterial 86.0 (LL) >92 %    Carboxyhgb, Arterial 2.1 0.0 - 5.0 %    Methemoglobin, Arterial 1.5 <1.5 %    O2 Content, Arterial 12.5 Not Established mL/dL    O2 Therapy Unknown    Potassium, Whole Blood    Collection Time: 07/23/21 10:33 PM   Result Value Ref Range    Potassium, Whole Blood 4.1    Comprehensive Metabolic Panel w/ Reflex to MG    Collection Time: 07/24/21  3:53 AM   Result Value Ref Range    Sodium 137 136 - 145 mmol/L    Potassium reflex Magnesium 4.0 3.5 - 5.0 mmol/L    Chloride 101 98 - 111 mmol/L    CO2 27 22 - 29 mmol/L    Anion Gap 9 7 - 19 mmol/L    Glucose 196 (H) 74 - 109 mg/dL    BUN 17 8 - 23 mg/dL    CREATININE 0.7 0.5 - 1.2 mg/dL    GFR Non-African American >60 >60    GFR African American >59 >59    Calcium 8.6 (L) 8.8 - 10.2 mg/dL    Total Protein 6.4 (L) 6.6 - 8.7 g/dL    Albumin 2.6 (L) 3.5 - 5.2 g/dL    Total Bilirubin 0.4 0.2 - 1.2 mg/dL    Alkaline Phosphatase 209 (H) 40 - 130 U/L    ALT 10 5 - 41 U/L    AST 27 5 - 40 U/L   CBC with Manual Differential    Collection Time: 07/24/21  3:53 AM   Result Value Ref Range    WBC 19.4 (H) 4.8 - 10.8 K/uL    RBC 2.87 (L) 4.70 - 6.10 M/uL    Hemoglobin 8.8 (L) 14.0 - 18.0 g/dL    Hematocrit 27.0 (L) 42.0 - 52.0 %    MCV 94.1 (H) 80.0 - 94.0 fL    MCH 30.7 27.0 - 31.0 pg    MCHC 32.6 (L) 33.0 - 37.0 g/dL    RDW 15.0 (H) 11.5 - 14.5 %    Platelets 759 722 - 586 K/uL    MPV 10.7 9.4 - 12.4 fL    PLATELET SLIDE REVIEW Adequate     Neutrophils % 96.0 (H) 50.0 - 65.0 %    Lymphocytes % 3.0 (L) 20.0 - 40.0 %    Monocytes % 1.0 0.0 - 10.0 %    Eosinophils % 0.0 0.0 - 5.0 %    Basophils % 0.0 0.0 - 1.0 %    Anisocytosis 1+ (A)     Polychromasia 1+ (A)     Schistocytes Occasional (A)     Ovalocytes Occasional (A)     Neutrophils Absolute 18.6 (H) 1.5 - 7.5 K/uL    Lymphocytes Absolute 0.6 (L) 1.1 - 4.5 K/uL    Monocytes Absolute 0.20 0.00 - 0.90 K/uL    Eosinophils Absolute 0.00 0.00 - 0.60 K/uL    Basophils Absolute 0.00 0.00 - 0.20 K/uL   Troponin    Collection Time: 07/24/21  3:53 AM   Result Value Ref Range    Troponin 0.23 (HH) 0.00 - 0.03 ng/mL     Narrative & Impression    Cardiac Diagnostic Report      Demographics      Patient Name        Alejo Gilford        Gender                  Male                       [address] [facility]      Patient Number      [id number]          Race                          Visit Number        [id number]       Ethnicity      Corporate ID                        Room Number             [address] [employer]      Accession Number    [id number]      Height                  67 inches      Date of Birth       1955      Weight                  168 pounds      Age                 76 year(s)      BSA                     1.88 m^2      Referring Physician Kristin Ponce MD    BMI                     26.31 kg/m^2      Performing          Reading Handler    Date of Study           03/16/2021   Physician                                          Interventional                                       Physician      Procedure     Procedure Type      Diagnostic procedure:DCA, Coronary Angiogram, Left Heart Cath, Left   Ventriculogram, Left Ventricular Pressure Measurement, Left Ventricular O2   saturation , Right Heart Cath, Pressure Measurement, Right Heart O2   Saturation Measurement      Conclusions      Nonobstructive CAD. Patent stent mid-RCA. Patent stent in mid LAD. Intermediate restenosis proximal OM1 stent. Normal LV systolic function. Severe aortic stenosis (mean gradient 40 mmHg)      Recommendations      CT surgery evaluation for SAVR vs TAVR. Signatures      ----------------------------------------------------------------   Electronically signed by Huang Ponce MD(Performing Physician) on   03/16/2021 16:03       Diet:  ADULT DIET; Regular    Examination:  Vitals: /75   Pulse 81   Temp 98.3 °F (36.8 °C) (Temporal)   Resp 26   Ht 5' 7\" (1.702 m)   Wt 164 lb 8 oz (74.6 kg)   SpO2 92%   BMI 25.76 kg/m²      Intake/Output Summary (Last 24 hours) at 7/24/2021 1241  Last data filed at 7/24/2021 1037  Gross per 24 hour   Intake 5480.67 ml   Output 1050 ml   Net 4430.67 ml     General appearance:  He is alert and cooperative  Skin: Skin color, texture, turgor normal. No rashes or lesions  HEENT: Head: Normal, normocephalic, atraumatic. Neck:no adenopathy, no carotid bruit, no JVD, supple, symmetrical, trachea midline and thyroid not enlarged, symmetric, no tenderness/mass/nodules  Lungs: clear to auscultation bilaterally.  He is barrel chested suggesting COPD  Heart: regular rate and rhythm, S1, S2 normal, systolic murmur  Abdomen: soft, non-tender; bowel sounds normal; no masses,  no organomegaly  Extremities: right knee incision  Neurologic:  Alert, oriented to place and 8/2021.  No dysarthria.  Speech is fluent.  EOMI, PERRL, VFF.  No facial weakness.  Limb strength is grossly normal.  No pronator drift.  Rapid alternating movements are normal.  Finger to nose testing shows no dysmetria.     Assessment:   1.         Subacute bilateral occipital strokes with resultant visual impairment, visual hallucinations, cognitive impairment.  He realizes his hallucinations are not real.  The only medications that could help are antipsychotics or atypical antipsychotics and they carry a risk for MI and stroke. Pt and wife do not want to undergo that risk at this time. 2.         Septic arthritis right knee. Persistent fever. R shoulder aspiration cell count noted. 3.         CAD with AMI. No significant obstructive CAD 3/2021  4.         AVR  5.         COPD  6.         Hypertension  7.         Hyperlipidemia. Plan:   1. Certainly he is not a candidate for an antipsychotic or atypical antipsychotic medication now. No behavior difficulties to warrant Depakote. 2. Abx, Per ID  3. Continue ASA, Plavix  4. Cardiology following     I discussed the above with him, his wife, and his sister. Discharge planning:   Home     Reviewed treatment plans with the patient and/or family. 35 minutes spent in face to face interaction and coordination of care.      Electronically signed by Portia Mitchell MD on 7/24/2021 at 12:41 PM

## 2021-07-25 NOTE — PROGRESS NOTES
Subjective:     Post-Operative Day: 8   Knee better. Still sob. Right shoulder not much pain    Objective:     Patient Vitals for the past 24 hrs:   BP Temp Temp src Pulse Resp SpO2 Height Weight   07/25/21 0625      92 %     07/25/21 0609 119/71 97.9 °F (36.6 °C)  73 16 96 %     07/25/21 0205 131/70 97 °F (36.1 °C) Temporal 70 18 90 %     07/24/21 2341 131/70 98.2 °F (36.8 °C) Temporal 79 18 94 %     07/24/21 1925      95 %     07/24/21 1819 123/78 98 °F (36.7 °C) Temporal 83 18 92 %     07/24/21 1730      95 %     07/24/21 1530 (!) 101/59 98.4 °F (36.9 °C) Temporal 88 20 95 % 5' 7\" (1.702 m) 163 lb 1.6 oz (74 kg)   07/24/21 1410     25 96 %     07/24/21 1409     24 94 %     07/24/21 1400 132/76 98.1 °F (36.7 °C) Temporal 80 18 94 %     07/24/21 1330 133/83   85 27 93 %     07/24/21 1300 121/70     94 %     07/24/21 1100 124/71   80 22 94 %         General: Alert cooperative   Wound: Clean dry intact. Moderate swelling   Neurovascular: Exam normal   DVT Exam: No evidence of DVT         Data Review:  Recent Labs     07/24/21  0353 07/25/21  0220   HGB 8.8* 7.5*     Recent Labs     07/25/21  0220      K 3.7   CREATININE 0.7   GLUCOSE 136*     No results for input(s): POCGLU in the last 72 hours. Wbc elevated      Assessment:     Status Post explant , I&D, antbx spacer for MSSA infected right Total Knee Arthroplasty. Doing well postop without complication. Concerned about rising wbc. Will follow. May reaspirate right shoulder if this continues and no other source of infection identified. Low suspicion for septic right shoulder at this time.    Plan:   WBAT / ROM ok  IV antbx (ancef)  Pain control  Tight blood glucose control  PT/OT  DVT prophylaxis  Ice and elevate

## 2021-07-25 NOTE — PROGRESS NOTES
Infectious Diseases Progress Note    Patient:  Khanh Cherry  YOB: 1955  MRN: [id number]   Admit date: 7/16/2021   Admitting Physician: Nj Holland MD  Primary Care Physician: Linde Rubinstein, APRN    Chief Complaint/Interval History: He feels okay. No fever. Hemodynamically stable. No new symptoms. In/Out    Intake/Output Summary (Last 24 hours) at 7/24/2021 1943  Last data filed at 7/24/2021 1037  Gross per 24 hour   Intake 5000.67 ml   Output 1050 ml   Net 3950.67 ml     Allergies: Allergies   Allergen Reactions    Codeine Swelling     Lips swelling    Iv [Iodides] Swelling     Contrast dye used for heart cath. Caused face to swell.     Hydrocodone Swelling     lips swelling    Statins      Myalgias       Current Meds: furosemide (LASIX) tablet 20 mg, Daily  baclofen (LIORESAL) tablet 5 mg, TID PRN  ceFAZolin (ANCEF) 2000 mg in 0.9% sodium chloride 50 mL IVPB, Q8H  sodium chloride flush 0.9 % injection 10 mL, 2 times per day  sodium chloride flush 0.9 % injection 10 mL, PRN  acetaminophen (TYLENOL) tablet 650 mg, Q6H  oxyCODONE (ROXICODONE) immediate release tablet 5 mg, Q4H PRN   Or  oxyCODONE (ROXICODONE) immediate release tablet 10 mg, Q4H PRN  morphine injection 2 mg, Q1H PRN   Or  morphine injection 4 mg, Q1H PRN  sennosides-docusate sodium (SENOKOT-S) 8.6-50 MG tablet 1 tablet, BID  magnesium hydroxide (MILK OF MAGNESIA) 400 MG/5ML suspension 30 mL, Daily PRN  aspirin EC tablet 325 mg, BID  0.9 % sodium chloride infusion, PRN  ondansetron (ZOFRAN-ODT) disintegrating tablet 4 mg, Q8H PRN   Or  ondansetron (ZOFRAN) injection 4 mg, Q6H PRN  polyethylene glycol (GLYCOLAX) packet 17 g, Daily PRN  acetaminophen (TYLENOL) tablet 650 mg, Q6H PRN   Or  acetaminophen (TYLENOL) suppository 650 mg, Q6H PRN  magnesium sulfate 2000 mg in 50 mL IVPB premix, PRN  potassium chloride (KLOR-CON M) extended release tablet 40 mEq, PRN   Or  potassium bicarb-citric acid (EFFER-K) effervescent tablet 40 mEq, PRN   Or  potassium chloride 10 mEq/100 mL IVPB (Peripheral Line), PRN  [Held by provider] clopidogrel (PLAVIX) tablet 75 mg, Daily  ezetimibe (ZETIA) tablet 10 mg, Daily  gabapentin (NEURONTIN) capsule 800 mg, TID  hydroxychloroquine (PLAQUENIL) tablet 400 mg, Daily  oxyCODONE-acetaminophen (PERCOCET) 7.5-325 MG per tablet 1 tablet, Q4H PRN  pantoprazole (PROTONIX) tablet 40 mg, Daily  albuterol (PROVENTIL) nebulizer solution 2.5 mg, Q6H PRN  budesonide (PULMICORT) nebulizer suspension 500 mcg, BID  Arformoterol Tartrate (BROVANA) nebulizer solution 15 mcg, BID  thiamine (B-1) injection 100 mg, Daily  multivitamin 1 tablet, Daily  folic acid (FOLVITE) tablet 1 mg, Daily  LORazepam (ATIVAN) tablet 1 mg, Q1H PRN   Or  LORazepam (ATIVAN) injection 1 mg, Q1H PRN   Or  LORazepam (ATIVAN) tablet 2 mg, Q1H PRN   Or  LORazepam (ATIVAN) injection 2 mg, Q1H PRN   Or  LORazepam (ATIVAN) tablet 3 mg, Q1H PRN   Or  LORazepam (ATIVAN) injection 3 mg, Q1H PRN   Or  LORazepam (ATIVAN) tablet 4 mg, Q1H PRN   Or  LORazepam (ATIVAN) injection 4 mg, Q1H PRN      Review of Systems    VitalSigns:  /78   Pulse 83   Temp 98 °F (36.7 °C) (Temporal)   Resp 18   Ht 5' 7\" (1.702 m)   Wt 163 lb 1.6 oz (74 kg)   SpO2 95%   BMI 25.55 kg/m²      Physical Exam  Line/IV site: No erythema, warmth, induration, or tenderness. Left knee without drainage. Left knee without erythema. Lab Results:  CBC:   Recent Labs     07/23/21  0812 07/23/21  1630 07/24/21  0353   WBC 14.8* 13.3* 19.4*   HGB 8.9* 8.4* 8.8*    177 186     BMP:  Recent Labs     07/22/21  0645 07/22/21  0645 07/23/21  0812 07/23/21  2233 07/24/21  0353     --  134*  --  137   K 3.6   < > 3.6 4.1 4.0     --  99  --  101   CO2 27  --  27  --  27   BUN 13  --  9  --  17   CREATININE 0.7  --  0.5  --  0.7   GLUCOSE 110*  --  112*  --  196*    < > = values in this interval not displayed.      CultureResults: Blood cultures yesterday no growth to date    Radiology: None    Additional Studies Reviewed:  None    Impression:  1. Right knee prosthetic joint infection  2. Chronic obstructive pulmonary disease  3. Coronary artery disease  4. Rheumatoid arthritis  5. History of transcatheter aortic valve replacement  6. Right shoulder pain  7. Fever-appears to be resolved    Recommendations:  Continue cefazolin  Follow-up blood cultures remaining no growth  Follow temperature curve and white blood cell count  No change in antibiotic treatment at present    Addendum: On rereview 1 set of blood cultures from yesterday appears to be showing coagulase-negative staph. It was culture drawn via PICC line. He is afebrile today. Going to repeat cultures via PICC. If additional fever or blood cultures positive, may need to adjust antibiotic treatment. Possibly addition of vancomycin if coagulase-negative staph recovered from additional blood culture.     Rachel Dillard MD

## 2021-07-25 NOTE — PROGRESS NOTES
07/25/21 1253   Restrictions/Precautions   Restrictions/Precautions Weight Bearing; Fall Risk   Lower Extremity Weight Bearing Restrictions   Right Lower Extremity Weight Bearing Weight Bearing As Tolerated   General   Chart Reviewed Yes   Family / Caregiver Present Yes   Subjective   Subjective Patient states he has been up to chair but agrees to short walk   General Comment   Comments Patient required extra time new gait belt,  RW. O2 tank had to be acquired   Pain Screening   Patient Currently in Pain Yes   Intervention List Patient able to continue with treatment;Nurse called to administer meds   Pain Assessment   Pain Assessment 0-10   Pain Level 7   Patient's Stated Pain Goal No pain   Pain Type Surgical pain   Pain Location Knee   Pain Orientation Right   Pain Frequency Intermittent   Clinical Progression Gradually improving   Functional Pain Assessment Prevents or interferes some active activities and ADLs   Non-Pharmaceutical Pain Intervention(s) Rest   Pain Descriptors Aching; Sore   Pain Onset On-going   Oxygen Therapy   SpO2 91 %   O2 Device Nasal cannula   O2 Flow Rate (L/min) 1 L/min   Pre Treatment Pain Screening   Pain at present 7   Scale Used Numeric Score   Intervention List Patient able to continue with treatment   Comments / Details Gait on 2L SPo2 91 post gait   Bed Mobility   Supine to Sit Minimal assistance   Transfers   Sit to Stand Contact guard assistance   Stand to sit Contact guard assistance   Bed to Chair Contact guard assistance   Ambulation   Ambulation?  Yes   WB Status WBAT   Ambulation 1   Device Rolling Walker   Other Apparatus O2  (2L)   Assistance Contact guard assistance   Quality of Gait antalgic   Gait Deviations Slow Ayanna;Decreased step length   Distance 20'   Comments Patient walked to door then to chair for lunch   Activity Tolerance   Activity Tolerance Patient Tolerated treatment well;Patient limited by endurance   Safety Devices   Type of devices Left in chair;Chair alarm in place;Call light within reach  (Wife present)   Physical Therapy    Electronically signed by Andria Valdovinos PTA on 7/25/2021 at 1:07 PM

## 2021-07-25 NOTE — PROGRESS NOTES
Patient:  Marybeth Celeste  YOB: 1955  Date of Service: 7/25/2021  MRN: [id number]   Acct: [de-identified]   Primary Care Physician: SHIMON Heredia  Advance Directive: Full Code  Admit Date: 7/16/2021       Hospital Day: 9    . CHIEF COMPLAINT R knee pain    SUBJECTIVE: Patient seen and examined sitting up on the bedside, continues to have some chest tightness, saturating well on 1 L nasal cannula. Currently continues to have pain of the leg/shoulder joint. Denies headache, change in vision, abdominal pain, fevers or chills. CUMULATIVE HOSPITAL COURSE:   Patient was admitted 7/16, 76 y. o. male with past medical history of TAVR, CAD with stents, COPD, HTN, and HLD who presented to [facility] ED complaining of right knee pain, and swelling x2 days, associated with temperature elevation. History of bilateral knee replacement.,  With intermittent right knee aspiration. Work-up in ED revealed lactic 2.3, CRP 34.75, troponin 0.05, WBC 9.5, sed rate 30, unremarkable chest x-ray, right knee x-ray showed small knee joint effusion with anterior soft tissue swelling. Orthopedic surgery was consulted from ED whom recommended arthrocentesis, Fluid study showed nucl cell 19,560, 40,000 RBC. Patient was admitted to hospital medicine with Septic arthritis. Dr. Sugar Ardon performed right knee explant antibiotic spacer on 7/17/21, with ANA drain. Blood culture returned positive for gram positive cocci in clusters resembling staphylococcus. Synovial fluid returned positive for staphylococcus aureus. ID was consulted whom recommended discontinuing vancomycin and cefepime and changed to Cefazolin. CT head showed no acute process and  Neurology notes that symptoms will continue to improve. Patient does have PICC line in place, right thoracic echocardiogram with questionable findings of vegetations, patient underwent JESSICA which ruled this out.   While on medical floor patient had bouts of visual hallucinations, anxiety, increased bouts of respirations, chest tightness. Rapid response called 7/23, patient transition to unit for closer watch. Respiratory status has improved and stabilized, continue to monitor blood cultures, await final infectious disease recommendations on antibiotic therapy, recent blood culture from 7/23 with growth of staph epi. Continue PT/OT, case management for safe discharge planning. Review of Systems   ROS: 14 point review of systems is negative except as specifically addressed above. Objective:   VITALS:  /76   Pulse 91   Temp 95.3 °F (35.2 °C)   Resp 20   Ht 5' 7\" (1.702 m)   Wt 163 lb 1.6 oz (74 kg)   SpO2 96%   BMI 25.55 kg/m²   24HR INTAKE/OUTPUT:      Intake/Output Summary (Last 24 hours) at 7/25/2021 1115  Last data filed at 7/25/2021 0953  Gross per 24 hour   Intake 810 ml   Output 500 ml   Net 310 ml       Physical Exam  Constitutional:       Appearance: Normal appearance. He is not ill-appearing or diaphoretic. HENT:      Head: Normocephalic and atraumatic. Right Ear: External ear normal.      Left Ear: External ear normal.      Nose: Nose normal.      Mouth/Throat:      Mouth: Mucous membranes are moist.      Pharynx: Oropharynx is clear. Eyes:      General: No scleral icterus. Extraocular Movements: Extraocular movements intact. Conjunctiva/sclera: Conjunctivae normal.   Cardiovascular:      Rate and Rhythm: Normal rate and regular rhythm. Pulses: Normal pulses. Heart sounds: Murmur heard. Pulmonary:      Effort: Pulmonary effort is normal. No respiratory distress. Breath sounds: Normal breath sounds. No wheezing. Abdominal:      General: Abdomen is flat. Bowel sounds are normal. There is no distension. Palpations: Abdomen is soft. Musculoskeletal:         General: Swelling and tenderness present. No signs of injury. Cervical back: Normal range of motion and neck supple. Right lower leg: No edema. Left lower leg: No edema. Comments: Dressing c/d/i. PICC line left basilic   Skin:     General: Skin is warm and dry. Coloration: Skin is not jaundiced or pale. Findings: No erythema, lesion or rash. Neurological:      General: No focal deficit present. Mental Status: He is alert and oriented to person, place, and time. Motor: No weakness. Psychiatric:      Comments: Appreciate underlying anxiety            Medications:      sodium chloride        melatonin  5 mg Oral Nightly    furosemide  20 mg Oral Daily    ceFAZolin  2,000 mg Intravenous Q8H    sodium chloride flush  10 mL Intravenous 2 times per day    acetaminophen  650 mg Oral Q6H    sennosides-docusate sodium  1 tablet Oral BID    aspirin  325 mg Oral BID    [Held by provider] clopidogrel  75 mg Oral Daily    ezetimibe  10 mg Oral Daily    gabapentin  800 mg Oral TID    hydroxychloroquine  400 mg Oral Daily    pantoprazole  40 mg Oral Daily    budesonide  0.5 mg Nebulization BID    Arformoterol Tartrate  15 mcg Nebulization BID    thiamine  100 mg Intravenous Daily    multivitamin  1 tablet Oral Daily    folic acid  1 mg Oral Daily     hydrOXYzine, baclofen, sodium chloride flush, oxyCODONE **OR** oxyCODONE, morphine **OR** morphine, magnesium hydroxide, sodium chloride, ondansetron **OR** ondansetron, polyethylene glycol, acetaminophen **OR** acetaminophen, magnesium sulfate, potassium chloride **OR** potassium alternative oral replacement **OR** potassium chloride, oxyCODONE-acetaminophen, albuterol, LORazepam **OR** LORazepam **OR** LORazepam **OR** LORazepam **OR** LORazepam **OR** LORazepam **OR** LORazepam **OR** LORazepam  ADULT DIET;  Regular     Lab and other Data:     Recent Labs     07/23/21  1630 07/24/21  0353 07/25/21  0220   WBC 13.3* 19.4* 13.0*   HGB 8.4* 8.8* 7.5*    186 192     Recent Labs     07/23/21  0812 07/23/21  2233 07/24/21  0353 07/25/21  0220   *  --  137 141   K 3.6 4.1 4.0 3.7   CL 99  --  101 104   CO2 27  --  27 28   BUN 9  --  17 26*   CREATININE 0.5  --  0.7 0.7   GLUCOSE 112*  --  196* 136*     Recent Labs     07/23/21  0812 07/24/21  0353 07/25/21  0220   AST 28 27 22   ALT 12 10 8   BILITOT 0.5 0.4 <0.2   ALKPHOS 211* 209* 152*       RAD:   XR Chest (2VW)   Result Date: 7/23/21  Impression:  Bibasilar atelectasis with a small left effusion. Signed by Dr Nae Leonard Contrast  Result Date: 7/19/21  Impression:  1. Mild cerebral and cerebellar volume loss with chronic microvascular disease but no evidence of acute intracranial process. Signed by Dr Tadeo Perez (MIN 2 VIEWS)  Result Date: 7/17/2021  1. Appropriate alignment of the right shoulder prosthesis with no acute bony pathology identified. 2. Improving aeration right lung base compared to 4/22/2021. Right infrahilar opacities may relate to vascular crowding and atelectasis. Signed by Dr Merle Courtney    XR KNEE RIGHT (1-2 VIEWS)  Result Date: 7/17/2021  1. Revision of right knee arthroplasty with placement of antibiotic beads. Anterior surgical drain. 2. Old healed proximal fibula shaft fracture. 3. Diffuse vascular calcifications   Signed by Dr Merle Courtney    XR KNEE RIGHT (1-2 VIEWS)  Result Date: 7/16/2021  1. Appropriate alignment of the right knee prosthesis with no acute osseous pathology. Small knee joint effusion with anterior soft tissue swelling. Signed by Dr Merle Courtney    XR CHEST PORTABLE  Result Date: 7/16/2021  Poor lung expansion and left lower lung discoid atelectasis. No active infiltrate or pulmonary congestion. Signed by Dr Jeffry Valera    ECHO:  2D W doppler W color   Summary   Mitral valve leaflets are mildly thickened with preserved leaflet   mobility. Mild mitral regurgitation is present. S/P TAVR   Mean gradient 49 mm hg suggesting severe stenosis   Mild aortic regurgitation is noted.    JUAN JOSÉ 0.95 cm2   Tricuspid valve is structurally normal.   Mild tricuspid regurgitation with estimated RVSP of 50 mm Hg. Normal left ventricular size with preserved LV function and an estimated   ejection fraction of approximately 55-60%. Mild concentric left ventricular hypertrophy. No regional wall motion abnormalities. Impaired relaxation compatible with diastolic dysfunction. ( reversed E/A   ratio)   Normal right ventricular size with preserved RV function. IVC dilated. Electronically signed by Olinda Carson MD(Interpreting physician) on 07/21/2021 02:04 PM        Micro: Ant Davenport #[id number] ([id number]) (76 y.o. M) (Adm: 07/16/21)  [facility] [id number]  Results    Procedure Component Value Units Date/Time   Culture, Blood 1 [[id number]] Collected: 07/23/21 1305   Order Status: Sent Specimen: Blood Updated: 07/23/21 1349   Culture, Blood 2 [[id number]]    Order Status: No result Specimen: Blood    Culture, Blood 2 [[id number]]    Order Status: Canceled Specimen: Blood    Culture, Blood 2 [[id number]] Collected: 07/18/21 0938   Order Status: Completed Specimen: Blood Updated: 07/23/21 1114    Culture, Blood 2 No growth after 5 days of incubation. Narrative:     ORDER#: [id number]                          ORDERED BY: ROCIO TREVIÑO   SOURCE: Blood Antecubital-Rig              COLLECTED:  07/18/21 09:38   ANTIBIOTICS AT [facility]. :                      RECEIVED :  07/18/21 10:17   Culture, Blood 1 [[id number]] Collected: 07/23/21 3161   Order Status: Sent Specimen: Arm from Blood Updated: 07/23/21 0845   Culture, Blood 1 [[id number]] Collected: 07/20/21 0556   Order Status: Completed Specimen: Blood Updated: 07/21/21 0914    Blood Culture, Routine No Growth to date.  Any change in status will be called. Narrative:     ORDER#: [id number]                          ORDERED BY: ROCIO TREVIÑO   SOURCE: Blood LAC                          COLLECTED:  07/20/21 05:56   ANTIBIOTICS AT [facility]. :                      RECEIVED :  07/20/21 06:28   Culture, Blood 1 [[id number]] Collected: 07/20/21 0616   Order Status: Completed Specimen: Blood Updated: 07/21/21 0914    Blood Culture, Routine No Growth to date.  Any change in status will be called. Narrative:     ORDER#: [id number]                          ORDERED BY: SUHAS Bingham   SOURCE: Blood LAC                          COLLECTED:  07/20/21 06:16   ANTIBIOTICS AT [facility]. :                      RECEIVED :  07/20/21 06:28   Culture, Surgical [[id number]] (Abnormal) Collected: 07/17/21 1110   Order Status: Completed Specimen: Tissue from Joint, Knee Updated: 07/21/21 0805    Gram Stain Result Few WBC's (Polymorphonuclear) present   Few Gram positive cocci  in clusters   No Epithelial Cells seen   Abnormal     Anaerobic Culture No anaerobes isolated  4 days    Organism Staphylococcus aureusAbnormal     Culture Surgical --    Moderate growth   No further workup   Refer to (Surgical culture Knee #3 R knee medial gutter collected   Ty@[employer]) for sensitivity results    Narrative:     ORDER#: [id number]                          ORDERED BY: ROCIO TREVIÑO   SOURCE: Knee #2 R knee fat pad             COLLECTED:  07/17/21 11:10   ANTIBIOTICS AT [facility]. :                      RECEIVED :  07/17/21 12:43   Culture, Surgical [[id number]] (Abnormal) Collected: 07/17/21 1107   Order Status: Completed Specimen:  Body Fluid from Joint, Knee Updated: 07/21/21 0804    Gram Stain Result Many WBC's (Polymorphonuclear) present   Moderate Gram positive cocci  in clusters   No Epithelial Cells seen   Abnormal     Anaerobic Culture No anaerobes isolated  4 days    Organism Staphylococcus aureusAbnormal     Culture Surgical --    Moderate growth   No further workup   Refer to (Surgical culture Knee #3 R knee medial gutter collected   Ty@[employer]) for sensitivity results    Narrative:     ORDER#: [id number]                          ORDERED BY: ROCIO TREVIÑO   SOURCE: Knee #1 R knee fluid               COLLECTED:  07/17/21 11:07   ANTIBIOTICS AT [facility].:                      RECEIVED :  07/17/21 12:41   Culture, Surgical [[id number]] (Abnormal)  Collected: 07/17/21 1111   Order Status: Completed Specimen: Tissue from Joint, Knee Updated: 07/21/21 0804    Gram Stain Result Few WBC's (Polymorphonuclear) present   Rare Gram positive cocci  in clusters   No Epithelial Cells seen   Abnormal     Anaerobic Culture No anaerobes isolated  4 days    Organism Staphylococcus aureusAbnormal     Culture Surgical Moderate growth   Narrative:     ORDER#: [id number]                          ORDERED BY: ROCIO TREVIÑO   SOURCE: Knee #3 R knee medial gutter       COLLECTED:  07/17/21 11:11   ANTIBIOTICS AT [facility]. :                      RECEIVED :  07/17/21 12:43   Culture, Blood 1 [[id number]] Collected: 07/19/21 0631   Order Status: Completed Specimen: Blood Updated: 07/20/21 0914    Blood Culture, Routine No Growth to date.  Any change in status will be called. Narrative:     ORDER#: [id number]                          ORDERED BY: ROCIO TREVIÑO   SOURCE: Blood LFA                          COLLECTED:  07/19/21 06:31   ANTIBIOTICS AT [facility]. :                      RECEIVED :  07/19/21 06:43   Culture, Body Fluid [[id number]] (Abnormal)  Collected: 07/16/21 1447   Order Status: Completed Specimen: Body Fluid from Synovial Fluid Updated: 07/20/21 0711    Gram Stain Result Many WBC's (Polymorphonuclear)   Many Gram positive cocci  in pairs   Abnormal     Anaerobic Culture No anaerobes isolated  4 days    Organism Staphylococcus aureusAbnormal     Body Fluid Culture, Sterile Moderate growth   Narrative:     ORDER#: [id number]                          ORDERED BY: SHANNON PIRES   SOURCE: Synovial Fluid Right Knee          COLLECTED:  07/16/21 14:47   ANTIBIOTICS AT [facility]. :                      RECEIVED :  07/16/21 14:54   Culture, Blood 1 [[id number]] (Abnormal) Collected: 07/18/21 0552   Order Status: Completed Specimen: Blood Updated: 07/20/21 0701    Blood Culture, Routine --Abnormal      Bottle volume = <5 ml   Quality of results might be   affected with low volume. Abnormal     Organism Staphylococcus aureusAbnormal     Blood Culture, Routine --    No further workup   Isolated from Anaerobic bottle   Refer to (Blood culture collected [email or web address]) for sensitivity results    Narrative:     ORDER#: [id number]                          ORDERED BY: John Saenz   SOURCE: Blood Hand, Left                   COLLECTED:  07/18/21 05:52   ANTIBIOTICS AT [facility]. :                      RECEIVED :  07/18/21 06:57   CALL Harvinder Lion [phone],   Microbiology results called to and read back by Kt Byrd RN ON 3RD, 07/19/2021 05:46, by [facility]   Culture, Blood 1 [[id number]] (Abnormal)  Collected: 07/16/21 1256   Order Status: Completed Specimen: Blood Updated: 07/19/21 0714    Blood Culture, Routine  Bottle volume = 8 mlAbnormal     Organism Staphylococcus aureusAbnormal     Blood Culture, Routine Isolated from Aerobic and Anaerobic bottle   Narrative:     ORDER#: [id number]                          ORDERED BY: Reinaldo Blanco   SOURCE: Blood lac                          COLLECTED:  07/16/21 12:56   ANTIBIOTICS AT [facility]. :                      RECEIVED :  07/16/21 12:56   CALL Tameka Garrido [phone],   Microbiology results called to and read back by Sheeba Borrego RN on   5th, 07/17/2021 04:13, by [facility]   Culture, Blood 2 [[id number]] (Abnormal) Collected: 07/16/21 1237   Order Status: Completed Specimen: Blood Updated: 07/18/21 0803    Culture, Blood 2  Bottle volume = 9 mlAbnormal     Organism Staphylococcus aureusAbnormal     Culture, Blood 2 --    No further workup   Isolated from Aerobic and Anaerobic bottle   Refer to (Blood culture collected [email or web address]) for sensitivity results    Narrative:     ORDER#: [id number]                          ORDERED BY: Reinaldo Blanco   SOURCE: Blood rac                          COLLECTED:  07/16/21 12:37   ANTIBIOTICS AT [facility]. :                      RECEIVED :  07/16/21 12:56   Jeff Keen [phone],   Microbiology results called to and read back by Tanya iKm RN on   5th, 07/17/2021 04:15, by [facility]   Culture, Blood 1 [[id number]] Collected: 07/18/21 0552   Order Status: Canceled Specimen: Blood    Culture, Fungus [[id number]] Collected: 07/17/21 1110   Order Status: Sent Specimen: Tissue from Joint, Knee Updated: 07/17/21 1418    KOH Prep No Fungal elements seen   Narrative:     ORDER#: [id number]                          ORDERED BY: ROCIO Everett   SOURCE: Knee #2 R knee fat pad             COLLECTED:  07/17/21 11:10   ANTIBIOTICS AT [facility]. :                      RECEIVED :  07/17/21 12:40   Culture, Fungus [[id number]] Collected: 07/17/21 1107   Order Status: Sent Specimen: Body Fluid from Joint, Knee Updated: 07/17/21 1417    KOH Prep No Fungal elements seen   Narrative:     ORDER#: [id number]                          ORDERED BY: ROCIO TREVIÑO   SOURCE: Knee #1 R knee fluid               COLLECTED:  07/17/21 11:07   ANTIBIOTICS AT [facility]. :                      RECEIVED :  07/17/21 12:39   Culture, Fungus [[id number]] Collected: 07/17/21 1111   Order Status: Sent Specimen: Tissue from Joint, Knee Updated: 07/17/21 1355    KOH Prep No Fungal elements seen   Narrative:     ORDER#: [id number]                          ORDERED BY: ROCIO TREVIÑO   SOURCE: Knee #3 R knee medial gutter       COLLECTED:  07/17/21 11:11   ANTIBIOTICS AT [facility]. :                      RECEIVED :  07/17/21 12:41   Gram Stain [[id number]]    Order Status: Canceled Specimen: Aspirate    COVID-19, Rapid [[id number]] Collected: 07/16/21 1153   Order Status: Completed Specimen: Nasopharyngeal Swab Updated: 07/16/21 1233    SARS-CoV-2, NAAT Not Detected                 Assessment/Plan   Principal Problem:    Septic arthritis ([address]- Orthopedics performed R knee explant antibiotic spacer with ANA drain on 7/17/21. Weightbearing as tolerated, DVT prophylaxis, PT/OT, ice and elevation.  ID consulted and continuing Cefazolin. 7/20 Drain removed, picc line placed. Continue to monitor blood cultures. Blood culture 7/23 with growth of staph epi. Monitoring R shoulder and other joints for possible metastatic focus of infection. Ortho aspirate R shoulder 7/21/21. Active Problems:    Coronary artery disease involving native coronary artery of native heart without angina pectoris    S/P TAVR (transcatheter aortic valve replacement)   - Cardiology consulted and notes pt will need follow up in 4 weeks in the valve clinic. 2D echo completed. Cardiology performed JESSICA and ruled out endocarditis. COPD (chronic obstructive pulmonary disease) ([address]- O2 and nebulizers. IS    Elevated troponin leveldowntrend seen, likely related to recent JESSICA      Essential hypertension-continue home medications and monitor closely     Normocytic anemia -we will obtain repeat an iron panel as studies on file from April 2021, continue to monitor daily CBC, transfuse for hemoglobin less than 7    Anxietylikely cause of worsening respiratory status/panic attacks at night, family member staying at night. Addition of melatonin/Vistaril. Antibiotic: Ancef    EMR Dragon/Transcription disclaimer:   Much of this encounter note is an electronic transcription/translation of spoken language to printed text.  The electronic translation of spoken language may permit erroneous, or at times, nonsensical words or phrases to be inadvertently transcribed; although attempts have made to review the note for such errors, some may still exist.    Electronically signed by   Andreia Garrett MD   Internal Medicine Hospitalist  On 7/25/2021  At 11:15 AM

## 2021-07-25 NOTE — PROGRESS NOTES
Infectious Diseases Progress Note    Patient:  Sharmin Zaldivar  YOB: 1955  MRN: [id number]   Admit date: 7/16/2021   Admitting Physician: Zuhair Duarte MD  Primary Care Physician: SHIMON Fernandez    Chief Complaint/Interval History: He is a little tired and sore overall today. No real new localizing symptoms. No cough or shortness of breath. He has some right knee soreness. Mild shoulder soreness stable. No rash or skin itching. No nausea or diarrhea. In/Out    Intake/Output Summary (Last 24 hours) at 7/25/2021 1334  Last data filed at 7/25/2021 0953  Gross per 24 hour   Intake 810 ml   Output 500 ml   Net 310 ml     Allergies: Allergies   Allergen Reactions    Codeine Swelling     Lips swelling    Iv [Iodides] Swelling     Contrast dye used for heart cath. Caused face to swell.     Hydrocodone Swelling     lips swelling    Statins      Myalgias       Current Meds: hydrOXYzine (VISTARIL) capsule 25 mg, TID PRN  melatonin disintegrating tablet 5 mg, Nightly  furosemide (LASIX) tablet 20 mg, Daily  baclofen (LIORESAL) tablet 5 mg, TID PRN  ceFAZolin (ANCEF) 2000 mg in 0.9% sodium chloride 50 mL IVPB, Q8H  sodium chloride flush 0.9 % injection 10 mL, 2 times per day  sodium chloride flush 0.9 % injection 10 mL, PRN  acetaminophen (TYLENOL) tablet 650 mg, Q6H  oxyCODONE (ROXICODONE) immediate release tablet 5 mg, Q4H PRN   Or  oxyCODONE (ROXICODONE) immediate release tablet 10 mg, Q4H PRN  morphine injection 2 mg, Q1H PRN   Or  morphine injection 4 mg, Q1H PRN  sennosides-docusate sodium (SENOKOT-S) 8.6-50 MG tablet 1 tablet, BID  magnesium hydroxide (MILK OF MAGNESIA) 400 MG/5ML suspension 30 mL, Daily PRN  aspirin EC tablet 325 mg, BID  0.9 % sodium chloride infusion, PRN  ondansetron (ZOFRAN-ODT) disintegrating tablet 4 mg, Q8H PRN   Or  ondansetron (ZOFRAN) injection 4 mg, Q6H PRN  polyethylene glycol (GLYCOLAX) packet 17 g, Daily PRN  acetaminophen (TYLENOL) tablet 650 mg, Q6H PRN   Or  acetaminophen (TYLENOL) suppository 650 mg, Q6H PRN  magnesium sulfate 2000 mg in 50 mL IVPB premix, PRN  potassium chloride (KLOR-CON M) extended release tablet 40 mEq, PRN   Or  potassium bicarb-citric acid (EFFER-K) effervescent tablet 40 mEq, PRN   Or  potassium chloride 10 mEq/100 mL IVPB (Peripheral Line), PRN  [Held by provider] clopidogrel (PLAVIX) tablet 75 mg, Daily  ezetimibe (ZETIA) tablet 10 mg, Daily  gabapentin (NEURONTIN) capsule 800 mg, TID  hydroxychloroquine (PLAQUENIL) tablet 400 mg, Daily  oxyCODONE-acetaminophen (PERCOCET) 7.5-325 MG per tablet 1 tablet, Q4H PRN  pantoprazole (PROTONIX) tablet 40 mg, Daily  albuterol (PROVENTIL) nebulizer solution 2.5 mg, Q6H PRN  budesonide (PULMICORT) nebulizer suspension 500 mcg, BID  Arformoterol Tartrate (BROVANA) nebulizer solution 15 mcg, BID  thiamine (B-1) injection 100 mg, Daily  multivitamin 1 tablet, Daily  folic acid (FOLVITE) tablet 1 mg, Daily  LORazepam (ATIVAN) tablet 1 mg, Q1H PRN   Or  LORazepam (ATIVAN) injection 1 mg, Q1H PRN   Or  LORazepam (ATIVAN) tablet 2 mg, Q1H PRN   Or  LORazepam (ATIVAN) injection 2 mg, Q1H PRN   Or  LORazepam (ATIVAN) tablet 3 mg, Q1H PRN   Or  LORazepam (ATIVAN) injection 3 mg, Q1H PRN   Or  LORazepam (ATIVAN) tablet 4 mg, Q1H PRN   Or  LORazepam (ATIVAN) injection 4 mg, Q1H PRN      Review of Systems see HPI    VitalSigns:  /76   Pulse 91   Temp 95.3 °F (35.2 °C)   Resp 20   Ht 5' 7\" (1.702 m)   Wt 163 lb 1.6 oz (74 kg)   SpO2 91%   BMI 25.55 kg/m²      Physical Exam  Line/IV site: No erythema, warmth, induration, or tenderness. Shoulders without erythema or warmth  Right knee with well-healed surgical incision. No drainage.   Lungs without crackles  Abdomen soft nontender    Lab Results:  CBC:   Recent Labs     07/23/21  1630 07/24/21  0353 07/25/21  0220   WBC 13.3* 19.4* 13.0*   HGB 8.4* 8.8* 7.5*    186 192     BMP:  Recent Labs     07/23/21  0812 07/23/21  2233 07/24/21  0353 07/25/21  0220   *  --  137 141   K 3.6 4.1 4.0 3.7   CL 99  --  101 104   CO2 27  --  27 28   BUN 9  --  17 26*   CREATININE 0.5  --  0.7 0.7   GLUCOSE 112*  --  196* 136*     Ferritin 340  Iron 36, TIBC 204, iron saturation 18    CultureResults:  Blood cultures July 24-no growth to date  Blood cultures July 23-Staphylococcus epidermidis (these cultures were drawn via PICC)  Blood cultures July 23-no growth (drawn peripherally)    Radiology: None    Additional Studies Reviewed:  None    Impression:  1. Right knee prosthetic joint infection-MSSA (also had MSSA bacteremia-blood cultures now clear of MSSA)  2. Positive blood culture for Staph epidermidis from July 23-he has had no recurrent fever at this point. Hard to know whether that culture is contaminant or pathogen. Would tend to follow without antibiotic adjustment unless recurrent fever or additional blood cultures positive. 3.  Coronary artery disease  4. Rheumatoid arthritis  5. History of transcatheter aortic valve replacement  6. Right shoulder pain  7. Fever at this point remains resolved  8.   Subacute bilateral occipital strokes    Recommendations:  Continue supportive care  Continue cefazolin  Await blood cultures from July 24  Monitor temperature curve  No change in treatment at present  Continue to follow    David Melendrez MD

## 2021-07-25 NOTE — PROGRESS NOTES
[facility]  [address] [place] [address] [address], [address]  [address] [facility], [facility] [address]  Phone [phone]     Neurology Progress Note  2021 1:09 PM  Information:   Patient Name: Martinez Hubbard  :   1955  Age:   76 y.o. MRN:   [id number]  Account #:  [de-identified]  Admit Date:   2021  Today:  21     ADMIT DX:   Septic arthritis ([facility] [address]    Subjective:     Brittani Katz a 76y.o. year old man with a history of CAD, TAVR, COPD, and stroke who was admitted  with a right knee septic joint and has had worsening visual hallucinations.  He sustained bilateral occipital infarcts in 2021 and has had visual hallucinations since. Interval History:   He had a better night. He did get some sleep. He has been afebrile. He gets short of breath with minor exertion and has some intermittent chest pain. He has right knee pain and some right shoulder pain. His visual hallucinations are a little better, more like floaters today. Objective:     Past Medical History:  Past Medical History:   Diagnosis Date    Acute on chronic combined systolic and diastolic congestive heart failure due to valvular disease ([address] 2021    Arthritis     Bronchitis     Cancer (HCC)     Skin Cancer REMOVED RT ARM & LT EAR    Chronic back pain     Chronic cholecystitis without calculus 2017    Chronic GERD     COPD [facility]chronic obstructive pulmonary disease) [facility]     Coronary artery disease of native artery of native heart with stable angina pectoris ([address]     Coronary atherosclerosis of native coronary artery     s/p PTCA and stent placement to the LAD and RCA/7 stents    DDD (degenerative disc disease), lumbar 2018    History of blood transfusion     WITH RT SHOULDER SURGERY    History of tobacco abuse     Hyperlipidemia     Hypertension     MI (myocardial infarction) ([address]     Old, inferior wall    Moderate aortic regurgitation 2017    mod-severe AR.      Moderate aortic stenosis 2017 mod-severe aortic stenosis.  Pain management 2021    PT SEES PAIN MANAGEMENT FOR CHRONIC BACK PAIN    Spinal stenosis of lumbar region with neurogenic claudication 12/4/2018       Past Surgical History:   Procedure Laterality Date    BACK SURGERY      s/p laminectomy    CARDIAC CATHETERIZATION  08/10/04 [facility]      CARDIAC CATHETERIZATION  12/10/03  [facility]    CARDIAC CATHETERIZATION  08/29/03 [facility]    CARDIAC CATHETERIZATION  02/16/01 [facility]    CARDIAC CATHETERIZATION  05/25/2009    EF is estimated to be 50%. See scanned document.  CARDIAC CATHETERIZATION N/A 03/2016    stent placement    CARDIAC CATHETERIZATION  08/2017    no PCI    CARDIAC CATHETERIZATION  07/11/2019    Drug-eluting stents placed to mid LAD and mid RCA, normal LV    CHOLECYSTECTOMY, LAPAROSCOPIC N/A 05/19/2017    CHOLECYSTECTOMY LAPAROSCOPIC performed by Gary Foreman MD at [address] [place] [address]  05/28/2015    Dr. Yann Boyer: No Polyps   10yr recall    CORONARY ANGIOPLASTY WITH STENT PLACEMENT  03/2016    stent to LAD    JOINT REPLACEMENT      Left thumb    JOINT REPLACEMENT Left     knee    JOINT REPLACEMENT      KNEE SURGERY      s/p Rt.  knee replacement    LEG SURGERY Right     Broken leg with surgical repair    LUMBAR SPINE SURGERY N/A 12/04/2018    L1-5 LAMINECTOMY performed by Ayla Fisher MD at [facility] EGD TRANSORAL BIOPSY SINGLE/MULTIPLE N/A 05/18/2017    Dr Kaylan Stephens, Amelie (-), biliary colic, surgical referral    [place] EGD TRANSORAL BIOPSY SINGLE/MULTIPLE N/A 11/29/2018    Dr JAIDEN Torres-w/dilation over wire-51 [place]-Distal esophagitis, gastritis    REMOVE HARDWARE SPINE N/A 12/20/2018    I AND D LUMBAR INCISION SEROMA performed by Ayla Fisher MD at [facility] [address] Right 7/17/2021    KNEE TOTAL ARTHROPLASTY REVISION performed by Robert Tamayo MD at [address] [place] [address] Right 12/12/2019    RIGHT REVERSE TOTAL SHOULDER ARTHROPLASTY performed by Arnaldo Love MD at [facility]    SKIN BIOPSY      UPPER GASTROINTESTINAL ENDOSCOPY  2015    Dr. Carson Hernandez: rukhsana neg,,normal, empiric dilatation with 51F    UPPER GASTROINTESTINAL ENDOSCOPY N/A 2018    Dr JAIDEN Torres-w/dilation over wire-51 [place]-Distal esophagitis, gastritis       Family History   Problem Relation Age of Onset    Cancer Mother     Lung Cancer Mother     Heart Disease Father     Cancer Father     Liver Cancer Father     Lung Cancer Father     Diabetes Maternal Grandmother     Heart Disease Maternal Grandfather     Cancer Maternal Grandfather     Stroke Paternal Grandmother     Stomach Cancer Sister     No Known Problems Brother     COPD Sister     Heart Disease Paternal Grandfather     Colon Cancer Neg Hx     Colon Polyps Neg Hx     Esophageal Cancer Neg Hx     Liver Disease Neg Hx     Rectal Cancer Neg Hx        Social History     Socioeconomic History    Marital status:      Spouse name: Not on file    Number of children: Not on file    Years of education: Not on file    Highest education level: Not on file   Occupational History    Not on file   Tobacco Use    Smoking status: Former Smoker     Packs/day: 0.00     Types: Cigarettes     Quit date: 12/10/2009     Years since quittin.6    Smokeless tobacco: Never Used   Vaping Use    Vaping Use: Never used   Substance and Sexual Activity    Alcohol use: Yes     Alcohol/week: 42.0 standard drinks     Types: 42 Cans of beer per week     Comment: daily    Drug use: No    Sexual activity: Yes     Partners: Female   Other Topics Concern    Not on file   Social History Narrative    Not on file     Social Determinants of Health     Financial Resource Strain:     Difficulty of Paying Living Expenses:    Food Insecurity:     Worried About Running Out of Food in the Last Year:     [address] in the Last Year:    Transportation Needs:     Lack of Transportation (Medical):      Lack of Transportation (Non-Medical):    Physical Activity:     Days of Exercise per Week:     Minutes of Exercise per Session:    Stress:     Feeling of Stress :    Social Connections:     Frequency of Communication with Friends and Family:     Frequency of Social Gatherings with Friends and Family:     Attends [employer] Services:     Active Member of Clubs or Organizations:     Attends Club or Organization Meetings:     Marital Status:    Intimate Partner Violence:     Fear of Current or Ex-Partner:     Emotionally Abused:     Physically Abused:     Sexually Abused:        Medications:   sodium chloride        melatonin  5 mg Oral Nightly    furosemide  20 mg Oral Daily    ceFAZolin  2,000 mg Intravenous Q8H    sodium chloride flush  10 mL Intravenous 2 times per day    acetaminophen  650 mg Oral Q6H    sennosides-docusate sodium  1 tablet Oral BID    aspirin  325 mg Oral BID    [Held by provider] clopidogrel  75 mg Oral Daily    ezetimibe  10 mg Oral Daily    gabapentin  800 mg Oral TID    hydroxychloroquine  400 mg Oral Daily    pantoprazole  40 mg Oral Daily    budesonide  0.5 mg Nebulization BID    Arformoterol Tartrate  15 mcg Nebulization BID    thiamine  100 mg Intravenous Daily    multivitamin  1 tablet Oral Daily    folic acid  1 mg Oral Daily       Diagnostic Studies:  Reviewed all labs/diagnositcs since last 24hrs:  Recent Results (from the past 24 hour(s))   Troponin    Collection Time: 07/24/21  2:05 PM   Result Value Ref Range    Troponin 0.18 (HH) 0.00 - 0.03 ng/mL   Comprehensive Metabolic Panel w/ Reflex to MG    Collection Time: 07/25/21  2:20 AM   Result Value Ref Range    Sodium 141 136 - 145 mmol/L    Potassium reflex Magnesium 3.7 3.5 - 5.0 mmol/L    Chloride 104 98 - 111 mmol/L    CO2 28 22 - 29 mmol/L    Anion Gap 9 7 - 19 mmol/L    Glucose 136 (H) 74 - 109 mg/dL    BUN 26 (H) 8 - 23 mg/dL    CREATININE 0.7 0.5 - 1.2 mg/dL    GFR Non-African American >60 >60    GFR  >59 >59    Calcium 8.5 (L) 8.8 - 10.2 mg/dL    Total Protein 5.8 (L) 6.6 - 8.7 g/dL    Albumin 2.3 (L) 3.5 - 5.2 g/dL    Total Bilirubin <0.2 0.2 - 1.2 mg/dL    Alkaline Phosphatase 152 (H) 40 - 130 U/L    ALT 8 5 - 41 U/L    AST 22 5 - 40 U/L   CBC Auto Differential    Collection Time: 07/25/21  2:20 AM   Result Value Ref Range    WBC 13.0 (H) 4.8 - 10.8 K/uL    RBC 2.53 (L) 4.70 - 6.10 M/uL    Hemoglobin 7.5 (L) 14.0 - 18.0 g/dL    Hematocrit 23.7 (L) 42.0 - 52.0 %    MCV 93.7 80.0 - 94.0 fL    MCH 29.6 27.0 - 31.0 pg    MCHC 31.6 (L) 33.0 - 37.0 g/dL    RDW 15.0 (H) 11.5 - 14.5 %    Platelets 613 623 - 674 K/uL    MPV 10.6 9.4 - 12.4 fL    Neutrophils % 83.6 (H) 50.0 - 65.0 %    Lymphocytes % 8.2 (L) 20.0 - 40.0 %    Monocytes % 7.5 0.0 - 10.0 %    Eosinophils % 0.0 0.0 - 5.0 %    Basophils % 0.1 0.0 - 1.0 %    Neutrophils Absolute 10.9 (H) 1.5 - 7.5 K/uL    Immature Granulocytes # 0.1 K/uL    Lymphocytes Absolute 1.1 1.1 - 4.5 K/uL    Monocytes Absolute 1.00 (H) 0.00 - 0.90 K/uL    Eosinophils Absolute 0.00 0.00 - 0.60 K/uL    Basophils Absolute 0.00 0.00 - 0.20 K/uL   Magnesium    Collection Time: 07/25/21  2:20 AM   Result Value Ref Range    Magnesium 2.2 1.6 - 2.4 mg/dL       Diet:  ADULT DIET; Regular    Examination:  Vitals: /76   Pulse 91   Temp 95.3 °F (35.2 °C)   Resp 20   Ht 5' 7\" (1.702 m)   Wt 163 lb 1.6 oz (74 kg)   SpO2 91%   BMI 25.55 kg/m²      Intake/Output Summary (Last 24 hours) at 7/25/2021 1309  Last data filed at 7/25/2021 0953  Gross per 24 hour   Intake 810 ml   Output 500 ml   Net 310 ml     General appearance:  He is alert and cooperative  Skin: Skin color, texture, turgor normal. No rashes or lesions  HEENT: Head: Normal, normocephalic, atraumatic.   Neck:no adenopathy, no carotid bruit, no JVD, supple, symmetrical, trachea midline and thyroid not enlarged, symmetric, no tenderness/mass/nodules  Lungs: clear to auscultation bilaterally.  He is barrel chested suggesting COPD  Heart: regular rate and rhythm, S1, S2 normal, systolic murmur  Abdomen: soft, non-tender; bowel sounds normal; no masses,  no organomegaly  Extremities: right knee incision  Neurologic:  Alert, oriented to place and 8/2021.  No dysarthria.  Speech is fluent.  EOMI, PERRL, VFF.  No facial weakness.  Limb strength is grossly normal.  No pronator drift.  Rapid alternating movements are normal.  Finger to nose testing shows no dysmetria. Assessment:   1.         Subacute bilateral occipital strokes with resultant visual impairment, visual hallucinations, cognitive impairment.  He realizes his hallucinations are not real.  The only medications that could help are antipsychotics or atypical antipsychotics and they carry a risk for MI and stroke.  Pt and wife do not want to undergo that risk at this time. 2.         Septic arthritis right knee. 3.         CAD with AMI. No significant obstructive CAD 3/2021  4.         AVR  5.         COPD  6.         Hypertension  7.         Hyperlipidemia. Plan:   1. Abx per ID  2. ASA/Plavix  3. Nebulizers, oxygen  4. PT/OT  5. Cardiology following     Discharge planning:   Home    Reviewed treatment plans with the patient and/or family. 25 minutes spent in face to face interaction and coordination of care.      Electronically signed by Crispin Alberto MD on 7/25/2021 at 1:09 PM

## 2021-07-26 PROBLEM — Z51.5 PALLIATIVE CARE PATIENT: Status: ACTIVE | Noted: 2021-01-01

## 2021-07-26 PROBLEM — J44.1 CHRONIC OBSTRUCTIVE PULMONARY DISEASE WITH (ACUTE) EXACERBATION (HCC): Status: ACTIVE | Noted: 2021-01-01

## 2021-07-26 NOTE — PROGRESS NOTES
No need for Rapid Covid swab this morning, pt had a negative covid swab on admission, per Dr. Evan Nye.

## 2021-07-26 NOTE — PROGRESS NOTES
08/14/2017    mod-severe AR.  Moderate aortic stenosis 08/14/2017    mod-severe aortic stenosis.  Pain management 2021    PT SEES PAIN MANAGEMENT FOR CHRONIC BACK PAIN    Palliative care patient 07/26/2021    Spinal stenosis of lumbar region with neurogenic claudication 12/04/2018       Past Surgical History:   Procedure Laterality Date    BACK SURGERY      s/p laminectomy    CARDIAC CATHETERIZATION  08/10/04 [facility]      C[facility]  [facility]03  [facility]    CARDIAC CATHETERIZATION  08/29/03 [facility]    CARDIAC CATHETERIZATION  02/16/01 [facility]    CARDIAC CATHETERIZATION  05/25/2009    EF is estimated to be 50%. See scanned document.  CARDIAC CATHETERIZATION N/A 03/2016    stent placement    CARDIAC CATHETERIZATION  08/2017    no PCI    CARDIAC CATHETERIZATION  07/11/2019    Drug-eluting stents placed to mid LAD and mid RCA, normal LV    CHOLECYSTECTOMY, LAPAROSCOPIC N/A 05/19/2017    CHOLECYSTECTOMY LAPAROSCOPIC performed by Abel Anderson MD at [address] [place] [address]  05/28/2015    Dr. Binta Medley: No Polyps   10yr recall    CORONARY ANGIOPLASTY WITH STENT PLACEMENT  03/2016    stent to LAD    JOINT REPLACEMENT      Left thumb    JOINT REPLACEMENT Left     knee    JOINT REPLACEMENT      KNEE SURGERY      s/p Rt.  knee replacement    LEG SURGERY Right     Broken leg with surgical repair    LUMBAR SPINE SURGERY N/A 12/04/2018    L1-5 LAMINECTOMY performed by Shay Melgoza MD at [facility] 43 EGD TRANSORAL BIOPSY SINGLE/MULTIPLE N/A 05/18/2017    Dr Andreina Gonzalez, [facility] [place] [facility] (-), biliary colic, surgical referral    DE EGD TRANSORAL BIOPSY SINGLE/MULTIPLE N/A 11/29/2018    Dr JAIDEN Torres-w/dilation over wire-51 Turkmen-Distal esophagitis, gastritis    REMOVE HARDWARE SPINE N/A 12/20/2018    I AND D LUMBAR INCISION SEROMA performed by Shay Melgoza MD at [facility] Right 7/17/2021    KNEE TOTAL ARTHROPLASTY REVISION performed by Jes Aguilar MD [facility] Right 2019    RIGHT REVERSE TOTAL SHOULDER ARTHROPLASTY performed by Ryan Madrid MD at [facility] OR    SKIN BIOPSY      UPPER GASTROINTESTINAL ENDOSCOPY  2015    Dr. Sadie Harvey: rukhsana neg,,normal, empiric dilatation with 51F    UPPER GASTROINTESTINAL ENDOSCOPY N/A 2018    Dr JAIDEN Torres-w/dilation over wire-51 [place]-Distal esophagitis, gastritis       Family History   Problem Relation Age of Onset    Cancer Mother     Lung Cancer Mother     Heart Disease Father     Cancer Father     Liver Cancer Father     Lung Cancer Father     Diabetes Maternal Grandmother     Heart Disease Maternal Grandfather     Cancer Maternal Grandfather     Stroke Paternal Grandmother     Stomach Cancer Sister     No Known Problems Brother     COPD Sister     Heart Disease Paternal Grandfather     Colon Cancer Neg Hx     Colon Polyps Neg Hx     Esophageal Cancer Neg Hx     Liver Disease Neg Hx     Rectal Cancer Neg Hx        Social History     Socioeconomic History    Marital status:      Spouse name: Not on file    Number of children: Not on file    Years of education: Not on file    Highest education level: Not on file   Occupational History    Not on file   Tobacco Use    Smoking status: Former Smoker     Packs/day: 0.00     Types: Cigarettes     Quit date: 12/10/2009     Years since quittin.6    Smokeless tobacco: Never Used   Vaping Use    Vaping Use: Never used   Substance and Sexual Activity    Alcohol use:  Yes     Alcohol/week: 42.0 standard drinks     Types: 42 Cans of beer per week     Comment: daily    Drug use: No    Sexual activity: Yes     Partners: Female   Other Topics Concern    Not on file   Social History Narrative    Not on file     Social Determinants of Health     Financial Resource Strain:     Difficulty of Paying Living Expenses:    Food Insecurity:     Worried About Running Out of Food in the Last Year:     Jamel of Food in the Last Year:    Transportation Needs:     Lack of Transportation (Medical):      Lack of Transportation (Non-Medical):    Physical Activity:     Days of Exercise per Week:     Minutes of Exercise per Session:    Stress:     Feeling of Stress :    Social Connections:     Frequency of Communication with Friends and Family:     Frequency of Social Gatherings with Friends and Family:     Attends Jew Services:     Active Member of Clubs or Organizations:     Attends Club or Organization Meetings:     Marital Status:    Intimate Partner Violence:     Fear of Current or Ex-Partner:     Emotionally Abused:     Physically Abused:     Sexually Abused:        Medications:     melatonin  5 mg Oral Nightly    furosemide        furosemide  20 mg Oral Daily    ceFAZolin  2,000 mg Intravenous Q8H    sodium chloride flush  10 mL Intravenous 2 times per day    acetaminophen  650 mg Oral Q6H    sennosides-docusate sodium  1 tablet Oral BID    aspirin  325 mg Oral BID    [Held by provider] clopidogrel  75 mg Oral Daily    ezetimibe  10 mg Oral Daily    gabapentin  800 mg Oral TID    hydroxychloroquine  400 mg Oral Daily    pantoprazole  40 mg Oral Daily    budesonide  0.5 mg Nebulization BID    Arformoterol Tartrate  15 mcg Nebulization BID    thiamine  100 mg Intravenous Daily    multivitamin  1 tablet Oral Daily    folic acid  1 mg Oral Daily       Diagnostic Studies:  Reviewed all labs/diagnositcs since last 24hrs:  Recent Results (from the past 24 hour(s))   Iron and TIBC    Collection Time: 07/25/21 12:55 PM   Result Value Ref Range    Iron 36 (L) 59 - 158 ug/dL    TIBC 204 (L) 250 - 400 ug/dL    Iron Saturation 18 14 - 50 %   Ferritin    Collection Time: 07/25/21 12:55 PM   Result Value Ref Range    Ferritin 340.1 30.0 - 400.0 ng/mL   Comprehensive Metabolic Panel w/ Reflex to MG    Collection Time: 07/26/21  4:40 AM   Result Value Ref Range    Sodium 140 136 - 145 mmol/L Potassium reflex Magnesium 4.3 3.5 - 5.0 mmol/L    Chloride 102 98 - 111 mmol/L    CO2 29 22 - 29 mmol/L    Anion Gap 9 7 - 19 mmol/L    Glucose 159 (H) 74 - 109 mg/dL    BUN 29 (H) 8 - 23 mg/dL    CREATININE 1.0 0.5 - 1.2 mg/dL    GFR Non-African American >60 >60    GFR African American >59 >59    Calcium 8.6 (L) 8.8 - 10.2 mg/dL    Total Protein 6.7 6.6 - 8.7 g/dL    Albumin 2.7 (L) 3.5 - 5.2 g/dL    Total Bilirubin 0.4 0.2 - 1.2 mg/dL    Alkaline Phosphatase 175 (H) 40 - 130 U/L    ALT 8 5 - 41 U/L    AST 27 5 - 40 U/L   CBC Auto Differential    Collection Time: 07/26/21  4:40 AM   Result Value Ref Range    WBC 15.8 (H) 4.8 - 10.8 K/uL    RBC 2.85 (L) 4.70 - 6.10 M/uL    Hemoglobin 8.5 (L) 14.0 - 18.0 g/dL    Hematocrit 26.3 (L) 42.0 - 52.0 %    MCV 92.3 80.0 - 94.0 fL    MCH 29.8 27.0 - 31.0 pg    MCHC 32.3 (L) 33.0 - 37.0 g/dL    RDW 15.1 (H) 11.5 - 14.5 %    Platelets 980 919 - 123 K/uL    MPV 10.2 9.4 - 12.4 fL    Neutrophils % 92.6 (H) 50.0 - 65.0 %    Lymphocytes % 3.2 (L) 20.0 - 40.0 %    Monocytes % 3.2 0.0 - 10.0 %    Eosinophils % 0.0 0.0 - 5.0 %    Basophils % 0.1 0.0 - 1.0 %    Neutrophils Absolute 14.6 (H) 1.5 - 7.5 K/uL    Immature Granulocytes # 0.1 K/uL    Lymphocytes Absolute 0.5 (L) 1.1 - 4.5 K/uL    Monocytes Absolute 0.50 0.00 - 0.90 K/uL    Eosinophils Absolute 0.00 0.00 - 0.60 K/uL    Basophils Absolute 0.00 0.00 - 0.20 K/uL       Diet:  ADULT DIET; Regular    Examination:  Vitals: BP (!) 133/97   Pulse 140   Temp 98.9 °F (37.2 °C) (Oral)   Resp 20   Ht 5' 7\" (1.702 m)   Wt 163 lb 1.6 oz (74 kg)   SpO2 95%   BMI 25.55 kg/m²      Intake/Output Summary (Last 24 hours) at 7/26/2021 0915  Last data filed at 7/26/2021 5125  Gross per 24 hour   Intake 636 ml   Output 900 ml   Net -264 ml     General appearance:  He is presently sleeping with BiPAP and oxygen. I did not disturb him.     Assessment:   1.         Subacute bilateral occipital strokes with resultant visual impairment, visual hallucinations, cognitive impairment.    2.         Septic arthritis right knee.    3.         CAD with AMI.  No significant obstructive CAD 3/2021  4.         AVR, recent TTE with severe AS with mean gradient of 49mm  5.         COPD. He has a barrel chest so no doubt a chronic and severe problem. In addition, he has pulmonary hypertension per recent echocardiogram with RVSP of 50mm Hg. 6.         Hypertension  7.         Hyperlipidemia. Plan:   1. His wife is anticipating Hospice care     Discharge planning:   Hospice per wife    Reviewed treatment plans with the patient and/or family. 25 minutes spent in face to face interaction and coordination of care.      Electronically signed by Kandice Mello MD on 7/26/2021 at 9:15 AM

## 2021-07-26 NOTE — DISCHARGE SUMMARY
Discharge Summary  Date:7/26/2021        Patient Name:Ramón Owens     YOB: 1955     Age:65 y.o. Admit Date:7/16/2021   Admission Condition:poor   Discharged Condition:fair  Discharge Date: 07/26/21     Discharge Diagnoses   Principal Problem:    Septic arthritis ([address]  Active Problems:    S/P TAVR (transcatheter aortic valve replacement)    COPD (chronic obstructive pulmonary disease) (HCC)    Coronary artery disease involving native coronary artery of native heart without angina pectoris    Mixed hyperlipidemia    Essential hypertension    Palliative care patient  Resolved Problems:    * No resolved hospital problems. [facility] Stay   Narrative of Hospital Course: Patient was admitted 7/16, 76 y. o. male with past medical history of TAVR, CAD with stents, COPD, HTN, and HLD who presented to [facility] ED complaining of right knee pain, and swelling x2 days, associated with temperature elevation. History of bilateral knee replacement.,  With intermittent right knee aspiration. Work-up in ED revealed lactic 2.3, CRP 34.75, troponin 0.05, WBC 9.5, sed rate 30, unremarkable chest x-ray, right knee x-ray showed small knee joint effusion with anterior soft tissue swelling. Orthopedic surgery was consulted from ED whom recommended arthrocentesis, Fluid study showed nucl cell 19,560, 40,000 RBC. Patient was admitted to hospital medicine with Septic arthritis. Dr. Rola Mayberry performed right knee explant antibiotic spacer on 7/17/21, with ANA drain. Blood culture returned positive for gram positive cocci in clusters resembling staphylococcus. Synovial fluid returned positive for staphylococcus aureus. ID was consulted whom recommended discontinuing vancomycin and cefepime and changed to Cefazolin. CT head showed no acute process and  Neurology notes that symptoms will continue to improve.     Decline noted to be in place, right thoracic echocardiogram with questionable findings of vegetations, patient 07/26/2021    MCHC 32.3 07/26/2021    RDW 15.1 07/26/2021     07/26/2021     BMP:    Lab Results   Component Value Date    GLUCOSE 159 07/26/2021     07/26/2021    K 4.3 07/26/2021     07/26/2021    CO2 29 07/26/2021    ANIONGAP 9 07/26/2021    BUN 29 07/26/2021    CREATININE 1.0 07/26/2021    CALCIUM 8.6 07/26/2021    LABGLOM >60 07/26/2021    GFRAA >59 07/26/2021     HFP:    Lab Results   Component Value Date    PROT 6.7 07/26/2021     CMP:    Lab Results   Component Value Date    GLUCOSE 159 07/26/2021     07/26/2021    K 4.3 07/26/2021     07/26/2021    CO2 29 07/26/2021    BUN 29 07/26/2021    CREATININE 1.0 07/26/2021    ANIONGAP 9 07/26/2021    ALKPHOS 175 07/26/2021    ALT 8 07/26/2021    AST 27 07/26/2021    BILITOT 0.4 07/26/2021    LABALBU 2.7 07/26/2021    LABGLOM >60 07/26/2021    GFRAA >59 07/26/2021    PROT 6.7 07/26/2021    CALCIUM 8.6 07/26/2021     PT/INR:    Lab Results   Component Value Date    PROTIME 14.5 07/16/2021    INR 1.11 07/16/2021     PTT:   Lab Results   Component Value Date    APTT 29.5 07/16/2021     FLP:    Lab Results   Component Value Date    CHOL 94 07/17/2021    TRIG 132 07/17/2021    HDL 29 07/17/2021     U/A:    Lab Results   Component Value Date    COLORU YELLOW 05/05/2021    SPECGRAV 1.005 05/05/2021    PHUR 7.0 05/05/2021    PROTEINU Negative 05/05/2021    GLUCOSEU Negative 05/05/2021    KETUA Negative 05/05/2021    BILIRUBINUR Negative 05/05/2021    BILIRUBINUR neg 08/02/2019    UROBILINOGEN 0.2 05/05/2021    NITRU Negative 05/05/2021    LEUKOCYTESUR Negative 05/05/2021     TSH:    Lab Results   Component Value Date    TSH 1.960 12/12/2019       Radiology Last 7 Days:  XR CHEST (2 VW)    Result Date: 7/23/2021  . Bibasilar atelectasis with a small left effusion. Signed by Dr Aziza Mills    Result Date: 7/19/2021  1.  Mild cerebral and cerebellar volume loss with chronic microvascular disease but no evidence of acute intracranial process. Signed by Dr Tonny Adam    Physical Exam    Constitutional:       Appearance: Normal appearance. He is not ill-appearing or diaphoretic. HENT:      Head: Normocephalic and atraumatic. Right Ear: External ear normal.      Left Ear: External ear normal.      Nose: Nose normal.      Mouth/Throat:      Mouth: Mucous membranes are moist.      Pharynx: Oropharynx is clear. Eyes:      General: No scleral icterus. Extraocular Movements: Extraocular movements intact. Conjunctiva/sclera: Conjunctivae normal.   Cardiovascular:      Rate and Rhythm: Normal rate and regular rhythm. Pulses: Normal pulses. Heart sounds: Murmur heard. Pulmonary: BiPAP in place     Effort: Diminished breath sounds upon auscultation, expiratory wheezing   Abdominal:      General: Abdomen is flat. Bowel sounds are normal. There is no distension. Palpations: Abdomen is soft. Musculoskeletal:         General: Swelling and tenderness present. No signs of injury. Cervical back: Normal range of motion and neck supple. PICC line left basilic   Skin:     General: Skin is warm and dry. Coloration: Skin is not jaundiced or pale. Findings: No erythema, lesion or rash. Neurological:      General: No focal deficit present. Mental Status: He is alert and oriented to person, place, and time. Motor: No weakness. Discharge Plan   Disposition: Discharge/Readmit (Inpatient Hospice)     Provider Follow-Up:   No follow-up provider specified.        Patient Instructions   Diet: regular diet    Activity:   activity as tolerated      Discharge Medications         Medication List      ASK your doctor about these medications    aspirin 81 MG chewable tablet  Take 1 tablet by mouth daily     clopidogrel 75 MG tablet  Commonly known as: PLAVIX  Take 1 tablet by mouth daily     diphenhydrAMINE 50 MG tablet  Commonly known as: BENADRYL  1 Tablet by mouth to be taken with last prednisone dose 1 hour before procedure.     ezetimibe 10 MG tablet  Commonly known as: ZETIA  TAKE 1 TABLET BY MOUTH EVERY DAY     furosemide 20 MG tablet  Commonly known as: Lasix  Take 1 tablet by mouth daily     gabapentin 800 MG tablet  Commonly known as: NEURONTIN     hydroxychloroquine 200 MG tablet  Commonly known as: PLAQUENIL     indomethacin 50 MG capsule  Commonly known as: INDOCIN     multivitamin Tabs tablet     nitroGLYCERIN 0.4 MG SL tablet  Commonly known as: NITROSTAT  Place 1 tablet under the tongue every 5 minutes as needed for Chest pain 1 tablet as needed     oxyCODONE-acetaminophen 7.5-325 MG per tablet  Commonly known as: PERCOCET     pantoprazole 40 MG tablet  Commonly known as: PROTONIX  TAKE 1 TABLET BY MOUTH DAILY TAKE DAILY FIRST THING IN THE MORNING ON AN EMPTY STOMACH.     predniSONE 50 MG tablet  Commonly known as: DELTASONE  1 tablet by mouth 24, 12, and 1 hour before procedure. EMR Dragon/Transcription disclaimer:   Much of this encounter note is an electronic transcription/translation of spoken language to printed text.  The electronic translation of spoken language may permit erroneous, or at times, nonsensical words or phrases to be inadvertently transcribed; although attempts have made to review the note for such errors, some may still exist.    Electronically signed by   Rolly Nichols MD   Internal Medicine Hospitalist  On 7/26/2021  At 10:08 AM

## 2021-07-26 NOTE — CONSULTS
Palliative Care Consult Note  7/26/2021 7:20 AM  Subjective:   Admit Date: 7/16/2021  PCP: SHIMON Murcia    Reason For Consult: Goals of care, Code status, Family Support    Requesting Physician:  Dr. Sarah Richardson    History Obtained From: EMR    Chief Complaint: Shortness of breath    History of Present Illness:  Patient is a 76 yr old male with a PMH of CAD s/p TAVR, Acute in Chronic Combined CHF, COPD, HTN, HLD, ETOH abuse, that presented to [facility] ED on 7/16/2021  With complaints of increased R knee pain. Patient does have HX of R knee replacement in 2012, was seen at [facility] the previous day with fever 103.2, WBC 12, wife reported they were told that he likely had some sort of infection but source unknown and he was discharged home. Upon arrival to [facility], patient was noted to be in acute distress, R knee swollen, mild erythema. Workup in the ED completed, CXR revealed poor lung expansion and LLL discoid atelectasis. R knee XRAY revealed small knee joint effusion with anterior soft tissue swelling. Ortho consulted,  R Knee aspirated for 60ml of blood tinged fluid by ED physician, He was initiated on broad spectrum IV abx and admitted for further evaluation and treatment, concern for sepsis. Synovial fluid culture showed many gram-positive cocci in pair. ID consulted and following for trending of cultures and management of abx. He underwent R knee explant with abx spacer on 7/17/2021. Patient also seen by neurology for worsening hallucinations, he does have memory and cognition impairment, as well as hallucinations, at baseline from a previous bilateral occipital infarcts. CT of the head completed negative for new/acute processes. Patient has continued to have shortness of breath, JESSICA completed on 7/22/2021 showing no vegetation or thrombus. He remains on IV abx, did require short transfer/stay in ICU due to increased respiratory distress, currently on PCU.  He has had repeated episodes of increased shortness of breath, now requiring BiPAP. He has also required PRN IV dilaudid and lorazepam for shortness of breath, pain, and restlessness/hallucitnations. Family has made patient a DNR and requested to speak with palliative care regarding possible hospice care. Palliative was consulted to assist with discussions regarding goals of care and provide pt/family support. Past Medical History:        Diagnosis Date    Acute on chronic combined systolic and diastolic congestive heart failure due to valvular disease ([address] 04/16/2021    Arthritis     Bronchitis     Cancer (HCC)     Skin Cancer REMOVED RT ARM & LT EAR    Chronic back pain     Chronic cholecystitis without calculus 05/19/2017    Chronic GERD     COPD [facility]     Coronary artery disease of native artery of native heart with stable angina pectoris ([address]     Coronary atherosclerosis of native coronary artery     s/p PTCA and stent placement to the LAD and RCA/7 stents    DDD (degenerative disc disease), lumbar 12/04/2018    History of blood transfusion     WITH RT SHOULDER SURGERY    History of tobacco abuse 2010    Hyperlipidemia     Hypertension     MI (myocardial infarction) ([address]     Old, inferior wall    Moderate aortic regurgitation 08/14/2017    mod-severe AR.  Moderate aortic stenosis 08/14/2017    mod-severe aortic stenosis.      Pain management 2021    PT SEES PAIN MANAGEMENT FOR CHRONIC BACK PAIN    Palliative care patient 07/26/2021    Spinal stenosis of lumbar region with neurogenic claudication 12/04/2018       Past Surgical History:        Procedure Laterality Date    BACK SURGERY      s/p laminectomy    [facility]  [facility]04 [facility]      CARDIAC CATHETERIZATION  12/10/03  [facility]    CARDIAC CATHETERIZATION  08/29/03 [facility]    CARDIAC CATHETERIZATION  02/16/01 [facility]    [facility]    CARDIAC CATHETERIZATION  05/25/2009    EF is estimated to be 50%. See scanned document.  CARDIAC CATHETERIZATION N/A 03/2016    stent placement    CARDIAC CATHETERIZATION  08/2017    no PCI    CARDIAC CATHETERIZATION  07/11/2019    Drug-eluting stents placed to mid LAD and mid RCA, normal LV    CHOLECYSTECTOMY, LAPAROSCOPIC N/A 05/19/2017    CHOLECYSTECTOMY LAPAROSCOPIC performed by Margie Payne MD at [address] [place] [address]  05/28/2015    Dr. Carson Hernandez: No Polyps   10yr recall    CORONARY ANGIOPLASTY WITH STENT PLACEMENT  03/2016    stent to LAD    JOINT REPLACEMENT      Left thumb    JOINT REPLACEMENT Left     knee    JOINT REPLACEMENT      KNEE SURGERY      s/p Rt. knee replacement    LEG SURGERY Right     Broken leg with surgical repair    LUMBAR SPINE SURGERY N/A 12/04/2018    L1-5 LAMINECTOMY performed by Courtney Garza MD at [facility] EGD TRANSORAL BIOPSY SINGLE/MULTIPLE N/A 05/18/2017    Dr Lester Cutler, Amelie (-), biliary colic, surgical referral    NE EGD TRANSORAL BIOPSY SINGLE/MULTIPLE N/A 11/29/2018    Dr JAIDEN Torres-w/dilation over wire-51 [place]-Distal esophagitis, gastritis    REMOVE HARDWARE SPINE N/A 12/20/2018    I AND D LUMBAR INCISION SEROMA performed by Courtney Garza MD at [place] [address] Right 7/17/2021    KNEE TOTAL ARTHROPLASTY REVISION performed by Danelle Bassett MD at [address] [place] [address] Right 12/12/2019    RIGHT REVERSE TOTAL SHOULDER ARTHROPLASTY performed by Arnaldo Love MD at [address] [place] [address] Dr ENDOSCOPY  03/30/2015    Dr. Carson Hernandez: amelie neg,,normal, empiric dilatation with 51F    UPPER GASTROINTESTINAL ENDOSCOPY N/A 11/29/2018    Dr JAIDEN Torres-w/dilation over wire-51 [place]-Distal esophagitis, gastritis       Allergies:  Codeine, Iv [iodides], Hydrocodone, and Statins    Social History:    TOBACCO:   reports that he quit smoking about 11 years ago. His smoking use included cigarettes. He smoked 0.00 packs per day.  He has never used smokeless disintegrating tablet 5 mg  5 mg Oral Nightly Candido Brown MD   5 mg at 07/25/21 2123    furosemide (LASIX) 10 MG/ML injection             furosemide (LASIX) tablet 20 mg  20 mg Oral Daily Satnam Stiles MD   20 mg at 07/25/21 0839    baclofen (LIORESAL) tablet 5 mg  5 mg Oral TID PRN Angela Song MD   5 mg at 07/25/21 0241    ceFAZolin (ANCEF) 2000 mg in 0.9% sodium chloride 50 mL IVPB  2,000 mg Intravenous Q8H Nataliia Smith MD   Stopped at 07/26/21 0544    sodium chloride flush 0.9 % injection 10 mL  10 mL Intravenous 2 times per day Nataliia Smith MD   10 mL at 07/25/21 2129    sodium chloride flush 0.9 % injection 10 mL  10 mL Intravenous PRN Nataliia Smith MD        acetaminophen (TYLENOL) tablet 650 mg  650 mg Oral Q6H Nataliia Smith MD   650 mg at 07/25/21 1709    sennosides-docusate sodium (SENOKOT-S) 8.6-50 MG tablet 1 tablet  1 tablet Oral BID Nataliia Smith MD   1 tablet at 07/25/21 0839    magnesium hydroxide (MILK OF MAGNESIA) 400 MG/5ML suspension 30 mL  30 mL Oral Daily PRN Nataliia Smith MD        aspirin EC tablet 325 mg  325 mg Oral BID Nataliia Smith MD   325 mg at 07/25/21 2124    ondansetron (ZOFRAN-ODT) disintegrating tablet 4 mg  4 mg Oral Q8H PRN Nataliia Smith MD        Or    ondansetron [facility]) injection 4 mg  4 mg Intravenous Q6H PRN Nataliia Smith MD        polyethylene glycol [facility]) packet 17 g  17 g Oral Daily PRN Nataliia Smith MD        acetaminophen (TYLENOL) tablet 650 mg  650 mg Oral Q6H PRN Nataliia Smith MD   650 mg at 07/18/21 1708    Or    acetaminophen (TYLENOL) suppository 650 mg  650 mg Rectal Q6H PRN Nataliia Smith MD        magnesium sulfate 2000 mg in 50 mL IVPB premix  2,000 mg Intravenous PRN Nataliia Smith MD        potassium chloride Abdoulaye Perez M) extended release tablet 40 mEq  40 mEq Oral PRN Miley Olsen MD Los   40 mEq at 07/21/21 1126    Or    potassium bicarb-citric acid (EFFER-K) effervescent tablet 40 mEq  40 mEq Oral PRN Rojelio Cat MD        Or    potassium chloride 10 mEq/100 mL IVPB (Peripheral Line)  10 mEq Intravenous PRN Rojelio Cat  mL/hr at 07/19/21 1026 10 mEq at 07/19/21 1026    [Held by provider] clopidogrel (PLAVIX) tablet 75 mg  75 mg Oral Daily SHIMON Padgett - CNP        ezetimibe (ZETIA) tablet 10 mg  10 mg Oral Daily Rojelio Cat MD   10 mg at 07/25/21 1972    gabapentin (NEURONTIN) capsule 800 mg  800 mg Oral TID Rojelio Cat MD   800 mg at 07/25/21 2122    hydroxychloroquine (PLAQUENIL) tablet 400 mg  400 mg Oral Daily Rojelio Cat MD   400 mg at 07/25/21 0840    pantoprazole (PROTONIX) tablet 40 mg  40 mg Oral Daily Rojelio Cat MD   40 mg at 07/25/21 0840    albuterol (PROVENTIL) nebulizer solution 2.5 mg  2.5 mg Nebulization Q6H PRN Rojelio Cat MD   2.5 mg at 07/25/21 2140    budesonide (PULMICORT) nebulizer suspension 500 mcg  0.5 mg Nebulization BID Rojelio Cat MD   500 mcg at 07/26/21 8870    Arformoterol Tartrate (BROVANA) nebulizer solution 15 mcg  15 mcg Nebulization BID Rojelio Cat MD   15 mcg at 07/26/21 1447    thiamine (B-1) injection 100 mg  100 mg Intravenous Daily Rojelio Cat MD   100 mg at 07/25/21 0840    multivitamin 1 tablet  1 tablet Oral Daily Rojelio Cat MD   1 tablet at 53/72/92 8590    folic acid (FOLVITE) tablet 1 mg  1 mg Oral Daily Rojelio Cat MD   1 mg at 07/25/21 8842    LORazepam (ATIVAN) tablet 1 mg  1 mg Oral Q1H PRN Rojelio Cat MD   1 mg at 07/24/21 2218    Or    LORazepam (ATIVAN) injection 1 mg  1 mg Intravenous Q1H PRN Rojelio Cat MD   1 mg at 07/25/21 2140    Or    LORazepam (ATIVAN) tablet 2 mg  2 mg Oral Q1H PRN Rojelio Cat MD Or    LORazepam (ATIVAN) injection 2 mg  2 mg Intravenous Q1H PRN Aron Rodney MD        Or    LORazepam (ATIVAN) tablet 3 mg  3 mg Oral Q1H PRN Aron Rodney MD        Or    LORazepam (ATIVAN) injection 3 mg  3 mg Intravenous Q1H PRN Aron Rodney MD        Or    LORazepam (ATIVAN) tablet 4 mg  4 mg Oral Q1H PRN Aron Rodney MD        Or    LORazepam (ATIVAN) injection 4 mg  4 mg Intravenous Q1H PRN Aron Rodney MD            Labs:     Recent Labs     07/24/21  0353 07/25/21 0220 07/26/21  0440   WBC 19.4* 13.0* 15.8*   RBC 2.87* 2.53* 2.85*   HGB 8.8* 7.5* 8.5*   HCT 27.0* 23.7* 26.3*   MCV 94.1* 93.7 92.3   MCH 30.7 29.6 29.8   MCHC 32.6* 31.6* 32.3*    192 264     Recent Labs     07/24/21  0353 07/25/21 0220 07/26/21  0440    141 140   K 4.0 3.7 4.3   ANIONGAP 9 9 9    104 102   CO2 27 28 29   BUN 17 26* 29*   CREATININE 0.7 0.7 1.0   GLUCOSE 196* 136* 159*   CALCIUM 8.6* 8.5* 8.6*     Recent Labs     07/25/21  0220   MG 2.2     Recent Labs     07/24/21  0353 07/25/21 0220 07/26/21  0440   AST 27 22 27   ALT 10 8 8   BILITOT 0.4 <0.2 0.4   ALKPHOS 209* 152* 175*     ABGs:  Recent Labs     07/23/21 2233   PHART 7.290*   PFD3CJV 54.0*   PO2ART 54.0*   EHS2DUZ 26.0   BEART -1.1   HGBAE 10.3*   V6TBURKY 86.0*   CARBOXHGBART 2.1   02THERAPY Unknown     HgBA1c: No results for input(s): LABA1C in the last 72 hours. FLP:    Lab Results   Component Value Date    TRIG 132 07/17/2021    HDL 29 07/17/2021    LDLCALC 39 07/17/2021     TSH:    Lab Results   Component Value Date    TSH 1.960 12/12/2019     Troponin T:   Recent Labs     07/24/21  0353 07/24/21  1405   TROPONINI 0.23* 0.18*     INR: No results for input(s): INR in the last 72 hours.     Objective:   Vitals: BP (!) 133/97   Pulse 140   Temp 98.9 °F (37.2 °C) (Oral)   Resp 20   Ht 5' 7\" (1.702 m)   Wt 163 lb 1.6 oz (74 kg)   SpO2 95%   BMI 25.55 kg/m²   24HR INTAKE/OUTPUT:      Intake/Output Summary (Last 24 hours) at 7/26/2021 0720  Last data filed at 7/26/2021 3308  Gross per 24 hour   Intake 636 ml   Output 900 ml   Net -264 ml     Physical Exam      General appearance: somnolent, appears chronically ill, no acute distress  HEENT: atraumatic, eyes with clear conjunctiva and normal lids, pupils and irises normal, external ears and nose are normal,lips normal.  Neck: without masses, supple   Lungs: no increased work of breathing, minimal air exchange with rales noted in LLL, BiPAP in place  Heart: murmur noted, regular rate, distal pulses intact  Abdomen: soft, non-tender; bowel sounds normal; no masses,  no organomegaly  Genitourinary: No bladder fullness, masses, or tenderness, indwelling valencia  Extremities: BLE edema, R>L, surgical incision to R knee noted  Neurologic: somnolent, responds to pain, alert at times   Psychiatric: alert at times, restless, easily agitated  Skin: warm, dry, R knee surgical incision D/I    Assessment and Plan:   Principal Problem:    Septic arthritis ([address]  Active Problems:    S/P TAVR (transcatheter aortic valve replacement)    COPD (chronic obstructive pulmonary disease) (HCC)    Coronary artery disease involving native coronary artery of native heart without angina pectoris    Mixed hyperlipidemia    Essential hypertension    Palliative care patient  Resolved Problems:    * No resolved hospital problems. *      Visit Summary: Chart reviewed, patient discussed with nursing staff. I saw the patient at the bedside with his wife and son present. I discussed palliative care services and reason for consult. Patient's family was able to provide a good health history and baseline functional status. They did report that he was short of breath with exertion at baseline, was hopeful hat this would improve s/p TAVR, but likely he had severe underlying COPD that family reports he did not seek treatment for or have addressed.  He has continued to decline over the past few days despite aggressive treatment and would like to focus on comfort. I have reviewed hospice services, level of services, philosophy, and symptom management with them. Patient's spouse and family would like patient reviewed for inpatient level of care, hospice consult placed. He is requiring IV dilaudid for pain and air hunger, IV lorazepam for agitation and restlessness, continues to require BiPAP. I have discussed the patient with the hospice physician and patient has been approved for inpatient hospice. I have notified hospice staff, the attending, consulting physicians, and nursing staff of the outcome of my visit. I have adjusted frequency of comfort meds while patient is awaiting transition to hospice care. All questions answered, emotional support provided to family. Patient to transition to inpatient hospice. Recommendations:     1. Palliative Care- GOC to pursue hospice care, patient approved for transition to inpatient hospice  CODE STATUS- DNR  SYMPTOM MANAGEMENT- IV dilaudid and lorazepam frequency adjusted for comfort    2. Septic Arthritis- Ortho following, s/p explant and antibiotic spacer, IV abx, ID following, labs and cultures trended    3. CAD- s/p TAVR, JESSICA completed with no endocarditis/vegetation    4. COPD- Currently on BiPAP, wean O2 as maria luisa, nebulizers    5. Hallucintaions- hx of bilateral occipital strokes, neurology following    7. Air hunger- IV dilaudid PRN for comfort    8. Agitation- IV lorazepam PRN for comfort    Thank you for consulting palliative care and allowing us to participate in the care of the patient.       CounselingTopics: Goals of care, Code Status, Disease process education, pt/family support    Time Spent Counseling > 50%:  YES                                   Total Time Spent: 115m    Electronically signed by SHIMON Mackey on 7/26/2021 at 7:20 AM    (Please note that portions of this note were completed with a voice recognition program.  Efforts were made to edit the dictations but occasionally words are mis-transcribed.)

## 2021-07-26 NOTE — PROGRESS NOTES
Condition remains controlled.  Continue sertraline 25 mg daily.   Pt is unable to take PO medications this morning. He is too weak and resp are labored at this time. Bipap is being utilized. Has PICC for IV meds.

## 2021-07-26 NOTE — SIGNIFICANT EVENT
Decline in condition    This is the third or fourth time, this gentleman is going thru this very same episodes of respiratory distress due to flash pulmonary edema     He has severe heart disease despite all of the interventions that have been done     Mitral regurgitation present       He has had a TAVR and has mild aortic regurg present. (diffuse murmurs heard throughout precordium)     Ef is 55 percent with diastolic dysfunction present     He has severe end stage copd and still has been smoking daily. His exercise tolerance and quality of life significantly diminished (his own words, \" well I just sit on the porch now,\"). Addendum   Have stayed on the floor with patient and family and working on measures to make him more comfortable so that he can converse with wife and family. However, we are not able to get to the point, where he can converse without losing his breath. Definition of care going forward and what family and patient goals are. :    1.   Patient does not wish to continue living like this, with repeated episodes of what he perceives as smothering and sensation that he cannot get enough air in to breathe. He is deathly afraid of this type of sx. (both his parents  of lung cancer and  with sx of gasping for breath. ). 2.  He has been adamant since I first saw him at rapid response that he did not want to be intubated. However, the second time moved to icu and wife and family (brother ) there and they both convinced him to at least try bipap and intubation was not required at that point. He did get better for a short while and improved enough to get to the pcu and then again this evening had another severe decompensation episode of respiratory distress and tachypnea, and tachycardia and had to be placed on bipap again. Wife and family came back in for evaluation and conversation as to how to proceed further.      Extended conversations and assessment of the patient and going over notes in the chart and determining a plan of care to go over with family. 3.  He has been chronically and critically ill for a long time now. He had hoped post TAVR he would have gotten better and this has not been case. Repeated episodes of chf and copd exacerbation due to poor reserves and expression of long term disease       4. Wife states, he has very \"mild\" copd. Unfortunately, I have had to disagree with her assessment. He has no air movement that when he is at steady state that you can hear. Three days, ago, when he was stable, he had no air movement noted at that time as well   Minimal air movement now. Wheezing diffusely noted. Will continue steroids, bronchodilators and supportive care. He has not needed oxygen at home until this visit. 5.  Acute cardiopulmonary failure that has not responded to extensive aggressive measures,     6. Family has finally agreed to his wishes of not wanting   cpr  No chest compressions  No intubation   No vasopressors  No aggressive measures, except to continue bipap for now     7. We will not be detracting anything at this point,   Continue diuretics and bronchodilators and steroids given   And pain meds for knee liberalized   And morphine and ativan for air hunger liberalized     8. Conversation in am with hospice and palliative care. 5.  Wife is adamant about not letting him die at home. Discussion in am with family and hospice team,if their schedule allows    Comfort measures for now     Vitals   Stable   rr is still 35 to 44   Anxious as soon as you touch him   But calms down quickly as well   Family all around him   cv rrr with systolic and diastolic murmur heard like a machinery in his chest   Lungs diminshed breath sounds, all I can hear is crackles and exp wheezes througout   abd hypoactive bowel sounds   Right knee, he keeps reaching for , as it is painful.    Given pain meds   He wants to reposition leg and this was

## 2021-07-27 PROBLEM — J96.21 ACUTE ON CHRONIC RESPIRATORY FAILURE WITH HYPOXIA (HCC): Status: ACTIVE | Noted: 2021-01-01

## 2021-07-27 PROBLEM — I27.20 PULMONARY HYPERTENSION (HCC): Status: ACTIVE | Noted: 2021-01-01

## 2021-07-27 PROBLEM — I63.9 OCCIPITAL CEREBRAL INFARCTION (HCC): Status: ACTIVE | Noted: 2021-01-01

## 2021-08-17 LAB
FUNGUS (MYCOLOGY) CULTURE: NORMAL
KOH PREP: NORMAL

## 2021-09-01 RX ORDER — CLOPIDOGREL BISULFATE 75 MG/1
TABLET ORAL
Qty: 90 TABLET | Refills: 1 | OUTPATIENT
Start: 2021-09-01

## 2023-05-15 NOTE — PROGRESS NOTES
AVS & MYNX BOOKLETS EXPLAINED & GIVEN. PT & WIFE VOICED UNDERSTANDINGS. TRANSPORTED PT TO CAR VIA W/C WITH ALL BELONGINGS FOR DISCHARGE HOME PER WIFE.
Cardiac catheterization preliminary note:    Patent stents mid LAD and mid RCA with intermediate restenosis noted in previous mid circumflex stent. Severe aortic stenosis (mean gradient 40 to 45 mmHg)  Normal LV ejection fraction. Plan: CT surgery evaluation for SAVR versus TAVR  Can hold Plavix if necessary dependent on procedure.
OLD DRESSING REMOVED FROM RT GROIN. QUICK CLOT PLACED AT RT GROIN. MANUAL PRESSURE HELD X 5 MIN. OPSITE APPLIED OVER GUAZE DRESSING. SITE NOW CLEAR.
PT OOB WITH RN @ SIDE. PT AMBULATED TO BATHROOM TO VOID. PT THEN AMBULATED IN CATH HOLDING HALLS WITHOUT S/S BLEEDING NOTED. DRESSING CONTS D/I. PT DENIES ANY PAIN OR DISCOMFORT.
2 = A lot of assistance

## (undated) DEVICE — BIPOLAR SEALER 23-113-1 AQM 2.3 OM NEURO: Brand: AQUAMANTYS ®

## (undated) DEVICE — THE MILL DISPOSABLE - MEDIUM

## (undated) DEVICE — T-MAX DISPOSABLE FACE MASK 8 PER BOX

## (undated) DEVICE — SUTURE MCRYL SZ 4-0 L18IN ABSRB UD L19MM PS-2 3/8 CIR PRIM Y496G

## (undated) DEVICE — GLOVE SURG SZ 85 L12IN FNGR ORTHO 126MIL CRM LTX FREE

## (undated) DEVICE — TRAY SURG PROC CHOLE FLX

## (undated) DEVICE — HYPODERMIC SAFETY NEEDLE: Brand: MAGELLAN

## (undated) DEVICE — GLOVE SURG SZ 75 L12IN FNGR THK94MIL TRNSLUC YEL LTX

## (undated) DEVICE — DRESSING FOAM W4XL12IN SIL RECT ADH WTRPRF FLM BK W/ BORD

## (undated) DEVICE — COVER,TABLE,44X90,STERILE: Brand: MEDLINE

## (undated) DEVICE — NEURO CDS

## (undated) DEVICE — INSERT CUSHION HEAD PRONEVIEW

## (undated) DEVICE — DUAL CUT SAGITTAL BLADE

## (undated) DEVICE — CURAVIEW LED LARYNSCP BLDE

## (undated) DEVICE — SUTURE ABSRB BRAID COAT UD CP NO 2 27IN VCRL J195H

## (undated) DEVICE — DRESSING BORDERED ADH GZ UNIV GEN USE 8INX4IN AND 6INX2IN

## (undated) DEVICE — Device

## (undated) DEVICE — C-ARMOR C-ARM EQUIPMENT COVERS CLEAR STERILE UNIVERSAL FIT 12 PER CASE: Brand: C-ARMOR

## (undated) DEVICE — 4.0MM PRECISION ROUND

## (undated) DEVICE — UNDERGLOVE SURG SZ 8 FNGR THK0.21MIL GRN LTX BEAD CUF

## (undated) DEVICE — HANDPIECE SET WITH COAXIAL MULTI-ORIFICE TIP AND SUCTION TUBE: Brand: INTERPULSE

## (undated) DEVICE — YANKAUER SUCTION INSTRUMENT WITHOUT CONTROL VENT, OPEN TIP, CLEAR: Brand: YANKAUER

## (undated) DEVICE — CONTAINER,SPECIMEN,OR STERILE,4OZ: Brand: MEDLINE

## (undated) DEVICE — FORCEP BPLR IRIS

## (undated) DEVICE — KIT POS FOAM ARM CRADL BILAT CHST PD CVR HIP HNG CVR L

## (undated) DEVICE — SPONGE LAP W18XL18IN WHT COT 4 PLY FLD STRUNG RADPQ DISP ST

## (undated) DEVICE — ASTOUND STANDARD SURGICAL GOWN, XXL: Brand: CONVERTORS

## (undated) DEVICE — GLOVE SURG SZ 75 L12IN FNGR THK87MIL DK GRN LTX FREE ISOLEX

## (undated) DEVICE — SHOULDER STABILIZATION KIT,                                    DISPOSABLE 12 PER BOX

## (undated) DEVICE — GAUZE,SPONGE,AVANT,4"X4",4PLY,STRL,10/TR: Brand: MEDLINE

## (undated) DEVICE — SUTURE NRLN SZ 4-0 L18IN NONABSORBABLE BLK L13MM TF 1/2 CIR C584D

## (undated) DEVICE — ARGYLEPOLYURETHANE UMBILICAL VESSEL CATHETERSINGLE LUMEN5 FR/CH(1.7 MM) X 9-8/10" (25 CM)0.41 ML PRIME VOLUME": Brand: ARGYLE

## (undated) DEVICE — TWIST DRILL 4.7MM DIA 127.0MM LONG

## (undated) DEVICE — SOLUTION IV IRRIG POUR BRL 0.9% SODIUM CHL 2F7124

## (undated) DEVICE — GLOVE SURG SZ 8 CRM LTX FREE POLYISOPRENE POLYMER BEAD ANTI

## (undated) DEVICE — SUTURE VCRL SZ 2-0 L36IN ABSRB UD L36MM CT-1 1/2 CIR J945H

## (undated) DEVICE — MASTISOL ADHESIVE LIQ 2/3ML

## (undated) DEVICE — BANDAGE GZ W2XL75IN ST RAYON POLY CNFRM STRTCH LTWT

## (undated) DEVICE — ELECTROSURGICAL PENCIL BUTTON SWITCH NON COATED BLADE ELECTRODE 10 FT (3 M) CORD HOLSTER: Brand: MEGADYNE

## (undated) DEVICE — BLADE SURG 4 MM EXT

## (undated) DEVICE — SHOULDER CDS

## (undated) DEVICE — BLADE SURG NO10 C STL DISP ST

## (undated) DEVICE — GLOVE SURG SZ 85 L12IN FNGR THK94MIL TRNSLUC YEL LTX

## (undated) DEVICE — SOLUTION IV 250ML 0.9% SOD CHL PH 5 INJ USP VIAFLX PLAS

## (undated) DEVICE — 3M™ IOBAN™ 2 ANTIMICROBIAL INCISE DRAPE 6651EZ: Brand: IOBAN™ 2

## (undated) DEVICE — 3M™ STERI-STRIP™ REINFORCED ADHESIVE SKIN CLOSURES, R1547, 1/2 IN X 4 IN (12 MM X 100 MM), 6 STRIPS/ENVELOPE: Brand: 3M™ STERI-STRIP™

## (undated) DEVICE — FORCEPS BX L240CM DIA2.4MM L NDL RAD JAW 4 133340

## (undated) DEVICE — CHLORAPREP 26ML ORANGE

## (undated) DEVICE — GLOVE SURG SZ 85 CRM LTX FREE POLYISOPRENE POLYMER BEAD ANTI

## (undated) DEVICE — STERILE POLYISOPRENE POWDER-FREE SURGICAL GLOVES: Brand: PROTEXIS

## (undated) DEVICE — DRAPE,UTILITY,XL,4/PK,STERILE: Brand: MEDLINE

## (undated) DEVICE — TUBE ET 7.5MM NSL ORAL BASIC CUF INTMED MURPHY EYE RADPQ

## (undated) DEVICE — SUTURE VCRL SZ 0 L27IN ABSRB UD L36MM CT-1 1/2 CIR J260H

## (undated) DEVICE — GLOVE SURG SZ 6 L12IN FNGR THK94MIL TRNSLUC YEL LTX HYDRGEL

## (undated) DEVICE — TISSUE RETRIEVAL SYSTEM: Brand: INZII RETRIEVAL SYSTEM

## (undated) DEVICE — GAUZE,SPONGE,4"X4",8PLY,STRL,LF,10/TRAY: Brand: MEDLINE

## (undated) DEVICE — SUTURE VCRL SZ 3-0 L27IN ABSRB UD L26MM SH 1/2 CIR J416H

## (undated) DEVICE — DRESSING FOAM W4XL10IN SIL RECT ADH WTRPRF FLM BK W/ BORD

## (undated) DEVICE — ZIMMER® STERILE DISPOSABLE TOURNIQUET CUFF WITH PLC, DUAL PORT, SINGLE BLADDER, 34 IN. (86 CM)

## (undated) DEVICE — ULTRACLEAN ACCESSORY ELECTRODE 1" (2.54 CM) COATED BLADE: Brand: ULTRACLEAN

## (undated) DEVICE — MICRO KOVER: Brand: UNBRANDED

## (undated) DEVICE — SUTURE SZ 0 27IN 5/8 CIR UR-6  TAPER PT VIOLET ABSRB VICRYL J603H

## (undated) DEVICE — SUTURE VCRL SZ 2-0 L18IN ABSRB UD CT-1 L36MM 1/2 CIR J839D

## (undated) DEVICE — SUTURE VCRL SZ 1 L18IN ABSRB UD L36MM CT-1 1/2 CIR J841D

## (undated) DEVICE — STERILE LATEX POWDER FREE SURGICAL GLOVES WITH HYDROGEL COATING: Brand: PROTEXIS

## (undated) DEVICE — COAXIAL FEMORAL CANAL TIP

## (undated) DEVICE — CEMENT MIXING SYSTEM WITH FEMORAL BREAKWAY NOZZLE: Brand: REVOLUTION

## (undated) DEVICE — SURGICAL PROCEDURE PACK LOWER EXTREMITY LOURDES HOSP

## (undated) DEVICE — SKIN AFFIX SURG ADHESIVE 72/CS 0.55ML: Brand: MEDLINE

## (undated) DEVICE — TOOL 21BA60 LEGEND 21CM 6MM BA: Brand: MIDAS REX

## (undated) DEVICE — SHEET,DRAPE,53X77,STERILE: Brand: MEDLINE

## (undated) DEVICE — TIBURON DRAPE TOWELS: Brand: CONVERTORS

## (undated) DEVICE — JP 3-SPRING RES W/10FR PVC DRAIN/TR: Brand: CARDINAL HEALTH

## (undated) DEVICE — Z INACTIVE USE 2660664 SOLUTION IRRIG 3000ML 0.9% SOD CHL USP UROMATIC PLAS CONT

## (undated) DEVICE — ARM BOARD PAD: Brand: DEVON

## (undated) DEVICE — GLOVE SURG SZ 85 L12IN FNGR THK79MIL GRN LTX FREE

## (undated) DEVICE — SPACER CEM DIA39MM +6MM OFFSET HUM TI UNIVERS REVERS: Type: IMPLANTABLE DEVICE | Site: SHOULDER | Status: NON-FUNCTIONAL

## (undated) DEVICE — BIPOLAR SEALER 23-112-1 AQM 6.0: Brand: AQUAMANTYS ®

## (undated) DEVICE — RECIPROCATING BLADE DOUBLE SIDE (74.0 X 0.77MM)

## (undated) DEVICE — DRESSING FOAM W10XL20CM ANTIMIC SELF ADH SAFETAC TECHNOLOGY

## (undated) DEVICE — GOWN,PRECEPT,XLNG/XXLARGE,STRL: Brand: MEDLINE

## (undated) DEVICE — SUTURE PERMAHAND SZ 2-0 L18IN NONABSORBABLE BLK L26MM FS 685G

## (undated) DEVICE — MINI ENDOCUT SCISSOR TIP, DISPOSABLE: Brand: RENEW

## (undated) DEVICE — 3M™ WARMING BLANKET, LOWER BODY, 10 PER CASE, 42568: Brand: BAIR HUGGER™

## (undated) DEVICE — C-ARM: Brand: UNBRANDED

## (undated) DEVICE — STRIP SKIN CLSR W1XL5IN NYL REINF CURAD

## (undated) DEVICE — SURGICAL PROCEDURE PACK KNEE TOT DBD CDS LOURDES HOSP LF

## (undated) DEVICE — GENERAL LAP CDS

## (undated) DEVICE — Z DISCONTINUED USE 2275676 GLOVE SURG SZ 65 L12IN FNGR THK87MIL DK GRN LTX FREE ISOLEX

## (undated) DEVICE — ADHESIVE SKIN CLSR 0.7ML TOP DERMBND ADV

## (undated) DEVICE — SUTURE ETHLN SZ 3-0 L30IN NONABSORBABLE BLK L36MM FSLX 3/8 1673BH

## (undated) DEVICE — JP PERF DRN SIL FLT 7MM FULL: Brand: CARDINAL HEALTH

## (undated) DEVICE — DRAPE,SHOULDER,BEACH CHAIR,STERILE: Brand: MEDLINE

## (undated) DEVICE — ENDO KIT,LOURDES HOSPITAL: Brand: MEDLINE INDUSTRIES, INC.

## (undated) DEVICE — Z DISCONTINUED USE 2272117 DRAPE SURG 3 QTR N INVASIVE 2 LAYR DISP

## (undated) DEVICE — BANDAGE COMPR W3INXL15FT BGE E SGL LAYERED CLP CLSR

## (undated) DEVICE — SUTURE ETHBND EXCEL SZ 5 L30IN NONABSORBABLE GRN L40MM V-37 MB66G